# Patient Record
Sex: MALE | Race: WHITE | NOT HISPANIC OR LATINO | URBAN - METROPOLITAN AREA
[De-identification: names, ages, dates, MRNs, and addresses within clinical notes are randomized per-mention and may not be internally consistent; named-entity substitution may affect disease eponyms.]

---

## 2022-07-05 ENCOUNTER — INPATIENT (INPATIENT)
Facility: HOSPITAL | Age: 72
LOS: 50 days | Discharge: HOME HEALTH | End: 2022-08-25
Attending: STUDENT IN AN ORGANIZED HEALTH CARE EDUCATION/TRAINING PROGRAM | Admitting: STUDENT IN AN ORGANIZED HEALTH CARE EDUCATION/TRAINING PROGRAM

## 2022-07-05 ENCOUNTER — EMERGENCY (EMERGENCY)
Facility: HOSPITAL | Age: 72
LOS: 1 days | Discharge: ANOTHER HOSPITAL | End: 2022-07-05
Attending: STUDENT IN AN ORGANIZED HEALTH CARE EDUCATION/TRAINING PROGRAM
Payer: COMMERCIAL

## 2022-07-05 VITALS
RESPIRATION RATE: 19 BRPM | SYSTOLIC BLOOD PRESSURE: 127 MMHG | HEART RATE: 68 BPM | OXYGEN SATURATION: 99 % | DIASTOLIC BLOOD PRESSURE: 83 MMHG | TEMPERATURE: 98 F

## 2022-07-05 VITALS
DIASTOLIC BLOOD PRESSURE: 77 MMHG | OXYGEN SATURATION: 97 % | SYSTOLIC BLOOD PRESSURE: 121 MMHG | WEIGHT: 169.98 LBS | RESPIRATION RATE: 18 BRPM | HEART RATE: 60 BPM | TEMPERATURE: 98 F

## 2022-07-05 VITALS — TEMPERATURE: 98 F | WEIGHT: 169.98 LBS | RESPIRATION RATE: 16 BRPM | HEIGHT: 73 IN | OXYGEN SATURATION: 99 %

## 2022-07-05 DIAGNOSIS — F33.9 MAJOR DEPRESSIVE DISORDER, RECURRENT, UNSPECIFIED: ICD-10-CM

## 2022-07-05 DIAGNOSIS — F33.2 MAJOR DEPRESSIVE DISORDER, RECURRENT SEVERE WITHOUT PSYCHOTIC FEATURES: ICD-10-CM

## 2022-07-05 LAB
ALBUMIN SERPL ELPH-MCNC: 3.9 G/DL — SIGNIFICANT CHANGE UP (ref 3.5–5)
ALP SERPL-CCNC: 74 U/L — SIGNIFICANT CHANGE UP (ref 40–120)
ALT FLD-CCNC: 21 U/L DA — SIGNIFICANT CHANGE UP (ref 10–60)
ANION GAP SERPL CALC-SCNC: 6 MMOL/L — SIGNIFICANT CHANGE UP (ref 5–17)
AST SERPL-CCNC: 18 U/L — SIGNIFICANT CHANGE UP (ref 10–40)
BASOPHILS # BLD AUTO: 0.03 K/UL — SIGNIFICANT CHANGE UP (ref 0–0.2)
BASOPHILS NFR BLD AUTO: 0.4 % — SIGNIFICANT CHANGE UP (ref 0–2)
BILIRUB SERPL-MCNC: 1.4 MG/DL — HIGH (ref 0.2–1.2)
BUN SERPL-MCNC: 23 MG/DL — HIGH (ref 7–18)
CALCIUM SERPL-MCNC: 9.6 MG/DL — SIGNIFICANT CHANGE UP (ref 8.4–10.5)
CHLORIDE SERPL-SCNC: 105 MMOL/L — SIGNIFICANT CHANGE UP (ref 96–108)
CO2 SERPL-SCNC: 31 MMOL/L — SIGNIFICANT CHANGE UP (ref 22–31)
CREAT SERPL-MCNC: 1.15 MG/DL — SIGNIFICANT CHANGE UP (ref 0.5–1.3)
EGFR: 68 ML/MIN/1.73M2 — SIGNIFICANT CHANGE UP
EOSINOPHIL # BLD AUTO: 0.15 K/UL — SIGNIFICANT CHANGE UP (ref 0–0.5)
EOSINOPHIL NFR BLD AUTO: 2.2 % — SIGNIFICANT CHANGE UP (ref 0–6)
ETHANOL SERPL-MCNC: <3 MG/DL — SIGNIFICANT CHANGE UP (ref 0–10)
GLUCOSE SERPL-MCNC: 94 MG/DL — SIGNIFICANT CHANGE UP (ref 70–99)
HCT VFR BLD CALC: 39.9 % — SIGNIFICANT CHANGE UP (ref 39–50)
HGB BLD-MCNC: 13.5 G/DL — SIGNIFICANT CHANGE UP (ref 13–17)
IMM GRANULOCYTES NFR BLD AUTO: 0.4 % — SIGNIFICANT CHANGE UP (ref 0–1.5)
LYMPHOCYTES # BLD AUTO: 1.21 K/UL — SIGNIFICANT CHANGE UP (ref 1–3.3)
LYMPHOCYTES # BLD AUTO: 17.4 % — SIGNIFICANT CHANGE UP (ref 13–44)
MAGNESIUM SERPL-MCNC: 2.3 MG/DL — SIGNIFICANT CHANGE UP (ref 1.6–2.6)
MCHC RBC-ENTMCNC: 29.3 PG — SIGNIFICANT CHANGE UP (ref 27–34)
MCHC RBC-ENTMCNC: 33.8 GM/DL — SIGNIFICANT CHANGE UP (ref 32–36)
MCV RBC AUTO: 86.6 FL — SIGNIFICANT CHANGE UP (ref 80–100)
MONOCYTES # BLD AUTO: 0.61 K/UL — SIGNIFICANT CHANGE UP (ref 0–0.9)
MONOCYTES NFR BLD AUTO: 8.8 % — SIGNIFICANT CHANGE UP (ref 2–14)
NEUTROPHILS # BLD AUTO: 4.92 K/UL — SIGNIFICANT CHANGE UP (ref 1.8–7.4)
NEUTROPHILS NFR BLD AUTO: 70.8 % — SIGNIFICANT CHANGE UP (ref 43–77)
NRBC # BLD: 0 /100 WBCS — SIGNIFICANT CHANGE UP (ref 0–0)
PLATELET # BLD AUTO: 191 K/UL — SIGNIFICANT CHANGE UP (ref 150–400)
POTASSIUM SERPL-MCNC: 3.6 MMOL/L — SIGNIFICANT CHANGE UP (ref 3.5–5.3)
POTASSIUM SERPL-SCNC: 3.6 MMOL/L — SIGNIFICANT CHANGE UP (ref 3.5–5.3)
PROT SERPL-MCNC: 7.5 G/DL — SIGNIFICANT CHANGE UP (ref 6–8.3)
RBC # BLD: 4.61 M/UL — SIGNIFICANT CHANGE UP (ref 4.2–5.8)
RBC # FLD: 13.7 % — SIGNIFICANT CHANGE UP (ref 10.3–14.5)
SARS-COV-2 RNA SPEC QL NAA+PROBE: SIGNIFICANT CHANGE UP
SODIUM SERPL-SCNC: 142 MMOL/L — SIGNIFICANT CHANGE UP (ref 135–145)
TSH SERPL-MCNC: 1.08 UU/ML — SIGNIFICANT CHANGE UP (ref 0.34–4.82)
WBC # BLD: 6.95 K/UL — SIGNIFICANT CHANGE UP (ref 3.8–10.5)
WBC # FLD AUTO: 6.95 K/UL — SIGNIFICANT CHANGE UP (ref 3.8–10.5)

## 2022-07-05 PROCEDURE — 99284 EMERGENCY DEPT VISIT MOD MDM: CPT

## 2022-07-05 PROCEDURE — 96374 THER/PROPH/DIAG INJ IV PUSH: CPT

## 2022-07-05 PROCEDURE — 96375 TX/PRO/DX INJ NEW DRUG ADDON: CPT

## 2022-07-05 PROCEDURE — 80307 DRUG TEST PRSMV CHEM ANLYZR: CPT

## 2022-07-05 PROCEDURE — 93005 ELECTROCARDIOGRAM TRACING: CPT

## 2022-07-05 PROCEDURE — 36415 COLL VENOUS BLD VENIPUNCTURE: CPT

## 2022-07-05 PROCEDURE — 80053 COMPREHEN METABOLIC PANEL: CPT

## 2022-07-05 PROCEDURE — 83735 ASSAY OF MAGNESIUM: CPT

## 2022-07-05 PROCEDURE — 85025 COMPLETE CBC W/AUTO DIFF WBC: CPT

## 2022-07-05 PROCEDURE — 84484 ASSAY OF TROPONIN QUANT: CPT

## 2022-07-05 PROCEDURE — 99285 EMERGENCY DEPT VISIT HI MDM: CPT | Mod: 25

## 2022-07-05 PROCEDURE — 84443 ASSAY THYROID STIM HORMONE: CPT

## 2022-07-05 PROCEDURE — 87635 SARS-COV-2 COVID-19 AMP PRB: CPT

## 2022-07-05 RX ORDER — SODIUM CHLORIDE 9 MG/ML
1000 INJECTION INTRAMUSCULAR; INTRAVENOUS; SUBCUTANEOUS ONCE
Refills: 0 | Status: COMPLETED | OUTPATIENT
Start: 2022-07-05 | End: 2022-07-05

## 2022-07-05 RX ORDER — DIAZEPAM 5 MG
10 TABLET ORAL ONCE
Refills: 0 | Status: DISCONTINUED | OUTPATIENT
Start: 2022-07-05 | End: 2022-07-05

## 2022-07-05 RX ORDER — KETOROLAC TROMETHAMINE 30 MG/ML
15 SYRINGE (ML) INJECTION ONCE
Refills: 0 | Status: DISCONTINUED | OUTPATIENT
Start: 2022-07-05 | End: 2022-07-05

## 2022-07-05 RX ADMIN — Medication 15 MILLIGRAM(S): at 13:51

## 2022-07-05 RX ADMIN — Medication 15 MILLIGRAM(S): at 14:21

## 2022-07-05 RX ADMIN — SODIUM CHLORIDE 1000 MILLILITER(S): 9 INJECTION INTRAMUSCULAR; INTRAVENOUS; SUBCUTANEOUS at 13:54

## 2022-07-05 RX ADMIN — Medication 10 MILLIGRAM(S): at 17:58

## 2022-07-05 NOTE — ED ADULT NURSE NOTE - NSIMPLEMENTINTERV_GEN_ALL_ED
Implemented All Fall with Harm Risk Interventions:  [place] to call system. Call bell, personal items and telephone within reach. Instruct patient to call for assistance. Room bathroom lighting operational. Non-slip footwear when patient is off stretcher. Physically safe environment: no spills, clutter or unnecessary equipment. Stretcher in lowest position, wheels locked, appropriate side rails in place. Provide visual cue, wrist band, yellow gown, etc. Monitor gait and stability. Monitor for mental status changes and reorient to person, place, and time. Review medications for side effects contributing to fall risk. Reinforce activity limits and safety measures with patient and family. Provide visual clues: red socks.

## 2022-07-05 NOTE — ED BEHAVIORAL HEALTH ASSESSMENT NOTE - ELEVATED CHRONIC RISK
Cheiloplasty (Complex) Text: A decision was made to reconstruct the defect with a  cheiloplasty.  The defect was undermined extensively.  Additional obicularis oris muscle was excised with a 15 blade scalpel.  The defect was converted into a full thickness wedge to facilite a better cosmetic result.  Small vessels were then tied off with 5-0 monocyrl. The obicularis oris, superficial fascia, adipose and dermis were then reapproximated.  After the deeper layers were approximated the epidermis was reapproximated with particular care given to realign the vermilion border. Yes

## 2022-07-05 NOTE — ED BEHAVIORAL HEALTH ASSESSMENT NOTE - DETAILS
Discussed with ED attending see HPI, reports that he has walked into traffic, reports that he plans on stopping all medications reports that he had put a  hit out on a drug dealer years ago but did not give details Would not impact clinical decisionmaking at this time reports some shortness of breath though no apparent distress on exam at this time Patient is referent, did not give permission to speak to brother

## 2022-07-05 NOTE — ED PROVIDER NOTE - CLINICAL SUMMARY MEDICAL DECISION MAKING FREE TEXT BOX
71M presenting with chronic insomnia and difficulty urinating. endorses some passive SI. will get labs, psych consult. will reassess.

## 2022-07-05 NOTE — ED BEHAVIORAL HEALTH ASSESSMENT NOTE - RISK ASSESSMENT
High Acute Suicide Risk risk factors include suicide ideation, report of prior attempts, insomnia, hopelessness, multiple medical conditions, lack of current outpatient care, social isolation, elderly white male. protective factors include lack of access to firearms, lack of psychosis, sobriety

## 2022-07-05 NOTE — BH PATIENT PROFILE - FALL HARM RISK - UNIVERSAL INTERVENTIONS
Bed in lowest position, wheels locked, appropriate side rails in place/Call bell, personal items and telephone in reach/Instruct patient to call for assistance before getting out of bed or chair/Non-slip footwear when patient is out of bed/[place] to call system/Physically safe environment - no spills, clutter or unnecessary equipment/Purposeful Proactive Rounding/Room/bathroom lighting operational, light cord in reach

## 2022-07-05 NOTE — ED BEHAVIORAL HEALTH ASSESSMENT NOTE - HPI (INCLUDE ILLNESS QUALITY, SEVERITY, DURATION, TIMING, CONTEXT, MODIFYING FACTORS, ASSOCIATED SIGNS AND SYMPTOMS)
71M, unemployed, living with brother, with PMH of HTN, HLD, urinary incontinence 2/2 unclear prostate issues, R leg pain from arthritis, psych hx of MDD, OCD, "mixed personality disorder", reports hx of walking into traffic and of hitting himself, at least two prior hospitalizations ([place] 1/4/22-2/24/2022, [facility] 10/22-11/15/2021 for depression w psychosis), has previously been seen at [place] ED, now BIBEMS unclear who called after patient reported multiple somatic complaints and then reported that he wanted to kill himself.     Patient states that he has been dealing with multiple medical issues including urinary incontinence and leg pain, and recently shortness of breath, that he cannot get these taken care of (unclear reasons). Patient states that he has felt increasingly depressed, not able to enjoy himself at all, that he no longer has anyone to talk to because hes pushed them all away, reports sleeping at most 2 hours a night, hes been losing weight, feeling hopeless, and increasingly suicidal. He states that he doesn't want to go on anymore, has thought about walking into traffic, touching a live wire, and states that he no longer wants to take any of his medications so that he will die. He does not know what he takes, stating that he has medications for high blood pressure, cholesterol, prostate issues, denies taking psych meds for years stating that they have all stopped working but that prozac worked in the past. Denies that he currently has any outpatient psychiatrist or therapist though stated that he had someone who he spoke with at [facility] who helped him with tasks from time to time but that this program is ending soon. He doesn't remember their name or contact information and states that there is nobody that we can talk to. He states that he had one best friend but that this person doesn't have time for him anymore and he doesn't want to burden her.     He denies auditory, visual, or tactile hallucinations, denies paranoia though states that the psychiatrist at [facility] has lied to him in the past and locked him away. He states that he used to drink alcohol but hasn't in decades, denies any drug use, states that he is incredibly anti nicotine.    He states that he is open to inpatient admission. He did not give permission for us to speak with brother or any collateral other than his pharmacy and doctor if we could find the name

## 2022-07-05 NOTE — ED ADULT NURSE NOTE - OBJECTIVE STATEMENT
BIBA c/o chest pressure, frequent urination, leg pain, difficulty walking, on arrival patient says he does not feel like living, not intentions of hurting himself, but wishes that  he may pass away. denies homicidal ideation.

## 2022-07-05 NOTE — ED BEHAVIORAL HEALTH ASSESSMENT NOTE - NS ED BHA PLAN HOLD IN ED BH CONTACTED FT
No last  provider contact information. Will call [facility] and obtain medication list and PMD

## 2022-07-05 NOTE — ED BEHAVIORAL HEALTH ASSESSMENT NOTE - DIFFERENTIAL
MDD, recurrent, severe without psychosis, r/o adjustment, mood disorder secondary to medical conditions, bipolar, schizoaffective, malingering

## 2022-07-05 NOTE — ED PROVIDER NOTE - PROGRESS NOTE DETAILS
telepsych consulted. Tomás Perry pt calm after valium. accepted vol to [facility] dr kennedy [address] [place] [address]

## 2022-07-05 NOTE — ED BEHAVIORAL HEALTH ASSESSMENT NOTE - SUMMARY
71M, unemployed, living with brother, with PMH of HTN, HLD, urinary incontinence 2/2 unclear prostate issues, R leg pain from arthritis, psych hx of MDD, OCD, "mixed personality disorder", reports hx of walking into traffic and of hitting himself, at least two prior hospitalizations ([place] 1/4/22-2/24/2022, [facility] 10/22-11/15/2021 for depression w psychosis), has previously been seen at [place] ED, now BIBEMS unclear who called after patient reported multiple somatic complaints and then reported that he wanted to kill himself.     Patient reports shortness of breath and some chest pressure and as such will need medical clearance    If cleared, he reports symptoms consistent with worsening depression in the form of low mood, hopelessness, insomnia, anhedonia, decreased PO intake, and worsening suicide ideation with plan to stop all of his medications. At this time he would meet inpatient hospitalization criteria for safety, stabilization, possible medication titration, and is currently appropriate for voluntary admission

## 2022-07-05 NOTE — ED PROVIDER NOTE - OBJECTIVE STATEMENT
71M, pmh of htn, hld, presenting with chronic leg pain and difficulty urinating. patient is unsure when he last saw a doctor. "don't know if i'm coming or going". has also been unable to sleep. would like surgery to fix his prostate problems. wishes he would die if his medical problems cannot be fixed. no fever, chest pain, shortness of breath, nausea, vomiting, or abdominal pain. has a headache.

## 2022-07-05 NOTE — ED ADULT TRIAGE NOTE - CHIEF COMPLAINT QUOTE
patient complains of difficulty x months, chest pressure x month, continously urinating, " I feel like its better of if someone just kill me "

## 2022-07-05 NOTE — ED BEHAVIORAL HEALTH NOTE - BEHAVIORAL HEALTH NOTE
Spoke to pharmacist at  and  pharmacy: Confirmed patients medications to be tamsulosin 0.4mg PO qDay, finesteride 5mg PO qDay, nifedipine ER 30mg PO qDay, simvastatin 20mg PO qDay, oxybutinin ER 5mg PO qDay, Drs ashley Brewer and Liliana Morales ([phone]), zeke walker [phone]. All medications were picked up 6/8/2022, 30 day supply. She reports that patient ambulates independently as far as she knows, not surprised that he does not remember what medications he takes as he gets a blister pack.

## 2022-07-05 NOTE — ED BEHAVIORAL HEALTH ASSESSMENT NOTE - CURRENT MEDICATION
denies psychiatric medications, reports that he takes multiple medical medications but doesn't remember what

## 2022-07-06 LAB
A1C WITH ESTIMATED AVERAGE GLUCOSE RESULT: 5.1 % — SIGNIFICANT CHANGE UP (ref 4–5.6)
CHOLEST SERPL-MCNC: 128 MG/DL — SIGNIFICANT CHANGE UP
ESTIMATED AVERAGE GLUCOSE: 100 — SIGNIFICANT CHANGE UP
FOLATE SERPL-MCNC: 6.7 NG/ML — SIGNIFICANT CHANGE UP (ref 3.1–17.5)
HDLC SERPL-MCNC: 52 MG/DL — SIGNIFICANT CHANGE UP
LIPID PNL WITH DIRECT LDL SERPL: 61 MG/DL — SIGNIFICANT CHANGE UP
NON HDL CHOLESTEROL: 76 MG/DL — SIGNIFICANT CHANGE UP
TRIGL SERPL-MCNC: 74 MG/DL — SIGNIFICANT CHANGE UP
VIT B12 SERPL-MCNC: 335 PG/ML — SIGNIFICANT CHANGE UP (ref 200–900)
VIT D25+D1,25 OH+D1,25 PNL SERPL-MCNC: 52.1 PG/ML — SIGNIFICANT CHANGE UP (ref 19.9–79.3)

## 2022-07-06 PROCEDURE — 99223 1ST HOSP IP/OBS HIGH 75: CPT

## 2022-07-06 PROCEDURE — 99222 1ST HOSP IP/OBS MODERATE 55: CPT

## 2022-07-06 RX ORDER — MIRTAZAPINE 45 MG/1
7.5 TABLET, ORALLY DISINTEGRATING ORAL ONCE
Refills: 0 | Status: COMPLETED | OUTPATIENT
Start: 2022-07-06 | End: 2022-07-06

## 2022-07-06 RX ORDER — DIPHENHYDRAMINE HCL 50 MG
25 CAPSULE ORAL ONCE
Refills: 0 | Status: DISCONTINUED | OUTPATIENT
Start: 2022-07-06 | End: 2022-07-06

## 2022-07-06 RX ORDER — CHOLECALCIFEROL (VITAMIN D3) 125 MCG
2000 CAPSULE ORAL ONCE
Refills: 0 | Status: COMPLETED | OUTPATIENT
Start: 2022-07-06 | End: 2022-07-06

## 2022-07-06 RX ORDER — TAMSULOSIN HYDROCHLORIDE 0.4 MG/1
0.4 CAPSULE ORAL DAILY
Refills: 0 | Status: DISCONTINUED | OUTPATIENT
Start: 2022-07-06 | End: 2022-08-25

## 2022-07-06 RX ORDER — DIPHENHYDRAMINE HCL 50 MG
25 CAPSULE ORAL EVERY 6 HOURS
Refills: 0 | Status: DISCONTINUED | OUTPATIENT
Start: 2022-07-06 | End: 2022-07-06

## 2022-07-06 RX ORDER — FINASTERIDE 5 MG/1
5 TABLET, FILM COATED ORAL DAILY
Refills: 0 | Status: DISCONTINUED | OUTPATIENT
Start: 2022-07-06 | End: 2022-08-25

## 2022-07-06 RX ORDER — HALOPERIDOL DECANOATE 100 MG/ML
2.5 INJECTION INTRAMUSCULAR EVERY 6 HOURS
Refills: 0 | Status: DISCONTINUED | OUTPATIENT
Start: 2022-07-06 | End: 2022-08-25

## 2022-07-06 RX ORDER — HALOPERIDOL DECANOATE 100 MG/ML
2.5 INJECTION INTRAMUSCULAR ONCE
Refills: 0 | Status: DISCONTINUED | OUTPATIENT
Start: 2022-07-06 | End: 2022-08-25

## 2022-07-06 RX ORDER — OXYBUTYNIN CHLORIDE 5 MG
5 TABLET ORAL DAILY
Refills: 0 | Status: DISCONTINUED | OUTPATIENT
Start: 2022-07-06 | End: 2022-08-25

## 2022-07-06 RX ORDER — CHOLECALCIFEROL (VITAMIN D3) 125 MCG
2000 CAPSULE ORAL DAILY
Refills: 0 | Status: DISCONTINUED | OUTPATIENT
Start: 2022-07-06 | End: 2022-08-25

## 2022-07-06 RX ORDER — SIMVASTATIN 20 MG/1
20 TABLET, FILM COATED ORAL DAILY
Refills: 0 | Status: DISCONTINUED | OUTPATIENT
Start: 2022-07-06 | End: 2022-08-25

## 2022-07-06 RX ORDER — NIFEDIPINE 30 MG
30 TABLET, EXTENDED RELEASE 24 HR ORAL DAILY
Refills: 0 | Status: DISCONTINUED | OUTPATIENT
Start: 2022-07-06 | End: 2022-08-25

## 2022-07-06 RX ADMIN — Medication 1 TABLET(S): at 17:36

## 2022-07-06 RX ADMIN — Medication 5 MILLIGRAM(S): at 09:51

## 2022-07-06 RX ADMIN — MIRTAZAPINE 7.5 MILLIGRAM(S): 45 TABLET, ORALLY DISINTEGRATING ORAL at 02:00

## 2022-07-06 RX ADMIN — SIMVASTATIN 20 MILLIGRAM(S): 20 TABLET, FILM COATED ORAL at 09:51

## 2022-07-06 RX ADMIN — Medication 30 MILLIGRAM(S): at 09:50

## 2022-07-06 RX ADMIN — FINASTERIDE 5 MILLIGRAM(S): 5 TABLET, FILM COATED ORAL at 09:51

## 2022-07-06 RX ADMIN — TAMSULOSIN HYDROCHLORIDE 0.4 MILLIGRAM(S): 0.4 CAPSULE ORAL at 09:51

## 2022-07-06 NOTE — BH INPATIENT PSYCHIATRY ASSESSMENT NOTE - DETAILS
reports that he had put a  hit out on a drug dealer years ago but did not give details see HPI, reports that he has walked into traffic, reports that he plans on stopping all medications. Verbalized conditional suicidality such as If you don't find my phone I will harm myself. However denied thoughts of hurting self in hospital when not discussing missing phone Would not impact clinical decisionmaking at this time

## 2022-07-06 NOTE — CONSULT NOTE ADULT - SUBJECTIVE AND OBJECTIVE BOX
HPI: 71M admitted to [facility] 7/5/22 for depression with several medical complaints.  He reports urinary frequency and is taking proscar and flomax but says they don''t help.  He states that there is "no urologist for him to see" due to limited mobility due to right knee pain.  His upper back constantly itches.  Has insomnia    PAST MEDICAL & SURGICAL HISTORY:  BPH    Review of Systems:   CONSTITUTIONAL: No fever, weight loss, or fatigue  EYES: No eye pain, visual disturbances, or discharge  ENMT:  No difficulty hearing, tinnitus, vertigo; No sinus or throat pain  NECK: No pain or stiffness  RESPIRATORY: No cough, wheezing, chills or hemoptysis; No shortness of breath  CARDIOVASCULAR: No chest pain, palpitations, dizziness, or leg swelling  GASTROINTESTINAL: No abdominal or epigastric pain. No nausea, vomiting, or hematemesis; No diarrhea or constipation. No melena or hematochezia.  GENITOURINARY: see HPI  NEUROLOGICAL: No headaches, memory loss, loss of strength, numbness, or tremors  SKIN: see HPI   LYMPH NODES: No enlarged glands  ENDOCRINE: No heat or cold intolerance; No hair loss  MUSCULOSKELETAL: see HPI  HEME/LYMPH: No easy bruising, or bleeding gums  ALLERY AND IMMUNOLOGIC: No hives or eczema    Allergies    penicillin (Hives)    Intolerances        Social History: no etoh ton idu    FAMILY HISTORY:      MEDICATIONS  (STANDING):  cholecalciferol 2000 Unit(s) Oral once  cholecalciferol 2000 Unit(s) Oral daily  finasteride 5 milliGRAM(s) Oral daily  multivitamin      multivitamin 1 Tablet(s) Oral once  NIFEdipine XL 30 milliGRAM(s) Oral daily  oxybutynin 5 milliGRAM(s) Oral daily  simvastatin 20 milliGRAM(s) Oral daily  tamsulosin 0.4 milliGRAM(s) Oral daily    MEDICATIONS  (PRN):  haloperidol     Tablet 2.5 milliGRAM(s) Oral every 6 hours PRN agitation  haloperidol    Injectable 2.5 milliGRAM(s) IntraMuscular once PRN severe agitation  LORazepam     Tablet 0.5 milliGRAM(s) Oral every 6 hours PRN anxiety or insomnia      Vital Signs Last 24 Hrs  T(C): 36.2 (06 Jul 2022 07:59), Max: 36.7 (05 Jul 2022 16:41)  T(F): 97.2 (06 Jul 2022 07:59), Max: 98.1 (05 Jul 2022 16:41)  HR: 68 (05 Jul 2022 22:11) (62 - 68)  BP: 127/83 (05 Jul 2022 22:11) (123/67 - 133/82)  BP(mean): --  RR: 16 (05 Jul 2022 23:42) (16 - 19)  SpO2: 99% (05 Jul 2022 23:42) (97% - 99%)  CAPILLARY BLOOD GLUCOSE            PHYSICAL EXAM:  GENERAL: NAD, well-developed  HEAD:  Atraumatic, Normocephalic  EYES: EOMI, conjunctiva and sclera clear  NECK: Supple, No JVD  CHEST/LUNG: Clear to auscultation bilaterally; No wheeze  HEART: Regular rate and rhythm; No murmurs, rubs, or gallops  ABDOMEN: Soft, Nontender, Nondistended; Bowel sounds present  EXTREMITIES:  2+ Peripheral Pulses, No clubbing, cyanosis, or edema  R Knee no swelling or erythema, ttp when flexing  NEUROLOGY: non-focal  SKIN: no rash on back, multiple SKs    LABS:                        13.5   6.95  )-----------( 191      ( 05 Jul 2022 13:47 )             39.9     07-05    142  |  105  |  23<H>  ----------------------------<  94  3.6   |  31  |  1.15    Ca    9.6      05 Jul 2022 13:47  Mg     2.3     07-05    TPro  7.5  /  Alb  3.9  /  TBili  1.4<H>  /  DBili  x   /  AST  18  /  ALT  21  /  AlkPhos  74  07-05                Care Discussed with Consultants/Other Providers: Dr Iniguez

## 2022-07-06 NOTE — PSYCHIATRIC REHAB INITIAL EVALUATION - NSBHSTRENGTHHOME_PSY_ALL_CORE
Pt reports that he is unsure of returning back to the apartment he shares with his brother due to conflict with his brother.

## 2022-07-06 NOTE — CONSULT NOTE ADULT - ASSESSMENT
71M BPH HTN HLD admitted for depression    Plan:  Knee arthritis: Tylenol, lidocaien patch prn.  PT eval    BPH: COntinue flomax/proscar, outpt urology f/u    HTN: Nifedipine    HLD: statin    Depression: Management per primary team

## 2022-07-06 NOTE — BH INPATIENT PSYCHIATRY ASSESSMENT NOTE - NSBHASSESSSUMMFT_PSY_ALL_CORE
Patient with long term hx of unusual relatedness including pattern of making up psychiatric symptoms or hx such as claiming he arranged a hit or his brother suicided . Recently there has been escalating pattern of hospitalizations, ER visits . Compliance after d/c is nil. Patient also develops excessive attachment or intense dislike of providers. Patient has no actual hx of suicide attempts or fh or SIB. 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 Suspect possible unconscious secondary gain related to medical complaints help seeking then rejection of help as inadequate. Patient not assessed as needing CO. Will try to get more collateral before starting psych meds. Despite above may benefit from SSRI or SNRi and low dose antipsychotic. Seen by medicine started on lidoderm patch

## 2022-07-06 NOTE — BH INPATIENT PSYCHIATRY ASSESSMENT NOTE - NSCOMMENTSUICRISKFACT_PSY_ALL_CORE
Patient with  multiple risk factors including mood disorder, , possible personality disorder however denying current plan or intent to harm self, is help seeking want to get treatment, no CO required

## 2022-07-06 NOTE — BH INPATIENT PSYCHIATRY ASSESSMENT NOTE - NSBHCHARTREVIEWVS_PSY_A_CORE FT
Vital Signs Last 24 Hrs  T(C): 36.2 (07-06-22 @ 07:59), Max: 36.7 (07-05-22 @ 16:41)  T(F): 97.2 (07-06-22 @ 07:59), Max: 98.1 (07-05-22 @ 16:41)  HR: 68 (07-05-22 @ 22:11) (62 - 68)  BP: 127/83 (07-05-22 @ 22:11) (123/67 - 133/82)  BP(mean): --  RR: 16 (07-05-22 @ 23:42) (16 - 19)  SpO2: 99% (07-05-22 @ 23:42) (97% - 99%)    Orthostatic VS  07-06-22 @ 07:59  Lying BP: --/-- HR: --  Sitting BP: 113/78 HR: 65  Standing BP: 119/72 HR: 80  Site: upper left arm  Mode: electronic  Orthostatic VS  07-05-22 @ 23:42  Lying BP: --/-- HR: --  Sitting BP: 144/83 HR: 70  Standing BP: 128/74 HR: 80  Site: upper right arm  Mode: electronic

## 2022-07-06 NOTE — BH SOCIAL WORK INITIAL PSYCHOSOCIAL EVALUATION - NSHIGHRISKBEHFT_PSY_ALL_CORE
Pt expresses SI with plan Pt expresses SI with plan.  Pt reportedly has no history of suicide attempt.  Pt reportedly leaves the home early in the day (5am), is out all day or multiple days without contact with his family.

## 2022-07-06 NOTE — PHYSICAL THERAPY INITIAL EVALUATION ADULT - PERTINENT HX OF CURRENT PROBLEM, REHAB EVAL
Pt is with PMH of HTN, HLD, urinary incontinence 2/2 unclear prostate issues, R leg pain from arthritis, psych hx of MDD, OCD, "mixed personality disorder", at least two prior hospitalizations ([place] 1/4/22-2/24/2022, [facility] 10/22-11/15/2021 for depression w psychosis), has previously been seen at [place] ED, now BIBEMS unclear who called after patient reported multiple somatic complaints and then reported that he wanted to kill himself." Patient referred to PT due to Chronic R knee pain.

## 2022-07-06 NOTE — BH INPATIENT PSYCHIATRY ASSESSMENT NOTE - NSSUICRSKFACTOR_PSY_ALL_CORE
Current and Past Psychiatric Diagnoses/Presenting Symptoms/Treatment Related Factors/Activating Events/Stressors/Other

## 2022-07-06 NOTE — BH SOCIAL WORK INITIAL PSYCHOSOCIAL EVALUATION - NSBHHOME_PSY_ALL_CORE
Home with Family Pt lives with his brother and brother's roomate in a rented apartment.  Pt reports living in this apartment most of his life/Home with Family

## 2022-07-06 NOTE — BH SOCIAL WORK INITIAL PSYCHOSOCIAL EVALUATION - OTHER PAST PSYCHIATRIC HISTORY (INCLUDE DETAILS REGARDING ONSET, COURSE OF ILLNESS, INPATIENT/OUTPATIENT TREATMENT)
Pt with history of formal psychiatric treatment for many years.  Last admissions in [facility] 1/4/22-2/24/22 and [facility] 10/22/21-11/15/21.  Pt reports no outpatient provider at this time. Pt with history of formal psychiatric treatment.  Two admissions in [facility] 1/4/22-2/24/22 and [facility] 10/22/21-11/15/21, were reportedly his first inpatient admissions. .  Pt was in outpatient treatment in [facility], and attending the [facility] there consistently until last fall when he reports falling out with a woman there.  Pt with at least three ED visits this Spring, and reportedly overutilizing the ER for medical care that is not urgent.

## 2022-07-06 NOTE — BH INPATIENT PSYCHIATRY ASSESSMENT NOTE - CURRENT PLAN:
Internal Medicine Resident Progress Note     Patient: Alyssia Swan Date: 8/1/2021   YOB: 1936 Admission Date: 6/23/2021   MRN: [id number] Attending: Fabio Steele MD     Chief Complaint   Patient presents with   • Multiple Falls     Patient Active Problem List   Diagnosis   • Diabetes mellitus (CMS/HCC)   • LBP (low back pain)   • TIA (transient ischemic attack)   • Dermatophytosis of foot   • Diabetic polyneuropathy (CMS/HCC)   • Type II or unspecified type diabetes mellitus with neurological manifestations, not stated as uncontrolled   • Lichen sclerosus   • Depression   • Difficulty walking   • Urinary incontinence   • Osteoarthritis   • Vitamin B12 deficiency (non anemic)   • Rib pain on left side   • Somnolence   • Folic acid deficiency   • Syncope    • Benign essential hypertension   • Anemia   • Thyroid nodule   • Memory loss   • MDS (myelodysplastic syndrome) (CMS/HCC)   • Deconditioning w/ recurrent falls   • Moderate Pericardial Effusion   • DEBRA     Alyssia Swan is an 85 year old female with PMHx significant for hypertension, hyperlipidemia, type 2 diabetes, myelodysplastic malignancy (leukocytosis and anemia), and CKD2 who presented to the hospital after she was seen at the hematology oncology clinic for MDS and presented for concern of frequent falls. Vital signs were stable on admission. She did have bruises over the face and pain to the left thumb. Pertinent laboratory findings include WBC 19 (13-22), HGB 9.3, creatinine 1.1 with GFR of 46, CT negative, however did show bilateral thyroid nodules, largest being 1.9 cm.  X-ray showing nondisplaced intra-articular fracture of palmar base of distal phalanx of left thumb and patient refused splint. An Echo was done showing moderate pericardial effusion with minor hemodynamic compromise but patient remained hemodynamically stable. Patient was deemed inappropriate to make her own decisions so her POA was activated (son Ted Swan). POA and patient decided  to treat pericardial effusion conservatively. Patient was seen by cardiology who elected no immediate procedure but to try steroid trial with prednisone 60mg BID. This was subsequently changed on 06/27/21 to 60mg once daily as it was leading to high blood sugars and elevated white count.  On 6/28 the patient is prednisone was discontinued.  On 6/29 0.6 mg colchicine was started daily and changed to twice weekly on 7/26 due to decreased Cr clearance. The patient has continued to be hemodynamically stable, working with PT and OT while looking for placement. On 7/13 patient had unwitnessed fall with laceration to the right parietal skull, CT negative, laceration stapled by surgery. Staples removed on 7/25. Throughout admission, she has received twice weekly labs (M, Th). Hgb has remained stable around 7-8 and she has not required transfusion. Repeat echo on 7/26 showed similar moderate pericardial effusion, but with less hemodynamic compromise of the heart.    SUBJECTIVE   No acute event overnight.  Sleeping quietly this morning.  Discussed with RN regarding any concerns overnight.  There where none.    Activity level:  As tolerated  Nausea/vomiting:  GI Symptoms: Decreased appetite    OBJECTIVE  Scheduled Medications latanoprost, 1 drop, Nightly  linaGLIPtin, 5 mg, Daily  vitamin D3, 1.25 mg, Once per day on Sun  aspirin-dipyridamole, 1 capsule, BID  folic acid, 1 mg, Daily  insulin lispro, , TID WC  colchicine, 0.6 mg, Once per day on Tue Fri  carvedilol, 6.25 mg, BID WC  losartan, 50 mg, Daily  magnesium oxide, 400 mg, BID  docusate sodium-sennosides, 1 tablet, Daily  melatonin, 3 mg, Nightly  chlorthalidone, 25 mg, Daily  enoxaparin, 30 mg, Daily  Potassium Standard Replacement Protocol, , See Admin Instructions  Magnesium Standard Replacement Protocol, , See Admin Instructions    PRN Medications acetaminophen, 1,000 mg, Q4H PRN  bisacodyl, 10 mg, Daily PRN  dextrose, 25 g, PRN  dextrose, 12.5 g, PRN  glucagon, 1  mg, PRN  polyethylene glycol, 17 g, BID PRN  hydrALAZINE, 10 mg, Q6H PRN  labetalol, 20 mg, Q6H PRN  sodium chloride, 500 mL, PRN  sodium chloride, 25 mL, PRN  dextrose, 15 g, PRN  dextrose, 30 g, PRN    Continuous Infusions   Vital Last Value 24 Hour Range   Temperature 97.5 °F (36.4 °C) (08/01/21 0610) Temp  Min: 97.5 °F (36.4 °C)  Max: 98 °F (36.7 °C)   Pulse 82 (08/01/21 0610) Pulse  Min: 75  Max: 82   Respiratory 18 (08/01/21 0610) Resp  Min: 17  Max: 18   Non-Invasive  Blood Pressure (!) 145/63 (08/01/21 0610) BP  Min: 114/54  Max: 145/63   Arterial   Blood Pressure   No data recorded   Pulse Oximetry 96 % (08/01/21 0610) SpO2  Min: 96 %  Max: 97 %     Height/Weight Today Admitted   Weight 54.8 kg (07/06/21 1400) Weight: 58.3 kg (06/24/21 1600)   Height N/A Height: 5' 1\" (154.9 cm) (06/24/21 1600)   BMI N/A BMI (Calculated): 24.28 (06/24/21 1600)     Intake/Output  Last Stool Occurrence: 1 (07/28/21 1300)    Intake/Output Summary (Last 24 hours) at 8/1/2021 0723  Last data filed at 7/31/2021 2120  Gross per 24 hour   Intake 120 ml   Output --   Net 120 ml     Active Lines and Nursing Skin Assessments  Peripheral IV 07/04/21 Left Antecubital 22 (Active)   Site Assessment WDL 07/06/21 0900   Dressing Type Transparent 07/06/21 0900   Dressing Activity Changed 07/04/21 1900   Dressing Changed On   07/04/21 07/04/21 1900   Lumen Cap Applied/Changed On 07/04/21 07/04/21 0530   Interventions Capped IV 07/06/21 0900     PHYSICAL EXAM  General:  [place]  used. Alert, cooperative, conversive. Lying in bed. Pale.  Skin:  Warm, dry no evidence of rash or other lesions. 1 large 5 cm bruise on R forearm near venipuncture site, healing.  HEENT: EOMI. The sclerae and conjunctivae are normal bilaterally. 2.5 cm laceration without active bleeding on R parietal skull, healing well, staples removed. MMM.  Respiratory:  Bilaterally clear to auscultation   Non-labored respirations.  Normal respiratory  effort.  Cardiovascular: Regular rate and rhythm and S1, S2 present but distant   Abdomen: soft, non-tender, non-distended. Positive bowel sounds all quadrants. No guarding or rebound.   Extremities and Spine:  No edema.  No deformity.  No tenderness.  Neuro:  Alert, oriented to self, place ([place]) and Month (July), year 2021.  Speech intact.  No dysphasia or dysarthria.  Symmetrical facial structures.    Psych:  Affect is appropriate with normal social interaction.    LAB RESULTS  Recent Labs   Lab 07/31/21  0753 07/30/21  1005 07/29/21  0857   WBC 14.5* 15.0* 13.1*   HCT 25.1* 26.4* 23.6*   HGB 7.8* 8.0* 7.1*    427 483*     Recent Labs   Lab 07/30/21  1005 07/29/21  0857 07/27/21  0708 07/26/21  0925   SODIUM 141 139  --  139   POTASSIUM 4.2 4.5 4.7 3.9   CHLORIDE 103 103  --  104   CO2 29 28  --  26   GLUCOSE 168* 213*  --  200*   BUN 21* 20  --  32*   CREATININE 1.04* 1.06*  --  0.95       EKG (Most recent)  6/23 Normal sinus, non specific t wave inversion    IMAGING  TTE  Result Date: 7/26/2021  Final Impression:  Limited TTE. Moderate pericardial effusion. Compared to prior study, dated 6/25/21, the amount of effusion is similar, but hemodynamically the heart seems LESS compromised. now RA , MV & TV inflow velocities ) .    MICROBIOLOGY  No new microbiology    Quality Indicators   AMI With Heart Failure with Reduced LVEF (<40%)? no  Heart failure?  No  Stroke measures indicated? no  AMI? No  DVT/VTE Prophylaxis:  VTE Pharmacologic Prophylaxis: Yes  VTE Mechanical Prophylaxis: Yes  Severe sepsis with septic shock?  No    ASSESSMENT AND PLAN  Alyssia Swan is a 85 year old female who was admitted on 6/23/2021. The patient is currently in stable condition.  We plan to proceed as follows (by problem).     # Moderate Pericardial Effusion: 6/25 echo with moderate pericardial effusion. Some signs of hemodynamic compromise noted (RV & RA collapse, exaggerated changes in diastolic flow velocities through MV  during respiration, IVC size). New EF 40%. Etiology unclear.  Patient hemodynamically stable.    - Pt's POA opted for conservative treatment per discussion with cardiology. Patient agreed with this  - S/p prednisone 60 mg BID 6/25-6/26, reduced to 60 mg daily 6/27-6/28, d/c'ed 6/28  - Continue colchicine 0.6 mg twice weekly (T, F)   - S/p colchicine 0.6 mg daily 6/29-7/25   - With reduced Cr clearance changed to Tues/Fri per pharmacy on 7/26  - Limited echo repeated on 07/26 with no significant changes, but showed less hemodynamic effect of effusion on the heart  - Patient remains asymptomatic  - Selective DNR: no intubation; antiarrhythmics, cardioversion, and vasopressors okay     # Dementia, without behavioral disturbance  # Depression, unspecified  - Delirium precautions, including sleep hygiene, frequent redirection, pain control, and controlled environment with frequent reorientation.  Avoid pharmacologic interventions for agitation.  - Activated POA - Ted Swan (son)    # Right parietal skull laceration, subsequent encounter: result of unwitnessed fall on 7/13, head CT negative.  - Laceration closed with staples by general surgery 7/13, staples removed on 7/25  - Healing well    # Type 2 diabetes mellitus, with hyperglycemia, with polyneuropathy  - PTA metformin and Tradjenta held  - Sliding scale insulin TID with meals   - 7/26: adjusted to more conservative scale (i.e.,1 unit Lispro for -250) due to minimal insulin requirements  - Consider restarting home medications given stability while awaiting placement, but creatinine clearance <30   - Will hold off for now      # Nondisplaced intra-articular fracture of the palmar base of the distal phalanx of the thumb  - Patient refusing splint  - Pain control with Tylenol, patient not complaining of pain currently  - PT/OT consulted     # Myeloid malignancy, biopsy proven (+) DASHAWN 2 and SF3B1  # Leukocytosis, trending upwards  # Anemia: Hgb stable around  7-8  - Recent bone marrow proven myeloid malignancy: (+) DASHAWN 2 and SF3B1, deletion 20 q Complex cytogenetics.  No active indication of infection. On Luspatercept treatment outpatient    - Discussed with Dr. Mcintosh, no need for luspatercept injection as inpatient, can wait for outpatient  - Continue Monday, Thursday CBC, BMP  - Transfuse if Hgb <7.0  - WBC: 13.1 > 15.0  - UA with small LEs, few bacteria, likely contaminated sample. Will repeat UA with culture  - Will contact hematologist Dr. Mcintosh  to discuss continuing treatment    # Painful urination, resolved  - 7/12: UA non-infectious  # Constipation, resolved  # Acute Kidney Injury, resolved  # Recurrent falls  # Generalized weakness  --PT/OT     Chronic Issues:  # Essential Hypertension, controlled - Losartan 50 mg, Carvedilol 6.25 mg bid, Chlorthalidone 25 mg    [facility]  Fluids: none   Isolation: none  DM: Insulin lispro SQ SS  Electrolyte PRN's: yes  Nutrition: PO Carb controlled  Pain: Acetaminophen PO  N/V: currently none  PT/OT: involved  GI Prophylaxis: currently none     Disposition:  Medically stable for discharge and awaiting placement.  Case management team is working with the patient's son (POA) to find placement.  [facility] approved, clinically accepted to [facility], awaiting patient admission to [facility].    The assessment and plan discussed with attending physician Fabio Steele MD who upon seeing the patient agrees with the above.       Parth Villa MD IMTS , PGY 3  Pager # [phone]  08/01/21 7:23 AM     Attending staffing note:    I have evaluated Ms. Swan and discussed her status and plan of care with Dr. Villa.  I have reviewed and agree with his progress note.    Fabio Steele MD  08/01/2021    Please contact the cross cover pager ([phone] or  at [facility] for questions between 1900 to 0700 Monday through Friday and between 1100 to 0700 on the weekends.     Covers all AMTS/IMTS patients  during the following hours:  Mon - Fri: 7:00 PM to 7:00 AM  Sat - Sun, and holidays: 11:00 AM to 7:00 AM      None known

## 2022-07-06 NOTE — BH INPATIENT PSYCHIATRY ASSESSMENT NOTE - NSBHLEGALSTATUSDT_PSY_ALL_CORE
2020    From the office of:   Adrian Haq MD   [facility]  [address] [place] [address] [address] SP[address]ER [address]  Phone: [phone]  Fax: [phone]    In regards to Dorene Garland, :  1939    Please note, the patient has severe hearing deficit.    Chief Complaint/Reason for Visit:  This patient is seen at the request of Natalie Bella MD  for further evaluation and recommendation regarding uterine mass and right breast mass.  The patient presents today accompanied by her daughter Jayda.      History of Present Illness:  Pt is a 81 year old year old female who, on 20, presented to the emergency department with complaints of constipation and unable to urinate. It was noted, \"The patient received normal saline bolus of 500 mL IV. She received 2 enemas with a large amount of stool out after the 2nd enema. A Peres catheter was placed.The case was initially discussed with Dr. Glass, gynecology. He requested the case been discussed with Dr. Adrian Haq, gynecology- oncology in [place]. The case was then discussed with Dr. Haq.\" A CT was obtained.    CT abdomen/pelvis performed 20 showed:  -Significant enlargement of the uterus with a large mass. This has  increased in size from the prior exam. Gynecological consultation is  recommended for possible malignancy.  -A 2.5 cm lobulated heterogeneous mass in the right breast. This is  suspicious for malignancy. Correlation with mammography and ultrasound is  recommended if indicated.  -Small pulmonary nodules, for which correlation with CT of the chest is  recommended if indicated.  -The other findings as described above.    She now presents for further evaluation and treatment recommendations (consultation).  She states she has had a very weak urinary stream for many weeks.  She did have a prolapsed uterus and blamed the urination issue on the ureter and prolapse.  She has urge to urinate, and feel like she was fully emptying  her bladder.  She also notes chronic constipation, having bowel movement 1-2 times per week.  She presented to the emergency department yesterday due to her concerns and had the workup as above.  She was told she could have both breast and uterine cancer.  She does have a history of breast cysts.    She notes chronic constipation as well as the issues with being unable to urinate.  She has had a Peres catheter placed and this is in place at this point.  She has no vaginal bleeding or discharge.  She notes no leg or ankle swelling.  No abdominal pain or bloating.  She has had an intentional weight loss of 14 lb over the past month and a half.  She has decreased appetite, does not feel hungry.  She denies any early satiety.    On her review of systems notes ongoing medicated depression, insomnia and anxiety, all worsened with the present diagnosis.  Medicated hypothyroidism.  Ongoing hot flashes which have increased in severity of late.  Easy bruising.  Chronic constipation.                                        Review of Systems:  A 14 item review of systems was performed and all responses were negative except as noted in the above \"History of Present Illness\".     Distress Thermometer:  Ten.  Depression, nervousness, worry.  Changes in urination.  Constipation.  Eating, fatigue.  Sleep.    Medications:  Current Outpatient Medications   Medication Sig   • aspirin 81 MG chewable tablet Chew 81 mg by mouth daily.   • buPROPion (WELLBUTRIN) 100 MG tablet Take 100 mg by mouth.   • hydrochlorothiazide (HYDRODIURIL) 25 MG tablet TAKE ONE TABLET BY MOUTH ONCE A DAY.   • levothyroxine 100 MCG tablet TAKE ONE TABLET BY MOUTH ONCE A DAY.   • meclizine (ANTIVERT) 25 MG tablet Take 25 mg by mouth 3 times daily as needed.   • pantoprazole (PROTONIX) 40 MG tablet Take 40 mg by mouth daily.   • prochlorperazine (COMPAZINE) 10 MG tablet Take 10 mg by mouth every 6 hours as needed.   • psyllium (METAMUCIL) 0.52 g capsule 4Capsules  daily     No current facility-administered medications for this visit.        Please see EPIC for a complete list of medications.    Allergies:  ALLERGIES:  Opioid analgesics and Propoxyphene    Past Medical History:  Past Medical History:   Diagnosis Date   • Anxiety    • Scott's esophagus    • Depressive disorder    • Diaphragmatic hernia    • Esophageal reflux    • Gangrene (CMS/HCC)    • Gastric ulcer    • Hyperlipidemia    • Hypothyroidism    • Lyme disease    • Rectocele    • Rosacea, acne    • Shoulder joint replacement status 2007   • Ulcer of esophagus        Past Surgical History:  Past Surgical History:   Procedure Laterality Date   • ------------other-------------      vein stripping   • ------------other-------------      cyber sterotatc radio  surg   • Central line insertion  2015   • Colonoscopy     • Removal of ovary(s)     • Total shoulder arthroplasty  2004   • Upper gi endoscopy,exam         Family History:  History reviewed. No pertinent family history.    Social History:   Social History     Socioeconomic History   • Marital status:      Spouse name: Not on file   • Number of children: Not on file   • Years of education: Not on file   • Highest education level: Not on file   Occupational History   • Not on file   Social Needs   • Financial resource strain: Not on file   • Food insecurity:     Worry: Not on file     Inability: Not on file   • Transportation needs:     Medical: Not on file     Non-medical: Not on file   Tobacco Use   • Smoking status: Former Smoker   • Smokeless tobacco: Never Used   Substance and Sexual Activity   • Alcohol use: Not Currently   • Drug use: Never   • Sexual activity: Not Currently   Lifestyle   • Physical activity:     Days per week: Not on file     Minutes per session: Not on file   • Stress: Not on file   Relationships   • Social connections:     Talks on phone: Not on file     Gets together: Not on file     Attends Confucianist service: Not on file      Active member of club or organization: Not on file     Attends meetings of clubs or organizations: Not on file     Relationship status: Not on file   • Intimate partner violence:     Fear of current or ex partner: Not on file     Emotionally abused: Not on file     Physically abused: Not on file     Forced sexual activity: Not on file   Other Topics Concern   • Not on file   Social History Narrative   • Not on file       PHYSICAL EXAM  Constitutional:   General appearance:  Well-developed, well-nourished female in no obvious distress.  She has a significant resting tremor.   Vitals:    Visit Vitals  /70   Pulse 70   Temp 97.5 °F (36.4 °C)   Resp 14   Ht 5' 1\" (1.549 m)   Wt 66.5 kg   BMI 27.68 kg/m²     Neurological/Psychiatric:  Orientation: Alert to time, place and person. Mood and affect: Appears appropriate.  Neck:  Appears normal, with no masses, asymmetry, with a midline trachea. The thyroid is not enlarged, tender or with any masses.  Skin: No rashes, lesions or ulcers.  Respiratory:  Good respiratory effort, with no use of accessory muscles.  Good diaphram movement.  Lungs are clear to auscultation with no abnormal breath sounds.  Cardiovascular: Auscultation of heart reveals no abnormal sounds or murmurs. The peripheral vascular system shows full pulses and no edema, or tenderness.  Chest (breasts):  Please see \"Genitourinary\" below  Gastrointestinal (Abdomen):  No surgical scars.  A mass is palpable almost up to the umbilicus, mobile, not tender.  No other masses, tenderness, hernia, or hepatosplenomegaly. Stool sample for occult blood test not indicated.  Lymphatic: No palpable lymph nodes in neck, axillae, or groin.  Genitourinary:    Breasts are symmetric and the left side shows masses or lumps, tenderness, or nipple discharge.  On the right, there is a now the tuning have cm in diameter, not fixed to the underlying bone, hard, fibrotic, in the inner lower quadrant, skin retraction or ulceration  above this.  It does not appear fixed to the skin.  Pelvic exam   External genitalia: Normal appearance with no lesions.   Urethra meatus is of normal size and location, with no lesions or prolapse.   Urethra shows no masses, tenderness or scarring.   Bladder shows no tenderness, fullness or masses.   Vagina has normal appearance, with no discharge, lesions, cystocele, rectocele or prolapse.   Cervix:  I could not clearly see the cervix.  It feels smooth on palpation.   Uterus:  Appears markedly enlarged, but smooth, mobile, with no tenderness, fairly poorly supported.   Adnexa/Parametria:  One side is surgically absent.  I could not clearly palpate the other side secondary to the marked enlargement of the uterus.   Anus and Perineum: No lesions noted.  There was formed stool elements around the anus which I removed prior to doing the exam.  Digital rectal exam: Good sphincter tone, no masses. No obvious hemorrhoids.     Data Reviewed:  Ultrasound and Office notes    Tests Ordered:  COVID-19 testing.  CBC, CMP, chest x-ray, EKG, type and screen    Consultations Requested:  Breast surgery regarding evaluation of right inner lower quadrant mass 2.5 cm.  Preop surgical optimization clinic.    Assessment and Plan:   Uterine mass with bladder outlet obstruction.  Right breast mass  I discussed the following:    I think both the uterine mass and the breast mass may be cancer.  In terms of the breast mass, I would like to have a breast specialist see her for further workup and possible treatment.    In terms of what required treatment in the near future, given the bladder outlet obstruction, I think it would be wise to perform the gynecologic cancer surgery in the near future.  She does have the Peres, there is a fair chance she can develop an infection with the Peres in place.  As far as the likelihood of this being cancer, it has been noted that she had the mass back in 2009 but it is appreciably bigger now.  That would  put the chance of this being cancer at least 50%.  Unfortunately I was unable to perform any type of biopsy at this point.  Other options would be to simply observe this, have a pessary in place and see if she can urinate better.  Another option would be to do nothing at all.  They wish to proceed with surgery.    The specific surgery recommended is a robotic-assisted total laparoscopic hysterectomy, removal remaining ovary and tube, possible removal of pelvic/aortic lymph nodes via sentinel technique or full dissection, possible omentectomy, possible tumor debulking, possible exploratory laparotomy. She was given a list of medications she should take with a sip of water the morning of surgery. Otherwise, she is to have nothing to eat, drink or smoke for 8 hours before surgery. She will likely be discharged home the day of the surgery, but could potentially require a number of days in the hospital, if perhaps she undergoes an exploratory laparotomy. Regardless, she will have excellent postoperative pain control including when she is discharged home. In terms of restrictions, she was advised she'll be unable to drive for one week following surgery and unable to lift over 10 pounds for 4 weeks following the surgery. She acknowledges understanding of all this information and grants both verbal and written permission to proceed with surgery.    The risks of surgery were discussed in great detail.  This included reviewing the [facility] Informed Consent in detail.  She acknowledges understanding of all the information presented and was given a copy of this to take with her for further review.  She grants both verbal and written permission to proceed with surgery.     The plan is the surgery will be performed at [facility] on [address].   She will have the tests as noted above. She was given a copy of my professional card and encouraged to call me should she  have any questions or concerns.    Dr. Bella, thank you so much for allowing me to help you care for Ms. Garland.  Please contact me should you have any questions or concerns whatsoever. I look forward to being of further service to you and will continue to keep you informed of any and all subsequent developments in her care from my standpoint.         05-Jul-2022

## 2022-07-06 NOTE — PHYSICAL THERAPY INITIAL EVALUATION ADULT - PLANNED THERAPY INTERVENTIONS, PT EVAL
balance training/gait training/joint mobilization/manual therapy techniques/neuromuscular re-education/ROM/strengthening/stretching/transfer training
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
## 2022-07-06 NOTE — BH INPATIENT PSYCHIATRY ASSESSMENT NOTE - NSBHLOCFT_PSY_A_CORE
Problem: Falls - Risk of:  Goal: Will remain free from falls  Description  Will remain free from falls  Outcome: Met This Shift  Note:    Pt is a High fall risk. See Kalli Shoe Fall Score and ABCDS Injury Risk assessments. xplained fall risk precautions to pt and family and rationale behind their use to keep the patient safe. Pt bed is in low position, side rails up, call light and belongings are in reach. Fall wristband applied and present on pts wrist.  Bed alarm on. Pt encouraged to call for assistance. Will continue with hourly rounds for PO intake, pain needs, toileting and repositioning as needed. Problem: Bleeding:  Goal: Will show no signs and symptoms of excessive bleeding  Description  Will show no signs and symptoms of excessive bleeding  Outcome: Met This Shift  Note:   Patient's hemoglobin this AM:   Recent Labs     03/04/20  0245   HGB 8.1*     Patient's platelet count this AM:   Recent Labs     03/04/20  0245   *    Thrombocytopenia Precautions in place. Patient showing no signs or symptoms of active bleeding. Transfusion not indicated at this time. Patient verbalizes understanding of all instructions. Will continue to assess and implement POC. Call light within reach and hourly rounding in place. Problem: Infection - Central Venous Catheter-Associated Bloodstream Infection:  Goal: Will show no infection signs and symptoms  Description  Will show no infection signs and symptoms  Outcome: Met This Shift  Note:   CVC site remains free of signs/symptoms of infection. No drainage, edema, erythema, pain, or warmth noted at site. Dressing changes continue per protocol and on an as needed basis - see flowsheet. Compliant with [facility] Bath Protocol:  Performed CHG bath today per [facility] protocol utilizing Bed bath with CHG wipes. CVC site cleansed with CHG wipe over dressing, skin surrounding dressing, and first 6\" of IV tubing. Pt tolerated well.   Continued to encourage daily CHG bathing per [facility] protocol. Problem: Nutrition  Goal: Optimal nutrition therapy  Outcome: Ongoing  Note:   Pt continues to attempt small frequent snacks. Problem: Venous Thromboembolism:  Goal: Will show no signs or symptoms of venous thromboembolism  Description  Will show no signs or symptoms of venous thromboembolism  Outcome: Ongoing  Note:   Pt with a-fib. Currently on treatment dose Lovenox. Pt at risk of bleeding d/t anticoagulation. Cautioned pt on safety precautions to decrease risk of bleeding. Will notify provider if platelets drop below 50,000 and/or if pt has invasive procedure scheduled in order to hold anticoagulation. Problem: Cardiac Output - Decreased:  Goal: Hemodynamic stability will improve  Description  Hemodynamic stability will improve  Outcome: Ongoing  Note:   Pt remains on blood pressure support medication. Titrating per order. Problem: Serum Glucose Level - Abnormal:  Goal: Ability to maintain appropriate glucose levels will improve to within specified parameters  Description  Ability to maintain appropriate glucose levels will improve to within specified parameters  Outcome: Ongoing  Note:   Blood sugar checked ACHS. Problem: Risk for Impaired Skin Integrity  Goal: Tissue integrity - skin and mucous membranes  Description  Structural intactness and normal physiological function of skin and  mucous membranes. Note:   No acute changes during shift. Pt turned Q2. mild sedation from meds in ER

## 2022-07-06 NOTE — BH INPATIENT PSYCHIATRY ASSESSMENT NOTE - NSBHMETABOLIC_PSY_ALL_CORE_FT
BMI: BMI (kg/m2): 22.4 (07-05-22 @ 23:42)  HbA1c: A1C with Estimated Average Glucose Result: 5.1 % (07-06-22 @ 10:38)    Glucose:   BP: --  Lipid Panel: Date/Time: 07-06-22 @ 10:38  Cholesterol, Serum: 128  Direct LDL: --  HDL Cholesterol, Serum: 52  Total Cholesterol/HDL Ration Measurement: --  Triglycerides, Serum: 74

## 2022-07-06 NOTE — PHYSICAL THERAPY INITIAL EVALUATION ADULT - GAIT DEVIATIONS NOTED, PT EVAL
decreased stella/increased time in double stance/decreased step length/decreased stride length/decreased weight-shifting ability

## 2022-07-06 NOTE — PHYSICAL THERAPY INITIAL EVALUATION ADULT - RANGE OF MOTION EXAMINATION, REHAB EVAL
Right knee extension/bilateral lower extremity ROM was WFL (within functional limits)/deficits as listed below

## 2022-07-06 NOTE — PSYCHIATRIC REHAB INITIAL EVALUATION - NSBHPRRECOMMEND_PSY_ALL_CORE
Writer met with Pt to orient him to the unit, to Psych Rehab department and its functions. Pt was receptive. Pt was seen in his room. Upon approach Pt was lying on his bed wearing hospital gown and boxers. Pt was carefree with his revealing attire and unreceptive to staff’s encouragement/suggestion to put on pants or have the sheet on his bottom.    Pt was verbal, uncooperative, and disorganized during the session. Pt is a 71 year old who identifies as a single,  male. Pt reports that he is unemployed and on disability benefits. Pt lives with his brother who he does not get along well. Pt report SI in context of refuse to eat and take his meds. Pt also expressed [place] in context of plan to kill one of the psychologists of [facility]. When he is asked about his plan he reports that he got people who would do for him. Pt denies AH/VH. Pt reports chronic pain and desire of having his operation of prostate. Pt also reports that he does not trust anyone.  Pt [email or web address]. Pt was unable to identify his primary reason of hospitalization.  Per the chart Pt is with PMH of HTN, HLD, urinary incontinence 2/2 unclear prostate issues, R leg pain from arthritis, psych hx of MDD, OCD, "mixed personality disorder", at least two prior hospitalizations ([facility] 1/4/22-2/24/2022, [facility] 10/22-11/15/2021 for depression w psychosis), has previously been seen at [facility], now [facility] unclear who called after patient reported multiple somatic complaints and then reported that he wanted to kill himself."  Writer was unable to establish to collaborate to work on a specific goal due to Pt’s disorganization. Therefore, a goal is chosen for him to work on the next seven days. Over the next seven days Psych rehab staff will provide support, encouragement individual and group therapy to assist the Pt in his progression his goal.

## 2022-07-06 NOTE — BH SOCIAL WORK INITIAL PSYCHOSOCIAL EVALUATION - NSBHBARRIERS_PSY_ALL_CORE
Pt reports chronic leg pain, urinary incontinence related to urologic issues./Medical co-morbidities/Poor judgement

## 2022-07-06 NOTE — PHYSICAL THERAPY INITIAL EVALUATION ADULT - IMPAIRMENTS FOUND, PT EVAL
aerobic capacity/endurance/ergonomics and body mechanics/gait, locomotion, and balance/joint integrity and mobility/muscle strength/poor safety awareness/ROM
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
## 2022-07-06 NOTE — PSYCHIATRIC REHAB INITIAL EVALUATION - NSBHSIGPRIORMED_PSY_ALL_CORE
Pt is with PMH of HTN, HLD, urinary incontinence 2/2 unclear prostate issues, R leg pain from arthritis, psych hx of MDD, OCD, "mixed personality disorder"./Yes (Specify)

## 2022-07-06 NOTE — BH SOCIAL WORK INITIAL PSYCHOSOCIAL EVALUATION - NSBHCOMMCURRENT_PSY_ALL_CORE
Pt has been followed by [facility] for several years [facility] has followed pt since 11/21.  Raeann Live Dir.  [phone]

## 2022-07-06 NOTE — BH SOCIAL WORK INITIAL PSYCHOSOCIAL EVALUATION - NSBHEMPLOYEDYN_PSY_ALL_CORE
No pt worked for [employer] for 14 years leaving approximately 1983 with no further consistent employment/No

## 2022-07-06 NOTE — BH INPATIENT PSYCHIATRY ASSESSMENT NOTE - CURRENT MEDICATION
MEDICATIONS  (STANDING):  cholecalciferol 2000 Unit(s) Oral once  cholecalciferol 2000 Unit(s) Oral daily  finasteride 5 milliGRAM(s) Oral daily  multivitamin      multivitamin 1 Tablet(s) Oral once  NIFEdipine XL 30 milliGRAM(s) Oral daily  oxybutynin 5 milliGRAM(s) Oral daily  simvastatin 20 milliGRAM(s) Oral daily  tamsulosin 0.4 milliGRAM(s) Oral daily    MEDICATIONS  (PRN):  haloperidol     Tablet 2.5 milliGRAM(s) Oral every 6 hours PRN agitation  haloperidol    Injectable 2.5 milliGRAM(s) IntraMuscular once PRN severe agitation  LORazepam     Tablet 0.5 milliGRAM(s) Oral every 6 hours PRN anxiety or insomnia

## 2022-07-06 NOTE — BH INPATIENT PSYCHIATRY ASSESSMENT NOTE - RISK ASSESSMENT
risk factors include suicide ideation, report of prior attempts, insomnia, hopelessness, multiple medical conditions, lack of current outpatient care, social isolation, elderly white male. protective factors include lack of access to firearms, lack of psychosis, sobriety

## 2022-07-06 NOTE — BH INPATIENT PSYCHIATRY ASSESSMENT NOTE - LEGAL HISTORY
Arrived from surgery per cart with Dr. Hidalgo and OR RN. PD catheter intact and clamped, dressing dry. Continue to monitor.   none known

## 2022-07-07 PROCEDURE — 99232 SBSQ HOSP IP/OBS MODERATE 35: CPT

## 2022-07-07 PROCEDURE — 90837 PSYTX W PT 60 MINUTES: CPT

## 2022-07-07 RX ADMIN — SIMVASTATIN 20 MILLIGRAM(S): 20 TABLET, FILM COATED ORAL at 09:01

## 2022-07-07 RX ADMIN — Medication 2000 UNIT(S): at 09:01

## 2022-07-07 RX ADMIN — FINASTERIDE 5 MILLIGRAM(S): 5 TABLET, FILM COATED ORAL at 09:02

## 2022-07-07 RX ADMIN — Medication 5 MILLIGRAM(S): at 09:02

## 2022-07-07 RX ADMIN — Medication 0.5 MILLIGRAM(S): at 21:57

## 2022-07-07 RX ADMIN — TAMSULOSIN HYDROCHLORIDE 0.4 MILLIGRAM(S): 0.4 CAPSULE ORAL at 09:01

## 2022-07-07 RX ADMIN — Medication 1 TABLET(S): at 09:02

## 2022-07-07 RX ADMIN — Medication 30 MILLIGRAM(S): at 09:01

## 2022-07-07 NOTE — BH INPATIENT PSYCHIATRY PROGRESS NOTE - NSBHCHARTREVIEWVS_PSY_A_CORE FT
Vital Signs Last 24 Hrs  T(C): 36.3 (07-07-22 @ 07:46), Max: 36.3 (07-07-22 @ 07:46)  T(F): 97.3 (07-07-22 @ 07:46), Max: 97.3 (07-07-22 @ 07:46)  HR: --  BP: --  BP(mean): --  RR: --  SpO2: --    Orthostatic VS  07-07-22 @ 07:46  Lying BP: --/-- HR: --  Sitting BP: 107/73 HR: 73  Standing BP: --/-- HR: --  Site: upper left arm  Mode: electronic  Orthostatic VS  07-06-22 @ 07:59  Lying BP: --/-- HR: --  Sitting BP: 113/78 HR: 65  Standing BP: 119/72 HR: 80  Site: upper left arm  Mode: electronic  Orthostatic VS  07-05-22 @ 23:42  Lying BP: --/-- HR: --  Sitting BP: 144/83 HR: 70  Standing BP: 128/74 HR: 80  Site: upper right arm  Mode: electronic

## 2022-07-07 NOTE — BH INPATIENT PSYCHIATRY PROGRESS NOTE - NSBHFUPINTERVALHXFT_PSY_A_CORE
71 y.o.  male, single, unemployed, living with brother, with medical hx of HTN, HLD, urinary incontinence 2/2 unclear prostate issues vs overactive bladder, R leg pain from arthritis, psychiatric hx of MDD, ?OCD, "mixed personality disorder", at least two prior hospitalizations ([place] 1/4/22-2/24/2022, [facility] 10/22-11/15/2021 for depression w psychosis), has previously been seen at [facility], now BIBEMS unclear who called after patient reported multiple somatic complaints and then reported that he wanted to kill himself.     Patient initially met at entrance of his room. Patient emphatically stated, "my tshirts are gone so I have no reason to live". However, patient was redirected and agreed to interview. Patient interviewed in group room with attending psychiatrist. Patient reported that he feels chronically depressed since childhood, with nothing has helped in the past, except for prozac, and also complained of multiple somatic complaints, claiming that he feels suicidal because of loss of his shirts and not being able to watch TV, however upon further questioning admits to no specific intent or plan.     Patient appears to be circumstantial at times, reports that he had been seeing someone at [employer] and was later reassigned to "[employer]", however has not been seeing a psychiatrist. Patient talks about different ways he can potentially hurt himself, however denied any suicide attempts, and reports previous nonsuicidal self harm behavior by cutting and by banging on the wall when frustrated in the past. He can be vivid in his descriptions, stating "I wish a hole would open up in the earth and swallow [facility]". Patient reports his frustration at the director at [facility] for not allowing him to set up a table in the front, offering resources for substance use cessation, yet also reports being attracted to her. Patient also makes claims that he has ordered "hits" on multiple people, including on one person to protect his sex-worker friend and claims that the police did not care and thanked him for it.     No perceptual disturbances elicited. Patient complained about having to read his poetry, reported being a "well-read" person but does not like reading.     Collateral obtained from  of [facility] Dr. Cristo Delgadillo [phone] who reported that he is familiar with patient for several years.  He reported that patient has been extensively evaluated and diagnosed with factitious disorder, with several visits to [place] ED for somatic complaints and c/o depression and insomnia. He reports that patient has a  to link to outpatient care, and requires psychotherapy. He reported that patient is not on any psychotropic medications and has been previously tried on several psychotropic medications with no effect. He reported patient having several medical problems including urinary incontinence, that his medical providers are managing conservatively with medications and not opting for surgery. He reported that patient has previously been offered a place in Adult home, however declined, electing to stay with his brother.  71 y.o.  male, single, unemployed, living with brother, with medical hx of HTN, HLD, urinary incontinence 2/2 unclear prostate issues vs overactive bladder, R leg pain from arthritis, psychiatric hx of MDD, ?OCD, "mixed personality disorder", at least two prior hospitalizations ([place] 1/4/22-2/24/2022, [facility] 10/22-11/15/2021 for depression w psychosis), has previously been seen at [place] ED, now BIBEMS unclear who called after patient reported multiple somatic complaints and then reported that he wanted to kill himself.     Patient initially met at entrance of his room. Patient exasperatedly stated, "my tshirts are gone so I have no reason to live". However, patient was redirected and agreed to interview. Patient interviewed in group room with attending psychiatrist. Patient reported that he feels chronically depressed since childhood, with nothing has helped in the past, except for prozac, and also complained of multiple somatic complaints, claiming that he feels suicidal because of loss of his shirts and not being able to watch TV, however upon further questioning admits to no specific intent or plan. He also admitted that there are several ways to hurt himself, however does not have previous suicide attempts. He does have past hx of self-injurious behavior of banging on the wall or threatening to cut self when frustrated.    Patient appears to be circumstantial at times, reports that he had been seeing someone at [employer] and was later reassigned to "[employer]", however has not been seeing a psychiatrist. Patient talks about different ways he can potentially hurt himself, however denied any suicide attempts, and reports previous nonsuicidal self harm behavior by cutting and by banging on the wall when frustrated in the past. He can be vivid in his descriptions, stating "I wish a hole would open up in the earth and swallow [facility]". Patient reports his frustration at the director at [facility] for not allowing him to set up a table in the front, offering resources for substance use cessation, yet also reports being attracted to her. Patient also makes claims that he has ordered "hits" on multiple people, including on one person to protect his sex-worker friend and claims that the police did not care and thanked him for it.     No perceptual disturbances elicited. Patient complained about having to read his poetry, reported being a "well-read" person but does not like reading.     Collateral obtained from  of [facility] Dr. Cristo Delgadillo [phone] who reported that he is familiar with patient for several years.  He reported that patient has been extensively evaluated and diagnosed with factitious disorder, with several visits to [place] ED for somatic complaints and c/o depression and insomnia. He reports that patient has a  to link to outpatient care, and requires psychotherapy. He reported that patient is not on any psychotropic medications and has been previously tried on several psychotropic medications with no effect. He reported patient having several medical problems including urinary incontinence, that his medical providers are managing conservatively with medications and not opting for surgery. He reported that patient has previously been offered a place in Adult home, however declined, electing to stay with his brother.

## 2022-07-07 NOTE — BH INPATIENT PSYCHIATRY PROGRESS NOTE - PRN MEDS
MEDICATIONS  (PRN):  haloperidol     Tablet 2.5 milliGRAM(s) Oral every 6 hours PRN agitation  haloperidol    Injectable 2.5 milliGRAM(s) IntraMuscular once PRN severe agitation  LORazepam     Tablet 0.5 milliGRAM(s) Oral every 6 hours PRN anxiety or insomnia

## 2022-07-07 NOTE — BH INPATIENT PSYCHIATRY PROGRESS NOTE - NSBHASSESSSUMMFT_PSY_ALL_CORE
Patient with long term hx of unusual relatedness including pattern of making up psychiatric symptoms or hx such as claiming he arranged a hit or his brother suicided . Recently there has been escalating pattern of hospitalizations, ER visits . Compliance after d/c is nil. Patient also develops excessive attachment or intense dislike of providers. Patient has no actual hx of suicide attempts or fh or SIB. 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 Suspect possible unconscious secondary gain related to medical complaints help seeking then rejection of help as inadequate. Patient not assessed as needing CO. Will try to get more collateral before starting psych meds. Despite above may benefit from SSRI or SNRi and low dose antipsychotic. Seen by medicine started on lidoderm patch Patient with long term hx of unusual relatedness including pattern of making up psychiatric symptoms or hx such as claiming he arranged a hit or his brother suicided . Recently there has been escalating pattern of hospitalizations, ER visits . Compliance after d/c is nil. Patient also develops excessive attachment or intense dislike of providers. Patient has no actual hx of suicide attempts or fh or SIB. Patient with possible facititious disorder vs mood disorder. Suspect possible unconscious primary gain related to medical complaints help seeking then rejection of help as inadequate. Patient not assessed as needing CO. In light of collateral, will consider referral to psychotherapy program upon discharge if patient adherent to treatment plan.

## 2022-07-07 NOTE — BH INPATIENT PSYCHIATRY PROGRESS NOTE - CURRENT MEDICATION
MEDICATIONS  (STANDING):  cholecalciferol 2000 Unit(s) Oral once  cholecalciferol 2000 Unit(s) Oral daily  finasteride 5 milliGRAM(s) Oral daily  multivitamin      multivitamin 1 Tablet(s) Oral daily  NIFEdipine XL 30 milliGRAM(s) Oral daily  oxybutynin 5 milliGRAM(s) Oral daily  simvastatin 20 milliGRAM(s) Oral daily  tamsulosin 0.4 milliGRAM(s) Oral daily    MEDICATIONS  (PRN):  haloperidol     Tablet 2.5 milliGRAM(s) Oral every 6 hours PRN agitation  haloperidol    Injectable 2.5 milliGRAM(s) IntraMuscular once PRN severe agitation  LORazepam     Tablet 0.5 milliGRAM(s) Oral every 6 hours PRN anxiety or insomnia   MEDICATIONS  (STANDING):  cholecalciferol 2000 Unit(s) Oral daily  finasteride 5 milliGRAM(s) Oral daily  multivitamin      multivitamin 1 Tablet(s) Oral daily  NIFEdipine XL 30 milliGRAM(s) Oral daily  oxybutynin 5 milliGRAM(s) Oral daily  simvastatin 20 milliGRAM(s) Oral daily  tamsulosin 0.4 milliGRAM(s) Oral daily    MEDICATIONS  (PRN):  haloperidol     Tablet 2.5 milliGRAM(s) Oral every 6 hours PRN agitation  haloperidol    Injectable 2.5 milliGRAM(s) IntraMuscular once PRN severe agitation  LORazepam     Tablet 0.5 milliGRAM(s) Oral every 6 hours PRN anxiety or insomnia

## 2022-07-08 PROCEDURE — 99232 SBSQ HOSP IP/OBS MODERATE 35: CPT | Mod: GC

## 2022-07-08 RX ORDER — MIRTAZAPINE 45 MG/1
7.5 TABLET, ORALLY DISINTEGRATING ORAL AT BEDTIME
Refills: 0 | Status: DISCONTINUED | OUTPATIENT
Start: 2022-07-08 | End: 2022-07-09

## 2022-07-08 RX ADMIN — SIMVASTATIN 20 MILLIGRAM(S): 20 TABLET, FILM COATED ORAL at 10:04

## 2022-07-08 RX ADMIN — Medication 1 TABLET(S): at 10:04

## 2022-07-08 RX ADMIN — MIRTAZAPINE 7.5 MILLIGRAM(S): 45 TABLET, ORALLY DISINTEGRATING ORAL at 20:03

## 2022-07-08 RX ADMIN — Medication 2000 UNIT(S): at 10:04

## 2022-07-08 RX ADMIN — FINASTERIDE 5 MILLIGRAM(S): 5 TABLET, FILM COATED ORAL at 10:04

## 2022-07-08 RX ADMIN — TAMSULOSIN HYDROCHLORIDE 0.4 MILLIGRAM(S): 0.4 CAPSULE ORAL at 10:04

## 2022-07-08 RX ADMIN — Medication 30 MILLIGRAM(S): at 10:04

## 2022-07-08 RX ADMIN — Medication 5 MILLIGRAM(S): at 10:04

## 2022-07-08 RX ADMIN — Medication 0.5 MILLIGRAM(S): at 19:48

## 2022-07-08 NOTE — BH INPATIENT PSYCHIATRY PROGRESS NOTE - NSBHCHARTREVIEWVS_PSY_A_CORE FT
Vital Signs Last 24 Hrs  T(C): 36.8 (07-08-22 @ 07:50), Max: 36.8 (07-08-22 @ 07:50)  T(F): 98.3 (07-08-22 @ 07:50), Max: 98.3 (07-08-22 @ 07:50)  HR: --  BP: --  BP(mean): --  RR: --  SpO2: --    Orthostatic VS  07-08-22 @ 07:50  Lying BP: --/-- HR: --  Sitting BP: 108/66 HR: 76  Standing BP: --/-- HR: --  Site: upper left arm  Mode: electronic  Orthostatic VS  07-07-22 @ 07:46  Lying BP: --/-- HR: --  Sitting BP: 107/73 HR: 73  Standing BP: --/-- HR: --  Site: upper left arm  Mode: electronic

## 2022-07-08 NOTE — BH CHART NOTE - NSEVENTNOTEFT_PSY_ALL_CORE
SCOTT called to perform psychiatric safety assessment on patient expressing acute suicidality. Patient reports active SI w/plan and intent to cut wrists vertically. Patient would not describe how he would do so on the unit. He is unable to safety plan. Patient is unable able to identify protective factors or reasons to live. Patient states that if he were to go home, he would overdose on his pills. At this time patient requires CO 1:1 to remain safe. Discussed with RN staff. Staff to also offer patient Ativan 0.5mg PO for anxiety.

## 2022-07-08 NOTE — BH INPATIENT PSYCHIATRY PROGRESS NOTE - NSBHFUPINTERVALHXFT_PSY_A_CORE
71 y.o.  male, single, unemployed, living with brother, with medical hx of HTN, HLD, urinary incontinence 2/2 unclear prostate issues vs overactive bladder, R leg pain from arthritis, psychiatric hx of MDD, ?OCD, "mixed personality disorder", at least two prior hospitalizations ([place] 1/4/22-2/24/2022, [facility] 10/22-11/15/2021 for depression w psychosis), has previously been seen at [place] [facility], now BIBEMS unclear who called after patient reported multiple somatic complaints and then reported that he wanted to kill himself.     7/7: Patient reported that he feels chronically depressed since childhood, with nothing has helped in the past, except for prozac, and also complained of multiple somatic complaints, claiming that he feels suicidal because of loss of his shirts and not being able to watch TV, however upon further questioning admits to no specific intent or plan. He also admitted that there are several ways to hurt himself, however does not have previous suicide attempts. He does have past hx of self-injurious behavior of banging on the wall or threatening to cut self when frustrated. No perceptual disturbances elicited.     Per professional collateral Dr. Delgadillo from [facility], patient is diagnosed with factitious disorder, can appear morose and requires light level of support. He recommended psychotherapy for patient.     7/8: 71 y.o.  male, single, unemployed, living with brother, with medical hx of HTN, HLD, urinary incontinence 2/2 unclear prostate issues vs overactive bladder, R leg pain from arthritis, psychiatric hx of MDD, ?OCD, "mixed personality disorder", at least two prior hospitalizations ([place] 1/4/22-2/24/2022, [facility] 10/22-11/15/2021 for depression w psychosis), has previously been seen at [place] [facility], now BIBEMS unclear who called after patient reported multiple somatic complaints and then reported that he wanted to kill himself.     7/7: Patient reported that he feels chronically depressed since childhood, with nothing has helped in the past, except for prozac, and also complained of multiple somatic complaints, claiming that he feels suicidal because of loss of his shirts and not being able to watch TV, however upon further questioning admits to no specific intent or plan. He also admitted that there are several ways to hurt himself, however does not have previous suicide attempts. He does have past hx of self-injurious behavior of banging on the wall or threatening to cut self when frustrated. No perceptual disturbances elicited.     Per professional collateral Dr. Delgadillo from [facility], patient is diagnosed with factitious disorder, can appear morose and requires light level of support. He recommended psychotherapy for patient.     7/8: c/o depressed mood and difficulty sleeping, verbalizes feeling that his friends, including his female friend, are going to leave him while he is in the hospital. He reports wanting surgery for his prostate. He was encouraged to explore his treatment options with his outpatient provider. treatment options were discussed with patient including mirtazapine. Patient was counseled on most common side effects. Patient verbalized understanding and agreed to trial of low dose mirtazapine. Patient also complained of toe nail issue, will consult podiatry for evaluation.

## 2022-07-08 NOTE — BH TREATMENT PLAN - NSTXDISORGINTERPR_PSY_ALL_CORE
Over the next seven days, Psychiatric rehabilitation staff will provide encouragement, support, psychotherapy, and psychoeducation to assist the patient in the progression of his treatment goal.

## 2022-07-08 NOTE — BH INPATIENT PSYCHIATRY PROGRESS NOTE - CURRENT MEDICATION
MEDICATIONS  (STANDING):  cholecalciferol 2000 Unit(s) Oral daily  finasteride 5 milliGRAM(s) Oral daily  mirtazapine 7.5 milliGRAM(s) Oral at bedtime  multivitamin      multivitamin 1 Tablet(s) Oral daily  NIFEdipine XL 30 milliGRAM(s) Oral daily  oxybutynin 5 milliGRAM(s) Oral daily  simvastatin 20 milliGRAM(s) Oral daily  tamsulosin 0.4 milliGRAM(s) Oral daily    MEDICATIONS  (PRN):  haloperidol     Tablet 2.5 milliGRAM(s) Oral every 6 hours PRN agitation  haloperidol    Injectable 2.5 milliGRAM(s) IntraMuscular once PRN severe agitation  LORazepam     Tablet 0.5 milliGRAM(s) Oral every 6 hours PRN anxiety or insomnia

## 2022-07-08 NOTE — BH INPATIENT PSYCHIATRY PROGRESS NOTE - NSBHASSESSSUMMFT_PSY_ALL_CORE
Patient with long term hx of unusual relatedness including pattern of making up psychiatric symptoms or hx such as claiming he arranged a hit or his brother suicided . Recently there has been escalating pattern of hospitalizations, ER visits . Compliance after d/c is nil. Patient also develops excessive attachment or intense dislike of providers. Patient has no actual hx of suicide attempts or fh or SIB. Patient with possible facititious disorder vs mood disorder. Suspect possible unconscious primary gain related to medical complaints help seeking then rejection of help as inadequate. Patient not assessed as needing CO. In light of collateral, will consider referral to psychotherapy program upon discharge if patient adherent to treatment plan.     Patient continues to verbalize suicidal ideations when needs are not met, has been c/o depressed mood and insomnia, will start on low dose mirtazapine and reassess.  Patient with long term hx of unusual relatedness including pattern of making up psychiatric symptoms or hx such as claiming he arranged a hit or his brother suicided . Recently there has been escalating pattern of hospitalizations, ER visits . Compliance after d/c is nil. Patient also develops excessive attachment or intense dislike of providers. Patient has no actual hx of suicide attempts or fh or SIB. Patient with possible facititious disorder vs mood disorder. Suspect possible unconscious primary gain related to medical complaints help seeking then rejection of help as inadequate. Patient not assessed as needing CO. In light of collateral, will consider referral to psychotherapy program upon discharge if patient adherent to treatment plan.     Patient continues to verbalize suicidal ideations when needs are not met, has been c/o depressed mood and insomnia, will start on low dose mirtazapine and reassess.     -Admit to [facility] 2S  -Legal status: 9.13 Voluntary  -START Mirtazapine 7.5mg QHS  -Continue individual, group and milieu therapy  -Continue home meds

## 2022-07-09 PROCEDURE — 99232 SBSQ HOSP IP/OBS MODERATE 35: CPT

## 2022-07-09 RX ORDER — MIRTAZAPINE 45 MG/1
15 TABLET, ORALLY DISINTEGRATING ORAL AT BEDTIME
Refills: 0 | Status: DISCONTINUED | OUTPATIENT
Start: 2022-07-09 | End: 2022-07-17

## 2022-07-09 RX ADMIN — Medication 1 TABLET(S): at 09:14

## 2022-07-09 RX ADMIN — Medication 0.5 MILLIGRAM(S): at 20:24

## 2022-07-09 RX ADMIN — Medication 30 MILLIGRAM(S): at 09:15

## 2022-07-09 RX ADMIN — Medication 5 MILLIGRAM(S): at 09:14

## 2022-07-09 RX ADMIN — MIRTAZAPINE 15 MILLIGRAM(S): 45 TABLET, ORALLY DISINTEGRATING ORAL at 20:23

## 2022-07-09 RX ADMIN — SIMVASTATIN 20 MILLIGRAM(S): 20 TABLET, FILM COATED ORAL at 09:14

## 2022-07-09 RX ADMIN — TAMSULOSIN HYDROCHLORIDE 0.4 MILLIGRAM(S): 0.4 CAPSULE ORAL at 09:15

## 2022-07-09 RX ADMIN — FINASTERIDE 5 MILLIGRAM(S): 5 TABLET, FILM COATED ORAL at 09:14

## 2022-07-09 RX ADMIN — Medication 2000 UNIT(S): at 09:14

## 2022-07-09 NOTE — BH INPATIENT PSYCHIATRY PROGRESS NOTE - NSBHCHARTREVIEWVS_PSY_A_CORE FT
Vital Signs Last 24 Hrs  T(C): --  T(F): --  HR: --  BP: --  BP(mean): --  RR: --  SpO2: --    Orthostatic VS  07-09-22 @ 07:33  Lying BP: --/-- HR: --  Sitting BP: 119/77 HR: 68  Standing BP: --/-- HR: --  Site: --  Mode: --  Orthostatic VS  07-08-22 @ 07:50  Lying BP: --/-- HR: --  Sitting BP: 108/66 HR: 76  Standing BP: --/-- HR: --  Site: upper left arm  Mode: electronic

## 2022-07-09 NOTE — BH INPATIENT PSYCHIATRY PROGRESS NOTE - NSBHFUPINTERVALHXFT_PSY_A_CORE
71 y.o.  male, single, unemployed, living with brother, with medical hx of HTN, HLD, urinary incontinence 2/2 unclear prostate issues vs overactive bladder, R leg pain from arthritis, psychiatric hx of MDD, ?OCD, "mixed personality disorder", at least two prior hospitalizations ([place] 1/4/22-2/24/2022, [facility] 10/22-11/15/2021 for depression w psychosis), has previously been seen at [place] [facility], now BIBEMS unclear who called after patient reported multiple somatic complaints and then reported that he wanted to kill himself.   7/9 Patient seen for dperession, PD. Patient verbalized plan to make vertical incision on wrist last night placed on CO. Eating sleeping fair.    7/7: Patient reported that he feels chronically depressed since childhood, with nothing has helped in the past, except for prozac, and also complained of multiple somatic complaints, claiming that he feels suicidal because of loss of his shirts and not being able to watch TV, however upon further questioning admits to no specific intent or plan. He also admitted that there are several ways to hurt himself, however does not have previous suicide attempts. He does have past hx of self-injurious behavior of banging on the wall or threatening to cut self when frustrated. No perceptual disturbances elicited.     Per professional collateral Dr. Delgadillo from [facility], patient is diagnosed with factitious disorder, can appear morose and requires light level of support. He recommended psychotherapy for patient.     7/8: c/o depressed mood and difficulty sleeping, verbalizes feeling that his friends, including his female friend, are going to leave him while he is in the hospital. He reports wanting surgery for his prostate. He was encouraged to explore his treatment options with his outpatient provider. treatment options were discussed with patient including mirtazapine. Patient was counseled on most common side effects. Patient verbalized understanding and agreed to trial of low dose mirtazapine. Patient also complained of toe nail issue, will consult podiatry for evaluation.

## 2022-07-09 NOTE — BH INPATIENT PSYCHIATRY PROGRESS NOTE - NSBHASSESSSUMMFT_PSY_ALL_CORE
Patient with long term hx of unusual relatedness including pattern of making up psychiatric symptoms or hx such as claiming he arranged a hit or his brother suicided . Recently there has been escalating pattern of hospitalizations, ER visits . Compliance after d/c is nil. Patient also develops excessive attachment or intense dislike of providers. Patient has no actual hx of suicide attempts or fh or SIB. Patient with possible facititious disorder vs mood disorder. Suspect possible unconscious primary gain related to medical complaints help seeking then rejection of help as inadequate. Patient not assessed as needing CO. In light of collateral, will consider referral to psychotherapy program upon discharge if patient adherent to treatment plan.     Patient continues to verbalize suicidal ideations when needs are not met, has been c/o depressed mood and insomnia, will start on low dose mirtazapine and reassess.     -Admit to [facility] 2S  -Legal status: 9.13 Voluntary  -START Mirtazapine 7.5mg QHS  -Continue individual, group and milieu therapy  -Continue home meds   7/9 Depressed, denies current SI. Has hx symptom exaggeration therefore complicated self harm assessment. COnt C?O for now, explore collateral that may shed light on pattern of in hospital suicidality Increase Remeron

## 2022-07-10 PROCEDURE — 99232 SBSQ HOSP IP/OBS MODERATE 35: CPT

## 2022-07-10 RX ADMIN — MIRTAZAPINE 15 MILLIGRAM(S): 45 TABLET, ORALLY DISINTEGRATING ORAL at 20:55

## 2022-07-10 RX ADMIN — FINASTERIDE 5 MILLIGRAM(S): 5 TABLET, FILM COATED ORAL at 08:59

## 2022-07-10 RX ADMIN — Medication 2000 UNIT(S): at 08:59

## 2022-07-10 RX ADMIN — TAMSULOSIN HYDROCHLORIDE 0.4 MILLIGRAM(S): 0.4 CAPSULE ORAL at 08:59

## 2022-07-10 RX ADMIN — Medication 1 TABLET(S): at 08:58

## 2022-07-10 RX ADMIN — Medication 5 MILLIGRAM(S): at 08:59

## 2022-07-10 RX ADMIN — SIMVASTATIN 20 MILLIGRAM(S): 20 TABLET, FILM COATED ORAL at 08:58

## 2022-07-10 RX ADMIN — Medication 30 MILLIGRAM(S): at 08:59

## 2022-07-10 NOTE — BH INPATIENT PSYCHIATRY PROGRESS NOTE - NSBHASSESSSUMMFT_PSY_ALL_CORE
Patient with long term hx of unusual relatedness including pattern of making up psychiatric symptoms or hx such as claiming he arranged a hit or his brother suicided . Recently there has been escalating pattern of hospitalizations, ER visits . Compliance after d/c is nil. Patient also develops excessive attachment or intense dislike of providers. Patient has no actual hx of suicide attempts or fh or SIB. Patient with possible facititious disorder vs mood disorder. Suspect possible unconscious primary gain related to medical complaints help seeking then rejection of help as inadequate. Patient not assessed as needing CO. In light of collateral, will consider referral to psychotherapy program upon discharge if patient adherent to treatment plan.     Patient continues to verbalize suicidal ideations when needs are not met, has been c/o depressed mood and insomnia, will start on low dose mirtazapine and reassess.     -Admit to [facility] 2S  -Legal status: 9.13 Voluntary  -START Mirtazapine 7.5mg QHS  -Continue individual, group and milieu therapy  -Continue home meds   7/9 Depressed, denies current SI. Has hx symptom exaggeration therefore complicated self harm assessment. COnt CO for now, explore collateral that may shed light on pattern of in hospital suicidality Increase Remeron  7/10 Patient dysphoric, expresses pessimism but not agitated, not psychotic, no command khan to harm self, no global insomnia. Verbalizes conditional suicidality, talks about possibility of self harm in future but not in hospital. At same time has evidence of future orientation in that he requested we get in touch with urologist because he wants to have a procedure that may help him with his urologic issues. Patient is assessed as not having active SI requiring as CO. He has conditional SI which is associated with lower risk fo self harm that non conditional SI. He is help seeking.Will d/c Co but cont to provide support , Cont Remeron dose just increased.

## 2022-07-10 NOTE — BH INPATIENT PSYCHIATRY PROGRESS NOTE - NSBHCHARTREVIEWVS_PSY_A_CORE FT
Vital Signs Last 24 Hrs  T(C): 36.9 (07-10-22 @ 07:45), Max: 36.9 (07-10-22 @ 07:45)  T(F): 98.4 (07-10-22 @ 07:45), Max: 98.4 (07-10-22 @ 07:45)  HR: --  BP: --  BP(mean): --  RR: 18 (07-10-22 @ 07:45) (18 - 18)  SpO2: --    Orthostatic VS  07-10-22 @ 07:45  Lying BP: --/-- HR: --  Sitting BP: 130/75 HR: 64  Standing BP: --/-- HR: --  Site: --  Mode: --  Orthostatic VS  07-09-22 @ 07:33  Lying BP: --/-- HR: --  Sitting BP: 119/77 HR: 68  Standing BP: --/-- HR: --  Site: --  Mode: --

## 2022-07-10 NOTE — BH INPATIENT PSYCHIATRY PROGRESS NOTE - NSBHFUPINTERVALCCFT_PSY_A_CORE
I don't know what will happen if I go home. I want to see that urologist. Patient reports somewhat betters sleep.

## 2022-07-10 NOTE — BH INPATIENT PSYCHIATRY PROGRESS NOTE - NSBHFUPINTERVALHXFT_PSY_A_CORE
71 y.o.  male, single, unemployed, living with brother, with medical hx of HTN, HLD, urinary incontinence 2/2 unclear prostate issues vs overactive bladder, R leg pain from arthritis, psychiatric hx of MDD, ?OCD, "mixed personality disorder", at least two prior hospitalizations ([place] 1/4/22-2/24/2022, [facility] 10/22-11/15/2021 for depression w psychosis), has previously been seen at [place] [facility], now BIBEMS unclear who called after patient reported multiple somatic complaints and then reported that he wanted to kill himself.   7/9 Patient seen for dperession, PD. Patient verbalized plan to make vertical incision on wrist last night placed on CO. Eating sleeping fair.    7/7: Patient reported that he feels chronically depressed since childhood, with nothing has helped in the past, except for prozac, and also complained of multiple somatic complaints, claiming that he feels suicidal because of loss of his shirts and not being able to watch TV, however upon further questioning admits to no specific intent or plan. He also admitted that there are several ways to hurt himself, however does not have previous suicide attempts. He does have past hx of self-injurious behavior of banging on the wall or threatening to cut self when frustrated. No perceptual disturbances elicited.     Per professional collateral Dr. Delgadillo from [facility], patient is diagnosed with factitious disorder, can appear morose and requires light level of support. He recommended psychotherapy for patient.     7/8: c/o depressed mood and difficulty sleeping, verbalizes feeling that his friends, including his female friend, are going to leave him while he is in the hospital. He reports wanting surgery for his prostate. He was encouraged to explore his treatment options with his outpatient provider. treatment options were discussed with patient including mirtazapine. Patient was counseled on most common side effects. Patient verbalized understanding and agreed to trial of low dose mirtazapine. Patient also complained of toe nail issue, will consult podiatry for evaluation.   7/9 Patient seen for depression, PD. No overnight events. Patient eating okay, slept fairly well.

## 2022-07-10 NOTE — BH INPATIENT PSYCHIATRY PROGRESS NOTE - CURRENT MEDICATION
MEDICATIONS  (STANDING):  cholecalciferol 2000 Unit(s) Oral daily  finasteride 5 milliGRAM(s) Oral daily  mirtazapine 15 milliGRAM(s) Oral at bedtime  multivitamin      multivitamin 1 Tablet(s) Oral daily  NIFEdipine XL 30 milliGRAM(s) Oral daily  oxybutynin 5 milliGRAM(s) Oral daily  simvastatin 20 milliGRAM(s) Oral daily  tamsulosin 0.4 milliGRAM(s) Oral daily    MEDICATIONS  (PRN):  haloperidol     Tablet 2.5 milliGRAM(s) Oral every 6 hours PRN agitation  haloperidol    Injectable 2.5 milliGRAM(s) IntraMuscular once PRN severe agitation  LORazepam     Tablet 0.5 milliGRAM(s) Oral every 6 hours PRN anxiety or insomnia

## 2022-07-11 PROCEDURE — 99232 SBSQ HOSP IP/OBS MODERATE 35: CPT

## 2022-07-11 RX ORDER — ACETAMINOPHEN 500 MG
650 TABLET ORAL ONCE
Refills: 0 | Status: COMPLETED | OUTPATIENT
Start: 2022-07-11 | End: 2022-07-11

## 2022-07-11 RX ADMIN — Medication 650 MILLIGRAM(S): at 18:47

## 2022-07-11 RX ADMIN — Medication 2000 UNIT(S): at 08:29

## 2022-07-11 RX ADMIN — TAMSULOSIN HYDROCHLORIDE 0.4 MILLIGRAM(S): 0.4 CAPSULE ORAL at 08:29

## 2022-07-11 RX ADMIN — Medication 0.5 MILLIGRAM(S): at 20:55

## 2022-07-11 RX ADMIN — Medication 5 MILLIGRAM(S): at 08:29

## 2022-07-11 RX ADMIN — FINASTERIDE 5 MILLIGRAM(S): 5 TABLET, FILM COATED ORAL at 08:29

## 2022-07-11 RX ADMIN — Medication 1 TABLET(S): at 08:29

## 2022-07-11 RX ADMIN — MIRTAZAPINE 15 MILLIGRAM(S): 45 TABLET, ORALLY DISINTEGRATING ORAL at 20:55

## 2022-07-11 RX ADMIN — Medication 30 MILLIGRAM(S): at 08:29

## 2022-07-11 RX ADMIN — Medication 650 MILLIGRAM(S): at 19:45

## 2022-07-11 RX ADMIN — SIMVASTATIN 20 MILLIGRAM(S): 20 TABLET, FILM COATED ORAL at 08:28

## 2022-07-11 NOTE — BH INPATIENT PSYCHIATRY PROGRESS NOTE - NSBHCHARTREVIEWVS_PSY_A_CORE FT
Vital Signs Last 24 Hrs  T(C): 36.8 (07-11-22 @ 08:06), Max: 36.8 (07-11-22 @ 08:06)  T(F): 98.3 (07-11-22 @ 08:06), Max: 98.3 (07-11-22 @ 08:06)  HR: --  BP: --  BP(mean): --  RR: --  SpO2: --    Orthostatic VS  07-11-22 @ 08:06  Lying BP: --/-- HR: --  Sitting BP: 151/84 HR: 68  Standing BP: 151/91 HR: 77  Site: upper left arm  Mode: electronic  Orthostatic VS  07-10-22 @ 07:45  Lying BP: --/-- HR: --  Sitting BP: 130/75 HR: 64  Standing BP: --/-- HR: --  Site: --  Mode: --

## 2022-07-11 NOTE — BH INPATIENT PSYCHIATRY PROGRESS NOTE - NSBHASSESSSUMMFT_PSY_ALL_CORE
Patient with long term hx of unusual relatedness including pattern of making up psychiatric symptoms or hx such as claiming he arranged a hit or his brother suicided . Recently there has been escalating pattern of hospitalizations, ER visits . Compliance after d/c is nil. Patient also develops excessive attachment or intense dislike of providers. Patient has no actual hx of suicide attempts or fh or SIB. Patient with possible facititious disorder vs mood disorder. Suspect possible unconscious primary gain related to medical complaints help seeking then rejection of help as inadequate. Patient not assessed as needing CO. In light of collateral, will consider referral to psychotherapy program upon discharge if patient adherent to treatment plan.     Patient reports conditional suicidal ideations when his medical or psychosocial needs are not addressed. Patient's medications were adjusted over the weekend. Will continue to monitor and encourage developing healthier coping mechanisms.     -Admit to [facility] 2S  -Legal status: 9.13 Voluntary  -Continue Mirtazapine 15mg HS  -Continue individual, group and milieu therapy  -Continue home meds

## 2022-07-11 NOTE — BH INPATIENT PSYCHIATRY PROGRESS NOTE - NSBHFUPINTERVALHXFT_PSY_A_CORE
71 y.o.  male, single, unemployed, living with brother, with medical hx of HTN, HLD, urinary incontinence 2/2 unclear prostate issues vs overactive bladder, R leg pain from arthritis, psychiatric hx of MDD, ?OCD, "mixed personality disorder", at least two prior hospitalizations ([place] 1/4/22-2/24/2022, [facility] 10/22-11/15/2021 for depression w psychosis), has previously been seen at [place] ED, now BIBEMS unclear who called after patient reported multiple somatic complaints and then reported that he wanted to kill himself.     Chart reviewed. Patient had reported suicidal ideation over the weekend of wanting to cut self, was placed on CO1:1 however no subsequent events reported  and he reported that he reported SI if he were to be discharged, however denied SI while in the hospital and CO1:1 was discontinued.     Patient seen and evaluated this AM with attending. Patient reported feeling frustration of not hearing from his friend, complaining that his friend is moving on without him and is too busy to contact him. Patient was provided the correct number for his friend to contact if he wishes his friend to contact him. Patient reports he is the "Alpha resident" at his house and reports he "probably knows more than the counselors at the [employer]". He denied current suicidal or homicidal ideations, reports that he has limited frustration tolerance and verbalized SI as he was feeling frustrated. He denied any specific intent or plan to hurt himself. Patient has forward looking outlook and looks forward to getting better and getting his medical issues resolved in the future as well. Patient reported adherence to medications, denied any side effects. Patient has not been attending groups. Patient was provided emotional support and encouraged to develop frustration tolerance and coping skills.

## 2022-07-11 NOTE — BH INPATIENT PSYCHIATRY PROGRESS NOTE - NSBHFUPINTERVALCCFT_PSY_A_CORE
"I can't speak with my friend. I cannot get a newspaper. I hate the clubhouse. I wish [facility] would vanish".

## 2022-07-12 PROCEDURE — 99232 SBSQ HOSP IP/OBS MODERATE 35: CPT

## 2022-07-12 RX ADMIN — Medication 0.5 MILLIGRAM(S): at 21:43

## 2022-07-12 RX ADMIN — FINASTERIDE 5 MILLIGRAM(S): 5 TABLET, FILM COATED ORAL at 09:38

## 2022-07-12 RX ADMIN — TAMSULOSIN HYDROCHLORIDE 0.4 MILLIGRAM(S): 0.4 CAPSULE ORAL at 09:37

## 2022-07-12 RX ADMIN — SIMVASTATIN 20 MILLIGRAM(S): 20 TABLET, FILM COATED ORAL at 09:38

## 2022-07-12 RX ADMIN — Medication 1 TABLET(S): at 09:38

## 2022-07-12 RX ADMIN — Medication 5 MILLIGRAM(S): at 09:37

## 2022-07-12 RX ADMIN — Medication 2000 UNIT(S): at 09:37

## 2022-07-12 RX ADMIN — MIRTAZAPINE 15 MILLIGRAM(S): 45 TABLET, ORALLY DISINTEGRATING ORAL at 21:03

## 2022-07-12 RX ADMIN — Medication 30 MILLIGRAM(S): at 09:37

## 2022-07-12 NOTE — BH INPATIENT PSYCHIATRY PROGRESS NOTE - NSBHASSESSSUMMFT_PSY_ALL_CORE
Patient with long term hx of unusual relatedness including pattern of making up psychiatric symptoms or hx such as claiming he arranged a hit or his brother suicided . Recently there has been escalating pattern of hospitalizations, ER visits . Compliance after d/c is nil. Patient also develops excessive attachment or intense dislike of providers. Patient has no actual hx of suicide attempts or fh or SIB. Patient with possible facititious disorder vs mood disorder. Suspect possible unconscious primary gain related to medical complaints help seeking then rejection of help as inadequate. Patient not assessed as needing CO. In light of collateral, will consider referral to psychotherapy program upon discharge if patient adherent to treatment plan.     Patient reports conditional suicidal ideations when his medical or psychosocial needs are not addressed. Patient's medications were adjusted over the weekend. Will continue to monitor and encourage developing healthier coping mechanisms and working relationships with his treatment team.     -Admit to [facility]  -Legal status: 9.13 Voluntary  -Continue Mirtazapine 15mg HS  -Continue individual, group and milieu therapy  -Continue home meds

## 2022-07-12 NOTE — BH INPATIENT PSYCHIATRY PROGRESS NOTE - NSBHFUPINTERVALHXFT_PSY_A_CORE
Pt seen for follow up of depression and suicidal thoughts. Overnight staff called [facility] to evaluate pt after he endorsed tooth pain. Pt denies toothache this morning during rounds. Will continue follow up and consider dental consult if pt complains of sx again. Pt briefly endorses preoccupation with his urinary frequency and again brings up his wish to pursue surgery. Pt aware that he will have to follow up as an outpatient with Urology. Pt expresses concern about his ability to follow up with his outpatient providers due to transportation issues. Reports that his last  was "working on getting me access-a-ride but I don't know what happened". Pt asks treatment team to help him find a  "that will tolerate me", suggesting the prospects should take a survey in order to "match" him with the  with the most compatible temperament. Writer discussed how an arrangement like this would be unlikely and explored Mr. Rogers' struggles establishing working relationships with people. Mr. Rogers states "well as soon as I meet someone I'm already expecting to be stabbed on the back". Pt shares a long history of perceived betrayals starting with both parental figures. Pt reports he lost touch with his father's side of the family since he passed away at age 40, stating his father's family blamed his mother for his death. "They thought she pushed him too hard to work beyond his ability". Patient describes his mother as a woman who "liked the finer things, she loved wearing jewelry and being admired for her pieces". Pt states his mother was "terrible" and shares how she "paraded me and pointed out my oddities". 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 Encouraged pt to focus on ways he has been able to work with people in the past in order to achieve his goals.

## 2022-07-12 NOTE — BH INPATIENT PSYCHIATRY PROGRESS NOTE - NSBHCHARTREVIEWVS_PSY_A_CORE FT
Vital Signs Last 24 Hrs  T(C): 36.7 (07-12-22 @ 08:11), Max: 36.7 (07-12-22 @ 08:11)  T(F): 98 (07-12-22 @ 08:11), Max: 98 (07-12-22 @ 08:11)  HR: --  BP: --  BP(mean): --  RR: --  SpO2: --    Orthostatic VS  07-12-22 @ 08:11  Lying BP: --/-- HR: --  Sitting BP: 133/88 HR: 67  Standing BP: 124/82 HR: 81  Site: upper left arm  Mode: electronic  Orthostatic VS  07-11-22 @ 08:06  Lying BP: --/-- HR: --  Sitting BP: 151/84 HR: 68  Standing BP: 151/91 HR: 77  Site: upper left arm  Mode: electronic

## 2022-07-13 PROCEDURE — 99232 SBSQ HOSP IP/OBS MODERATE 35: CPT | Mod: GC

## 2022-07-13 RX ORDER — CALAMINE AND ZINC OXIDE AND PHENOL 160; 10 MG/ML; MG/ML
1 LOTION TOPICAL THREE TIMES A DAY
Refills: 0 | Status: DISCONTINUED | OUTPATIENT
Start: 2022-07-13 | End: 2022-07-14

## 2022-07-13 RX ADMIN — Medication 5 MILLIGRAM(S): at 09:24

## 2022-07-13 RX ADMIN — FINASTERIDE 5 MILLIGRAM(S): 5 TABLET, FILM COATED ORAL at 09:24

## 2022-07-13 RX ADMIN — MIRTAZAPINE 15 MILLIGRAM(S): 45 TABLET, ORALLY DISINTEGRATING ORAL at 20:24

## 2022-07-13 RX ADMIN — Medication 0.5 MILLIGRAM(S): at 09:24

## 2022-07-13 RX ADMIN — Medication 2000 UNIT(S): at 09:23

## 2022-07-13 RX ADMIN — Medication 0.5 MILLIGRAM(S): at 20:24

## 2022-07-13 RX ADMIN — SIMVASTATIN 20 MILLIGRAM(S): 20 TABLET, FILM COATED ORAL at 09:23

## 2022-07-13 RX ADMIN — Medication 30 MILLIGRAM(S): at 09:24

## 2022-07-13 RX ADMIN — TAMSULOSIN HYDROCHLORIDE 0.4 MILLIGRAM(S): 0.4 CAPSULE ORAL at 09:24

## 2022-07-13 RX ADMIN — Medication 1 TABLET(S): at 09:23

## 2022-07-13 NOTE — BH INPATIENT PSYCHIATRY PROGRESS NOTE - CURRENT MEDICATION
MEDICATIONS  (STANDING):  cholecalciferol 2000 Unit(s) Oral daily  finasteride 5 milliGRAM(s) Oral daily  mirtazapine 15 milliGRAM(s) Oral at bedtime  multivitamin      multivitamin 1 Tablet(s) Oral daily  NIFEdipine XL 30 milliGRAM(s) Oral daily  oxybutynin 5 milliGRAM(s) Oral daily  simvastatin 20 milliGRAM(s) Oral daily  tamsulosin 0.4 milliGRAM(s) Oral daily    MEDICATIONS  (PRN):  haloperidol     Tablet 2.5 milliGRAM(s) Oral every 6 hours PRN agitation  haloperidol    Injectable 2.5 milliGRAM(s) IntraMuscular once PRN severe agitation  LORazepam     Tablet 0.5 milliGRAM(s) Oral every 6 hours PRN anxiety or insomnia   MEDICATIONS  (STANDING):  cholecalciferol 2000 Unit(s) Oral daily  finasteride 5 milliGRAM(s) Oral daily  mirtazapine 15 milliGRAM(s) Oral at bedtime  multivitamin      multivitamin 1 Tablet(s) Oral daily  NIFEdipine XL 30 milliGRAM(s) Oral daily  oxybutynin 5 milliGRAM(s) Oral daily  simvastatin 20 milliGRAM(s) Oral daily  tamsulosin 0.4 milliGRAM(s) Oral daily    MEDICATIONS  (PRN):  haloperidol     Tablet 2.5 milliGRAM(s) Oral every 6 hours PRN agitation  haloperidol    Injectable 2.5 milliGRAM(s) IntraMuscular once PRN severe agitation  LORazepam     Tablet 0.5 milliGRAM(s) Oral every 6 hours PRN anxiety/insomnia   MEDICATIONS  (STANDING):  cholecalciferol 2000 Unit(s) Oral daily  finasteride 5 milliGRAM(s) Oral daily  mirtazapine 15 milliGRAM(s) Oral at bedtime  multivitamin      multivitamin 1 Tablet(s) Oral daily  NIFEdipine XL 30 milliGRAM(s) Oral daily  oxybutynin 5 milliGRAM(s) Oral daily  simvastatin 20 milliGRAM(s) Oral daily  tamsulosin 0.4 milliGRAM(s) Oral daily    MEDICATIONS  (PRN):  calamine/zinc oxide Lotion 1 Application(s) Topical three times a day PRN itching  haloperidol     Tablet 2.5 milliGRAM(s) Oral every 6 hours PRN agitation  haloperidol    Injectable 2.5 milliGRAM(s) IntraMuscular once PRN severe agitation  LORazepam     Tablet 0.5 milliGRAM(s) Oral every 6 hours PRN anxiety/insomnia

## 2022-07-13 NOTE — BH INPATIENT PSYCHIATRY PROGRESS NOTE - NSBHFUPINTERVALHXFT_PSY_A_CORE
Pt seen for follow up of depression and suicidal thoughts. Patient     Talked about his friend and talking to her.  Itching. No lesions other than moles. No rash  Loose tooth. Dental  Catastrophic thinking. Pt seen for follow up of depression and suicidal thoughts. Patient complained of having a loose tooth and wanting it to be addressed as he fears he may dislodge it and choke on it. Patient also reported feeling itchy on his back. Upon examination, no significant rash noted. Several nevi noted which patient reported are chronic in nature. Patient agreed to use lotion to alleviate the itching. Patient also appeared to demonstrate all-or-none thinking, stating "if I cannot have things the way I want, life is not worth living". Discussed patient's pattern of thinking, including that patient had previously thought that his friend no longer wanted to be his friend when he had been unable to reach her by phone. However, patient had spoken with her recently. Encouraged patient to develop balanced thoughts including considering alternate explanations. Patient also voiced his frustration of not getting along with previous assigned , however reported that he spoke with a different  and appeared to be more amenable to working with him. Patient denied current suicidal thoughts. No problems with sleep or appetite reported.

## 2022-07-13 NOTE — BH INPATIENT PSYCHIATRY PROGRESS NOTE - PRN MEDS
MEDICATIONS  (PRN):  haloperidol     Tablet 2.5 milliGRAM(s) Oral every 6 hours PRN agitation  haloperidol    Injectable 2.5 milliGRAM(s) IntraMuscular once PRN severe agitation  LORazepam     Tablet 0.5 milliGRAM(s) Oral every 6 hours PRN anxiety or insomnia   MEDICATIONS  (PRN):  haloperidol     Tablet 2.5 milliGRAM(s) Oral every 6 hours PRN agitation  haloperidol    Injectable 2.5 milliGRAM(s) IntraMuscular once PRN severe agitation  LORazepam     Tablet 0.5 milliGRAM(s) Oral every 6 hours PRN anxiety/insomnia   MEDICATIONS  (PRN):  calamine/zinc oxide Lotion 1 Application(s) Topical three times a day PRN itching  haloperidol     Tablet 2.5 milliGRAM(s) Oral every 6 hours PRN agitation  haloperidol    Injectable 2.5 milliGRAM(s) IntraMuscular once PRN severe agitation  LORazepam     Tablet 0.5 milliGRAM(s) Oral every 6 hours PRN anxiety/insomnia

## 2022-07-13 NOTE — BH INPATIENT PSYCHIATRY PROGRESS NOTE - NSBHASSESSSUMMFT_PSY_ALL_CORE
71 y.o. male with hx of unusual relatedness including pattern of making up psychiatric symptoms or hx such as claiming he arranged a hit or his brother killed himself. Recently there has been escalating pattern of hospitalizations, ER visits. Compliance after d/c is nil. Patient also develops excessive attachment or intense dislike of providers and other staff. Patient has no actual hx of suicide attempts or fh or SIB. Patient with possible facititious disorder vs mood disorder. Suspect possible unconscious primary gain related to medical complaints help seeking then rejection of help as inadequate. Patient not assessed as needing CO. In light of collateral, will consider referral to psychotherapy program upon discharge if patient adherent to treatment plan.     7/13: Patient reports complaints of his back itching and loose tooth. Patient continues to demonstrate black-or-white thinking and making conditional statements "if I do not get things the way I want, there is no point in living". Patient does not endorse suicidal ideations, and accepts that he tends to think about the worst outcomes at times. Patient encouraged to develop healthier pattern of thinking and to also focus on positives. Patient is future oriented, thinking about working with new , that he appears to like, and wants to be setup with appointments to receive the care.     Plan:  - Calamine lotion TID for itching  - Continue Remeron 15mg PO HS  - Dental consult for loose tooth  - Individual and group therapy  - Coordinate with SW for safe discharge plan.         71 y.o. male with hx of unusual relatedness including pattern of making up psychiatric symptoms or hx such as claiming he arranged a hit or his brother killed himself. Recently there has been escalating pattern of hospitalizations, ER visits. Compliance after d/c is nil. Patient also develops excessive attachment or intense dislike of providers and other staff. Patient has no actual hx of suicide attempts or fh or SIB. Patient with possible facititious disorder vs mood disorder. Suspect possible unconscious primary gain related to medical complaints help seeking then rejection of help as inadequate. Patient not assessed as needing CO. In light of collateral, will consider referral to psychotherapy program upon discharge if patient adherent to treatment plan.     7/13: Patient reports complaints of his back itching and loose tooth. Patient continues to demonstrate black-or-white thinking and making conditional statements "if I do not get things the way I want, there is no point in living". Patient does not endorse suicidal ideations, and accepts that he tends to think about the worst outcomes at times. Patient encouraged to develop healthier pattern of thinking and to also focus on positives. Patient is future oriented, thinking about working with new , that he appears to like, and wants to be setup with appointments to receive the care.     Plan:  - Calamine lotion TID for itching  - Continue Remeron 15mg PO HS  - Continue Proscar  - Continue tamsulosin for overflow incontinence  - Continue simvastatin for HLD  - Continue nifedipine XL for HTN  - Dental consult for loose tooth  - Individual and group therapy  - Coordinate with SW for safe discharge plan.         71 y.o. male with hx of unusual relatedness including pattern of making up psychiatric symptoms or hx such as claiming he arranged a hit or his brother killed himself. Recently there has been escalating pattern of hospitalizations, ER visits. Compliance after d/c is nil. Patient also develops excessive attachment or intense dislike of providers and other staff. Patient has no actual hx of suicide attempts or fh or SIB. Patient with possible facititious disorder vs mood disorder. Suspect possible unconscious primary gain related to medical complaints help seeking then rejection of help as inadequate. Patient not assessed as needing CO. In light of collateral, will consider referral to psychotherapy program upon discharge if patient adherent to treatment plan.     7/13: Patient reports complaints of his back itching and loose tooth. Patient continues to demonstrate black-or-white thinking and making conditional statements "if I do not get things the way I want, there is no point in living". Patient does not endorse suicidal ideations, and accepts that he tends to think about the worst outcomes at times. Patient encouraged to develop healthier pattern of thinking and to also focus on positives. Patient is future oriented, thinking about working with new , that he appears to like, and wants to be setup with appointments to receive the care.     Plan:  - Calamine lotion TID for itching  - Continue Remeron 15mg PO HS  - Continue Proscar 5mg PO daily for BPH  - Continue tamsulosin 0.4mg PO daily for overflow incontinence/BPH  - Continue oxybutynin 5mg PO daily for overactive bladder  - Continue simvastatin 20mg PO HS for HLD  - Continue nifedipine XL 30mg PO daily for HTN  - Dental consult for loose tooth  - Individual and group therapy  - Coordinate with SW for safe discharge plan.

## 2022-07-13 NOTE — BH INPATIENT PSYCHIATRY PROGRESS NOTE - NSBHCHARTREVIEWVS_PSY_A_CORE FT
Vital Signs Last 24 Hrs  T(C): 36.8 (07-13-22 @ 07:54), Max: 36.8 (07-13-22 @ 07:54)  T(F): 98.3 (07-13-22 @ 07:54), Max: 98.3 (07-13-22 @ 07:54)  HR: --  BP: --  BP(mean): --  RR: --  SpO2: --    Orthostatic VS  07-13-22 @ 07:54  Lying BP: --/-- HR: --  Sitting BP: 141/74 HR: 80  Standing BP: 146/71 HR: 69  Site: --  Mode: --  Orthostatic VS  07-12-22 @ 08:11  Lying BP: --/-- HR: --  Sitting BP: 133/88 HR: 67  Standing BP: 124/82 HR: 81  Site: upper left arm  Mode: electronic

## 2022-07-14 PROCEDURE — 99232 SBSQ HOSP IP/OBS MODERATE 35: CPT | Mod: GC

## 2022-07-14 RX ORDER — CALAMINE AND ZINC OXIDE AND PHENOL 160; 10 MG/ML; MG/ML
1 LOTION TOPICAL THREE TIMES A DAY
Refills: 0 | Status: DISCONTINUED | OUTPATIENT
Start: 2022-07-14 | End: 2022-07-17

## 2022-07-14 RX ADMIN — FINASTERIDE 5 MILLIGRAM(S): 5 TABLET, FILM COATED ORAL at 08:38

## 2022-07-14 RX ADMIN — Medication 30 MILLIGRAM(S): at 08:38

## 2022-07-14 RX ADMIN — SIMVASTATIN 20 MILLIGRAM(S): 20 TABLET, FILM COATED ORAL at 08:37

## 2022-07-14 RX ADMIN — CALAMINE AND ZINC OXIDE AND PHENOL 1 APPLICATION(S): 160; 10 LOTION TOPICAL at 21:29

## 2022-07-14 RX ADMIN — TAMSULOSIN HYDROCHLORIDE 0.4 MILLIGRAM(S): 0.4 CAPSULE ORAL at 08:38

## 2022-07-14 RX ADMIN — Medication 1 TABLET(S): at 08:38

## 2022-07-14 RX ADMIN — Medication 2000 UNIT(S): at 08:38

## 2022-07-14 RX ADMIN — Medication 5 MILLIGRAM(S): at 08:38

## 2022-07-14 RX ADMIN — MIRTAZAPINE 15 MILLIGRAM(S): 45 TABLET, ORALLY DISINTEGRATING ORAL at 21:29

## 2022-07-14 NOTE — BH INPATIENT PSYCHIATRY PROGRESS NOTE - NSBHFUPINTERVALHXFT_PSY_A_CORE
Pt seen for follow up of depression and suicidal thoughts. Patient  Pt seen for follow up of depression and suicidal thoughts. Patient reported feeling upset this morning as he did not like the way a nurse responded to him. Patient reports not getting along with others and did not ask for calamine lotion and feels bothered by the itch. Patient also reported that he is upset because he would like to be able to achieve his [place] and getting his medical issues addressed however tends to bring obstacles to achieving his goals including stating that he does not get along with the  from [employer]. He also complains about his brother and blames his family dynamics with his mother for him not getting along with his brother. However patient does not wish to have alternate living arrangements as he would no longer be the "alpha tenant". He is aware of goals, however tends to magnify his obstacles. Patient encouraged to accept circumstances that may be outside his control and to redirect his attention to his goals. His tendency to employ magnification of his problems was briefly addressed including his concern of losing his friend. Patient appears to feel reassured as he was able to speak with his friend again. Patient was encouraged to utilize balanced thoughts when working with assigned  to achieve his goals, however patient became ambivalent. Pt reports feeling nice overall because he feels he is able to help encourage other patients.

## 2022-07-14 NOTE — BH INPATIENT PSYCHIATRY PROGRESS NOTE - CURRENT MEDICATION
MEDICATIONS  (STANDING):  cholecalciferol 2000 Unit(s) Oral daily  finasteride 5 milliGRAM(s) Oral daily  mirtazapine 15 milliGRAM(s) Oral at bedtime  multivitamin      multivitamin 1 Tablet(s) Oral daily  NIFEdipine XL 30 milliGRAM(s) Oral daily  oxybutynin 5 milliGRAM(s) Oral daily  simvastatin 20 milliGRAM(s) Oral daily  tamsulosin 0.4 milliGRAM(s) Oral daily    MEDICATIONS  (PRN):  calamine/zinc oxide Lotion 1 Application(s) Topical three times a day PRN itching  haloperidol     Tablet 2.5 milliGRAM(s) Oral every 6 hours PRN agitation  haloperidol    Injectable 2.5 milliGRAM(s) IntraMuscular once PRN severe agitation  LORazepam     Tablet 0.5 milliGRAM(s) Oral every 6 hours PRN anxiety/insomnia   MEDICATIONS  (STANDING):  calamine/zinc oxide Lotion 1 Application(s) Topical three times a day  cholecalciferol 2000 Unit(s) Oral daily  finasteride 5 milliGRAM(s) Oral daily  mirtazapine 15 milliGRAM(s) Oral at bedtime  multivitamin      multivitamin 1 Tablet(s) Oral daily  NIFEdipine XL 30 milliGRAM(s) Oral daily  oxybutynin 5 milliGRAM(s) Oral daily  simvastatin 20 milliGRAM(s) Oral daily  tamsulosin 0.4 milliGRAM(s) Oral daily    MEDICATIONS  (PRN):  haloperidol     Tablet 2.5 milliGRAM(s) Oral every 6 hours PRN agitation  haloperidol    Injectable 2.5 milliGRAM(s) IntraMuscular once PRN severe agitation  LORazepam     Tablet 0.5 milliGRAM(s) Oral every 6 hours PRN anxiety/insomnia

## 2022-07-14 NOTE — BH INPATIENT PSYCHIATRY PROGRESS NOTE - NSBHCHARTREVIEWVS_PSY_A_CORE FT
Vital Signs Last 24 Hrs  T(C): 36.4 (07-14-22 @ 07:56), Max: 36.4 (07-14-22 @ 07:56)  T(F): 97.6 (07-14-22 @ 07:56), Max: 97.6 (07-14-22 @ 07:56)  HR: --  BP: --  BP(mean): --  RR: --  SpO2: --    Orthostatic VS  07-14-22 @ 07:56  Lying BP: --/-- HR: --  Sitting BP: 125/81 HR: 92  Standing BP: 127/77 HR: 84  Site: upper left arm  Mode: electronic  Orthostatic VS  07-13-22 @ 07:54  Lying BP: --/-- HR: --  Sitting BP: 141/74 HR: 80  Standing BP: 146/71 HR: 69  Site: --  Mode: --

## 2022-07-14 NOTE — BH INPATIENT PSYCHIATRY PROGRESS NOTE - NSBHASSESSSUMMFT_PSY_ALL_CORE
71 y.o. male with hx of unusual relatedness including pattern of making up psychiatric symptoms or hx such as claiming he arranged a hit or his brother killed himself. Recently there has been escalating pattern of hospitalizations, ER visits. Compliance after d/c is nil. Patient also develops excessive attachment or intense dislike of providers and other staff. Patient has no actual hx of suicide attempts or fh or SIB. Patient with possible facititious disorder vs mood disorder. Suspect possible unconscious primary gain related to medical complaints help seeking then rejection of help as inadequate. Patient not assessed as needing CO. In light of collateral, will consider referral to psychotherapy program upon discharge if patient adherent to treatment plan.     7/13: Patient reports complaints of his back itching and loose tooth. Patient continues to demonstrate black-or-white thinking and making conditional statements "if I do not get things the way I want, there is no point in living". Patient does not endorse suicidal ideations, and accepts that he tends to think about the worst outcomes at times. Patient encouraged to develop healthier pattern of thinking and to also focus on positives. Patient is future oriented, thinking about working with new , that he appears to like, and wants to be setup with appointments to receive the care.   7/14:     Plan:  - Calamine lotion TID for itching  - Continue Remeron 15mg PO HS  - Continue Proscar 5mg PO daily for BPH  - Continue tamsulosin 0.4mg PO daily for overflow incontinence/BPH  - Continue oxybutynin 5mg PO daily for overactive bladder  - Continue simvastatin 20mg PO HS for HLD  - Continue nifedipine XL 30mg PO daily for HTN  - Dental consult for loose tooth  - Individual and group therapy  - Coordinate with SW for safe discharge plan.         71 y.o. male with hx of unusual relatedness including pattern of making up psychiatric symptoms or hx such as claiming he arranged a hit or his brother killed himself. Recently there has been escalating pattern of hospitalizations, ER visits. Compliance after d/c is nil. Patient also develops excessive attachment or intense dislike of providers and other staff. Patient has no actual hx of suicide attempts or fh or SIB. Patient with possible facititious disorder vs mood disorder. Suspect possible unconscious primary gain related to medical complaints help seeking then rejection of help as inadequate. Patient not assessed as needing CO. In light of collateral, will consider referral to psychotherapy program upon discharge if patient adherent to treatment plan.     7/13: Patient reports complaints of his back itching and loose tooth. Patient continues to demonstrate black-or-white thinking and making conditional statements "if I do not get things the way I want, there is no point in living". Patient does not endorse suicidal ideations, and accepts that he tends to think about the worst outcomes at times. Patient encouraged to develop healthier pattern of thinking and to also focus on positives. Patient is future oriented, thinking about working with new , that he appears to like, and wants to be setup with appointments to receive the care.   7/14: Pt does not endorse SI. Appears goal oriented, however also complains and becomes help rejecting. Patient encouraged to develop balanced thoughts. Patient continues to endorse conditions on receiving help only on his terms.     Plan:  - Legals: 9.13  - Psychiatry: Remeron 15mg PO HS for depression.  - Psychotherapy: Individual, group and milieu therapy as appropriate.  - Medical: Calamine lotion TID for itching, Proscar 5mg PO daily for BPH, tamsulosin 0.4mg PO daily for overflow incontinence/BPH, oxybutynin 5mg PO daily for overactive bladder, simvastatin 20mg PO HS for HLD, nifedipine XL 30mg PO daily for HTN  - Dental consult for loose tooth  - Dispo: Pending.

## 2022-07-14 NOTE — BH INPATIENT PSYCHIATRY PROGRESS NOTE - PRN MEDS
MEDICATIONS  (PRN):  calamine/zinc oxide Lotion 1 Application(s) Topical three times a day PRN itching  haloperidol     Tablet 2.5 milliGRAM(s) Oral every 6 hours PRN agitation  haloperidol    Injectable 2.5 milliGRAM(s) IntraMuscular once PRN severe agitation  LORazepam     Tablet 0.5 milliGRAM(s) Oral every 6 hours PRN anxiety/insomnia   MEDICATIONS  (PRN):  haloperidol     Tablet 2.5 milliGRAM(s) Oral every 6 hours PRN agitation  haloperidol    Injectable 2.5 milliGRAM(s) IntraMuscular once PRN severe agitation  LORazepam     Tablet 0.5 milliGRAM(s) Oral every 6 hours PRN anxiety/insomnia

## 2022-07-15 RX ORDER — ARIPIPRAZOLE 15 MG/1
2 TABLET ORAL DAILY
Refills: 0 | Status: DISCONTINUED | OUTPATIENT
Start: 2022-07-15 | End: 2022-07-17

## 2022-07-15 RX ADMIN — Medication 5 MILLIGRAM(S): at 08:43

## 2022-07-15 RX ADMIN — MIRTAZAPINE 15 MILLIGRAM(S): 45 TABLET, ORALLY DISINTEGRATING ORAL at 21:31

## 2022-07-15 RX ADMIN — SIMVASTATIN 20 MILLIGRAM(S): 20 TABLET, FILM COATED ORAL at 08:44

## 2022-07-15 RX ADMIN — CALAMINE AND ZINC OXIDE AND PHENOL 1 APPLICATION(S): 160; 10 LOTION TOPICAL at 13:14

## 2022-07-15 RX ADMIN — Medication 30 MILLIGRAM(S): at 08:43

## 2022-07-15 RX ADMIN — CALAMINE AND ZINC OXIDE AND PHENOL 1 APPLICATION(S): 160; 10 LOTION TOPICAL at 09:21

## 2022-07-15 RX ADMIN — FINASTERIDE 5 MILLIGRAM(S): 5 TABLET, FILM COATED ORAL at 08:43

## 2022-07-15 RX ADMIN — TAMSULOSIN HYDROCHLORIDE 0.4 MILLIGRAM(S): 0.4 CAPSULE ORAL at 08:43

## 2022-07-15 RX ADMIN — Medication 2000 UNIT(S): at 08:43

## 2022-07-15 RX ADMIN — Medication 1 TABLET(S): at 08:44

## 2022-07-15 RX ADMIN — CALAMINE AND ZINC OXIDE AND PHENOL 1 APPLICATION(S): 160; 10 LOTION TOPICAL at 21:31

## 2022-07-15 NOTE — BH INPATIENT PSYCHIATRY PROGRESS NOTE - PRN MEDS
MEDICATIONS  (PRN):  haloperidol     Tablet 2.5 milliGRAM(s) Oral every 6 hours PRN agitation  haloperidol    Injectable 2.5 milliGRAM(s) IntraMuscular once PRN severe agitation  LORazepam     Tablet 0.5 milliGRAM(s) Oral every 6 hours PRN anxiety/insomnia

## 2022-07-15 NOTE — BH INPATIENT PSYCHIATRY PROGRESS NOTE - CURRENT MEDICATION
MEDICATIONS  (STANDING):  ARIPiprazole 2 milliGRAM(s) Oral daily  calamine/zinc oxide Lotion 1 Application(s) Topical three times a day  cholecalciferol 2000 Unit(s) Oral daily  finasteride 5 milliGRAM(s) Oral daily  mirtazapine 15 milliGRAM(s) Oral at bedtime  multivitamin      multivitamin 1 Tablet(s) Oral daily  NIFEdipine XL 30 milliGRAM(s) Oral daily  oxybutynin 5 milliGRAM(s) Oral daily  simvastatin 20 milliGRAM(s) Oral daily  tamsulosin 0.4 milliGRAM(s) Oral daily    MEDICATIONS  (PRN):  haloperidol     Tablet 2.5 milliGRAM(s) Oral every 6 hours PRN agitation  haloperidol    Injectable 2.5 milliGRAM(s) IntraMuscular once PRN severe agitation  LORazepam     Tablet 0.5 milliGRAM(s) Oral every 6 hours PRN anxiety/insomnia

## 2022-07-15 NOTE — BH INPATIENT PSYCHIATRY PROGRESS NOTE - NSBHFUPINTERVALHXFT_PSY_A_CORE
Pt seen for follow up of depression and suicidal thoughts. Pt went for dental consult today and was told he has several teeth that should be extracted. Pt return to the unit feeling upset, stating he'd rather die than use dentures. Pt states he would prefer to have implants but is unsure if this would be covered. Pt denies active suicidal ideation, intent or plans, stating his suicidal thinking is conditional on his ability to either obtain implants in the future or dentures he can tolerate. Pt reports he was given dentures in the past and never liked them because "I felt I was gagging", "If I had dentures that would stick well, maybe that could work". Despite having a bleak outlook on life pt is able to consider alternatives and find reasons to live for. Pt hopes to be able to obtain access a ride and pursue urology follow up. Pt enjoys working on his writing and drawings.

## 2022-07-15 NOTE — BH INPATIENT PSYCHIATRY PROGRESS NOTE - NSBHASSESSSUMMFT_PSY_ALL_CORE
71 y.o. male with hx of unusual relatedness including pattern of making up psychiatric symptoms or hx such as claiming he arranged a hit or his brother killed himself. Recently there has been escalating pattern of hospitalizations, ER visits. Compliance after d/c is nil. Patient also develops excessive attachment or intense dislike of providers and other staff. Patient has no actual hx of suicide attempts or fh or SIB. Patient with possible facititious disorder vs mood disorder. Suspect possible unconscious primary gain related to medical complaints help seeking then rejection of help as inadequate. Patient not assessed as needing CO. In light of collateral, will consider referral to psychotherapy program upon discharge if patient adherent to treatment plan.     7/13: Patient reports complaints of his back itching and loose tooth. Patient continues to demonstrate black-or-white thinking and making conditional statements "if I do not get things the way I want, there is no point in living". Patient does not endorse suicidal ideations, and accepts that he tends to think about the worst outcomes at times. Patient encouraged to develop healthier pattern of thinking and to also focus on positives. Patient is future oriented, thinking about working with new , that he appears to like, and wants to be setup with appointments to receive the care.   7/14: Pt does not endorse SI. Appears goal oriented, however also complains and becomes help rejecting. Patient encouraged to develop balanced thoughts. Patient continues to endorse conditions on receiving help only on his terms.   7/15: Pt continues to endorse passive SI, conditional on being able to obtain certain medical services in the future.     Plan:  - Legals: 9.13  - Psychiatry: Remeron 15mg PO HS for depression. START Abilify 2mg QD.   - Psychotherapy: Individual, group and milieu therapy as appropriate.  - Medical: Calamine lotion TID for itching, Proscar 5mg PO daily for BPH, tamsulosin 0.4mg PO daily for overflow incontinence/BPH, oxybutynin 5mg PO daily for overactive bladder, simvastatin 20mg PO HS for HLD, nifedipine XL 30mg PO daily for HTN  - Dental consult for loose tooth  - Dispo: Pending.

## 2022-07-15 NOTE — BH INPATIENT PSYCHIATRY PROGRESS NOTE - NSBHCHARTREVIEWVS_PSY_A_CORE FT
Vital Signs Last 24 Hrs  T(C): 36.5 (07-15-22 @ 07:50), Max: 36.5 (07-15-22 @ 07:50)  T(F): 97.7 (07-15-22 @ 07:50), Max: 97.7 (07-15-22 @ 07:50)  HR: --  BP: --  BP(mean): --  RR: --  SpO2: --    Orthostatic VS  07-15-22 @ 07:50  Lying BP: --/-- HR: --  Sitting BP: 131/82 HR: 65  Standing BP: 113/80 HR: 86  Site: --  Mode: --  Orthostatic VS  07-14-22 @ 07:56  Lying BP: --/-- HR: --  Sitting BP: 125/81 HR: 92  Standing BP: 127/77 HR: 84  Site: upper left arm  Mode: electronic

## 2022-07-16 PROCEDURE — 99232 SBSQ HOSP IP/OBS MODERATE 35: CPT

## 2022-07-16 RX ORDER — ACETAMINOPHEN 500 MG
650 TABLET ORAL EVERY 6 HOURS
Refills: 0 | Status: DISCONTINUED | OUTPATIENT
Start: 2022-07-16 | End: 2022-08-25

## 2022-07-16 RX ADMIN — FINASTERIDE 5 MILLIGRAM(S): 5 TABLET, FILM COATED ORAL at 10:16

## 2022-07-16 RX ADMIN — TAMSULOSIN HYDROCHLORIDE 0.4 MILLIGRAM(S): 0.4 CAPSULE ORAL at 10:16

## 2022-07-16 RX ADMIN — Medication 5 MILLIGRAM(S): at 10:16

## 2022-07-16 RX ADMIN — MIRTAZAPINE 15 MILLIGRAM(S): 45 TABLET, ORALLY DISINTEGRATING ORAL at 21:33

## 2022-07-16 RX ADMIN — Medication 2000 UNIT(S): at 10:15

## 2022-07-16 RX ADMIN — Medication 650 MILLIGRAM(S): at 17:14

## 2022-07-16 RX ADMIN — SIMVASTATIN 20 MILLIGRAM(S): 20 TABLET, FILM COATED ORAL at 10:16

## 2022-07-16 RX ADMIN — Medication 650 MILLIGRAM(S): at 18:11

## 2022-07-16 RX ADMIN — CALAMINE AND ZINC OXIDE AND PHENOL 1 APPLICATION(S): 160; 10 LOTION TOPICAL at 21:33

## 2022-07-16 RX ADMIN — ARIPIPRAZOLE 2 MILLIGRAM(S): 15 TABLET ORAL at 10:16

## 2022-07-16 RX ADMIN — Medication 30 MILLIGRAM(S): at 10:16

## 2022-07-16 RX ADMIN — Medication 1 TABLET(S): at 10:16

## 2022-07-16 NOTE — BH INPATIENT PSYCHIATRY PROGRESS NOTE - NSBHASSESSSUMMFT_PSY_ALL_CORE
71 y.o. male with hx of unusual relatedness including pattern of making up psychiatric symptoms or hx such as claiming he arranged a hit or his brother killed himself. Recently there has been escalating pattern of hospitalizations, ER visits. Compliance after d/c is nil. Patient also develops excessive attachment or intense dislike of providers and other staff. Patient has no actual hx of suicide attempts or fh or SIB. Patient with possible facititious disorder vs mood disorder. Suspect possible unconscious primary gain related to medical complaints help seeking then rejection of help as inadequate. Patient not assessed as needing CO. In light of collateral, will consider referral to psychotherapy program upon discharge if patient adherent to treatment plan.     7/13: Patient reports complaints of his back itching and loose tooth. Patient continues to demonstrate black-or-white thinking and making conditional statements "if I do not get things the way I want, there is no point in living". Patient does not endorse suicidal ideations, and accepts that he tends to think about the worst outcomes at times. Patient encouraged to develop healthier pattern of thinking and to also focus on positives. Patient is future oriented, thinking about working with new , that he appears to like, and wants to be setup with appointments to receive the care.   7/14: Pt does not endorse SI. Appears goal oriented, however also complains and becomes help rejecting. Patient encouraged to develop balanced thoughts. Patient continues to endorse conditions on receiving help only on his terms.   7/15: Pt continues to endorse passive SI, conditional on being able to obtain certain medical services in the future.   7/16: Depressed, overinclusive, negativistic and ruminative. Tolerating abilify.      Plan:  - Legals: 9.13  - Psychiatry: Remeron 15mg PO HS for depression, Abilify 2mg QD.   - Psychotherapy: Individual, group and milieu therapy as appropriate.  - Medical: Calamine lotion TID for itching, Proscar 5mg PO daily for BPH, tamsulosin 0.4mg PO daily for overflow incontinence/BPH, oxybutynin 5mg PO daily for overactive bladder, simvastatin 20mg PO HS for HLD, nifedipine XL 30mg PO daily for HTN  - Dental consult for loose tooth  - Dispo: Pending.

## 2022-07-16 NOTE — BH INPATIENT PSYCHIATRY PROGRESS NOTE - NSBHFUPINTERVALHXFT_PSY_A_CORE
Pt seen for follow up of depression and suicidal thoughts. Chart reviewed and discussed with nursing staff. Pt is compliant with meds, no acute outbursts, no PRN overnight/this am. On Exam, Pt reports not doing well, has more physical problems than emotional. He spoke about his dental issues, not sure if he will be covered for his dental implants that he needs. He reports most psych meds are not helpful except prozac in the past. He appears negativistic, talkative, ruminative and somatic. Denies any SI, HI.

## 2022-07-16 NOTE — BH INPATIENT PSYCHIATRY PROGRESS NOTE - NSBHCHARTREVIEWVS_PSY_A_CORE FT
Vital Signs Last 24 Hrs  T(C): 36.2 (07-16-22 @ 08:46), Max: 36.2 (07-16-22 @ 08:46)  T(F): 97.2 (07-16-22 @ 08:46), Max: 97.2 (07-16-22 @ 08:46)  HR: --  BP: --  BP(mean): --  RR: --  SpO2: --    Orthostatic VS  07-16-22 @ 09:15  Lying BP: --/-- HR: --  Sitting BP: 117/67 HR: 72  Standing BP: 111/67 HR: 92  Site: --  Mode: --  Orthostatic VS  07-16-22 @ 08:46  Lying BP: --/-- HR: --  Sitting BP: 123/79 HR: 66  Standing BP: 103/76 HR: 88  Site: --  Mode: --  Orthostatic VS  07-15-22 @ 07:50  Lying BP: --/-- HR: --  Sitting BP: 131/82 HR: 65  Standing BP: 113/80 HR: 86  Site: --  Mode: --

## 2022-07-17 RX ORDER — MIRTAZAPINE 45 MG/1
7.5 TABLET, ORALLY DISINTEGRATING ORAL AT BEDTIME
Refills: 0 | Status: DISCONTINUED | OUTPATIENT
Start: 2022-07-17 | End: 2022-07-21

## 2022-07-17 RX ORDER — FLUOXETINE HCL 10 MG
10 CAPSULE ORAL DAILY
Refills: 0 | Status: DISCONTINUED | OUTPATIENT
Start: 2022-07-17 | End: 2022-08-16

## 2022-07-17 RX ORDER — CALAMINE AND ZINC OXIDE AND PHENOL 160; 10 MG/ML; MG/ML
1 LOTION TOPICAL THREE TIMES A DAY
Refills: 0 | Status: DISCONTINUED | OUTPATIENT
Start: 2022-07-17 | End: 2022-08-25

## 2022-07-17 RX ORDER — ARIPIPRAZOLE 15 MG/1
5 TABLET ORAL DAILY
Refills: 0 | Status: DISCONTINUED | OUTPATIENT
Start: 2022-07-17 | End: 2022-07-26

## 2022-07-17 RX ADMIN — Medication 2000 UNIT(S): at 09:58

## 2022-07-17 RX ADMIN — Medication 650 MILLIGRAM(S): at 07:24

## 2022-07-17 RX ADMIN — TAMSULOSIN HYDROCHLORIDE 0.4 MILLIGRAM(S): 0.4 CAPSULE ORAL at 09:59

## 2022-07-17 RX ADMIN — SIMVASTATIN 20 MILLIGRAM(S): 20 TABLET, FILM COATED ORAL at 09:58

## 2022-07-17 RX ADMIN — Medication 650 MILLIGRAM(S): at 08:15

## 2022-07-17 RX ADMIN — Medication 1 TABLET(S): at 09:58

## 2022-07-17 RX ADMIN — MIRTAZAPINE 7.5 MILLIGRAM(S): 45 TABLET, ORALLY DISINTEGRATING ORAL at 20:28

## 2022-07-17 RX ADMIN — Medication 30 MILLIGRAM(S): at 09:58

## 2022-07-17 RX ADMIN — Medication 5 MILLIGRAM(S): at 09:59

## 2022-07-17 RX ADMIN — ARIPIPRAZOLE 2 MILLIGRAM(S): 15 TABLET ORAL at 09:58

## 2022-07-17 RX ADMIN — FINASTERIDE 5 MILLIGRAM(S): 5 TABLET, FILM COATED ORAL at 09:59

## 2022-07-17 RX ADMIN — Medication 0.5 MILLIGRAM(S): at 20:28

## 2022-07-17 NOTE — BH INPATIENT PSYCHIATRY PROGRESS NOTE - NSBHFUPINTERVALHXFT_PSY_A_CORE
Pt seen for follow up of depression and suicidal thoughts. Chart reviewed and discussed with nursing staff. Pt is compliant with meds, no acute outbursts, no PRN overnight/this am. On Exam, Pt reports not doing well, has more physical problems than emotional. He spoke about his dental issues, not sure if he will be covered for his dental implants that he needs. He reports most psych meds are not helpful except prozac in the past. He appears negativistic, talkative, ruminative and somatic. Denies any SI, HI.    7/17: No significant overnight events reported, pt taking meds, denies side effects. Pt seen this morning during rounds, sitting in a corner in the dayroom. Ravinder, reports preferring to avoid people Pt seen for follow up of depression and suicidal thoughts. Chart reviewed and discussed with nursing staff. Pt is compliant with meds, no acute outbursts, no PRN overnight/this am. On Exam, Pt reports not doing well, has more physical problems than emotional. He spoke about his dental issues, not sure if he will be covered for his dental implants that he needs. He reports most psych meds are not helpful except prozac in the past. He appears negativistic, talkative, ruminative and somatic. Denies any SI, HI.    7/17: No significant overnight events reported, pt taking meds, denies side effects. Pt seen this morning during rounds, sitting in a corner in the dayroom. Ravinder, reports preferring to avoid people. Continues to present catastrophic thinking, negative, endosing hopelessness and somatic preoccupation with his teeth, back itch and prostate. Denies acute SI but endorses thoughts that his life is not worth living contingent on being able to obtain his prostate surgery, being able to get access a ride and a  "that is up for the challenge". Will lower Mirtazapine to 7.5mg as pt continues to endorse difficulty sleeping and start Prozac, which pt reports he has benefitted from in the past.

## 2022-07-17 NOTE — BH INPATIENT PSYCHIATRY PROGRESS NOTE - CURRENT MEDICATION
MEDICATIONS  (STANDING):  ARIPiprazole 2 milliGRAM(s) Oral daily  cholecalciferol 2000 Unit(s) Oral daily  finasteride 5 milliGRAM(s) Oral daily  mirtazapine 15 milliGRAM(s) Oral at bedtime  multivitamin      multivitamin 1 Tablet(s) Oral daily  NIFEdipine XL 30 milliGRAM(s) Oral daily  oxybutynin 5 milliGRAM(s) Oral daily  simvastatin 20 milliGRAM(s) Oral daily  tamsulosin 0.4 milliGRAM(s) Oral daily    MEDICATIONS  (PRN):  acetaminophen     Tablet .. 650 milliGRAM(s) Oral every 6 hours PRN Mild Pain (1 - 3), Moderate Pain (4 - 6)  calamine/zinc oxide Lotion 1 Application(s) Topical three times a day PRN itching  haloperidol     Tablet 2.5 milliGRAM(s) Oral every 6 hours PRN agitation  haloperidol    Injectable 2.5 milliGRAM(s) IntraMuscular once PRN severe agitation  LORazepam     Tablet 0.5 milliGRAM(s) Oral every 6 hours PRN anxiety/insomnia   MEDICATIONS  (STANDING):  ARIPiprazole 5 milliGRAM(s) Oral daily  cholecalciferol 2000 Unit(s) Oral daily  finasteride 5 milliGRAM(s) Oral daily  FLUoxetine 10 milliGRAM(s) Oral daily  mirtazapine 7.5 milliGRAM(s) Oral at bedtime  multivitamin      multivitamin 1 Tablet(s) Oral daily  NIFEdipine XL 30 milliGRAM(s) Oral daily  oxybutynin 5 milliGRAM(s) Oral daily  simvastatin 20 milliGRAM(s) Oral daily  tamsulosin 0.4 milliGRAM(s) Oral daily    MEDICATIONS  (PRN):  acetaminophen     Tablet .. 650 milliGRAM(s) Oral every 6 hours PRN Mild Pain (1 - 3), Moderate Pain (4 - 6)  calamine/zinc oxide Lotion 1 Application(s) Topical three times a day PRN itching  haloperidol     Tablet 2.5 milliGRAM(s) Oral every 6 hours PRN agitation  haloperidol    Injectable 2.5 milliGRAM(s) IntraMuscular once PRN severe agitation  LORazepam     Tablet 0.5 milliGRAM(s) Oral every 6 hours PRN anxiety/insomnia

## 2022-07-17 NOTE — BH INPATIENT PSYCHIATRY PROGRESS NOTE - PRN MEDS
MEDICATIONS  (PRN):  acetaminophen     Tablet .. 650 milliGRAM(s) Oral every 6 hours PRN Mild Pain (1 - 3), Moderate Pain (4 - 6)  calamine/zinc oxide Lotion 1 Application(s) Topical three times a day PRN itching  haloperidol     Tablet 2.5 milliGRAM(s) Oral every 6 hours PRN agitation  haloperidol    Injectable 2.5 milliGRAM(s) IntraMuscular once PRN severe agitation  LORazepam     Tablet 0.5 milliGRAM(s) Oral every 6 hours PRN anxiety/insomnia

## 2022-07-17 NOTE — BH INPATIENT PSYCHIATRY PROGRESS NOTE - NSBHASSESSSUMMFT_PSY_ALL_CORE
71 y.o. male with hx of unusual relatedness including pattern of making up psychiatric symptoms or hx such as claiming he arranged a hit or his brother killed himself. Recently there has been escalating pattern of hospitalizations, ER visits. Compliance after d/c is nil. Patient also develops excessive attachment or intense dislike of providers and other staff. Patient has no actual hx of suicide attempts or fh or SIB. Patient with possible facititious disorder vs mood disorder. Suspect possible unconscious primary gain related to medical complaints help seeking then rejection of help as inadequate. Patient not assessed as needing CO. In light of collateral, will consider referral to psychotherapy program upon discharge if patient adherent to treatment plan.     7/13: Patient reports complaints of his back itching and loose tooth. Patient continues to demonstrate black-or-white thinking and making conditional statements "if I do not get things the way I want, there is no point in living". Patient does not endorse suicidal ideations, and accepts that he tends to think about the worst outcomes at times. Patient encouraged to develop healthier pattern of thinking and to also focus on positives. Patient is future oriented, thinking about working with new , that he appears to like, and wants to be setup with appointments to receive the care.   7/14: Pt does not endorse SI. Appears goal oriented, however also complains and becomes help rejecting. Patient encouraged to develop balanced thoughts. Patient continues to endorse conditions on receiving help only on his terms.   7/15: Pt continues to endorse passive SI, conditional on being able to obtain certain medical services in the future.   7/16: Depressed, overinclusive, negativistic and ruminative. Tolerating abilify.      Plan:  - Legals: 9.13  - Psychiatry: Remeron 15mg PO HS for depression, Abilify 2mg QD.   - Psychotherapy: Individual, group and milieu therapy as appropriate.  - Medical: Calamine lotion TID for itching, Proscar 5mg PO daily for BPH, tamsulosin 0.4mg PO daily for overflow incontinence/BPH, oxybutynin 5mg PO daily for overactive bladder, simvastatin 20mg PO HS for HLD, nifedipine XL 30mg PO daily for HTN  - Dental consult for loose tooth  - Dispo: Pending.         71 y.o. male with hx of unusual relatedness including pattern of making up psychiatric symptoms or hx such as claiming he arranged a hit or his brother killed himself. Recently there has been escalating pattern of hospitalizations, ER visits. Compliance after d/c is nil. Patient also develops excessive attachment or intense dislike of providers and other staff. Patient has no actual hx of suicide attempts or fh or SIB. Patient with possible facititious disorder vs mood disorder. Suspect possible unconscious primary gain related to medical complaints help seeking then rejection of help as inadequate. Patient not assessed as needing CO. In light of collateral, will consider referral to psychotherapy program upon discharge if patient adherent to treatment plan.     7/13: Patient reports complaints of his back itching and loose tooth. Patient continues to demonstrate black-or-white thinking and making conditional statements "if I do not get things the way I want, there is no point in living". Patient does not endorse suicidal ideations, and accepts that he tends to think about the worst outcomes at times. Patient encouraged to develop healthier pattern of thinking and to also focus on positives. Patient is future oriented, thinking about working with new , that he appears to like, and wants to be setup with appointments to receive the care.   7/14: Pt does not endorse SI. Appears goal oriented, however also complains and becomes help rejecting. Patient encouraged to develop balanced thoughts. Patient continues to endorse conditions on receiving help only on his terms.   7/15: Pt continues to endorse passive SI, conditional on being able to obtain certain medical services in the future.   7/16: Depressed, overinclusive, negativistic and ruminative. Tolerating abilify.    7/17: 7/17: No significant overnight events reported, pt taking meds, denies side effects. Pt seen this morning during rounds, sitting in a corner in the dayroom. Ravinder, reports preferring to avoid people. Continues to present catastrophic thinking, negative, endosing hopelessness and somatic preoccupation with his teeth, back itch and prostate. Denies acute SI but endorses thoughts that his life is not worth living contingent on being able to obtain his prostate surgery, being able to get access a ride and a  "that is up for the challenge". Will lower Mirtazapine to 7.5mg as pt continues to endorse difficulty sleeping and start Prozac, which pt reports he has benefitted from in the past. Increased Abilify to 5mg QD.     Plan:  - Legals: 9.13  - Psychiatry: Remeron 7/5mg PO HS for depression, Abilify 5mg QD, START Prozac 10mg QD.   - Psychotherapy: Individual, group and milieu therapy as appropriate.  - Medical: Calamine lotion TID for itching, Proscar 5mg PO daily for BPH, tamsulosin 0.4mg PO daily for overflow incontinence/BPH, oxybutynin 5mg PO daily for overactive bladder, simvastatin 20mg PO HS for HLD, nifedipine XL 30mg PO daily for HTN  - Dental consult for loose tooth  - Dispo: Pending.

## 2022-07-17 NOTE — BH INPATIENT PSYCHIATRY PROGRESS NOTE - NSBHCHARTREVIEWVS_PSY_A_CORE FT
Vital Signs Last 24 Hrs  T(C): 36.8 (07-17-22 @ 07:57), Max: 36.8 (07-17-22 @ 07:57)  T(F): 98.3 (07-17-22 @ 07:57), Max: 98.3 (07-17-22 @ 07:57)  HR: --  BP: --  BP(mean): --  RR: --  SpO2: --    Orthostatic VS  07-17-22 @ 07:57  Lying BP: --/-- HR: --  Sitting BP: 169/82 HR: 87  Standing BP: 149/87 HR: 91  Site: --  Mode: --  Orthostatic VS  07-16-22 @ 09:15  Lying BP: --/-- HR: --  Sitting BP: 117/67 HR: 72  Standing BP: 111/67 HR: 92  Site: --  Mode: --  Orthostatic VS  07-16-22 @ 08:46  Lying BP: --/-- HR: --  Sitting BP: 123/79 HR: 66  Standing BP: 103/76 HR: 88  Site: --  Mode: --

## 2022-07-18 PROCEDURE — 99232 SBSQ HOSP IP/OBS MODERATE 35: CPT | Mod: GC

## 2022-07-18 RX ADMIN — Medication 5 MILLIGRAM(S): at 08:48

## 2022-07-18 RX ADMIN — Medication 2000 UNIT(S): at 08:49

## 2022-07-18 RX ADMIN — Medication 10 MILLIGRAM(S): at 08:49

## 2022-07-18 RX ADMIN — Medication 30 MILLIGRAM(S): at 08:49

## 2022-07-18 RX ADMIN — MIRTAZAPINE 7.5 MILLIGRAM(S): 45 TABLET, ORALLY DISINTEGRATING ORAL at 20:41

## 2022-07-18 RX ADMIN — ARIPIPRAZOLE 5 MILLIGRAM(S): 15 TABLET ORAL at 08:49

## 2022-07-18 RX ADMIN — Medication 1 TABLET(S): at 08:49

## 2022-07-18 RX ADMIN — FINASTERIDE 5 MILLIGRAM(S): 5 TABLET, FILM COATED ORAL at 08:48

## 2022-07-18 RX ADMIN — Medication 0.5 MILLIGRAM(S): at 20:41

## 2022-07-18 RX ADMIN — Medication 650 MILLIGRAM(S): at 21:43

## 2022-07-18 RX ADMIN — Medication 650 MILLIGRAM(S): at 22:40

## 2022-07-18 RX ADMIN — TAMSULOSIN HYDROCHLORIDE 0.4 MILLIGRAM(S): 0.4 CAPSULE ORAL at 08:48

## 2022-07-18 RX ADMIN — SIMVASTATIN 20 MILLIGRAM(S): 20 TABLET, FILM COATED ORAL at 08:49

## 2022-07-18 NOTE — BH INPATIENT PSYCHIATRY PROGRESS NOTE - NSBHASSESSSUMMFT_PSY_ALL_CORE
71 y.o. male with hx of unusual relatedness including pattern of making up psychiatric symptoms or hx such as claiming he arranged a hit or his brother killed himself. Recently there has been escalating pattern of hospitalizations, ER visits. Compliance after d/c is nil. Patient also develops excessive attachment or intense dislike of providers and other staff. Patient has no actual hx of suicide attempts or fh or SIB. Patient with possible facititious disorder vs mood disorder. Suspect possible unconscious primary gain related to medical complaints help seeking then rejection of help as inadequate. Patient not assessed as needing CO. In light of collateral, will consider referral to psychotherapy program upon discharge if patient adherent to treatment plan.     7/13: Patient reports complaints of his back itching and loose tooth. Patient continues to demonstrate black-or-white thinking and making conditional statements "if I do not get things the way I want, there is no point in living". Patient does not endorse suicidal ideations, and accepts that he tends to think about the worst outcomes at times. Patient encouraged to develop healthier pattern of thinking and to also focus on positives. Patient is future oriented, thinking about working with new , that he appears to like, and wants to be setup with appointments to receive the care.   7/14: Pt does not endorse SI. Appears goal oriented, however also complains and becomes help rejecting. Patient encouraged to develop balanced thoughts. Patient continues to endorse conditions on receiving help only on his terms.   7/15: Pt continues to endorse passive SI, conditional on being able to obtain certain medical services in the future.   7/16: Depressed, overinclusive, negativistic and ruminative. Tolerating abilify.    7/17: No significant overnight events reported, pt taking meds, denies side effects. Continues to present catastrophic thinking, negative, endosing hopelessness and somatic preoccupation with his teeth, back itch and prostate. Denies acute SI but endorses thoughts that his life is not worth living contingent on being able to obtain his prostate surgery, being able to get access a ride and a  "that is up for the challenge". Will lower Mirtazapine to 7.5mg as pt continues to endorse difficulty sleeping and start Prozac, which pt reports he has benefitted from in the past. Increased Abilify to 5mg QD.   7/18: Pt continues to present with all-or-none thinking, endorsing somatic preoccupation with teeth and prostate. Patient appears to bring up other complaints such as his relationships when discussing plans for his current somatic complaints. No active SI reported, and hopelessness is contingent upon not getting staff that "can tolerate" him and getting his tooth extracted and receiving implants and receiving prostate surgery. Continue current medications. Plan for meeting with  for appropriate followups. Will keep mirtazapine 7.5mg HS for insomnia and plan to titrate fluoxetine as tolerated.     Plan:  - Legals: 9.13  - Psychiatry: Remeron 7.5mg PO HS for depression, Abilify 5mg QD, Prozac 10mg QD.   - Psychotherapy: Individual, group and milieu therapy as appropriate.  - Medical: Calamine lotion TID for itching, Proscar 5mg PO daily for BPH, tamsulosin 0.4mg PO daily for overflow incontinence/BPH, oxybutynin 5mg PO daily for overactive bladder, simvastatin 20mg PO HS for HLD, nifedipine XL 30mg PO daily for HTN  - Dispo: Pending.

## 2022-07-18 NOTE — BH INPATIENT PSYCHIATRY PROGRESS NOTE - NSBHCHARTREVIEWVS_PSY_A_CORE FT
Vital Signs Last 24 Hrs  T(C): 36.7 (07-18-22 @ 07:52), Max: 36.7 (07-18-22 @ 07:52)  T(F): 98.1 (07-18-22 @ 07:52), Max: 98.1 (07-18-22 @ 07:52)  HR: --  BP: --  BP(mean): --  RR: --  SpO2: --    Orthostatic VS  07-18-22 @ 07:52  Lying BP: --/-- HR: --  Sitting BP: 142/86 HR: 78  Standing BP: 133/81 HR: 82  Site: upper left arm  Mode: electronic  Orthostatic VS  07-17-22 @ 07:57  Lying BP: --/-- HR: --  Sitting BP: 169/82 HR: 87  Standing BP: 149/87 HR: 91  Site: --  Mode: --

## 2022-07-18 NOTE — BH INPATIENT PSYCHIATRY PROGRESS NOTE - NSBHFUPINTERVALHXFT_PSY_A_CORE
71 y.o. male, single, living with brother, presented to hospital for complaints of SI.     7/18: Pt seen for follow up of depression and suicidal thoughts. Chart reviewed. Patient received PRN Ativan last night for insomnia. Patient has been adherent to medications. Patient reports people cannot stand him and sits by himself in common area. Patient reports he only get along with certain people such as his roommate, however he prefers not to bother him. Patient continues to verbalize complaints about his teeth and prostate with all or none thinking and continues to bring up other problems when discussing plans for his complaints. Patient denies acute SI, reports feeling "live would not be worth living" contingent upon him not having his prostate surgery and having only 1 tooth that he feel loose to be removed. Patient encouraged to discuss his concerns with the respective providers during his appointments and encouraged to develop healthier pattern of thinking. Patient appears to have multiple complaints and it was discussed with the patient that he appears to relate with people through his complaints.  71 y.o. male, single, living with brother, presented to hospital for complaints of SI.     7/18: Pt seen for follow up of depression and suicidal thoughts. Chart reviewed. Patient received PRN Ativan last night for insomnia. Patient has been adherent to medications. Patient reports people cannot stand him and sits by himself in common area. Patient reports he only get along with certain people such as his roommate, however he prefers not to bother him. Patient continues to verbalize complaints about his teeth and prostate with all or none thinking and continues to bring up other problems when discussing plans for his complaints. Patient denies acute SI, reports feeling "live would not be worth living" contingent upon him not having his prostate surgery and having only 1 tooth that he feel loose to be removed. Patient encouraged to discuss his concerns with the respective providers during his appointments and encouraged to develop healthier pattern of thinking. Patient appears to have multiple complaints and it was discussed with the patient that he appears to relate with people through his complaints.     Addendum 3:23 PM: Pt's loose tooth fell out. Pt has previously been to dental clinic and it was recommended for multiple tooth extractions however patient declined. Pt was offered to follow up with dental. Pt agreed to follow up, however declines extraction of the other teeth.

## 2022-07-18 NOTE — BH INPATIENT PSYCHIATRY PROGRESS NOTE - CURRENT MEDICATION
MEDICATIONS  (STANDING):  ARIPiprazole 5 milliGRAM(s) Oral daily  cholecalciferol 2000 Unit(s) Oral daily  finasteride 5 milliGRAM(s) Oral daily  FLUoxetine 10 milliGRAM(s) Oral daily  mirtazapine 7.5 milliGRAM(s) Oral at bedtime  multivitamin      multivitamin 1 Tablet(s) Oral daily  NIFEdipine XL 30 milliGRAM(s) Oral daily  oxybutynin 5 milliGRAM(s) Oral daily  simvastatin 20 milliGRAM(s) Oral daily  tamsulosin 0.4 milliGRAM(s) Oral daily    MEDICATIONS  (PRN):  acetaminophen     Tablet .. 650 milliGRAM(s) Oral every 6 hours PRN Mild Pain (1 - 3), Moderate Pain (4 - 6)  calamine/zinc oxide Lotion 1 Application(s) Topical three times a day PRN itching  haloperidol     Tablet 2.5 milliGRAM(s) Oral every 6 hours PRN agitation  haloperidol    Injectable 2.5 milliGRAM(s) IntraMuscular once PRN severe agitation  LORazepam     Tablet 0.5 milliGRAM(s) Oral every 6 hours PRN anxiety/insomnia

## 2022-07-19 PROCEDURE — 90837 PSYTX W PT 60 MINUTES: CPT

## 2022-07-19 PROCEDURE — 99232 SBSQ HOSP IP/OBS MODERATE 35: CPT

## 2022-07-19 RX ADMIN — ARIPIPRAZOLE 5 MILLIGRAM(S): 15 TABLET ORAL at 09:58

## 2022-07-19 RX ADMIN — Medication 2000 UNIT(S): at 09:58

## 2022-07-19 RX ADMIN — FINASTERIDE 5 MILLIGRAM(S): 5 TABLET, FILM COATED ORAL at 09:58

## 2022-07-19 RX ADMIN — Medication 5 MILLIGRAM(S): at 09:57

## 2022-07-19 RX ADMIN — TAMSULOSIN HYDROCHLORIDE 0.4 MILLIGRAM(S): 0.4 CAPSULE ORAL at 09:58

## 2022-07-19 RX ADMIN — Medication 1 TABLET(S): at 09:58

## 2022-07-19 RX ADMIN — Medication 30 MILLIGRAM(S): at 09:58

## 2022-07-19 RX ADMIN — SIMVASTATIN 20 MILLIGRAM(S): 20 TABLET, FILM COATED ORAL at 09:57

## 2022-07-19 RX ADMIN — Medication 10 MILLIGRAM(S): at 09:58

## 2022-07-19 RX ADMIN — MIRTAZAPINE 7.5 MILLIGRAM(S): 45 TABLET, ORALLY DISINTEGRATING ORAL at 22:11

## 2022-07-19 NOTE — BH INPATIENT PSYCHIATRY PROGRESS NOTE - NSBHCHARTREVIEWVS_PSY_A_CORE FT
Vital Signs Last 24 Hrs  T(C): 36.3 (07-19-22 @ 08:10), Max: 36.3 (07-19-22 @ 08:10)  T(F): 97.3 (07-19-22 @ 08:10), Max: 97.3 (07-19-22 @ 08:10)  HR: --  BP: --  BP(mean): --  RR: --  SpO2: --    Orthostatic VS  07-19-22 @ 08:28  Lying BP: --/-- HR: --  Sitting BP: 144/82 HR: 77  Standing BP: 131/77 HR: 88  Site: --  Mode: --  Orthostatic VS  07-19-22 @ 08:10  Lying BP: --/-- HR: --  Sitting BP: 135/85 HR: 72  Standing BP: 164/89 HR: 93  Site: --  Mode: --  Orthostatic VS  07-18-22 @ 07:52  Lying BP: --/-- HR: --  Sitting BP: 142/86 HR: 78  Standing BP: 133/81 HR: 82  Site: upper left arm  Mode: electronic

## 2022-07-19 NOTE — BH INPATIENT PSYCHIATRY PROGRESS NOTE - NSBHASSESSSUMMFT_PSY_ALL_CORE
71 y.o. male with hx of unusual relatedness including pattern of making up psychiatric symptoms or hx such as claiming he arranged a hit or his brother killed himself. Recently there has been escalating pattern of hospitalizations, ER visits. Compliance after d/c is nil. Patient also develops excessive attachment or intense dislike of providers and other staff. Patient has no actual hx of suicide attempts or fh or SIB. Patient with possible facititious disorder vs mood disorder. Suspect possible unconscious primary gain related to medical complaints help seeking then rejection of help as inadequate. Patient not assessed as needing CO. In light of collateral, will consider referral to psychotherapy program upon discharge if patient adherent to treatment plan.     7/13: Patient reports complaints of his back itching and loose tooth. Patient continues to demonstrate black-or-white thinking and making conditional statements "if I do not get things the way I want, there is no point in living". Patient does not endorse suicidal ideations, and accepts that he tends to think about the worst outcomes at times. Patient encouraged to develop healthier pattern of thinking and to also focus on positives. Patient is future oriented, thinking about working with new , that he appears to like, and wants to be setup with appointments to receive the care.   7/14: Pt does not endorse SI. Appears goal oriented, however also complains and becomes help rejecting. Patient encouraged to develop balanced thoughts. Patient continues to endorse conditions on receiving help only on his terms.   7/15: Pt continues to endorse passive SI, conditional on being able to obtain certain medical services in the future.   7/16: Depressed, overinclusive, negativistic and ruminative. Tolerating abilify.    7/17: No significant overnight events reported, pt taking meds, denies side effects. Continues to present catastrophic thinking, negative, endosing hopelessness and somatic preoccupation with his teeth, back itch and prostate. Denies acute SI but endorses thoughts that his life is not worth living contingent on being able to obtain his prostate surgery, being able to get access a ride and a  "that is up for the challenge". Will lower Mirtazapine to 7.5mg as pt continues to endorse difficulty sleeping and start Prozac, which pt reports he has benefitted from in the past. Increased Abilify to 5mg QD.   7/18: Pt continues to present with all-or-none thinking, endorsing somatic preoccupation with teeth and prostate. Patient appears to bring up other complaints such as his relationships when discussing plans for his current somatic complaints. No active SI reported, and hopelessness is contingent upon not getting staff that "can tolerate" him and getting his tooth extracted and receiving implants and receiving prostate surgery. Continue current medications. Plan for meeting with  for appropriate followups. Will keep mirtazapine 7.5mg HS for insomnia and plan to titrate fluoxetine as tolerated.   7/19: Pt continues to present negative, catastrophic thinking. Will continue current regimen  Plan:  - Legals: 9.13  - Psychiatry: Remeron 7.5mg PO HS for depression, Abilify 5mg QD, Prozac 10mg QD.   - Psychotherapy: Individual, group and milieu therapy as appropriate.  - Medical: Calamine lotion TID for itching, Proscar 5mg PO daily for BPH, tamsulosin 0.4mg PO daily for overflow incontinence/BPH, oxybutynin 5mg PO daily for overactive bladder, simvastatin 20mg PO HS for HLD, nifedipine XL 30mg PO daily for HTN  - Dispo: Pending.

## 2022-07-19 NOTE — BH INPATIENT PSYCHIATRY PROGRESS NOTE - NSBHFUPINTERVALHXFT_PSY_A_CORE
71 y.o. male, single, living with brother, presented to hospital for complaints of SI.     No significant overnight events reported by staff. No PRNs needed. Pt is visible in the unit. On encounter this morning pt expressed some ambivalence about returning home with his brother, considering how poor his relationship with him has been. 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 Continues to request assistance securing an appointment with a surgeon who could help with his prostate. Requesting access a ride as well.

## 2022-07-20 PROCEDURE — 99232 SBSQ HOSP IP/OBS MODERATE 35: CPT | Mod: GC

## 2022-07-20 RX ORDER — SIMETHICONE 80 MG/1
80 TABLET, CHEWABLE ORAL THREE TIMES A DAY
Refills: 0 | Status: DISCONTINUED | OUTPATIENT
Start: 2022-07-20 | End: 2022-08-25

## 2022-07-20 RX ADMIN — MIRTAZAPINE 7.5 MILLIGRAM(S): 45 TABLET, ORALLY DISINTEGRATING ORAL at 21:01

## 2022-07-20 RX ADMIN — Medication 650 MILLIGRAM(S): at 11:06

## 2022-07-20 RX ADMIN — FINASTERIDE 5 MILLIGRAM(S): 5 TABLET, FILM COATED ORAL at 09:26

## 2022-07-20 RX ADMIN — Medication 2000 UNIT(S): at 09:25

## 2022-07-20 RX ADMIN — Medication 650 MILLIGRAM(S): at 21:57

## 2022-07-20 RX ADMIN — SIMVASTATIN 20 MILLIGRAM(S): 20 TABLET, FILM COATED ORAL at 09:25

## 2022-07-20 RX ADMIN — Medication 5 MILLIGRAM(S): at 09:26

## 2022-07-20 RX ADMIN — Medication 650 MILLIGRAM(S): at 21:01

## 2022-07-20 RX ADMIN — ARIPIPRAZOLE 5 MILLIGRAM(S): 15 TABLET ORAL at 09:25

## 2022-07-20 RX ADMIN — Medication 650 MILLIGRAM(S): at 09:27

## 2022-07-20 RX ADMIN — Medication 10 MILLIGRAM(S): at 09:25

## 2022-07-20 RX ADMIN — Medication 30 MILLIGRAM(S): at 09:25

## 2022-07-20 RX ADMIN — TAMSULOSIN HYDROCHLORIDE 0.4 MILLIGRAM(S): 0.4 CAPSULE ORAL at 09:25

## 2022-07-20 RX ADMIN — Medication 1 TABLET(S): at 09:25

## 2022-07-20 NOTE — BH INPATIENT PSYCHIATRY PROGRESS NOTE - PRN MEDS
MEDICATIONS  (PRN):  acetaminophen     Tablet .. 650 milliGRAM(s) Oral every 6 hours PRN Mild Pain (1 - 3), Moderate Pain (4 - 6)  calamine/zinc oxide Lotion 1 Application(s) Topical three times a day PRN itching  haloperidol     Tablet 2.5 milliGRAM(s) Oral every 6 hours PRN agitation  haloperidol    Injectable 2.5 milliGRAM(s) IntraMuscular once PRN severe agitation  LORazepam     Tablet 0.5 milliGRAM(s) Oral every 6 hours PRN anxiety/insomnia  simethicone 80 milliGRAM(s) Chew three times a day PRN Gas

## 2022-07-20 NOTE — BH INPATIENT PSYCHIATRY PROGRESS NOTE - NSBHASSESSSUMMFT_PSY_ALL_CORE
71 y.o. male with hx of unusual relatedness including pattern of making up psychiatric symptoms or hx such as claiming he arranged a hit or his brother killed himself. Recently there has been escalating pattern of hospitalizations, ER visits. Compliance after d/c is nil. Patient also develops excessive attachment or intense dislike of providers and other staff. Patient has no actual hx of suicide attempts or fh or SIB. Patient with possible facititious disorder vs mood disorder. Suspect possible unconscious primary gain related to medical complaints help seeking then rejection of help as inadequate. Patient not assessed as needing CO. In light of collateral, will consider referral to psychotherapy program upon discharge if patient adherent to treatment plan.     7/13: Patient reports complaints of his back itching and loose tooth. Patient continues to demonstrate black-or-white thinking and making conditional statements "if I do not get things the way I want, there is no point in living". Patient does not endorse suicidal ideations, and accepts that he tends to think about the worst outcomes at times. Patient encouraged to develop healthier pattern of thinking and to also focus on positives. Patient is future oriented, thinking about working with new , that he appears to like, and wants to be setup with appointments to receive the care.   7/14: Pt does not endorse SI. Appears goal oriented, however also complains and becomes help rejecting. Patient encouraged to develop balanced thoughts. Patient continues to endorse conditions on receiving help only on his terms.   7/15: Pt continues to endorse passive SI, conditional on being able to obtain certain medical services in the future.   7/16: Depressed, overinclusive, negativistic and ruminative. Tolerating abilify.    7/17: No significant overnight events reported, pt taking meds, denies side effects. Continues to present catastrophic thinking, negative, endosing hopelessness and somatic preoccupation with his teeth, back itch and prostate. Denies acute SI but endorses thoughts that his life is not worth living contingent on being able to obtain his prostate surgery, being able to get access a ride and a  "that is up for the challenge". Will lower Mirtazapine to 7.5mg as pt continues to endorse difficulty sleeping and start Prozac, which pt reports he has benefitted from in the past. Increased Abilify to 5mg QD.   7/18: Pt continues to present with all-or-none thinking, endorsing somatic preoccupation with teeth and prostate. Patient appears to bring up other complaints such as his relationships when discussing plans for his current somatic complaints. No active SI reported, and hopelessness is contingent upon not getting staff that "can tolerate" him and getting his tooth extracted and receiving implants and receiving prostate surgery. Continue current medications. Plan for meeting with  for appropriate followups. Will keep mirtazapine 7.5mg HS for insomnia and plan to titrate fluoxetine as tolerated.   7/19: Pt continues to present negative, catastrophic thinking. Will continue current regimen  Plan:  - Legals: 9.13  - Psychiatry: Remeron 7.5mg PO HS for depression, Abilify 5mg QD, Prozac 10mg QD.   - Psychotherapy: Individual, group and milieu therapy as appropriate.  - Medical: Calamine lotion TID for itching, Proscar 5mg PO daily for BPH, tamsulosin 0.4mg PO daily for overflow incontinence/BPH, oxybutynin 5mg PO daily for overactive bladder, simvastatin 20mg PO HS for HLD, nifedipine XL 30mg PO daily for HTN  - Dispo: Pending.   71 y.o. male with hx of unusual relatedness including pattern of making up psychiatric symptoms or hx such as claiming he arranged a hit or his brother killed himself. Recently there has been escalating pattern of hospitalizations, ER visits. Compliance after d/c is nil. Patient also develops excessive attachment or intense dislike of providers and other staff. Patient has no actual hx of suicide attempts or fh or SIB. Patient with possible facititious disorder vs mood disorder. Suspect possible unconscious primary gain related to medical complaints help seeking then rejection of help as inadequate. Patient not assessed as needing CO. In light of collateral, will consider referral to psychotherapy program upon discharge if patient adherent to treatment plan.     7/13: Patient reports complaints of his back itching and loose tooth. Patient continues to demonstrate black-or-white thinking and making conditional statements "if I do not get things the way I want, there is no point in living". Patient does not endorse suicidal ideations, and accepts that he tends to think about the worst outcomes at times. Patient encouraged to develop healthier pattern of thinking and to also focus on positives. Patient is future oriented, thinking about working with new , that he appears to like, and wants to be setup with appointments to receive the care.   7/14: Pt does not endorse SI. Appears goal oriented, however also complains and becomes help rejecting. Patient encouraged to develop balanced thoughts. Patient continues to endorse conditions on receiving help only on his terms.   7/15: Pt continues to endorse passive SI, conditional on being able to obtain certain medical services in the future.   7/16: Depressed, overinclusive, negativistic and ruminative. Tolerating abilify.    7/17: No significant overnight events reported, pt taking meds, denies side effects. Continues to present catastrophic thinking, negative, endosing hopelessness and somatic preoccupation with his teeth, back itch and prostate. Denies acute SI but endorses thoughts that his life is not worth living contingent on being able to obtain his prostate surgery, being able to get access a ride and a  "that is up for the challenge". Will lower Mirtazapine to 7.5mg as pt continues to endorse difficulty sleeping and start Prozac, which pt reports he has benefitted from in the past. Increased Abilify to 5mg QD.   7/18: Pt continues to present with all-or-none thinking, endorsing somatic preoccupation with teeth and prostate. Patient appears to bring up other complaints such as his relationships when discussing plans for his current somatic complaints. No active SI reported, and hopelessness is contingent upon not getting staff that "can tolerate" him and getting his tooth extracted and receiving implants and receiving prostate surgery. Continue current medications. Plan for meeting with  for appropriate followups. Will keep mirtazapine 7.5mg HS for insomnia and plan to titrate fluoxetine as tolerated.   7/19: Pt continues to present negative, catastrophic thinking. Will continue current regimen  7/20: Pt preoccupied with being alpha tenant. C/o gas, however denied constipation. Will give PRN simethicone.   Plan:  - Legals: 9.13  - Psychiatry: Remeron 7.5mg PO HS for depression, Abilify 5mg QD, Prozac 10mg QD.   - Psychotherapy: Individual, group and milieu therapy as appropriate.  - Medical: Simethicone 80mg PO TID PRN flatulence. Calamine lotion TID for itching, Proscar 5mg PO daily for BPH, tamsulosin 0.4mg PO daily for overflow incontinence/BPH, oxybutynin 5mg PO daily for overactive bladder, simvastatin 20mg PO HS for HLD, nifedipine XL 30mg PO daily for HTN  - Dispo: Pending.

## 2022-07-20 NOTE — BH INPATIENT PSYCHIATRY PROGRESS NOTE - NSBHFUPINTERVALHXFT_PSY_A_CORE
71 y.o. male, single, living with brother, presented to hospital for complaints of SI.     No significant overnight events reported by staff. No PRNs needed. Pt is visible in the unit. On encounter this morning pt expressed some ambivalence about returning home with his brother, considering how poor his relationship with him has been. 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 Continues to request assistance securing an appointment with a surgeon who could help with his prostate. Requesting access a ride as well.  71 y.o. male, single, living with brother, presented to hospital for complaints of SI. No significant overnight events reported by staff. No PRNs needed. Pt continued to exhibit catastrophization and all-or-none thinking. Pt [place] because he gets along with only some people there. Pt continued to decline going to assisted living facility, stating he hates his brother but he will live with him to not lose his belongings. 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 No SI reported during evaluation.     Pt c/o of flatulence x 1 day. Regular BM.

## 2022-07-20 NOTE — BH INPATIENT PSYCHIATRY PROGRESS NOTE - NSBHMSESPABN_PSY_A_CORE
Increased productivity
Decreased productivity
Increased productivity

## 2022-07-20 NOTE — BH INPATIENT PSYCHIATRY PROGRESS NOTE - CURRENT MEDICATION
MEDICATIONS  (STANDING):  ARIPiprazole 5 milliGRAM(s) Oral daily  cholecalciferol 2000 Unit(s) Oral daily  finasteride 5 milliGRAM(s) Oral daily  FLUoxetine 10 milliGRAM(s) Oral daily  mirtazapine 7.5 milliGRAM(s) Oral at bedtime  multivitamin      multivitamin 1 Tablet(s) Oral daily  NIFEdipine XL 30 milliGRAM(s) Oral daily  oxybutynin 5 milliGRAM(s) Oral daily  simvastatin 20 milliGRAM(s) Oral daily  tamsulosin 0.4 milliGRAM(s) Oral daily    MEDICATIONS  (PRN):  acetaminophen     Tablet .. 650 milliGRAM(s) Oral every 6 hours PRN Mild Pain (1 - 3), Moderate Pain (4 - 6)  calamine/zinc oxide Lotion 1 Application(s) Topical three times a day PRN itching  haloperidol     Tablet 2.5 milliGRAM(s) Oral every 6 hours PRN agitation  haloperidol    Injectable 2.5 milliGRAM(s) IntraMuscular once PRN severe agitation  LORazepam     Tablet 0.5 milliGRAM(s) Oral every 6 hours PRN anxiety/insomnia  simethicone 80 milliGRAM(s) Chew three times a day PRN Gas

## 2022-07-20 NOTE — BH INPATIENT PSYCHIATRY PROGRESS NOTE - NSBHCHARTREVIEWVS_PSY_A_CORE FT
Vital Signs Last 24 Hrs  T(C): 36.7 (07-20-22 @ 08:27), Max: 36.7 (07-20-22 @ 08:27)  T(F): 98 (07-20-22 @ 08:27), Max: 98 (07-20-22 @ 08:27)  HR: --  BP: --  BP(mean): --  RR: --  SpO2: --    Orthostatic VS  07-20-22 @ 08:27  Lying BP: --/-- HR: --  Sitting BP: 115/76 HR: 75  Standing BP: 109/75 HR: 92  Site: upper left arm  Mode: electronic  Orthostatic VS  07-19-22 @ 08:28  Lying BP: --/-- HR: --  Sitting BP: 144/82 HR: 77  Standing BP: 131/77 HR: 88  Site: --  Mode: --  Orthostatic VS  07-19-22 @ 08:10  Lying BP: --/-- HR: --  Sitting BP: 135/85 HR: 72  Standing BP: 164/89 HR: 93  Site: --  Mode: --

## 2022-07-21 PROCEDURE — 99232 SBSQ HOSP IP/OBS MODERATE 35: CPT | Mod: GC

## 2022-07-21 RX ORDER — TRAZODONE HCL 50 MG
50 TABLET ORAL AT BEDTIME
Refills: 0 | Status: DISCONTINUED | OUTPATIENT
Start: 2022-07-21 | End: 2022-08-25

## 2022-07-21 RX ADMIN — TAMSULOSIN HYDROCHLORIDE 0.4 MILLIGRAM(S): 0.4 CAPSULE ORAL at 08:24

## 2022-07-21 RX ADMIN — Medication 5 MILLIGRAM(S): at 08:26

## 2022-07-21 RX ADMIN — Medication 50 MILLIGRAM(S): at 20:20

## 2022-07-21 RX ADMIN — Medication 30 MILLIGRAM(S): at 08:26

## 2022-07-21 RX ADMIN — ARIPIPRAZOLE 5 MILLIGRAM(S): 15 TABLET ORAL at 08:24

## 2022-07-21 RX ADMIN — Medication 2000 UNIT(S): at 08:25

## 2022-07-21 RX ADMIN — Medication 10 MILLIGRAM(S): at 08:26

## 2022-07-21 RX ADMIN — Medication 1 TABLET(S): at 08:25

## 2022-07-21 RX ADMIN — SIMVASTATIN 20 MILLIGRAM(S): 20 TABLET, FILM COATED ORAL at 08:24

## 2022-07-21 RX ADMIN — FINASTERIDE 5 MILLIGRAM(S): 5 TABLET, FILM COATED ORAL at 08:24

## 2022-07-21 NOTE — BH INPATIENT PSYCHIATRY PROGRESS NOTE - PRN MEDS
MEDICATIONS  (PRN):  acetaminophen     Tablet .. 650 milliGRAM(s) Oral every 6 hours PRN Mild Pain (1 - 3), Moderate Pain (4 - 6)  calamine/zinc oxide Lotion 1 Application(s) Topical three times a day PRN itching  haloperidol     Tablet 2.5 milliGRAM(s) Oral every 6 hours PRN agitation  haloperidol    Injectable 2.5 milliGRAM(s) IntraMuscular once PRN severe agitation  simethicone 80 milliGRAM(s) Chew three times a day PRN Gas

## 2022-07-21 NOTE — BH INPATIENT PSYCHIATRY PROGRESS NOTE - CURRENT MEDICATION
MEDICATIONS  (STANDING):  ARIPiprazole 5 milliGRAM(s) Oral daily  cholecalciferol 2000 Unit(s) Oral daily  finasteride 5 milliGRAM(s) Oral daily  FLUoxetine 10 milliGRAM(s) Oral daily  mirtazapine 7.5 milliGRAM(s) Oral at bedtime  multivitamin      multivitamin 1 Tablet(s) Oral daily  NIFEdipine XL 30 milliGRAM(s) Oral daily  oxybutynin 5 milliGRAM(s) Oral daily  simvastatin 20 milliGRAM(s) Oral daily  tamsulosin 0.4 milliGRAM(s) Oral daily    MEDICATIONS  (PRN):  acetaminophen     Tablet .. 650 milliGRAM(s) Oral every 6 hours PRN Mild Pain (1 - 3), Moderate Pain (4 - 6)  calamine/zinc oxide Lotion 1 Application(s) Topical three times a day PRN itching  haloperidol     Tablet 2.5 milliGRAM(s) Oral every 6 hours PRN agitation  haloperidol    Injectable 2.5 milliGRAM(s) IntraMuscular once PRN severe agitation  simethicone 80 milliGRAM(s) Chew three times a day PRN Gas

## 2022-07-21 NOTE — BH INPATIENT PSYCHIATRY PROGRESS NOTE - NSBHFUPINTERVALHXFT_PSY_A_CORE
71 y.o. male, single, living with brother, presented to hospital for complaints of SI. No significant overnight events reported by staff. No PRNs needed. Mr. Rogers went for his dental appointment this morning after one of his teeth fell out. Rooth was extracted. Pt continues to decline extraction of additional damaged teeth despite staff encouragement and possible risks. On encounter today pt reports difficulty sleeping at night. Will d/c mirtazapine and offer trazodone 50mg for sleep instead. Will continue Abilify and Prozac for mood.

## 2022-07-21 NOTE — BH INPATIENT PSYCHIATRY PROGRESS NOTE - NSBHASSESSSUMMFT_PSY_ALL_CORE
71 y.o. male with hx of unusual relatedness including pattern of making up psychiatric symptoms or hx such as claiming he arranged a hit or his brother killed himself. Recently there has been escalating pattern of hospitalizations, ER visits. Compliance after d/c is nil. Patient also develops excessive attachment or intense dislike of providers and other staff. Patient has no actual hx of suicide attempts or fh or SIB. Patient with possible facititious disorder vs mood disorder. Suspect possible unconscious primary gain related to medical complaints help seeking then rejection of help as inadequate. Patient not assessed as needing CO. In light of collateral, will consider referral to psychotherapy program upon discharge if patient adherent to treatment plan.     7/13: Patient reports complaints of his back itching and loose tooth. Patient continues to demonstrate black-or-white thinking and making conditional statements "if I do not get things the way I want, there is no point in living". Patient does not endorse suicidal ideations, and accepts that he tends to think about the worst outcomes at times. Patient encouraged to develop healthier pattern of thinking and to also focus on positives. Patient is future oriented, thinking about working with new , that he appears to like, and wants to be setup with appointments to receive the care.   7/14: Pt does not endorse SI. Appears goal oriented, however also complains and becomes help rejecting. Patient encouraged to develop balanced thoughts. Patient continues to endorse conditions on receiving help only on his terms.   7/15: Pt continues to endorse passive SI, conditional on being able to obtain certain medical services in the future.   7/16: Depressed, overinclusive, negativistic and ruminative. Tolerating abilify.    7/17: No significant overnight events reported, pt taking meds, denies side effects. Continues to present catastrophic thinking, negative, endosing hopelessness and somatic preoccupation with his teeth, back itch and prostate. Denies acute SI but endorses thoughts that his life is not worth living contingent on being able to obtain his prostate surgery, being able to get access a ride and a  "that is up for the challenge". Will lower Mirtazapine to 7.5mg as pt continues to endorse difficulty sleeping and start Prozac, which pt reports he has benefitted from in the past. Increased Abilify to 5mg QD.   7/18: Pt continues to present with all-or-none thinking, endorsing somatic preoccupation with teeth and prostate. Patient appears to bring up other complaints such as his relationships when discussing plans for his current somatic complaints. No active SI reported, and hopelessness is contingent upon not getting staff that "can tolerate" him and getting his tooth extracted and receiving implants and receiving prostate surgery. Continue current medications. Plan for meeting with  for appropriate followups. Will keep mirtazapine 7.5mg HS for insomnia and plan to titrate fluoxetine as tolerated.   7/19: Pt continues to present negative, catastrophic thinking. Will continue current regimen  7/20: Pt preoccupied with being alpha tenant. C/o gas, however denied constipation. Will give PRN simethicone.   7/21: Pt reports difficulty sleeping. D/C mirtazapine, will offer trazodone 50mg instead.   Plan:  - Legals: 9.13  - Psychiatry: D/C Remeron, Abilify 5mg QD, Prozac 10mg QD. Trazodone 50mg QHS.  - Psychotherapy: Individual, group and milieu therapy as appropriate.  - Medical: Simethicone 80mg PO TID PRN flatulence. Calamine lotion TID for itching, Proscar 5mg PO daily for BPH, tamsulosin 0.4mg PO daily for overflow incontinence/BPH, oxybutynin 5mg PO daily for overactive bladder, simvastatin 20mg PO HS for HLD, nifedipine XL 30mg PO daily for HTN  - Dispo: Pending.

## 2022-07-21 NOTE — BH INPATIENT PSYCHIATRY PROGRESS NOTE - NSBHCHARTREVIEWVS_PSY_A_CORE FT
Vital Signs Last 24 Hrs  T(C): 36.9 (07-21-22 @ 08:13), Max: 36.9 (07-21-22 @ 08:13)  T(F): 98.5 (07-21-22 @ 08:13), Max: 98.5 (07-21-22 @ 08:13)  HR: --  BP: --  BP(mean): --  RR: --  SpO2: --    Orthostatic VS  07-21-22 @ 08:13  Lying BP: --/-- HR: --  Sitting BP: 131/74 HR: 65  Standing BP: 122/80 HR: 81  Site: upper left arm  Mode: electronic  Orthostatic VS  07-20-22 @ 08:27  Lying BP: --/-- HR: --  Sitting BP: 115/76 HR: 75  Standing BP: 109/75 HR: 92  Site: upper left arm  Mode: electronic

## 2022-07-22 RX ADMIN — SIMVASTATIN 20 MILLIGRAM(S): 20 TABLET, FILM COATED ORAL at 09:08

## 2022-07-22 RX ADMIN — Medication 5 MILLIGRAM(S): at 09:08

## 2022-07-22 RX ADMIN — Medication 10 MILLIGRAM(S): at 09:08

## 2022-07-22 RX ADMIN — ARIPIPRAZOLE 5 MILLIGRAM(S): 15 TABLET ORAL at 09:08

## 2022-07-22 RX ADMIN — FINASTERIDE 5 MILLIGRAM(S): 5 TABLET, FILM COATED ORAL at 09:09

## 2022-07-22 RX ADMIN — Medication 50 MILLIGRAM(S): at 20:46

## 2022-07-22 RX ADMIN — Medication 1 TABLET(S): at 09:08

## 2022-07-22 RX ADMIN — Medication 2000 UNIT(S): at 09:08

## 2022-07-22 RX ADMIN — TAMSULOSIN HYDROCHLORIDE 0.4 MILLIGRAM(S): 0.4 CAPSULE ORAL at 09:08

## 2022-07-22 RX ADMIN — Medication 30 MILLIGRAM(S): at 09:08

## 2022-07-22 NOTE — BH INPATIENT PSYCHIATRY PROGRESS NOTE - CURRENT MEDICATION
MEDICATIONS  (STANDING):  ARIPiprazole 5 milliGRAM(s) Oral daily  cholecalciferol 2000 Unit(s) Oral daily  finasteride 5 milliGRAM(s) Oral daily  FLUoxetine 10 milliGRAM(s) Oral daily  multivitamin      multivitamin 1 Tablet(s) Oral daily  NIFEdipine XL 30 milliGRAM(s) Oral daily  oxybutynin 5 milliGRAM(s) Oral daily  simvastatin 20 milliGRAM(s) Oral daily  tamsulosin 0.4 milliGRAM(s) Oral daily  traZODone 50 milliGRAM(s) Oral at bedtime    MEDICATIONS  (PRN):  acetaminophen     Tablet .. 650 milliGRAM(s) Oral every 6 hours PRN Mild Pain (1 - 3), Moderate Pain (4 - 6)  calamine/zinc oxide Lotion 1 Application(s) Topical three times a day PRN itching  haloperidol     Tablet 2.5 milliGRAM(s) Oral every 6 hours PRN agitation  haloperidol    Injectable 2.5 milliGRAM(s) IntraMuscular once PRN severe agitation  simethicone 80 milliGRAM(s) Chew three times a day PRN Gas

## 2022-07-22 NOTE — BH INPATIENT PSYCHIATRY PROGRESS NOTE - NSBHCHARTREVIEWVS_PSY_A_CORE FT
Vital Signs Last 24 Hrs  T(C): 36.6 (07-22-22 @ 08:17), Max: 36.6 (07-22-22 @ 08:17)  T(F): 97.9 (07-22-22 @ 08:17), Max: 97.9 (07-22-22 @ 08:17)  HR: --  BP: --  BP(mean): --  RR: --  SpO2: --    Orthostatic VS  07-22-22 @ 08:17  Lying BP: --/-- HR: --  Sitting BP: 122/91 HR: 77  Standing BP: 112/76 HR: 105  Site: --  Mode: --  Orthostatic VS  07-21-22 @ 08:13  Lying BP: --/-- HR: --  Sitting BP: 131/74 HR: 65  Standing BP: 122/80 HR: 81  Site: upper left arm  Mode: electronic

## 2022-07-22 NOTE — BH INPATIENT PSYCHIATRY PROGRESS NOTE - NSBHFUPINTERVALHXFT_PSY_A_CORE
71 y.o. male, single, living with brother, presented to hospital for complaints of SI. No significant overnight events reported by staff. No PRNs needed. Pt went for dental extraction yesterday and has declined extraction of other teeth despite staff encouragement and education. Pt c/o of uncomfortable pointy feeling where he had extraction but denied pain, warmth, swelling at site. No VS abnormalities. Discussed with dental resident who confirmed that there were no complications during extraction and agreed with monitoring pt and to reconsult if pt develops any pain or swelling. Pt denied S/H IIP. Remains preoccupied with somatic complaints including his dental and prostate issues. Uses coping skill of writing poems with drawings and feels sense of accomplishment by helping other peers.

## 2022-07-22 NOTE — BH INPATIENT PSYCHIATRY PROGRESS NOTE - NSBHASSESSSUMMFT_PSY_ALL_CORE
71 y.o. male with hx of unusual relatedness including pattern of making up psychiatric symptoms or hx such as claiming he arranged a hit or his brother killed himself. Recently there has been escalating pattern of hospitalizations, ER visits. Compliance after d/c is nil. Patient also develops excessive attachment or intense dislike of providers and other staff. Patient has no actual hx of suicide attempts or fh or SIB. Patient with possible facititious disorder vs mood disorder. Suspect possible unconscious primary gain related to medical complaints help seeking then rejection of help as inadequate. Patient not assessed as needing CO. In light of collateral, will consider referral to psychotherapy program upon discharge if patient adherent to treatment plan.     7/13: Patient reports complaints of his back itching and loose tooth. Patient continues to demonstrate black-or-white thinking and making conditional statements "if I do not get things the way I want, there is no point in living". Patient does not endorse suicidal ideations, and accepts that he tends to think about the worst outcomes at times. Patient encouraged to develop healthier pattern of thinking and to also focus on positives. Patient is future oriented, thinking about working with new , that he appears to like, and wants to be setup with appointments to receive the care.   7/14: Pt does not endorse SI. Appears goal oriented, however also complains and becomes help rejecting. Patient encouraged to develop balanced thoughts. Patient continues to endorse conditions on receiving help only on his terms.   7/15: Pt continues to endorse passive SI, conditional on being able to obtain certain medical services in the future.   7/16: Depressed, overinclusive, negativistic and ruminative. Tolerating abilify.    7/17: No significant overnight events reported, pt taking meds, denies side effects. Continues to present catastrophic thinking, negative, endosing hopelessness and somatic preoccupation with his teeth, back itch and prostate. Denies acute SI but endorses thoughts that his life is not worth living contingent on being able to obtain his prostate surgery, being able to get access a ride and a  "that is up for the challenge". Will lower Mirtazapine to 7.5mg as pt continues to endorse difficulty sleeping and start Prozac, which pt reports he has benefitted from in the past. Increased Abilify to 5mg QD.   7/18: Pt continues to present with all-or-none thinking, endorsing somatic preoccupation with teeth and prostate. Patient appears to bring up other complaints such as his relationships when discussing plans for his current somatic complaints. No active SI reported, and hopelessness is contingent upon not getting staff that "can tolerate" him and getting his tooth extracted and receiving implants and receiving prostate surgery. Continue current medications. Plan for meeting with  for appropriate followups. Will keep mirtazapine 7.5mg HS for insomnia and plan to titrate fluoxetine as tolerated.   7/19: Pt continues to present negative, catastrophic thinking. Will continue current regimen  7/20: Pt preoccupied with being alpha tenant. C/o gas, however denied constipation. Will give PRN simethicone.   7/21: Pt reports difficulty sleeping. D/C mirtazapine, will offer trazodone 50mg instead.   7/22: No SI reported. No c/o sleep difficulty. Preoccupied with somatic complaints (dental, prostate).   Plan:  - Legals: 9.13  - Psychiatry: Abilify 5mg QD, Prozac 10mg QD. Trazodone 50mg QHS.  - Psychotherapy: Individual, group and milieu therapy as appropriate.  - Medical: Simethicone 80mg PO TID PRN flatulence. Calamine lotion TID for itching, Proscar 5mg PO daily for BPH, tamsulosin 0.4mg PO daily for overflow incontinence/BPH, oxybutynin 5mg PO daily for overactive bladder, simvastatin 20mg PO HS for HLD, nifedipine XL 30mg PO daily for HTN  - Dispo: Pending.

## 2022-07-23 RX ADMIN — Medication 10 MILLIGRAM(S): at 11:03

## 2022-07-23 RX ADMIN — ARIPIPRAZOLE 5 MILLIGRAM(S): 15 TABLET ORAL at 11:04

## 2022-07-23 RX ADMIN — Medication 1 TABLET(S): at 11:04

## 2022-07-23 RX ADMIN — TAMSULOSIN HYDROCHLORIDE 0.4 MILLIGRAM(S): 0.4 CAPSULE ORAL at 11:03

## 2022-07-23 RX ADMIN — SIMVASTATIN 20 MILLIGRAM(S): 20 TABLET, FILM COATED ORAL at 11:02

## 2022-07-23 RX ADMIN — Medication 650 MILLIGRAM(S): at 05:56

## 2022-07-23 RX ADMIN — Medication 650 MILLIGRAM(S): at 04:33

## 2022-07-23 RX ADMIN — Medication 50 MILLIGRAM(S): at 21:08

## 2022-07-23 RX ADMIN — Medication 2000 UNIT(S): at 11:02

## 2022-07-23 RX ADMIN — FINASTERIDE 5 MILLIGRAM(S): 5 TABLET, FILM COATED ORAL at 11:01

## 2022-07-23 RX ADMIN — Medication 5 MILLIGRAM(S): at 11:02

## 2022-07-23 RX ADMIN — Medication 30 MILLIGRAM(S): at 11:03

## 2022-07-23 NOTE — BH PSYCHOLOGY - GROUP THERAPY NOTE - NSPSYCHOLGRPDBTGOAL_PSY_A_CORE
reduce impulsive self-defeating behavior/improve ability to indentify feelings/improve ability to communicate feelings

## 2022-07-23 NOTE — BH PSYCHOLOGY - GROUP THERAPY NOTE - NSBHPSYCHOLADDL_PSY_A_CORE
Patients became acquainted with the DBT concept of effective problem solving. Patients provided handouts on problem solving techniques including checking the facts, goal identification, and evaluating decision making.  Patients identified a mutual problem and engaged in problem solving exercise. Patients were encouraged to continue practicing problem solving strategies while on unit.

## 2022-07-24 RX ADMIN — Medication 10 MILLIGRAM(S): at 08:50

## 2022-07-24 RX ADMIN — Medication 1 TABLET(S): at 08:50

## 2022-07-24 RX ADMIN — Medication 50 MILLIGRAM(S): at 20:55

## 2022-07-24 RX ADMIN — SIMVASTATIN 20 MILLIGRAM(S): 20 TABLET, FILM COATED ORAL at 08:49

## 2022-07-24 RX ADMIN — FINASTERIDE 5 MILLIGRAM(S): 5 TABLET, FILM COATED ORAL at 08:49

## 2022-07-24 RX ADMIN — Medication 30 MILLIGRAM(S): at 08:49

## 2022-07-24 RX ADMIN — Medication 2000 UNIT(S): at 08:49

## 2022-07-24 RX ADMIN — ARIPIPRAZOLE 5 MILLIGRAM(S): 15 TABLET ORAL at 08:49

## 2022-07-24 RX ADMIN — Medication 5 MILLIGRAM(S): at 08:49

## 2022-07-24 RX ADMIN — TAMSULOSIN HYDROCHLORIDE 0.4 MILLIGRAM(S): 0.4 CAPSULE ORAL at 08:50

## 2022-07-25 PROCEDURE — 99231 SBSQ HOSP IP/OBS SF/LOW 25: CPT | Mod: GC

## 2022-07-25 RX ADMIN — Medication 2000 UNIT(S): at 09:03

## 2022-07-25 RX ADMIN — FINASTERIDE 5 MILLIGRAM(S): 5 TABLET, FILM COATED ORAL at 09:05

## 2022-07-25 RX ADMIN — Medication 50 MILLIGRAM(S): at 20:47

## 2022-07-25 RX ADMIN — Medication 5 MILLIGRAM(S): at 09:04

## 2022-07-25 RX ADMIN — SIMVASTATIN 20 MILLIGRAM(S): 20 TABLET, FILM COATED ORAL at 09:04

## 2022-07-25 RX ADMIN — Medication 10 MILLIGRAM(S): at 09:04

## 2022-07-25 RX ADMIN — Medication 1 TABLET(S): at 09:03

## 2022-07-25 RX ADMIN — ARIPIPRAZOLE 5 MILLIGRAM(S): 15 TABLET ORAL at 09:04

## 2022-07-25 RX ADMIN — TAMSULOSIN HYDROCHLORIDE 0.4 MILLIGRAM(S): 0.4 CAPSULE ORAL at 09:04

## 2022-07-25 RX ADMIN — Medication 30 MILLIGRAM(S): at 09:04

## 2022-07-25 NOTE — BH INPATIENT PSYCHIATRY PROGRESS NOTE - NSBHFUPINTERVALHXFT_PSY_A_CORE
71 y.o. male, single, living with brother, presented to hospital for complaints of SI. No significant overnight events reported by staff. No PRNs needed. Pt reported feeling uncomfortable feeling but denied any distress at tooth extraction site. Pt continues to decline extraction of other teeth and is keen on getting implants instead of dentures. Pt was educated to discuss options with dentist during next appointment. Discussed with patient the plan to have meeting with , once assigned, and to set patient up with all required appointments. Pt verbalized understanding and also expressed wanting to be assigned with a particular staff at the [facility], because he cannot get along with the others.

## 2022-07-25 NOTE — BH INPATIENT PSYCHIATRY PROGRESS NOTE - NSBHCHARTREVIEWVS_PSY_A_CORE FT
Vital Signs Last 24 Hrs  T(C): 36.3 (07-25-22 @ 06:32), Max: 36.3 (07-25-22 @ 06:32)  T(F): 97.3 (07-25-22 @ 06:32), Max: 97.3 (07-25-22 @ 06:32)  HR: --  BP: --  BP(mean): --  RR: --  SpO2: --    Orthostatic VS  07-25-22 @ 06:32  Lying BP: --/-- HR: --  Sitting BP: 126/70 HR: 85  Standing BP: 116/69 HR: 83  Site: upper right arm  Mode: electronic  Orthostatic VS  07-24-22 @ 06:43  Lying BP: --/-- HR: --  Sitting BP: 123/80 HR: 70  Standing BP: 100/68 HR: 87  Site: upper left arm  Mode: electronic

## 2022-07-25 NOTE — BH INPATIENT PSYCHIATRY PROGRESS NOTE - NSBHASSESSSUMMFT_PSY_ALL_CORE
71 y.o. male with hx of unusual relatedness including pattern of making up psychiatric symptoms or hx such as claiming he arranged a hit or his brother killed himself. Recently there has been escalating pattern of hospitalizations, ER visits. Compliance after d/c is nil. Patient also develops excessive attachment or intense dislike of providers and other staff. Patient has no actual hx of suicide attempts or fh or SIB. Patient with possible facititious disorder vs mood disorder. Suspect possible unconscious primary gain related to medical complaints help seeking then rejection of help as inadequate. Patient not assessed as needing CO. In light of collateral, will consider referral to psychotherapy program upon discharge if patient adherent to treatment plan.     7/13: Patient reports complaints of his back itching and loose tooth. Patient continues to demonstrate black-or-white thinking and making conditional statements "if I do not get things the way I want, there is no point in living". Patient does not endorse suicidal ideations, and accepts that he tends to think about the worst outcomes at times. Patient encouraged to develop healthier pattern of thinking and to also focus on positives. Patient is future oriented, thinking about working with new , that he appears to like, and wants to be setup with appointments to receive the care.   7/14: Pt does not endorse SI. Appears goal oriented, however also complains and becomes help rejecting. Patient encouraged to develop balanced thoughts. Patient continues to endorse conditions on receiving help only on his terms.   7/15: Pt continues to endorse passive SI, conditional on being able to obtain certain medical services in the future.   7/16: Depressed, overinclusive, negativistic and ruminative. Tolerating abilify.    7/17: No significant overnight events reported, pt taking meds, denies side effects. Continues to present catastrophic thinking, negative, endosing hopelessness and somatic preoccupation with his teeth, back itch and prostate. Denies acute SI but endorses thoughts that his life is not worth living contingent on being able to obtain his prostate surgery, being able to get access a ride and a  "that is up for the challenge". Will lower Mirtazapine to 7.5mg as pt continues to endorse difficulty sleeping and start Prozac, which pt reports he has benefitted from in the past. Increased Abilify to 5mg QD.   7/18: Pt continues to present with all-or-none thinking, endorsing somatic preoccupation with teeth and prostate. Patient appears to bring up other complaints such as his relationships when discussing plans for his current somatic complaints. No active SI reported, and hopelessness is contingent upon not getting staff that "can tolerate" him and getting his tooth extracted and receiving implants and receiving prostate surgery. Continue current medications. Plan for meeting with  for appropriate followups. Will keep mirtazapine 7.5mg HS for insomnia and plan to titrate fluoxetine as tolerated.   7/19: Pt continues to present negative, catastrophic thinking. Will continue current regimen  7/20: Pt preoccupied with being alpha tenant. C/o gas, however denied constipation. Will give PRN simethicone.   7/21: Pt reports difficulty sleeping. D/C mirtazapine, will offer trazodone 50mg instead.   7/22: No SI reported. No c/o sleep difficulty. Preoccupied with somatic complaints (dental, prostate).   7/23: Pt presents with help-rejecting complaints but overall no longer c/o of SI. Continue current regimen  Plan:  - Legals: 9.13  - Psychiatry: Abilify 5mg QD, Prozac 10mg QD. Trazodone 50mg QHS.  - Psychotherapy: Individual, group and milieu therapy as appropriate.  - Medical: Simethicone 80mg PO TID PRN flatulence. Calamine lotion TID for itching, Proscar 5mg PO daily for BPH, tamsulosin 0.4mg PO daily for overflow incontinence/BPH, oxybutynin 5mg PO daily for overactive bladder, simvastatin 20mg PO HS for HLD, nifedipine XL 30mg PO daily for HTN  - Dispo: Pending.

## 2022-07-26 PROCEDURE — 99232 SBSQ HOSP IP/OBS MODERATE 35: CPT | Mod: GC

## 2022-07-26 RX ORDER — ARIPIPRAZOLE 15 MG/1
10 TABLET ORAL DAILY
Refills: 0 | Status: DISCONTINUED | OUTPATIENT
Start: 2022-07-26 | End: 2022-08-25

## 2022-07-26 RX ORDER — LANOLIN ALCOHOL/MO/W.PET/CERES
5 CREAM (GRAM) TOPICAL AT BEDTIME
Refills: 0 | Status: DISCONTINUED | OUTPATIENT
Start: 2022-07-26 | End: 2022-07-28

## 2022-07-26 RX ADMIN — Medication 1 TABLET(S): at 09:26

## 2022-07-26 RX ADMIN — FINASTERIDE 5 MILLIGRAM(S): 5 TABLET, FILM COATED ORAL at 09:25

## 2022-07-26 RX ADMIN — Medication 10 MILLIGRAM(S): at 09:26

## 2022-07-26 RX ADMIN — Medication 5 MILLIGRAM(S): at 09:25

## 2022-07-26 RX ADMIN — ARIPIPRAZOLE 5 MILLIGRAM(S): 15 TABLET ORAL at 09:25

## 2022-07-26 RX ADMIN — Medication 30 MILLIGRAM(S): at 09:24

## 2022-07-26 RX ADMIN — SIMVASTATIN 20 MILLIGRAM(S): 20 TABLET, FILM COATED ORAL at 09:25

## 2022-07-26 RX ADMIN — Medication 50 MILLIGRAM(S): at 21:25

## 2022-07-26 RX ADMIN — TAMSULOSIN HYDROCHLORIDE 0.4 MILLIGRAM(S): 0.4 CAPSULE ORAL at 09:25

## 2022-07-26 RX ADMIN — Medication 5 MILLIGRAM(S): at 21:25

## 2022-07-26 RX ADMIN — Medication 2000 UNIT(S): at 09:26

## 2022-07-26 NOTE — BH INPATIENT PSYCHIATRY PROGRESS NOTE - CURRENT MEDICATION
MEDICATIONS  (STANDING):  ARIPiprazole 5 milliGRAM(s) Oral daily  cholecalciferol 2000 Unit(s) Oral daily  finasteride 5 milliGRAM(s) Oral daily  FLUoxetine 10 milliGRAM(s) Oral daily  multivitamin      multivitamin 1 Tablet(s) Oral daily  NIFEdipine XL 30 milliGRAM(s) Oral daily  oxybutynin 5 milliGRAM(s) Oral daily  simvastatin 20 milliGRAM(s) Oral daily  tamsulosin 0.4 milliGRAM(s) Oral daily  traZODone 50 milliGRAM(s) Oral at bedtime    MEDICATIONS  (PRN):  acetaminophen     Tablet .. 650 milliGRAM(s) Oral every 6 hours PRN Mild Pain (1 - 3), Moderate Pain (4 - 6)  calamine/zinc oxide Lotion 1 Application(s) Topical three times a day PRN itching  haloperidol     Tablet 2.5 milliGRAM(s) Oral every 6 hours PRN agitation  haloperidol    Injectable 2.5 milliGRAM(s) IntraMuscular once PRN severe agitation  simethicone 80 milliGRAM(s) Chew three times a day PRN Gas   MEDICATIONS  (STANDING):  ARIPiprazole 10 milliGRAM(s) Oral daily  cholecalciferol 2000 Unit(s) Oral daily  finasteride 5 milliGRAM(s) Oral daily  FLUoxetine 10 milliGRAM(s) Oral daily  multivitamin      multivitamin 1 Tablet(s) Oral daily  NIFEdipine XL 30 milliGRAM(s) Oral daily  oxybutynin 5 milliGRAM(s) Oral daily  simvastatin 20 milliGRAM(s) Oral daily  tamsulosin 0.4 milliGRAM(s) Oral daily  traZODone 50 milliGRAM(s) Oral at bedtime    MEDICATIONS  (PRN):  acetaminophen     Tablet .. 650 milliGRAM(s) Oral every 6 hours PRN Mild Pain (1 - 3), Moderate Pain (4 - 6)  calamine/zinc oxide Lotion 1 Application(s) Topical three times a day PRN itching  haloperidol     Tablet 2.5 milliGRAM(s) Oral every 6 hours PRN agitation  haloperidol    Injectable 2.5 milliGRAM(s) IntraMuscular once PRN severe agitation  simethicone 80 milliGRAM(s) Chew three times a day PRN Gas

## 2022-07-26 NOTE — BH INPATIENT PSYCHIATRY PROGRESS NOTE - NSBHCHARTREVIEWVS_PSY_A_CORE FT
Vital Signs Last 24 Hrs  T(C): 36.4 (07-26-22 @ 06:52), Max: 36.4 (07-26-22 @ 06:52)  T(F): 97.6 (07-26-22 @ 06:52), Max: 97.6 (07-26-22 @ 06:52)  HR: --  BP: --  BP(mean): --  RR: --  SpO2: --    Orthostatic VS  07-26-22 @ 08:27  Lying BP: --/-- HR: --  Sitting BP: 143/91 HR: 74  Standing BP: 133/81 HR: 87  Site: upper left arm  Mode: electronic  Orthostatic VS  07-26-22 @ 06:52  Lying BP: --/-- HR: --  Sitting BP: 152/75 HR: 69  Standing BP: 121/77 HR: 81  Site: --  Mode: --  Orthostatic VS  07-25-22 @ 06:32  Lying BP: --/-- HR: --  Sitting BP: 126/70 HR: 85  Standing BP: 116/69 HR: 83  Site: upper right arm  Mode: electronic

## 2022-07-26 NOTE — BH INPATIENT PSYCHIATRY PROGRESS NOTE - NSBHASSESSSUMMFT_PSY_ALL_CORE
71 y.o. male with hx of unusual relatedness including pattern of making up psychiatric symptoms or hx such as claiming he arranged a hit or his brother killed himself. Recently there has been escalating pattern of hospitalizations, ER visits. Compliance after d/c is nil. Patient also develops excessive attachment or intense dislike of providers and other staff. Patient has no actual hx of suicide attempts or fh or SIB. Patient with possible facititious disorder vs mood disorder. Suspect possible unconscious primary gain related to medical complaints help seeking then rejection of help as inadequate. Patient not assessed as needing CO. In light of collateral, will consider referral to psychotherapy program upon discharge if patient adherent to treatment plan.     7/13: Patient reports complaints of his back itching and loose tooth. Patient continues to demonstrate black-or-white thinking and making conditional statements "if I do not get things the way I want, there is no point in living". Patient does not endorse suicidal ideations, and accepts that he tends to think about the worst outcomes at times. Patient encouraged to develop healthier pattern of thinking and to also focus on positives. Patient is future oriented, thinking about working with new , that he appears to like, and wants to be setup with appointments to receive the care.   7/14: Pt does not endorse SI. Appears goal oriented, however also complains and becomes help rejecting. Patient encouraged to develop balanced thoughts. Patient continues to endorse conditions on receiving help only on his terms.   7/15: Pt continues to endorse passive SI, conditional on being able to obtain certain medical services in the future.   7/16: Depressed, overinclusive, negativistic and ruminative. Tolerating abilify.    7/17: No significant overnight events reported, pt taking meds, denies side effects. Continues to present catastrophic thinking, negative, endosing hopelessness and somatic preoccupation with his teeth, back itch and prostate. Denies acute SI but endorses thoughts that his life is not worth living contingent on being able to obtain his prostate surgery, being able to get access a ride and a  "that is up for the challenge". Will lower Mirtazapine to 7.5mg as pt continues to endorse difficulty sleeping and start Prozac, which pt reports he has benefitted from in the past. Increased Abilify to 5mg QD.   7/18: Pt continues to present with all-or-none thinking, endorsing somatic preoccupation with teeth and prostate. Patient appears to bring up other complaints such as his relationships when discussing plans for his current somatic complaints. No active SI reported, and hopelessness is contingent upon not getting staff that "can tolerate" him and getting his tooth extracted and receiving implants and receiving prostate surgery. Continue current medications. Plan for meeting with  for appropriate followups. Will keep mirtazapine 7.5mg HS for insomnia and plan to titrate fluoxetine as tolerated.   7/19: Pt continues to present negative, catastrophic thinking. Will continue current regimen  7/20: Pt preoccupied with being alpha tenant. C/o gas, however denied constipation. Will give PRN simethicone.   7/21: Pt reports difficulty sleeping. D/C mirtazapine, will offer trazodone 50mg instead.   7/22: No SI reported. No c/o sleep difficulty. Preoccupied with somatic complaints (dental, prostate).   7/23: Pt presents with help-rejecting complaints but overall no longer c/o of SI. Continue current regimen  7/26: Some c/o of intermittent passive suicidal ideation, however affect had improved by the afternoon. Pt appears future oriented, wants to express his feelings to staff individually, however denies current SI, HI. No need for 1:1 at this time. Continue current medications.    Plan:  - Legals: 9.13  - Safety: Safety plan discussed. No need for CO 1:1 at this time.   - Psychiatry: Abilify 5mg QD, Prozac 10mg QD. Trazodone 50mg QHS.  - Psychotherapy: Individual, group and milieu therapy as appropriate.  - Medical: Simethicone 80mg PO TID PRN flatulence. Calamine lotion TID for itching, Proscar 5mg PO daily for BPH, tamsulosin 0.4mg PO daily for overflow incontinence/BPH, oxybutynin 5mg PO daily for overactive bladder, simvastatin 20mg PO HS for HLD, nifedipine XL 30mg PO daily for HTN  - Dispo: Pending.

## 2022-07-26 NOTE — BH INPATIENT PSYCHIATRY PROGRESS NOTE - NSBHFUPINTERVALHXFT_PSY_A_CORE
71 y.o. male, single, living with brother, presented to hospital for complaints of SI. No significant overnight events reported by staff. No PRNs needed. Pt had reported to staff earlier in the morning c/o passive suicidal ideations. However later in the day, pt was noted to be more future oriented, writing notes in his diary and talking about wanting a job and getting to a better place. He appeared to reminisce about the past, talking about his previous relationship and how he felt he cannot get another girlfriend. He also talked about a staff member at [facility] [employer], [facility], whom he gets along with and would like to be his  in the future. Pt also made vague allusions to some medication not working for him, however did not describe any particular side effect.

## 2022-07-27 LAB — SARS-COV-2 RNA SPEC QL NAA+PROBE: SIGNIFICANT CHANGE UP

## 2022-07-27 RX ADMIN — Medication 30 MILLIGRAM(S): at 09:21

## 2022-07-27 RX ADMIN — Medication 5 MILLIGRAM(S): at 20:57

## 2022-07-27 RX ADMIN — Medication 50 MILLIGRAM(S): at 20:56

## 2022-07-27 RX ADMIN — Medication 2000 UNIT(S): at 09:20

## 2022-07-27 RX ADMIN — FINASTERIDE 5 MILLIGRAM(S): 5 TABLET, FILM COATED ORAL at 09:20

## 2022-07-27 RX ADMIN — Medication 5 MILLIGRAM(S): at 09:20

## 2022-07-27 RX ADMIN — Medication 10 MILLIGRAM(S): at 09:20

## 2022-07-27 RX ADMIN — TAMSULOSIN HYDROCHLORIDE 0.4 MILLIGRAM(S): 0.4 CAPSULE ORAL at 09:20

## 2022-07-27 RX ADMIN — Medication 1 TABLET(S): at 09:20

## 2022-07-27 RX ADMIN — ARIPIPRAZOLE 10 MILLIGRAM(S): 15 TABLET ORAL at 09:20

## 2022-07-27 RX ADMIN — SIMVASTATIN 20 MILLIGRAM(S): 20 TABLET, FILM COATED ORAL at 09:21

## 2022-07-27 NOTE — BH INPATIENT PSYCHIATRY PROGRESS NOTE - NSBHFUPINTERVALHXFT_PSY_A_CORE
71 y.o. male, single, living with brother, presented to hospital for complaints of SI. No significant overnight events reported by staff. No PRNs needed. Pt has been adherent to medications. 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 Pt expressed disappointment with not being able to get in touch with his friend, and initially felt that she does not want to speak with him, but was willing to accept alternate explanation that she might have been busy and stated he would try calling her later. Pt was c/o of feeling urgency due to his prostate issues, however did not have episode of incontinence and was able to use the toilet in his room without incident. Pt also c/o of right hand tremor, however no tremor was noted on evaluation. He expressed wanting to speak to his previous  Raeann, however was informed that he is pending reassignment of , and will be able to address his concerns with them. Pt also wanted to follow up outpatient with his dentist for oral hygiene and did not feel the need to go to dental clinic here.    71 y.o. male, single, living with brother, presented to hospital for complaints of SI. No significant overnight events reported by staff. No PRNs needed. Pt has been adherent to medications. Vitals positive for orthostasis. 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 Pt expressed disappointment with not being able to get in touch with his friend, and initially felt that she does not want to speak with him, but was willing to accept alternate explanation that she might have been busy and stated he would try calling her later. Pt was c/o of feeling urgency due to his prostate issues, however did not have episode of incontinence and was able to use the toilet in his room without incident. Pt also c/o of right hand tremor, however no tremor was noted on evaluation. He expressed wanting to speak to his previous  Raeann, however was informed that he is pending reassignment of , and will be able to address his concerns with them. Pt also wanted to follow up outpatient with his dentist for oral hygiene and did not feel the need to go to dental clinic here.

## 2022-07-27 NOTE — BH INPATIENT PSYCHIATRY PROGRESS NOTE - NSBHCHARTREVIEWVS_PSY_A_CORE FT
Vital Signs Last 24 Hrs  T(C): 36.4 (07-27-22 @ 08:23), Max: 36.4 (07-27-22 @ 08:23)  T(F): 97.5 (07-27-22 @ 08:23), Max: 97.5 (07-27-22 @ 08:23)  HR: --  BP: --  BP(mean): --  RR: --  SpO2: --    Orthostatic VS  07-27-22 @ 08:23  Lying BP: --/-- HR: --  Sitting BP: 136/84 HR: 65  Standing BP: 107/73 HR: 85  Site: --  Mode: --  Orthostatic VS  07-26-22 @ 08:27  Lying BP: --/-- HR: --  Sitting BP: 143/91 HR: 74  Standing BP: 133/81 HR: 87  Site: upper left arm  Mode: electronic  Orthostatic VS  07-26-22 @ 06:52  Lying BP: --/-- HR: --  Sitting BP: 152/75 HR: 69  Standing BP: 121/77 HR: 81  Site: --  Mode: --

## 2022-07-27 NOTE — BH INPATIENT PSYCHIATRY PROGRESS NOTE - NSBHASSESSSUMMFT_PSY_ALL_CORE
71 y.o. male with hx of unusual relatedness including pattern of making up psychiatric symptoms or hx such as claiming he arranged a hit or his brother killed himself. Recently there has been escalating pattern of hospitalizations, ER visits. Compliance after d/c is nil. Patient also develops excessive attachment or intense dislike of providers and other staff. Patient has no actual hx of suicide attempts or fh or SIB. Patient with possible facititious disorder vs mood disorder. Suspect possible unconscious primary gain related to medical complaints help seeking then rejection of help as inadequate. Patient not assessed as needing CO. In light of collateral, will consider referral to psychotherapy program upon discharge if patient adherent to treatment plan.     7/13: Patient reports complaints of his back itching and loose tooth. Patient continues to demonstrate black-or-white thinking and making conditional statements "if I do not get things the way I want, there is no point in living". Patient does not endorse suicidal ideations, and accepts that he tends to think about the worst outcomes at times. Patient encouraged to develop healthier pattern of thinking and to also focus on positives. Patient is future oriented, thinking about working with new , that he appears to like, and wants to be setup with appointments to receive the care.   7/14: Pt does not endorse SI. Appears goal oriented, however also complains and becomes help rejecting. Patient encouraged to develop balanced thoughts. Patient continues to endorse conditions on receiving help only on his terms.   7/15: Pt continues to endorse passive SI, conditional on being able to obtain certain medical services in the future.   7/16: Depressed, overinclusive, negativistic and ruminative. Tolerating abilify.    7/17: No significant overnight events reported, pt taking meds, denies side effects. Continues to present catastrophic thinking, negative, endosing hopelessness and somatic preoccupation with his teeth, back itch and prostate. Denies acute SI but endorses thoughts that his life is not worth living contingent on being able to obtain his prostate surgery, being able to get access a ride and a  "that is up for the challenge". Will lower Mirtazapine to 7.5mg as pt continues to endorse difficulty sleeping and start Prozac, which pt reports he has benefitted from in the past. Increased Abilify to 5mg QD.   7/18: Pt continues to present with all-or-none thinking, endorsing somatic preoccupation with teeth and prostate. Patient appears to bring up other complaints such as his relationships when discussing plans for his current somatic complaints. No active SI reported, and hopelessness is contingent upon not getting staff that "can tolerate" him and getting his tooth extracted and receiving implants and receiving prostate surgery. Continue current medications. Plan for meeting with  for appropriate followups. Will keep mirtazapine 7.5mg HS for insomnia and plan to titrate fluoxetine as tolerated.   7/19: Pt continues to present negative, catastrophic thinking. Will continue current regimen  7/20: Pt preoccupied with being alpha tenant. C/o gas, however denied constipation. Will give PRN simethicone.   7/21: Pt reports difficulty sleeping. D/C mirtazapine, will offer trazodone 50mg instead.   7/22: No SI reported. No c/o sleep difficulty. Preoccupied with somatic complaints (dental, prostate).   7/23: Pt presents with help-rejecting complaints but overall no longer c/o of SI. Continue current regimen  7/26: Some c/o of intermittent passive suicidal ideation, however affect had improved by the afternoon. Pt appears future oriented, wants to express his feelings to staff individually, however denies current SI, HI. No need for 1:1 at this time. Continue current medications.  7/27: No longer c/o SI. Appears to want to be able to express his frustrations without interjection by others. Appears future oriented, wanting to speak with his friend later. C/o of tremor, however no tremor noted. Will continue to monitor for EPS in light of change in aripiprazole.     Plan:  - Legals: 9.13  - Safety: Safety plan discussed. No need for CO 1:1 at this time.   - Psychiatry: Abilify 10mg QD, Prozac 10mg QD. Trazodone 50mg QHS. Melatonin 4mg HS.  - Psychotherapy: Individual, group and milieu therapy as appropriate.  - Medical: Simethicone 80mg PO TID PRN flatulence. Calamine lotion TID for itching, Proscar 5mg PO daily for BPH, tamsulosin 0.4mg PO daily for overflow incontinence/BPH, oxybutynin 5mg PO daily for overactive bladder, simvastatin 20mg PO HS for HLD, nifedipine XL 30mg PO daily for HTN  - Dispo: Pending.   71 y.o. male with hx of unusual relatedness including pattern of making up psychiatric symptoms or hx such as claiming he arranged a hit or his brother killed himself. Recently there has been escalating pattern of hospitalizations, ER visits. Compliance after d/c is nil. Patient also develops excessive attachment or intense dislike of providers and other staff. Patient has no actual hx of suicide attempts or fh or SIB. Patient with possible facititious disorder vs mood disorder. Suspect possible unconscious primary gain related to medical complaints help seeking then rejection of help as inadequate. Patient not assessed as needing CO. In light of collateral, will consider referral to psychotherapy program upon discharge if patient adherent to treatment plan.     7/13: Patient reports complaints of his back itching and loose tooth. Patient continues to demonstrate black-or-white thinking and making conditional statements "if I do not get things the way I want, there is no point in living". Patient does not endorse suicidal ideations, and accepts that he tends to think about the worst outcomes at times. Patient encouraged to develop healthier pattern of thinking and to also focus on positives. Patient is future oriented, thinking about working with new , that he appears to like, and wants to be setup with appointments to receive the care.   7/14: Pt does not endorse SI. Appears goal oriented, however also complains and becomes help rejecting. Patient encouraged to develop balanced thoughts. Patient continues to endorse conditions on receiving help only on his terms.   7/15: Pt continues to endorse passive SI, conditional on being able to obtain certain medical services in the future.   7/16: Depressed, overinclusive, negativistic and ruminative. Tolerating abilify.    7/17: No significant overnight events reported, pt taking meds, denies side effects. Continues to present catastrophic thinking, negative, endosing hopelessness and somatic preoccupation with his teeth, back itch and prostate. Denies acute SI but endorses thoughts that his life is not worth living contingent on being able to obtain his prostate surgery, being able to get access a ride and a  "that is up for the challenge". Will lower Mirtazapine to 7.5mg as pt continues to endorse difficulty sleeping and start Prozac, which pt reports he has benefitted from in the past. Increased Abilify to 5mg QD.   7/18: Pt continues to present with all-or-none thinking, endorsing somatic preoccupation with teeth and prostate. Patient appears to bring up other complaints such as his relationships when discussing plans for his current somatic complaints. No active SI reported, and hopelessness is contingent upon not getting staff that "can tolerate" him and getting his tooth extracted and receiving implants and receiving prostate surgery. Continue current medications. Plan for meeting with  for appropriate followups. Will keep mirtazapine 7.5mg HS for insomnia and plan to titrate fluoxetine as tolerated.   7/19: Pt continues to present negative, catastrophic thinking. Will continue current regimen  7/20: Pt preoccupied with being alpha tenant. C/o gas, however denied constipation. Will give PRN simethicone.   7/21: Pt reports difficulty sleeping. D/C mirtazapine, will offer trazodone 50mg instead.   7/22: No SI reported. No c/o sleep difficulty. Preoccupied with somatic complaints (dental, prostate).   7/23: Pt presents with help-rejecting complaints but overall no longer c/o of SI. Continue current regimen  7/26: Some c/o of intermittent passive suicidal ideation, however affect had improved by the afternoon. Pt appears future oriented, wants to express his feelings to staff individually, however denies current SI, HI. No need for 1:1 at this time. Continue current medications.  7/27: No longer c/o SI. Appears to want to be able to express his frustrations without interjection by others. Appears future oriented, wanting to speak with his friend later. C/o of tremor, however no tremor noted. Will continue to monitor for EPS in light of change in aripiprazole. Orthostatic vitals noted. Will encourage fluids and continue to monitor.    Plan:  - Legals: 9.13  - Safety: Safety plan discussed. No need for CO 1:1 at this time.   - Psychiatry: Abilify 10mg QD, Prozac 10mg QD. Trazodone 50mg QHS. Melatonin 4mg HS.  - Psychotherapy: Individual, group and milieu therapy as appropriate.  - Medical: Simethicone 80mg PO TID PRN flatulence. Calamine lotion TID for itching, Proscar 5mg PO daily for BPH, tamsulosin 0.4mg PO daily for overflow incontinence/BPH, oxybutynin 5mg PO daily for overactive bladder, simvastatin 20mg PO HS for HLD, nifedipine XL 30mg PO daily for HTN  - Dispo: Pending.

## 2022-07-27 NOTE — BH INPATIENT PSYCHIATRY PROGRESS NOTE - CURRENT MEDICATION
MEDICATIONS  (STANDING):  ARIPiprazole 10 milliGRAM(s) Oral daily  cholecalciferol 2000 Unit(s) Oral daily  finasteride 5 milliGRAM(s) Oral daily  FLUoxetine 10 milliGRAM(s) Oral daily  melatonin. 5 milliGRAM(s) Oral at bedtime  multivitamin      multivitamin 1 Tablet(s) Oral daily  NIFEdipine XL 30 milliGRAM(s) Oral daily  oxybutynin 5 milliGRAM(s) Oral daily  simvastatin 20 milliGRAM(s) Oral daily  tamsulosin 0.4 milliGRAM(s) Oral daily  traZODone 50 milliGRAM(s) Oral at bedtime    MEDICATIONS  (PRN):  acetaminophen     Tablet .. 650 milliGRAM(s) Oral every 6 hours PRN Mild Pain (1 - 3), Moderate Pain (4 - 6)  calamine/zinc oxide Lotion 1 Application(s) Topical three times a day PRN itching  haloperidol     Tablet 2.5 milliGRAM(s) Oral every 6 hours PRN agitation  haloperidol    Injectable 2.5 milliGRAM(s) IntraMuscular once PRN severe agitation  simethicone 80 milliGRAM(s) Chew three times a day PRN Gas

## 2022-07-28 PROCEDURE — 99231 SBSQ HOSP IP/OBS SF/LOW 25: CPT | Mod: GC

## 2022-07-28 RX ORDER — CALCIUM CARBONATE 500(1250)
1 TABLET ORAL ONCE
Refills: 0 | Status: COMPLETED | OUTPATIENT
Start: 2022-07-28 | End: 2022-07-30

## 2022-07-28 RX ORDER — LANOLIN ALCOHOL/MO/W.PET/CERES
6 CREAM (GRAM) TOPICAL AT BEDTIME
Refills: 0 | Status: DISCONTINUED | OUTPATIENT
Start: 2022-07-28 | End: 2022-08-25

## 2022-07-28 RX ORDER — CALCIUM CARBONATE 500(1250)
1 TABLET ORAL DAILY
Refills: 0 | Status: DISCONTINUED | OUTPATIENT
Start: 2022-07-28 | End: 2022-08-25

## 2022-07-28 RX ADMIN — Medication 1 TABLET(S): at 09:49

## 2022-07-28 RX ADMIN — Medication 650 MILLIGRAM(S): at 22:33

## 2022-07-28 RX ADMIN — Medication 650 MILLIGRAM(S): at 21:49

## 2022-07-28 RX ADMIN — ARIPIPRAZOLE 10 MILLIGRAM(S): 15 TABLET ORAL at 09:49

## 2022-07-28 RX ADMIN — Medication 30 MILLIGRAM(S): at 09:49

## 2022-07-28 RX ADMIN — FINASTERIDE 5 MILLIGRAM(S): 5 TABLET, FILM COATED ORAL at 09:49

## 2022-07-28 RX ADMIN — Medication 50 MILLIGRAM(S): at 21:29

## 2022-07-28 RX ADMIN — Medication 5 MILLIGRAM(S): at 09:49

## 2022-07-28 RX ADMIN — Medication 2000 UNIT(S): at 09:49

## 2022-07-28 RX ADMIN — Medication 6 MILLIGRAM(S): at 21:28

## 2022-07-28 RX ADMIN — TAMSULOSIN HYDROCHLORIDE 0.4 MILLIGRAM(S): 0.4 CAPSULE ORAL at 09:49

## 2022-07-28 RX ADMIN — Medication 10 MILLIGRAM(S): at 09:48

## 2022-07-28 RX ADMIN — SIMVASTATIN 20 MILLIGRAM(S): 20 TABLET, FILM COATED ORAL at 09:49

## 2022-07-28 NOTE — BH INPATIENT PSYCHIATRY PROGRESS NOTE - NSCGIIMPROVESX_PSY_ALL_CORE
3 = Minimally improved - slightly better with little or no clinically meaningful reduction of symptoms.  Represents very little change in basic clinical status, level of care, or functional capacity.

## 2022-07-28 NOTE — BH INPATIENT PSYCHIATRY PROGRESS NOTE - NSCGISEVERILLNESS_PSY_ALL_CORE
5 = Markedly ill - intrusive symptoms that distinctly impair social/occupational function or cause intrusive levels of distress

## 2022-07-28 NOTE — BH INPATIENT PSYCHIATRY PROGRESS NOTE - NSBHCHARTREVIEWVS_PSY_A_CORE FT
Vital Signs Last 24 Hrs  T(C): 36.6 (07-28-22 @ 07:53), Max: 36.6 (07-28-22 @ 07:53)  T(F): 97.9 (07-28-22 @ 07:53), Max: 97.9 (07-28-22 @ 07:53)  HR: --  BP: --  BP(mean): --  RR: --  SpO2: --    Orthostatic VS  07-28-22 @ 10:08  Lying BP: --/-- HR: --  Sitting BP: 120/80 HR: 74  Standing BP: 110/70 HR: 70  Site: --  Mode: --  Orthostatic VS  07-28-22 @ 07:53  Lying BP: --/-- HR: --  Sitting BP: 120/72 HR: 76  Standing BP: 99/66 HR: 93  Site: upper left arm  Mode: electronic  Orthostatic VS  07-27-22 @ 08:23  Lying BP: --/-- HR: --  Sitting BP: 136/84 HR: 65  Standing BP: 107/73 HR: 85  Site: --  Mode: --

## 2022-07-28 NOTE — BH INPATIENT PSYCHIATRY PROGRESS NOTE - NSBHFUPINTERVALHXFT_PSY_A_CORE
71 y.o. male, single, living with brother, presented to hospital for complaints of SI. No significant overnight events reported by staff. No PRNs needed. Pt has been adherent to medications. Vitals positive for orthostasis. Upon evaluation, pt c/o difficulty sleeping which was not reflected in the nursing assessments which reflected Pt slept through the night until early morning. Pt reported having a lot of aggression that he lets out at others. Healthier methods of expressing aggression including wit, sarcasm were discussed however patient would direct conversation to other problems including his poor relationship with his family members and his negative attitude toward a particular staff member at [employer], whom he "cannot work with". Pt expressed his need of wanting a  who can work with "his personality". Patient also discussed his friend Grace whom he has not been able to get in touch with recently, however is able to manage his frustration. Denies SI, HI. Pt is goal oriented, with getting his prostate issues addressed so that he can travel to certain bakeries and go to [place]. Pt was also c/o dyspnea however was not noted to be short of breath during the entire interview and spoke comfortably without distress. Later during the interview, pt c/o dyspepsia, after having a heavy meal, and agreed to receive Tums for symptomatic relief

## 2022-07-28 NOTE — BH INPATIENT PSYCHIATRY PROGRESS NOTE - CURRENT MEDICATION
MEDICATIONS  (STANDING):  ARIPiprazole 10 milliGRAM(s) Oral daily  calcium carbonate    500 mG (Tums) Chewable 1 Tablet(s) Chew once  cholecalciferol 2000 Unit(s) Oral daily  finasteride 5 milliGRAM(s) Oral daily  FLUoxetine 10 milliGRAM(s) Oral daily  melatonin. 6 milliGRAM(s) Oral at bedtime  multivitamin      multivitamin 1 Tablet(s) Oral daily  NIFEdipine XL 30 milliGRAM(s) Oral daily  oxybutynin 5 milliGRAM(s) Oral daily  simvastatin 20 milliGRAM(s) Oral daily  tamsulosin 0.4 milliGRAM(s) Oral daily  traZODone 50 milliGRAM(s) Oral at bedtime    MEDICATIONS  (PRN):  acetaminophen     Tablet .. 650 milliGRAM(s) Oral every 6 hours PRN Mild Pain (1 - 3), Moderate Pain (4 - 6)  calamine/zinc oxide Lotion 1 Application(s) Topical three times a day PRN itching  calcium carbonate    500 mG (Tums) Chewable 1 Tablet(s) Chew daily PRN dyspepsia  haloperidol     Tablet 2.5 milliGRAM(s) Oral every 6 hours PRN agitation  haloperidol    Injectable 2.5 milliGRAM(s) IntraMuscular once PRN severe agitation  simethicone 80 milliGRAM(s) Chew three times a day PRN Gas

## 2022-07-28 NOTE — BH INPATIENT PSYCHIATRY PROGRESS NOTE - PRN MEDS
MEDICATIONS  (PRN):  acetaminophen     Tablet .. 650 milliGRAM(s) Oral every 6 hours PRN Mild Pain (1 - 3), Moderate Pain (4 - 6)  calamine/zinc oxide Lotion 1 Application(s) Topical three times a day PRN itching  calcium carbonate    500 mG (Tums) Chewable 1 Tablet(s) Chew daily PRN dyspepsia  haloperidol     Tablet 2.5 milliGRAM(s) Oral every 6 hours PRN agitation  haloperidol    Injectable 2.5 milliGRAM(s) IntraMuscular once PRN severe agitation  simethicone 80 milliGRAM(s) Chew three times a day PRN Gas

## 2022-07-28 NOTE — BH INPATIENT PSYCHIATRY PROGRESS NOTE - NSBHASSESSSUMMFT_PSY_ALL_CORE
71 y.o. male with hx of unusual relatedness including pattern of making up psychiatric symptoms or hx such as claiming he arranged a hit or his brother killed himself. Recently there has been escalating pattern of hospitalizations, ER visits. Compliance after d/c is nil. Patient also develops excessive attachment or intense dislike of providers and other staff. Patient has no actual hx of suicide attempts or fh or SIB. Patient with possible facititious disorder vs mood disorder. Suspect possible unconscious primary gain related to medical complaints help seeking then rejection of help as inadequate. Patient not assessed as needing CO. In light of collateral, will consider referral to psychotherapy program upon discharge if patient adherent to treatment plan.     7/13: Patient reports complaints of his back itching and loose tooth. Patient continues to demonstrate black-or-white thinking and making conditional statements "if I do not get things the way I want, there is no point in living". Patient does not endorse suicidal ideations, and accepts that he tends to think about the worst outcomes at times. Patient encouraged to develop healthier pattern of thinking and to also focus on positives. Patient is future oriented, thinking about working with new , that he appears to like, and wants to be setup with appointments to receive the care.   7/14: Pt does not endorse SI. Appears goal oriented, however also complains and becomes help rejecting. Patient encouraged to develop balanced thoughts. Patient continues to endorse conditions on receiving help only on his terms.   7/15: Pt continues to endorse passive SI, conditional on being able to obtain certain medical services in the future.   7/16: Depressed, overinclusive, negativistic and ruminative. Tolerating abilify.    7/17: No significant overnight events reported, pt taking meds, denies side effects. Continues to present catastrophic thinking, negative, endosing hopelessness and somatic preoccupation with his teeth, back itch and prostate. Denies acute SI but endorses thoughts that his life is not worth living contingent on being able to obtain his prostate surgery, being able to get access a ride and a  "that is up for the challenge". Will lower Mirtazapine to 7.5mg as pt continues to endorse difficulty sleeping and start Prozac, which pt reports he has benefitted from in the past. Increased Abilify to 5mg QD.   7/18: Pt continues to present with all-or-none thinking, endorsing somatic preoccupation with teeth and prostate. Patient appears to bring up other complaints such as his relationships when discussing plans for his current somatic complaints. No active SI reported, and hopelessness is contingent upon not getting staff that "can tolerate" him and getting his tooth extracted and receiving implants and receiving prostate surgery. Continue current medications. Plan for meeting with  for appropriate followups. Will keep mirtazapine 7.5mg HS for insomnia and plan to titrate fluoxetine as tolerated.   7/19: Pt continues to present negative, catastrophic thinking. Will continue current regimen  7/20: Pt preoccupied with being alpha tenant. C/o gas, however denied constipation. Will give PRN simethicone.   7/21: Pt reports difficulty sleeping. D/C mirtazapine, will offer trazodone 50mg instead.   7/22: No SI reported. No c/o sleep difficulty. Preoccupied with somatic complaints (dental, prostate).   7/23: Pt presents with help-rejecting complaints but overall no longer c/o of SI. Continue current regimen  7/26: Some c/o of intermittent passive suicidal ideation, however affect had improved by the afternoon. Pt appears future oriented, wants to express his feelings to staff individually, however denies current SI, HI. No need for 1:1 at this time. Continue current medications.  7/27: No longer c/o SI. Appears to want to be able to express his frustrations without interjection by others. Appears future oriented, wanting to speak with his friend later. C/o of tremor, however no tremor noted. Will continue to monitor for EPS in light of change in aripiprazole. Orthostatic vitals noted. Will encourage fluids and continue to monitor.  7/28: Expresses frustration about not having the right  for his personality. Pt denied SI, HI. Encouraged to develop further frustration tolerance skills and healthier expressions of frustration. C/o somatic symptoms including SOB, however not noted to be short of breath while talking circumstantially. C/o of difficulty sleeping, can increase melatonin. Will avoid increasing trazodone in light of positive orthostatic vital signs.      Plan:  - Legals: 9.13  - Safety: Safety plan discussed. No need for CO 1:1 at this time.   - Psychiatry: Abilify 10mg QD, Prozac 10mg QD. Trazodone 50mg QHS. Increase Melatonin 6mg HS.  - Psychotherapy: Individual, group and milieu therapy as appropriate.  - Medical: Simethicone 80mg PO TID PRN flatulence. Calamine lotion TID for itching, Proscar 5mg PO daily for BPH, tamsulosin 0.4mg PO daily for overflow incontinence/BPH, oxybutynin 5mg PO daily for overactive bladder, simvastatin 20mg PO HS for HLD, nifedipine XL 30mg PO daily for HTN  - Dispo: Pending.

## 2022-07-29 PROCEDURE — 99231 SBSQ HOSP IP/OBS SF/LOW 25: CPT | Mod: GC

## 2022-07-29 RX ADMIN — Medication 1 TABLET(S): at 10:00

## 2022-07-29 RX ADMIN — SIMVASTATIN 20 MILLIGRAM(S): 20 TABLET, FILM COATED ORAL at 10:01

## 2022-07-29 RX ADMIN — Medication 10 MILLIGRAM(S): at 10:01

## 2022-07-29 RX ADMIN — Medication 6 MILLIGRAM(S): at 20:24

## 2022-07-29 RX ADMIN — FINASTERIDE 5 MILLIGRAM(S): 5 TABLET, FILM COATED ORAL at 10:00

## 2022-07-29 RX ADMIN — Medication 2000 UNIT(S): at 10:00

## 2022-07-29 RX ADMIN — Medication 5 MILLIGRAM(S): at 10:01

## 2022-07-29 RX ADMIN — Medication 30 MILLIGRAM(S): at 10:00

## 2022-07-29 RX ADMIN — TAMSULOSIN HYDROCHLORIDE 0.4 MILLIGRAM(S): 0.4 CAPSULE ORAL at 10:00

## 2022-07-29 RX ADMIN — ARIPIPRAZOLE 10 MILLIGRAM(S): 15 TABLET ORAL at 10:00

## 2022-07-29 RX ADMIN — Medication 50 MILLIGRAM(S): at 20:25

## 2022-07-29 NOTE — BH TREATMENT PLAN - NSTXDCOPNOINTERSW_PSY_ALL_CORE
Sw meets routinely with pt to encourage his acceptance of adequate social support and follow up.
SW will meet with pt routinely to encourage his acceptance of outpatient supports and follow up, as well as compliance with his medication regimen post discharge.

## 2022-07-29 NOTE — BH INPATIENT PSYCHIATRY PROGRESS NOTE - NSBHFUPINTERVALHXFT_PSY_A_CORE
71 y.o. male, single, living with brother, presented to hospital for complaints of SI. No significant overnight events reported by staff. No PRNs needed. Pt has been adherent to medications. Pt expressed his frustration that another peer had taken his pillow. At the time, pt appropriately did not engage with peer, but rather complained to the staff. During evaluation, Patient continued to catastrophize and report on his problems including not getting along with brother and not getting along at the clubhouse, and would try to bring up other complaints when potential plans were discussed. Pt reports passive SI when frustrated, however also reports he would not kill himself. Pt's SI appears to be conditional on having to interact with his brother. Pt later reported that he is not interested in returning home and would like to explore other arrangements including assisted living facility.    71 y.o. male, single, living with brother, presented to hospital for complaints of SI. No significant overnight events reported by staff. No PRNs needed. Pt has been adherent to medications. Pt expressed his frustration that another peer had taken his pillow. At the time, pt appropriately did not engage with peer, but rather complained to the staff. During evaluation, Patient continued to catastrophize and report on his problems including not getting along with brother and not getting along at the clubhouse, and would try to bring up other complaints when potential plans were discussed. Pt reports passive SI when frustrated, however also reports he would not kill himself. 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

## 2022-07-29 NOTE — BH INPATIENT PSYCHIATRY PROGRESS NOTE - NSBHASSESSSUMMFT_PSY_ALL_CORE
71 y.o. male with hx of unusual relatedness including pattern of making up psychiatric symptoms or hx such as claiming he arranged a hit or his brother killed himself. Recently there has been escalating pattern of hospitalizations, ER visits. Compliance after d/c is nil. Patient also develops excessive attachment or intense dislike of providers and other staff. Patient has no actual hx of suicide attempts or fh or SIB. Patient with possible facititious disorder vs mood disorder. Suspect possible unconscious primary gain related to medical complaints help seeking then rejection of help as inadequate. Patient not assessed as needing CO. In light of collateral, will consider referral to psychotherapy program upon discharge if patient adherent to treatment plan.     7/13: Patient reports complaints of his back itching and loose tooth. Patient continues to demonstrate black-or-white thinking and making conditional statements "if I do not get things the way I want, there is no point in living". Patient does not endorse suicidal ideations, and accepts that he tends to think about the worst outcomes at times. Patient encouraged to develop healthier pattern of thinking and to also focus on positives. Patient is future oriented, thinking about working with new , that he appears to like, and wants to be setup with appointments to receive the care.   7/14: Pt does not endorse SI. Appears goal oriented, however also complains and becomes help rejecting. Patient encouraged to develop balanced thoughts. Patient continues to endorse conditions on receiving help only on his terms.   7/15: Pt continues to endorse passive SI, conditional on being able to obtain certain medical services in the future.   7/16: Depressed, overinclusive, negativistic and ruminative. Tolerating abilify.    7/17: No significant overnight events reported, pt taking meds, denies side effects. Continues to present catastrophic thinking, negative, endosing hopelessness and somatic preoccupation with his teeth, back itch and prostate. Denies acute SI but endorses thoughts that his life is not worth living contingent on being able to obtain his prostate surgery, being able to get access a ride and a  "that is up for the challenge". Will lower Mirtazapine to 7.5mg as pt continues to endorse difficulty sleeping and start Prozac, which pt reports he has benefitted from in the past. Increased Abilify to 5mg QD.   7/18: Pt continues to present with all-or-none thinking, endorsing somatic preoccupation with teeth and prostate. Patient appears to bring up other complaints such as his relationships when discussing plans for his current somatic complaints. No active SI reported, and hopelessness is contingent upon not getting staff that "can tolerate" him and getting his tooth extracted and receiving implants and receiving prostate surgery. Continue current medications. Plan for meeting with  for appropriate followups. Will keep mirtazapine 7.5mg HS for insomnia and plan to titrate fluoxetine as tolerated.   7/19: Pt continues to present negative, catastrophic thinking. Will continue current regimen  7/20: Pt preoccupied with being alpha tenant. C/o gas, however denied constipation. Will give PRN simethicone.   7/21: Pt reports difficulty sleeping. D/C mirtazapine, will offer trazodone 50mg instead.   7/22: No SI reported. No c/o sleep difficulty. Preoccupied with somatic complaints (dental, prostate).   7/23: Pt presents with help-rejecting complaints but overall no longer c/o of SI. Continue current regimen  7/26: Some c/o of intermittent passive suicidal ideation, however affect had improved by the afternoon. Pt appears future oriented, wants to express his feelings to staff individually, however denies current SI, HI. No need for 1:1 at this time. Continue current medications.  7/27: No longer c/o SI. Appears to want to be able to express his frustrations without interjection by others. Appears future oriented, wanting to speak with his friend later. C/o of tremor, however no tremor noted. Will continue to monitor for EPS in light of change in aripiprazole. Orthostatic vitals noted. Will encourage fluids and continue to monitor.  7/28: Expresses frustration about not having the right  for his personality. Pt denied SI, HI. Encouraged to develop further frustration tolerance skills and healthier expressions of frustration. C/o somatic symptoms including SOB, however not noted to be short of breath while talking circumstantially. C/o of difficulty sleeping, can increase melatonin. Will avoid increasing trazodone in light of positive orthostatic vital signs.  7/29: After overnight incident of another peer taking his pillow, pt continued to focus on problems. Pt reported his brother being a source of frustration for him and reported conditional passive SI if he had to deal with his brother at home. Pt wanting to go to [facility] and wanting a  "that can work with him". Discussed about pt expressing his frustration and aggression by verbalizing SI and pt employing negative cognitive filters.       Plan:  - Legals: 9.13  - Safety: Safety plan discussed. No need for CO 1:1 at this time.   - Psychiatry: Abilify 10mg QD, Prozac 10mg QD. Trazodone 50mg QHS. Increase Melatonin 6mg HS.  - Psychotherapy: Individual, group and milieu therapy as appropriate.  - Medical: Simethicone 80mg PO TID PRN flatulence. Calamine lotion TID for itching, Proscar 5mg PO daily for BPH, tamsulosin 0.4mg PO daily for overflow incontinence/BPH, oxybutynin 5mg PO daily for overactive bladder, simvastatin 20mg PO HS for HLD, nifedipine XL 30mg PO daily for HTN  - Dispo: Pending.   71 y.o. male with hx of unusual relatedness including pattern of making up psychiatric symptoms or hx such as claiming he arranged a hit or his brother killed himself. Recently there has been escalating pattern of hospitalizations, ER visits. Compliance after d/c is nil. Patient also develops excessive attachment or intense dislike of providers and other staff. Patient has no actual hx of suicide attempts or fh or SIB. Patient with possible facititious disorder vs mood disorder. Suspect possible unconscious primary gain related to medical complaints help seeking then rejection of help as inadequate. Patient not assessed as needing CO. In light of collateral, will consider referral to psychotherapy program upon discharge if patient adherent to treatment plan.     7/13: Patient reports complaints of his back itching and loose tooth. Patient continues to demonstrate black-or-white thinking and making conditional statements "if I do not get things the way I want, there is no point in living". Patient does not endorse suicidal ideations, and accepts that he tends to think about the worst outcomes at times. Patient encouraged to develop healthier pattern of thinking and to also focus on positives. Patient is future oriented, thinking about working with new , that he appears to like, and wants to be setup with appointments to receive the care.   7/14: Pt does not endorse SI. Appears goal oriented, however also complains and becomes help rejecting. Patient encouraged to develop balanced thoughts. Patient continues to endorse conditions on receiving help only on his terms.   7/15: Pt continues to endorse passive SI, conditional on being able to obtain certain medical services in the future.   7/16: Depressed, overinclusive, negativistic and ruminative. Tolerating abilify.    7/17: No significant overnight events reported, pt taking meds, denies side effects. Continues to present catastrophic thinking, negative, endosing hopelessness and somatic preoccupation with his teeth, back itch and prostate. Denies acute SI but endorses thoughts that his life is not worth living contingent on being able to obtain his prostate surgery, being able to get access a ride and a  "that is up for the challenge". Will lower Mirtazapine to 7.5mg as pt continues to endorse difficulty sleeping and start Prozac, which pt reports he has benefitted from in the past. Increased Abilify to 5mg QD.   7/18: Pt continues to present with all-or-none thinking, endorsing somatic preoccupation with teeth and prostate. Patient appears to bring up other complaints such as his relationships when discussing plans for his current somatic complaints. No active SI reported, and hopelessness is contingent upon not getting staff that "can tolerate" him and getting his tooth extracted and receiving implants and receiving prostate surgery. Continue current medications. Plan for meeting with  for appropriate followups. Will keep mirtazapine 7.5mg HS for insomnia and plan to titrate fluoxetine as tolerated.   7/19: Pt continues to present negative, catastrophic thinking. Will continue current regimen  7/20: Pt preoccupied with being alpha tenant. C/o gas, however denied constipation. Will give PRN simethicone.   7/21: Pt reports difficulty sleeping. D/C mirtazapine, will offer trazodone 50mg instead.   7/22: No SI reported. No c/o sleep difficulty. Preoccupied with somatic complaints (dental, prostate).   7/23: Pt presents with help-rejecting complaints but overall no longer c/o of SI. Continue current regimen  7/26: Some c/o of intermittent passive suicidal ideation, however affect had improved by the afternoon. Pt appears future oriented, wants to express his feelings to staff individually, however denies current SI, HI. No need for 1:1 at this time. Continue current medications.  7/27: No longer c/o SI. Appears to want to be able to express his frustrations without interjection by others. Appears future oriented, wanting to speak with his friend later. C/o of tremor, however no tremor noted. Will continue to monitor for EPS in light of change in aripiprazole. Orthostatic vitals noted. Will encourage fluids and continue to monitor.  7/28: Expresses frustration about not having the right  for his personality. Pt denied SI, HI. Encouraged to develop further frustration tolerance skills and healthier expressions of frustration. C/o somatic symptoms including SOB, however not noted to be short of breath while talking circumstantially. C/o of difficulty sleeping, can increase melatonin. Will avoid increasing trazodone in light of positive orthostatic vital signs.  7/29: After overnight incident of another peer taking his pillow, pt continued to focus on problems. 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 Pt's recent negative cognitive filter may be as a result of recent overnight incident, will continue to monitor. Pt requires case conference with  once assigned to have a concrete solution focused plan.       Plan:  - Legals: 9.13  - Safety: Safety plan discussed. No need for CO 1:1 at this time.   - Psychiatry: Abilify 10mg QD, Prozac 10mg QD. Trazodone 50mg QHS. Increase Melatonin 6mg HS.  - Psychotherapy: Individual, group and milieu therapy as appropriate.  - Medical: Simethicone 80mg PO TID PRN flatulence. Calamine lotion TID for itching, Proscar 5mg PO daily for BPH, tamsulosin 0.4mg PO daily for overflow incontinence/BPH, oxybutynin 5mg PO daily for overactive bladder, simvastatin 20mg PO HS for HLD, nifedipine XL 30mg PO daily for HTN  - Dispo: Pending.

## 2022-07-29 NOTE — BH INPATIENT PSYCHIATRY PROGRESS NOTE - NSBHCHARTREVIEWVS_PSY_A_CORE FT
Vital Signs Last 24 Hrs  T(C): 36.4 (07-29-22 @ 07:49), Max: 36.4 (07-29-22 @ 07:49)  T(F): 97.5 (07-29-22 @ 07:49), Max: 97.5 (07-29-22 @ 07:49)  HR: --  BP: --  BP(mean): --  RR: --  SpO2: --    Orthostatic VS  07-29-22 @ 07:49  Lying BP: --/-- HR: --  Sitting BP: --/-- HR: --  Standing BP: 119/77 HR: 83  Site: upper left arm  Mode: electronic  Orthostatic VS  07-28-22 @ 10:08  Lying BP: --/-- HR: --  Sitting BP: 120/80 HR: 74  Standing BP: 110/70 HR: 70  Site: --  Mode: --  Orthostatic VS  07-28-22 @ 07:53  Lying BP: --/-- HR: --  Sitting BP: 120/72 HR: 76  Standing BP: 99/66 HR: 93  Site: upper left arm  Mode: electronic

## 2022-07-30 LAB — SARS-COV-2 RNA SPEC QL NAA+PROBE: SIGNIFICANT CHANGE UP

## 2022-07-30 RX ADMIN — Medication 1 TABLET(S): at 09:00

## 2022-07-30 RX ADMIN — Medication 10 MILLIGRAM(S): at 09:00

## 2022-07-30 RX ADMIN — SIMETHICONE 80 MILLIGRAM(S): 80 TABLET, CHEWABLE ORAL at 09:01

## 2022-07-30 RX ADMIN — Medication 6 MILLIGRAM(S): at 20:57

## 2022-07-30 RX ADMIN — Medication 1 TABLET(S): at 09:01

## 2022-07-30 RX ADMIN — SIMVASTATIN 20 MILLIGRAM(S): 20 TABLET, FILM COATED ORAL at 09:01

## 2022-07-30 RX ADMIN — TAMSULOSIN HYDROCHLORIDE 0.4 MILLIGRAM(S): 0.4 CAPSULE ORAL at 09:00

## 2022-07-30 RX ADMIN — Medication 650 MILLIGRAM(S): at 07:03

## 2022-07-30 RX ADMIN — Medication 30 MILLIGRAM(S): at 09:00

## 2022-07-30 RX ADMIN — Medication 50 MILLIGRAM(S): at 20:57

## 2022-07-30 RX ADMIN — Medication 2000 UNIT(S): at 09:00

## 2022-07-30 RX ADMIN — Medication 5 MILLIGRAM(S): at 09:00

## 2022-07-30 RX ADMIN — ARIPIPRAZOLE 10 MILLIGRAM(S): 15 TABLET ORAL at 09:01

## 2022-07-30 RX ADMIN — Medication 1 TABLET(S): at 09:33

## 2022-07-30 RX ADMIN — FINASTERIDE 5 MILLIGRAM(S): 5 TABLET, FILM COATED ORAL at 09:00

## 2022-07-31 RX ADMIN — Medication 50 MILLIGRAM(S): at 21:09

## 2022-07-31 RX ADMIN — Medication 6 MILLIGRAM(S): at 21:08

## 2022-07-31 RX ADMIN — Medication 650 MILLIGRAM(S): at 21:38

## 2022-07-31 RX ADMIN — Medication 1 TABLET(S): at 08:26

## 2022-07-31 RX ADMIN — Medication 2000 UNIT(S): at 08:25

## 2022-07-31 RX ADMIN — SIMVASTATIN 20 MILLIGRAM(S): 20 TABLET, FILM COATED ORAL at 08:25

## 2022-07-31 RX ADMIN — ARIPIPRAZOLE 10 MILLIGRAM(S): 15 TABLET ORAL at 08:24

## 2022-07-31 RX ADMIN — Medication 30 MILLIGRAM(S): at 08:26

## 2022-07-31 RX ADMIN — Medication 10 MILLIGRAM(S): at 08:25

## 2022-07-31 RX ADMIN — TAMSULOSIN HYDROCHLORIDE 0.4 MILLIGRAM(S): 0.4 CAPSULE ORAL at 08:26

## 2022-07-31 RX ADMIN — Medication 5 MILLIGRAM(S): at 08:24

## 2022-07-31 RX ADMIN — FINASTERIDE 5 MILLIGRAM(S): 5 TABLET, FILM COATED ORAL at 08:26

## 2022-07-31 RX ADMIN — Medication 650 MILLIGRAM(S): at 22:10

## 2022-07-31 NOTE — BH CHART NOTE - NSEVENTNOTEFT_PSY_ALL_CORE
SCOTT called for evaluation of bilateral leg swelling. Pt reports chronic progressive, bilateral leg swelling that began several months ago. Pt denies any acute changes in pain or swelling. Pt denies chest pain, SOB, or edema. Denies headache, visual changes, confusion, or n/v.       T(C): 36.7 (07-31-22 @ 06:47), Max: 36.7 (07-31-22 @ 06:47)  HR: --  BP: --  RR: --  SpO2: --    Physical Exam:  Gen: Patient sitting on chair in common area, NAD   HEENT: NC/AT,  EOMI.    Resp: CTA b/l. No wheezes, ronchi, or crackles.   CV: Radial pulses 2+ b/l, RRR, no murmurs.   Abd: soft, NTND, no guarding or rigidity. NABS.    Ext: 1+ pitting edema, ROM intact, no clubbing, or cyanosis.   Neuro: awake, alert, grossly oriented.     Assessment:  SCOTT called for evaluation of bilateral leg swelling. Pt noted to have1+ bilateral leg swelling, however low clinical suspicion for DVT as no warmth or tenderness noted in legs and symptoms appear chronic. Pt is clinically stable with exam otherwise unremarkable.     Plan:  1. no further medical intervention necessary at this time.   2. will continue to monitor routinely.   3. d/w RN staff

## 2022-08-01 PROCEDURE — 90834 PSYTX W PT 45 MINUTES: CPT

## 2022-08-01 PROCEDURE — 99231 SBSQ HOSP IP/OBS SF/LOW 25: CPT | Mod: GC

## 2022-08-01 RX ADMIN — Medication 5 MILLIGRAM(S): at 08:20

## 2022-08-01 RX ADMIN — Medication 1 TABLET(S): at 08:20

## 2022-08-01 RX ADMIN — Medication 50 MILLIGRAM(S): at 21:12

## 2022-08-01 RX ADMIN — Medication 30 MILLIGRAM(S): at 08:20

## 2022-08-01 RX ADMIN — ARIPIPRAZOLE 10 MILLIGRAM(S): 15 TABLET ORAL at 08:19

## 2022-08-01 RX ADMIN — SIMVASTATIN 20 MILLIGRAM(S): 20 TABLET, FILM COATED ORAL at 08:20

## 2022-08-01 RX ADMIN — FINASTERIDE 5 MILLIGRAM(S): 5 TABLET, FILM COATED ORAL at 08:19

## 2022-08-01 RX ADMIN — TAMSULOSIN HYDROCHLORIDE 0.4 MILLIGRAM(S): 0.4 CAPSULE ORAL at 08:20

## 2022-08-01 RX ADMIN — Medication 2000 UNIT(S): at 08:19

## 2022-08-01 RX ADMIN — Medication 10 MILLIGRAM(S): at 08:19

## 2022-08-01 RX ADMIN — Medication 6 MILLIGRAM(S): at 21:12

## 2022-08-01 NOTE — BH INPATIENT PSYCHIATRY PROGRESS NOTE - CURRENT MEDICATION
MEDICATIONS  (STANDING):  ARIPiprazole 10 milliGRAM(s) Oral daily  cholecalciferol 2000 Unit(s) Oral daily  finasteride 5 milliGRAM(s) Oral daily  FLUoxetine 10 milliGRAM(s) Oral daily  melatonin. 6 milliGRAM(s) Oral at bedtime  multivitamin      multivitamin 1 Tablet(s) Oral daily  NIFEdipine XL 30 milliGRAM(s) Oral daily  oxybutynin 5 milliGRAM(s) Oral daily  simvastatin 20 milliGRAM(s) Oral daily  tamsulosin 0.4 milliGRAM(s) Oral daily  traZODone 50 milliGRAM(s) Oral at bedtime    MEDICATIONS  (PRN):  acetaminophen     Tablet .. 650 milliGRAM(s) Oral every 6 hours PRN Mild Pain (1 - 3), Moderate Pain (4 - 6)  calamine/zinc oxide Lotion 1 Application(s) Topical three times a day PRN itching  calcium carbonate    500 mG (Tums) Chewable 1 Tablet(s) Chew daily PRN dyspepsia  haloperidol     Tablet 2.5 milliGRAM(s) Oral every 6 hours PRN agitation  haloperidol    Injectable 2.5 milliGRAM(s) IntraMuscular once PRN severe agitation  simethicone 80 milliGRAM(s) Chew three times a day PRN Gas

## 2022-08-01 NOTE — BH INPATIENT PSYCHIATRY PROGRESS NOTE - NSBHCHARTREVIEWVS_PSY_A_CORE FT
Vital Signs Last 24 Hrs  T(C): 36.4 (08-01-22 @ 07:32), Max: 36.4 (08-01-22 @ 07:32)  T(F): 97.5 (08-01-22 @ 07:32), Max: 97.5 (08-01-22 @ 07:32)  HR: --  BP: --  BP(mean): --  RR: --  SpO2: --    Orthostatic VS  08-01-22 @ 07:32  Lying BP: --/-- HR: --  Sitting BP: 144/74 HR: 71  Standing BP: 139/80 HR: 79  Site: upper left arm  Mode: electronic  Orthostatic VS  07-31-22 @ 06:47  Lying BP: --/-- HR: --  Sitting BP: 134/63 HR: 65  Standing BP: 122/74 HR: 80  Site: upper left arm  Mode: electronic

## 2022-08-01 NOTE — BH INPATIENT PSYCHIATRY PROGRESS NOTE - NSBHASSESSSUMMFT_PSY_ALL_CORE
71 y.o. male with hx of unusual relatedness including pattern of making up psychiatric symptoms or hx such as claiming he arranged a hit or his brother killed himself. Recently there has been escalating pattern of hospitalizations, ER visits. Compliance after d/c is nil. Patient also develops excessive attachment or intense dislike of providers and other staff. Patient has no actual hx of suicide attempts or fh or SIB. Patient with possible facititious disorder vs mood disorder. Suspect possible unconscious primary gain related to medical complaints help seeking then rejection of help as inadequate. Patient not assessed as needing CO. In light of collateral, will consider referral to psychotherapy program upon discharge if patient adherent to treatment plan.     7/13: Patient reports complaints of his back itching and loose tooth. Patient continues to demonstrate black-or-white thinking and making conditional statements "if I do not get things the way I want, there is no point in living". Patient does not endorse suicidal ideations, and accepts that he tends to think about the worst outcomes at times. Patient encouraged to develop healthier pattern of thinking and to also focus on positives. Patient is future oriented, thinking about working with new , that he appears to like, and wants to be setup with appointments to receive the care.   7/14: Pt does not endorse SI. Appears goal oriented, however also complains and becomes help rejecting. Patient encouraged to develop balanced thoughts. Patient continues to endorse conditions on receiving help only on his terms.   7/15: Pt continues to endorse passive SI, conditional on being able to obtain certain medical services in the future.   7/16: Depressed, overinclusive, negativistic and ruminative. Tolerating abilify.    7/17: No significant overnight events reported, pt taking meds, denies side effects. Continues to present catastrophic thinking, negative, endosing hopelessness and somatic preoccupation with his teeth, back itch and prostate. Denies acute SI but endorses thoughts that his life is not worth living contingent on being able to obtain his prostate surgery, being able to get access a ride and a  "that is up for the challenge". Will lower Mirtazapine to 7.5mg as pt continues to endorse difficulty sleeping and start Prozac, which pt reports he has benefitted from in the past. Increased Abilify to 5mg QD.   7/18: Pt continues to present with all-or-none thinking, endorsing somatic preoccupation with teeth and prostate. Patient appears to bring up other complaints such as his relationships when discussing plans for his current somatic complaints. No active SI reported, and hopelessness is contingent upon not getting staff that "can tolerate" him and getting his tooth extracted and receiving implants and receiving prostate surgery. Continue current medications. Plan for meeting with  for appropriate followups. Will keep mirtazapine 7.5mg HS for insomnia and plan to titrate fluoxetine as tolerated.   7/19: Pt continues to present negative, catastrophic thinking. Will continue current regimen  7/20: Pt preoccupied with being alpha tenant. C/o gas, however denied constipation. Will give PRN simethicone.   7/21: Pt reports difficulty sleeping. D/C mirtazapine, will offer trazodone 50mg instead.   7/22: No SI reported. No c/o sleep difficulty. Preoccupied with somatic complaints (dental, prostate).   7/23: Pt presents with help-rejecting complaints but overall no longer c/o of SI. Continue current regimen  7/26: Some c/o of intermittent passive suicidal ideation, however affect had improved by the afternoon. Pt appears future oriented, wants to express his feelings to staff individually, however denies current SI, HI. No need for 1:1 at this time. Continue current medications.  7/27: No longer c/o SI. Appears to want to be able to express his frustrations without interjection by others. Appears future oriented, wanting to speak with his friend later. C/o of tremor, however no tremor noted. Will continue to monitor for EPS in light of change in aripiprazole. Orthostatic vitals noted. Will encourage fluids and continue to monitor.  7/28: Expresses frustration about not having the right  for his personality. Pt denied SI, HI. Encouraged to develop further frustration tolerance skills and healthier expressions of frustration. C/o somatic symptoms including SOB, however not noted to be short of breath while talking circumstantially. C/o of difficulty sleeping, can increase melatonin. Will avoid increasing trazodone in light of positive orthostatic vital signs.  7/29: After overnight incident of another peer taking his pillow, pt continued to focus on problems. 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 Pt's recent negative cognitive filter may be as a result of recent overnight incident, will continue to monitor. Pt requires case conference with  once assigned to have a concrete solution focused plan.   8/1: Pt making conditional passive SI if he were to go home and deal with his brother. Pt otherwise appears to be complaint driven, focused on his somatic issues including prostate and his arthritis. Pt's conditional threats appear to be his maladaptive way of relating to others. Does not require CO 1:1 at this time.       Plan:  - Legals: 9.13  - Safety: Safety plan discussed. No need for CO 1:1 at this time.   - Psychiatry: Abilify 10mg QD, Prozac 10mg QD. Trazodone 50mg QHS. Increase Melatonin 6mg HS.  - Psychotherapy: Individual, group and milieu therapy as appropriate.  - Medical: Simethicone 80mg PO TID PRN flatulence. Calamine lotion TID for itching, Proscar 5mg PO daily for BPH, tamsulosin 0.4mg PO daily for overflow incontinence/BPH, oxybutynin 5mg PO daily for overactive bladder, simvastatin 20mg PO HS for HLD, nifedipine XL 30mg PO daily for HTN  - Dispo: Pending.

## 2022-08-01 NOTE — BH INPATIENT PSYCHIATRY PROGRESS NOTE - NSBHFUPINTERVALHXFT_PSY_A_CORE
71 y.o. male, single, living with brother, presented to hospital for complaints of SI. No significant overnight events reported by staff. No PRNs needed. Pt has been adherent to medications. Upon evaluation, pt continued to express frustration and several somatic complaints, including his arthritis and prostate, stating that he would want them addressed before he felt ready to go home. Discussed with pt that his medical care appointments would be arranged for him upon discharge from inpatient unit such that he may have his issues addressed. Pt was verbalizing not wanting to go home because his brother is at home and he cannot get along with him, making conditional statements that he would die if he went home to live with him. Pt appears future oriented, is focused on his poetry and getting transportation to go to different bakeries and to his appointments.

## 2022-08-02 PROCEDURE — 99231 SBSQ HOSP IP/OBS SF/LOW 25: CPT | Mod: GC

## 2022-08-02 RX ADMIN — Medication 10 MILLIGRAM(S): at 09:50

## 2022-08-02 RX ADMIN — Medication 50 MILLIGRAM(S): at 21:08

## 2022-08-02 RX ADMIN — FINASTERIDE 5 MILLIGRAM(S): 5 TABLET, FILM COATED ORAL at 09:50

## 2022-08-02 RX ADMIN — TAMSULOSIN HYDROCHLORIDE 0.4 MILLIGRAM(S): 0.4 CAPSULE ORAL at 09:49

## 2022-08-02 RX ADMIN — Medication 1 TABLET(S): at 09:50

## 2022-08-02 RX ADMIN — SIMVASTATIN 20 MILLIGRAM(S): 20 TABLET, FILM COATED ORAL at 09:49

## 2022-08-02 RX ADMIN — Medication 2000 UNIT(S): at 09:50

## 2022-08-02 RX ADMIN — Medication 6 MILLIGRAM(S): at 21:08

## 2022-08-02 RX ADMIN — ARIPIPRAZOLE 10 MILLIGRAM(S): 15 TABLET ORAL at 09:49

## 2022-08-02 RX ADMIN — Medication 5 MILLIGRAM(S): at 09:50

## 2022-08-02 RX ADMIN — Medication 30 MILLIGRAM(S): at 09:50

## 2022-08-02 NOTE — BH INPATIENT PSYCHIATRY PROGRESS NOTE - NSBHCHARTREVIEWVS_PSY_A_CORE FT
Vital Signs Last 24 Hrs  T(C): 36.5 (08-02-22 @ 06:39), Max: 36.5 (08-02-22 @ 06:39)  T(F): 97.7 (08-02-22 @ 06:39), Max: 97.7 (08-02-22 @ 06:39)  HR: --  BP: --  BP(mean): --  RR: --  SpO2: --    Orthostatic VS  08-02-22 @ 06:39  Lying BP: --/-- HR: --  Sitting BP: 127/83 HR: 82  Standing BP: 119/79 HR: 69  Site: --  Mode: --  Orthostatic VS  08-01-22 @ 07:32  Lying BP: --/-- HR: --  Sitting BP: 144/74 HR: 71  Standing BP: 139/80 HR: 79  Site: upper left arm  Mode: electronic

## 2022-08-02 NOTE — BH INPATIENT PSYCHIATRY PROGRESS NOTE - NSBHFUPINTERVALHXFT_PSY_A_CORE
71 y.o. male, single, living with brother, presented to hospital for complaints of SI. No significant overnight events reported by staff. No PRNs needed. Pt has been adherent to medications. Upon evaluation, pt reported feeling okay. Pt reported he had been doing his poetry and remains focused on his medical problems. Pt denies SI, HI.

## 2022-08-02 NOTE — BH INPATIENT PSYCHIATRY PROGRESS NOTE - NSBHMSEMOOD_PSY_A_CORE
Writer talked with patient and explained to him that he did have an echocardiogram while he was in the hospital which did show that he has a leaky valve. Patient states, \"I don't remember having that test but I must have. I was pretty out of it. 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 My surgery was already canceled and I need to get this taken care of\". Writer spoke with KERI Morin NP who agrees to see patient for surgery clearance 12/27/18. Patient added to schedule and agrees to appointment.   Depressed

## 2022-08-02 NOTE — UM REPORT PROGRESS NOTE - NSUMSWNOTE_GEN_A_CORE FT
Pt remains negatively focused, help seeking, then rejecting.  Pt makes suicidal statements when frustrated with his circumstances.  Pt participating partially with plan for new casemanager.  Pt hasn't linked with outpatient follow up since [facility] which he cannot return Pt remains negatively focused, help seeking, then rejecting.  Pt makes suicidal statements when frustrated with his circumstances.  Pt participating partially with plan for new casemanager.  Pt hasn't linked with outpatient follow up since [facility], where he is now banned, due to excessive use of staff and ED.  Writer informed pt of new care coordination assignment with [facility].  Writer left message to coordinate intake, and plan for meeting in person/virtually given pt's chronic difficulties with linkage, and overal compliance Pt remains negatively focused, help seeking, then rejecting.  Pt makes suicidal statements when frustrated with his circumstances.  Pt participating partially with plan for new casemanager.  Pt hasn't linked with outpatient follow up since [facility], where he is now banned, due to excessive use of staff and ED.  Writer informed pt of new care coordination assignment with [facility].  Pt tolerated initial contact with [facility] care coordinator, with plans for an additional meeting to complete enrollment.   Pt remains negatively focused, help seeking, then rejecting.  Pt makes suicidal statements when frustrated with his circumstances.  Pt participating partially with plan for new casemanager.  Pt hasn't linked with outpatient follow up since [facility], where he is now banned, due to excessive use of staff and ED.  Writer informed pt of new care coordination assignment with [facility]. Second meeting with care coordination is completed and pt is enrolled with [facility] for care coordination.  Pt able to discuss his concerns, with more hope for the future, but  expresses pessimism and SI when speaking specifically about discharge.

## 2022-08-02 NOTE — BH INPATIENT PSYCHIATRY PROGRESS NOTE - NSBHASSESSSUMMFT_PSY_ALL_CORE
71 y.o. male with hx of unusual relatedness including pattern of making up psychiatric symptoms or hx such as claiming he arranged a hit or his brother killed himself. Recently there has been escalating pattern of hospitalizations, ER visits. Compliance after d/c is nil. Patient also develops excessive attachment or intense dislike of providers and other staff. Patient has no actual hx of suicide attempts or fh or SIB. Patient with possible facititious disorder vs mood disorder. Suspect possible unconscious primary gain related to medical complaints help seeking then rejection of help as inadequate. Patient not assessed as needing CO. In light of collateral, will consider referral to psychotherapy program upon discharge if patient adherent to treatment plan.     7/13: Patient reports complaints of his back itching and loose tooth. Patient continues to demonstrate black-or-white thinking and making conditional statements "if I do not get things the way I want, there is no point in living". Patient does not endorse suicidal ideations, and accepts that he tends to think about the worst outcomes at times. Patient encouraged to develop healthier pattern of thinking and to also focus on positives. Patient is future oriented, thinking about working with new , that he appears to like, and wants to be setup with appointments to receive the care.   7/14: Pt does not endorse SI. Appears goal oriented, however also complains and becomes help rejecting. Patient encouraged to develop balanced thoughts. Patient continues to endorse conditions on receiving help only on his terms.   7/15: Pt continues to endorse passive SI, conditional on being able to obtain certain medical services in the future.   7/16: Depressed, overinclusive, negativistic and ruminative. Tolerating abilify.    7/17: No significant overnight events reported, pt taking meds, denies side effects. Continues to present catastrophic thinking, negative, endosing hopelessness and somatic preoccupation with his teeth, back itch and prostate. Denies acute SI but endorses thoughts that his life is not worth living contingent on being able to obtain his prostate surgery, being able to get access a ride and a  "that is up for the challenge". Will lower Mirtazapine to 7.5mg as pt continues to endorse difficulty sleeping and start Prozac, which pt reports he has benefitted from in the past. Increased Abilify to 5mg QD.   7/18: Pt continues to present with all-or-none thinking, endorsing somatic preoccupation with teeth and prostate. Patient appears to bring up other complaints such as his relationships when discussing plans for his current somatic complaints. No active SI reported, and hopelessness is contingent upon not getting staff that "can tolerate" him and getting his tooth extracted and receiving implants and receiving prostate surgery. Continue current medications. Plan for meeting with  for appropriate followups. Will keep mirtazapine 7.5mg HS for insomnia and plan to titrate fluoxetine as tolerated.   7/19: Pt continues to present negative, catastrophic thinking. Will continue current regimen  7/20: Pt preoccupied with being alpha tenant. C/o gas, however denied constipation. Will give PRN simethicone.   7/21: Pt reports difficulty sleeping. D/C mirtazapine, will offer trazodone 50mg instead.   7/22: No SI reported. No c/o sleep difficulty. Preoccupied with somatic complaints (dental, prostate).   7/23: Pt presents with help-rejecting complaints but overall no longer c/o of SI. Continue current regimen  7/26: Some c/o of intermittent passive suicidal ideation, however affect had improved by the afternoon. Pt appears future oriented, wants to express his feelings to staff individually, however denies current SI, HI. No need for 1:1 at this time. Continue current medications.  7/27: No longer c/o SI. Appears to want to be able to express his frustrations without interjection by others. Appears future oriented, wanting to speak with his friend later. C/o of tremor, however no tremor noted. Will continue to monitor for EPS in light of change in aripiprazole. Orthostatic vitals noted. Will encourage fluids and continue to monitor.  7/28: Expresses frustration about not having the right  for his personality. Pt denied SI, HI. Encouraged to develop further frustration tolerance skills and healthier expressions of frustration. C/o somatic symptoms including SOB, however not noted to be short of breath while talking circumstantially. C/o of difficulty sleeping, can increase melatonin. Will avoid increasing trazodone in light of positive orthostatic vital signs.  7/29: After overnight incident of another peer taking his pillow, pt continued to focus on problems. 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 Pt's recent negative cognitive filter may be as a result of recent overnight incident, will continue to monitor. Pt requires case conference with  once assigned to have a concrete solution focused plan.   8/1: Pt making conditional passive SI if he were to go home and deal with his brother. Pt otherwise appears to be complaint driven, focused on his somatic issues including prostate and his arthritis. Pt's conditional threats appear to be his maladaptive way of relating to others. Does not require CO 1:1 at this time.   8/2: Pt denies SI, HI, however tends to make conditional threats when discussing discharge home. Pt focused on somatic complaints. Pt's brother called and pt gave verbal consent to speak with brother to give update.       Plan:  - Legals: 9.13  - Safety: Safety plan discussed. No need for CO 1:1 at this time.   - Psychiatry: Abilify 10mg QD, Prozac 10mg QD. Trazodone 50mg QHS. Melatonin 6mg HS.  - Psychotherapy: Individual, group and milieu therapy as appropriate.  - Medical: Simethicone 80mg PO TID PRN flatulence. Calamine lotion TID for itching, Proscar 5mg PO daily for BPH, tamsulosin 0.4mg PO daily for overflow incontinence/BPH, oxybutynin 5mg PO daily for overactive bladder, simvastatin 20mg PO HS for HLD, nifedipine XL 30mg PO daily for HTN  - Dispo: Pending.   71 y.o. male with hx of unusual relatedness including pattern of making up psychiatric symptoms or hx such as claiming he arranged a hit or his brother killed himself. Recently there has been escalating pattern of hospitalizations, ER visits. Compliance after d/c is nil. Patient also develops excessive attachment or intense dislike of providers and other staff. Patient has no actual hx of suicide attempts or fh or SIB. Patient with possible facititious disorder vs mood disorder. Suspect possible unconscious primary gain related to medical complaints help seeking then rejection of help as inadequate. Patient not assessed as needing CO. In light of collateral, will consider referral to psychotherapy program upon discharge if patient adherent to treatment plan.     7/13: Patient reports complaints of his back itching and loose tooth. Patient continues to demonstrate black-or-white thinking and making conditional statements "if I do not get things the way I want, there is no point in living". Patient does not endorse suicidal ideations, and accepts that he tends to think about the worst outcomes at times. Patient encouraged to develop healthier pattern of thinking and to also focus on positives. Patient is future oriented, thinking about working with new , that he appears to like, and wants to be setup with appointments to receive the care.   7/14: Pt does not endorse SI. Appears goal oriented, however also complains and becomes help rejecting. Patient encouraged to develop balanced thoughts. Patient continues to endorse conditions on receiving help only on his terms.   7/15: Pt continues to endorse passive SI, conditional on being able to obtain certain medical services in the future.   7/16: Depressed, overinclusive, negativistic and ruminative. Tolerating abilify.    7/17: No significant overnight events reported, pt taking meds, denies side effects. Continues to present catastrophic thinking, negative, endosing hopelessness and somatic preoccupation with his teeth, back itch and prostate. Denies acute SI but endorses thoughts that his life is not worth living contingent on being able to obtain his prostate surgery, being able to get access a ride and a  "that is up for the challenge". Will lower Mirtazapine to 7.5mg as pt continues to endorse difficulty sleeping and start Prozac, which pt reports he has benefitted from in the past. Increased Abilify to 5mg QD.   7/18: Pt continues to present with all-or-none thinking, endorsing somatic preoccupation with teeth and prostate. Patient appears to bring up other complaints such as his relationships when discussing plans for his current somatic complaints. No active SI reported, and hopelessness is contingent upon not getting staff that "can tolerate" him and getting his tooth extracted and receiving implants and receiving prostate surgery. Continue current medications. Plan for meeting with  for appropriate followups. Will keep mirtazapine 7.5mg HS for insomnia and plan to titrate fluoxetine as tolerated.   7/19: Pt continues to present negative, catastrophic thinking. Will continue current regimen  7/20: Pt preoccupied with being alpha tenant. C/o gas, however denied constipation. Will give PRN simethicone.   7/21: Pt reports difficulty sleeping. D/C mirtazapine, will offer trazodone 50mg instead.   7/22: No SI reported. No c/o sleep difficulty. Preoccupied with somatic complaints (dental, prostate).   7/23: Pt presents with help-rejecting complaints but overall no longer c/o of SI. Continue current regimen  7/26: Some c/o of intermittent passive suicidal ideation, however affect had improved by the afternoon. Pt appears future oriented, wants to express his feelings to staff individually, however denies current SI, HI. No need for 1:1 at this time. Continue current medications.  7/27: No longer c/o SI. Appears to want to be able to express his frustrations without interjection by others. Appears future oriented, wanting to speak with his friend later. C/o of tremor, however no tremor noted. Will continue to monitor for EPS in light of change in aripiprazole. Orthostatic vitals noted. Will encourage fluids and continue to monitor.  7/28: Expresses frustration about not having the right  for his personality. Pt denied SI, HI. Encouraged to develop further frustration tolerance skills and healthier expressions of frustration. C/o somatic symptoms including SOB, however not noted to be short of breath while talking circumstantially. C/o of difficulty sleeping, can increase melatonin. Will avoid increasing trazodone in light of positive orthostatic vital signs.  7/29: After overnight incident of another peer taking his pillow, pt continued to focus on problems. 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 Pt's recent negative cognitive filter may be as a result of recent overnight incident, will continue to monitor. Pt requires case conference with  once assigned to have a concrete solution focused plan.   8/1: Pt making conditional passive SI if he were to go home and deal with his brother. Pt otherwise appears to be complaint driven, focused on his somatic issues including prostate and his arthritis. Pt's conditional threats appear to be his maladaptive way of relating to others. Does not require CO 1:1 at this time.   8/2: Pt denies SI, HI, however tends to make conditional threats when frustrated and discussing discharge home. Pt focused on somatic complaints. Pt's brother called and pt gave verbal consent to speak with brother to give update.       Plan:  - Legals: 9.13  - Safety: Safety plan discussed. No need for CO 1:1 at this time.   - Psychiatry: Abilify 10mg QD, Prozac 10mg QD. Trazodone 50mg QHS. Melatonin 6mg HS.  - Psychotherapy: Individual, group and milieu therapy as appropriate.  - Medical: Simethicone 80mg PO TID PRN flatulence. Calamine lotion TID for itching, Proscar 5mg PO daily for BPH, tamsulosin 0.4mg PO daily for overflow incontinence/BPH, oxybutynin 5mg PO daily for overactive bladder, simvastatin 20mg PO HS for HLD, nifedipine XL 30mg PO daily for HTN  - Dispo: Pending.

## 2022-08-03 PROCEDURE — 99231 SBSQ HOSP IP/OBS SF/LOW 25: CPT

## 2022-08-03 PROCEDURE — 90834 PSYTX W PT 45 MINUTES: CPT

## 2022-08-03 RX ADMIN — Medication 30 MILLIGRAM(S): at 10:01

## 2022-08-03 RX ADMIN — SIMVASTATIN 20 MILLIGRAM(S): 20 TABLET, FILM COATED ORAL at 10:01

## 2022-08-03 RX ADMIN — Medication 5 MILLIGRAM(S): at 10:00

## 2022-08-03 RX ADMIN — TAMSULOSIN HYDROCHLORIDE 0.4 MILLIGRAM(S): 0.4 CAPSULE ORAL at 10:00

## 2022-08-03 RX ADMIN — Medication 1 TABLET(S): at 10:01

## 2022-08-03 RX ADMIN — Medication 6 MILLIGRAM(S): at 21:42

## 2022-08-03 RX ADMIN — Medication 2000 UNIT(S): at 10:01

## 2022-08-03 RX ADMIN — ARIPIPRAZOLE 10 MILLIGRAM(S): 15 TABLET ORAL at 10:00

## 2022-08-03 RX ADMIN — Medication 10 MILLIGRAM(S): at 10:01

## 2022-08-03 RX ADMIN — FINASTERIDE 5 MILLIGRAM(S): 5 TABLET, FILM COATED ORAL at 10:01

## 2022-08-03 RX ADMIN — Medication 50 MILLIGRAM(S): at 21:42

## 2022-08-03 NOTE — BH INPATIENT PSYCHIATRY PROGRESS NOTE - NSBHFUPINTERVALHXFT_PSY_A_CORE
71 y.o. male, single, living with brother, presented to hospital for complaints of SI. No significant overnight events reported by staff. No PRNs needed. Pt has been adherent to medications. Upon evaluation, pt reported he had been doing fine, including writing positive poems, until he felt that he had lost something. Pt kept perseverating on losing his watch which was later found in the trash. Pt denies SI, HI. Tends to verbalize his frustrations with reports of feeling hopeless despite being engaged in activities prior.

## 2022-08-03 NOTE — BH INPATIENT PSYCHIATRY PROGRESS NOTE - NSBHCHARTREVIEWVS_PSY_A_CORE FT
Vital Signs Last 24 Hrs  T(C): 36.6 (08-03-22 @ 06:38), Max: 36.6 (08-03-22 @ 06:38)  T(F): 97.8 (08-03-22 @ 06:38), Max: 97.8 (08-03-22 @ 06:38)  HR: --  BP: --  BP(mean): --  RR: --  SpO2: --    Orthostatic VS  08-03-22 @ 06:38  Lying BP: --/-- HR: --  Sitting BP: 158/87 HR: 68  Standing BP: 144/71 HR: 78  Site: --  Mode: --  Orthostatic VS  08-02-22 @ 06:39  Lying BP: --/-- HR: --  Sitting BP: 127/83 HR: 82  Standing BP: 119/79 HR: 69  Site: --  Mode: --

## 2022-08-03 NOTE — BH INPATIENT PSYCHIATRY PROGRESS NOTE - NSBHASSESSSUMMFT_PSY_ALL_CORE
71 y.o. male with hx of unusual relatedness including pattern of making up psychiatric symptoms or hx such as claiming he arranged a hit or his brother killed himself. Recently there has been escalating pattern of hospitalizations, ER visits. Compliance after d/c is nil. Patient also develops excessive attachment or intense dislike of providers and other staff. Patient has no actual hx of suicide attempts or fh or SIB. Patient with possible facititious disorder vs mood disorder. Suspect possible unconscious primary gain related to medical complaints help seeking then rejection of help as inadequate. Patient not assessed as needing CO. In light of collateral, will consider referral to psychotherapy program upon discharge if patient adherent to treatment plan.     7/13: Patient reports complaints of his back itching and loose tooth. Patient continues to demonstrate black-or-white thinking and making conditional statements "if I do not get things the way I want, there is no point in living". Patient does not endorse suicidal ideations, and accepts that he tends to think about the worst outcomes at times. Patient encouraged to develop healthier pattern of thinking and to also focus on positives. Patient is future oriented, thinking about working with new , that he appears to like, and wants to be setup with appointments to receive the care.   7/14: Pt does not endorse SI. Appears goal oriented, however also complains and becomes help rejecting. Patient encouraged to develop balanced thoughts. Patient continues to endorse conditions on receiving help only on his terms.   7/15: Pt continues to endorse passive SI, conditional on being able to obtain certain medical services in the future.   7/16: Depressed, overinclusive, negativistic and ruminative. Tolerating abilify.    7/17: No significant overnight events reported, pt taking meds, denies side effects. Continues to present catastrophic thinking, negative, endosing hopelessness and somatic preoccupation with his teeth, back itch and prostate. Denies acute SI but endorses thoughts that his life is not worth living contingent on being able to obtain his prostate surgery, being able to get access a ride and a  "that is up for the challenge". Will lower Mirtazapine to 7.5mg as pt continues to endorse difficulty sleeping and start Prozac, which pt reports he has benefitted from in the past. Increased Abilify to 5mg QD.   7/18: Pt continues to present with all-or-none thinking, endorsing somatic preoccupation with teeth and prostate. Patient appears to bring up other complaints such as his relationships when discussing plans for his current somatic complaints. No active SI reported, and hopelessness is contingent upon not getting staff that "can tolerate" him and getting his tooth extracted and receiving implants and receiving prostate surgery. Continue current medications. Plan for meeting with  for appropriate followups. Will keep mirtazapine 7.5mg HS for insomnia and plan to titrate fluoxetine as tolerated.   7/19: Pt continues to present negative, catastrophic thinking. Will continue current regimen  7/20: Pt preoccupied with being alpha tenant. C/o gas, however denied constipation. Will give PRN simethicone.   7/21: Pt reports difficulty sleeping. D/C mirtazapine, will offer trazodone 50mg instead.   7/22: No SI reported. No c/o sleep difficulty. Preoccupied with somatic complaints (dental, prostate).   7/23: Pt presents with help-rejecting complaints but overall no longer c/o of SI. Continue current regimen  7/26: Some c/o of intermittent passive suicidal ideation, however affect had improved by the afternoon. Pt appears future oriented, wants to express his feelings to staff individually, however denies current SI, HI. No need for 1:1 at this time. Continue current medications.  7/27: No longer c/o SI. Appears to want to be able to express his frustrations without interjection by others. Appears future oriented, wanting to speak with his friend later. C/o of tremor, however no tremor noted. Will continue to monitor for EPS in light of change in aripiprazole. Orthostatic vitals noted. Will encourage fluids and continue to monitor.  7/28: Expresses frustration about not having the right  for his personality. Pt denied SI, HI. Encouraged to develop further frustration tolerance skills and healthier expressions of frustration. C/o somatic symptoms including SOB, however not noted to be short of breath while talking circumstantially. C/o of difficulty sleeping, can increase melatonin. Will avoid increasing trazodone in light of positive orthostatic vital signs.  7/29: After overnight incident of another peer taking his pillow, pt continued to focus on problems. 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 Pt's recent negative cognitive filter may be as a result of recent overnight incident, will continue to monitor. Pt requires case conference with  once assigned to have a concrete solution focused plan.   8/1: Pt making conditional passive SI if he were to go home and deal with his brother. Pt otherwise appears to be complaint driven, focused on his somatic issues including prostate and his arthritis. Pt's conditional threats appear to be his maladaptive way of relating to others. Does not require CO 1:1 at this time.   8/2: Pt denies SI, HI, however tends to make conditional threats when frustrated and discussing discharge home. Pt focused on somatic complaints. Pt's brother called and pt gave verbal consent to speak with brother to give update.   8/3: Pt appears prone to verbalizing frustration with report of feeling hopeless, despite being engaged in writing positive poems shortly before that. No SI reported. Pending  assignment to establish safe discharge plan. Will continue to monitor.      Plan:  - Legals: 9.13  - Safety: Safety plan discussed. No need for CO 1:1 at this time.   - Psychiatry: Abilify 10mg QD, Prozac 10mg QD. Trazodone 50mg QHS. Melatonin 6mg HS.  - Psychotherapy: Individual, group and milieu therapy as appropriate.  - Medical: Simethicone 80mg PO TID PRN flatulence. Calamine lotion TID for itching, Proscar 5mg PO daily for BPH, tamsulosin 0.4mg PO daily for overflow incontinence/BPH, oxybutynin 5mg PO daily for overactive bladder, simvastatin 20mg PO HS for HLD, nifedipine XL 30mg PO daily for HTN  - Dispo: Pending.

## 2022-08-04 PROCEDURE — 99231 SBSQ HOSP IP/OBS SF/LOW 25: CPT | Mod: GC

## 2022-08-04 RX ADMIN — Medication 2000 UNIT(S): at 08:51

## 2022-08-04 RX ADMIN — Medication 10 MILLIGRAM(S): at 08:51

## 2022-08-04 RX ADMIN — TAMSULOSIN HYDROCHLORIDE 0.4 MILLIGRAM(S): 0.4 CAPSULE ORAL at 08:51

## 2022-08-04 RX ADMIN — Medication 50 MILLIGRAM(S): at 21:11

## 2022-08-04 RX ADMIN — FINASTERIDE 5 MILLIGRAM(S): 5 TABLET, FILM COATED ORAL at 08:50

## 2022-08-04 RX ADMIN — Medication 650 MILLIGRAM(S): at 06:10

## 2022-08-04 RX ADMIN — SIMVASTATIN 20 MILLIGRAM(S): 20 TABLET, FILM COATED ORAL at 08:50

## 2022-08-04 RX ADMIN — Medication 1 TABLET(S): at 08:51

## 2022-08-04 RX ADMIN — Medication 30 MILLIGRAM(S): at 08:50

## 2022-08-04 RX ADMIN — Medication 5 MILLIGRAM(S): at 08:51

## 2022-08-04 RX ADMIN — ARIPIPRAZOLE 10 MILLIGRAM(S): 15 TABLET ORAL at 08:50

## 2022-08-04 RX ADMIN — Medication 6 MILLIGRAM(S): at 21:10

## 2022-08-04 RX ADMIN — Medication 650 MILLIGRAM(S): at 06:56

## 2022-08-04 NOTE — BH INPATIENT PSYCHIATRY PROGRESS NOTE - NSBHCHARTREVIEWVS_PSY_A_CORE FT
Vital Signs Last 24 Hrs  T(C): 36.5 (08-04-22 @ 07:56), Max: 36.5 (08-04-22 @ 07:56)  T(F): 97.7 (08-04-22 @ 07:56), Max: 97.7 (08-04-22 @ 07:56)  HR: --  BP: --  BP(mean): --  RR: --  SpO2: --    Orthostatic VS  08-04-22 @ 07:56  Lying BP: --/-- HR: --  Sitting BP: 137/73 HR: 67  Standing BP: 131/82 HR: 83  Site: --  Mode: --  Orthostatic VS  08-03-22 @ 06:38  Lying BP: --/-- HR: --  Sitting BP: 158/87 HR: 68  Standing BP: 144/71 HR: 78  Site: --  Mode: --

## 2022-08-04 NOTE — BH INPATIENT PSYCHIATRY PROGRESS NOTE - NSBHFUPINTERVALHXFT_PSY_A_CORE
71 y.o. male, single, living with brother, presented to hospital for complaints of SI. No significant overnight events reported by staff. No PRNs needed. Pt has been adherent to medications. Upon evaluation, pt reported he had been as usual. He reported feeling upset that he could not talk to his friend Grace but he will have to try tomorrow when she is available. He also reported on telling a staff member at [facility] about his poems and feels encouraged and wishes to share his poetry with her. Pt also reported on his frustration with his brother whom pt reported was sick and "will not outlive me". Pt denies SI, HI.

## 2022-08-04 NOTE — BH INPATIENT PSYCHIATRY PROGRESS NOTE - NSBHASSESSSUMMFT_PSY_ALL_CORE
71 y.o. male with hx of unusual relatedness including pattern of making up psychiatric symptoms or hx such as claiming he arranged a hit or his brother killed himself. Recently there has been escalating pattern of hospitalizations, ER visits. Compliance after d/c is nil. Patient also develops excessive attachment or intense dislike of providers and other staff. Patient has no actual hx of suicide attempts or fh or SIB. Patient with possible facititious disorder vs mood disorder. Suspect possible unconscious primary gain related to medical complaints help seeking then rejection of help as inadequate. Patient not assessed as needing CO. In light of collateral, will consider referral to psychotherapy program upon discharge if patient adherent to treatment plan.     7/13: Patient reports complaints of his back itching and loose tooth. Patient continues to demonstrate black-or-white thinking and making conditional statements "if I do not get things the way I want, there is no point in living". Patient does not endorse suicidal ideations, and accepts that he tends to think about the worst outcomes at times. Patient encouraged to develop healthier pattern of thinking and to also focus on positives. Patient is future oriented, thinking about working with new , that he appears to like, and wants to be setup with appointments to receive the care.   7/14: Pt does not endorse SI. Appears goal oriented, however also complains and becomes help rejecting. Patient encouraged to develop balanced thoughts. Patient continues to endorse conditions on receiving help only on his terms.   7/15: Pt continues to endorse passive SI, conditional on being able to obtain certain medical services in the future.   7/16: Depressed, overinclusive, negativistic and ruminative. Tolerating abilify.    7/17: No significant overnight events reported, pt taking meds, denies side effects. Continues to present catastrophic thinking, negative, endosing hopelessness and somatic preoccupation with his teeth, back itch and prostate. Denies acute SI but endorses thoughts that his life is not worth living contingent on being able to obtain his prostate surgery, being able to get access a ride and a  "that is up for the challenge". Will lower Mirtazapine to 7.5mg as pt continues to endorse difficulty sleeping and start Prozac, which pt reports he has benefitted from in the past. Increased Abilify to 5mg QD.   7/18: Pt continues to present with all-or-none thinking, endorsing somatic preoccupation with teeth and prostate. Patient appears to bring up other complaints such as his relationships when discussing plans for his current somatic complaints. No active SI reported, and hopelessness is contingent upon not getting staff that "can tolerate" him and getting his tooth extracted and receiving implants and receiving prostate surgery. Continue current medications. Plan for meeting with  for appropriate followups. Will keep mirtazapine 7.5mg HS for insomnia and plan to titrate fluoxetine as tolerated.   7/19: Pt continues to present negative, catastrophic thinking. Will continue current regimen  7/20: Pt preoccupied with being alpha tenant. C/o gas, however denied constipation. Will give PRN simethicone.   7/21: Pt reports difficulty sleeping. D/C mirtazapine, will offer trazodone 50mg instead.   7/22: No SI reported. No c/o sleep difficulty. Preoccupied with somatic complaints (dental, prostate).   7/23: Pt presents with help-rejecting complaints but overall no longer c/o of SI. Continue current regimen  7/26: Some c/o of intermittent passive suicidal ideation, however affect had improved by the afternoon. Pt appears future oriented, wants to express his feelings to staff individually, however denies current SI, HI. No need for 1:1 at this time. Continue current medications.  7/27: No longer c/o SI. Appears to want to be able to express his frustrations without interjection by others. Appears future oriented, wanting to speak with his friend later. C/o of tremor, however no tremor noted. Will continue to monitor for EPS in light of change in aripiprazole. Orthostatic vitals noted. Will encourage fluids and continue to monitor.  7/28: Expresses frustration about not having the right  for his personality. Pt denied SI, HI. Encouraged to develop further frustration tolerance skills and healthier expressions of frustration. C/o somatic symptoms including SOB, however not noted to be short of breath while talking circumstantially. C/o of difficulty sleeping, can increase melatonin. Will avoid increasing trazodone in light of positive orthostatic vital signs.  7/29: After overnight incident of another peer taking his pillow, pt continued to focus on problems. 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 Pt's recent negative cognitive filter may be as a result of recent overnight incident, will continue to monitor. Pt requires case conference with  once assigned to have a concrete solution focused plan.   8/1: Pt making conditional passive SI if he were to go home and deal with his brother. Pt otherwise appears to be complaint driven, focused on his somatic issues including prostate and his arthritis. Pt's conditional threats appear to be his maladaptive way of relating to others. Does not require CO 1:1 at this time.   8/2: Pt denies SI, HI, however tends to make conditional threats when frustrated and discussing discharge home. Pt focused on somatic complaints. Pt's brother called and pt gave verbal consent to speak with brother to give update.   8/3: Pt appears prone to verbalizing frustration with report of feeling hopeless, despite being engaged in writing positive poems shortly before that. No SI reported. Pending  assignment to establish safe discharge plan. Will continue to monitor.  8/4: Verbalizes frustration and c/o his frustrations with his brother, also having positive future oriented goal including sharing poetry with [facility] staff. No SI reported. Pending care manager assignment.     Plan:  - Legals: 9.13  - Safety: Safety plan discussed. No need for CO 1:1 at this time.   - Psychiatry: Abilify 10mg QD, Prozac 10mg QD. Trazodone 50mg QHS. Melatonin 6mg HS.  - Psychotherapy: Individual, group and milieu therapy as appropriate.  - Medical: Simethicone 80mg PO TID PRN flatulence. Calamine lotion TID for itching, Proscar 5mg PO daily for BPH, tamsulosin 0.4mg PO daily for overflow incontinence/BPH, oxybutynin 5mg PO daily for overactive bladder, simvastatin 20mg PO HS for HLD, nifedipine XL 30mg PO daily for HTN  - Dispo: Pending.

## 2022-08-05 RX ORDER — SOD,AMMONIUM,POTASSIUM LACTATE
1 CREAM (GRAM) TOPICAL
Refills: 0 | Status: DISCONTINUED | OUTPATIENT
Start: 2022-08-05 | End: 2022-08-25

## 2022-08-05 RX ORDER — LANOLIN ALCOHOL/MO/W.PET/CERES
3 CREAM (GRAM) TOPICAL ONCE
Refills: 0 | Status: COMPLETED | OUTPATIENT
Start: 2022-08-05 | End: 2022-08-05

## 2022-08-05 RX ADMIN — Medication 10 MILLIGRAM(S): at 09:20

## 2022-08-05 RX ADMIN — TAMSULOSIN HYDROCHLORIDE 0.4 MILLIGRAM(S): 0.4 CAPSULE ORAL at 09:19

## 2022-08-05 RX ADMIN — FINASTERIDE 5 MILLIGRAM(S): 5 TABLET, FILM COATED ORAL at 09:19

## 2022-08-05 RX ADMIN — ARIPIPRAZOLE 10 MILLIGRAM(S): 15 TABLET ORAL at 09:19

## 2022-08-05 RX ADMIN — Medication 50 MILLIGRAM(S): at 20:47

## 2022-08-05 RX ADMIN — Medication 1 TABLET(S): at 09:20

## 2022-08-05 RX ADMIN — SIMVASTATIN 20 MILLIGRAM(S): 20 TABLET, FILM COATED ORAL at 09:20

## 2022-08-05 RX ADMIN — Medication 30 MILLIGRAM(S): at 09:20

## 2022-08-05 RX ADMIN — Medication 2000 UNIT(S): at 09:19

## 2022-08-05 RX ADMIN — Medication 1 APPLICATION(S): at 21:07

## 2022-08-05 RX ADMIN — Medication 5 MILLIGRAM(S): at 09:19

## 2022-08-05 RX ADMIN — Medication 3 MILLIGRAM(S): at 00:47

## 2022-08-05 RX ADMIN — Medication 6 MILLIGRAM(S): at 20:46

## 2022-08-05 NOTE — BH INPATIENT PSYCHIATRY PROGRESS NOTE - CURRENT MEDICATION
MEDICATIONS  (STANDING):  ammonium lactate 12% Lotion 1 Application(s) Topical two times a day  ARIPiprazole 10 milliGRAM(s) Oral daily  cholecalciferol 2000 Unit(s) Oral daily  finasteride 5 milliGRAM(s) Oral daily  FLUoxetine 10 milliGRAM(s) Oral daily  melatonin. 6 milliGRAM(s) Oral at bedtime  multivitamin      multivitamin 1 Tablet(s) Oral daily  NIFEdipine XL 30 milliGRAM(s) Oral daily  oxybutynin 5 milliGRAM(s) Oral daily  simvastatin 20 milliGRAM(s) Oral daily  tamsulosin 0.4 milliGRAM(s) Oral daily  traZODone 50 milliGRAM(s) Oral at bedtime    MEDICATIONS  (PRN):  acetaminophen     Tablet .. 650 milliGRAM(s) Oral every 6 hours PRN Mild Pain (1 - 3), Moderate Pain (4 - 6)  calamine/zinc oxide Lotion 1 Application(s) Topical three times a day PRN itching  calcium carbonate    500 mG (Tums) Chewable 1 Tablet(s) Chew daily PRN dyspepsia  haloperidol     Tablet 2.5 milliGRAM(s) Oral every 6 hours PRN agitation  haloperidol    Injectable 2.5 milliGRAM(s) IntraMuscular once PRN severe agitation  simethicone 80 milliGRAM(s) Chew three times a day PRN Gas

## 2022-08-05 NOTE — BH INPATIENT PSYCHIATRY PROGRESS NOTE - NSBHFUPINTERVALHXFT_PSY_A_CORE
71 y.o. male, single, living with brother, presented to hospital for complaints of SI. No significant overnight events reported by staff. No PRNs needed. Pt has been adherent to medications. Upon evaluation, pt reported he had been as usual. He reported having small ulcer on the interphalangeal aspect of middle toe. Pt has been stuffing items in his sock for safekeeping and it might be applying pressure and friction on pt's toe. Will give lachydrin for lubrication and consider podiatry consult if doesn't improve. Pt is looking forward to calling [facility] staff Jo and sharing his poetry which seems to give him a sense of purpose. Pt denies SI, HI.

## 2022-08-05 NOTE — BH INPATIENT PSYCHIATRY PROGRESS NOTE - NSBHCHARTREVIEWVS_PSY_A_CORE FT
Vital Signs Last 24 Hrs  T(C): 36.2 (08-05-22 @ 07:45), Max: 36.2 (08-05-22 @ 07:45)  T(F): 97.1 (08-05-22 @ 07:45), Max: 97.1 (08-05-22 @ 07:45)  HR: --  BP: --  BP(mean): --  RR: --  SpO2: --    Orthostatic VS  08-05-22 @ 07:45  Lying BP: --/-- HR: --  Sitting BP: 104/75 HR: 78  Standing BP: 128/85 HR: 83  Site: --  Mode: --  Orthostatic VS  08-04-22 @ 07:56  Lying BP: --/-- HR: --  Sitting BP: 137/73 HR: 67  Standing BP: 131/82 HR: 83  Site: --  Mode: --

## 2022-08-05 NOTE — BH INPATIENT PSYCHIATRY PROGRESS NOTE - NSBHASSESSSUMMFT_PSY_ALL_CORE
71 y.o. male with hx of unusual relatedness including pattern of making up psychiatric symptoms or hx such as claiming he arranged a hit or his brother killed himself. Recently there has been escalating pattern of hospitalizations, ER visits. Compliance after d/c is nil. Patient also develops excessive attachment or intense dislike of providers and other staff. Patient has no actual hx of suicide attempts or fh or SIB. Patient with possible facititious disorder vs mood disorder. Suspect possible unconscious primary gain related to medical complaints help seeking then rejection of help as inadequate. Patient not assessed as needing CO. In light of collateral, will consider referral to psychotherapy program upon discharge if patient adherent to treatment plan.     7/13: Patient reports complaints of his back itching and loose tooth. Patient continues to demonstrate black-or-white thinking and making conditional statements "if I do not get things the way I want, there is no point in living". Patient does not endorse suicidal ideations, and accepts that he tends to think about the worst outcomes at times. Patient encouraged to develop healthier pattern of thinking and to also focus on positives. Patient is future oriented, thinking about working with new , that he appears to like, and wants to be setup with appointments to receive the care.   7/14: Pt does not endorse SI. Appears goal oriented, however also complains and becomes help rejecting. Patient encouraged to develop balanced thoughts. Patient continues to endorse conditions on receiving help only on his terms.   7/15: Pt continues to endorse passive SI, conditional on being able to obtain certain medical services in the future.   7/16: Depressed, overinclusive, negativistic and ruminative. Tolerating abilify.    7/17: No significant overnight events reported, pt taking meds, denies side effects. Continues to present catastrophic thinking, negative, endosing hopelessness and somatic preoccupation with his teeth, back itch and prostate. Denies acute SI but endorses thoughts that his life is not worth living contingent on being able to obtain his prostate surgery, being able to get access a ride and a  "that is up for the challenge". Will lower Mirtazapine to 7.5mg as pt continues to endorse difficulty sleeping and start Prozac, which pt reports he has benefitted from in the past. Increased Abilify to 5mg QD.   7/18-7/22: Pt continues to present with all-or-none thinking, endorsing somatic preoccupation with teeth and prostate. Patient appears to bring up other complaints such as his relationships when discussing plans for his current somatic complaints. No active SI reported, and hopelessness is contingent upon not getting staff that "can tolerate" him and getting his tooth extracted and receiving implants and receiving prostate surgery. Pt showed poor response to mirtazapine for insomnia, was switched to trazodone 50mg PO HS.  7/26: Some c/o of intermittent passive suicidal ideation, however affect had improved by the afternoon. Pt appears future oriented, wants to express his feelings to staff individually, however denies current SI, HI. No need for 1:1 at this time. Continue current medications.  7/27: No longer c/o SI. Appears to want to be able to express his frustrations without interjection by others. Appears future oriented, wanting to speak with his friend later. C/o of tremor, however no tremor noted. Will continue to monitor for EPS in light of change in aripiprazole. Orthostatic vitals noted. Will encourage fluids and continue to monitor.  7/28: Expresses frustration about not having the right  for his personality. Pt denied SI, HI. Encouraged to develop further frustration tolerance skills and healthier expressions of frustration. C/o somatic symptoms including SOB, however not noted to be short of breath while talking circumstantially. C/o of difficulty sleeping, can increase melatonin. Will avoid increasing trazodone in light of positive orthostatic vital signs.  7/29: After overnight incident of another peer taking his pillow, pt continued to focus on problems. 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 Pt's recent negative cognitive filter may be as a result of recent overnight incident, will continue to monitor. Pt requires case conference with  once assigned to have a concrete solution focused plan.   8/1: Pt making conditional passive SI if he were to go home and deal with his brother. Pt otherwise appears to be complaint driven, focused on his somatic issues including prostate and his arthritis. Pt's conditional threats appear to be his maladaptive way of relating to others. Does not require CO 1:1 at this time.   8/2: Pt denies SI, HI, however tends to make conditional threats when frustrated and discussing discharge home. Pt focused on somatic complaints. Pt's brother called and pt gave verbal consent to speak with brother to give update.   8/3: Pt appears prone to verbalizing frustration with report of feeling hopeless, despite being engaged in writing positive poems shortly before that. No SI reported. Pending  assignment to establish safe discharge plan. Will continue to monitor.  8/4: Verbalizes frustration and c/o his frustrations with his brother, also having positive future oriented goal including sharing poetry with [facility] staff. No SI reported. Pending care manager assignment.   8/5: Pt doing well with concern over dryness between toes. Will give lachydrin and reassess. Also complaining of sleep. Can consider increasing melatonin to 10mg HS. Pending  assignment.     Plan:  - Legals: 9.13  - Safety: Safety plan discussed. No need for CO 1:1 at this time.   - Psychiatry: Abilify 10mg QD, Prozac 10mg QD. Trazodone 50mg QHS. Melatonin 6mg HS.  - Psychotherapy: Individual, group and milieu therapy as appropriate.  - Medical: Simethicone 80mg PO TID PRN flatulence. Calamine lotion TID for itching, Proscar 5mg PO daily for BPH, tamsulosin 0.4mg PO daily for overflow incontinence/BPH, oxybutynin 5mg PO daily for overactive bladder, simvastatin 20mg PO HS for HLD, nifedipine XL 30mg PO daily for HTN  - Dispo: Pending.

## 2022-08-06 RX ADMIN — SIMVASTATIN 20 MILLIGRAM(S): 20 TABLET, FILM COATED ORAL at 10:09

## 2022-08-06 RX ADMIN — Medication 2000 UNIT(S): at 10:08

## 2022-08-06 RX ADMIN — FINASTERIDE 5 MILLIGRAM(S): 5 TABLET, FILM COATED ORAL at 10:09

## 2022-08-06 RX ADMIN — Medication 5 MILLIGRAM(S): at 10:09

## 2022-08-06 RX ADMIN — Medication 6 MILLIGRAM(S): at 21:22

## 2022-08-06 RX ADMIN — Medication 1 TABLET(S): at 10:08

## 2022-08-06 RX ADMIN — TAMSULOSIN HYDROCHLORIDE 0.4 MILLIGRAM(S): 0.4 CAPSULE ORAL at 10:09

## 2022-08-06 RX ADMIN — ARIPIPRAZOLE 10 MILLIGRAM(S): 15 TABLET ORAL at 10:09

## 2022-08-06 RX ADMIN — Medication 50 MILLIGRAM(S): at 21:22

## 2022-08-06 RX ADMIN — Medication 10 MILLIGRAM(S): at 10:09

## 2022-08-06 RX ADMIN — Medication 1 APPLICATION(S): at 21:48

## 2022-08-06 RX ADMIN — Medication 30 MILLIGRAM(S): at 10:08

## 2022-08-07 RX ADMIN — TAMSULOSIN HYDROCHLORIDE 0.4 MILLIGRAM(S): 0.4 CAPSULE ORAL at 09:19

## 2022-08-07 RX ADMIN — Medication 5 MILLIGRAM(S): at 09:19

## 2022-08-07 RX ADMIN — Medication 650 MILLIGRAM(S): at 02:50

## 2022-08-07 RX ADMIN — Medication 2000 UNIT(S): at 09:17

## 2022-08-07 RX ADMIN — Medication 50 MILLIGRAM(S): at 21:02

## 2022-08-07 RX ADMIN — Medication 30 MILLIGRAM(S): at 09:20

## 2022-08-07 RX ADMIN — ARIPIPRAZOLE 10 MILLIGRAM(S): 15 TABLET ORAL at 09:18

## 2022-08-07 RX ADMIN — Medication 6 MILLIGRAM(S): at 21:01

## 2022-08-07 RX ADMIN — Medication 650 MILLIGRAM(S): at 00:19

## 2022-08-07 RX ADMIN — FINASTERIDE 5 MILLIGRAM(S): 5 TABLET, FILM COATED ORAL at 09:19

## 2022-08-07 RX ADMIN — SIMVASTATIN 20 MILLIGRAM(S): 20 TABLET, FILM COATED ORAL at 09:19

## 2022-08-07 RX ADMIN — Medication 10 MILLIGRAM(S): at 09:20

## 2022-08-07 RX ADMIN — Medication 1 TABLET(S): at 09:18

## 2022-08-08 PROCEDURE — 90837 PSYTX W PT 60 MINUTES: CPT

## 2022-08-08 RX ADMIN — ARIPIPRAZOLE 10 MILLIGRAM(S): 15 TABLET ORAL at 08:55

## 2022-08-08 RX ADMIN — Medication 2000 UNIT(S): at 08:54

## 2022-08-08 RX ADMIN — Medication 1 TABLET(S): at 08:54

## 2022-08-08 RX ADMIN — Medication 650 MILLIGRAM(S): at 04:12

## 2022-08-08 RX ADMIN — Medication 1 APPLICATION(S): at 21:53

## 2022-08-08 RX ADMIN — SIMVASTATIN 20 MILLIGRAM(S): 20 TABLET, FILM COATED ORAL at 08:55

## 2022-08-08 RX ADMIN — FINASTERIDE 5 MILLIGRAM(S): 5 TABLET, FILM COATED ORAL at 08:55

## 2022-08-08 RX ADMIN — Medication 5 MILLIGRAM(S): at 08:55

## 2022-08-08 RX ADMIN — Medication 50 MILLIGRAM(S): at 21:42

## 2022-08-08 RX ADMIN — Medication 6 MILLIGRAM(S): at 21:42

## 2022-08-08 RX ADMIN — Medication 30 MILLIGRAM(S): at 08:55

## 2022-08-08 RX ADMIN — TAMSULOSIN HYDROCHLORIDE 0.4 MILLIGRAM(S): 0.4 CAPSULE ORAL at 08:55

## 2022-08-08 RX ADMIN — Medication 1 APPLICATION(S): at 09:11

## 2022-08-08 RX ADMIN — Medication 650 MILLIGRAM(S): at 06:24

## 2022-08-08 RX ADMIN — Medication 10 MILLIGRAM(S): at 08:55

## 2022-08-08 NOTE — BH INPATIENT PSYCHIATRY PROGRESS NOTE - NSBHFUPINTERVALHXFT_PSY_A_CORE
71 y.o. male, single, living with brother, presented to hospital for complaints of SI. No significant overnight events reported by staff. No PRNs needed. Pt has been adherent to medications. Pt seen today after he met with Dr. Harrington from psychology. Mr. Rogers showed writer a poem he had just written about psychology. Writer encouraged him to show it to Dr. Harrington. Pt states he will. Discussed ongoing process and plans to get him a new  before discharge. Pt states he is willing to meet with "anyone and answer questions, as long as they understand I'm a difficult person. I know I'm difficult to work with because I'm smarter than all the doctors". BP was higher today than normal, pt usually runs within the normal range. Will check again tomorrow and observe.

## 2022-08-08 NOTE — BH INPATIENT PSYCHIATRY PROGRESS NOTE - NSBHCHARTREVIEWVS_PSY_A_CORE FT
Vital Signs Last 24 Hrs  T(C): 36.2 (08-08-22 @ 07:12), Max: 36.2 (08-08-22 @ 07:12)  T(F): 97.2 (08-08-22 @ 07:12), Max: 97.2 (08-08-22 @ 07:12)  HR: --  BP: --  BP(mean): --  RR: --  SpO2: --    Orthostatic VS  08-08-22 @ 07:12  Lying BP: --/-- HR: --  Sitting BP: 151/86 HR: 72  Standing BP: 146/78 HR: 84  Site: --  Mode: --  Orthostatic VS  08-07-22 @ 07:21  Lying BP: --/-- HR: --  Sitting BP: 119/82 HR: 87  Standing BP: 122/89 HR: 95  Site: --  Mode: --

## 2022-08-08 NOTE — BH INPATIENT PSYCHIATRY PROGRESS NOTE - NSBHASSESSSUMMFT_PSY_ALL_CORE
71 y.o. male with hx of unusual relatedness including pattern of making up psychiatric symptoms or hx such as claiming he arranged a hit or his brother killed himself. Recently there has been escalating pattern of hospitalizations, ER visits. Compliance after d/c is nil. Patient also develops excessive attachment or intense dislike of providers and other staff. Patient has no actual hx of suicide attempts or fh or SIB. Patient with possible facititious disorder vs mood disorder. Suspect possible unconscious primary gain related to medical complaints help seeking then rejection of help as inadequate. Patient not assessed as needing CO. In light of collateral, will consider referral to psychotherapy program upon discharge if patient adherent to treatment plan.     7/13: Patient reports complaints of his back itching and loose tooth. Patient continues to demonstrate black-or-white thinking and making conditional statements "if I do not get things the way I want, there is no point in living". Patient does not endorse suicidal ideations, and accepts that he tends to think about the worst outcomes at times. Patient encouraged to develop healthier pattern of thinking and to also focus on positives. Patient is future oriented, thinking about working with new , that he appears to like, and wants to be setup with appointments to receive the care.   7/14: Pt does not endorse SI. Appears goal oriented, however also complains and becomes help rejecting. Patient encouraged to develop balanced thoughts. Patient continues to endorse conditions on receiving help only on his terms.   7/15: Pt continues to endorse passive SI, conditional on being able to obtain certain medical services in the future.   7/16: Depressed, overinclusive, negativistic and ruminative. Tolerating abilify.    7/17: No significant overnight events reported, pt taking meds, denies side effects. Continues to present catastrophic thinking, negative, endosing hopelessness and somatic preoccupation with his teeth, back itch and prostate. Denies acute SI but endorses thoughts that his life is not worth living contingent on being able to obtain his prostate surgery, being able to get access a ride and a  "that is up for the challenge". Will lower Mirtazapine to 7.5mg as pt continues to endorse difficulty sleeping and start Prozac, which pt reports he has benefitted from in the past. Increased Abilify to 5mg QD.   7/18-7/22: Pt continues to present with all-or-none thinking, endorsing somatic preoccupation with teeth and prostate. Patient appears to bring up other complaints such as his relationships when discussing plans for his current somatic complaints. No active SI reported, and hopelessness is contingent upon not getting staff that "can tolerate" him and getting his tooth extracted and receiving implants and receiving prostate surgery. Pt showed poor response to mirtazapine for insomnia, was switched to trazodone 50mg PO HS.  7/26: Some c/o of intermittent passive suicidal ideation, however affect had improved by the afternoon. Pt appears future oriented, wants to express his feelings to staff individually, however denies current SI, HI. No need for 1:1 at this time. Continue current medications.  7/27: No longer c/o SI. Appears to want to be able to express his frustrations without interjection by others. Appears future oriented, wanting to speak with his friend later. C/o of tremor, however no tremor noted. Will continue to monitor for EPS in light of change in aripiprazole. Orthostatic vitals noted. Will encourage fluids and continue to monitor.  7/28: Expresses frustration about not having the right  for his personality. Pt denied SI, HI. Encouraged to develop further frustration tolerance skills and healthier expressions of frustration. C/o somatic symptoms including SOB, however not noted to be short of breath while talking circumstantially. C/o of difficulty sleeping, can increase melatonin. Will avoid increasing trazodone in light of positive orthostatic vital signs.  7/29: After overnight incident of another peer taking his pillow, pt continued to focus on problems. 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 Pt's recent negative cognitive filter may be as a result of recent overnight incident, will continue to monitor. Pt requires case conference with  once assigned to have a concrete solution focused plan.   8/1: Pt making conditional passive SI if he were to go home and deal with his brother. Pt otherwise appears to be complaint driven, focused on his somatic issues including prostate and his arthritis. Pt's conditional threats appear to be his maladaptive way of relating to others. Does not require CO 1:1 at this time.   8/2: Pt denies SI, HI, however tends to make conditional threats when frustrated and discussing discharge home. Pt focused on somatic complaints. Pt's brother called and pt gave verbal consent to speak with brother to give update.   8/3: Pt appears prone to verbalizing frustration with report of feeling hopeless, despite being engaged in writing positive poems shortly before that. No SI reported. Pending  assignment to establish safe discharge plan. Will continue to monitor.  8/4: Verbalizes frustration and c/o his frustrations with his brother, also having positive future oriented goal including sharing poetry with [facility] staff. No SI reported. Pending care manager assignment.   8/5: Pt doing well with concern over dryness between toes. Will give lachydrin and reassess. Also complaining of sleep. Can consider increasing melatonin to 10mg HS. Pending  assignment.   8/8: Pt reports stable mood, denies any complaints. Discharge pending  assignment.     Plan:  - Legals: 9.13  - Safety: Safety plan discussed. No need for CO 1:1 at this time.   - Psychiatry: Abilify 10mg QD, Prozac 10mg QD. Trazodone 50mg QHS. Melatonin 6mg HS.  - Psychotherapy: Individual, group and milieu therapy as appropriate.  - Medical: Simethicone 80mg PO TID PRN flatulence. Calamine lotion TID for itching, Proscar 5mg PO daily for BPH, tamsulosin 0.4mg PO daily for overflow incontinence/BPH, oxybutynin 5mg PO daily for overactive bladder, simvastatin 20mg PO HS for HLD, nifedipine XL 30mg PO daily for HTN  - Dispo: Pending.

## 2022-08-09 RX ADMIN — Medication 1 APPLICATION(S): at 21:27

## 2022-08-09 RX ADMIN — TAMSULOSIN HYDROCHLORIDE 0.4 MILLIGRAM(S): 0.4 CAPSULE ORAL at 09:33

## 2022-08-09 RX ADMIN — Medication 1 APPLICATION(S): at 09:32

## 2022-08-09 RX ADMIN — Medication 2000 UNIT(S): at 09:32

## 2022-08-09 RX ADMIN — Medication 5 MILLIGRAM(S): at 09:33

## 2022-08-09 RX ADMIN — Medication 10 MILLIGRAM(S): at 09:32

## 2022-08-09 RX ADMIN — Medication 30 MILLIGRAM(S): at 09:33

## 2022-08-09 RX ADMIN — Medication 1 TABLET(S): at 09:33

## 2022-08-09 RX ADMIN — FINASTERIDE 5 MILLIGRAM(S): 5 TABLET, FILM COATED ORAL at 09:34

## 2022-08-09 RX ADMIN — ARIPIPRAZOLE 10 MILLIGRAM(S): 15 TABLET ORAL at 09:33

## 2022-08-09 RX ADMIN — Medication 6 MILLIGRAM(S): at 21:26

## 2022-08-09 RX ADMIN — Medication 50 MILLIGRAM(S): at 21:25

## 2022-08-09 RX ADMIN — SIMVASTATIN 20 MILLIGRAM(S): 20 TABLET, FILM COATED ORAL at 09:33

## 2022-08-09 NOTE — BH INPATIENT PSYCHIATRY PROGRESS NOTE - NSBHFUPINTERVALHXFT_PSY_A_CORE
71 y.o. male, single, living with brother, presented to hospital for complaints of SI. No significant overnight events reported by staff. No PRNs needed. Pt has been adherent to medications. 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 He showed the poetry he had written and the artwork another peer had made. He reported looking forward to meeting with the  stating "I love to speak with people, let them do any assessments for me". Denies SI, HI.

## 2022-08-09 NOTE — BH INPATIENT PSYCHIATRY PROGRESS NOTE - NSBHCHARTREVIEWVS_PSY_A_CORE FT
Vital Signs Last 24 Hrs  T(C): 36.5 (08-09-22 @ 08:03), Max: 36.5 (08-09-22 @ 08:03)  T(F): 97.7 (08-09-22 @ 08:03), Max: 97.7 (08-09-22 @ 08:03)  HR: --  BP: --  BP(mean): --  RR: --  SpO2: --    Orthostatic VS  08-09-22 @ 08:03  Lying BP: --/-- HR: --  Sitting BP: 151/82 HR: 68  Standing BP: 134/75 HR: 79  Site: --  Mode: --  Orthostatic VS  08-08-22 @ 07:12  Lying BP: --/-- HR: --  Sitting BP: 151/86 HR: 72  Standing BP: 146/78 HR: 84  Site: --  Mode: --

## 2022-08-09 NOTE — BH INPATIENT PSYCHIATRY PROGRESS NOTE - NSBHASSESSSUMMFT_PSY_ALL_CORE
71 y.o. male with hx of unusual relatedness including pattern of making up psychiatric symptoms or hx such as claiming he arranged a hit or his brother killed himself. Recently there has been escalating pattern of hospitalizations, ER visits. Compliance after d/c is nil. Patient also develops excessive attachment or intense dislike of providers and other staff. Patient has no actual hx of suicide attempts or fh or SIB. Patient with possible facititious disorder vs mood disorder. Suspect possible unconscious primary gain related to medical complaints help seeking then rejection of help as inadequate. Patient not assessed as needing CO. In light of collateral, will consider referral to psychotherapy program upon discharge if patient adherent to treatment plan.     7/13: Patient reports complaints of his back itching and loose tooth. Patient continues to demonstrate black-or-white thinking and making conditional statements "if I do not get things the way I want, there is no point in living". Patient does not endorse suicidal ideations, and accepts that he tends to think about the worst outcomes at times. Patient encouraged to develop healthier pattern of thinking and to also focus on positives. Patient is future oriented, thinking about working with new , that he appears to like, and wants to be setup with appointments to receive the care.   7/14: Pt does not endorse SI. Appears goal oriented, however also complains and becomes help rejecting. Patient encouraged to develop balanced thoughts. Patient continues to endorse conditions on receiving help only on his terms.   7/15: Pt continues to endorse passive SI, conditional on being able to obtain certain medical services in the future.   7/16: Depressed, overinclusive, negativistic and ruminative. Tolerating abilify.    7/17: No significant overnight events reported, pt taking meds, denies side effects. Continues to present catastrophic thinking, negative, endosing hopelessness and somatic preoccupation with his teeth, back itch and prostate. Denies acute SI but endorses thoughts that his life is not worth living contingent on being able to obtain his prostate surgery, being able to get access a ride and a  "that is up for the challenge". Will lower Mirtazapine to 7.5mg as pt continues to endorse difficulty sleeping and start Prozac, which pt reports he has benefitted from in the past. Increased Abilify to 5mg QD.   7/18-7/22: Pt continues to present with all-or-none thinking, endorsing somatic preoccupation with teeth and prostate. Patient appears to bring up other complaints such as his relationships when discussing plans for his current somatic complaints. No active SI reported, and hopelessness is contingent upon not getting staff that "can tolerate" him and getting his tooth extracted and receiving implants and receiving prostate surgery. Pt showed poor response to mirtazapine for insomnia, was switched to trazodone 50mg PO HS.  7/26: Some c/o of intermittent passive suicidal ideation, however affect had improved by the afternoon. Pt appears future oriented, wants to express his feelings to staff individually, however denies current SI, HI. No need for 1:1 at this time. Continue current medications.  7/27: No longer c/o SI. Appears to want to be able to express his frustrations without interjection by others. Appears future oriented, wanting to speak with his friend later. C/o of tremor, however no tremor noted. Will continue to monitor for EPS in light of change in aripiprazole. Orthostatic vitals noted. Will encourage fluids and continue to monitor.  7/28: Expresses frustration about not having the right  for his personality. Pt denied SI, HI. Encouraged to develop further frustration tolerance skills and healthier expressions of frustration. C/o somatic symptoms including SOB, however not noted to be short of breath while talking circumstantially. C/o of difficulty sleeping, can increase melatonin. Will avoid increasing trazodone in light of positive orthostatic vital signs.  7/29: After overnight incident of another peer taking his pillow, pt continued to focus on problems. 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 Pt's recent negative cognitive filter may be as a result of recent overnight incident, will continue to monitor. Pt requires case conference with  once assigned to have a concrete solution focused plan.   8/1: Pt making conditional passive SI if he were to go home and deal with his brother. Pt otherwise appears to be complaint driven, focused on his somatic issues including prostate and his arthritis. Pt's conditional threats appear to be his maladaptive way of relating to others. Does not require CO 1:1 at this time.   8/2: Pt denies SI, HI, however tends to make conditional threats when frustrated and discussing discharge home. Pt focused on somatic complaints. Pt's brother called and pt gave verbal consent to speak with brother to give update.   8/3: Pt appears prone to verbalizing frustration with report of feeling hopeless, despite being engaged in writing positive poems shortly before that. No SI reported. Pending  assignment to establish safe discharge plan. Will continue to monitor.  8/4: Verbalizes frustration and c/o his frustrations with his brother, also having positive future oriented goal including sharing poetry with [facility] staff. No SI reported. Pending care manager assignment.   8/5: Pt doing well with concern over dryness between toes. Will give lachydrin and reassess. Also complaining of sleep. Can consider increasing melatonin to 10mg HS. Pending  assignment.   8/8: Pt reports stable mood, denies any complaints. Discharge pending  assignment.   8/9: Reports stable mood, engaged in activities - writing poems, engaging with peers. No complaints reported. Pending  assignment.    Plan:  - Legals: 9.13  - Safety: Safety plan discussed. No need for CO 1:1 at this time.   - Psychiatry: Abilify 10mg QD, Prozac 10mg QD. Trazodone 50mg QHS. Melatonin 6mg HS.  - Psychotherapy: Individual, group and milieu therapy as appropriate.  - Medical: Simethicone 80mg PO TID PRN flatulence. Calamine lotion TID for itching, Proscar 5mg PO daily for BPH, tamsulosin 0.4mg PO daily for overflow incontinence/BPH, oxybutynin 5mg PO daily for overactive bladder, simvastatin 20mg PO HS for HLD, nifedipine XL 30mg PO daily for HTN  - Dispo: Pending.

## 2022-08-10 RX ORDER — PETROLATUM,WHITE
1 JELLY (GRAM) TOPICAL THREE TIMES A DAY
Refills: 0 | Status: DISCONTINUED | OUTPATIENT
Start: 2022-08-10 | End: 2022-08-25

## 2022-08-10 RX ADMIN — Medication 5 MILLIGRAM(S): at 09:26

## 2022-08-10 RX ADMIN — SIMVASTATIN 20 MILLIGRAM(S): 20 TABLET, FILM COATED ORAL at 09:26

## 2022-08-10 RX ADMIN — FINASTERIDE 5 MILLIGRAM(S): 5 TABLET, FILM COATED ORAL at 09:26

## 2022-08-10 RX ADMIN — Medication 1 APPLICATION(S): at 21:58

## 2022-08-10 RX ADMIN — Medication 2000 UNIT(S): at 09:25

## 2022-08-10 RX ADMIN — Medication 10 MILLIGRAM(S): at 09:26

## 2022-08-10 RX ADMIN — Medication 50 MILLIGRAM(S): at 21:58

## 2022-08-10 RX ADMIN — Medication 30 MILLIGRAM(S): at 09:26

## 2022-08-10 RX ADMIN — SIMETHICONE 80 MILLIGRAM(S): 80 TABLET, CHEWABLE ORAL at 15:08

## 2022-08-10 RX ADMIN — Medication 1 APPLICATION(S): at 09:25

## 2022-08-10 RX ADMIN — TAMSULOSIN HYDROCHLORIDE 0.4 MILLIGRAM(S): 0.4 CAPSULE ORAL at 09:26

## 2022-08-10 RX ADMIN — Medication 1 TABLET(S): at 09:25

## 2022-08-10 RX ADMIN — Medication 6 MILLIGRAM(S): at 21:57

## 2022-08-10 RX ADMIN — ARIPIPRAZOLE 10 MILLIGRAM(S): 15 TABLET ORAL at 09:25

## 2022-08-10 NOTE — BH INPATIENT PSYCHIATRY PROGRESS NOTE - NSBHCHARTREVIEWVS_PSY_A_CORE FT
Vital Signs Last 24 Hrs  T(C): 36.7 (08-10-22 @ 07:54), Max: 36.7 (08-10-22 @ 07:54)  T(F): 98 (08-10-22 @ 07:54), Max: 98 (08-10-22 @ 07:54)  HR: --  BP: --  BP(mean): --  RR: --  SpO2: --    Orthostatic VS  08-10-22 @ 07:54  Lying BP: --/-- HR: --  Sitting BP: 141/78 HR: 74  Standing BP: 133/76 HR: 86  Site: --  Mode: --  Orthostatic VS  08-09-22 @ 08:03  Lying BP: --/-- HR: --  Sitting BP: 151/82 HR: 68  Standing BP: 134/75 HR: 79  Site: --  Mode: --

## 2022-08-10 NOTE — BH INPATIENT PSYCHIATRY PROGRESS NOTE - NSBHFUPINTERVALHXFT_PSY_A_CORE
71 y.o. male, single, living with brother, presented to hospital for complaints of SI. No significant overnight events reported by staff. No PRNs needed. Pt has been adherent to medications. On encounter this morning pt initially stated feeling well and working on his writing. Pt then appeared to be upset writer told another pt her drawing of a flower was pretty, asking writer if she was being honest of "just patronizing to these people". Mr. Rogers accused writer of being patronizing and suggested writer should be honest about "the quality of the art work". Later pt told his nurse that he wish he could die but denied having thoughts of hurting himself, stating he just wishes he could "let myself die". Pt has a long, chronic hx of making suicidal statements as a maladaptive coping skill and way to expresses his frustration. Pt himself has said during this admission "I don't mean it, it's just my way of expressing myself" when discussing his suicidal statements.

## 2022-08-10 NOTE — BH INPATIENT PSYCHIATRY PROGRESS NOTE - NSBHASSESSSUMMFT_PSY_ALL_CORE
71 y.o. male with hx of unusual relatedness including pattern of making up psychiatric symptoms or hx such as claiming he arranged a hit or his brother killed himself. Recently there has been escalating pattern of hospitalizations, ER visits. Compliance after d/c is nil. Patient also develops excessive attachment or intense dislike of providers and other staff. Patient has no actual hx of suicide attempts or fh or SIB. Patient with possible facititious disorder vs mood disorder. Suspect possible unconscious primary gain related to medical complaints help seeking then rejection of help as inadequate. Patient not assessed as needing CO. In light of collateral, will consider referral to psychotherapy program upon discharge if patient adherent to treatment plan.     7/13: Patient reports complaints of his back itching and loose tooth. Patient continues to demonstrate black-or-white thinking and making conditional statements "if I do not get things the way I want, there is no point in living". Patient does not endorse suicidal ideations, and accepts that he tends to think about the worst outcomes at times. Patient encouraged to develop healthier pattern of thinking and to also focus on positives. Patient is future oriented, thinking about working with new , that he appears to like, and wants to be setup with appointments to receive the care.   7/14: Pt does not endorse SI. Appears goal oriented, however also complains and becomes help rejecting. Patient encouraged to develop balanced thoughts. Patient continues to endorse conditions on receiving help only on his terms.   7/15: Pt continues to endorse passive SI, conditional on being able to obtain certain medical services in the future.   7/16: Depressed, overinclusive, negativistic and ruminative. Tolerating abilify.    7/17: No significant overnight events reported, pt taking meds, denies side effects. Continues to present catastrophic thinking, negative, endosing hopelessness and somatic preoccupation with his teeth, back itch and prostate. Denies acute SI but endorses thoughts that his life is not worth living contingent on being able to obtain his prostate surgery, being able to get access a ride and a  "that is up for the challenge". Will lower Mirtazapine to 7.5mg as pt continues to endorse difficulty sleeping and start Prozac, which pt reports he has benefitted from in the past. Increased Abilify to 5mg QD.   7/18-7/22: Pt continues to present with all-or-none thinking, endorsing somatic preoccupation with teeth and prostate. Patient appears to bring up other complaints such as his relationships when discussing plans for his current somatic complaints. No active SI reported, and hopelessness is contingent upon not getting staff that "can tolerate" him and getting his tooth extracted and receiving implants and receiving prostate surgery. Pt showed poor response to mirtazapine for insomnia, was switched to trazodone 50mg PO HS.  7/26: Some c/o of intermittent passive suicidal ideation, however affect had improved by the afternoon. Pt appears future oriented, wants to express his feelings to staff individually, however denies current SI, HI. No need for 1:1 at this time. Continue current medications.  7/27: No longer c/o SI. Appears to want to be able to express his frustrations without interjection by others. Appears future oriented, wanting to speak with his friend later. C/o of tremor, however no tremor noted. Will continue to monitor for EPS in light of change in aripiprazole. Orthostatic vitals noted. Will encourage fluids and continue to monitor.  7/28: Expresses frustration about not having the right  for his personality. Pt denied SI, HI. Encouraged to develop further frustration tolerance skills and healthier expressions of frustration. C/o somatic symptoms including SOB, however not noted to be short of breath while talking circumstantially. C/o of difficulty sleeping, can increase melatonin. Will avoid increasing trazodone in light of positive orthostatic vital signs.  7/29: After overnight incident of another peer taking his pillow, pt continued to focus on problems. 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 Pt's recent negative cognitive filter may be as a result of recent overnight incident, will continue to monitor. Pt requires case conference with  once assigned to have a concrete solution focused plan.   8/1: Pt making conditional passive SI if he were to go home and deal with his brother. Pt otherwise appears to be complaint driven, focused on his somatic issues including prostate and his arthritis. Pt's conditional threats appear to be his maladaptive way of relating to others. Does not require CO 1:1 at this time.   8/2: Pt denies SI, HI, however tends to make conditional threats when frustrated and discussing discharge home. Pt focused on somatic complaints. Pt's brother called and pt gave verbal consent to speak with brother to give update.   8/3: Pt appears prone to verbalizing frustration with report of feeling hopeless, despite being engaged in writing positive poems shortly before that. No SI reported. Pending  assignment to establish safe discharge plan. Will continue to monitor.  8/4: Verbalizes frustration and c/o his frustrations with his brother, also having positive future oriented goal including sharing poetry with [facility] staff. No SI reported. Pending care manager assignment.   8/5: Pt doing well with concern over dryness between toes. Will give lachydrin and reassess. Also complaining of sleep. Can consider increasing melatonin to 10mg HS. Pending  assignment.   8/8: Pt reports stable mood, denies any complaints. Discharge pending  assignment.   8/9: Reports stable mood, engaged in activities - writing poems, engaging with peers. No complaints reported. Pending  assignment.  8/10: Pt reported feeling well this morning, continues to make passive suicidal statements with no intent/plan usually triggered by frustration. Pending  assignment.   Plan:  - Legals: 9.13  - Safety: Safety plan discussed. No need for CO 1:1 at this time.   - Psychiatry: Abilify 10mg QD, Prozac 10mg QD. Trazodone 50mg QHS. Melatonin 6mg HS.  - Psychotherapy: Individual, group and milieu therapy as appropriate.  - Medical: Simethicone 80mg PO TID PRN flatulence. Calamine lotion TID for itching, Proscar 5mg PO daily for BPH, tamsulosin 0.4mg PO daily for overflow incontinence/BPH, oxybutynin 5mg PO daily for overactive bladder, simvastatin 20mg PO HS for HLD, nifedipine XL 30mg PO daily for HTN  - Dispo: Pending.

## 2022-08-11 PROCEDURE — 90834 PSYTX W PT 45 MINUTES: CPT

## 2022-08-11 RX ADMIN — Medication 1 APPLICATION(S): at 20:56

## 2022-08-11 RX ADMIN — Medication 50 MILLIGRAM(S): at 20:57

## 2022-08-11 RX ADMIN — SIMETHICONE 80 MILLIGRAM(S): 80 TABLET, CHEWABLE ORAL at 09:57

## 2022-08-11 RX ADMIN — Medication 1 TABLET(S): at 09:18

## 2022-08-11 RX ADMIN — TAMSULOSIN HYDROCHLORIDE 0.4 MILLIGRAM(S): 0.4 CAPSULE ORAL at 09:18

## 2022-08-11 RX ADMIN — FINASTERIDE 5 MILLIGRAM(S): 5 TABLET, FILM COATED ORAL at 09:18

## 2022-08-11 RX ADMIN — Medication 10 MILLIGRAM(S): at 09:19

## 2022-08-11 RX ADMIN — ARIPIPRAZOLE 10 MILLIGRAM(S): 15 TABLET ORAL at 09:19

## 2022-08-11 RX ADMIN — Medication 30 MILLIGRAM(S): at 09:18

## 2022-08-11 RX ADMIN — Medication 5 MILLIGRAM(S): at 09:19

## 2022-08-11 RX ADMIN — Medication 1 APPLICATION(S): at 10:43

## 2022-08-11 RX ADMIN — Medication 6 MILLIGRAM(S): at 20:57

## 2022-08-11 RX ADMIN — Medication 2000 UNIT(S): at 09:19

## 2022-08-11 RX ADMIN — SIMVASTATIN 20 MILLIGRAM(S): 20 TABLET, FILM COATED ORAL at 09:19

## 2022-08-11 NOTE — BH INPATIENT PSYCHIATRY PROGRESS NOTE - NSBHCHARTREVIEWVS_PSY_A_CORE FT
Vital Signs Last 24 Hrs  T(C): 36.5 (08-11-22 @ 06:27), Max: 36.5 (08-11-22 @ 06:27)  T(F): 97.7 (08-11-22 @ 06:27), Max: 97.7 (08-11-22 @ 06:27)  HR: --  BP: --  BP(mean): --  RR: 18 (08-11-22 @ 06:27) (18 - 18)  SpO2: --    Orthostatic VS  08-11-22 @ 10:40  Lying BP: --/-- HR: --  Sitting BP: 123/57 HR: 81  Standing BP: 111/61 HR: 91  Site: upper right arm  Mode: electronic  Orthostatic VS  08-11-22 @ 06:27  Lying BP: --/-- HR: --  Sitting BP: 139/73 HR: 68  Standing BP: 119/79 HR: 80  Site: upper right arm  Mode: electronic  Orthostatic VS  08-10-22 @ 07:54  Lying BP: --/-- HR: --  Sitting BP: 141/78 HR: 74  Standing BP: 133/76 HR: 86  Site: --  Mode: --

## 2022-08-11 NOTE — BH INPATIENT PSYCHIATRY PROGRESS NOTE - NSBHASSESSSUMMFT_PSY_ALL_CORE
71 y.o. male with hx of unusual relatedness including pattern of making up psychiatric symptoms or hx such as claiming he arranged a hit or his brother killed himself. Recently there has been escalating pattern of hospitalizations, ER visits. Compliance after d/c is nil. Patient also develops excessive attachment or intense dislike of providers and other staff. Patient has no actual hx of suicide attempts or fh or SIB. Patient with possible facititious disorder vs mood disorder. Suspect possible unconscious primary gain related to medical complaints help seeking then rejection of help as inadequate. Patient not assessed as needing CO. In light of collateral, will consider referral to psychotherapy program upon discharge if patient adherent to treatment plan.     7/13: Patient reports complaints of his back itching and loose tooth. Patient continues to demonstrate black-or-white thinking and making conditional statements "if I do not get things the way I want, there is no point in living". Patient does not endorse suicidal ideations, and accepts that he tends to think about the worst outcomes at times. Patient encouraged to develop healthier pattern of thinking and to also focus on positives. Patient is future oriented, thinking about working with new , that he appears to like, and wants to be setup with appointments to receive the care.   7/14: Pt does not endorse SI. Appears goal oriented, however also complains and becomes help rejecting. Patient encouraged to develop balanced thoughts. Patient continues to endorse conditions on receiving help only on his terms.   7/15: Pt continues to endorse passive SI, conditional on being able to obtain certain medical services in the future.   7/16: Depressed, overinclusive, negativistic and ruminative. Tolerating abilify.    7/17: No significant overnight events reported, pt taking meds, denies side effects. Continues to present catastrophic thinking, negative, endosing hopelessness and somatic preoccupation with his teeth, back itch and prostate. Denies acute SI but endorses thoughts that his life is not worth living contingent on being able to obtain his prostate surgery, being able to get access a ride and a  "that is up for the challenge". Will lower Mirtazapine to 7.5mg as pt continues to endorse difficulty sleeping and start Prozac, which pt reports he has benefitted from in the past. Increased Abilify to 5mg QD.   7/18-7/22: Pt continues to present with all-or-none thinking, endorsing somatic preoccupation with teeth and prostate. Patient appears to bring up other complaints such as his relationships when discussing plans for his current somatic complaints. No active SI reported, and hopelessness is contingent upon not getting staff that "can tolerate" him and getting his tooth extracted and receiving implants and receiving prostate surgery. Pt showed poor response to mirtazapine for insomnia, was switched to trazodone 50mg PO HS.  7/26: Some c/o of intermittent passive suicidal ideation, however affect had improved by the afternoon. Pt appears future oriented, wants to express his feelings to staff individually, however denies current SI, HI. No need for 1:1 at this time. Continue current medications.  7/27: No longer c/o SI. Appears to want to be able to express his frustrations without interjection by others. Appears future oriented, wanting to speak with his friend later. C/o of tremor, however no tremor noted. Will continue to monitor for EPS in light of change in aripiprazole. Orthostatic vitals noted. Will encourage fluids and continue to monitor.  7/28: Expresses frustration about not having the right  for his personality. Pt denied SI, HI. Encouraged to develop further frustration tolerance skills and healthier expressions of frustration. C/o somatic symptoms including SOB, however not noted to be short of breath while talking circumstantially. C/o of difficulty sleeping, can increase melatonin. Will avoid increasing trazodone in light of positive orthostatic vital signs.  7/29: After overnight incident of another peer taking his pillow, pt continued to focus on problems. 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 Pt's recent negative cognitive filter may be as a result of recent overnight incident, will continue to monitor. Pt requires case conference with  once assigned to have a concrete solution focused plan.   8/1: Pt making conditional passive SI if he were to go home and deal with his brother. Pt otherwise appears to be complaint driven, focused on his somatic issues including prostate and his arthritis. Pt's conditional threats appear to be his maladaptive way of relating to others. Does not require CO 1:1 at this time.   8/2: Pt denies SI, HI, however tends to make conditional threats when frustrated and discussing discharge home. Pt focused on somatic complaints. Pt's brother called and pt gave verbal consent to speak with brother to give update.   8/3: Pt appears prone to verbalizing frustration with report of feeling hopeless, despite being engaged in writing positive poems shortly before that. No SI reported. Pending  assignment to establish safe discharge plan. Will continue to monitor.  8/4: Verbalizes frustration and c/o his frustrations with his brother, also having positive future oriented goal including sharing poetry with [facility] staff. No SI reported. Pending care manager assignment.   8/5: Pt doing well with concern over dryness between toes. Will give lachydrin and reassess. Also complaining of sleep. Can consider increasing melatonin to 10mg HS. Pending  assignment.   8/8: Pt reports stable mood, denies any complaints. Discharge pending  assignment.   8/9: Reports stable mood, engaged in activities - writing poems, engaging with peers. No complaints reported. Pending  assignment.  8/10: Pt reported feeling well this morning, continues to make passive suicidal statements with no intent/plan usually triggered by frustration. Pending  assignment.   8/11: Pt has been eating appropriately and visible around the unit. Pt has maladaptive behavior of verbalizing SI without plan when frustrated. Pt tends to display traits of wanting perfectionism in the fulfillment of his wants. Pt is pending meeting with  to have concrete plans upon discharge.     Plan:  - Legals: 9.13  - Safety: Safety plan discussed. No need for CO 1:1 at this time.   - Psychiatry: Abilify 10mg QD, Prozac 10mg QD. Trazodone 50mg QHS. Melatonin 6mg HS.  - Psychotherapy: Individual, group and milieu therapy as appropriate.  - Medical: Simethicone 80mg PO TID PRN flatulence. Calamine lotion TID for itching, Proscar 5mg PO daily for BPH, tamsulosin 0.4mg PO daily for overflow incontinence/BPH, oxybutynin 5mg PO daily for overactive bladder, simvastatin 20mg PO HS for HLD, nifedipine XL 30mg PO daily for HTN  - Dispo: Pending.

## 2022-08-11 NOTE — BH INPATIENT PSYCHIATRY PROGRESS NOTE - CURRENT MEDICATION
MEDICATIONS  (STANDING):  ammonium lactate 12% Lotion 1 Application(s) Topical two times a day  ARIPiprazole 10 milliGRAM(s) Oral daily  cholecalciferol 2000 Unit(s) Oral daily  finasteride 5 milliGRAM(s) Oral daily  FLUoxetine 10 milliGRAM(s) Oral daily  melatonin. 6 milliGRAM(s) Oral at bedtime  multivitamin      multivitamin 1 Tablet(s) Oral daily  NIFEdipine XL 30 milliGRAM(s) Oral daily  oxybutynin 5 milliGRAM(s) Oral daily  simvastatin 20 milliGRAM(s) Oral daily  tamsulosin 0.4 milliGRAM(s) Oral daily  traZODone 50 milliGRAM(s) Oral at bedtime    MEDICATIONS  (PRN):  acetaminophen     Tablet .. 650 milliGRAM(s) Oral every 6 hours PRN Mild Pain (1 - 3), Moderate Pain (4 - 6)  calamine/zinc oxide Lotion 1 Application(s) Topical three times a day PRN itching  calcium carbonate    500 mG (Tums) Chewable 1 Tablet(s) Chew daily PRN dyspepsia  haloperidol     Tablet 2.5 milliGRAM(s) Oral every 6 hours PRN agitation  haloperidol    Injectable 2.5 milliGRAM(s) IntraMuscular once PRN severe agitation  petrolatum white Ointment 1 Application(s) Topical three times a day PRN skin irritation  simethicone 80 milliGRAM(s) Chew three times a day PRN Gas

## 2022-08-11 NOTE — BH INPATIENT PSYCHIATRY PROGRESS NOTE - PRN MEDS
MEDICATIONS  (PRN):  acetaminophen     Tablet .. 650 milliGRAM(s) Oral every 6 hours PRN Mild Pain (1 - 3), Moderate Pain (4 - 6)  calamine/zinc oxide Lotion 1 Application(s) Topical three times a day PRN itching  calcium carbonate    500 mG (Tums) Chewable 1 Tablet(s) Chew daily PRN dyspepsia  haloperidol     Tablet 2.5 milliGRAM(s) Oral every 6 hours PRN agitation  haloperidol    Injectable 2.5 milliGRAM(s) IntraMuscular once PRN severe agitation  petrolatum white Ointment 1 Application(s) Topical three times a day PRN skin irritation  simethicone 80 milliGRAM(s) Chew three times a day PRN Gas

## 2022-08-11 NOTE — BH INPATIENT PSYCHIATRY PROGRESS NOTE - NSBHFUPINTERVALHXFT_PSY_A_CORE
71 y.o. male, single, living with brother, presented to hospital for complaints of SI. No significant overnight events reported by staff. No PRNs needed. Pt has been adherent to medications. On encounter this morning pt initially stated he has been feeling upset and does not know if he wants to live, however also clarifies that he does not wish to kill himself. Pt reports feeling frustrated that he has not been able to speak with his friend and wonders if he will be able to meet with her in the future. Pt has a long, chronic hx of making suicidal statements as a maladaptive coping skill and way to expresses his frustration. Pt himself has said during this admission "I don't mean it, it's just my way of expressing myself" when discussing his suicidal statements. Pt's behavior of redirecting the conversation to his maladaptive behavior of verbalizing thoughts of death when talking about solutions was discussed. Pt has been otherwise eating well. Sleep has been disturbed due to urinary urgency from overactive bladder and BPH. Pt has been looking forward to meeting  to discuss his concerns. Pt also reports not wanting to go home despite being the "alpha tenant" and being "loved by everyone but my brother" despite reporting that his brother is "essentially bed bound" and is cared for by his roommate.

## 2022-08-12 PROCEDURE — 99231 SBSQ HOSP IP/OBS SF/LOW 25: CPT | Mod: GC

## 2022-08-12 RX ADMIN — Medication 10 MILLIGRAM(S): at 09:39

## 2022-08-12 RX ADMIN — Medication 1 APPLICATION(S): at 21:23

## 2022-08-12 RX ADMIN — Medication 2000 UNIT(S): at 09:38

## 2022-08-12 RX ADMIN — Medication 30 MILLIGRAM(S): at 09:39

## 2022-08-12 RX ADMIN — Medication 5 MILLIGRAM(S): at 09:39

## 2022-08-12 RX ADMIN — Medication 6 MILLIGRAM(S): at 21:24

## 2022-08-12 RX ADMIN — FINASTERIDE 5 MILLIGRAM(S): 5 TABLET, FILM COATED ORAL at 09:39

## 2022-08-12 RX ADMIN — Medication 650 MILLIGRAM(S): at 10:00

## 2022-08-12 RX ADMIN — TAMSULOSIN HYDROCHLORIDE 0.4 MILLIGRAM(S): 0.4 CAPSULE ORAL at 09:39

## 2022-08-12 RX ADMIN — SIMETHICONE 80 MILLIGRAM(S): 80 TABLET, CHEWABLE ORAL at 09:40

## 2022-08-12 RX ADMIN — ARIPIPRAZOLE 10 MILLIGRAM(S): 15 TABLET ORAL at 09:39

## 2022-08-12 RX ADMIN — Medication 50 MILLIGRAM(S): at 21:23

## 2022-08-12 RX ADMIN — Medication 1 TABLET(S): at 09:38

## 2022-08-12 RX ADMIN — Medication 650 MILLIGRAM(S): at 09:40

## 2022-08-12 RX ADMIN — Medication 1 APPLICATION(S): at 09:42

## 2022-08-12 RX ADMIN — SIMVASTATIN 20 MILLIGRAM(S): 20 TABLET, FILM COATED ORAL at 09:39

## 2022-08-12 NOTE — BH INPATIENT PSYCHIATRY PROGRESS NOTE - NSBHCHARTREVIEWVS_PSY_A_CORE FT
Vital Signs Last 24 Hrs  T(C): 36.6 (08-12-22 @ 07:56), Max: 36.6 (08-12-22 @ 07:56)  T(F): 97.9 (08-12-22 @ 07:56), Max: 97.9 (08-12-22 @ 07:56)  HR: --  BP: --  BP(mean): --  RR: --  SpO2: --    Orthostatic VS  08-12-22 @ 07:56  Lying BP: --/-- HR: --  Sitting BP: 144/87 HR: 75  Standing BP: 130/77 HR: 83  Site: --  Mode: --  Orthostatic VS  08-11-22 @ 10:40  Lying BP: --/-- HR: --  Sitting BP: 123/57 HR: 81  Standing BP: 111/61 HR: 91  Site: upper right arm  Mode: electronic  Orthostatic VS  08-11-22 @ 06:27  Lying BP: --/-- HR: --  Sitting BP: 139/73 HR: 68  Standing BP: 119/79 HR: 80  Site: upper right arm  Mode: electronic

## 2022-08-12 NOTE — BH INPATIENT PSYCHIATRY PROGRESS NOTE - NSBHFUPINTERVALHXFT_PSY_A_CORE
71 y.o. male, single, living with brother, presented to hospital for complaints of SI. No significant overnight events reported by staff. No PRNs needed. Pt has been adherent to medications. Upon evaluation, pt initially made statements he does not know if live is worth living. Pt has a long, chronic hx of making suicidal statements as a maladaptive coping skill and way to expresses his frustration. Further discussion held about his somatic complaints and encouraged pt to discuss his concerns with  to have them addressed. Pt's tendency to catastrophize matters was discussed. Pt looks forward to meeting the  and wants "her" to be someone who can deal with him.

## 2022-08-12 NOTE — BH INPATIENT PSYCHIATRY PROGRESS NOTE - NSBHASSESSSUMMFT_PSY_ALL_CORE
71 y.o. male with hx of unusual relatedness including pattern of making up psychiatric symptoms or hx such as claiming he arranged a hit or his brother killed himself. Recently there has been escalating pattern of hospitalizations, ER visits. Compliance after d/c is nil. Patient also develops excessive attachment or intense dislike of providers and other staff. Patient has no actual hx of suicide attempts or fh or SIB. Patient with possible facititious disorder vs mood disorder. Suspect possible unconscious primary gain related to medical complaints help seeking then rejection of help as inadequate. Patient not assessed as needing CO. In light of collateral, will consider referral to psychotherapy program upon discharge if patient adherent to treatment plan.     7/13: Patient reports complaints of his back itching and loose tooth. Patient continues to demonstrate black-or-white thinking and making conditional statements "if I do not get things the way I want, there is no point in living". Patient does not endorse suicidal ideations, and accepts that he tends to think about the worst outcomes at times. Patient encouraged to develop healthier pattern of thinking and to also focus on positives. Patient is future oriented, thinking about working with new , that he appears to like, and wants to be setup with appointments to receive the care.   7/14: Pt does not endorse SI. Appears goal oriented, however also complains and becomes help rejecting. Patient encouraged to develop balanced thoughts. Patient continues to endorse conditions on receiving help only on his terms.   7/15: Pt continues to endorse passive SI, conditional on being able to obtain certain medical services in the future.   7/16: Depressed, overinclusive, negativistic and ruminative. Tolerating abilify.    7/17: No significant overnight events reported, pt taking meds, denies side effects. Continues to present catastrophic thinking, negative, endosing hopelessness and somatic preoccupation with his teeth, back itch and prostate. Denies acute SI but endorses thoughts that his life is not worth living contingent on being able to obtain his prostate surgery, being able to get access a ride and a  "that is up for the challenge". Will lower Mirtazapine to 7.5mg as pt continues to endorse difficulty sleeping and start Prozac, which pt reports he has benefitted from in the past. Increased Abilify to 5mg QD.   7/18-7/22: Pt continues to present with all-or-none thinking, endorsing somatic preoccupation with teeth and prostate. Patient appears to bring up other complaints such as his relationships when discussing plans for his current somatic complaints. No active SI reported, and hopelessness is contingent upon not getting staff that "can tolerate" him and getting his tooth extracted and receiving implants and receiving prostate surgery. Pt showed poor response to mirtazapine for insomnia, was switched to trazodone 50mg PO HS.  7/26: Some c/o of intermittent passive suicidal ideation, however affect had improved by the afternoon. Pt appears future oriented, wants to express his feelings to staff individually, however denies current SI, HI. No need for 1:1 at this time. Continue current medications.  7/27: No longer c/o SI. Appears to want to be able to express his frustrations without interjection by others. Appears future oriented, wanting to speak with his friend later. C/o of tremor, however no tremor noted. Will continue to monitor for EPS in light of change in aripiprazole. Orthostatic vitals noted. Will encourage fluids and continue to monitor.  7/28: Expresses frustration about not having the right  for his personality. Pt denied SI, HI. Encouraged to develop further frustration tolerance skills and healthier expressions of frustration. C/o somatic symptoms including SOB, however not noted to be short of breath while talking circumstantially. C/o of difficulty sleeping, can increase melatonin. Will avoid increasing trazodone in light of positive orthostatic vital signs.  7/29: After overnight incident of another peer taking his pillow, pt continued to focus on problems. 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 Pt's recent negative cognitive filter may be as a result of recent overnight incident, will continue to monitor. Pt requires case conference with  once assigned to have a concrete solution focused plan.   8/1: Pt making conditional passive SI if he were to go home and deal with his brother. Pt otherwise appears to be complaint driven, focused on his somatic issues including prostate and his arthritis. Pt's conditional threats appear to be his maladaptive way of relating to others. Does not require CO 1:1 at this time.   8/2: Pt denies SI, HI, however tends to make conditional threats when frustrated and discussing discharge home. Pt focused on somatic complaints. Pt's brother called and pt gave verbal consent to speak with brother to give update.   8/3: Pt appears prone to verbalizing frustration with report of feeling hopeless, despite being engaged in writing positive poems shortly before that. No SI reported. Pending  assignment to establish safe discharge plan. Will continue to monitor.  8/4: Verbalizes frustration and c/o his frustrations with his brother, also having positive future oriented goal including sharing poetry with [facility] staff. No SI reported. Pending care manager assignment.   8/5: Pt doing well with concern over dryness between toes. Will give lachydrin and reassess. Also complaining of sleep. Can consider increasing melatonin to 10mg HS. Pending  assignment.   8/8: Pt reports stable mood, denies any complaints. Discharge pending  assignment.   8/9: Reports stable mood, engaged in activities - writing poems, engaging with peers. No complaints reported. Pending  assignment.  8/10: Pt reported feeling well this morning, continues to make passive suicidal statements with no intent/plan usually triggered by frustration. Pending  assignment.   8/11: Pt has been eating appropriately and visible around the unit. Pt has maladaptive behavior of verbalizing SI without plan when frustrated. Pt tends to display traits of wanting perfectionism in the fulfillment of his wants. Pt is pending meeting with  to have concrete plans upon discharge.   8/12: Pt verbalizes conditional passive SI when frustrated or provocatively to relate to others. Pt engages with others on the unit and was engaging with multiple staff members for significant periods. Pt is pending meeting with  to have concrete plans upon discharge.    Plan:  - Legals: 9.13  - Safety: Safety plan discussed. No need for CO 1:1 at this time.   - Psychiatry: Abilify 10mg QD, Prozac 10mg QD. Trazodone 50mg QHS. Melatonin 6mg HS.  - Psychotherapy: Individual, group and milieu therapy as appropriate.  - Medical: Simethicone 80mg PO TID PRN flatulence. Calamine lotion TID for itching, Proscar 5mg PO daily for BPH, tamsulosin 0.4mg PO daily for overflow incontinence/BPH, oxybutynin 5mg PO daily for overactive bladder, simvastatin 20mg PO HS for HLD, nifedipine XL 30mg PO daily for HTN  - Dispo: Pending.

## 2022-08-13 RX ADMIN — Medication 6 MILLIGRAM(S): at 20:54

## 2022-08-13 RX ADMIN — SIMVASTATIN 20 MILLIGRAM(S): 20 TABLET, FILM COATED ORAL at 10:12

## 2022-08-13 RX ADMIN — Medication 2000 UNIT(S): at 10:11

## 2022-08-13 RX ADMIN — Medication 650 MILLIGRAM(S): at 13:29

## 2022-08-13 RX ADMIN — Medication 50 MILLIGRAM(S): at 20:54

## 2022-08-13 RX ADMIN — Medication 5 MILLIGRAM(S): at 10:12

## 2022-08-13 RX ADMIN — Medication 30 MILLIGRAM(S): at 10:13

## 2022-08-13 RX ADMIN — FINASTERIDE 5 MILLIGRAM(S): 5 TABLET, FILM COATED ORAL at 10:13

## 2022-08-13 RX ADMIN — TAMSULOSIN HYDROCHLORIDE 0.4 MILLIGRAM(S): 0.4 CAPSULE ORAL at 10:11

## 2022-08-13 RX ADMIN — ARIPIPRAZOLE 10 MILLIGRAM(S): 15 TABLET ORAL at 10:10

## 2022-08-13 RX ADMIN — Medication 10 MILLIGRAM(S): at 10:12

## 2022-08-13 RX ADMIN — Medication 1 APPLICATION(S): at 20:54

## 2022-08-13 RX ADMIN — Medication 1 TABLET(S): at 10:13

## 2022-08-13 RX ADMIN — SIMETHICONE 80 MILLIGRAM(S): 80 TABLET, CHEWABLE ORAL at 13:29

## 2022-08-13 RX ADMIN — Medication 1 APPLICATION(S): at 11:24

## 2022-08-14 RX ADMIN — Medication 1 APPLICATION(S): at 09:39

## 2022-08-14 RX ADMIN — Medication 1 TABLET(S): at 09:40

## 2022-08-14 RX ADMIN — Medication 50 MILLIGRAM(S): at 20:25

## 2022-08-14 RX ADMIN — SIMVASTATIN 20 MILLIGRAM(S): 20 TABLET, FILM COATED ORAL at 09:40

## 2022-08-14 RX ADMIN — FINASTERIDE 5 MILLIGRAM(S): 5 TABLET, FILM COATED ORAL at 09:40

## 2022-08-14 RX ADMIN — ARIPIPRAZOLE 10 MILLIGRAM(S): 15 TABLET ORAL at 09:40

## 2022-08-14 RX ADMIN — Medication 10 MILLIGRAM(S): at 09:41

## 2022-08-14 RX ADMIN — Medication 6 MILLIGRAM(S): at 20:25

## 2022-08-14 RX ADMIN — Medication 2000 UNIT(S): at 09:40

## 2022-08-14 RX ADMIN — Medication 1 APPLICATION(S): at 20:24

## 2022-08-14 RX ADMIN — Medication 30 MILLIGRAM(S): at 09:40

## 2022-08-14 RX ADMIN — Medication 5 MILLIGRAM(S): at 09:43

## 2022-08-14 RX ADMIN — TAMSULOSIN HYDROCHLORIDE 0.4 MILLIGRAM(S): 0.4 CAPSULE ORAL at 09:41

## 2022-08-15 PROCEDURE — 99232 SBSQ HOSP IP/OBS MODERATE 35: CPT | Mod: GC

## 2022-08-15 RX ADMIN — SIMVASTATIN 20 MILLIGRAM(S): 20 TABLET, FILM COATED ORAL at 10:05

## 2022-08-15 RX ADMIN — Medication 1 APPLICATION(S): at 21:48

## 2022-08-15 RX ADMIN — ARIPIPRAZOLE 10 MILLIGRAM(S): 15 TABLET ORAL at 10:05

## 2022-08-15 RX ADMIN — FINASTERIDE 5 MILLIGRAM(S): 5 TABLET, FILM COATED ORAL at 10:05

## 2022-08-15 RX ADMIN — TAMSULOSIN HYDROCHLORIDE 0.4 MILLIGRAM(S): 0.4 CAPSULE ORAL at 10:06

## 2022-08-15 RX ADMIN — Medication 10 MILLIGRAM(S): at 10:05

## 2022-08-15 RX ADMIN — Medication 2000 UNIT(S): at 10:05

## 2022-08-15 RX ADMIN — Medication 50 MILLIGRAM(S): at 21:18

## 2022-08-15 RX ADMIN — Medication 6 MILLIGRAM(S): at 21:18

## 2022-08-15 RX ADMIN — Medication 5 MILLIGRAM(S): at 10:06

## 2022-08-15 RX ADMIN — Medication 1 TABLET(S): at 10:05

## 2022-08-15 NOTE — BH INPATIENT PSYCHIATRY PROGRESS NOTE - NSBHASSESSSUMMFT_PSY_ALL_CORE
71 y.o. male with hx of unusual relatedness including pattern of making up psychiatric symptoms or hx such as claiming he arranged a hit or his brother killed himself. Recently there has been escalating pattern of hospitalizations, ER visits. Compliance after d/c is nil. Patient also develops excessive attachment or intense dislike of providers and other staff. Patient has no actual hx of suicide attempts or fh or SIB. Patient with possible facititious disorder vs mood disorder. Suspect possible unconscious primary gain related to medical complaints help seeking then rejection of help as inadequate. Patient not assessed as needing CO. In light of collateral, will consider referral to psychotherapy program upon discharge if patient adherent to treatment plan.     7/13 -7/14: Patient reports complaints of his back itching and loose tooth. Patient continues to demonstrate black-or-white thinking and making conditional statements "if I do not get things the way I want, there is no point in living". Patient does not endorse suicidal ideations, and accepts that he tends to think about the worst outcomes at times. Patient encouraged to develop healthier pattern of thinking and to also focus on positives. Patient is future oriented, thinking about working with new , that he appears to like, and wants to be setup with appointments to receive the care. Pt does not endorse SI. Appears goal oriented, however also complains and becomes help rejecting. Patient encouraged to develop balanced thoughts. Patient continues to endorse conditions on receiving help only on his terms.   7/15: Pt continues to endorse passive SI, conditional on being able to obtain certain medical services in the future.   7/16: Depressed, overinclusive, negativistic and ruminative. Tolerating abilify.    7/17: No significant overnight events reported, pt taking meds, denies side effects. Continues to present catastrophic thinking, negative, endosing hopelessness and somatic preoccupation with his teeth, back itch and prostate. Denies acute SI but endorses thoughts that his life is not worth living contingent on being able to obtain his prostate surgery, being able to get access a ride and a  "that is up for the challenge". Will lower Mirtazapine to 7.5mg as pt continues to endorse difficulty sleeping and start Prozac, which pt reports he has benefitted from in the past. Increased Abilify to 5mg QD.   7/18-7/22: Pt continues to present with all-or-none thinking, endorsing somatic preoccupation with teeth and prostate. Patient appears to bring up other complaints such as his relationships when discussing plans for his current somatic complaints. No active SI reported, and hopelessness is contingent upon not getting staff that "can tolerate" him and getting his tooth extracted and receiving implants and receiving prostate surgery. Pt showed poor response to mirtazapine for insomnia, was switched to trazodone 50mg PO HS.  7/26: Some c/o of intermittent passive suicidal ideation, however affect had improved by the afternoon. Pt appears future oriented, wants to express his feelings to staff individually, however denies current SI, HI. No need for 1:1 at this time. Continue current medications.  7/27: No longer c/o SI. Appears to want to be able to express his frustrations without interjection by others. Appears future oriented, wanting to speak with his friend later. C/o of tremor, however no tremor noted. Will continue to monitor for EPS in light of change in aripiprazole. Orthostatic vitals noted. Will encourage fluids and continue to monitor.  7/28: Expresses frustration about not having the right  for his personality. Pt denied SI, HI. Encouraged to develop further frustration tolerance skills and healthier expressions of frustration. C/o somatic symptoms including SOB, however not noted to be short of breath while talking circumstantially. C/o of difficulty sleeping, can increase melatonin. Will avoid increasing trazodone in light of positive orthostatic vital signs.  7/29: After overnight incident of another peer taking his pillow, pt continued to focus on problems. 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 Pt's recent negative cognitive filter may be as a result of recent overnight incident, will continue to monitor. Pt requires case conference with  once assigned to have a concrete solution focused plan.   8/1: Pt making conditional passive SI if he were to go home and deal with his brother. Pt otherwise appears to be complaint driven, focused on his somatic issues including prostate and his arthritis. Pt's conditional threats appear to be his maladaptive way of relating to others. Does not require CO 1:1 at this time.   8/2: Pt denies SI, HI, however tends to make conditional threats when frustrated and discussing discharge home. Pt focused on somatic complaints. Pt's brother called and pt gave verbal consent to speak with brother to give update.   8/3: Pt appears prone to verbalizing frustration with report of feeling hopeless, despite being engaged in writing positive poems shortly before that. No SI reported. Pending  assignment to establish safe discharge plan. Will continue to monitor.  8/4: Verbalizes frustration and c/o his frustrations with his brother, also having positive future oriented goal including sharing poetry with [facility] staff. No SI reported. Pending care manager assignment.   8/5: Pt doing well with concern over dryness between toes. Will give lachydrin and reassess. Also complaining of sleep. Can consider increasing melatonin to 10mg HS. Pending  assignment.   8/8 - 8/12. Pt has been eating appropriately and visible around the unit. Pt has maladaptive behavior of verbalizing SI without plan when frustrated. Pt tends to display traits of wanting perfectionism in the fulfillment of his wants. Pt is pending meeting with  to have concrete plans upon discharge.     8/15: Pt denies SI. Appears to show tendencies of wanting to obtain perfectionism including having a "perfect" BP and missing out on romantic partner in his past. Pt is pending meeting with  for plans upon discharge. In light of orthostatic drop in BP, will hold nifedipine for today.     Plan:  - Legals: 9.13  - Safety: Safety plan discussed. No need for CO 1:1 at this time.   - Psychiatry: Abilify 10mg QD, Prozac 10mg QD. Trazodone 50mg QHS. Melatonin 6mg HS.  - Psychotherapy: Individual, group and milieu therapy as appropriate.  - Medical: Simethicone 80mg PO TID PRN flatulence. Calamine lotion TID for itching, Proscar 5mg PO daily for BPH, tamsulosin 0.4mg PO daily for overflow incontinence/BPH, oxybutynin 5mg PO daily for overactive bladder, simvastatin 20mg PO HS for HLD, nifedipine XL 30mg PO daily for HTN  - Dispo: Pending.

## 2022-08-15 NOTE — BH INPATIENT PSYCHIATRY PROGRESS NOTE - NSBHCHARTREVIEWVS_PSY_A_CORE FT
Vital Signs Last 24 Hrs  T(C): 36.7 (08-15-22 @ 07:56), Max: 36.7 (08-15-22 @ 07:56)  T(F): 98 (08-15-22 @ 07:56), Max: 98 (08-15-22 @ 07:56)  HR: --  BP: --  BP(mean): --  RR: --  SpO2: --    Orthostatic VS  08-15-22 @ 07:56  Lying BP: --/-- HR: --  Sitting BP: 127/79 HR: 68  Standing BP: 104/70 HR: 87  Site: --  Mode: --  Orthostatic VS  08-14-22 @ 08:28  Lying BP: --/-- HR: --  Sitting BP: 122/74 HR: 71  Standing BP: 102/74 HR: 85  Site: --  Mode: --   Vital Signs Last 24 Hrs  T(C): 36.7 (08-15-22 @ 07:56), Max: 36.7 (08-15-22 @ 07:56)  T(F): 98 (08-15-22 @ 07:56), Max: 98 (08-15-22 @ 07:56)  HR: --  BP: --  BP(mean): --  RR: --  SpO2: --    Orthostatic VS  08-15-22 @ 09:33  Lying BP: --/-- HR: --  Sitting BP: 134/70 HR: 73  Standing BP: 113/64 HR: 86  Site: --  Mode: --  Orthostatic VS  08-15-22 @ 07:56  Lying BP: --/-- HR: --  Sitting BP: 127/79 HR: 68  Standing BP: 104/70 HR: 87  Site: --  Mode: --  Orthostatic VS  08-14-22 @ 08:28  Lying BP: --/-- HR: --  Sitting BP: 122/74 HR: 71  Standing BP: 102/74 HR: 85  Site: --  Mode: --

## 2022-08-15 NOTE — BH INPATIENT PSYCHIATRY PROGRESS NOTE - NSBHFUPINTERVALHXFT_PSY_A_CORE
71 y.o. male, single, living with brother, presented to hospital for complaints of SI. No significant overnight events reported by staff. No PRNs needed. Pt has been adherent to medications. Upon evaluation, pt reported doing "okay". Pt reported working on his writing and wanting to share it with the "[employer]". Pt also c/o itching on his back after a recent shower, has PRN calamine lotion to use. Pt continues to have traits desiring perfection, reporting he was upset that his blood pressure was not 120/80 which he considers is "perfect". BP showing orthostatic drop, however pt denies any symptoms of hypotension. Patient also reported getting along with peer who has been making drawings of flowers in his notebook and recalls letting go of a romantic interest in his past. Patient denies SI, HI.

## 2022-08-16 DIAGNOSIS — R60.0 LOCALIZED EDEMA: ICD-10-CM

## 2022-08-16 PROCEDURE — 90834 PSYTX W PT 45 MINUTES: CPT

## 2022-08-16 PROCEDURE — 99233 SBSQ HOSP IP/OBS HIGH 50: CPT

## 2022-08-16 PROCEDURE — 99232 SBSQ HOSP IP/OBS MODERATE 35: CPT

## 2022-08-16 PROCEDURE — 93970 EXTREMITY STUDY: CPT | Mod: 26

## 2022-08-16 RX ORDER — FLUOXETINE HCL 10 MG
20 CAPSULE ORAL DAILY
Refills: 0 | Status: DISCONTINUED | OUTPATIENT
Start: 2022-08-17 | End: 2022-08-18

## 2022-08-16 RX ADMIN — Medication 30 MILLIGRAM(S): at 08:22

## 2022-08-16 RX ADMIN — FINASTERIDE 5 MILLIGRAM(S): 5 TABLET, FILM COATED ORAL at 08:22

## 2022-08-16 RX ADMIN — Medication 1 TABLET(S): at 08:21

## 2022-08-16 RX ADMIN — Medication 50 MILLIGRAM(S): at 21:30

## 2022-08-16 RX ADMIN — Medication 650 MILLIGRAM(S): at 09:38

## 2022-08-16 RX ADMIN — Medication 650 MILLIGRAM(S): at 08:39

## 2022-08-16 RX ADMIN — TAMSULOSIN HYDROCHLORIDE 0.4 MILLIGRAM(S): 0.4 CAPSULE ORAL at 08:20

## 2022-08-16 RX ADMIN — Medication 6 MILLIGRAM(S): at 21:30

## 2022-08-16 RX ADMIN — Medication 5 MILLIGRAM(S): at 08:22

## 2022-08-16 RX ADMIN — Medication 10 MILLIGRAM(S): at 08:21

## 2022-08-16 RX ADMIN — SIMVASTATIN 20 MILLIGRAM(S): 20 TABLET, FILM COATED ORAL at 08:21

## 2022-08-16 RX ADMIN — ARIPIPRAZOLE 10 MILLIGRAM(S): 15 TABLET ORAL at 08:21

## 2022-08-16 RX ADMIN — Medication 2000 UNIT(S): at 08:21

## 2022-08-16 NOTE — BH INPATIENT PSYCHIATRY PROGRESS NOTE - CURRENT MEDICATION
MEDICATIONS  (STANDING):  ammonium lactate 12% Lotion 1 Application(s) Topical two times a day  ARIPiprazole 10 milliGRAM(s) Oral daily  cholecalciferol 2000 Unit(s) Oral daily  finasteride 5 milliGRAM(s) Oral daily  melatonin. 6 milliGRAM(s) Oral at bedtime  multivitamin      multivitamin 1 Tablet(s) Oral daily  NIFEdipine XL 30 milliGRAM(s) Oral daily  oxybutynin 5 milliGRAM(s) Oral daily  simvastatin 20 milliGRAM(s) Oral daily  tamsulosin 0.4 milliGRAM(s) Oral daily  traZODone 50 milliGRAM(s) Oral at bedtime    MEDICATIONS  (PRN):  acetaminophen     Tablet .. 650 milliGRAM(s) Oral every 6 hours PRN Mild Pain (1 - 3), Moderate Pain (4 - 6)  calamine/zinc oxide Lotion 1 Application(s) Topical three times a day PRN itching  calcium carbonate    500 mG (Tums) Chewable 1 Tablet(s) Chew daily PRN dyspepsia  haloperidol     Tablet 2.5 milliGRAM(s) Oral every 6 hours PRN agitation  haloperidol    Injectable 2.5 milliGRAM(s) IntraMuscular once PRN severe agitation  petrolatum white Ointment 1 Application(s) Topical three times a day PRN skin irritation  simethicone 80 milliGRAM(s) Chew three times a day PRN Gas

## 2022-08-16 NOTE — BH INPATIENT PSYCHIATRY PROGRESS NOTE - NSBHCHARTREVIEWVS_PSY_A_CORE FT
Vital Signs Last 24 Hrs  T(C): 36.4 (08-16-22 @ 06:49), Max: 36.4 (08-16-22 @ 06:49)  T(F): 97.6 (08-16-22 @ 06:49), Max: 97.6 (08-16-22 @ 06:49)  HR: --  BP: --  BP(mean): --  RR: --  SpO2: --    Orthostatic VS  08-16-22 @ 06:49  Lying BP: --/-- HR: --  Sitting BP: 133/79 HR: 73  Standing BP: 123/77 HR: --  Site: --  Mode: --  Orthostatic VS  08-15-22 @ 09:33  Lying BP: --/-- HR: --  Sitting BP: 134/70 HR: 73  Standing BP: 113/64 HR: 86  Site: --  Mode: --  Orthostatic VS  08-15-22 @ 07:56  Lying BP: --/-- HR: --  Sitting BP: 127/79 HR: 68  Standing BP: 104/70 HR: 87  Site: --  Mode: --

## 2022-08-16 NOTE — PROGRESS NOTE ADULT - ASSESSMENT
71 year old male with Hx of BPH, HTN, HLD admitted for depression. Consulted for lower extremity edema, worse on R than Left. Patient states he has had lower extremity edema, more on the right leg ever since he fell on his right leg couple of years ago.     Plan:  #Lower extremity Edema  Likely chronic lymphedema, however have to rule out DVT   Obtain Doppler of LE b/l    Knee arthritis: Tylenol, lidocaine patch prn.  PT eval    BPH: COntinue flomax/proscar, outpt urology f/u    HTN: Nifedipine    HLD: statin    Depression: Management per primary team

## 2022-08-16 NOTE — DIETITIAN INITIAL EVALUATION ADULT - OTHER INFO
Pt admitted to [facility] for Depression and SI. Medical history includes HTN, Hyperlipidemia, Urinary incontinence 2/2 prostate issues, Right leg pain (Arthritis) Saw Pt in outdoor area, reports eating well. Complains of flatulence. Pt on simethicone. Will send Lactaid milk with breakfast. Denies food allergies. Declines diet education at this time.

## 2022-08-16 NOTE — BH INPATIENT PSYCHIATRY PROGRESS NOTE - NSBHFUPINTERVALHXFT_PSY_A_CORE
71 y.o. male, single, living with brother, presented to hospital for complaints of SI. No significant overnight events reported by staff. No PRNs needed. Pt has been adherent to medications. Upon evaluation, pt reported not doing well, and perseverating about returning home with his brother, which he is concerned would not go well.  He continues to complain of depression with vague suicidal thinking related to discharge.  He has been visible on the unit, social with select peers.  The patient reports eating well but having some interrupted sleep.  He has been tolerating medications well.  Agreeable to increasing Prozac to target continue symptoms.

## 2022-08-16 NOTE — BH INPATIENT PSYCHIATRY PROGRESS NOTE - NSBHASSESSSUMMFT_PSY_ALL_CORE
71 y.o. male with hx of unusual relatedness including pattern of making up psychiatric symptoms or hx such as claiming he arranged a hit or his brother killed himself. Recently there has been escalating pattern of hospitalizations, ER visits. Compliance after d/c is nil. Patient also develops excessive attachment or intense dislike of providers and other staff. Patient has no actual hx of suicide attempts or fh or SIB. Patient with possible facititious disorder vs mood disorder. Suspect possible unconscious primary gain related to medical complaints help seeking then rejection of help as inadequate. Patient not assessed as needing CO. In light of collateral, will consider referral to psychotherapy program upon discharge if patient adherent to treatment plan.     7/13 -7/14: Patient reports complaints of his back itching and loose tooth. Patient continues to demonstrate black-or-white thinking and making conditional statements "if I do not get things the way I want, there is no point in living". Patient does not endorse suicidal ideations, and accepts that he tends to think about the worst outcomes at times. Patient encouraged to develop healthier pattern of thinking and to also focus on positives. Patient is future oriented, thinking about working with new , that he appears to like, and wants to be setup with appointments to receive the care. Pt does not endorse SI. Appears goal oriented, however also complains and becomes help rejecting. Patient encouraged to develop balanced thoughts. Patient continues to endorse conditions on receiving help only on his terms.   7/15: Pt continues to endorse passive SI, conditional on being able to obtain certain medical services in the future.   7/16: Depressed, overinclusive, negativistic and ruminative. Tolerating abilify.    7/17: No significant overnight events reported, pt taking meds, denies side effects. Continues to present catastrophic thinking, negative, endosing hopelessness and somatic preoccupation with his teeth, back itch and prostate. Denies acute SI but endorses thoughts that his life is not worth living contingent on being able to obtain his prostate surgery, being able to get access a ride and a  "that is up for the challenge". Will lower Mirtazapine to 7.5mg as pt continues to endorse difficulty sleeping and start Prozac, which pt reports he has benefitted from in the past. Increased Abilify to 5mg QD.   7/18-7/22: Pt continues to present with all-or-none thinking, endorsing somatic preoccupation with teeth and prostate. Patient appears to bring up other complaints such as his relationships when discussing plans for his current somatic complaints. No active SI reported, and hopelessness is contingent upon not getting staff that "can tolerate" him and getting his tooth extracted and receiving implants and receiving prostate surgery. Pt showed poor response to mirtazapine for insomnia, was switched to trazodone 50mg PO HS.  7/26: Some c/o of intermittent passive suicidal ideation, however affect had improved by the afternoon. Pt appears future oriented, wants to express his feelings to staff individually, however denies current SI, HI. No need for 1:1 at this time. Continue current medications.  7/27: No longer c/o SI. Appears to want to be able to express his frustrations without interjection by others. Appears future oriented, wanting to speak with his friend later. C/o of tremor, however no tremor noted. Will continue to monitor for EPS in light of change in aripiprazole. Orthostatic vitals noted. Will encourage fluids and continue to monitor.  7/28: Expresses frustration about not having the right  for his personality. Pt denied SI, HI. Encouraged to develop further frustration tolerance skills and healthier expressions of frustration. C/o somatic symptoms including SOB, however not noted to be short of breath while talking circumstantially. C/o of difficulty sleeping, can increase melatonin. Will avoid increasing trazodone in light of positive orthostatic vital signs.  7/29: After overnight incident of another peer taking his pillow, pt continued to focus on problems. 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 Pt's recent negative cognitive filter may be as a result of recent overnight incident, will continue to monitor. Pt requires case conference with  once assigned to have a concrete solution focused plan.   8/1: Pt making conditional passive SI if he were to go home and deal with his brother. Pt otherwise appears to be complaint driven, focused on his somatic issues including prostate and his arthritis. Pt's conditional threats appear to be his maladaptive way of relating to others. Does not require CO 1:1 at this time.   8/2: Pt denies SI, HI, however tends to make conditional threats when frustrated and discussing discharge home. Pt focused on somatic complaints. Pt's brother called and pt gave verbal consent to speak with brother to give update.   8/3: Pt appears prone to verbalizing frustration with report of feeling hopeless, despite being engaged in writing positive poems shortly before that. No SI reported. Pending  assignment to establish safe discharge plan. Will continue to monitor.  8/4: Verbalizes frustration and c/o his frustrations with his brother, also having positive future oriented goal including sharing poetry with [facility] staff. No SI reported. Pending care manager assignment.   8/5: Pt doing well with concern over dryness between toes. Will give lachydrin and reassess. Also complaining of sleep. Can consider increasing melatonin to 10mg HS. Pending  assignment.   8/8 - 8/12. Pt has been eating appropriately and visible around the unit. Pt has maladaptive behavior of verbalizing SI without plan when frustrated. Pt tends to display traits of wanting perfectionism in the fulfillment of his wants. Pt is pending meeting with  to have concrete plans upon discharge.     8/15: Pt denies SI. Appears to show tendencies of wanting to obtain perfectionism including having a "perfect" BP and missing out on romantic partner in his past. Pt is pending meeting with  for plans upon discharge. In light of orthostatic drop in BP, will hold nifedipine for today.   6/16: the patient continues to express depressed mood and concerns about possible discharge home with his brother.  Vague SI without plan or intent.  Behavior has been well controlled.    Plan:  - Legals: 9.13  - Safety: Safety plan discussed. No need for CO 1:1 at this time.   - Psychiatry: Abilify 10mg QD, increase Prozac to 20mg QD. Trazodone 50mg QHS. Melatonin 6mg HS.  - Psychotherapy: Individual, group and milieu therapy as appropriate.  - Medical: Simethicone 80mg PO TID PRN flatulence. Calamine lotion TID for itching, Proscar 5mg PO daily for BPH, tamsulosin 0.4mg PO daily for overflow incontinence/BPH, oxybutynin 5mg PO daily for overactive bladder, simvastatin 20mg PO HS for HLD, nifedipine XL 30mg PO daily for HTN  - Patient noted with RLE swelling - seen by medicine - will obtain Dopplers to rule out DVT  - Dispo: Pending.

## 2022-08-16 NOTE — DIETITIAN INITIAL EVALUATION ADULT - PERTINENT MEDS FT
MEDICATIONS  (STANDING):  ammonium lactate 12% Lotion 1 Application(s) Topical two times a day  ARIPiprazole 10 milliGRAM(s) Oral daily  cholecalciferol 2000 Unit(s) Oral daily  finasteride 5 milliGRAM(s) Oral daily  melatonin. 6 milliGRAM(s) Oral at bedtime  multivitamin      multivitamin 1 Tablet(s) Oral daily  NIFEdipine XL 30 milliGRAM(s) Oral daily  oxybutynin 5 milliGRAM(s) Oral daily  simvastatin 20 milliGRAM(s) Oral daily  tamsulosin 0.4 milliGRAM(s) Oral daily  traZODone 50 milliGRAM(s) Oral at bedtime

## 2022-08-17 PROCEDURE — 99231 SBSQ HOSP IP/OBS SF/LOW 25: CPT | Mod: GC

## 2022-08-17 RX ORDER — SODIUM CHLORIDE 0.65 %
1 AEROSOL, SPRAY (ML) NASAL
Refills: 0 | Status: DISCONTINUED | OUTPATIENT
Start: 2022-08-17 | End: 2022-08-25

## 2022-08-17 RX ADMIN — FINASTERIDE 5 MILLIGRAM(S): 5 TABLET, FILM COATED ORAL at 09:09

## 2022-08-17 RX ADMIN — Medication 50 MILLIGRAM(S): at 21:01

## 2022-08-17 RX ADMIN — Medication 1 SPRAY(S): at 20:57

## 2022-08-17 RX ADMIN — Medication 2000 UNIT(S): at 09:08

## 2022-08-17 RX ADMIN — Medication 650 MILLIGRAM(S): at 06:52

## 2022-08-17 RX ADMIN — Medication 5 MILLIGRAM(S): at 09:08

## 2022-08-17 RX ADMIN — ARIPIPRAZOLE 10 MILLIGRAM(S): 15 TABLET ORAL at 09:08

## 2022-08-17 RX ADMIN — Medication 20 MILLIGRAM(S): at 09:09

## 2022-08-17 RX ADMIN — Medication 6 MILLIGRAM(S): at 20:57

## 2022-08-17 RX ADMIN — Medication 650 MILLIGRAM(S): at 06:01

## 2022-08-17 RX ADMIN — Medication 1 APPLICATION(S): at 20:56

## 2022-08-17 RX ADMIN — Medication 1 APPLICATION(S): at 14:33

## 2022-08-17 RX ADMIN — SIMVASTATIN 20 MILLIGRAM(S): 20 TABLET, FILM COATED ORAL at 09:09

## 2022-08-17 RX ADMIN — Medication 30 MILLIGRAM(S): at 09:08

## 2022-08-17 RX ADMIN — Medication 1 TABLET(S): at 09:08

## 2022-08-17 RX ADMIN — TAMSULOSIN HYDROCHLORIDE 0.4 MILLIGRAM(S): 0.4 CAPSULE ORAL at 09:09

## 2022-08-17 NOTE — BH TREATMENT PLAN - NSTXDEPRESINTERMD_PSY_ALL_CORE
Starting Mirtazapine 7.5mg QHS. Continue psychoeducation. 
Psychoeducation and Pharmacotherapy. Continue AbilifyShaic. 
Psychoeducation and Pharmacotherapy. Continue AbilifyShaic.

## 2022-08-17 NOTE — BH INPATIENT PSYCHIATRY PROGRESS NOTE - NSBHCHARTREVIEWVS_PSY_A_CORE FT
Vital Signs Last 24 Hrs  T(C): 36.4 (08-17-22 @ 06:25), Max: 36.4 (08-17-22 @ 06:25)  T(F): 97.6 (08-17-22 @ 06:25), Max: 97.6 (08-17-22 @ 06:25)  HR: --  BP: --  BP(mean): --  RR: --  SpO2: --    Orthostatic VS  08-17-22 @ 06:25  Lying BP: --/-- HR: --  Sitting BP: 133/72 HR: 69  Standing BP: 133/74 HR: 80  Site: --  Mode: --  Orthostatic VS  08-16-22 @ 06:49  Lying BP: --/-- HR: --  Sitting BP: 133/79 HR: 73  Standing BP: 123/77 HR: --  Site: --  Mode: --

## 2022-08-17 NOTE — BH INPATIENT PSYCHIATRY PROGRESS NOTE - NSBHASSESSSUMMFT_PSY_ALL_CORE
71 y.o. male with hx of unusual relatedness including pattern of making up psychiatric symptoms or hx such as claiming he arranged a hit or his brother killed himself. Recently there has been escalating pattern of hospitalizations, ER visits. Compliance after d/c is nil. Patient also develops excessive attachment or intense dislike of providers and other staff. Patient has no actual hx of suicide attempts or fh or SIB. Patient with possible facititious disorder vs mood disorder. Suspect possible unconscious primary gain related to medical complaints help seeking then rejection of help as inadequate. Patient not assessed as needing CO. In light of collateral, will consider referral to psychotherapy program upon discharge if patient adherent to treatment plan.     7/13 -7/14: Patient reports complaints of his back itching and loose tooth. Patient continues to demonstrate black-or-white thinking and making conditional statements "if I do not get things the way I want, there is no point in living". Patient does not endorse suicidal ideations, and accepts that he tends to think about the worst outcomes at times. Patient encouraged to develop healthier pattern of thinking and to also focus on positives. Patient is future oriented, thinking about working with new , that he appears to like, and wants to be setup with appointments to receive the care. Pt does not endorse SI. Appears goal oriented, however also complains and becomes help rejecting. Patient encouraged to develop balanced thoughts. Patient continues to endorse conditions on receiving help only on his terms.   7/15: Pt continues to endorse passive SI, conditional on being able to obtain certain medical services in the future.   7/16: Depressed, overinclusive, negativistic and ruminative. Tolerating abilify.    7/17: No significant overnight events reported, pt taking meds, denies side effects. Continues to present catastrophic thinking, negative, endosing hopelessness and somatic preoccupation with his teeth, back itch and prostate. Denies acute SI but endorses thoughts that his life is not worth living contingent on being able to obtain his prostate surgery, being able to get access a ride and a  "that is up for the challenge". Will lower Mirtazapine to 7.5mg as pt continues to endorse difficulty sleeping and start Prozac, which pt reports he has benefitted from in the past. Increased Abilify to 5mg QD.   7/18-7/22: Pt continues to present with all-or-none thinking, endorsing somatic preoccupation with teeth and prostate. Patient appears to bring up other complaints such as his relationships when discussing plans for his current somatic complaints. No active SI reported, and hopelessness is contingent upon not getting staff that "can tolerate" him and getting his tooth extracted and receiving implants and receiving prostate surgery. Pt showed poor response to mirtazapine for insomnia, was switched to trazodone 50mg PO HS.  7/26: Some c/o of intermittent passive suicidal ideation, however affect had improved by the afternoon. Pt appears future oriented, wants to express his feelings to staff individually, however denies current SI, HI. No need for 1:1 at this time. Continue current medications.  7/27: No longer c/o SI. Appears to want to be able to express his frustrations without interjection by others. Appears future oriented, wanting to speak with his friend later. C/o of tremor, however no tremor noted. Will continue to monitor for EPS in light of change in aripiprazole. Orthostatic vitals noted. Will encourage fluids and continue to monitor.  7/28: Expresses frustration about not having the right  for his personality. Pt denied SI, HI. Encouraged to develop further frustration tolerance skills and healthier expressions of frustration. C/o somatic symptoms including SOB, however not noted to be short of breath while talking circumstantially. C/o of difficulty sleeping, can increase melatonin. Will avoid increasing trazodone in light of positive orthostatic vital signs.  7/29: After overnight incident of another peer taking his pillow, pt continued to focus on problems. 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 Pt's recent negative cognitive filter may be as a result of recent overnight incident, will continue to monitor. Pt requires case conference with  once assigned to have a concrete solution focused plan.   8/1: Pt making conditional passive SI if he were to go home and deal with his brother. Pt otherwise appears to be complaint driven, focused on his somatic issues including prostate and his arthritis. Pt's conditional threats appear to be his maladaptive way of relating to others. Does not require CO 1:1 at this time.   8/2: Pt denies SI, HI, however tends to make conditional threats when frustrated and discussing discharge home. Pt focused on somatic complaints. Pt's brother called and pt gave verbal consent to speak with brother to give update.   8/3: Pt appears prone to verbalizing frustration with report of feeling hopeless, despite being engaged in writing positive poems shortly before that. No SI reported. Pending  assignment to establish safe discharge plan. Will continue to monitor.  8/4: Verbalizes frustration and c/o his frustrations with his brother, also having positive future oriented goal including sharing poetry with [facility] staff. No SI reported. Pending care manager assignment.   8/5: Pt doing well with concern over dryness between toes. Will give lachydrin and reassess. Also complaining of sleep. Can consider increasing melatonin to 10mg HS. Pending  assignment.   8/8 - 8/12. Pt has been eating appropriately and visible around the unit. Pt has maladaptive behavior of verbalizing SI without plan when frustrated. Pt tends to display traits of wanting perfectionism in the fulfillment of his wants. Pt is pending meeting with  to have concrete plans upon discharge.     8/15: Pt denies SI. Appears to show tendencies of wanting to obtain perfectionism including having a "perfect" BP and missing out on romantic partner in his past. Pt is pending meeting with  for plans upon discharge. In light of orthostatic drop in BP, will hold nifedipine for today.   6/16: the patient continues to express depressed mood and concerns about possible discharge home with his brother.  Vague SI without plan or intent.  Behavior has been well controlled.  8/17: Denies SI, HI. Behavior under control and visible around the unit with peers. Remains externally focused with sharing poetry with friends and engaging in activities pleasing to pt.    Plan:  - Legals: 9.13  - Safety: Safety plan discussed. No need for CO 1:1 at this time.   - Psychiatry: Abilify 10mg QD, increase Prozac to 20mg QD. Trazodone 50mg QHS. Melatonin 6mg HS.  - Psychotherapy: Individual, group and milieu therapy as appropriate.  - Medical: Simethicone 80mg PO TID PRN flatulence. Calamine lotion TID for itching, Proscar 5mg PO daily for BPH, tamsulosin 0.4mg PO daily for overflow incontinence/BPH, oxybutynin 5mg PO daily for overactive bladder, simvastatin 20mg PO HS for HLD, nifedipine XL 30mg PO daily for HTN  - Patient noted with RLE swelling - seen by medicine - Dopplers negative for DVT.  - Dispo: Pending.

## 2022-08-17 NOTE — BH TREATMENT PLAN - NSTXCOPEINTERPR_PSY_ALL_CORE
Psychiatric rehabilitation staff will continue to encourage patient to attend daily groups and meet with staff individually, in order to identify 2 effective coping skills to manage sx within seven days.
Patient will identify and utilize two coping skills daily to manage his anxiety and depressive symptoms within seven days.

## 2022-08-17 NOTE — BH TREATMENT PLAN - NSTXPATIENTPARTICIPATE_PSY_ALL_CORE
Patient participated in identification of needs/problems/goals for treatment/Patient participated in defining interventions/Patient participated in development of after care plan
Patient participated in identification of needs/problems/goals for treatment/Patient participated in development of after care plan
Patient participated in identification of needs/problems/goals for treatment/Patient participated in defining interventions/Patient participated in development of after care plan

## 2022-08-17 NOTE — BH INPATIENT PSYCHIATRY PROGRESS NOTE - CURRENT MEDICATION
MEDICATIONS  (STANDING):  ammonium lactate 12% Lotion 1 Application(s) Topical two times a day  ARIPiprazole 10 milliGRAM(s) Oral daily  cholecalciferol 2000 Unit(s) Oral daily  finasteride 5 milliGRAM(s) Oral daily  FLUoxetine 20 milliGRAM(s) Oral daily  melatonin. 6 milliGRAM(s) Oral at bedtime  multivitamin      multivitamin 1 Tablet(s) Oral daily  NIFEdipine XL 30 milliGRAM(s) Oral daily  oxybutynin 5 milliGRAM(s) Oral daily  simvastatin 20 milliGRAM(s) Oral daily  sodium chloride 0.65% Nasal 1 Spray(s) Both Nostrils two times a day  tamsulosin 0.4 milliGRAM(s) Oral daily  traZODone 50 milliGRAM(s) Oral at bedtime    MEDICATIONS  (PRN):  acetaminophen     Tablet .. 650 milliGRAM(s) Oral every 6 hours PRN Mild Pain (1 - 3), Moderate Pain (4 - 6)  calamine/zinc oxide Lotion 1 Application(s) Topical three times a day PRN itching  calcium carbonate    500 mG (Tums) Chewable 1 Tablet(s) Chew daily PRN dyspepsia  haloperidol     Tablet 2.5 milliGRAM(s) Oral every 6 hours PRN agitation  haloperidol    Injectable 2.5 milliGRAM(s) IntraMuscular once PRN severe agitation  petrolatum white Ointment 1 Application(s) Topical three times a day PRN skin irritation  simethicone 80 milliGRAM(s) Chew three times a day PRN Gas

## 2022-08-17 NOTE — BH TREATMENT PLAN - NSTXPLANTHERAPYSESSIONSFT_PSY_ALL_CORE
08-11-22  Type of therapy: Other,Physical therapy  Type of session: Individual  Level of patient participation: Participates  --  Therapy conducted by: Other (specify),Physical therapy  Therapy Summary: Patient participated in therapeutic activity and gait training.    08-16-22  Type of therapy: Coping skills,Creative arts therapy,Health and fitness,Spirituality,Stress management  Type of session: Group  Level of patient participation: Participated with encouragement  Duration of participation: 45 minutes  Therapy conducted by: Psych rehab  Therapy Summary: In response to the COVID-19 outbreak [facility]. As a result it should be noted the unit activities and structure have been temporarily modified to promote safety of patients and staff. Patient over the past week made some gains. Patient with minimal encouragement attends most of the morning and afternoon rehab groups. During activities patient is generally cooperative, verbal and is able to stay task focused with moderate redirection. Patient is complying with his medications, eats his meals with the community and ambulates independently. Patient initiate social interaction with select peers and staff. However, patient remains negative, at times moderately irritable and somatically focus. Patient continues to state he does not want to live with his brother.  
  07-26-22  --  Type of session: Individual  Level of patient participation: Attentive,Engaged,Participates  Duration of participation: Less than 15 minutes  Therapy conducted by: Psych rehab  Therapy Summary: Patient remains essentially the same this week, with minimal change in functioning and sx over the past week. Patient reported to writer that he is "not good" and reported that he is "still depressed" and is not interestd in going to a "dessisted living" as he is concerned about his artwork that is in his apartment. Patient was mostly negativistic and help rejecting. Patient spoke mostly [facility], and how he is "not ready to go home". Patient denied AH/VH/SI/HI. Patient appears disheveled with poor grooming. Patient has not made progress with his rehabilitation goals. Patient did report that he enjoys writing poems and haikus but does not identify them as coping skills. Psych rehab staff will continue to provide ongoing support and encouragement.    07-29-22  Type of therapy: Other,Physical therapy  Type of session: Individual  Level of patient participation: Participates  --  Therapy conducted by: Other (specify),Physical therapy  Therapy Summary: Patient engaged in gait training and therapeutic activity.

## 2022-08-17 NOTE — BH TREATMENT PLAN - NSBHPRIMARYDX_PSY_ALL_CORE
Major psychotic depression, recurrent    

## 2022-08-17 NOTE — BH TREATMENT PLAN - NSTXNEUROGOAL_PSY_ALL_CORE
Will respond to therapeutic diversional/calming interventions 75% of the time when agitated and/or disruptive

## 2022-08-17 NOTE — BH INPATIENT PSYCHIATRY PROGRESS NOTE - NSBHFUPINTERVALHXFT_PSY_A_CORE
71 y.o. male, single, living with brother, presented to hospital for complaints of SI. No significant overnight events reported by staff. No PRNs needed. Pt has been adherent to medications and tolerating it well. B/l venous duplex was negative for DVT. Upon evaluation, pt reported doing okay. Pt reported on some mild itching on his forearm, no visible signs of erythema or rash. Pt has ammonium lactate for xerosis.  He has been visible on the unit, social with select peers. He also appeared keen on sharing poetry he had written for his friend Grace who is caring for her daughter. Pt reported being concerned to get his teeth fixed and possibly get fixed dentures. Pt encouraged to explore his options upon outpatient appointment with dentist. Pt also verbalizes difficulty getting along with his brother, but later reported that his brother's roommate is taking care of him, and pt values being the main tenant at the apartment. He denies current SI, HI.

## 2022-08-18 PROCEDURE — 99231 SBSQ HOSP IP/OBS SF/LOW 25: CPT

## 2022-08-18 RX ORDER — FLUOXETINE HCL 10 MG
30 CAPSULE ORAL DAILY
Refills: 0 | Status: DISCONTINUED | OUTPATIENT
Start: 2022-08-18 | End: 2022-08-25

## 2022-08-18 RX ADMIN — TAMSULOSIN HYDROCHLORIDE 0.4 MILLIGRAM(S): 0.4 CAPSULE ORAL at 09:24

## 2022-08-18 RX ADMIN — FINASTERIDE 5 MILLIGRAM(S): 5 TABLET, FILM COATED ORAL at 09:24

## 2022-08-18 RX ADMIN — Medication 50 MILLIGRAM(S): at 21:28

## 2022-08-18 RX ADMIN — ARIPIPRAZOLE 10 MILLIGRAM(S): 15 TABLET ORAL at 09:23

## 2022-08-18 RX ADMIN — Medication 1 SPRAY(S): at 21:29

## 2022-08-18 RX ADMIN — Medication 1 TABLET(S): at 09:23

## 2022-08-18 RX ADMIN — SIMVASTATIN 20 MILLIGRAM(S): 20 TABLET, FILM COATED ORAL at 09:23

## 2022-08-18 RX ADMIN — Medication 30 MILLIGRAM(S): at 09:24

## 2022-08-18 RX ADMIN — Medication 2000 UNIT(S): at 09:23

## 2022-08-18 RX ADMIN — Medication 20 MILLIGRAM(S): at 09:24

## 2022-08-18 RX ADMIN — Medication 1 SPRAY(S): at 09:23

## 2022-08-18 RX ADMIN — Medication 6 MILLIGRAM(S): at 21:24

## 2022-08-18 RX ADMIN — Medication 5 MILLIGRAM(S): at 09:24

## 2022-08-18 NOTE — BH INPATIENT PSYCHIATRY PROGRESS NOTE - CURRENT MEDICATION
MEDICATIONS  (STANDING):  ammonium lactate 12% Lotion 1 Application(s) Topical two times a day  ARIPiprazole 10 milliGRAM(s) Oral daily  cholecalciferol 2000 Unit(s) Oral daily  finasteride 5 milliGRAM(s) Oral daily  FLUoxetine 20 milliGRAM(s) Oral daily  melatonin. 6 milliGRAM(s) Oral at bedtime  multivitamin      multivitamin 1 Tablet(s) Oral daily  NIFEdipine XL 30 milliGRAM(s) Oral daily  oxybutynin 5 milliGRAM(s) Oral daily  simvastatin 20 milliGRAM(s) Oral daily  sodium chloride 0.65% Nasal 1 Spray(s) Both Nostrils two times a day  tamsulosin 0.4 milliGRAM(s) Oral daily  traZODone 50 milliGRAM(s) Oral at bedtime    MEDICATIONS  (PRN):  acetaminophen     Tablet .. 650 milliGRAM(s) Oral every 6 hours PRN Mild Pain (1 - 3), Moderate Pain (4 - 6)  calamine/zinc oxide Lotion 1 Application(s) Topical three times a day PRN itching  calcium carbonate    500 mG (Tums) Chewable 1 Tablet(s) Chew daily PRN dyspepsia  haloperidol     Tablet 2.5 milliGRAM(s) Oral every 6 hours PRN agitation  haloperidol    Injectable 2.5 milliGRAM(s) IntraMuscular once PRN severe agitation  petrolatum white Ointment 1 Application(s) Topical three times a day PRN skin irritation  simethicone 80 milliGRAM(s) Chew three times a day PRN Gas   MEDICATIONS  (STANDING):  ammonium lactate 12% Lotion 1 Application(s) Topical two times a day  ARIPiprazole 10 milliGRAM(s) Oral daily  cholecalciferol 2000 Unit(s) Oral daily  finasteride 5 milliGRAM(s) Oral daily  FLUoxetine 30 milliGRAM(s) Oral daily  melatonin. 6 milliGRAM(s) Oral at bedtime  multivitamin      multivitamin 1 Tablet(s) Oral daily  NIFEdipine XL 30 milliGRAM(s) Oral daily  oxybutynin 5 milliGRAM(s) Oral daily  simvastatin 20 milliGRAM(s) Oral daily  sodium chloride 0.65% Nasal 1 Spray(s) Both Nostrils two times a day  tamsulosin 0.4 milliGRAM(s) Oral daily  traZODone 50 milliGRAM(s) Oral at bedtime    MEDICATIONS  (PRN):  acetaminophen     Tablet .. 650 milliGRAM(s) Oral every 6 hours PRN Mild Pain (1 - 3), Moderate Pain (4 - 6)  calamine/zinc oxide Lotion 1 Application(s) Topical three times a day PRN itching  calcium carbonate    500 mG (Tums) Chewable 1 Tablet(s) Chew daily PRN dyspepsia  haloperidol     Tablet 2.5 milliGRAM(s) Oral every 6 hours PRN agitation  haloperidol    Injectable 2.5 milliGRAM(s) IntraMuscular once PRN severe agitation  petrolatum white Ointment 1 Application(s) Topical three times a day PRN skin irritation  simethicone 80 milliGRAM(s) Chew three times a day PRN Gas

## 2022-08-18 NOTE — BH INPATIENT PSYCHIATRY PROGRESS NOTE - NSBHCHARTREVIEWVS_PSY_A_CORE FT
Vital Signs Last 24 Hrs  T(C): 36.5 (08-18-22 @ 07:58), Max: 36.5 (08-18-22 @ 07:58)  T(F): 97.7 (08-18-22 @ 07:58), Max: 97.7 (08-18-22 @ 07:58)  HR: --  BP: --  BP(mean): --  RR: 16 (08-17-22 @ 19:54) (16 - 16)  SpO2: 100% (08-17-22 @ 19:54) (100% - 100%)    Orthostatic VS  08-18-22 @ 10:01  Lying BP: --/-- HR: --  Sitting BP: 109/70 HR: 82  Standing BP: 101/71 HR: 90  Site: upper left arm  Mode: electronic  Orthostatic VS  08-18-22 @ 07:58  Lying BP: --/-- HR: --  Sitting BP: 135/77 HR: 71  Standing BP: 113/78 HR: 79  Site: upper left arm  Mode: electronic  Orthostatic VS  08-17-22 @ 20:30  Lying BP: --/-- HR: --  Sitting BP: 152/72 HR: 72  Standing BP: --/-- HR: --  Site: --  Mode: --  Orthostatic VS  08-17-22 @ 06:25  Lying BP: --/-- HR: --  Sitting BP: 133/72 HR: 69  Standing BP: 133/74 HR: 80  Site: --  Mode: --

## 2022-08-18 NOTE — BH INPATIENT PSYCHIATRY PROGRESS NOTE - NSBHFUPINTERVALHXFT_PSY_A_CORE
71 y.o. male, single, living with brother, presented to hospital for complaints of SI. No significant overnight events reported by staff. No PRNs needed. Pt has been adherent to medications and tolerating it well. Upon evaluation, pt reported not feeling well. Pt c/o poor sleep waking up at 1 AM and working on his poetry since then, despite sleep log reflecting sleep through the night.  He has been visible on the unit, social with select peers. Pt is preoccupied with his somatic complaints however upon discussion of solution focused plan, becomes upset stating that he cannot leave and expresses conditional SI if he were to be discharged. Pt is also seeking another visit with his . He denies current SI, HI. 71 y.o. male, single, living with brother, presented to hospital for complaints of SI. No significant overnight events reported by staff. No PRNs needed. Pt has been adherent to medications and tolerating it well. Upon evaluation, pt reported not feeling well. Pt c/o poor sleep waking up at 1 AM and working on his poetry since then, despite sleep log reflecting sleep throughout most of the night.  He has been visible on the unit, social with select peers. Pt is preoccupied with his somatic complaints however upon discussion of solution focused plan, becomes upset stating that he cannot leave and expresses conditional SI if he were to be discharged. Pt is also seeking another visit with his . He denies current SI, HI.

## 2022-08-18 NOTE — BH INPATIENT PSYCHIATRY PROGRESS NOTE - NSBHASSESSSUMMFT_PSY_ALL_CORE
71 y.o. male with hx of unusual relatedness including pattern of making up psychiatric symptoms or hx such as claiming he arranged a hit or his brother killed himself. Recently there has been escalating pattern of hospitalizations, ER visits. Compliance after d/c is nil. Patient also develops excessive attachment or intense dislike of providers and other staff. Patient has no actual hx of suicide attempts or fh or SIB. Patient with possible facititious disorder vs mood disorder. Suspect possible unconscious primary gain related to medical complaints help seeking then rejection of help as inadequate. Patient not assessed as needing CO. In light of collateral, will consider referral to psychotherapy program upon discharge if patient adherent to treatment plan.     7/13 -7/14: Patient reports complaints of his back itching and loose tooth. Patient continues to demonstrate black-or-white thinking and making conditional statements "if I do not get things the way I want, there is no point in living". Patient does not endorse suicidal ideations, and accepts that he tends to think about the worst outcomes at times. Patient encouraged to develop healthier pattern of thinking and to also focus on positives. Patient is future oriented, thinking about working with new , that he appears to like, and wants to be setup with appointments to receive the care. Pt does not endorse SI. Appears goal oriented, however also complains and becomes help rejecting. Patient encouraged to develop balanced thoughts. Patient continues to endorse conditions on receiving help only on his terms.   7/15: Pt continues to endorse passive SI, conditional on being able to obtain certain medical services in the future.   7/16: Depressed, overinclusive, negativistic and ruminative. Tolerating abilify.    7/17: No significant overnight events reported, pt taking meds, denies side effects. Continues to present catastrophic thinking, negative, endosing hopelessness and somatic preoccupation with his teeth, back itch and prostate. Denies acute SI but endorses thoughts that his life is not worth living contingent on being able to obtain his prostate surgery, being able to get access a ride and a  "that is up for the challenge". Will lower Mirtazapine to 7.5mg as pt continues to endorse difficulty sleeping and start Prozac, which pt reports he has benefitted from in the past. Increased Abilify to 5mg QD.   7/18-7/22: Pt continues to present with all-or-none thinking, endorsing somatic preoccupation with teeth and prostate. Patient appears to bring up other complaints such as his relationships when discussing plans for his current somatic complaints. No active SI reported, and hopelessness is contingent upon not getting staff that "can tolerate" him and getting his tooth extracted and receiving implants and receiving prostate surgery. Pt showed poor response to mirtazapine for insomnia, was switched to trazodone 50mg PO HS.  7/26: Some c/o of intermittent passive suicidal ideation, however affect had improved by the afternoon. Pt appears future oriented, wants to express his feelings to staff individually, however denies current SI, HI. No need for 1:1 at this time. Continue current medications.  7/27: No longer c/o SI. Appears to want to be able to express his frustrations without interjection by others. Appears future oriented, wanting to speak with his friend later. C/o of tremor, however no tremor noted. Will continue to monitor for EPS in light of change in aripiprazole. Orthostatic vitals noted. Will encourage fluids and continue to monitor.  7/28: Expresses frustration about not having the right  for his personality. Pt denied SI, HI. Encouraged to develop further frustration tolerance skills and healthier expressions of frustration. C/o somatic symptoms including SOB, however not noted to be short of breath while talking circumstantially. C/o of difficulty sleeping, can increase melatonin. Will avoid increasing trazodone in light of positive orthostatic vital signs.  7/29: After overnight incident of another peer taking his pillow, pt continued to focus on problems. 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 Pt's recent negative cognitive filter may be as a result of recent overnight incident, will continue to monitor. Pt requires case conference with  once assigned to have a concrete solution focused plan.   8/1: Pt making conditional passive SI if he were to go home and deal with his brother. Pt otherwise appears to be complaint driven, focused on his somatic issues including prostate and his arthritis. Pt's conditional threats appear to be his maladaptive way of relating to others. Does not require CO 1:1 at this time.   8/2: Pt denies SI, HI, however tends to make conditional threats when frustrated and discussing discharge home. Pt focused on somatic complaints. Pt's brother called and pt gave verbal consent to speak with brother to give update.   8/3: Pt appears prone to verbalizing frustration with report of feeling hopeless, despite being engaged in writing positive poems shortly before that. No SI reported. Pending  assignment to establish safe discharge plan. Will continue to monitor.  8/4: Verbalizes frustration and c/o his frustrations with his brother, also having positive future oriented goal including sharing poetry with [employer] staff. No SI reported. Pending care manager assignment.   8/5: Pt doing well with concern over dryness between toes. Will give lachydrin and reassess. Also complaining of sleep. Can consider increasing melatonin to 10mg HS. Pending  assignment.   8/8 - 8/12. Pt has been eating appropriately and visible around the unit. Pt has maladaptive behavior of verbalizing SI without plan when frustrated. Pt tends to display traits of wanting perfectionism in the fulfillment of his wants. Pt is pending meeting with  to have concrete plans upon discharge.     8/15: Pt denies SI. Appears to show tendencies of wanting to obtain perfectionism including having a "perfect" BP and missing out on romantic partner in his past. Pt is pending meeting with  for plans upon discharge. In light of orthostatic drop in BP, will hold nifedipine for today.   6/16: the patient continues to express depressed mood and concerns about possible discharge home with his brother.  Vague SI without plan or intent.  Behavior has been well controlled.  8/17: Denies SI, HI. Behavior under control and visible around the unit with peers. Remains externally focused with sharing poetry with friends and engaging in activities pleasing to pt.  8/18: Pt tends to express conditional SI when frustrated and wants his needs addressed. Psychoeducation provided and will facilitate another meeting with . Pt c/o subjective insomnia that is not reflected in sleep log. Will increase prozac to 30mg PO daily for depressed mood.    Plan:  - Legals: 9.13  - Safety: Safety plan discussed. No need for CO 1:1 at this time.   - Psychiatry: Abilify 10mg QD, increase Prozac to 30mg QD. Trazodone 50mg QHS. Melatonin 6mg HS.  - Psychotherapy: Individual, group and milieu therapy as appropriate.  - Medical: Simethicone 80mg PO TID PRN flatulence. Calamine lotion TID for itching, Proscar 5mg PO daily for BPH, tamsulosin 0.4mg PO daily for overflow incontinence/BPH, oxybutynin 5mg PO daily for overactive bladder, simvastatin 20mg PO HS for HLD, nifedipine XL 30mg PO daily for HTN  - Patient noted with RLE swelling - seen by medicine - Dopplers negative for DVT.  - Dispo: Pending.

## 2022-08-19 PROCEDURE — 99231 SBSQ HOSP IP/OBS SF/LOW 25: CPT | Mod: GC

## 2022-08-19 RX ADMIN — TAMSULOSIN HYDROCHLORIDE 0.4 MILLIGRAM(S): 0.4 CAPSULE ORAL at 09:52

## 2022-08-19 RX ADMIN — Medication 5 MILLIGRAM(S): at 09:52

## 2022-08-19 RX ADMIN — Medication 650 MILLIGRAM(S): at 14:43

## 2022-08-19 RX ADMIN — Medication 6 MILLIGRAM(S): at 21:03

## 2022-08-19 RX ADMIN — Medication 30 MILLIGRAM(S): at 09:53

## 2022-08-19 RX ADMIN — Medication 30 MILLIGRAM(S): at 09:52

## 2022-08-19 RX ADMIN — FINASTERIDE 5 MILLIGRAM(S): 5 TABLET, FILM COATED ORAL at 09:52

## 2022-08-19 RX ADMIN — SIMETHICONE 80 MILLIGRAM(S): 80 TABLET, CHEWABLE ORAL at 12:57

## 2022-08-19 RX ADMIN — ARIPIPRAZOLE 10 MILLIGRAM(S): 15 TABLET ORAL at 09:52

## 2022-08-19 RX ADMIN — Medication 50 MILLIGRAM(S): at 21:03

## 2022-08-19 RX ADMIN — Medication 1 TABLET(S): at 09:51

## 2022-08-19 RX ADMIN — SIMVASTATIN 20 MILLIGRAM(S): 20 TABLET, FILM COATED ORAL at 09:52

## 2022-08-19 RX ADMIN — Medication 650 MILLIGRAM(S): at 13:23

## 2022-08-19 RX ADMIN — Medication 1 SPRAY(S): at 09:51

## 2022-08-19 RX ADMIN — Medication 2000 UNIT(S): at 09:51

## 2022-08-19 NOTE — BH INPATIENT PSYCHIATRY PROGRESS NOTE - NSBHCHARTREVIEWVS_PSY_A_CORE FT
Vital Signs Last 24 Hrs  T(C): 36.6 (08-19-22 @ 07:57), Max: 36.6 (08-19-22 @ 07:57)  T(F): 97.9 (08-19-22 @ 07:57), Max: 97.9 (08-19-22 @ 07:57)  HR: --  BP: --  BP(mean): --  RR: --  SpO2: --    Orthostatic VS  08-19-22 @ 07:57  Lying BP: --/-- HR: --  Sitting BP: 127/69 HR: 73  Standing BP: 126/63 HR: 80  Site: upper left arm  Mode: electronic  Orthostatic VS  08-18-22 @ 10:01  Lying BP: --/-- HR: --  Sitting BP: 109/70 HR: 82  Standing BP: 101/71 HR: 90  Site: upper left arm  Mode: electronic  Orthostatic VS  08-18-22 @ 07:58  Lying BP: --/-- HR: --  Sitting BP: 135/77 HR: 71  Standing BP: 113/78 HR: 79  Site: upper left arm  Mode: electronic  Orthostatic VS  08-17-22 @ 20:30  Lying BP: --/-- HR: --  Sitting BP: 152/72 HR: 72  Standing BP: --/-- HR: --  Site: --  Mode: --

## 2022-08-19 NOTE — BH INPATIENT PSYCHIATRY PROGRESS NOTE - NSBHASSESSSUMMFT_PSY_ALL_CORE
71 y.o. male with hx of unusual relatedness including pattern of making up psychiatric symptoms or hx such as claiming he arranged a hit or his brother killed himself. Recently there has been escalating pattern of hospitalizations, ER visits. Compliance after d/c is nil. Patient also develops excessive attachment or intense dislike of providers and other staff. Patient has no actual hx of suicide attempts or fh or SIB. Patient with possible facititious disorder vs mood disorder. Suspect possible unconscious primary gain related to medical complaints help seeking then rejection of help as inadequate. Patient not assessed as needing CO. In light of collateral, will consider referral to psychotherapy program upon discharge if patient adherent to treatment plan.     7/13 -7/14: Patient reports complaints of his back itching and loose tooth. Patient continues to demonstrate black-or-white thinking and making conditional statements "if I do not get things the way I want, there is no point in living". Patient does not endorse suicidal ideations, and accepts that he tends to think about the worst outcomes at times. Patient encouraged to develop healthier pattern of thinking and to also focus on positives. Patient is future oriented, thinking about working with new , that he appears to like, and wants to be setup with appointments to receive the care. Pt does not endorse SI. Appears goal oriented, however also complains and becomes help rejecting. Patient encouraged to develop balanced thoughts. Patient continues to endorse conditions on receiving help only on his terms.   7/15: Pt continues to endorse passive SI, conditional on being able to obtain certain medical services in the future.   7/16: Depressed, overinclusive, negativistic and ruminative. Tolerating abilify.    7/17: No significant overnight events reported, pt taking meds, denies side effects. Continues to present catastrophic thinking, negative, endosing hopelessness and somatic preoccupation with his teeth, back itch and prostate. Denies acute SI but endorses thoughts that his life is not worth living contingent on being able to obtain his prostate surgery, being able to get access a ride and a  "that is up for the challenge". Will lower Mirtazapine to 7.5mg as pt continues to endorse difficulty sleeping and start Prozac, which pt reports he has benefitted from in the past. Increased Abilify to 5mg QD.   7/18-7/22: Pt continues to present with all-or-none thinking, endorsing somatic preoccupation with teeth and prostate. Patient appears to bring up other complaints such as his relationships when discussing plans for his current somatic complaints. No active SI reported, and hopelessness is contingent upon not getting staff that "can tolerate" him and getting his tooth extracted and receiving implants and receiving prostate surgery. Pt showed poor response to mirtazapine for insomnia, was switched to trazodone 50mg PO HS.  7/26: Some c/o of intermittent passive suicidal ideation, however affect had improved by the afternoon. Pt appears future oriented, wants to express his feelings to staff individually, however denies current SI, HI. No need for 1:1 at this time. Continue current medications.  7/27: No longer c/o SI. Appears to want to be able to express his frustrations without interjection by others. Appears future oriented, wanting to speak with his friend later. C/o of tremor, however no tremor noted. Will continue to monitor for EPS in light of change in aripiprazole. Orthostatic vitals noted. Will encourage fluids and continue to monitor.  7/28: Expresses frustration about not having the right  for his personality. Pt denied SI, HI. Encouraged to develop further frustration tolerance skills and healthier expressions of frustration. C/o somatic symptoms including SOB, however not noted to be short of breath while talking circumstantially. C/o of difficulty sleeping, can increase melatonin. Will avoid increasing trazodone in light of positive orthostatic vital signs.  7/29: After overnight incident of another peer taking his pillow, pt continued to focus on problems. 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 Pt's recent negative cognitive filter may be as a result of recent overnight incident, will continue to monitor. Pt requires case conference with  once assigned to have a concrete solution focused plan.   8/1: Pt making conditional passive SI if he were to go home and deal with his brother. Pt otherwise appears to be complaint driven, focused on his somatic issues including prostate and his arthritis. Pt's conditional threats appear to be his maladaptive way of relating to others. Does not require CO 1:1 at this time.   8/2: Pt denies SI, HI, however tends to make conditional threats when frustrated and discussing discharge home. Pt focused on somatic complaints. Pt's brother called and pt gave verbal consent to speak with brother to give update.   8/3: Pt appears prone to verbalizing frustration with report of feeling hopeless, despite being engaged in writing positive poems shortly before that. No SI reported. Pending  assignment to establish safe discharge plan. Will continue to monitor.  8/4: Verbalizes frustration and c/o his frustrations with his brother, also having positive future oriented goal including sharing poetry with [employer] staff. No SI reported. Pending care manager assignment.   8/5: Pt doing well with concern over dryness between toes. Will give lachydrin and reassess. Also complaining of sleep. Can consider increasing melatonin to 10mg HS. Pending  assignment.   8/8 - 8/12. Pt has been eating appropriately and visible around the unit. Pt has maladaptive behavior of verbalizing SI without plan when frustrated. Pt tends to display traits of wanting perfectionism in the fulfillment of his wants. Pt is pending meeting with  to have concrete plans upon discharge.     8/15: Pt denies SI. Appears to show tendencies of wanting to obtain perfectionism including having a "perfect" BP and missing out on romantic partner in his past. Pt is pending meeting with  for plans upon discharge. In light of orthostatic drop in BP, will hold nifedipine for today.   6/16: the patient continues to express depressed mood and concerns about possible discharge home with his brother.  Vague SI without plan or intent.  Behavior has been well controlled.  8/17: Denies SI, HI. Behavior under control and visible around the unit with peers. Remains externally focused with sharing poetry with friends and engaging in activities pleasing to pt.  8/18: Pt tends to express conditional SI when frustrated and wants his needs addressed. Psychoeducation provided and will facilitate another meeting with . Pt c/o subjective insomnia that is not reflected in sleep log. Will increase prozac to 30mg PO daily for depressed mood.  8/19: Pt denies any acute complaints, remains focused on sharing poetry with [employer] staff and feels fulfilled with assisting or commenting on other peers. Will continue current treatment.     Plan:  - Legals: 9.13  - Safety: Safety plan discussed. No need for CO 1:1 at this time.   - Psychiatry: Abilify 10mg QD, increase Prozac to 30mg QD. Trazodone 50mg QHS. Melatonin 6mg HS.  - Psychotherapy: Individual, group and milieu therapy as appropriate.  - Medical: Simethicone 80mg PO TID PRN flatulence. Calamine lotion TID for itching, Proscar 5mg PO daily for BPH, tamsulosin 0.4mg PO daily for overflow incontinence/BPH, oxybutynin 5mg PO daily for overactive bladder, simvastatin 20mg PO HS for HLD, nifedipine XL 30mg PO daily for HTN  - Patient noted with RLE swelling - seen by medicine - Dopplers negative for DVT.  - Dispo: Pending.

## 2022-08-19 NOTE — BH INPATIENT PSYCHIATRY PROGRESS NOTE - NSBHFUPINTERVALHXFT_PSY_A_CORE
71 y.o. male, single, living with brother, presented to hospital for complaints of SI. No significant overnight events reported by staff. No PRNs needed. Pt has been adherent to medications and tolerating it well. Upon evaluation, pt appears to feel sense of accomplishment by helping peers on the unit and feels validated.  He has been visible on the unit, social with select peers. Pt is seeking to share his poetry with [employer] staff and appears future oriented but can become frustrated when plans of discharge are discussed. Pt still feels upset about his brother who lives at home but also remains ambivalent stating that his brother is "sicker than [patient], and does not have much long to live".

## 2022-08-19 NOTE — BH INPATIENT PSYCHIATRY PROGRESS NOTE - CURRENT MEDICATION
MEDICATIONS  (STANDING):  ammonium lactate 12% Lotion 1 Application(s) Topical two times a day  ARIPiprazole 10 milliGRAM(s) Oral daily  cholecalciferol 2000 Unit(s) Oral daily  finasteride 5 milliGRAM(s) Oral daily  FLUoxetine 30 milliGRAM(s) Oral daily  melatonin. 6 milliGRAM(s) Oral at bedtime  multivitamin      multivitamin 1 Tablet(s) Oral daily  NIFEdipine XL 30 milliGRAM(s) Oral daily  oxybutynin 5 milliGRAM(s) Oral daily  simvastatin 20 milliGRAM(s) Oral daily  sodium chloride 0.65% Nasal 1 Spray(s) Both Nostrils two times a day  tamsulosin 0.4 milliGRAM(s) Oral daily  traZODone 50 milliGRAM(s) Oral at bedtime    MEDICATIONS  (PRN):  acetaminophen     Tablet .. 650 milliGRAM(s) Oral every 6 hours PRN Mild Pain (1 - 3), Moderate Pain (4 - 6)  calamine/zinc oxide Lotion 1 Application(s) Topical three times a day PRN itching  calcium carbonate    500 mG (Tums) Chewable 1 Tablet(s) Chew daily PRN dyspepsia  haloperidol     Tablet 2.5 milliGRAM(s) Oral every 6 hours PRN agitation  haloperidol    Injectable 2.5 milliGRAM(s) IntraMuscular once PRN severe agitation  petrolatum white Ointment 1 Application(s) Topical three times a day PRN skin irritation  simethicone 80 milliGRAM(s) Chew three times a day PRN Gas

## 2022-08-20 RX ADMIN — ARIPIPRAZOLE 10 MILLIGRAM(S): 15 TABLET ORAL at 09:51

## 2022-08-20 RX ADMIN — Medication 1 SPRAY(S): at 09:53

## 2022-08-20 RX ADMIN — SIMVASTATIN 20 MILLIGRAM(S): 20 TABLET, FILM COATED ORAL at 09:52

## 2022-08-20 RX ADMIN — FINASTERIDE 5 MILLIGRAM(S): 5 TABLET, FILM COATED ORAL at 09:52

## 2022-08-20 RX ADMIN — Medication 1 APPLICATION(S): at 22:31

## 2022-08-20 RX ADMIN — Medication 1 APPLICATION(S): at 09:52

## 2022-08-20 RX ADMIN — Medication 1 SPRAY(S): at 22:31

## 2022-08-20 RX ADMIN — Medication 30 MILLIGRAM(S): at 09:51

## 2022-08-20 RX ADMIN — TAMSULOSIN HYDROCHLORIDE 0.4 MILLIGRAM(S): 0.4 CAPSULE ORAL at 09:52

## 2022-08-20 RX ADMIN — Medication 1 TABLET(S): at 09:52

## 2022-08-20 RX ADMIN — Medication 6 MILLIGRAM(S): at 22:30

## 2022-08-20 RX ADMIN — Medication 30 MILLIGRAM(S): at 09:52

## 2022-08-20 RX ADMIN — Medication 2000 UNIT(S): at 09:50

## 2022-08-20 RX ADMIN — Medication 5 MILLIGRAM(S): at 09:50

## 2022-08-20 RX ADMIN — Medication 50 MILLIGRAM(S): at 22:30

## 2022-08-21 RX ADMIN — FINASTERIDE 5 MILLIGRAM(S): 5 TABLET, FILM COATED ORAL at 09:57

## 2022-08-21 RX ADMIN — TAMSULOSIN HYDROCHLORIDE 0.4 MILLIGRAM(S): 0.4 CAPSULE ORAL at 09:57

## 2022-08-21 RX ADMIN — SIMVASTATIN 20 MILLIGRAM(S): 20 TABLET, FILM COATED ORAL at 09:58

## 2022-08-21 RX ADMIN — Medication 2000 UNIT(S): at 09:57

## 2022-08-21 RX ADMIN — Medication 1 SPRAY(S): at 09:57

## 2022-08-21 RX ADMIN — Medication 6 MILLIGRAM(S): at 20:56

## 2022-08-21 RX ADMIN — Medication 30 MILLIGRAM(S): at 09:58

## 2022-08-21 RX ADMIN — Medication 5 MILLIGRAM(S): at 09:58

## 2022-08-21 RX ADMIN — Medication 1 TABLET(S): at 09:58

## 2022-08-21 RX ADMIN — Medication 50 MILLIGRAM(S): at 20:56

## 2022-08-21 RX ADMIN — ARIPIPRAZOLE 10 MILLIGRAM(S): 15 TABLET ORAL at 09:58

## 2022-08-21 RX ADMIN — Medication 1 APPLICATION(S): at 09:57

## 2022-08-22 PROCEDURE — 90832 PSYTX W PT 30 MINUTES: CPT

## 2022-08-22 PROCEDURE — 99231 SBSQ HOSP IP/OBS SF/LOW 25: CPT | Mod: GC

## 2022-08-22 PROCEDURE — 99232 SBSQ HOSP IP/OBS MODERATE 35: CPT

## 2022-08-22 RX ORDER — KETOCONAZOLE 20 MG/G
1 AEROSOL, FOAM TOPICAL DAILY
Refills: 0 | Status: DISCONTINUED | OUTPATIENT
Start: 2022-08-22 | End: 2022-08-25

## 2022-08-22 RX ADMIN — FINASTERIDE 5 MILLIGRAM(S): 5 TABLET, FILM COATED ORAL at 08:57

## 2022-08-22 RX ADMIN — Medication 30 MILLIGRAM(S): at 08:56

## 2022-08-22 RX ADMIN — Medication 1 SPRAY(S): at 08:55

## 2022-08-22 RX ADMIN — SIMVASTATIN 20 MILLIGRAM(S): 20 TABLET, FILM COATED ORAL at 08:56

## 2022-08-22 RX ADMIN — Medication 1 TABLET(S): at 08:56

## 2022-08-22 RX ADMIN — TAMSULOSIN HYDROCHLORIDE 0.4 MILLIGRAM(S): 0.4 CAPSULE ORAL at 08:56

## 2022-08-22 RX ADMIN — Medication 1 SPRAY(S): at 20:24

## 2022-08-22 RX ADMIN — Medication 1 APPLICATION(S): at 20:24

## 2022-08-22 RX ADMIN — Medication 1 APPLICATION(S): at 20:25

## 2022-08-22 RX ADMIN — Medication 5 MILLIGRAM(S): at 08:56

## 2022-08-22 RX ADMIN — Medication 6 MILLIGRAM(S): at 20:23

## 2022-08-22 RX ADMIN — Medication 2000 UNIT(S): at 08:56

## 2022-08-22 RX ADMIN — ARIPIPRAZOLE 10 MILLIGRAM(S): 15 TABLET ORAL at 08:56

## 2022-08-22 RX ADMIN — Medication 50 MILLIGRAM(S): at 20:23

## 2022-08-22 NOTE — BH INPATIENT PSYCHIATRY PROGRESS NOTE - CURRENT MEDICATION
MEDICATIONS  (STANDING):  ammonium lactate 12% Lotion 1 Application(s) Topical two times a day  ARIPiprazole 10 milliGRAM(s) Oral daily  cholecalciferol 2000 Unit(s) Oral daily  finasteride 5 milliGRAM(s) Oral daily  FLUoxetine 30 milliGRAM(s) Oral daily  melatonin. 6 milliGRAM(s) Oral at bedtime  multivitamin      multivitamin 1 Tablet(s) Oral daily  NIFEdipine XL 30 milliGRAM(s) Oral daily  oxybutynin 5 milliGRAM(s) Oral daily  simvastatin 20 milliGRAM(s) Oral daily  sodium chloride 0.65% Nasal 1 Spray(s) Both Nostrils two times a day  tamsulosin 0.4 milliGRAM(s) Oral daily  traZODone 50 milliGRAM(s) Oral at bedtime    MEDICATIONS  (PRN):  acetaminophen     Tablet .. 650 milliGRAM(s) Oral every 6 hours PRN Mild Pain (1 - 3), Moderate Pain (4 - 6)  calamine/zinc oxide Lotion 1 Application(s) Topical three times a day PRN itching  calcium carbonate    500 mG (Tums) Chewable 1 Tablet(s) Chew daily PRN dyspepsia  haloperidol     Tablet 2.5 milliGRAM(s) Oral every 6 hours PRN agitation  haloperidol    Injectable 2.5 milliGRAM(s) IntraMuscular once PRN severe agitation  petrolatum white Ointment 1 Application(s) Topical three times a day PRN skin irritation  simethicone 80 milliGRAM(s) Chew three times a day PRN Gas   MEDICATIONS  (STANDING):  ammonium lactate 12% Lotion 1 Application(s) Topical two times a day  ARIPiprazole 10 milliGRAM(s) Oral daily  cholecalciferol 2000 Unit(s) Oral daily  clotrimazole 1% Cream 1 Application(s) Topical two times a day  finasteride 5 milliGRAM(s) Oral daily  FLUoxetine 30 milliGRAM(s) Oral daily  ketoconazole 2% Shampoo 1 Application(s) Topical daily  melatonin. 6 milliGRAM(s) Oral at bedtime  multivitamin      multivitamin 1 Tablet(s) Oral daily  NIFEdipine XL 30 milliGRAM(s) Oral daily  oxybutynin 5 milliGRAM(s) Oral daily  simvastatin 20 milliGRAM(s) Oral daily  sodium chloride 0.65% Nasal 1 Spray(s) Both Nostrils two times a day  tamsulosin 0.4 milliGRAM(s) Oral daily  traZODone 50 milliGRAM(s) Oral at bedtime    MEDICATIONS  (PRN):  acetaminophen     Tablet .. 650 milliGRAM(s) Oral every 6 hours PRN Mild Pain (1 - 3), Moderate Pain (4 - 6)  calamine/zinc oxide Lotion 1 Application(s) Topical three times a day PRN itching  calcium carbonate    500 mG (Tums) Chewable 1 Tablet(s) Chew daily PRN dyspepsia  haloperidol     Tablet 2.5 milliGRAM(s) Oral every 6 hours PRN agitation  haloperidol    Injectable 2.5 milliGRAM(s) IntraMuscular once PRN severe agitation  petrolatum white Ointment 1 Application(s) Topical three times a day PRN skin irritation  simethicone 80 milliGRAM(s) Chew three times a day PRN Gas

## 2022-08-22 NOTE — BH INPATIENT PSYCHIATRY PROGRESS NOTE - NSBHASSESSSUMMFT_PSY_ALL_CORE
71 y.o. male with hx of unusual relatedness including pattern of making up psychiatric symptoms or hx such as claiming he arranged a hit or his brother killed himself. Recently there has been escalating pattern of hospitalizations, ER visits. Compliance after d/c is nil. Patient also develops excessive attachment or intense dislike of providers and other staff. Patient has no actual hx of suicide attempts or fh or SIB. Patient with possible facititious disorder vs mood disorder. Suspect possible unconscious primary gain related to medical complaints help seeking then rejection of help as inadequate. Patient not assessed as needing CO. In light of collateral, will consider referral to psychotherapy program upon discharge if patient adherent to treatment plan.     7/13 -7/14: Patient reports complaints of his back itching and loose tooth. Patient continues to demonstrate black-or-white thinking and making conditional statements "if I do not get things the way I want, there is no point in living". Patient does not endorse suicidal ideations, and accepts that he tends to think about the worst outcomes at times. Patient encouraged to develop healthier pattern of thinking and to also focus on positives. Patient is future oriented, thinking about working with new , that he appears to like, and wants to be setup with appointments to receive the care. Pt does not endorse SI. Appears goal oriented, however also complains and becomes help rejecting. Patient encouraged to develop balanced thoughts. Patient continues to endorse conditions on receiving help only on his terms.   7/15: Pt continues to endorse passive SI, conditional on being able to obtain certain medical services in the future.   7/16: Depressed, overinclusive, negativistic and ruminative. Tolerating abilify.    7/17: No significant overnight events reported, pt taking meds, denies side effects. Continues to present catastrophic thinking, negative, endosing hopelessness and somatic preoccupation with his teeth, back itch and prostate. Denies acute SI but endorses thoughts that his life is not worth living contingent on being able to obtain his prostate surgery, being able to get access a ride and a  "that is up for the challenge". Will lower Mirtazapine to 7.5mg as pt continues to endorse difficulty sleeping and start Prozac, which pt reports he has benefitted from in the past. Increased Abilify to 5mg QD.   7/18-7/22: Pt continues to present with all-or-none thinking, endorsing somatic preoccupation with teeth and prostate. Patient appears to bring up other complaints such as his relationships when discussing plans for his current somatic complaints. No active SI reported, and hopelessness is contingent upon not getting staff that "can tolerate" him and getting his tooth extracted and receiving implants and receiving prostate surgery. Pt showed poor response to mirtazapine for insomnia, was switched to trazodone 50mg PO HS.  7/26: Some c/o of intermittent passive suicidal ideation, however affect had improved by the afternoon. Pt appears future oriented, wants to express his feelings to staff individually, however denies current SI, HI. No need for 1:1 at this time. Continue current medications.  7/27: No longer c/o SI. Appears to want to be able to express his frustrations without interjection by others. Appears future oriented, wanting to speak with his friend later. C/o of tremor, however no tremor noted. Will continue to monitor for EPS in light of change in aripiprazole. Orthostatic vitals noted. Will encourage fluids and continue to monitor.  7/28: Expresses frustration about not having the right  for his personality. Pt denied SI, HI. Encouraged to develop further frustration tolerance skills and healthier expressions of frustration. C/o somatic symptoms including SOB, however not noted to be short of breath while talking circumstantially. C/o of difficulty sleeping, can increase melatonin. Will avoid increasing trazodone in light of positive orthostatic vital signs.  7/29: After overnight incident of another peer taking his pillow, pt continued to focus on problems. 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 Pt's recent negative cognitive filter may be as a result of recent overnight incident, will continue to monitor. Pt requires case conference with  once assigned to have a concrete solution focused plan.   8/1: Pt making conditional passive SI if he were to go home and deal with his brother. Pt otherwise appears to be complaint driven, focused on his somatic issues including prostate and his arthritis. Pt's conditional threats appear to be his maladaptive way of relating to others. Does not require CO 1:1 at this time.   8/2: Pt denies SI, HI, however tends to make conditional threats when frustrated and discussing discharge home. Pt focused on somatic complaints. Pt's brother called and pt gave verbal consent to speak with brother to give update.   8/3: Pt appears prone to verbalizing frustration with report of feeling hopeless, despite being engaged in writing positive poems shortly before that. No SI reported. Pending  assignment to establish safe discharge plan. Will continue to monitor.  8/4: Verbalizes frustration and c/o his frustrations with his brother, also having positive future oriented goal including sharing poetry with [employer] staff. No SI reported. Pending care manager assignment.   8/5: Pt doing well with concern over dryness between toes. Will give lachydrin and reassess. Also complaining of sleep. Can consider increasing melatonin to 10mg HS. Pending  assignment.   8/8 - 8/12. Pt has been eating appropriately and visible around the unit. Pt has maladaptive behavior of verbalizing SI without plan when frustrated. Pt tends to display traits of wanting perfectionism in the fulfillment of his wants. Pt is pending meeting with  to have concrete plans upon discharge.     8/15: Pt denies SI. Appears to show tendencies of wanting to obtain perfectionism including having a "perfect" BP and missing out on romantic partner in his past. Pt is pending meeting with  for plans upon discharge. In light of orthostatic drop in BP, will hold nifedipine for today.   6/16: the patient continues to express depressed mood and concerns about possible discharge home with his brother.  Vague SI without plan or intent.  Behavior has been well controlled.  8/17: Denies SI, HI. Behavior under control and visible around the unit with peers. Remains externally focused with sharing poetry with friends and engaging in activities pleasing to pt.  8/18: Pt tends to express conditional SI when frustrated and wants his needs addressed. Psychoeducation provided and will facilitate another meeting with . Pt c/o subjective insomnia that is not reflected in sleep log. Will increase prozac to 30mg PO daily for depressed mood.  8/19: Pt denies any acute complaints, remains focused on sharing poetry with [employer] staff and feels fulfilled with assisting or commenting on other peers. Will continue current treatment.   8/22: Pt noted to have new onset rash of unclear etiology. Will consult hospitalist and/or derm for further recs. No other complaints. Continue current meds.    Plan:  - Legals: 9.13  - Safety: Safety plan discussed. No need for CO 1:1 at this time.   - Psychiatry: Abilify 10mg QD, Prozac 30mg QD. Trazodone 50mg QHS. Melatonin 6mg HS.  - Psychotherapy: Individual, group and milieu therapy as appropriate.  - Medical: Simethicone 80mg PO TID PRN flatulence. Calamine lotion TID for itching, Proscar 5mg PO daily for BPH, tamsulosin 0.4mg PO daily for overflow incontinence/BPH, oxybutynin 5mg PO daily for overactive bladder, simvastatin 20mg PO HS for HLD, nifedipine XL 30mg PO daily for HTN  - Patient noted with RLE swelling - seen by medicine - Dopplers negative for DVT.  - Dispo: Pending.

## 2022-08-22 NOTE — BH INPATIENT PSYCHIATRY PROGRESS NOTE - NSBHFUPINTERVALHXFT_PSY_A_CORE
71 y.o. male, single, living with brother, presented to hospital for complaints of SI. No significant overnight events reported by staff. No PRNs needed. Pt has been adherent to medications and tolerating it well. Upon evaluation patient reported having a new rash on his neck since yesterday with some itching that is less today. Pt had used some moisturizer which did not help with the itching. Patient initially reported he feels like he is waiting for death but later clarified that it is his way of expressing frustration and he denied any suicidal ideation, intent or plan. Patient appears to be looking forward to another meeting with .

## 2022-08-22 NOTE — PROGRESS NOTE ADULT - SUBJECTIVE AND OBJECTIVE BOX
CC/Reason for Consult: Lower extremity swelling     SUBJECTIVE / OVERNIGHT EVENTS: 71 year old male with Hx of BPH, HTN, HLD admitted for depression. Consulted for lower extremity edema, worse on R than Left. Patient states he has had lower extremity edema, more on the right leg ever since he fell on his right leg couple of years ago.     MEDICATIONS  (STANDING):  ammonium lactate 12% Lotion 1 Application(s) Topical two times a day  ARIPiprazole 10 milliGRAM(s) Oral daily  cholecalciferol 2000 Unit(s) Oral daily  finasteride 5 milliGRAM(s) Oral daily  melatonin. 6 milliGRAM(s) Oral at bedtime  multivitamin      multivitamin 1 Tablet(s) Oral daily  NIFEdipine XL 30 milliGRAM(s) Oral daily  oxybutynin 5 milliGRAM(s) Oral daily  simvastatin 20 milliGRAM(s) Oral daily  tamsulosin 0.4 milliGRAM(s) Oral daily  traZODone 50 milliGRAM(s) Oral at bedtime    MEDICATIONS  (PRN):  acetaminophen     Tablet .. 650 milliGRAM(s) Oral every 6 hours PRN Mild Pain (1 - 3), Moderate Pain (4 - 6)  calamine/zinc oxide Lotion 1 Application(s) Topical three times a day PRN itching  calcium carbonate    500 mG (Tums) Chewable 1 Tablet(s) Chew daily PRN dyspepsia  haloperidol     Tablet 2.5 milliGRAM(s) Oral every 6 hours PRN agitation  haloperidol    Injectable 2.5 milliGRAM(s) IntraMuscular once PRN severe agitation  petrolatum white Ointment 1 Application(s) Topical three times a day PRN skin irritation  simethicone 80 milliGRAM(s) Chew three times a day PRN Gas      Vital Signs Last 24 Hrs  T(C): 36.4 (16 Aug 2022 06:49), Max: 36.4 (16 Aug 2022 06:49)  T(F): 97.6 (16 Aug 2022 06:49), Max: 97.6 (16 Aug 2022 06:49)  BP: --133/79      CAPILLARY BLOOD GLUCOSE      PHYSICAL EXAM:  GENERAL: NAD, well-developed  HEAD:  Atraumatic, Normocephalic  EYES: EOMI, conjunctiva and sclera clear  NECK: Supple, No JVD  CHEST/LUNG: Clear to auscultation bilaterally; No wheeze  HEART: Regular rate and rhythm; No murmurs, rubs, or gallops  ABDOMEN: Soft, Nontender, Nondistended; Bowel sounds present  EXTREMITIES:  B/L nonpitting LE edema , R > L   PSYCH: AAOx3  NEUROLOGY: non-focal  SKIN: No rashes or lesions      
[facility] Division of Hospital Medicine  Ashok SearsDO  Available via MS Teams  In house pager [phone]    SUBJECTIVE / OVERNIGHT EVENTS:  Patient states he has had a rash for some time with no significant pruritus across his neck, for which he tried moisturizer. He also reported lesions on his back which bother him more so, but he did not specify in what sense.    ADDITIONAL REVIEW OF SYSTEMS:    MEDICATIONS  (STANDING):  ammonium lactate 12% Lotion 1 Application(s) Topical two times a day  ARIPiprazole 10 milliGRAM(s) Oral daily  cholecalciferol 2000 Unit(s) Oral daily  finasteride 5 milliGRAM(s) Oral daily  FLUoxetine 30 milliGRAM(s) Oral daily  melatonin. 6 milliGRAM(s) Oral at bedtime  multivitamin      multivitamin 1 Tablet(s) Oral daily  NIFEdipine XL 30 milliGRAM(s) Oral daily  oxybutynin 5 milliGRAM(s) Oral daily  simvastatin 20 milliGRAM(s) Oral daily  sodium chloride 0.65% Nasal 1 Spray(s) Both Nostrils two times a day  tamsulosin 0.4 milliGRAM(s) Oral daily  traZODone 50 milliGRAM(s) Oral at bedtime    MEDICATIONS  (PRN):  acetaminophen     Tablet .. 650 milliGRAM(s) Oral every 6 hours PRN Mild Pain (1 - 3), Moderate Pain (4 - 6)  calamine/zinc oxide Lotion 1 Application(s) Topical three times a day PRN itching  calcium carbonate    500 mG (Tums) Chewable 1 Tablet(s) Chew daily PRN dyspepsia  haloperidol     Tablet 2.5 milliGRAM(s) Oral every 6 hours PRN agitation  haloperidol    Injectable 2.5 milliGRAM(s) IntraMuscular once PRN severe agitation  petrolatum white Ointment 1 Application(s) Topical three times a day PRN skin irritation  simethicone 80 milliGRAM(s) Chew three times a day PRN Gas      I&O's Summary      PHYSICAL EXAM:  Vital Signs Last 24 Hrs  T(C): 36.4 (22 Aug 2022 08:06), Max: 36.4 (22 Aug 2022 08:06)  T(F): 97.6 (22 Aug 2022 08:06), Max: 97.6 (22 Aug 2022 08:06)  HR: --  BP: --  BP(mean): --  RR: --  SpO2: --      CONSTITUTIONAL: NAD, well-developed, well-groomed, elderly  EYES: PERRLA; conjunctiva and sclera clear  ENMT: Moist oral mucosa, no pharyngeal injection or exudates; normal dentition  NECK: Supple, no palpable masses; no thyromegaly  RESPIRATORY: Normal respiratory effort; lungs are clear to auscultation bilaterally  CARDIOVASCULAR: Regular rate and rhythm, normal S1 and S2, no murmur/rub/gallop; No lower extremity edema; Peripheral pulses are 2+ bilaterally  ABDOMEN: Nontender to palpation, normoactive bowel sounds, no rebound/guarding; No hepatosplenomegaly  MUSCULOSKELETAL:  Normal gait; no clubbing or cyanosis of digits; no joint swelling or tenderness to palpation  PSYCH: A+O to person, place, and time; affect appropriate  NEUROLOGY: CN 2-12 are intact and symmetric; no gross sensory deficits   SKIN: posterior neck erythematous, non-pruritic, non-indurated pink-colored lesions with some evidence of central sparing extending superiorly into scalp and anteriorly across lateral neck. Back lesions with spotted brown flecks easily removed with palpation by patient. Dry skin in affected regions with dandruff.    LABS:                    COVID-19 PCR: NotDetec (30 Jul 2022 10:41)  COVID-19 PCR: NotDetec (26 Jul 2022 21:38)  COVID-19 PCR: NotDetec (05 Jul 2022 19:08)

## 2022-08-22 NOTE — PROGRESS NOTE ADULT - ASSESSMENT
71 year old male with Hx of BPH, HTN, HLD admitted for depression. Consulted for neck rash.    Plan:  -Neck rash, likely seborrheic dermatitis of posterior neck and scalp  - Recommend ketoconazole shampoo for scalp and clotrimazole cream to neck    BPH: COntinue flomax/proscar, outpt urology f/u    HTN: Nifedipine    HLD: statin    Depression: Management per primary team

## 2022-08-22 NOTE — BH INPATIENT PSYCHIATRY PROGRESS NOTE - NSBHCHARTREVIEWVS_PSY_A_CORE FT
Vital Signs Last 24 Hrs  T(C): 36.4 (08-22-22 @ 08:06), Max: 36.4 (08-22-22 @ 08:06)  T(F): 97.6 (08-22-22 @ 08:06), Max: 97.6 (08-22-22 @ 08:06)  HR: --  BP: --  BP(mean): --  RR: --  SpO2: --    Orthostatic VS  08-22-22 @ 08:06  Lying BP: --/-- HR: --  Sitting BP: 120/82 HR: 70  Standing BP: 128/72 HR: 78  Site: --  Mode: --  Orthostatic VS  08-21-22 @ 06:40  Lying BP: --/-- HR: --  Sitting BP: 122/75 HR: 70  Standing BP: 113/73 HR: 85  Site: upper left arm  Mode: electronic

## 2022-08-23 PROCEDURE — 99231 SBSQ HOSP IP/OBS SF/LOW 25: CPT | Mod: GC

## 2022-08-23 RX ADMIN — Medication 5 MILLIGRAM(S): at 08:20

## 2022-08-23 RX ADMIN — Medication 1 SPRAY(S): at 08:21

## 2022-08-23 RX ADMIN — Medication 30 MILLIGRAM(S): at 08:20

## 2022-08-23 RX ADMIN — TAMSULOSIN HYDROCHLORIDE 0.4 MILLIGRAM(S): 0.4 CAPSULE ORAL at 08:20

## 2022-08-23 RX ADMIN — KETOCONAZOLE 1 APPLICATION(S): 20 AEROSOL, FOAM TOPICAL at 08:29

## 2022-08-23 RX ADMIN — Medication 1 SPRAY(S): at 21:18

## 2022-08-23 RX ADMIN — Medication 1 TABLET(S): at 08:20

## 2022-08-23 RX ADMIN — FINASTERIDE 5 MILLIGRAM(S): 5 TABLET, FILM COATED ORAL at 08:20

## 2022-08-23 RX ADMIN — Medication 1 APPLICATION(S): at 21:19

## 2022-08-23 RX ADMIN — Medication 650 MILLIGRAM(S): at 05:19

## 2022-08-23 RX ADMIN — Medication 1 APPLICATION(S): at 08:29

## 2022-08-23 RX ADMIN — Medication 50 MILLIGRAM(S): at 21:18

## 2022-08-23 RX ADMIN — Medication 1 APPLICATION(S): at 21:18

## 2022-08-23 RX ADMIN — Medication 2000 UNIT(S): at 08:20

## 2022-08-23 RX ADMIN — Medication 6 MILLIGRAM(S): at 21:18

## 2022-08-23 RX ADMIN — ARIPIPRAZOLE 10 MILLIGRAM(S): 15 TABLET ORAL at 08:20

## 2022-08-23 RX ADMIN — SIMVASTATIN 20 MILLIGRAM(S): 20 TABLET, FILM COATED ORAL at 08:20

## 2022-08-23 NOTE — BH INPATIENT PSYCHIATRY PROGRESS NOTE - NSBHCHARTREVIEWVS_PSY_A_CORE FT
Vital Signs Last 24 Hrs  T(C): 36.6 (08-23-22 @ 06:31), Max: 36.6 (08-23-22 @ 06:31)  T(F): 97.9 (08-23-22 @ 06:31), Max: 97.9 (08-23-22 @ 06:31)  HR: --  BP: --  BP(mean): --  RR: --  SpO2: --    Orthostatic VS  08-23-22 @ 06:31  Lying BP: --/-- HR: --  Sitting BP: 138/80 HR: 71  Standing BP: 142/79 HR: 78  Site: upper left arm  Mode: electronic  Orthostatic VS  08-22-22 @ 08:06  Lying BP: --/-- HR: --  Sitting BP: 120/82 HR: 70  Standing BP: 128/72 HR: 78  Site: --  Mode: --
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
## 2022-08-23 NOTE — BH INPATIENT PSYCHIATRY PROGRESS NOTE - CURRENT MEDICATION
MEDICATIONS  (STANDING):  ammonium lactate 12% Lotion 1 Application(s) Topical two times a day  ARIPiprazole 10 milliGRAM(s) Oral daily  cholecalciferol 2000 Unit(s) Oral daily  clotrimazole 1% Cream 1 Application(s) Topical two times a day  finasteride 5 milliGRAM(s) Oral daily  FLUoxetine 30 milliGRAM(s) Oral daily  ketoconazole 2% Shampoo 1 Application(s) Topical daily  melatonin. 6 milliGRAM(s) Oral at bedtime  multivitamin      multivitamin 1 Tablet(s) Oral daily  NIFEdipine XL 30 milliGRAM(s) Oral daily  oxybutynin 5 milliGRAM(s) Oral daily  simvastatin 20 milliGRAM(s) Oral daily  sodium chloride 0.65% Nasal 1 Spray(s) Both Nostrils two times a day  tamsulosin 0.4 milliGRAM(s) Oral daily  traZODone 50 milliGRAM(s) Oral at bedtime    MEDICATIONS  (PRN):  acetaminophen     Tablet .. 650 milliGRAM(s) Oral every 6 hours PRN Mild Pain (1 - 3), Moderate Pain (4 - 6)  calamine/zinc oxide Lotion 1 Application(s) Topical three times a day PRN itching  calcium carbonate    500 mG (Tums) Chewable 1 Tablet(s) Chew daily PRN dyspepsia  haloperidol     Tablet 2.5 milliGRAM(s) Oral every 6 hours PRN agitation  haloperidol    Injectable 2.5 milliGRAM(s) IntraMuscular once PRN severe agitation  petrolatum white Ointment 1 Application(s) Topical three times a day PRN skin irritation  simethicone 80 milliGRAM(s) Chew three times a day PRN Gas

## 2022-08-23 NOTE — BH INPATIENT PSYCHIATRY PROGRESS NOTE - NSBHFUPINTERVALHXFT_PSY_A_CORE
71 y.o. male, single, living with brother, presented to hospital for complaints of SI. No significant overnight events reported by staff. No PRNs needed. Pt has been adherent to medications and tolerating it well. Upon evaluation patient reported good today. Patient reported looking forward to getting his needs addressed upon discharge. Pt also reports rash is improving. Patient reported meeting with the care coordinator today and felt satisfied. Patient is wanting to share his poems with previously acquainted staff at [facility] and feels optimistic toward the future. Pt does report concern about his brother at home, but feels that he could appropriately step out of the house when his brother is bothering him.

## 2022-08-24 RX ORDER — KETOCONAZOLE 20 MG/G
1 AEROSOL, FOAM TOPICAL
Qty: 1 | Refills: 0
Start: 2022-08-24 | End: 2022-09-07

## 2022-08-24 RX ORDER — TAMSULOSIN HYDROCHLORIDE 0.4 MG/1
1 CAPSULE ORAL
Qty: 30 | Refills: 0
Start: 2022-08-24 | End: 2022-09-22

## 2022-08-24 RX ORDER — NIFEDIPINE 30 MG
1 TABLET, EXTENDED RELEASE 24 HR ORAL
Qty: 30 | Refills: 0
Start: 2022-08-24 | End: 2022-09-22

## 2022-08-24 RX ORDER — ARIPIPRAZOLE 15 MG/1
1 TABLET ORAL
Qty: 30 | Refills: 0
Start: 2022-08-24 | End: 2022-09-22

## 2022-08-24 RX ORDER — FINASTERIDE 5 MG/1
1 TABLET, FILM COATED ORAL
Qty: 30 | Refills: 0
Start: 2022-08-24 | End: 2022-09-22

## 2022-08-24 RX ORDER — LANOLIN ALCOHOL/MO/W.PET/CERES
2 CREAM (GRAM) TOPICAL
Qty: 60 | Refills: 0
Start: 2022-08-24 | End: 2022-09-22

## 2022-08-24 RX ORDER — OXYBUTYNIN CHLORIDE 5 MG
1 TABLET ORAL
Qty: 30 | Refills: 0
Start: 2022-08-24 | End: 2022-09-22

## 2022-08-24 RX ORDER — FLUOXETINE HCL 10 MG
3 CAPSULE ORAL
Qty: 90 | Refills: 0
Start: 2022-08-24 | End: 2022-09-22

## 2022-08-24 RX ORDER — CHOLECALCIFEROL (VITAMIN D3) 125 MCG
2000 CAPSULE ORAL
Qty: 30 | Refills: 0
Start: 2022-08-24 | End: 2022-09-22

## 2022-08-24 RX ORDER — SIMVASTATIN 20 MG/1
1 TABLET, FILM COATED ORAL
Qty: 30 | Refills: 0
Start: 2022-08-24 | End: 2022-09-22

## 2022-08-24 RX ORDER — TRAZODONE HCL 50 MG
1 TABLET ORAL
Qty: 30 | Refills: 0
Start: 2022-08-24 | End: 2022-09-22

## 2022-08-24 RX ADMIN — ARIPIPRAZOLE 10 MILLIGRAM(S): 15 TABLET ORAL at 09:49

## 2022-08-24 RX ADMIN — Medication 6 MILLIGRAM(S): at 21:11

## 2022-08-24 RX ADMIN — Medication 50 MILLIGRAM(S): at 21:11

## 2022-08-24 RX ADMIN — Medication 1 APPLICATION(S): at 09:55

## 2022-08-24 RX ADMIN — Medication 650 MILLIGRAM(S): at 21:46

## 2022-08-24 RX ADMIN — Medication 1 SPRAY(S): at 21:45

## 2022-08-24 RX ADMIN — Medication 30 MILLIGRAM(S): at 09:49

## 2022-08-24 RX ADMIN — Medication 2000 UNIT(S): at 09:49

## 2022-08-24 RX ADMIN — TAMSULOSIN HYDROCHLORIDE 0.4 MILLIGRAM(S): 0.4 CAPSULE ORAL at 09:48

## 2022-08-24 RX ADMIN — Medication 1 APPLICATION(S): at 21:45

## 2022-08-24 RX ADMIN — SIMVASTATIN 20 MILLIGRAM(S): 20 TABLET, FILM COATED ORAL at 09:49

## 2022-08-24 RX ADMIN — Medication 1 TABLET(S): at 09:49

## 2022-08-24 RX ADMIN — FINASTERIDE 5 MILLIGRAM(S): 5 TABLET, FILM COATED ORAL at 09:50

## 2022-08-24 RX ADMIN — Medication 5 MILLIGRAM(S): at 09:49

## 2022-08-24 RX ADMIN — Medication 1 SPRAY(S): at 09:50

## 2022-08-24 NOTE — BH INPATIENT PSYCHIATRY PROGRESS NOTE - NSBHATTESTSTAFFAMEND_PSY_A_CORE
I have personally seen and examined this patient. I fully participated in the care of this patient. I have made amendments to the documentation where appropriate and otherwise agree with the history, physical exam, and plan as documented by the

## 2022-08-24 NOTE — BH INPATIENT PSYCHIATRY PROGRESS NOTE - NSBHCHARTREVIEWVS_PSY_A_CORE FT
Vital Signs Last 24 Hrs  T(C): 36.7 (08-24-22 @ 07:23), Max: 36.7 (08-24-22 @ 07:23)  T(F): 98 (08-24-22 @ 07:23), Max: 98 (08-24-22 @ 07:23)  HR: 78 (08-24-22 @ 13:20) (78 - 78)  BP: --  BP(mean): --  RR: 16 (08-24-22 @ 13:20) (16 - 16)  SpO2: 97% (08-24-22 @ 13:20) (97% - 97%)    Orthostatic VS  08-24-22 @ 07:23  Lying BP: --/-- HR: --  Sitting BP: 125/72 HR: 84  Standing BP: 124/76 HR: 87  Site: --  Mode: --  Orthostatic VS  08-23-22 @ 06:31  Lying BP: --/-- HR: --  Sitting BP: 138/80 HR: 71  Standing BP: 142/79 HR: 78  Site: upper left arm  Mode: electronic   Vital Signs Last 24 Hrs  T(C): 36.6 (08-25-22 @ 07:58), Max: 36.6 (08-25-22 @ 07:58)  T(F): 97.9 (08-25-22 @ 07:58), Max: 97.9 (08-25-22 @ 07:58)  HR: 78 (08-24-22 @ 13:20) (78 - 78)  BP: --  BP(mean): --  RR: 16 (08-24-22 @ 13:20) (16 - 16)  SpO2: 97% (08-24-22 @ 13:20) (97% - 97%)    Orthostatic VS  08-25-22 @ 07:58  Lying BP: --/-- HR: --  Sitting BP: 138/81 HR: 88  Standing BP: 154/81 HR: 91  Site: upper left arm  Mode: electronic  Orthostatic VS  08-24-22 @ 07:23  Lying BP: --/-- HR: --  Sitting BP: 125/72 HR: 84  Standing BP: 124/76 HR: 87  Site: --  Mode: --

## 2022-08-24 NOTE — BH INPATIENT PSYCHIATRY PROGRESS NOTE - NSBHFUPINTERVALHXFT_PSY_A_CORE
71 y.o. male, single, living with brother, presented to hospital for complaints of SI. No significant overnight events reported by staff. No PRNs needed. Pt has been adherent to medications and tolerating it well. Patient had been complaining of returning home to his brother and was upset about that and had verbalized multiple somatic complaints and passive SI in that context. Pt makes conditional SI when discussing discharge, however has been noted to be attending groups and visible on the unit.  71 y.o. male, single, living with brother, presented to hospital for complaints of SI. No significant overnight events reported by staff. No PRNs needed. Pt has been adherent to medications and tolerating it well. Patient had been complaining of returning home to his brother and was upset about that and had verbalized multiple somatic complaints and passive SI in that context. Pt makes conditional SI when discussing discharge, however has been noted to be attending groups and visible on the unit. In addition, when exploring his suicidal statements pt denies having any intent or plan to do something to hurt himself and states this is "my way of expressing myself when Im frustrated". Pt also adds he felt upset because he had just tried calling his friend Grace and Jo from [facility] [employer] and was unable to reach either of them.
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
## 2022-08-24 NOTE — BH INPATIENT PSYCHIATRY PROGRESS NOTE - NSBHATTESTTYPESTAFF_PSY_A_CORE
Fellow
Resident
Fellow
Resident
Fellow
Resident

## 2022-08-24 NOTE — BH INPATIENT PSYCHIATRY PROGRESS NOTE - NSBHASSESSSUMMFT_PSY_ALL_CORE
71 y.o. male with hx of unusual relatedness including pattern of making up psychiatric symptoms or hx such as claiming he arranged a hit or his brother killed himself. Recently there has been escalating pattern of hospitalizations, ER visits. Compliance after d/c is nil. Patient also develops excessive attachment or intense dislike of providers and other staff. Patient has no actual hx of suicide attempts or fh or SIB. Patient with possible facititious disorder vs mood disorder. Suspect possible unconscious primary gain related to medical complaints help seeking then rejection of help as inadequate. Patient not assessed as needing CO. In light of collateral, will consider referral to psychotherapy program upon discharge if patient adherent to treatment plan.     7/13 -7/14: Patient reports complaints of his back itching and loose tooth. Patient continues to demonstrate black-or-white thinking and making conditional statements "if I do not get things the way I want, there is no point in living". Patient does not endorse suicidal ideations, and accepts that he tends to think about the worst outcomes at times. Patient encouraged to develop healthier pattern of thinking and to also focus on positives. Patient is future oriented, thinking about working with new , that he appears to like, and wants to be setup with appointments to receive the care. Pt does not endorse SI. Appears goal oriented, however also complains and becomes help rejecting. Patient encouraged to develop balanced thoughts. Patient continues to endorse conditions on receiving help only on his terms.   7/15: Pt continues to endorse passive SI, conditional on being able to obtain certain medical services in the future.   7/16: Depressed, overinclusive, negativistic and ruminative. Tolerating abilify.    7/17: No significant overnight events reported, pt taking meds, denies side effects. Continues to present catastrophic thinking, negative, endosing hopelessness and somatic preoccupation with his teeth, back itch and prostate. Denies acute SI but endorses thoughts that his life is not worth living contingent on being able to obtain his prostate surgery, being able to get access a ride and a  "that is up for the challenge". Will lower Mirtazapine to 7.5mg as pt continues to endorse difficulty sleeping and start Prozac, which pt reports he has benefitted from in the past. Increased Abilify to 5mg QD.   7/18-7/22: Pt continues to present with all-or-none thinking, endorsing somatic preoccupation with teeth and prostate. Patient appears to bring up other complaints such as his relationships when discussing plans for his current somatic complaints. No active SI reported, and hopelessness is contingent upon not getting staff that "can tolerate" him and getting his tooth extracted and receiving implants and receiving prostate surgery. Pt showed poor response to mirtazapine for insomnia, was switched to trazodone 50mg PO HS.  7/26: Some c/o of intermittent passive suicidal ideation, however affect had improved by the afternoon. Pt appears future oriented, wants to express his feelings to staff individually, however denies current SI, HI. No need for 1:1 at this time. Continue current medications.  7/27: No longer c/o SI. Appears to want to be able to express his frustrations without interjection by others. Appears future oriented, wanting to speak with his friend later. C/o of tremor, however no tremor noted. Will continue to monitor for EPS in light of change in aripiprazole. Orthostatic vitals noted. Will encourage fluids and continue to monitor.  7/28: Expresses frustration about not having the right  for his personality. Pt denied SI, HI. Encouraged to develop further frustration tolerance skills and healthier expressions of frustration. C/o somatic symptoms including SOB, however not noted to be short of breath while talking circumstantially. C/o of difficulty sleeping, can increase melatonin. Will avoid increasing trazodone in light of positive orthostatic vital signs.  7/29: After overnight incident of another peer taking his pillow, pt continued to focus on problems. 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 Pt's recent negative cognitive filter may be as a result of recent overnight incident, will continue to monitor. Pt requires case conference with  once assigned to have a concrete solution focused plan.   8/1: Pt making conditional passive SI if he were to go home and deal with his brother. Pt otherwise appears to be complaint driven, focused on his somatic issues including prostate and his arthritis. Pt's conditional threats appear to be his maladaptive way of relating to others. Does not require CO 1:1 at this time.   8/2: Pt denies SI, HI, however tends to make conditional threats when frustrated and discussing discharge home. Pt focused on somatic complaints. Pt's brother called and pt gave verbal consent to speak with brother to give update.   8/3: Pt appears prone to verbalizing frustration with report of feeling hopeless, despite being engaged in writing positive poems shortly before that. No SI reported. Pending  assignment to establish safe discharge plan. Will continue to monitor.  8/4: Verbalizes frustration and c/o his frustrations with his brother, also having positive future oriented goal including sharing poetry with [facility] staff. No SI reported. Pending care manager assignment.   8/5: Pt doing well with concern over dryness between toes. Will give lachydrin and reassess. Also complaining of sleep. Can consider increasing melatonin to 10mg HS. Pending  assignment.   8/8 - 8/12. Pt has been eating appropriately and visible around the unit. Pt has maladaptive behavior of verbalizing SI without plan when frustrated. Pt tends to display traits of wanting perfectionism in the fulfillment of his wants. Pt is pending meeting with  to have concrete plans upon discharge.     8/15: Pt denies SI. Appears to show tendencies of wanting to obtain perfectionism including having a "perfect" BP and missing out on romantic partner in his past. Pt is pending meeting with  for plans upon discharge. In light of orthostatic drop in BP, will hold nifedipine for today.   6/16: the patient continues to express depressed mood and concerns about possible discharge home with his brother.  Vague SI without plan or intent.  Behavior has been well controlled.  8/17: Denies SI, HI. Behavior under control and visible around the unit with peers. Remains externally focused with sharing poetry with friends and engaging in activities pleasing to pt.  8/18: Pt tends to express conditional SI when frustrated and wants his needs addressed. Psychoeducation provided and will facilitate another meeting with . Pt c/o subjective insomnia that is not reflected in sleep log. Will increase prozac to 30mg PO daily for depressed mood.  8/19: Pt denies any acute complaints, remains focused on sharing poetry with [facility] staff and feels fulfilled with assisting or commenting on other peers. Will continue current treatment.   8/22: Pt noted to have new onset rash of unclear etiology. Will consult hospitalist and/or derm for further recs. No other complaints. Continue current meds.  8/23: Pt feeling better. Will continue current medications.  8/24: Pt endorsing multiple somatic c/o and SI conditional upon discharge. Pt had been doing well until today, and had no acute stressor besides discharge. Pt does not appear to be better served with prolonged hospitalization. Pt will be engaged further to connect with outpatient resources including club[facility], case management services and utilizing coping skills.     Plan:  - Legals: 9.13  - Safety: Safety plan discussed. No need for CO 1:1 at this time.   - Psychiatry: Abilify 10mg QD, Prozac 30mg QD. Trazodone 50mg QHS. Melatonin 6mg HS.  - Psychotherapy: Individual, group and milieu therapy as appropriate.  - Medical: Simethicone 80mg PO TID PRN flatulence. Calamine lotion TID for itching, Proscar 5mg PO daily for BPH, tamsulosin 0.4mg PO daily for overflow incontinence/BPH, oxybutynin 5mg PO daily for overactive bladder, simvastatin 20mg PO HS for HLD, nifedipine XL 30mg PO daily for HTN  - Patient noted with RLE swelling - seen by medicine - Dopplers negative for DVT.  - Dispo: Pending.

## 2022-08-25 VITALS — TEMPERATURE: 98 F

## 2022-08-25 PROCEDURE — 99231 SBSQ HOSP IP/OBS SF/LOW 25: CPT

## 2022-08-25 PROCEDURE — 90853 GROUP PSYCHOTHERAPY: CPT

## 2022-08-25 PROCEDURE — 90832 PSYTX W PT 30 MINUTES: CPT | Mod: 59

## 2022-08-25 RX ADMIN — KETOCONAZOLE 1 APPLICATION(S): 20 AEROSOL, FOAM TOPICAL at 09:21

## 2022-08-25 RX ADMIN — Medication 30 MILLIGRAM(S): at 09:22

## 2022-08-25 RX ADMIN — Medication 1 APPLICATION(S): at 09:21

## 2022-08-25 RX ADMIN — Medication 650 MILLIGRAM(S): at 12:16

## 2022-08-25 RX ADMIN — FINASTERIDE 5 MILLIGRAM(S): 5 TABLET, FILM COATED ORAL at 09:22

## 2022-08-25 RX ADMIN — Medication 2000 UNIT(S): at 09:22

## 2022-08-25 RX ADMIN — Medication 5 MILLIGRAM(S): at 09:22

## 2022-08-25 RX ADMIN — Medication 1 TABLET(S): at 09:21

## 2022-08-25 RX ADMIN — SIMVASTATIN 20 MILLIGRAM(S): 20 TABLET, FILM COATED ORAL at 09:22

## 2022-08-25 RX ADMIN — Medication 1 SPRAY(S): at 09:21

## 2022-08-25 RX ADMIN — ARIPIPRAZOLE 10 MILLIGRAM(S): 15 TABLET ORAL at 09:21

## 2022-08-25 RX ADMIN — TAMSULOSIN HYDROCHLORIDE 0.4 MILLIGRAM(S): 0.4 CAPSULE ORAL at 09:22

## 2022-08-25 NOTE — BH INPATIENT PSYCHIATRY PROGRESS NOTE - NSBHPROGSUIC_PSY_A_CORE
no

## 2022-08-25 NOTE — BH DISCHARGE NOTE NURSING/SOCIAL WORK/PSYCH REHAB - NSDCPRRECOMMEND_PSY_ALL_CORE
Patient is scheduled to be discharged today and will return to his previous residence where he lives with his brother. Writer encouraged patient to maintain medication compliance, to utilize his coping skills and to participate in structured activities.

## 2022-08-25 NOTE — BH INPATIENT PSYCHIATRY PROGRESS NOTE - NSBHMSEATTEN_PSY_A_CORE
Impaired

## 2022-08-25 NOTE — BH PSYCHOLOGY - CLINICIAN PSYCHOTHERAPY NOTE - NSTXDEPRESGOAL_PSY_ALL_CORE
Exhibit improvements in self-grooming, hygiene, sleep and appetite
Will identify 2 coping skills that assist in improving mood
Exhibit improvements in self-grooming, hygiene, sleep and appetite
Will identify 2 coping skills that assist in improving mood
Will identify 2 coping skills that assist in improving mood

## 2022-08-25 NOTE — BH PSYCHOLOGY - CLINICIAN PSYCHOTHERAPY NOTE - NSTXDISORGDATEEST_PSY_ALL_CORE
06-Jul-2022
11-Aug-2022
06-Jul-2022
11-Aug-2022

## 2022-08-25 NOTE — BH INPATIENT PSYCHIATRY PROGRESS NOTE - NSBHCONSDANGERSELF_PSY_A_CORE
suicidal ideation with plan and means

## 2022-08-25 NOTE — BH INPATIENT PSYCHIATRY PROGRESS NOTE - NSBHMETABOLICLABS_PSY_ALL_CORE
Labs within last 12 months

## 2022-08-25 NOTE — BH PSYCHOLOGY - GROUP THERAPY NOTE - NSBHPSYCHOLADDL_PSY_A_CORE
Patients were encouraged to discuss concerns and observations about recent events on the unit or in their lives that are cause for distress and to exchange views. Emphasis was on coping, offering support and generating alternatives for problems and challenges. Group members discussed recent reactions to stresses including stigma over diagnoses, anxiety about discharge, and being in a psychiatric hospital and interacting with other patients. Group members were able to listen to each other and receive encouragement, validation and acceptance.

## 2022-08-25 NOTE — BH PSYCHOLOGY - CLINICIAN PSYCHOTHERAPY NOTE - NSBHPSYCHOLBILLFAM_PSY_A_CORE
05171 - 53 minutes and above
46014 - 53 minutes and above
64552 - 53 minutes and above
46479 - 38 to 52 minutes
98878 - 16 to 37 minutes
20358 - 38 to 52 minutes
22830 - 38 to 52 minutes
58172 - 16 to 37 minutes
87884 - 38 to 52 minutes

## 2022-08-25 NOTE — BH INPATIENT PSYCHIATRY PROGRESS NOTE - NSTXDEPRESGOAL_PSY_ALL_CORE
Will identify 2 coping skills that assist in improving mood
Exhibit improvements in self-grooming, hygiene, sleep and appetite
Will identify 2 coping skills that assist in improving mood
Will identify 2 coping skills that assist in improving mood
Exhibit improvements in self-grooming, hygiene, sleep and appetite
Will identify 2 coping skills that assist in improving mood
Exhibit improvements in self-grooming, hygiene, sleep and appetite
Will identify 2 coping skills that assist in improving mood
Exhibit improvements in self-grooming, hygiene, sleep and appetite
Will identify 2 coping skills that assist in improving mood
Exhibit improvements in self-grooming, hygiene, sleep and appetite
Will identify 2 coping skills that assist in improving mood
Exhibit improvements in self-grooming, hygiene, sleep and appetite

## 2022-08-25 NOTE — BH INPATIENT PSYCHIATRY PROGRESS NOTE - NS ED BHA REVIEW OF ED CHART AVAILABLE LABS REVIEWED
None available
Yes
Yes
None available
Yes
Yes
None available
Yes
None available
None available
Yes
None available

## 2022-08-25 NOTE — BH INPATIENT PSYCHIATRY PROGRESS NOTE - NSBHMSEHYG_PSY_A_CORE
Fair

## 2022-08-25 NOTE — BH INPATIENT PSYCHIATRY PROGRESS NOTE - NSBHMSEKNOWHOW_PSY_ALL_CORE
Current Events/Vocabulary
Vocabulary
Current Events/Vocabulary
Vocabulary
Current Events/Vocabulary
Vocabulary
Current Events/Vocabulary
Current Events/Vocabulary
Vocabulary
Vocabulary
Current Events/Vocabulary
Vocabulary
Current Events/Vocabulary
Vocabulary
Current Events/Vocabulary
Vocabulary
Vocabulary
Current Events/Vocabulary

## 2022-08-25 NOTE — BH PSYCHOLOGY - CLINICIAN PSYCHOTHERAPY NOTE - NSBHPSYCHOLPROBS_PSY_ALL_CORE
Depression
Depression
Anxiety/Depression
Depression
Depression/Psychosis
Anxiety/Depression
Depression
Depression
Anxiety/Depression

## 2022-08-25 NOTE — BH INPATIENT PSYCHIATRY PROGRESS NOTE - NSTXDCOPNODATETRGT_PSY_ALL_CORE
03-Aug-2022
14-Jul-2022
01-Aug-2022
01-Sep-2022
14-Jul-2022
21-Jul-2022
25-Aug-2022
01-Aug-2022
14-Jul-2022
16-Aug-2022
14-Jul-2022
21-Jul-2022
16-Aug-2022
01-Aug-2022
21-Jul-2022
03-Aug-2022
16-Aug-2022
21-Jul-2022
21-Jul-2022
01-Aug-2022
14-Jul-2022
21-Jul-2022
25-Aug-2022
25-Aug-2022
21-Jul-2022
01-Aug-2022
03-Aug-2022
16-Aug-2022
16-Aug-2022
14-Jul-2022
01-Aug-2022
14-Jul-2022
16-Aug-2022
21-Jul-2022
01-Aug-2022
14-Jul-2022
16-Aug-2022
25-Aug-2022
21-Jul-2022
25-Aug-2022

## 2022-08-25 NOTE — BH INPATIENT PSYCHIATRY PROGRESS NOTE - NSTXDISORGDATEEST_PSY_ALL_CORE
06-Jul-2022
11-Aug-2022
06-Jul-2022
11-Aug-2022
06-Jul-2022
11-Aug-2022
06-Jul-2022
11-Aug-2022
06-Jul-2022
11-Aug-2022
06-Jul-2022
11-Aug-2022
06-Jul-2022
11-Aug-2022
06-Jul-2022
06-Jul-2022
11-Aug-2022
06-Jul-2022
11-Aug-2022
06-Jul-2022

## 2022-08-25 NOTE — BH INPATIENT PSYCHIATRY PROGRESS NOTE - TELEPSYCHIATRY?
No

## 2022-08-25 NOTE — BH INPATIENT PSYCHIATRY PROGRESS NOTE - NSBHATTESTBILLINGAW_PSY_A_CORE
[id number]-Afuolxwuik Inpatient care - low complexity - 15 minutes
83433-Akritikclx Inpatient care - moderate complexity - 25 minutes
31794-Csmcfubwfn Inpatient care - moderate complexity - 25 minutes
36983-Wehrmbjzsm Inpatient care - moderate complexity - 25 minutes
46876-Mgohmazpwq Inpatient care - moderate complexity - 25 minutes
99421-Wghtqzegcn Inpatient care - moderate complexity - 25 minutes
66329-Jmvwmhkvwi Inpatient care - moderate complexity - 25 minutes
65276-Qfwyvuaoqs Inpatient care - moderate complexity - 25 minutes
28370-Ilrkmyjafz Inpatient care - low complexity - 15 minutes
57382-Xtiosgjldt Inpatient care - moderate complexity - 25 minutes
45382-Kangxztcuw Inpatient care - moderate complexity - 25 minutes
63528-Pojkukabzj Inpatient care - moderate complexity - 25 minutes
61724-Ikmpgjyxzm Inpatient care - moderate complexity - 25 minutes
43439-Btitjljviv Inpatient care - moderate complexity - 25 minutes
76631-Mkltgpylxb Inpatient care - moderate complexity - 25 minutes
85063-Xwpysmlzap Inpatient care - moderate complexity - 25 minutes
67575-Usfcwynwij Inpatient care - moderate complexity - 25 minutes
98283-Vjdvqmtizx Inpatient care - low complexity - 15 minutes
29045-Iunysogrma Inpatient care - moderate complexity - 25 minutes
21200-Ykpvveqzdc Inpatient care - low complexity - 15 minutes
52275-Mnartkiflj Inpatient care - low complexity - 15 minutes
53121-Lxsrqmfgge Inpatient care - low complexity - 15 minutes
96055-Jrmwmnwmhz Inpatient care - low complexity - 15 minutes
08622-Zfrljzwynt Inpatient care - low complexity - 15 minutes
07862-Qtgwsixcza Inpatient care - low complexity - 15 minutes
16237-Bmobrfvnux Inpatient care - low complexity - 15 minutes
21832-Gazjrobsxb Inpatient care - low complexity - 15 minutes
71637-Rfcrunvlkt Inpatient care - low complexity - 15 minutes
30850-Nsazbumdja Inpatient care - low complexity - 15 minutes
14566-Icuymbihhm Inpatient care - moderate complexity - 25 minutes
00882-Vpojjouwhl Inpatient care - low complexity - 15 minutes
48319-Ktvyjrzkqb Inpatient care - low complexity - 15 minutes
77228-Ddytcfmavi Inpatient care - low complexity - 15 minutes
79743-Mwbzfatqei Inpatient care - moderate complexity - 25 minutes
61070-Hwxoybpegd Inpatient care - low complexity - 15 minutes
85502-Otlavyfnqe Inpatient care - moderate complexity - 25 minutes

## 2022-08-25 NOTE — BH INPATIENT PSYCHIATRY PROGRESS NOTE - OTHER
Pt preoccupied with somatic and psychosocial stressors. Patient denied current suicidal ideations, intent or plan.
Pt preoccupied with somatic and psychosocial stressors. Patient denied current suicidal ideations, intent or plan.
somatic, main focus on prostate issues. Patient denies plan or intent to harm self here but may at home if not feeling better
Pt preoccupied with somatic and psychosocial stressors. Patient denied current suicidal ideations, intent or plan.
Pt preoccupied with somatic and psychosocial stressors. Patient denied current suicidal ideations, intent or plan but reports having poor frustration tolerance.
Pt preoccupied with somatic and psychosocial stressors. Patient denied current suicidal ideations, intent or plan.
somatic
Pt preoccupied with somatic and psychosocial stressors. Patient denied current suicidal ideations, intent or plan.
Pt preoccupied with somatic and psychosocial stressors. Expressed vague suicidal ideations related to discharge without intent or plan.
Pt preoccupied with somatic and psychosocial stressors. Patient denied current suicidal ideations, intent or plan.
somatic
somatic. Patient denies plan intent to harm self here but may at home
Pt preoccupied with somatic and psychosocial stressors. Patient denied current suicidal ideations, intent or plan.
Pt preoccupied with somatic and psychosocial stressors. Expressed vague suicidal ideations related to discharge without intent or plan.
Pt preoccupied with somatic and psychosocial stressors. Patient denied current suicidal ideations, intent or plan.
Pt preoccupied with somatic and psychosocial stressors. Expressed vague suicidal ideations related to discharge without intent or plan.
Pt preoccupied with somatic and psychosocial stressors. Expressed vague suicidal ideations related to discharge without intent or plan.
Pt preoccupied with somatic and psychosocial stressors. Patient denied current suicidal ideations, intent or plan but reports having poor frustration tolerance.
Pt preoccupied with somatic and psychosocial stressors. Patient denied current suicidal ideations, intent or plan.
Pt preoccupied with somatic and psychosocial stressors. Expressed vague suicidal ideations related to discharge without intent or plan.
Pt preoccupied with somatic and psychosocial stressors. Expressed vague suicidal ideations related to discharge without intent or plan.
Pt preoccupied with somatic and psychosocial stressors. Patient denied current suicidal ideations, intent or plan.
Pt preoccupied with somatic and psychosocial stressors. Patient denied current suicidal ideations, intent or plan.
Pt preoccupied with somatic and psychosocial stressors. Expressed vague suicidal ideations related to discharge without intent or plan.
Pt preoccupied with somatic and psychosocial stressors. Patient denied current suicidal ideations, intent or plan but reports having poor frustration tolerance.
Pt preoccupied with somatic and psychosocial stressors. Expressed vague suicidal ideations related to discharge without intent or plan.
Pt preoccupied with somatic and psychosocial stressors. Patient denied current suicidal ideations, intent or plan but reports having poor frustration tolerance.
Pt preoccupied with somatic and psychosocial stressors. Patient denied current suicidal ideations, intent or plan but reports having poor frustration tolerance.
Pt preoccupied with somatic and psychosocial stressors. Patient denied current suicidal ideations, intent or plan.

## 2022-08-25 NOTE — BH PSYCHOLOGY - CLINICIAN PSYCHOTHERAPY NOTE - NSBHPSYCHOLASSESSPROV_PSY_A_CORE
Licensed Psychologist

## 2022-08-25 NOTE — BH PSYCHOLOGY - CLINICIAN PSYCHOTHERAPY NOTE - NSTXDCOPNODATEEST_PSY_ALL_CORE
07-Jul-2022
25-Jul-2022
25-Aug-2022
09-Aug-2022
09-Aug-2022
03-Aug-2022
25-Jul-2022
14-Jul-2022
18-Aug-2022

## 2022-08-25 NOTE — BH PSYCHOLOGY - CLINICIAN PSYCHOTHERAPY NOTE - NSBHPSYCHOLRESPONSE_PSY_A_CORE
Accepted support
Symptoms reduced/Coping skills acquired/Accepted support
Symptoms reduced/Coping skills acquired/Accepted support
Accepted support
Symptoms reduced/Coping skills acquired/Accepted support
Symptoms reduced/Accepted support
Accepted support
Accepted support

## 2022-08-25 NOTE — BH INPATIENT PSYCHIATRY PROGRESS NOTE - NSBHMSEBEHAV_PSY_A_CORE
Cooperative
Uncooperative
Uncooperative
Cooperative
Cooperative
Uncooperative
Cooperative
Uncooperative
Cooperative
Uncooperative
Cooperative
Uncooperative
Uncooperative
Cooperative
Uncooperative
Cooperative
Uncooperative
Cooperative

## 2022-08-25 NOTE — BH INPATIENT PSYCHIATRY DISCHARGE NOTE - NSBHDCMEDICALFT_PSY_A_CORE
Pt has medical problem of urinary incontinence, was treated with home medications of Finasteride 5mg PO daily, oxybutynin 5mg po daily and tamsulosin 0.4mg pO HS.  HTN - Procardia XL 30mg PO daily  HLD - Simvastatin 20mg PO HS.

## 2022-08-25 NOTE — BH PSYCHOLOGY - CLINICIAN PSYCHOTHERAPY NOTE - NSBHPSYCHOLGOALS_PSY_A_CORE
Decrease symptoms/Improve social/vocational/coping skills/Psychoeducation
Assessment/Prevent relapse/Psychoeducation
Decrease symptoms/Improve social/vocational/coping skills/Psychoeducation
Decrease symptoms/Improve social/vocational/coping skills/Psychoeducation
Improve social/vocational/coping skills/Prevent relapse/Psychoeducation
Decrease symptoms/Improve social/vocational/coping skills/Psychoeducation
Assessment/Psychoeducation
Improve social/vocational/coping skills/Psychoeducation
Improve social/vocational/coping skills/Prevent relapse/Psychoeducation/Treatment compliance

## 2022-08-25 NOTE — BH INPATIENT PSYCHIATRY PROGRESS NOTE - NSBHMSEEYE_PSY_A_CORE
Fair
Poor
Fair

## 2022-08-25 NOTE — BH INPATIENT PSYCHIATRY PROGRESS NOTE - NSTXCOPEGOAL_PSY_ALL_CORE
Identify and utilize 2 coping skills that meet their needs

## 2022-08-25 NOTE — BH PSYCHOLOGY - CLINICIAN PSYCHOTHERAPY NOTE - NSTXDEPRESDATETRGT_PSY_ALL_CORE
13-Jul-2022
24-Aug-2022
13-Jul-2022
24-Aug-2022
13-Jul-2022
18-Aug-2022
18-Aug-2022

## 2022-08-25 NOTE — BH DISCHARGE NOTE NURSING/SOCIAL WORK/PSYCH REHAB - NSDCPRGOAL_PSY_ALL_CORE
Patient initially presented with symptoms of depression, anxiety, irritability and decreased daily functioning. Patient's initial goals included increasing his hope in recovery, increasing his coping skills, increasing daily functioning and decreasing anxiety. Patient made some gains towards his rehab goals as evidenced by patient's brighter affect, utilization of some coping skills, increased daily functioning and decreased anxiety. Patient also demonstrated daily medication compliance and daily participation in rehab groups. However, today patient stated he is not looking forward to living with his brother and does not want his brother to be his caregiver. Writer provided patient with supportive encouragement.    ***Patient completed a self-rating and a Press Ganey Survey.

## 2022-08-25 NOTE — BH INPATIENT PSYCHIATRY PROGRESS NOTE - NSDCCRITERIA_PSY_ALL_CORE
cgi less than 3

## 2022-08-25 NOTE — BH PSYCHOLOGY - CLINICIAN PSYCHOTHERAPY NOTE - NSTXPROBNEURO_PSY_ALL_CORE
NEUROCOGNITIVE DYSFUNCTION

## 2022-08-25 NOTE — BH INPATIENT PSYCHIATRY PROGRESS NOTE - NSTXCOPEPROGRES_PSY_ALL_CORE
Improving
No Change
Improving
No Change
Improving
No Change
Improving
No Change
Improving
No Change
Improving
Improving
No Change
Improving
Improving
No Change
Improving

## 2022-08-25 NOTE — BH INPATIENT PSYCHIATRY PROGRESS NOTE - NSBHFUPINTERVALHXFT_PSY_A_CORE
71 y.o. male, single, living with brother, presented to hospital for complaints of SI. Pt seen this morning by interdisciplinary team in order to go over his discharge and aftercare plan. Pt initially endorsing anxiety and negative thinking surrounding discharge and the prospect of needing to interact with his brother again. However as the encounter went by patient expressed more willingness to participate and eagerness to pursue his goals. Patient was future oriented in his thought content and speech. Pt expressing interest in attending the Urology appointment we set up for him a week from today in order to pursue his prostate surgery. Pt also on board with psychiatric follow up at our clinic. Discussed process to enroll him in access a ride, pt made aware that he will be receiving a packet that will be mailed to his home address. His  will be available to provide assistance filling out said packet and follow up with access a ride. Pt completed safety plan and received information on our crisis and suicide hotline services in the event he where to experience thoughts of self harm and feel the need to speak with someone. As per [place], while going over these services pt states "but I would never kill myself!".     At all times pt denies active suicidal intent or plans and has expressed on multiple occasions that he uses the term suicide to express his frustration and would "not do something to end my life". Regarding discharge pt appears ambivalent in his feelings about leaving the hospital, on one hand expressing desire to leave in order to attend to his goals (follow up with Urology, pursue prostate surgery, go to [facility], share his poetry at the [employer]) and other times expresses apprehension about going back home with his brother, because "I don't like him". Pt is able to discuss discharge in a positive light after he has been able to talk to supportive figures such as certain clubhouse members or his care manager and feels secure in the plans laid out to help him achieve his goals.  These behaviors appear to be chronic and part of patient's personality and character as evidenced by extensive collateral obtained from [place] psychiatric team, who know patient over many years. At this time pt shows gains in terms of irritability, mood and frustration tolerance with his current med regimen. He appears more adaptable and less rigid in his thinking. He has also presented more positive thinking and reports good mood more often. Patient's use of passive SI as ways of expressing acute frustration and help rejecting patterns appear to be part of characterologic and personality structure problems that are chronic in nature and unlikely to improve with prolonged inpatient psychiatric treatment. Extending his inpatient treatment would likely be detrimental as it would further foster dependency in institutionalization and in the end, hinder patient's ability to pursue goals he has communicated time and time again he wants to achieve.  Pt has at this time shown to benefit from pharmacologic treatment and expansion of his services and support in the community. He has been enrolled and has met with his new  twice, he has an appointment with outpatient psychiatry for follow up and outpatient urology as requested by patient. He is not deemed to be an acute risk to self or others at this point in time and would not benefit from a prolonged inpatient admission at the present time. As discussed above pt remains at chronic, moderate, risk for self harm due to characterologic factors, limited frustration tolerance and maladaptive coping skills. Future risk has been mitigated by pharmacologic treatment of depressive symptoms, establishment of outpatient psychiatric and medical follow up and enhancement of social supports in the community. Protective factors include stable housing, hx of stability in the community, pt denies active SIIP, future oriented thoughts, help seeking behavior, hobbies and activities pt finds meaningful such as working on his poetry.  71 y.o. male, single, living with brother, presented to hospital for complaints of SI. Pt seen this morning by interdisciplinary team in order to go over his discharge and aftercare plan. Pt initially endorsing anxiety and negative thinking surrounding discharge and the prospect of needing to interact with his brother again. However as the encounter went by patient expressed more willingness to participate and eagerness to pursue his goals. Patient was future oriented in his thought content and speech. Pt expressing interest in attending the Urology appointment we set up for him a week from today in order to pursue his prostate surgery. Pt also on board with psychiatric follow up at our clinic. Discussed process to enroll him in access a ride, pt made aware that he will be receiving a packet that will be mailed to his home address. His  will be available to provide assistance filling out said packet and follow up with access a ride. Pt completed safety plan and received information on our crisis and suicide hotline services in the event he where to experience thoughts of self harm and feel the need to speak with someone. As per [place], while going over these services pt states "but I would never kill myself!".     At all times pt denies active suicidal intent or plans and has expressed on multiple occasions that he uses the term suicide to express his frustration and would "not do something to end my life". Regarding discharge pt appears ambivalent in his feelings about leaving the hospital, on one hand expressing desire to leave in order to attend to his goals (follow up with Urology, pursue prostate surgery, go to [facility], share his poetry at [facility] [employer]) and other times expresses apprehension about going back home with his brother, because "I don't like him". 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 Discussed with pt that if he changes his mind and wants to pursue assisted living, his  can assist him. Pt verbalized understanding. In addition, discussed mitigating strategies in order to avoid spending time with his brother if he so wishes. Pt was able to come up with alternatives on his own such as, "I can talk to Bill instead".   Pt is able to discuss discharge in a positive light after he has been able to talk to supportive figures such as certain clubhouse members or his care manager and feels secure in the plans laid out to help him achieve his goals.  These behaviors appear to be chronic and part of patient's personality and character as evidenced by extensive collateral obtained from [place] psychiatric team, who know patient over many years. At this time pt shows gains in terms of irritability, mood and frustration tolerance with his current med regimen. He appears more adaptable and less rigid in his thinking. He has also presented more positive thinking and reports good mood more often. Patient's use of passive SI as ways of expressing acute frustration and help rejecting patterns appear to be part of characterologic and personality structure problems that are chronic in nature and unlikely to improve with prolonged inpatient psychiatric treatment. Extending his inpatient treatment would likely be detrimental as it would further foster dependency in institutionalization and in the end, hinder patient's ability to pursue goals he has communicated time and time again he wants to achieve.  Pt has at this time shown to benefit from pharmacologic treatment and expansion of his services and support in the community. He has been enrolled and has met with his new  twice, he has an appointment with outpatient psychiatry for follow up and outpatient urology as requested by patient. He is not deemed to be an acute risk to self or others at this point in time and would not benefit from a prolonged inpatient admission at the present time. As discussed above pt remains at chronic, moderate, risk for self harm due to characterologic factors, limited frustration tolerance and maladaptive coping skills. Future risk has been mitigated by pharmacologic treatment of depressive symptoms, establishment of outpatient psychiatric and medical follow up and enhancement of social supports in the community. Protective factors include stable housing, hx of stability in the community, pt denies active SIIP, future oriented thoughts, help seeking behavior, hobbies and activities pt finds meaningful such as working on his poetry.

## 2022-08-25 NOTE — BH PSYCHOLOGY - CLINICIAN PSYCHOTHERAPY NOTE - NSTXDCOPNODATETRGT_PSY_ALL_CORE
16-Aug-2022
01-Sep-2022
03-Aug-2022
16-Aug-2022
01-Aug-2022
21-Jul-2022
01-Aug-2022
14-Jul-2022
25-Aug-2022

## 2022-08-25 NOTE — BH INPATIENT PSYCHIATRY PROGRESS NOTE - NSBHMSEMOVE_PSY_A_CORE
No abnormal movements

## 2022-08-25 NOTE — BH INPATIENT PSYCHIATRY PROGRESS NOTE - NSTXDEPRESINTERMD_PSY_ALL_CORE
Psychoeducation and Pharmacotherapy. Continue AbilifyShaic. 
Psychoeducation and Pharmacotherapy. Continue AbilifyShaic. 
Starting Mirtazapine 7.5mg QHS. Continue psychoeducation. 
Psychoeducation and Pharmacotherapy. Continue AbilifyShaic. 
Starting Mirtazapine 7.5mg QHS. Continue psychoeducation. 
Starting Mirtazapine 7.5mg QHS. Continue psychoeducation. 
Psychoeducation and Pharmacotherapy. Continue AbilifyShaic. 
Starting Mirtazapine 7.5mg QHS. Continue psychoeducation. 
Psychoeducation and Pharmacotherapy. Continue AbilifyShaic. 
Starting Mirtazapine 7.5mg QHS. Continue psychoeducation. 
Psychoeducation and Pharmacotherapy. Continue AbilifyShaic. 
Starting Mirtazapine 7.5mg QHS. Continue psychoeducation. 
Psychoeducation and Pharmacotherapy. Continue AbilifyShaic. 
Starting Mirtazapine 7.5mg QHS. Continue psychoeducation. 
Psychoeducation and Pharmacotherapy. Continue AbilifyShaic. 
Psychoeducation and Pharmacotherapy. Continue AbilifyShaic. 
Starting Mirtazapine 7.5mg QHS. Continue psychoeducation. 
Psychoeducation and Pharmacotherapy. Continue AbilifyShaic.

## 2022-08-25 NOTE — BH DISCHARGE NOTE NURSING/SOCIAL WORK/PSYCH REHAB - DISCHARGE INSTRUCTIONS AFTERCARE APPOINTMENTS
In order to check the location, date, or time of your aftercare appointment, please refer to your Discharge Instructions Document given to you upon leaving the hospital.  If you have lost the instructions please call [phone]

## 2022-08-25 NOTE — BH INPATIENT PSYCHIATRY PROGRESS NOTE - NSBHMSETHTCONTENT_PSY_A_CORE
Other
Hopelessness/Other
Hopelessness/Suicidality/Other
Other
Hopelessness/Suicidality/Other
Other
Hopelessness/Suicidality/Other
Hopelessness/Suicidality/Other
Other
Hopelessness/Other
Other
Hopelessness/Other
Other
Other

## 2022-08-25 NOTE — BH PSYCHOLOGY - CLINICIAN PSYCHOTHERAPY NOTE - TOKEN PULL-DIAGNOSIS
Primary Diagnosis:  Major psychotic depression, recurrent [F33.3]        Problem Dx:   Leg edema [R60.0]      
Primary Diagnosis:  Major psychotic depression, recurrent [F33.3]        Problem Dx:   Leg edema [R60.0]      
Primary Diagnosis:  Major psychotic depression, recurrent [F33.3]        Problem Dx:   
Primary Diagnosis:  Major psychotic depression, recurrent [F33.3]        Problem Dx:   Leg edema [R60.0]

## 2022-08-25 NOTE — BH INPATIENT PSYCHIATRY PROGRESS NOTE - NSTXPROBNEURO_PSY_ALL_CORE
NEUROCOGNITIVE DYSFUNCTION

## 2022-08-25 NOTE — BH PSYCHOLOGY - CLINICIAN PSYCHOTHERAPY NOTE - NSBHPSYCHOLNARRATIVE_PSY_A_CORE FT
Psychologist and psychology trainee met with the patient for an individual therapy session at the request of the treatment team and consent of the pt. Themes focused on negative beliefs, perfectionism, and need for external validation. Patient completed homework focused on ambivalence to change. Psychologist noted the patient’s challenges with rigid negative beliefs and unrealistic expectations, to which the patient agreed. Patient engaged in mindfulness, behavior activation, and thought challenging exercises. The patient had difficulty engaging in thought challenging exercises, but found the other exercises to be helpful.  Psychologist encouraged the patient to review thought challenging handouts for homework. Patient welcomed psychology f/u and thanked psychologist for session.    Appearance: Patient appeared their stated age, appropriately attired. His hair appeared unkempt.  Cognition and sensorium: Patient generally appeared awake and alert. Memory appeared sufficient for purposes of this session.   Behavior: Patient’s interview behavior varied between his interactions with the psychologist and the psychology trainee. With the psychologist, the patient was generally cooperative and slightly demanding, eye contact was fair. When interacting with the trainee, the patient appeared slightly provocative, eye contact was intense at times.   Motor: mild fidgeting  Gait: Slight shuffle noted. The patient reported some knee pain.  Speech:  Volume, rate and tone of expressive speech were normal. Quantity was talkative and negativistic.   Mood: Reported as “ok" rating it “6 or 7/10” (10 = best) at outset. During exercises, patient appeared to increase his report of anxiety and frustration   Affect: Appeared anxious, slightly dysthymic narrow in range. Affect brightened by end of session. Congruent with reported thought/mood.   Thought process (observed):  Negativistic, concrete, simplistic; perseverating on frustrations, somatic symptoms, and catastrophic fears  Thought Content: Patient denied Auditory Hallucinations or Visual Hallucinations.  Pt did not endorse suicidal/ homicidal ideation, intent, plan, or urge to self-harm.  Attention/Concentration: good.   Impulse control: good.  Insight:  limited – patient has limited insight into his symptoms.  Judgment: limited to fair   
Psychologist met with the patient for an individual therapy session at the request of the treatment team and consent of the pt. Themes focused on ambivalence to engage in treatment, negative beliefs, and and concerns with purposeful living. Psychologist provided psychoeducation on depression, ambivalence, and social support. Patient declined to engage in coping skills exercise, citing negative beliefs (i.e. “I don’t feel like this is going to do me any good”) as his rationale for not engaging. Psychologist engaged in motivational interviewing, with the patient noting his ambivalence to treatment.  Patient engaged in self-compassion and negative thought challenging exercise. Psychologist encouraged the patient to continue self-compassion exercise for homework. Patient welcomed psychology f/u and thanked psychologist for session.    Appearance: Patient appeared their stated age, appropriately attired. His hair appeared unkempt.  Cognition and sensorium: Patient generally appeared awake and alert. Memory appeared sufficient for purposes of this session.   Behavior: Patient was initially demanding and slightly irritable, though he appeared more cooperative as session wore on. One instance of provocative communication (“I like your pen, I’m going to steal it later”). Eye contact was fair. Patient occasionally glanced away from clinician.  Motor: mild fidgeting  Gait: Slight shuffle noted. The patient reported some knee pain.  Speech:  Rate and tone of expressive speech were normal. Volume was slightly low. Quantity was talkative and negativistic.   Mood: Reported as “ok" rating it “7/10” (10 = best), an improvement from previous session (“2 or 3/10”)  Affect: Appeared anxious, narrow in range. Congruent with reported thought/mood.   Thought process (observed):  Negativistic, concrete, simplistic; perseverating on frustrations and catastrophic fears  Thought Content: Patient denied Auditory Hallucinations or Visual Hallucinations.  Pt did not endorse suicidal/ homicidal ideation, intent, plan, or urge to self-harm.  Attention/Concentration: good.   Impulse control: good.  Insight:  limited – patient has limited insight into his symptoms.  Judgment: limited to fair as evidenced by his desire to meet and his ambivalence to engage in treatment today  
Akash Rogers 08/11/22    11:00-12:10  Psychologist and psychology trainee met with the patient for an individual therapy session at the request of the treatment team and consent of the pt. Themes focused on negative beliefs, perfectionism, and need for external validation. Patient completed homework focused on ambivalence to change. Psychologist noted the patient’s challenges with rigid negative beliefs and unrealistic expectations, to which the patient agreed. Patient engaged in mindfulness, behavior activation, and thought challenging exercises. The patient had difficulty engaging in thought challenging exercises, but found the other exercises to be helpful.  Psychologist encouraged the patient to review thought challenging handouts for homework. Patient welcomed psychology f/u and thanked psychologist for session.    Appearance: Patient appeared their stated age, appropriately attired. His hair appeared unkempt.  Cognition and sensorium: Patient generally appeared awake and alert. Memory appeared sufficient for purposes of this session.   Behavior: Patient’s interview behavior varied between his interactions with the psychologist and the psychology trainee. With the psychologist, the patient was generally cooperative and slightly demanding, eye contact was fair. When interacting with the trainee, the patient appeared slightly provocative, eye contact was intense at times.   Motor: mild fidgeting  Gait: Slight shuffle noted. The patient reported some knee pain.  Speech:  Volume, rate and tone of expressive speech were normal. Quantity was talkative and negativistic.   Mood: Reported as “ok" rating it “6 or 7/10” (10 = best) at outset. During exercises, patient appeared to increase his report of anxiety and frustration   Affect: Appeared anxious, slightly dysthymic narrow in range. Affect brightened by end of session. Congruent with reported thought/mood.   Thought process (observed):  Negativistic, concrete, simplistic; perseverating on frustrations, somatic symptoms, and catastrophic fears  Thought Content: Patient denied Auditory Hallucinations or Visual Hallucinations.  Pt did not endorse suicidal/ homicidal ideation, intent, plan, or urge to self-harm.  Attention/Concentration: good.   Impulse control: good.  Insight:  limited – patient has limited insight into his symptoms.  Judgment: limited to fair   
Psychologist met with the patient for an individual therapy session at the request of the treatment team and consent of the pt. Confidentiality and its limits were discussed; patient verbalized understanding of those limits.   Patient discussed issues related to their hospitalization. Themes focused on somatic concerns, feelings of helplessness and depression, concerns with memory, and concerns with purposeful living. Patient responded well to empathy and validation of her emotional experience.  In order to better understand her experience of symptoms, this Psychologist administered the following cognitive assessments and symptom inventories: Jagdish Cognitive Assessment (MOCA), Geriatric Anxiety Scale -10 item form (GAS-10), Geriatric Depression Scale -Short Form (GDS-SF). Results from these measures indicate that the patient is experiencing severe levels of depression (GDS-SF = 14/15) and experiencing a severe level of anxiety (GAS-10 = 13/30). The patient reported these results to be in line with their subjective experience.   Scores from the MOCA were suggestive of cognitive impairment (MOCA =17/30; 18/30 correcting for education). Based on the patient’s responses, his immediate recall ability appear to be stronger than his delayed recall ability. His visual-spatial processing abilities also appear to be a significant weakness and overall, may warrant further testing.     Appearance: Patient appeared their stated age, appropriately attired. His hair appeared unkempt.  Cognition and sensorium: Patient generally appeared awake and alert but displayed periods of somnolence. Memory appeared sufficient for purposes of this session.   Behavior: Patient was initially demanding and slightly irritable, though she appeared more cooperative as session wore on. Eye contact was limited. Patient was often looking away from clinician.   Motor: mild fidgeting  Gait: Slight shuffle noted.  Speech:  Rate and tone of expressive speech were normal. Volume was slightly low. Quantity was responsive.   Mood: Reported as “not happy" rating it “1/10” (10 = best)  Affect: Appeared slightly irritable, anxious, narrow in range. Congruent with reported thought/mood.   Thought process (observed):  Negativistic; perseverating on frustrations and catastrophic fears  Thought Content: Patient denied Auditory Hallucinations or Visual Hallucinations.  Pt did not endorse suicidal/ homicidal ideation, intent, plan, or urge to self-harm.  Attention/Concentration: good.   Impulse control: good.  Insight:  limited – patient has limited insight into his symptoms.  Judgment: fair to good as evidenced by his desire to engage in treatment today  
Psychologist met with the patient for an individual therapy session at the request of the treatment team and consent of the pt. Themes focused on negative beliefs and fear of change. Psychologist provided psychoeducation behavior activation. Patient engaged in coping skills and digital behavior activation exercise, which he found enjoyable. Patient also shared challenges with change and ambivalence.  Psychologist also provided psychoeducation on challenges with transitions and ambivalence. Psychologist engaged in motivational interviewing and tasked the patient to identify costs vs benefits associated with change for review at next session. Psychologist also encouraged the patient to continue engaging in coping skills and self-compassion exercise for homework. Patient welcomed psychology f/u and thanked psychologist for session.    Appearance: Patient appeared their stated age, appropriately attired. His hair appeared unkempt.  Cognition and sensorium: Patient generally appeared awake and alert. Memory appeared sufficient for purposes of this session.   Behavior: Patient was generally cooperative. Eye contact was fair. Patient occasionally glanced away from clinician.  Motor: mild fidgeting  Gait: Slight shuffle noted. The patient reported some knee pain.  Speech:  Rate and tone of expressive speech were normal. Volume was slightly low. Quantity was talkative and negativistic.   Mood: Reported as “good" rating it “6/10” (10 = best) at outset.   Affect: Appeared anxious, slightly dysthymic narrow in range. Affect brightened by end of session. Congruent with reported thought/mood.   Thought process (observed):  Negativistic, concrete, simplistic; perseverating on frustrations, somatic symptoms, and catastrophic fears  Thought Content: Patient denied Auditory Hallucinations or Visual Hallucinations.  Pt did not endorse suicidal/ homicidal ideation, intent, plan, or urge to self-harm.  Attention/Concentration: good.   Impulse control: good.  Insight:  limited – patient has limited insight into his symptoms.  Judgment: limited to fair     
  Psychologist met with the patient for an individual therapy session at the request of the treatment team and consent of the patient. Themes focused on self-esteem, negative beliefs, and self-compassion. Patient reported improved mood, though quickly noted areas of imperfection that he found frustrating. Psychologist provided psychoeducation on self-compassion as a means of challenging negative beliefs. Patient engaged in negative thought challenging and self-compassion interventions, which the patient reported as being helpful. Patient assigned homework to continue identifying and challenging negative thoughts, and practice self-compassion. Patient welcomed psychology f/u and thanked psychologist for session.    Appearance: Patient appeared their stated age, appropriately attired. His hair appeared unkempt.  Cognition and sensorium: Patient generally appeared awake and alert. Memory appeared sufficient for purposes of this session.   Behavior: Patient was generally cooperative and slightly demanding, eye contact was fair.   Motor: mild fidgeting  Gait: Slight shuffle noted.   Speech:  Volume, rate and tone of expressive speech were normal. Quantity was talkative.   Mood: Reported as “ok" rating it “5 or 6 /10” (10 = best) at outset. Following intervention, the patient reported improved mood, though he did not provide a rating.  Affect: Appeared anxious, slightly dysthymic at outset. Affect brightened by end of session. Congruent with reported thought/mood.   Thought process (observed):  [place], rigid, simplistic, slightly negativistic, slightly provocative; some focus on frustrations and catastrophic fears, though more open to redirection.  Thought Content: Patient denied Auditory Hallucinations or Visual Hallucinations.  Pt did not endorse suicidal/ homicidal ideation, intent, plan, or urge to self-harm.  Attention/Concentration: good.   Impulse control: good.  Insight:  limited – patient has limited insight into his symptoms.  Judgment: limited to fair   
Psychologist met with the patient for an individual therapy session at the request of the treatment team and consent of the pt. Themes focused on negative beliefs, goal setting, and concerns with purposeful living. Psychologist provided psychoeducation behavior activation. Patient engaged in coping skills and digital behavior activation exercise, which he found enjoyable. Psychologist encouraged the patient to continue coping skills and self-compassion exercise for homework. Patient welcomed psychology f/u and thanked psychologist for session.    Appearance: Patient appeared their stated age, appropriately attired. His hair appeared unkempt.  Cognition and sensorium: Patient generally appeared awake and alert. Memory appeared sufficient for purposes of this session.   Behavior: Patient was generally cooperative. Eye contact was fair. Patient occasionally glanced away from clinician.  Motor: mild fidgeting  Gait: Slight shuffle noted. The patient reported some knee pain.  Speech:  Rate and tone of expressive speech were normal. Volume was slightly low. Quantity was talkative and negativistic.   Mood: Reported as “ok" rating it “3/10” (10 = best) at outset. Following exercises, his mood improved to “5/10”  Affect: Appeared anxious, slightly dysthymic narrow in range. Affect brightened by end of session. Congruent with reported thought/mood.   Thought process (observed):  Negativistic, concrete, simplistic; perseverating on frustrations, somatic symptoms, and catastrophic fears  Thought Content: Patient denied Auditory Hallucinations or Visual Hallucinations.  Pt did not endorse suicidal/ homicidal ideation, intent, plan, or urge to self-harm.  Attention/Concentration: good.   Impulse control: good.  Insight:  limited – patient has limited insight into his symptoms.  Judgment: limited to fair   
Psychologist met with the patient for an individual therapy session at the request of the treatment team and consent of the pt. Themes focused on somatic concerns, feelings of helplessness and depression, and concerns with purposeful living. Psychologist provided psychoeducation on depression, values, and values-based living. Patient responded well to empathy and validation of his emotional experience. Psychologist and patient reviewed patient’s journal and patient processed emotions associated with the journal entries as well as the historical challenges on which the entries were based.  Patient engaged in digital behavior activation, which he reported to be beneficial.  Psychologist encouraged the patient to continue engaging in behavior activation. Patient welcomed psychology f/u and thanked psychologist for session.    Appearance: Patient appeared their stated age, appropriately attired. His hair appeared unkempt.  Cognition and sensorium: Patient generally appeared awake and alert. Memory appeared sufficient for purposes of this session.   Behavior: Patient was initially demanding and slightly irritable, though he appeared more cooperative as session wore on. Eye contact was fair. Patient occasionally glanced away from clinician.  Motor: mild fidgeting  Gait: Slight shuffle noted. The patient reported some knee pain.  Speech:  Rate and tone of expressive speech were normal. Volume was slightly low. Quantity was responsive.   Mood: Reported as “not good" rating it “2 or 3/10” (10 = best), an improvement from previous session (“1/10”).  Affect: Appeared anxious, narrow in range. Congruent with reported thought/mood.   Thought process (observed):  Negativistic; perseverating on frustrations and catastrophic fears  Thought Content: Patient denied Auditory Hallucinations or Visual Hallucinations.  Pt did not endorse suicidal/ homicidal ideation, intent, plan, or urge to self-harm.  Attention/Concentration: good.   Impulse control: good.  Insight:  limited – patient has limited insight into his symptoms.  Judgment: fair to good as evidenced by his desire to engage in treatment today    
Psychologist met with the patient for an individual therapy session at the request of the treatment team and consent of the patient. Themes focused on discharge planning and anticipatory anxiety. Patient reported worries surrounding his upcoming discharge. Patient declined to engage in negative thought challenging exercise. He responded well to empathy and normalization of his emotional experience. Psychologist reflected on the patient’s growth during his stay and psychologist encouraged patient to continue practicing skills learned in session including identifying and challenging negative thoughts, coping skills, and practicing self-compassion. Patient thanked psychologist for session.    Appearance: Patient appeared their stated age, appropriately attired. His hair appeared unkempt.  Cognition and sensorium: Patient generally appeared awake and alert. Memory appeared sufficient for purposes of this session.   Behavior: Patient was generally cooperative and slightly demanding, eye contact was fair.   Motor: mild fidgeting  Gait: Slight shuffle noted.   Speech:  Volume, rate and tone of expressive speech were normal. Quantity was talkative.   Mood: Reported as “worried about discharge" rating it “5/10” (10 = best) at outset.   Affect: Appeared anxious, slightly dysthymic at outset. Affect brightened slightly by end of session. Congruent with reported thought/mood.   Thought process (observed):  [place], rigid, simplistic, slightly negativistic, slightly provocative; some focus on frustrations and catastrophic fears, though much more able to be redirected.  Thought Content: Patient denied Auditory Hallucinations or Visual Hallucinations.  Pt did not endorse suicidal/ homicidal ideation, intent, plan, or urge to self-harm.  Attention/Concentration: good.   Impulse control: good.  Insight:  limited – patient has limited insight into his symptoms, though he has shown some improvement in his understanding of how his symptoms impact his life  Judgment: limited to fair

## 2022-08-25 NOTE — BH PSYCHOLOGY - CLINICIAN PSYCHOTHERAPY NOTE - NSTXDISORGDATETRGT_PSY_ALL_CORE
13-Jul-2022
18-Aug-2022
13-Jul-2022
18-Aug-2022
18-Aug-2022
27-Jul-2022
18-Aug-2022
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
## 2022-08-25 NOTE — BH PSYCHOLOGY - GROUP THERAPY NOTE - NSPSYCHOLGRPCOGINT_PSY_A_CORE
group members provided support/group members suggested positive behaviors/reinforced need to self-reward

## 2022-08-25 NOTE — BH INPATIENT PSYCHIATRY PROGRESS NOTE - NSTXNEURODATETRGT_PSY_ALL_CORE
18-Aug-2022

## 2022-08-25 NOTE — BH INPATIENT PSYCHIATRY PROGRESS NOTE - NSBHMSETHTPROC_PSY_A_CORE
Overinclusive/Perseverative
Circumstantial
Linear
Overinclusive/Perseverative
Circumstantial
Overinclusive/Perseverative
Linear
Circumstantial
Overinclusive/Perseverative
Circumstantial
Overinclusive/Perseverative
Circumstantial
Overinclusive/Perseverative
Linear
Overinclusive/Perseverative
Circumstantial
Overinclusive/Perseverative

## 2022-08-25 NOTE — BH PSYCHOLOGY - CLINICIAN PSYCHOTHERAPY NOTE - NSTXDEPRESDATEEST_PSY_ALL_CORE
05-Jul-2022
11-Aug-2022
05-Jul-2022
11-Aug-2022
11-Aug-2022
05-Jul-2022
11-Aug-2022

## 2022-08-25 NOTE — BH INPATIENT PSYCHIATRY PROGRESS NOTE - NSTXPROBCOPE_PSY_ALL_CORE
COPING, INEFFECTIVE

## 2022-08-25 NOTE — BH INPATIENT PSYCHIATRY PROGRESS NOTE - NSTXDEPRESDATEEST_PSY_ALL_CORE
11-Aug-2022
05-Jul-2022
05-Jul-2022
11-Aug-2022
05-Jul-2022
05-Jul-2022
11-Aug-2022
05-Jul-2022
11-Aug-2022
05-Jul-2022
11-Aug-2022
05-Jul-2022
11-Aug-2022
11-Aug-2022
05-Jul-2022
11-Aug-2022
05-Jul-2022
05-Jul-2022
11-Aug-2022
05-Jul-2022

## 2022-08-25 NOTE — BH PSYCHOLOGY - CLINICIAN PSYCHOTHERAPY NOTE - NSBHPSYCHOLINT_PSY_A_CORE
Cognitive/behavioral therapy/Stress management/Supported coping skills/Supportive therapy
Cognitive/behavioral therapy/Stress management/Supported coping skills/Supportive therapy
Cognitive/behavioral therapy/Supported coping skills/Supportive therapy
Cognitive/behavioral therapy/Dialectical  Behavioral Therapy (DBT)/Stress management/Supported coping skills/Supportive therapy
Cognitive/behavioral therapy/Supported coping skills/Supportive therapy
Cognitive/behavioral therapy/Dialectical  Behavioral Therapy (DBT)/Stress management/Supported coping skills/Supportive therapy
Cognitive/behavioral therapy/Supported coping skills/Supportive therapy/Treatment compliance encouraged
Cognitive/behavioral therapy/Reality testing/Supported coping skills/Supportive therapy
Cognitive/behavioral therapy/Stress management/Supported coping skills/Supportive therapy

## 2022-08-25 NOTE — BH INPATIENT PSYCHIATRY PROGRESS NOTE - NSBHFUPMEDSE_PSY_A_CORE
None known

## 2022-08-25 NOTE — BH INPATIENT PSYCHIATRY PROGRESS NOTE - MSE OPTIONS
Structured MSE

## 2022-08-25 NOTE — BH INPATIENT PSYCHIATRY PROGRESS NOTE - NSTXDISORGDATETRGT_PSY_ALL_CORE
27-Jul-2022
18-Aug-2022
13-Jul-2022
13-Jul-2022
18-Aug-2022
13-Jul-2022
13-Jul-2022
27-Jul-2022
13-Jul-2022
13-Jul-2022
27-Jul-2022
13-Jul-2022
13-Jul-2022
18-Aug-2022
27-Jul-2022
13-Jul-2022
27-Jul-2022
27-Jul-2022
13-Jul-2022
13-Jul-2022
27-Jul-2022
18-Aug-2022
27-Jul-2022
18-Aug-2022
13-Jul-2022
18-Aug-2022
13-Jul-2022
13-Jul-2022
18-Aug-2022
18-Aug-2022
27-Jul-2022
18-Aug-2022
13-Jul-2022
18-Aug-2022
27-Jul-2022
27-Jul-2022
18-Aug-2022

## 2022-08-25 NOTE — BH INPATIENT PSYCHIATRY PROGRESS NOTE - NSBHMSEAFFQUAL_PSY_A_CORE
Depressed

## 2022-08-25 NOTE — BH DISCHARGE NOTE NURSING/SOCIAL WORK/PSYCH REHAB - NSBHDCADDR1FT_A_CORE
[address] [place] [address], [place] [address]  Entrance on [address]

## 2022-08-25 NOTE — BH INPATIENT PSYCHIATRY PROGRESS NOTE - LEVEL OF CONSCIOUSNESS
Alert
Other
Alert

## 2022-08-25 NOTE — BH INPATIENT PSYCHIATRY PROGRESS NOTE - NSBHANTIPSYCHOTIC_PSY_ALL_CORE
Yes...
No
Yes...
No
Yes...
No
No
Yes...
No
Yes...
No
Yes...
No
Yes...
No
Yes...
Yes...
No
Yes...

## 2022-08-25 NOTE — BH PSYCHOLOGY - CLINICIAN PSYCHOTHERAPY NOTE - NSTXDISORGGOAL_PSY_ALL_CORE
Will identify 2 coping skills that assist in organizing

## 2022-08-25 NOTE — BH INPATIENT PSYCHIATRY PROGRESS NOTE - NSBHPSYCHOLCOGORIENT_PSY_A_CORE
Oriented to time, place, person, situation

## 2022-08-25 NOTE — BH INPATIENT PSYCHIATRY PROGRESS NOTE - GENERAL APPEARANCE
No deformities present

## 2022-08-25 NOTE — BH INPATIENT PSYCHIATRY PROGRESS NOTE - NSTXDISORGGOAL_PSY_ALL_CORE
Will identify 2 coping skills that assist in organizing

## 2022-08-25 NOTE — BH DISCHARGE NOTE NURSING/SOCIAL WORK/PSYCH REHAB - NSDCADDINFO1FT_PSY_ALL_CORE
The Crisis Center operates between 9 am-3PM Monday through Friday.  You do not have to go at the specific time above, and you should expect a waiting time

## 2022-08-25 NOTE — BH INPATIENT PSYCHIATRY PROGRESS NOTE - NSBHMSEAFFRANGE_PSY_A_CORE
Constricted

## 2022-08-25 NOTE — BH DISCHARGE NOTE NURSING/SOCIAL WORK/PSYCH REHAB - NSCDUDCCRISIS_PSY_A_CORE
[facility]  1 (490) [facility]-WELL ([phone])  Text "WELL" to 27635  Website: www.[employer]/.[facility] 1 (763) 201-UKMK (0294) Website: www.safehorizon.org/.National Suicide Prevention Lifeline 5 (110) 418-4466/.  [facility]  1 (794) LIFENET (845-1819)/.  [facility]  [address] [place] [address] [phone]   Hours:  Monday through Friday from 9 AM to 3 PM/.  [facility] [facility] Crisis Line  9 [phone], Option 1

## 2022-08-25 NOTE — BH INPATIENT PSYCHIATRY PROGRESS NOTE - NSTXDISORGPROGRES_PSY_ALL_CORE
No Change
Improving
Improving
No Change
Improving
No Change
Improving
Improving
No Change
Improving
No Change
Improving
Improving

## 2022-08-25 NOTE — BH INPATIENT PSYCHIATRY DISCHARGE NOTE - NSBHSUICIDESTATUS_PSY_ALL_CORE
Mr Rogers, a 71 y.o.  male, with chronic medical problem of urinary incontinence, with limited social support, and reported mood disorder, has those risks factors for increased risk of suicide. Of those risk factors, patient's social supports, mood disorder and medical problems are modifiable. Patient was provided with additional social supports including reinstatement of a care coordinator, rescheduling appointment for Access-a-ride privileges, instatement of meals-on-wheels, and application for home care submitted. Patient's mood disorder and insomnia was treated with Abilify titrated to 10mg PO daily, Prozac titrated to 30mg PO daily, Trazodone 50mg PO HS and melatonin 6mg PO HS. Patient was also provided with supply of medications for medical comorbidities until his follow up with PCP. Patient was scheduled for follow up with urology for his urinary incontinence.   Patient's protective factors include the lack of recent suicide attempt, having other social supports including friend as well as clinical staff at the [facility], as well as being future oriented toward going to [place]. In light of the above considerations, pt's short term risk of suicide is low. Patient's statements about wanting to kill himself if he had to live with his brother appear to be a maladaptive way of expressing his frustration rather than an imminent risk of death.

## 2022-08-25 NOTE — BH INPATIENT PSYCHIATRY PROGRESS NOTE - NSTXDCOPNOGOAL_PSY_ALL_CORE
Will agree to participate in appropriate outpatient care

## 2022-08-25 NOTE — BH INPATIENT PSYCHIATRY PROGRESS NOTE - NSBHCHARTREVIEWVS_PSY_A_CORE FT
Vital Signs Last 24 Hrs  T(C): 36.6 (08-25-22 @ 07:58), Max: 36.6 (08-25-22 @ 07:58)  T(F): 97.9 (08-25-22 @ 07:58), Max: 97.9 (08-25-22 @ 07:58)  HR: --  BP: --  BP(mean): --  RR: --  SpO2: --    Orthostatic VS  08-25-22 @ 07:58  Lying BP: --/-- HR: --  Sitting BP: 138/81 HR: 88  Standing BP: 154/81 HR: 91  Site: upper left arm  Mode: electronic  Orthostatic VS  08-24-22 @ 07:23  Lying BP: --/-- HR: --  Sitting BP: 125/72 HR: 84  Standing BP: 124/76 HR: 87  Site: --  Mode: --

## 2022-08-25 NOTE — BH INPATIENT PSYCHIATRY PROGRESS NOTE - NSBHROSSYSTEMS_PSY_ALL_CORE
Psychiatric

## 2022-08-25 NOTE — BH DISCHARGE NOTE NURSING/SOCIAL WORK/PSYCH REHAB - PATIENT PORTAL LINK FT
You can access the FollowMyHealth Patient Portal offered by [facility] by registering at the following website: [email or web address]. By joining [facility]’s FollowMyHealth portal, you will also be able to view your health information using other applications (apps) compatible with our system.

## 2022-08-25 NOTE — BH INPATIENT PSYCHIATRY PROGRESS NOTE - NSBHMSEBODY_PSY_A_CORE
Average build

## 2022-08-25 NOTE — BH INPATIENT PSYCHIATRY PROGRESS NOTE - NSTXPROBDISORG_PSY_ALL_CORE
DISORGANIZATION OF THOUGHT/BEHAVIOR

## 2022-08-25 NOTE — BH INPATIENT PSYCHIATRY PROGRESS NOTE - NSBHMSERELATED_PSY_A_CORE
Fair
Poor
Fair
Poor
Fair
Poor
Fair
Poor
Fair
Poor
Poor
Fair
Poor
Poor
Fair
Poor
Fair
Poor
Fair
Poor
Poor
Fair
Fair
Poor
Fair
Fair
Poor
Fair

## 2022-08-25 NOTE — BH INPATIENT PSYCHIATRY PROGRESS NOTE - NSTXCOPEDATEEST_PSY_ALL_CORE
19-Jul-2022
19-Jul-2022
16-Aug-2022
12-Jul-2022
16-Aug-2022
02-Aug-2022
[id number]
12-Jul-2022
02-Aug-2022
12-Jul-2022
02-Aug-2022
02-Aug-2022
11-Aug-2022
12-Jul-2022
11-Aug-2022
12-Jul-2022
16-Aug-2022
22-Aug-2022
19-Jul-2022
25-Aug-2022
16-Aug-2022
19-Jul-2022
02-Aug-2022
16-Aug-2022
19-Jul-2022
19-Jul-2022
02-Aug-2022
02-Aug-2022
19-Jul-2022
12-Jul-2022
12-Jul-2022
19-Jul-2022
22-Aug-2022
19-Jul-2022
11-Aug-2022
19-Jul-2022

## 2022-08-25 NOTE — BH PSYCHOLOGY - GROUP THERAPY NOTE - TOKEN PULL-DIAGNOSIS
Primary Diagnosis:  Major psychotic depression, recurrent [F33.3]        Problem Dx:   
Primary Diagnosis:  Major psychotic depression, recurrent [F33.3]        Problem Dx:   Leg edema [R60.0]

## 2022-08-25 NOTE — BH PSYCHOLOGY - CLINICIAN PSYCHOTHERAPY NOTE - NSBHPTASSESSDT_PSY_A_CORE
11-Aug-2022 11:00
19-Jul-2022 13:45
01-Aug-2022 14:00
07-Jul-2022 14:00
03-Aug-2022 13:40
08-Aug-2022 13:20
25-Aug-2022 14:00
22-Aug-2022 09:26
16-Aug-2022 14:30

## 2022-08-25 NOTE — BH INPATIENT PSYCHIATRY PROGRESS NOTE - NSTXDEPRESDATETRGT_PSY_ALL_CORE
13-Jul-2022
24-Aug-2022
13-Jul-2022
18-Aug-2022
24-Aug-2022
13-Jul-2022
18-Aug-2022
13-Jul-2022
24-Aug-2022
24-Aug-2022
13-Jul-2022
24-Aug-2022
13-Jul-2022
18-Aug-2022
13-Jul-2022
18-Aug-2022
24-Aug-2022
13-Jul-2022
18-Aug-2022

## 2022-08-25 NOTE — BH INPATIENT PSYCHIATRY PROGRESS NOTE - NSTXCOPEDATETRGT_PSY_ALL_CORE
18-Aug-2022
09-Aug-2022
29-Aug-2022
02-Aug-2022
19-Jul-2022
09-Aug-2022
19-Jul-2022
23-Aug-2022
26-Jul-2022
19-Jul-2022
18-Aug-2022
09-Aug-2022
26-Jul-2022
19-Jul-2022
02-Aug-2022
25-Aug-2022
23-Aug-2022
02-Aug-2022
19-Jul-2022
23-Aug-2022
23-Aug-2022
19-Jul-2022
26-Jul-2022
23-Aug-2022
09-Aug-2022
09-Aug-2022
02-Aug-2022
16-Aug-2022
18-Aug-2022
26-Jul-2022
16-Aug-2022
26-Jul-2022
19-Jul-2022
26-Jul-2022
29-Aug-2022

## 2022-08-25 NOTE — BH INPATIENT PSYCHIATRY PROGRESS NOTE - NSBHASSESSSUMMFT_PSY_ALL_CORE
71 y.o. male with hx of unusual relatedness including pattern of making up psychiatric symptoms or hx such as claiming he arranged a hit or his brother killed himself. Recently there has been escalating pattern of hospitalizations, ER visits. Compliance after d/c is nil. Patient also develops excessive attachment or intense dislike of providers and other staff. Patient has no actual hx of suicide attempts or fh or SIB. Patient with possible facititious disorder vs mood disorder. Suspect possible unconscious primary gain related to medical complaints help seeking then rejection of help as inadequate. Patient not assessed as needing CO. In light of collateral, will consider referral to psychotherapy program upon discharge if patient adherent to treatment plan.     7/13 -7/14: Patient reports complaints of his back itching and loose tooth. Patient continues to demonstrate black-or-white thinking and making conditional statements "if I do not get things the way I want, there is no point in living". Patient does not endorse suicidal ideations, and accepts that he tends to think about the worst outcomes at times. Patient encouraged to develop healthier pattern of thinking and to also focus on positives. Patient is future oriented, thinking about working with new , that he appears to like, and wants to be setup with appointments to receive the care. Pt does not endorse SI. Appears goal oriented, however also complains and becomes help rejecting. Patient encouraged to develop balanced thoughts. Patient continues to endorse conditions on receiving help only on his terms.   7/15: Pt continues to endorse passive SI, conditional on being able to obtain certain medical services in the future.   7/16: Depressed, overinclusive, negativistic and ruminative. Tolerating abilify.    7/17: No significant overnight events reported, pt taking meds, denies side effects. Continues to present catastrophic thinking, negative, endosing hopelessness and somatic preoccupation with his teeth, back itch and prostate. Denies acute SI but endorses thoughts that his life is not worth living contingent on being able to obtain his prostate surgery, being able to get access a ride and a  "that is up for the challenge". Will lower Mirtazapine to 7.5mg as pt continues to endorse difficulty sleeping and start Prozac, which pt reports he has benefitted from in the past. Increased Abilify to 5mg QD.   7/18-7/22: Pt continues to present with all-or-none thinking, endorsing somatic preoccupation with teeth and prostate. Patient appears to bring up other complaints such as his relationships when discussing plans for his current somatic complaints. No active SI reported, and hopelessness is contingent upon not getting staff that "can tolerate" him and getting his tooth extracted and receiving implants and receiving prostate surgery. Pt showed poor response to mirtazapine for insomnia, was switched to trazodone 50mg PO HS.  7/26: Some c/o of intermittent passive suicidal ideation, however affect had improved by the afternoon. Pt appears future oriented, wants to express his feelings to staff individually, however denies current SI, HI. No need for 1:1 at this time. Continue current medications.  7/27: No longer c/o SI. Appears to want to be able to express his frustrations without interjection by others. Appears future oriented, wanting to speak with his friend later. C/o of tremor, however no tremor noted. Will continue to monitor for EPS in light of change in aripiprazole. Orthostatic vitals noted. Will encourage fluids and continue to monitor.  7/28: Expresses frustration about not having the right  for his personality. Pt denied SI, HI. Encouraged to develop further frustration tolerance skills and healthier expressions of frustration. C/o somatic symptoms including SOB, however not noted to be short of breath while talking circumstantially. C/o of difficulty sleeping, can increase melatonin. Will avoid increasing trazodone in light of positive orthostatic vital signs.  7/29: After overnight incident of another peer taking his pillow, pt continued to focus on problems. 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 Pt's recent negative cognitive filter may be as a result of recent overnight incident, will continue to monitor. Pt requires case conference with  once assigned to have a concrete solution focused plan.   8/1: Pt making conditional passive SI if he were to go home and deal with his brother. Pt otherwise appears to be complaint driven, focused on his somatic issues including prostate and his arthritis. Pt's conditional threats appear to be his maladaptive way of relating to others. Does not require CO 1:1 at this time.   8/2: Pt denies SI, HI, however tends to make conditional threats when frustrated and discussing discharge home. Pt focused on somatic complaints. Pt's brother called and pt gave verbal consent to speak with brother to give update.   8/3: Pt appears prone to verbalizing frustration with report of feeling hopeless, despite being engaged in writing positive poems shortly before that. No SI reported. Pending  assignment to establish safe discharge plan. Will continue to monitor.  8/4: Verbalizes frustration and c/o his frustrations with his brother, also having positive future oriented goal including sharing poetry with [employer] staff. No SI reported. Pending care manager assignment.   8/5: Pt doing well with concern over dryness between toes. Will give lachydrin and reassess. Also complaining of sleep. Can consider increasing melatonin to 10mg HS. Pending  assignment.   8/8 - 8/12. Pt has been eating appropriately and visible around the unit. Pt has maladaptive behavior of verbalizing SI without plan when frustrated. Pt tends to display traits of wanting perfectionism in the fulfillment of his wants. Pt is pending meeting with  to have concrete plans upon discharge.     8/15: Pt denies SI. Appears to show tendencies of wanting to obtain perfectionism including having a "perfect" BP and missing out on romantic partner in his past. Pt is pending meeting with  for plans upon discharge. In light of orthostatic drop in BP, will hold nifedipine for today.   6/16: the patient continues to express depressed mood and concerns about possible discharge home with his brother.  Vague SI without plan or intent.  Behavior has been well controlled.  8/17: Denies SI, HI. Behavior under control and visible around the unit with peers. Remains externally focused with sharing poetry with friends and engaging in activities pleasing to pt.  8/18: Pt tends to express conditional SI when frustrated and wants his needs addressed. Psychoeducation provided and will facilitate another meeting with . Pt c/o subjective insomnia that is not reflected in sleep log. Will increase prozac to 30mg PO daily for depressed mood.  8/19: Pt denies any acute complaints, remains focused on sharing poetry with [employer] staff and feels fulfilled with assisting or commenting on other peers. Will continue current treatment.   8/22: Pt noted to have new onset rash of unclear etiology. Will consult hospitalist and/or derm for further recs. No other complaints. Continue current meds.  8/23: Pt feeling better. Will continue current medications.  8/24: Pt endorsing multiple somatic c/o and SI conditional upon discharge. Pt had been doing well until today, and had no acute stressor besides discharge. Pt does not appear to be better served with prolonged hospitalization. Pt will be engaged further to connect with outpatient resources including [facility], case management services and utilizing coping skills.     8/25: 71 y.o. male, single, living with brother, presented to hospital for complaints of SI. Pt seen this morning by interdisciplinary team in order to go over his discharge and aftercare plan. Pt initially endorsing anxiety and negative thinking surrounding discharge and the prospect of needing to interact with his brother again. However as the encounter went by patient expressed more willingness to participate and eagerness to pursue his goals. Patient was future oriented in his thought content and speech. Pt expressing interest in attending the Urology appointment we set up for him a week from today in order to pursue his prostate surgery. Pt also on board with psychiatric follow up at our clinic. Discussed process to enroll him in access a ride, pt made aware that he will be receiving a packet that will be mailed to his home address. His  will be available to provide assistance filling out said packet and follow up with access a ride. Pt completed safety plan and received information on our crisis and suicide hotline services in the event he where to experience thoughts of self harm and feel the need to speak with someone. As per [place], while going over these services pt states "but I would never kill myself!".     At all times pt denies active suicidal intent or plans and has expressed on multiple occasions that he uses the term suicide to express his frustration and would "not do something to end my life". Regarding discharge pt appears ambivalent in his feelings about leaving the hospital, on one hand expressing desire to leave in order to attend to his goals (follow up with Urology, pursue prostate surgery, go to [facility], share his poetry at the [employer]) and other times expresses apprehension about going back home with his brother, because "I don't like him". Pt is able to discuss discharge in a positive light after he has been able to talk to supportive figures such as certain clubhouse members or his care manager and feels secure in the plans laid out to help him achieve his goals.  These behaviors appear to be chronic and part of patient's personality and character as evidenced by extensive collateral obtained from [facility] psychiatric team, who know patient over many years. At this time pt shows gains in terms of irritability, mood and frustration tolerance with his current med regimen. He appears more adaptable and less rigid in his thinking. He has also presented more positive thinking and reports good mood more often. Patient's use of passive SI as ways of expressing acute frustration and help rejecting patterns appear to be part of characterologic and personality structure problems that are chronic in nature and unlikely to improve with prolonged inpatient psychiatric treatment. Extending his inpatient treatment would likely be detrimental as it would further foster dependency in institutionalization and in the end, hinder patient's ability to pursue goals he has communicated time and time again he wants to achieve.  Pt has at this time shown to benefit from pharmacologic treatment and expansion of his services and support in the community. He has been enrolled and has met with his new  twice, he has an appointment with outpatient psychiatry for follow up and outpatient urology as requested by patient. He is not deemed to be an acute risk to self or others at this point in time and would not benefit from a prolonged inpatient admission at the present time. As discussed above pt remains at chronic, moderate, risk for self harm due to characterologic factors, limited frustration tolerance and maladaptive coping skills. Future risk has been mitigated by pharmacologic treatment of depressive symptoms, establishment of outpatient psychiatric and medical follow up and enhancement of social supports in the community. Protective factors include stable housing, hx of stability in the community, pt denies active SIIP, future oriented thoughts, help seeking behavior, hobbies and activities pt finds meaningful such as working on his poetry.     Plan:  - Legals: 9.13  - Safety: Safety plan discussed. No need for CO 1:1 at this time.   - Psychiatry: Abilify 10mg QD, Prozac 30mg QD. Trazodone 50mg QHS. Melatonin 6mg HS.  - Psychotherapy: Individual, group and milieu therapy as appropriate.  - Medical: Simethicone 80mg PO TID PRN flatulence. Calamine lotion TID for itching, Proscar 5mg PO daily for BPH, tamsulosin 0.4mg PO daily for overflow incontinence/BPH, oxybutynin 5mg PO daily for overactive bladder, simvastatin 20mg PO HS for HLD, nifedipine XL 30mg PO daily for HTN  - Patient noted with RLE swelling - seen by medicine - Dopplers negative for DVT.  - Dispo: Discharge home

## 2022-08-25 NOTE — BH INPATIENT PSYCHIATRY PROGRESS NOTE - NSBHMSEMUSCLE_PSY_A_CORE
Normal muscle tone/strength

## 2022-08-25 NOTE — BH INPATIENT PSYCHIATRY DISCHARGE NOTE - HOSPITAL COURSE
Upon admission, patient was initially make various complaints of SI. Collateral was obtained from  of [place] outpatient clinic Dr. Cristo Delgadillo who was familiar with the patient, who reported that patient has a tendency to exaggerate symptoms or seek hospitalization for somatic complaints and c/o of depression and insomnia. He reported that patient has been extensively evaluated and diagnosed with factitious disorder and has  to link to outpatient care nad has several medical problems that his providers are managing conservatively. He reported that patient has been offered to live in Adult home in the past, however patient chooses to live with his brother. It was found that patient does not have any significant hx of serious suicidal or self harm attempts and does not have a family hx of suicide attempt in his brother as previously reported as his only brother is alive and lives with him.    During the hospitalization, patient would make conditional passive SI statements that were usually triggered by conversations about discharge planning or by poor frustration tolerance when his needs of immediate response when he is calling his friend or support staff members at [facility] was not met. Patient had been noted to attend groups throughout his hospitalization, would ask for notebooks and pencils to write poems and draw pictures, and would be visible around the unit. Patient would often catastrophize when not receiving immediate response to his calls, and would reject proposed solutions to his problems by redirecting the conversation to other problems. Patient would admit during his hospitalization that he did not want to kill himself or actually die but voiced his frustration in that maladaptive manner. Patient also appeared to be conflicted about wanting to remain the alpha tenant at his house, while also wanting to stay at the hospital.    Patient had been seen by  the psychologist on the unit, and was noted to have patterns of inflexibility, with a pervasive sense of grandiosity and with maladaptive communication skills. Further testing may be appropriate.    Patient was seen prior to discharge and while initially was expressing his frustration at his discharge, he began voices several somatic complaints that he had not mentioned before including that his blood pressure was not "perfect", despite being in the normal range, and c/o of hand tremors with rotational shaking of his right hand that was not noted any time prior to the conversation about discharge. He also began complaining about vision difficulty through his glasses despite not having made any complaints prior to this. Any benefits from continued inpatient hospitalization would be outweighed by further reinforcement of patient's maladaptive behavior leading to further impeding return to a functional baseline in the community.     An extensive discussion about assessment and treatment recommendations including instatement of various social supports has been done with the patient. No evidence of acute decompensated mood or psychotic disorder has been noted that impairs patient from acting on his own volition. Patient has been psychoeducated that he retains ability to not kill himself and follow up with proposed treatment and that the decision to not kill himself lies with the patient himself. Although Mr. Rogers was upset about the decision, he demonstrated an understanding about the treatment recommendations and is appropriate for discharge.    Case discussion was also held with unit chief and another inpatient psychiatrist to discuss the challenges and complexity of the situation and the team agreed that patient might be challenging to discharge but would have to be discharged once social supports are in place.

## 2022-08-25 NOTE — BH INPATIENT PSYCHIATRY PROGRESS NOTE - NSTXPROBDCOPNO_PSY_ALL_CORE
DISCHARGE ISSUE - NON-ADHERENT WITH OUTPATIENT SERVICES

## 2022-08-25 NOTE — BH INPATIENT PSYCHIATRY DISCHARGE NOTE - NSDCMRMEDTOKEN_GEN_ALL_CORE_FT
ARIPiprazole 10 mg oral tablet: 1 tab(s) orally once a day  cholecalciferol oral tablet: 2000 unit(s) orally once a day  clotrimazole 1% topical cream: 1 application topically 2 times a day  finasteride 5 mg oral tablet: 1 tab(s) orally once a day  ketoconazole 2% topical shampoo: 1 application topically once a day  melatonin 3 mg oral tablet: 2 tab(s) orally once a day (at bedtime)  NIFEdipine 30 mg oral tablet, extended release: 1 tab(s) orally once a day  oxybutynin 5 mg oral tablet: 1 tab(s) orally once a day  PROzac 10 mg oral capsule: 3 cap(s) orally once a day  simvastatin 20 mg oral tablet: 1 tab(s) orally once a day  tamsulosin 0.4 mg oral capsule: 1 cap(s) orally once a day  traZODone 50 mg oral tablet: 1 tab(s) orally once a day (at bedtime)

## 2022-08-25 NOTE — BH INPATIENT PSYCHIATRY PROGRESS NOTE - NSTXDEPRESPROGRES_PSY_ALL_CORE
No Change
Improving
No Change
Improving
No Change
Improving
No Change
Improving
Improving
No Change
Improving
No Change

## 2022-08-25 NOTE — BH INPATIENT PSYCHIATRY PROGRESS NOTE - NSBHMSELANG_PSY_A_CORE
No abnormalities noted

## 2022-08-25 NOTE — BH INPATIENT PSYCHIATRY PROGRESS NOTE - NSICDXBHPRIMARYDX_PSY_ALL_CORE
Major psychotic depression, recurrent   F33.3  

## 2022-08-25 NOTE — BH INPATIENT PSYCHIATRY PROGRESS NOTE - NSTXPROBDEPRES_PSY_ALL_CORE
DEPRESSIVE SYMPTOMS

## 2022-08-25 NOTE — BH INPATIENT PSYCHIATRY PROGRESS NOTE - NSICDXBHSECONDARYDX_PSY_ALL_CORE
Leg edema   R60.0  

## 2022-08-25 NOTE — BH INPATIENT PSYCHIATRY PROGRESS NOTE - NSTXDCOPNODATEEST_PSY_ALL_CORE
07-Jul-2022
09-Aug-2022
03-Aug-2022
25-Jul-2022
18-Aug-2022
25-Jul-2022
03-Aug-2022
18-Aug-2022
14-Jul-2022
07-Jul-2022
25-Aug-2022
09-Aug-2022
09-Aug-2022
07-Jul-2022
25-Jul-2022
14-Jul-2022
07-Jul-2022
18-Aug-2022
09-Aug-2022
18-Aug-2022
09-Aug-2022
25-Jul-2022
25-Jul-2022
07-Jul-2022
25-Jul-2022
03-Aug-2022
25-Jul-2022
09-Aug-2022
14-Jul-2022
14-Jul-2022
18-Aug-2022
07-Jul-2022
14-Jul-2022
09-Aug-2022
14-Jul-2022
14-Jul-2022

## 2022-08-25 NOTE — BH INPATIENT PSYCHIATRY PROGRESS NOTE - NSBHMSEJUDGE_PSY_A_CORE
Poor

## 2022-08-25 NOTE — BH INPATIENT PSYCHIATRY PROGRESS NOTE - NSTXDCOPNOPROGRES_PSY_ALL_CORE
No Change

## 2022-08-26 NOTE — SOCIAL WORK POST DISCHARGE FOLLOW UP NOTE - NSBHSWFOLLOWUP_PSY_ALL_CORE_FT
Pt was not accepted to [facility] for RN visits due to their staffing shortage. [facility] have accepted pt and will follow up by phone.

## 2022-09-10 ENCOUNTER — INPATIENT (INPATIENT)
Facility: HOSPITAL | Age: 72
LOS: 23 days | Discharge: ANOTHER HOSPITAL | DRG: 880 | End: 2022-10-04
Attending: HOSPITALIST | Admitting: HOSPITALIST
Payer: COMMERCIAL

## 2022-09-10 VITALS
DIASTOLIC BLOOD PRESSURE: 70 MMHG | OXYGEN SATURATION: 95 % | HEIGHT: 73 IN | RESPIRATION RATE: 15 BRPM | HEART RATE: 69 BPM | SYSTOLIC BLOOD PRESSURE: 132 MMHG | WEIGHT: 179.02 LBS | TEMPERATURE: 98 F

## 2022-09-10 DIAGNOSIS — R06.02 SHORTNESS OF BREATH: ICD-10-CM

## 2022-09-10 LAB
ALBUMIN SERPL ELPH-MCNC: 3.2 G/DL — LOW (ref 3.5–5)
ALP SERPL-CCNC: 118 U/L — SIGNIFICANT CHANGE UP (ref 40–120)
ALT FLD-CCNC: 27 U/L DA — SIGNIFICANT CHANGE UP (ref 10–60)
ANION GAP SERPL CALC-SCNC: 5 MMOL/L — SIGNIFICANT CHANGE UP (ref 5–17)
AST SERPL-CCNC: 18 U/L — SIGNIFICANT CHANGE UP (ref 10–40)
BASOPHILS # BLD AUTO: 0.02 K/UL — SIGNIFICANT CHANGE UP (ref 0–0.2)
BASOPHILS NFR BLD AUTO: 0.3 % — SIGNIFICANT CHANGE UP (ref 0–2)
BILIRUB SERPL-MCNC: 0.4 MG/DL — SIGNIFICANT CHANGE UP (ref 0.2–1.2)
BUN SERPL-MCNC: 31 MG/DL — HIGH (ref 7–18)
CALCIUM SERPL-MCNC: 8.6 MG/DL — SIGNIFICANT CHANGE UP (ref 8.4–10.5)
CHLORIDE SERPL-SCNC: 110 MMOL/L — HIGH (ref 96–108)
CO2 SERPL-SCNC: 28 MMOL/L — SIGNIFICANT CHANGE UP (ref 22–31)
CREAT SERPL-MCNC: 1.21 MG/DL — SIGNIFICANT CHANGE UP (ref 0.5–1.3)
EGFR: 64 ML/MIN/1.73M2 — SIGNIFICANT CHANGE UP
EOSINOPHIL # BLD AUTO: 0.15 K/UL — SIGNIFICANT CHANGE UP (ref 0–0.5)
EOSINOPHIL NFR BLD AUTO: 2.2 % — SIGNIFICANT CHANGE UP (ref 0–6)
GAS PNL BLDV: SIGNIFICANT CHANGE UP
GLUCOSE SERPL-MCNC: 122 MG/DL — HIGH (ref 70–99)
HCT VFR BLD CALC: 33.6 % — LOW (ref 39–50)
HGB BLD-MCNC: 11.2 G/DL — LOW (ref 13–17)
IMM GRANULOCYTES NFR BLD AUTO: 0.3 % — SIGNIFICANT CHANGE UP (ref 0–1.5)
LYMPHOCYTES # BLD AUTO: 0.83 K/UL — LOW (ref 1–3.3)
LYMPHOCYTES # BLD AUTO: 12.3 % — LOW (ref 13–44)
MCHC RBC-ENTMCNC: 29.6 PG — SIGNIFICANT CHANGE UP (ref 27–34)
MCHC RBC-ENTMCNC: 33.3 GM/DL — SIGNIFICANT CHANGE UP (ref 32–36)
MCV RBC AUTO: 88.7 FL — SIGNIFICANT CHANGE UP (ref 80–100)
MONOCYTES # BLD AUTO: 0.51 K/UL — SIGNIFICANT CHANGE UP (ref 0–0.9)
MONOCYTES NFR BLD AUTO: 7.5 % — SIGNIFICANT CHANGE UP (ref 2–14)
NEUTROPHILS # BLD AUTO: 5.23 K/UL — SIGNIFICANT CHANGE UP (ref 1.8–7.4)
NEUTROPHILS NFR BLD AUTO: 77.4 % — HIGH (ref 43–77)
NRBC # BLD: 0 /100 WBCS — SIGNIFICANT CHANGE UP (ref 0–0)
NT-PROBNP SERPL-SCNC: 315 PG/ML — HIGH (ref 0–125)
OB PNL STL: NEGATIVE — SIGNIFICANT CHANGE UP
PLATELET # BLD AUTO: 213 K/UL — SIGNIFICANT CHANGE UP (ref 150–400)
POTASSIUM SERPL-MCNC: 3.3 MMOL/L — LOW (ref 3.5–5.3)
POTASSIUM SERPL-SCNC: 3.3 MMOL/L — LOW (ref 3.5–5.3)
PROT SERPL-MCNC: 6.5 G/DL — SIGNIFICANT CHANGE UP (ref 6–8.3)
RBC # BLD: 3.79 M/UL — LOW (ref 4.2–5.8)
RBC # FLD: 13.3 % — SIGNIFICANT CHANGE UP (ref 10.3–14.5)
SARS-COV-2 RNA SPEC QL NAA+PROBE: SIGNIFICANT CHANGE UP
SODIUM SERPL-SCNC: 143 MMOL/L — SIGNIFICANT CHANGE UP (ref 135–145)
TROPONIN I, HIGH SENSITIVITY RESULT: 7.2 NG/L — SIGNIFICANT CHANGE UP
WBC # BLD: 6.76 K/UL — SIGNIFICANT CHANGE UP (ref 3.8–10.5)
WBC # FLD AUTO: 6.76 K/UL — SIGNIFICANT CHANGE UP (ref 3.8–10.5)

## 2022-09-10 PROCEDURE — 71045 X-RAY EXAM CHEST 1 VIEW: CPT | Mod: 26

## 2022-09-10 PROCEDURE — 99285 EMERGENCY DEPT VISIT HI MDM: CPT

## 2022-09-10 PROCEDURE — 99221 1ST HOSP IP/OBS SF/LOW 40: CPT | Mod: GC

## 2022-09-10 NOTE — H&P ADULT - PROBLEM/PLAN-3
"Pt calling from a clinic in [place]. Requests copy of Depo-Provera order faxed there. Pt states injection due today.   We printed order and faxed to \"Family Practice\"  [phone].  Stewart Muller RN      "
DISPLAY PLAN FREE TEXT

## 2022-09-10 NOTE — ED ADULT NURSE NOTE - ED STAT RN HANDOFF DETAILS 2
pt .remained stable. pt .remained stable. c/o chest discomfort.  NP aware. EKG done. denies chest pain. transfer to [facility] area. report given to nesha buchanan. no acute distress noted

## 2022-09-10 NOTE — H&P ADULT - NSHPREVIEWOFSYSTEMS_GEN_ALL_CORE
REVIEW OF SYSTEMS:    CONSTITUTIONAL: + weakness, no fevers or chills  EYES/ENT: No visual changes;  No vertigo or throat pain   NECK: No pain or stiffness  RESPIRATORY: No cough, wheezing, hemoptysis; + shortness of breath  CARDIOVASCULAR: + chest pain no palpitations  GASTROINTESTINAL: No abdominal or epigastric pain. No nausea, vomiting, or hematemesis; No diarrhea or constipation. No melena or hematochezia.  GENITOURINARY: No dysuria, frequency or hematuria  NEUROLOGICAL: No numbness or weakness  SKIN: No itching, burning, rashes, or lesions   All other review of systems is negative unless indicated above.

## 2022-09-10 NOTE — ED ADULT NURSE NOTE - ED STAT RN HANDOFF DETAILS
Report given to GRZEGORZ Ratliff. No acute distress noted, denies chest pain, no shortness of breath indicated.

## 2022-09-10 NOTE — H&P ADULT - ATTENDING COMMENTS
Discussed with [facility] PGY2 Dr. Brown    This is a 70 y/o male with BPH, HTN and HLD presenting with generalized complaints. Patient of note has flat affect and does not make eye contact during my encounter. He says he has been short of breath for several months. He also endorses musculoskeletal pain and chest discomfort. He reports he has seen physicians for this in the past, but "nobody believes me". Patient presented with similar complaints in July and also endorsed SI at that time, prompting transfer from  ED to [facility] psych unit. Will admit to for Discussed with [facility] PGY2 Dr. Brown    This is a 70 y/o male with depression, BPH, HTN, HLD and medication/care nonadherence presenting with various generalized complaints. Patient of note has flat affect and does not make eye contact during my encounter. He says he has been short of breath for several months. He also endorses musculoskeletal pain and chest discomfort. He reports he has seen physicians for this in the past, but "nobody believes me". Patient presented with similar complaints in July and also endorsed SI at that time, prompting transfer from  ED to [facility] inpatient psych unit. Will admit to medicine for social work/case management evaluation as patient may need change in living situation.    Vital Signs Last 24 Hrs  T(C): 36.7 (11 Sep 2022 05:16), Max: 36.7 (11 Sep 2022 05:16)  T(F): 98.1 (11 Sep 2022 05:16), Max: 98.1 (11 Sep 2022 05:16)  HR: 59 (11 Sep 2022 05:16) (57 - 69)  BP: 152/83 (11 Sep 2022 05:16) (132/70 - 158/89)  BP(mean): --  RR: 17 (11 Sep 2022 05:16) (15 - 17)  SpO2: 96% (11 Sep 2022 05:16) (95% - 97%)    Parameters below as of 11 Sep 2022 05:16  Patient On (Oxygen Delivery Method): room air    PEx  as above    A/P:  #Depression  #HTN  #HLD  #BPH  #Prophylactic measure    - admit to medicine  - dyspnea as well as trace LE edema appear chronic on chart review; will get TTE to establish baseline  - resume home meds  - psych eval  - case management/ eval for safe dispo planning Discussed with [facility] PGY2 Dr. Brown    This is a 70 y/o male with depression, BPH, HTN, HLD and medication/care nonadherence presenting with various generalized complaints. Patient of note has flat affect and does not make eye contact during my encounter. He says he has been short of breath for several months. He also endorses musculoskeletal pain and chest discomfort. He reports he has seen physicians for this in the past, but "nobody believes me". Patient presented with similar complaints in July and also endorsed SI at that time, prompting transfer from  ED to [facility] inpatient psych unit. Will admit to medicine for social work/case management evaluation as patient may need change in living situation.    Vital Signs Last 24 Hrs  T(C): 36.7 (11 Sep 2022 05:16), Max: 36.7 (11 Sep 2022 05:16)  T(F): 98.1 (11 Sep 2022 05:16), Max: 98.1 (11 Sep 2022 05:16)  HR: 59 (11 Sep 2022 05:16) (57 - 69)  BP: 152/83 (11 Sep 2022 05:16) (132/70 - 158/89)  BP(mean): --  RR: 17 (11 Sep 2022 05:16) (15 - 17)  SpO2: 96% (11 Sep 2022 05:16) (95% - 97%)    Parameters below as of 11 Sep 2022 05:16  Patient On (Oxygen Delivery Method): room air    PEx  as above    A/P:  #Depression  #Anemia  #HTN  #HLD  #BPH  #Prophylactic measure    - admit to medicine  - dyspnea as well as trace LE edema appear chronic on chart review; will get TTE to establish baseline  - resume home meds  - trend H/H  - iron studies outpatient  - psych eval  - case management/SW eval for safe dispo planning

## 2022-09-10 NOTE — ED PROVIDER NOTE - PROGRESS NOTE DETAILS
pt states still feeling very bad, wants to stay for more testing, rectal w brown stool, guaiac sent given hb drop, pt does not have cards, is clinic pt at [place] - does not know if he has personal medical doctor. dionne admitting md.

## 2022-09-10 NOTE — ED ADULT NURSE NOTE - INTERVENTIONS DEFINITIONS
Instruct patient to call for assistance/Room bathroom lighting operational/Non-slip footwear when patient is off stretcher/Physically safe environment: no spills, clutter or unnecessary equipment/Stretcher in lowest position, wheels locked, appropriate side rails in place/Monitor gait and stability

## 2022-09-10 NOTE — ED PROVIDER NOTE - CLINICAL SUMMARY MEDICAL DECISION MAKING FREE TEXT BOX
Will do basic blood work, EKG, chest x-ray to rule out ACS, pneumonia or plural effusion. Physical exam and clinical findings against pulmonary embolism. Likely admit for further evaluation including echocardiogram.

## 2022-09-10 NOTE — H&P ADULT - NSHPPHYSICALEXAM_GEN_ALL_CORE
PHYSICAL EXAMINATION:  GENERAL: flat affect, no eye contact   HEAD:  Atraumatic, Normocephalic  EYES:  conjunctiva and sclera clear  NECK: Supple, No JVD, Normal thyroid  CHEST/LUNG: Clear to auscultation. Clear to percussion bilaterally; No rales, rhonchi, wheezing, or rubs  HEART: Regular rate and rhythm; No murmurs, rubs, or gallops  ABDOMEN: Soft, Nontender, Nondistended; Bowel sounds present  NERVOUS SYSTEM:  Alert & Oriented X3,    EXTREMITIES:  2+ Peripheral Pulses, No clubbing, cyanosis, mild trace LE edema   SKIN: warm dry

## 2022-09-10 NOTE — H&P ADULT - ASSESSMENT
70 y/o male with BPH, HTN and HLD presenting with generalized complaints, admitted for further work up for dyspnea and  depression

## 2022-09-10 NOTE — H&P ADULT - HISTORY OF PRESENT ILLNESS
70 y/o male with BPH, HTN and HLD presenting with generalized complaints. Patient states that he feels SOB for the past couple months and states its getting progressively worse. States he is unable to ambulate due to the SOB. He also endorses overall body pain and chest discomfort.  He states that he has seen multiple  physicians for his SOB but states that "nobody believes me".    Per chart review, Patient presented with similar complaints in July and was admitted to inpatient psych due to suicidal ideation.

## 2022-09-10 NOTE — H&P ADULT - PROBLEM SELECTOR PLAN 2
pt was recently admitted to inpatient psych [facility] in July  Diagnosed with  Major psychotic depression  Does not know what medications he is on  per [facility] scripts, pt has not been on any SSRI since last year   primary team to confirm home meds pt was recently admitted to inpatient psych [facility] in July  Diagnosed with  Major psychotic depression  Does not know what medications he is on  per sure scripts, pt has not been on any SSRI since last year   primary team to confirm home meds   consulted

## 2022-09-10 NOTE — H&P ADULT - PROBLEM SELECTOR PLAN 1
presents with dyspnea for couple months  Afebrile, saturating well on RA  CXR shows no infiltrates or signs of fluid overload  EKG NSR, no ST changes   , trop X1 negative   trace LE edema   dyspnea is likely 2/2 anxiety, but will get ECHO to r/o cardiac causes   f/u ECHO

## 2022-09-10 NOTE — H&P ADULT - NSHPLABSRESULTS_GEN_ALL_CORE
LABS:                        11.2   6.76  )-----------( 213      ( 10 Sep 2022 20:20 )             33.6     09-10    143  |  110<H>  |  31<H>  ----------------------------<  122<H>  3.3<L>   |  28  |  1.21    Ca    8.6      10 Sep 2022 20:20    TPro  6.5  /  Alb  3.2<L>  /  TBili  0.4  /  DBili  x   /  AST  18  /  ALT  27  /  AlkPhos  118  09-10        LIVER FUNCTIONS - ( 10 Sep 2022 20:20 )  Alb: 3.2 g/dL / Pro: 6.5 g/dL / ALK PHOS: 118 U/L / ALT: 27 U/L DA / AST: 18 U/L / GGT: x

## 2022-09-10 NOTE — ED PROVIDER NOTE - OBJECTIVE STATEMENT
72 y/o male with a history of hypertension, severe depression, hyperlipidemia and prostate issues comes in complaining that for weeks he has been having shortness of breath that gets worse at night and better in the day. Patient's shortness of breath is not positional. He denies any cough, chest discomfort, leg swelling, bleeding, vomiting, or any weakness in the arms or legs. Patient is a nonsmoker and also denies any history of blood clots. He is allergic to penicillin and septra.

## 2022-09-10 NOTE — H&P ADULT - PROBLEM SELECTOR PLAN 5
IMPROVE VTE Individual Risk Assessment          RISK                                                          Points  [  ] Previous VTE                                                3  [  ] Thrombophilia                                             2  [  ] Lower limb paralysis                                   2        (unable to hold up >15 seconds)    [  ] Current Cancer                                             2         (within 6 months)  [ x ] Immobilization > 24 hrs                              1  [  ] ICU/CCU stay > 24 hours                             1  [ x ] Age > 60                                                         1    IMPROVE VTE Score:         [     2    ]      Will Start Lovenox

## 2022-09-11 DIAGNOSIS — F32.9 MAJOR DEPRESSIVE DISORDER, SINGLE EPISODE, UNSPECIFIED: ICD-10-CM

## 2022-09-11 DIAGNOSIS — I10 ESSENTIAL (PRIMARY) HYPERTENSION: ICD-10-CM

## 2022-09-11 DIAGNOSIS — E78.5 HYPERLIPIDEMIA, UNSPECIFIED: ICD-10-CM

## 2022-09-11 DIAGNOSIS — Z29.9 ENCOUNTER FOR PROPHYLACTIC MEASURES, UNSPECIFIED: ICD-10-CM

## 2022-09-11 DIAGNOSIS — R06.00 DYSPNEA, UNSPECIFIED: ICD-10-CM

## 2022-09-11 LAB
ANION GAP SERPL CALC-SCNC: 4 MMOL/L — LOW (ref 5–17)
BASOPHILS # BLD AUTO: 0.04 K/UL — SIGNIFICANT CHANGE UP (ref 0–0.2)
BASOPHILS NFR BLD AUTO: 0.6 % — SIGNIFICANT CHANGE UP (ref 0–2)
BUN SERPL-MCNC: 31 MG/DL — HIGH (ref 7–18)
CALCIUM SERPL-MCNC: 8.3 MG/DL — LOW (ref 8.4–10.5)
CHLORIDE SERPL-SCNC: 111 MMOL/L — HIGH (ref 96–108)
CO2 SERPL-SCNC: 30 MMOL/L — SIGNIFICANT CHANGE UP (ref 22–31)
CREAT SERPL-MCNC: 1.14 MG/DL — SIGNIFICANT CHANGE UP (ref 0.5–1.3)
EGFR: 69 ML/MIN/1.73M2 — SIGNIFICANT CHANGE UP
EOSINOPHIL # BLD AUTO: 0.24 K/UL — SIGNIFICANT CHANGE UP (ref 0–0.5)
EOSINOPHIL NFR BLD AUTO: 3.6 % — SIGNIFICANT CHANGE UP (ref 0–6)
GLUCOSE SERPL-MCNC: 94 MG/DL — SIGNIFICANT CHANGE UP (ref 70–99)
HCT VFR BLD CALC: 32 % — LOW (ref 39–50)
HCV AB S/CO SERPL IA: 0.09 S/CO — SIGNIFICANT CHANGE UP (ref 0–0.99)
HCV AB SERPL-IMP: SIGNIFICANT CHANGE UP
HGB BLD-MCNC: 10.7 G/DL — LOW (ref 13–17)
IMM GRANULOCYTES NFR BLD AUTO: 0.5 % — SIGNIFICANT CHANGE UP (ref 0–1.5)
LYMPHOCYTES # BLD AUTO: 1.17 K/UL — SIGNIFICANT CHANGE UP (ref 1–3.3)
LYMPHOCYTES # BLD AUTO: 17.7 % — SIGNIFICANT CHANGE UP (ref 13–44)
MAGNESIUM SERPL-MCNC: 2.2 MG/DL — SIGNIFICANT CHANGE UP (ref 1.6–2.6)
MCHC RBC-ENTMCNC: 29.6 PG — SIGNIFICANT CHANGE UP (ref 27–34)
MCHC RBC-ENTMCNC: 33.4 GM/DL — SIGNIFICANT CHANGE UP (ref 32–36)
MCV RBC AUTO: 88.6 FL — SIGNIFICANT CHANGE UP (ref 80–100)
MONOCYTES # BLD AUTO: 0.63 K/UL — SIGNIFICANT CHANGE UP (ref 0–0.9)
MONOCYTES NFR BLD AUTO: 9.5 % — SIGNIFICANT CHANGE UP (ref 2–14)
NEUTROPHILS # BLD AUTO: 4.51 K/UL — SIGNIFICANT CHANGE UP (ref 1.8–7.4)
NEUTROPHILS NFR BLD AUTO: 68.1 % — SIGNIFICANT CHANGE UP (ref 43–77)
NRBC # BLD: 0 /100 WBCS — SIGNIFICANT CHANGE UP (ref 0–0)
PHOSPHATE SERPL-MCNC: 2.4 MG/DL — LOW (ref 2.5–4.5)
PLATELET # BLD AUTO: 194 K/UL — SIGNIFICANT CHANGE UP (ref 150–400)
POTASSIUM SERPL-MCNC: 3.6 MMOL/L — SIGNIFICANT CHANGE UP (ref 3.5–5.3)
POTASSIUM SERPL-SCNC: 3.6 MMOL/L — SIGNIFICANT CHANGE UP (ref 3.5–5.3)
RBC # BLD: 3.61 M/UL — LOW (ref 4.2–5.8)
RBC # FLD: 13.2 % — SIGNIFICANT CHANGE UP (ref 10.3–14.5)
SODIUM SERPL-SCNC: 145 MMOL/L — SIGNIFICANT CHANGE UP (ref 135–145)
TROPONIN I, HIGH SENSITIVITY RESULT: 10.2 NG/L — SIGNIFICANT CHANGE UP
WBC # BLD: 6.62 K/UL — SIGNIFICANT CHANGE UP (ref 3.8–10.5)
WBC # FLD AUTO: 6.62 K/UL — SIGNIFICANT CHANGE UP (ref 3.8–10.5)

## 2022-09-11 PROCEDURE — 99233 SBSQ HOSP IP/OBS HIGH 50: CPT | Mod: GC

## 2022-09-11 RX ORDER — SIMVASTATIN 20 MG/1
20 TABLET, FILM COATED ORAL AT BEDTIME
Refills: 0 | Status: DISCONTINUED | OUTPATIENT
Start: 2022-09-11 | End: 2022-10-04

## 2022-09-11 RX ORDER — ENOXAPARIN SODIUM 100 MG/ML
40 INJECTION SUBCUTANEOUS EVERY 24 HOURS
Refills: 0 | Status: DISCONTINUED | OUTPATIENT
Start: 2022-09-11 | End: 2022-09-29

## 2022-09-11 RX ORDER — POTASSIUM CHLORIDE 20 MEQ
40 PACKET (EA) ORAL ONCE
Refills: 0 | Status: COMPLETED | OUTPATIENT
Start: 2022-09-11 | End: 2022-09-11

## 2022-09-11 RX ORDER — NIFEDIPINE 30 MG
30 TABLET, EXTENDED RELEASE 24 HR ORAL DAILY
Refills: 0 | Status: DISCONTINUED | OUTPATIENT
Start: 2022-09-11 | End: 2022-10-04

## 2022-09-11 RX ORDER — LANOLIN ALCOHOL/MO/W.PET/CERES
3 CREAM (GRAM) TOPICAL ONCE
Refills: 0 | Status: DISCONTINUED | OUTPATIENT
Start: 2022-09-11 | End: 2022-09-12

## 2022-09-11 RX ADMIN — Medication 40 MILLIEQUIVALENT(S): at 02:25

## 2022-09-11 RX ADMIN — ENOXAPARIN SODIUM 40 MILLIGRAM(S): 100 INJECTION SUBCUTANEOUS at 05:27

## 2022-09-11 RX ADMIN — SIMVASTATIN 20 MILLIGRAM(S): 20 TABLET, FILM COATED ORAL at 21:24

## 2022-09-11 RX ADMIN — Medication 30 MILLIGRAM(S): at 05:27

## 2022-09-11 NOTE — PROGRESS NOTE ADULT - PROBLEM SELECTOR PLAN 2
pt was recently admitted to inpatient psych [facility] in July  Diagnosed with  Major psychotic depression  Does not know what medications he is on  per [facility] scripts, pt has not been on any SSRI since last year   primary team to confirm home meds   consulted pt was recently admitted to inpatient psych [facility] in July  Diagnosed with  Major psychotic depression  Does not know what medications he is on  per [facility] scripts, pt has not been on any SSRI since last year   primary team to confirm home meds   consulted  Patient's underlying psychiatric illness likely contributor to presentation, however will assess TTE as above and CT chest, complete cycling cardiac biomarkers.

## 2022-09-11 NOTE — ED ADULT NURSE REASSESSMENT NOTE - NS ED NURSE REASSESS COMMENT FT1
Pt c/o chest pressure, vitals stable. No acute distress noted, denies chest pain, no shortness of breath indicated. NP Stewart made aware, to come evaluate pt. STAT EKG being completed at this time.

## 2022-09-11 NOTE — CHART NOTE - NSCHARTNOTEFT_GEN_A_CORE
EVENT: Received telephone call from RN that pt is c/o of chest tightness    HPI: 70 y/o male with BPH, HTN and HLD presenting with generalized complaints, admitted for further work up for dyspnea and  depression    SUBJECTIVE: "I have tightness in my mid chest area"    OBJECTIVE:  Vital Signs Last 24 Hrs  T(C): 36.8 (11 Sep 2022 19:40), Max: 36.8 (11 Sep 2022 19:40)  T(F): 98.3 (11 Sep 2022 19:40), Max: 98.3 (11 Sep 2022 19:40)  HR: 65 (11 Sep 2022 19:40) (55 - 65)  BP: 162/85 (11 Sep 2022 19:40) (152/83 - 177/95)  BP(mean): --  RR: 17 (11 Sep 2022 19:40) (16 - 17)  SpO2: 98% (11 Sep 2022 19:40) (96% - 98%)    Parameters below as of 11 Sep 2022 19:40  Patient On (Oxygen Delivery Method): room air    FOCUSED PHYSICAL EXAM:  Neuro: awake, alert, oriented x 3. No neuro deficit  Cardiovascular: Pulses +2 B/L in lower and upper extremities, HR regular, BP stable, No edema.  Respiratory: Respirations regular, unlabored, breath sounds clear B/L.   GI: Abdomen soft, non-tender, positive bowel sounds.  : no bladder distention noted. No complaints at this time.  Skin: Dry, intact, no bruising, no diaphoresis.    LABS:                        10.7   6.62  )-----------( 194      ( 11 Sep 2022 05:40 )             32.0     09-11    145  |  111<H>  |  31<H>  ----------------------------<  94  3.6   |  30  |  1.14    Ca    8.3<L>      11 Sep 2022 05:40  Phos  2.4     09-11  Mg     2.2     09-11    TPro  6.5  /  Alb  3.2<L>  /  TBili  0.4  /  DBili  x   /  AST  18  /  ALT  27  /  AlkPhos  118  09-10      EKG:   IMAGING:    ASSESSMENT/PROBLEM:  Chest pain      PLAN:   1. Troponin, EKG ordered stat  2. Monitor for further symptoms of chest pain/chest discomfort  3. F/u with results of troponin & EKG  4. Cont present care/treatment  5. Supportive care

## 2022-09-11 NOTE — PROGRESS NOTE ADULT - PROBLEM SELECTOR PLAN 1
presents with dyspnea for couple months  Afebrile, saturating well on RA  CXR shows no infiltrates or signs of fluid overload  EKG NSR, no ST changes   , trop X1 negative   trace LE edema   dyspnea is likely 2/2 anxiety, but will get ECHO to r/o cardiac causes   f/u ECHO presents with dyspnea for couple months  Afebrile, saturating well on RA  CXR shows no infiltrates or signs of fluid overload  EKG NSR, no ST changes   , trop X1 negative   trace LE edema   dyspnea is likely 2/2 anxiety, but will get ECHO to r/o cardiac causes   f/u ECHO, check CT chest

## 2022-09-11 NOTE — PROGRESS NOTE ADULT - SUBJECTIVE AND OBJECTIVE BOX
Patient is a 71y old  Male who presents with a chief complaint of dyspnea (10 Sep 2022 23:18)      INTERVAL HPI/OVERNIGHT EVENTS: no new complaints    MEDICATIONS  (STANDING):  enoxaparin Injectable 40 milliGRAM(s) SubCutaneous every 24 hours  melatonin 3 milliGRAM(s) Oral once  NIFEdipine XL 30 milliGRAM(s) Oral daily  simvastatin 20 milliGRAM(s) Oral at bedtime    MEDICATIONS  (PRN):      __________________________________________________  REVIEW OF SYSTEMS:    CONSTITUTIONAL: No fever,   EYES: no acute visual disturbances  NECK: No pain or stiffness  RESPIRATORY: No cough; No shortness of breath  CARDIOVASCULAR: No chest pain, no palpitations  GASTROINTESTINAL: No pain. No nausea or vomiting; No diarrhea   NEUROLOGICAL: No headache or numbness, no tremors  MUSCULOSKELETAL: No joint pain, no muscle pain  GENITOURINARY: no dysuria, no frequency, no hesitancy  PSYCHIATRY: no depression , no anxiety  ALL OTHER  ROS negative        Vital Signs Last 24 Hrs  T(C): 36.8 (11 Sep 2022 19:40), Max: 36.8 (11 Sep 2022 19:40)  T(F): 98.3 (11 Sep 2022 19:40), Max: 98.3 (11 Sep 2022 19:40)  HR: 65 (11 Sep 2022 19:40) (55 - 65)  BP: 162/85 (11 Sep 2022 19:40) (152/83 - 177/95)  BP(mean): --  RR: 17 (11 Sep 2022 19:40) (16 - 17)  SpO2: 98% (11 Sep 2022 19:40) (96% - 98%)    Parameters below as of 11 Sep 2022 19:40  Patient On (Oxygen Delivery Method): room air        ________________________________________________  PHYSICAL EXAM:  GENERAL: NAD  HEENT: Normocephalic;  conjunctivae and sclerae clear; moist mucous membranes;   NECK : supple  CHEST/LUNG: Clear to auscultation bilaterally with good air entry   HEART: S1 S2  regular; no murmurs, gallops or rubs  ABDOMEN: Soft, Nontender, Nondistended; Bowel sounds present  EXTREMITIES: no cyanosis; no edema; no calf tenderness  SKIN: warm and dry; no rash  NERVOUS SYSTEM:  Awake and alert; Oriented  to place, person and time ; no new deficits    _________________________________________________  LABS:                        10.7   6.62  )-----------( 194      ( 11 Sep 2022 05:40 )             32.0     09-11    145  |  111<H>  |  31<H>  ----------------------------<  94  3.6   |  30  |  1.14    Ca    8.3<L>      11 Sep 2022 05:40  Phos  2.4     09-11  Mg     2.2     09-11    TPro  6.5  /  Alb  3.2<L>  /  TBili  0.4  /  DBili  x   /  AST  18  /  ALT  27  /  AlkPhos  118  09-10        CAPILLARY BLOOD GLUCOSE            RADIOLOGY & ADDITIONAL TESTS:    Imaging Personally Reviewed:  YES/NO    Consultant(s) Notes Reviewed:   YES/ No    Care Discussed with Consultants :     Plan of care was discussed with patient and /or primary care giver; all questions and concerns were addressed and care was aligned with patient's wishes.       Patient is a 71y old  Male who presents with a chief complaint of dyspnea (10 Sep 2022 23:18)      INTERVAL HPI/OVERNIGHT EVENTS:   Various somatic complaints, including chronic sob, abd pain, generalized weakness/malaise  States he feels "terrible". Did not provide permission for me to reach out to his brother today.   Chart review indicates prior inpatient psych hospitalization for suicidal ideation in setting of major depressive disorder July 5th-August 25th.     MEDICATIONS  (STANDING):  enoxaparin Injectable 40 milliGRAM(s) SubCutaneous every 24 hours  melatonin 3 milliGRAM(s) Oral once  NIFEdipine XL 30 milliGRAM(s) Oral daily  simvastatin 20 milliGRAM(s) Oral at bedtime    MEDICATIONS  (PRN):      __________________________________________________  REVIEW OF SYSTEMS:    CONSTITUTIONAL: No fever,   EYES: no acute visual disturbances  NECK: No pain or stiffness  RESPIRATORY: No cough; No shortness of breath  CARDIOVASCULAR: + chest pain, no palpitations  GASTROINTESTINAL: + abd pain. No nausea or vomiting; No diarrhea   NEUROLOGICAL: No headache or numbness, no tremors  MUSCULOSKELETAL: No joint pain, no muscle pain  GENITOURINARY: no dysuria, no frequency, no hesitancy  PSYCHIATRY: no depression , + anxiety  ALL OTHER  ROS negative        Vital Signs Last 24 Hrs  T(C): 36.8 (11 Sep 2022 19:40), Max: 36.8 (11 Sep 2022 19:40)  T(F): 98.3 (11 Sep 2022 19:40), Max: 98.3 (11 Sep 2022 19:40)  HR: 65 (11 Sep 2022 19:40) (55 - 65)  BP: 162/85 (11 Sep 2022 19:40) (152/83 - 177/95)  BP(mean): --  RR: 17 (11 Sep 2022 19:40) (16 - 17)  SpO2: 98% (11 Sep 2022 19:40) (96% - 98%)    Parameters below as of 11 Sep 2022 19:40  Patient On (Oxygen Delivery Method): room air        ________________________________________________  PHYSICAL EXAM:  GENERAL: NAD  HEENT: Normocephalic;  conjunctivae and sclerae clear; moist mucous membranes;   NECK : supple  CHEST/LUNG: Clear to auscultation bilaterally with good air entry   HEART: S1 S2  regular; no murmurs, gallops or rubs  ABDOMEN: Soft, Nontender, Nondistended; Bowel sounds present  EXTREMITIES: no cyanosis; no edema; no calf tenderness  SKIN: warm and dry; no rash  NERVOUS SYSTEM:  Awake and alert; Oriented  to place, person and time ; no new deficits    _________________________________________________  LABS:                        10.7   6.62  )-----------( 194      ( 11 Sep 2022 05:40 )             32.0     09-11    145  |  111<H>  |  31<H>  ----------------------------<  94  3.6   |  30  |  1.14    Ca    8.3<L>      11 Sep 2022 05:40  Phos  2.4     09-11  Mg     2.2     09-11    TPro  6.5  /  Alb  3.2<L>  /  TBili  0.4  /  DBili  x   /  AST  18  /  ALT  27  /  AlkPhos  118  09-10        CAPILLARY BLOOD GLUCOSE            RADIOLOGY & ADDITIONAL TESTS:    Imaging Personally Reviewed:  YES/NO    Consultant(s) Notes Reviewed:   YES/ No    Care Discussed with Consultants :     Plan of care was discussed with patient and /or primary care giver; all questions and concerns were addressed and care was aligned with patient's wishes.

## 2022-09-11 NOTE — ED ADULT NURSE REASSESSMENT NOTE - NS ED NURSE REASSESS COMMENT FT1
Pt resting in bed, A&OX3, admitted to medicine service, awaiting floor bed. No acute distress noted, denies chest pain, no shortness of breath indicated.

## 2022-09-12 DIAGNOSIS — Z02.9 ENCOUNTER FOR ADMINISTRATIVE EXAMINATIONS, UNSPECIFIED: ICD-10-CM

## 2022-09-12 DIAGNOSIS — F41.9 ANXIETY DISORDER, UNSPECIFIED: ICD-10-CM

## 2022-09-12 DIAGNOSIS — Z86.59 PERSONAL HISTORY OF OTHER MENTAL AND BEHAVIORAL DISORDERS: ICD-10-CM

## 2022-09-12 LAB
ANION GAP SERPL CALC-SCNC: 6 MMOL/L — SIGNIFICANT CHANGE UP (ref 5–17)
BASOPHILS # BLD AUTO: 0.05 K/UL — SIGNIFICANT CHANGE UP (ref 0–0.2)
BASOPHILS NFR BLD AUTO: 0.6 % — SIGNIFICANT CHANGE UP (ref 0–2)
BUN SERPL-MCNC: 23 MG/DL — HIGH (ref 7–18)
CALCIUM SERPL-MCNC: 9 MG/DL — SIGNIFICANT CHANGE UP (ref 8.4–10.5)
CHLORIDE SERPL-SCNC: 104 MMOL/L — SIGNIFICANT CHANGE UP (ref 96–108)
CO2 SERPL-SCNC: 30 MMOL/L — SIGNIFICANT CHANGE UP (ref 22–31)
CREAT SERPL-MCNC: 0.97 MG/DL — SIGNIFICANT CHANGE UP (ref 0.5–1.3)
EGFR: 83 ML/MIN/1.73M2 — SIGNIFICANT CHANGE UP
EOSINOPHIL # BLD AUTO: 0.29 K/UL — SIGNIFICANT CHANGE UP (ref 0–0.5)
EOSINOPHIL NFR BLD AUTO: 3.7 % — SIGNIFICANT CHANGE UP (ref 0–6)
GLUCOSE SERPL-MCNC: 84 MG/DL — SIGNIFICANT CHANGE UP (ref 70–99)
HCT VFR BLD CALC: 35.9 % — LOW (ref 39–50)
HGB BLD-MCNC: 12.1 G/DL — LOW (ref 13–17)
IMM GRANULOCYTES NFR BLD AUTO: 0.6 % — SIGNIFICANT CHANGE UP (ref 0–1.5)
LYMPHOCYTES # BLD AUTO: 1.27 K/UL — SIGNIFICANT CHANGE UP (ref 1–3.3)
LYMPHOCYTES # BLD AUTO: 16.3 % — SIGNIFICANT CHANGE UP (ref 13–44)
MAGNESIUM SERPL-MCNC: 2.1 MG/DL — SIGNIFICANT CHANGE UP (ref 1.6–2.6)
MCHC RBC-ENTMCNC: 28.8 PG — SIGNIFICANT CHANGE UP (ref 27–34)
MCHC RBC-ENTMCNC: 33.7 GM/DL — SIGNIFICANT CHANGE UP (ref 32–36)
MCV RBC AUTO: 85.5 FL — SIGNIFICANT CHANGE UP (ref 80–100)
MONOCYTES # BLD AUTO: 0.73 K/UL — SIGNIFICANT CHANGE UP (ref 0–0.9)
MONOCYTES NFR BLD AUTO: 9.4 % — SIGNIFICANT CHANGE UP (ref 2–14)
NEUTROPHILS # BLD AUTO: 5.41 K/UL — SIGNIFICANT CHANGE UP (ref 1.8–7.4)
NEUTROPHILS NFR BLD AUTO: 69.4 % — SIGNIFICANT CHANGE UP (ref 43–77)
NRBC # BLD: 0 /100 WBCS — SIGNIFICANT CHANGE UP (ref 0–0)
PHOSPHATE SERPL-MCNC: 2.3 MG/DL — LOW (ref 2.5–4.5)
PLATELET # BLD AUTO: 219 K/UL — SIGNIFICANT CHANGE UP (ref 150–400)
POTASSIUM SERPL-MCNC: 3.4 MMOL/L — LOW (ref 3.5–5.3)
POTASSIUM SERPL-SCNC: 3.4 MMOL/L — LOW (ref 3.5–5.3)
RBC # BLD: 4.2 M/UL — SIGNIFICANT CHANGE UP (ref 4.2–5.8)
RBC # FLD: 13 % — SIGNIFICANT CHANGE UP (ref 10.3–14.5)
SODIUM SERPL-SCNC: 140 MMOL/L — SIGNIFICANT CHANGE UP (ref 135–145)
WBC # BLD: 7.8 K/UL — SIGNIFICANT CHANGE UP (ref 3.8–10.5)
WBC # FLD AUTO: 7.8 K/UL — SIGNIFICANT CHANGE UP (ref 3.8–10.5)

## 2022-09-12 PROCEDURE — 71275 CT ANGIOGRAPHY CHEST: CPT | Mod: 26

## 2022-09-12 PROCEDURE — 90792 PSYCH DIAG EVAL W/MED SRVCS: CPT | Mod: 95

## 2022-09-12 RX ORDER — FLUOXETINE HCL 10 MG
20 CAPSULE ORAL DAILY
Refills: 0 | Status: DISCONTINUED | OUTPATIENT
Start: 2022-09-12 | End: 2022-09-20

## 2022-09-12 RX ORDER — FLUTICASONE PROPIONATE 50 MCG
1 SPRAY, SUSPENSION NASAL
Refills: 0 | Status: DISCONTINUED | OUTPATIENT
Start: 2022-09-12 | End: 2022-10-04

## 2022-09-12 RX ORDER — SODIUM,POTASSIUM PHOSPHATES 278-250MG
1 POWDER IN PACKET (EA) ORAL
Refills: 0 | Status: COMPLETED | OUTPATIENT
Start: 2022-09-12 | End: 2022-09-12

## 2022-09-12 RX ORDER — LANOLIN ALCOHOL/MO/W.PET/CERES
3 CREAM (GRAM) TOPICAL AT BEDTIME
Refills: 0 | Status: DISCONTINUED | OUTPATIENT
Start: 2022-09-12 | End: 2022-10-01

## 2022-09-12 RX ORDER — ARIPIPRAZOLE 15 MG/1
5 TABLET ORAL DAILY
Refills: 0 | Status: DISCONTINUED | OUTPATIENT
Start: 2022-09-12 | End: 2022-10-04

## 2022-09-12 RX ORDER — TRAZODONE HCL 50 MG
50 TABLET ORAL AT BEDTIME
Refills: 0 | Status: DISCONTINUED | OUTPATIENT
Start: 2022-09-12 | End: 2022-10-01

## 2022-09-12 RX ADMIN — Medication 1 PACKET(S): at 13:30

## 2022-09-12 RX ADMIN — Medication 1 SPRAY(S): at 22:18

## 2022-09-12 RX ADMIN — ENOXAPARIN SODIUM 40 MILLIGRAM(S): 100 INJECTION SUBCUTANEOUS at 05:39

## 2022-09-12 RX ADMIN — Medication 30 MILLIGRAM(S): at 05:40

## 2022-09-12 RX ADMIN — Medication 3 MILLIGRAM(S): at 22:18

## 2022-09-12 RX ADMIN — Medication 1 PACKET(S): at 13:13

## 2022-09-12 RX ADMIN — Medication 20 MILLIGRAM(S): at 16:57

## 2022-09-12 RX ADMIN — ARIPIPRAZOLE 5 MILLIGRAM(S): 15 TABLET ORAL at 16:57

## 2022-09-12 RX ADMIN — SIMVASTATIN 20 MILLIGRAM(S): 20 TABLET, FILM COATED ORAL at 22:18

## 2022-09-12 RX ADMIN — Medication 1 PACKET(S): at 16:57

## 2022-09-12 RX ADMIN — Medication 50 MILLIGRAM(S): at 22:18

## 2022-09-12 NOTE — DISCHARGE NOTE PROVIDER - HOSPITAL COURSE
72 y/o male  unemployed with PMH of BPH, HTN and HLD , psyche h/o MDD, OCD, "mixed personality disorder " presenting with generalized complaints, admitted for further work up for dyspnea and  depression    Pt presents with  dyspnea for couple months  Afebrile, saturating well on RA  CXR shows no infiltrates or signs of fluid overload  EKG NSR, no ST changes   , trop X1 negative   trace LE edema   dyspnea is likely 2/2 anxiety. Echo shows EF > 55% , G1DD, mild pulmonary HTN.  Pt expresses suicidal ideation. Pt placed on constant observation.  Psyche consulted. Pt denies suicidal plan or intent, saying he would never kill himself. Recently admitted to [facility] 7/5-8/25/22. Pt with factitious disorder.  No contraindication for discharge per Psyche.   Constant observation discontinued. Psyche home meds resumed.     Pt is medically optimized for discharge. Continue medications as instructed. Follow-up with PCP.   This is brief summary of patient's hospitalization. Refer to medical record for complete details of hospital course.         70 y/o male  unemployed with PMH of BPH, HTN and HLD , psyche h/o MDD, OCD, "mixed personality disorder " presenting with generalized complaints, admitted for further work up for dyspnea and  depression.   Patient presented dyspnea for couple months. Afebrile, saturating well on RA. CXR shows no infiltrates or signs of fluid overload. EKG NSR, no ST changes. , trop X1 negative.   Echo shows EF > 55% , G1DD, mild pulmonary HTN. Pt expresses suicidal ideation. Pt placed on constant observation. Psyche consulted. Recently admitted to [facility] 7/5-8/25/22. Pt with factitious disorder.   Pt is medically optimized for discharge. Continue medications as instructed. Follow-up with PCP. Plan to discharge to Inpatient psych facility per psych recommendations.     This is brief summary of patient's hospitalization. Refer to medical record for complete details of hospital course. Discussed discharge plan with SW and hospitalist.          72 y/o male  unemployed with PMH of BPH, HTN and HLD , psych h/o MDD requiring inpatient hospitalizations, OCD, "mixed personality disorder " presenting with generalized complaints, admitted for further work up for dyspnea and  depression. Patient presented dyspnea for couple months. Afebrile, saturating well on RA. CXR shows no infiltrates or signs of fluid overload. EKG NSR, no ST changes. , trop X1 negative.   Echo shows EF > 55% , G1DD, mild pulmonary HTN. Pt expresses suicidal ideation. Pt placed on constant observation. Psyche consulted. Recently admitted to [facility] 7/5-8/25/22. Pt with factitious disorder.   Pt was medically optimized for discharge but then exposed to and later converted to COVID positive status. He remained asymptomatic from his COVID and completed a 10 day quarantine while at [facility]. D-dimer low, not discharged on anti-coagulation. Continue medications as instructed. Follow-up with PCP. Plan to discharge to Inpatient psych facility per psych recommendations.     This is brief summary of patient's hospitalization. Refer to medical record for complete details of hospital course. Discussed discharge plan with SW and hospitalist.

## 2022-09-12 NOTE — DISCHARGE NOTE PROVIDER - NSDCMRMEDTOKEN_GEN_ALL_CORE_FT
ARIPiprazole 10 mg oral tablet: 1 tab(s) orally once a day  cholecalciferol oral tablet: 2000 unit(s) orally once a day  clotrimazole 1% topical cream: 1 application topically 2 times a day  finasteride 5 mg oral tablet: 1 tab(s) orally once a day  ketoconazole 2% topical shampoo: 1 application topically once a day  melatonin 3 mg oral tablet: 2 tab(s) orally once a day (at bedtime)  NIFEdipine 30 mg oral tablet, extended release: 1 tab(s) orally once a day  oxybutynin 5 mg oral tablet: 1 tab(s) orally once a day  PROzac 10 mg oral capsule: 3 cap(s) orally once a day  simvastatin 20 mg oral tablet: 1 tab(s) orally once a day  tamsulosin 0.4 mg oral capsule: 1 cap(s) orally once a day  traZODone 50 mg oral tablet: 1 tab(s) orally once a day (at bedtime)   aluminum hydroxide-magnesium hydroxide 200 mg-200 mg/5 mL oral suspension: 30 milliliter(s) orally every 6 hours, As needed, Dyspepsia  ARIPiprazole 10 mg oral tablet: 1 tab(s) orally once a day  cholecalciferol oral tablet: 2000 unit(s) orally once a day  clotrimazole 1% topical cream: 1 application topically 2 times a day  finasteride 5 mg oral tablet: 1 tab(s) orally once a day  fluticasone 50 mcg/inh nasal spray: 1 spray(s) nasal 2 times a day  ketoconazole 2% topical shampoo: 1 application topically once a day  melatonin 3 mg oral tablet: 2 tab(s) orally once a day (at bedtime)  NIFEdipine 30 mg oral tablet, extended release: 1 tab(s) orally once a day  oxybutynin 5 mg oral tablet: 1 tab(s) orally once a day  PROzac 10 mg oral capsule: 3 cap(s) orally once a day  simvastatin 20 mg oral tablet: 1 tab(s) orally once a day  tamsulosin 0.4 mg oral capsule: 1 cap(s) orally once a day  traZODone 50 mg oral tablet: 1 tab(s) orally once a day (at bedtime)   aluminum hydroxide-magnesium hydroxide 200 mg-200 mg/5 mL oral suspension: 30 milliliter(s) orally every 6 hours, As needed, Dyspepsia  ARIPiprazole 5 mg oral tablet: 1 tab(s) orally once a day  cholecalciferol oral tablet: 2000 unit(s) orally once a day  finasteride 5 mg oral tablet: 1 tab(s) orally once a day  finasteride 5 mg oral tablet: 1 tab(s) orally once a day  FLUoxetine 40 mg oral capsule: 2 cap(s) orally once a day  fluticasone 50 mcg/inh nasal spray: 1 spray(s) nasal 2 times a day  ketoconazole 2% topical shampoo: 1 application topically once a day  melatonin 5 mg oral tablet: 1 tab(s) orally once a day (at bedtime)  NIFEdipine 30 mg oral tablet, extended release: 1 tab(s) orally once a day  oxybutynin 5 mg oral tablet: 1 tab(s) orally once a day  simvastatin 20 mg oral tablet: 1 tab(s) orally once a day (at bedtime)  tamsulosin 0.4 mg oral capsule: 1 cap(s) orally once a day  tamsulosin 0.4 mg oral capsule: 1 cap(s) orally once a day  traZODone 50 mg oral tablet: 1 tab(s) orally once a day (at bedtime)   ALPRAZolam 0.25 mg oral tablet: 1 tab(s) orally every 6 hours, As Needed for Anxiety/Anxiousness/Mild agitation   aluminum hydroxide-magnesium hydroxide 200 mg-200 mg/5 mL oral suspension: 30 milliliter(s) orally every 6 hours, As needed, Dyspepsia  ARIPiprazole 5 mg oral tablet: 1 tab(s) orally once a day  cholecalciferol oral tablet: 2000 unit(s) orally once a day  finasteride 5 mg oral tablet: 1 tab(s) orally once a day  finasteride 5 mg oral tablet: 1 tab(s) orally once a day  FLUoxetine 40 mg oral capsule: 2 cap(s) orally once a day  fluticasone 50 mcg/inh nasal spray: 1 spray(s) nasal 2 times a day  lidocaine 4% topical film: Apply topically to affected area once a day  melatonin 5 mg oral tablet: 1 tab(s) orally once a day (at bedtime)  NIFEdipine 30 mg oral tablet, extended release: 1 tab(s) orally once a day  oxybutynin 5 mg oral tablet: 1 tab(s) orally once a day  simvastatin 20 mg oral tablet: 1 tab(s) orally once a day (at bedtime)  tamsulosin 0.4 mg oral capsule: 1 cap(s) orally once a day  tamsulosin 0.4 mg oral capsule: 1 cap(s) orally once a day  traZODone 50 mg oral tablet: 1 tab(s) orally once a day (at bedtime)   ALPRAZolam 0.25 mg oral tablet: 1 tab(s) orally every 6 hours, As Needed for Anxiety/Anxiousness/Mild agitation   aluminum hydroxide-magnesium hydroxide 200 mg-200 mg/5 mL oral suspension: 30 milliliter(s) orally every 6 hours, As needed, Dyspepsia  ARIPiprazole 5 mg oral tablet: 1 tab(s) orally once a day  cholecalciferol: 2000 unit(s) orally once a day  finasteride 5 mg oral tablet: 1 tab(s) orally once a day  FLUoxetine 40 mg oral capsule: 2 cap(s) orally once a day  fluticasone 50 mcg/inh nasal spray: 1 spray(s) nasal 2 times a day  lidocaine 4% topical film: Apply topically to affected area once a day  melatonin 5 mg oral tablet: 1 tab(s) orally once a day (at bedtime)  NIFEdipine 30 mg oral tablet, extended release: 1 tab(s) orally once a day  oxybutynin 5 mg oral tablet: 1 tab(s) orally once a day  simvastatin 20 mg oral tablet: 1 tab(s) orally once a day (at bedtime)  tamsulosin 0.4 mg oral capsule: 1 cap(s) orally once a day  traZODone 50 mg oral tablet: 1 tab(s) orally once a day (at bedtime)   ALPRAZolam 0.25 mg oral tablet: 1 tab(s) orally every 6 hours, As Needed for Anxiety/Anxiousness/Mild agitation   aluminum hydroxide-magnesium hydroxide 200 mg-200 mg/5 mL oral suspension: 30 milliliter(s) orally every 6 hours, As needed, Dyspepsia  ARIPiprazole 5 mg oral tablet: 1 tab(s) orally once a day  bacitracin 500 units/g topical ointment: Apply topically to affected area 4 times a day to right ear  cholecalciferol: 2000 unit(s) orally once a day  finasteride 5 mg oral tablet: 1 tab(s) orally once a day  FLUoxetine 40 mg oral capsule: 2 cap(s) orally once a day  fluticasone 50 mcg/inh nasal spray: 1 spray(s) nasal 2 times a day  lidocaine 4% topical film: Apply topically to affected area right knee once a day  melatonin 5 mg oral tablet: 1 tab(s) orally once a day (at bedtime)  NIFEdipine 30 mg oral tablet, extended release: 1 tab(s) orally once a day  oxybutynin 5 mg oral tablet: 1 tab(s) orally once a day  simvastatin 20 mg oral tablet: 1 tab(s) orally once a day (at bedtime)  tamsulosin 0.4 mg oral capsule: 1 cap(s) orally once a day  traZODone 50 mg oral tablet: 1 tab(s) orally once a day (at bedtime)

## 2022-09-12 NOTE — BH CONSULTATION LIAISON ASSESSMENT NOTE - NSACTIVEVENT_PSY_ALL_CORE
Triggering events leading to humiliation, shame, and/or despair (e.g., Loss of relationship, financial or health status) (real or anticipated) Current or pending social isolation

## 2022-09-12 NOTE — CHART NOTE - NSCHARTNOTEFT_GEN_A_CORE
EVENT: Received telephone call from RN that pt has redness to el groin areas    HPI: 70 y/o male with BPH, HTN and HLD presenting with generalized complaints, admitted for further work up for dyspnea and  depression    SUBJECTIVE: n/a    OBJECTIVE:  Vital Signs Last 24 Hrs  T(C): 36.6 (12 Sep 2022 00:22), Max: 36.8 (11 Sep 2022 19:40)  T(F): 97.9 (12 Sep 2022 00:22), Max: 98.3 (11 Sep 2022 19:40)  HR: 65 (12 Sep 2022 00:22) (55 - 65)  BP: 161/71 (12 Sep 2022 00:22) (152/83 - 177/95)  BP(mean): --  RR: 18 (12 Sep 2022 00:22) (17 - 18)  SpO2: 97% (12 Sep 2022 00:22) (96% - 98%)    Parameters below as of 12 Sep 2022 00:22  Patient On (Oxygen Delivery Method): room air      FOCUSED PHYSICAL EXAM:  Neuro: awake, alert, oriented x 3. No neuro deficit  Cardiovascular: Pulses +2 B/L in lower and upper extremities, HR regular, BP stable, No edema.  Respiratory: Respirations regular, unlabored, breath sounds clear B/L.   GI: Abdomen soft, non-tender, positive bowel sounds.  : no bladder distention noted. No complaints at this time.  Skin: Dry, intact, no bruising, no diaphoresis.    LABS:                        10.7   6.62  )-----------( 194      ( 11 Sep 2022 05:40 )             32.0     09-11    145  |  111<H>  |  31<H>  ----------------------------<  94  3.6   |  30  |  1.14    Ca    8.3<L>      11 Sep 2022 05:40  Phos  2.4     09-11  Mg     2.2     09-11    TPro  6.5  /  Alb  3.2<L>  /  TBili  0.4  /  DBili  x   /  AST  18  /  ALT  27  /  AlkPhos  118  09-10      EKG:   IMAGING:    ASSESSMENT/PROBLEM: Redness to el groin      PLAN:   1. Nystatin powder to be applied to el groin , 3 x day  2. Monitor response to treatment  3. Cont present care/treatment  4. Supportive care

## 2022-09-12 NOTE — PROGRESS NOTE ADULT - PROBLEM SELECTOR PLAN 1
likely due to anxiety, factitious disorder  CXR shows no infiltrates  Echo shows EF >55% , G1DD, mild pulm HTN  Resume anti-anxiety home meds : Abilify , prozac, trazodone  Continue Melatonin  Appreciate Psyche input  PT  SW

## 2022-09-12 NOTE — BH CONSULTATION LIAISON ASSESSMENT NOTE - CURRENT MEDICATION
MEDICATIONS  (STANDING):  enoxaparin Injectable 40 milliGRAM(s) SubCutaneous every 24 hours  melatonin 3 milliGRAM(s) Oral once  NIFEdipine XL 30 milliGRAM(s) Oral daily  simvastatin 20 milliGRAM(s) Oral at bedtime    MEDICATIONS  (PRN):

## 2022-09-12 NOTE — DISCHARGE NOTE PROVIDER - REASON FOR ADMISSION
Rx Refill Note  Requested Prescriptions     Pending Prescriptions Disp Refills   • montelukast (SINGULAIR) 10 MG tablet 90 tablet 3     Sig: Take 1 tablet by mouth every night at bedtime.      Last office visit with prescribing clinician: 9/17/2021      Next office visit with prescribing clinician: Visit date not found            Deisi Hilton MA  03/07/22, 07:26 CST    
dyspnea

## 2022-09-12 NOTE — BH CONSULTATION LIAISON ASSESSMENT NOTE - RISK ASSESSMENT
risk factors include suicide ideation, report of prior attempts, insomnia, hopelessness, multiple medical conditions, lack of current outpatient care, social isolation, elderly white male. protective factors include lack of access to firearms, lack of psychosis, sobriety risk factors include passive suicidal ideation , insomnia, hopelessness, multiple medical conditions, lack of current outpatient care, social isolation, elderly white male. protective factors include lack of access to firearms, lack of psychosis, sobriety

## 2022-09-12 NOTE — BH CONSULTATION LIAISON ASSESSMENT NOTE - NSSUICRSKFACTOR_PSY_ALL_CORE
Presenting Symptoms/Activating Events/Stressors Current and Past Psychiatric Diagnoses/Presenting Symptoms/Treatment Related Factors/Activating Events/Stressors

## 2022-09-12 NOTE — BH CONSULTATION LIAISON ASSESSMENT NOTE - ADDITIONAL DETAILS ALL
states that he has hit himself in the head states that he has hit himself in the head; per brother and inpatient psychiatrist no other hx of self harm, and no hx   of actual SA's

## 2022-09-12 NOTE — PATIENT PROFILE ADULT - FUNCTIONAL SCREEN CURRENT LEVEL: SWALLOWING (IF SCORE 2 OR MORE FOR ANY ITEM, CONSULT REHAB SERVICES), MLM)
"Requested Prescriptions   Pending Prescriptions Disp Refills     insulin glargine (BASAGLAR KWIKPEN) 100 UNIT/ML pen  Last Written Prescription Date:  08/13/18  Last Fill Quantity: 15ml,  # refills: 1   Last Office Visit with SANDEE, DANIELLE or [facility] prescribing provider:  10/30/18-Monica   Future Office Visit:    15 mL 1     Sig: Inject 45 Units Subcutaneous 2 times daily    Long Acting Insulin Protocol Failed    11/26/2018  8:14 AM       Failed - Blood pressure less than 140/90 in past 6 months    BP Readings from Last 3 Encounters:   10/30/18 157/72   08/13/18 162/80   03/12/18 142/82                Failed - HgbA1C in past 3 or 6 months    If HgbA1C is 8 or greater, it needs to be on file within the past 3 months.  If less than 8, must be on file within the past 6 months.     Recent Labs   Lab Test  08/13/18   0935   A1C  12.9*            Passed - LDL on file in past 12 months    Recent Labs   Lab Test  08/13/18   0935   LDL  Cannot estimate LDL when triglyceride exceeds 400 mg/dL  85            Passed - Microalbumin on file in past 12 months    Recent Labs   Lab Test  08/13/18   0945   MICROL  286   UMALCR  506.20*            Passed - Serum creatinine on file in past 12 months    Recent Labs   Lab Test  12/29/17   0930   CR  0.60            Passed - Patient is age 18 or older       Passed - Recent (6 mo) or future (30 days) visit within the authorizing provider's specialty    Patient had office visit in the last 6 months or has a visit in the next 30 days with authorizing provider or within the authorizing provider's specialty.  See \"Patient Info\" tab in inbasket, or \"Choose Columns\" in Meds & Orders section of the refill encounter.              " 0 = swallows foods/liquids without difficulty

## 2022-09-12 NOTE — PROGRESS NOTE ADULT - SUBJECTIVE AND OBJECTIVE BOX
NP Note.     Patient is a 71y old  Male who presents with a chief complaint of dyspnea (11 Sep 2022 19:10)      INTERVAL HPI/OVERNIGHT EVENTS:   Pt seen and examined this morning.   Pt awake/alert, endorses depression , " I have nothing to live for, my depression is 25/8 which means it is eternal" Pt endorses he harms himself "  I am a cutter but not to the point where I will kill myself, I avoid my veins." Pt endorses last episode of cutting was last year.      MEDICATIONS  (STANDING):  ARIPiprazole 5 milliGRAM(s) Oral daily  enoxaparin Injectable 40 milliGRAM(s) SubCutaneous every 24 hours  FLUoxetine 20 milliGRAM(s) Oral daily  melatonin 3 milliGRAM(s) Oral at bedtime  NIFEdipine XL 30 milliGRAM(s) Oral daily  potassium phosphate / sodium phosphate Powder (PHOS-NaK) 1 Packet(s) Oral three times a day with meals  simvastatin 20 milliGRAM(s) Oral at bedtime  traZODone 50 milliGRAM(s) Oral at bedtime    MEDICATIONS  (PRN):      __________________________________________________  REVIEW OF SYSTEMS:    CONSTITUTIONAL: No fever,   EYES: no acute visual disturbances  NECK: No pain or stiffness  RESPIRATORY: No cough; No shortness of breath  CARDIOVASCULAR: No chest pain, no palpitations  GASTROINTESTINAL: No pain. No nausea or vomiting; No diarrhea   NEUROLOGICAL: No headache or numbness, no tremors  MUSCULOSKELETAL: No joint pain, no muscle pain  GENITOURINARY: no dysuria, no frequency, no hesitancy  PSYCHIATRY: (+)  depression ,  anxiety.   ALL OTHER  ROS negative        Vital Signs Last 24 Hrs  T(C): 36.3 (12 Sep 2022 12:41), Max: 36.8 (11 Sep 2022 19:40)  T(F): 97.4 (12 Sep 2022 12:41), Max: 98.3 (11 Sep 2022 19:40)  HR: 68 (12 Sep 2022 12:41) (60 - 68)  BP: 134/81 (12 Sep 2022 12:41) (134/81 - 162/85)  BP(mean): --  RR: 17 (12 Sep 2022 12:41) (17 - 18)  SpO2: 97% (12 Sep 2022 12:41) (97% - 98%)    Parameters below as of 12 Sep 2022 12:41  Patient On (Oxygen Delivery Method): room air        ________________________________________________  PHYSICAL EXAM:  GENERAL: NAD  HEENT: Normocephalic;  conjunctivae and sclerae clear; moist mucous membranes;   NECK : supple  CHEST/LUNG: Clear to auscultation bilaterally with good air entry   HEART: S1 S2  regular; no murmurs, gallops or rubs  ABDOMEN: Soft, Nontender, Nondistended; Bowel sounds present  EXTREMITIES: no cyanosis; no edema; no calf tenderness  SKIN: warm and dry; no rash  NERVOUS SYSTEM:  Awake and alert; no new deficits    _________________________________________________  LABS:                        12.1   7.80  )-----------( 219      ( 12 Sep 2022 09:42 )             35.9     09-12    140  |  104  |  23<H>  ----------------------------<  84  3.4<L>   |  30  |  0.97    Ca    9.0      12 Sep 2022 09:42  Phos  2.3     09-12  Mg     2.1     09-12    TPro  6.5  /  Alb  3.2<L>  /  TBili  0.4  /  DBili  x   /  AST  18  /  ALT  27  /  AlkPhos  118  09-10        CAPILLARY BLOOD GLUCOSE      RADIOLOGY & ADDITIONAL TESTS:        Consultant(s) Notes Reviewed:   YES/ No    Care Discussed with Consultants :     Plan of care was discussed with patient and /or primary care giver; all questions and concerns were addressed and care was aligned with patient's wishes.     NP Note discussed with primary care physician    Patient is a 71y old  Male who presents with a chief complaint of dyspnea (11 Sep 2022 19:10)      INTERVAL HPI/OVERNIGHT EVENTS:   Pt seen and examined this morning.   Pt awake/alert, endorses depression , " I have nothing to live for, my depression is 25/8 which means it is eternal" Pt endorses he harms himself "  I am a cutter but not to the point where I will kill myself, I avoid my veins." Pt endorses last episode of cutting was last year.      MEDICATIONS  (STANDING):  ARIPiprazole 5 milliGRAM(s) Oral daily  enoxaparin Injectable 40 milliGRAM(s) SubCutaneous every 24 hours  FLUoxetine 20 milliGRAM(s) Oral daily  melatonin 3 milliGRAM(s) Oral at bedtime  NIFEdipine XL 30 milliGRAM(s) Oral daily  potassium phosphate / sodium phosphate Powder (PHOS-NaK) 1 Packet(s) Oral three times a day with meals  simvastatin 20 milliGRAM(s) Oral at bedtime  traZODone 50 milliGRAM(s) Oral at bedtime    MEDICATIONS  (PRN):      __________________________________________________  REVIEW OF SYSTEMS:    CONSTITUTIONAL: No fever,   EYES: no acute visual disturbances  NECK: No pain or stiffness  RESPIRATORY: No cough; No shortness of breath  CARDIOVASCULAR: No chest pain, no palpitations  GASTROINTESTINAL: No pain. No nausea or vomiting; No diarrhea   NEUROLOGICAL: No headache or numbness, no tremors  MUSCULOSKELETAL: No joint pain, no muscle pain  GENITOURINARY: no dysuria, no frequency, no hesitancy  PSYCHIATRY: (+)  depression ,  anxiety.   ALL OTHER  ROS negative        Vital Signs Last 24 Hrs  T(C): 36.3 (12 Sep 2022 12:41), Max: 36.8 (11 Sep 2022 19:40)  T(F): 97.4 (12 Sep 2022 12:41), Max: 98.3 (11 Sep 2022 19:40)  HR: 68 (12 Sep 2022 12:41) (60 - 68)  BP: 134/81 (12 Sep 2022 12:41) (134/81 - 162/85)  BP(mean): --  RR: 17 (12 Sep 2022 12:41) (17 - 18)  SpO2: 97% (12 Sep 2022 12:41) (97% - 98%)    Parameters below as of 12 Sep 2022 12:41  Patient On (Oxygen Delivery Method): room air        ________________________________________________  PHYSICAL EXAM:  GENERAL: NAD  HEENT: Normocephalic;  conjunctivae and sclerae clear; moist mucous membranes;   NECK : supple  CHEST/LUNG: Clear to auscultation bilaterally with good air entry   HEART: S1 S2  regular; no murmurs, gallops or rubs  ABDOMEN: Soft, Nontender, Nondistended; Bowel sounds present  EXTREMITIES: no cyanosis; no edema; no calf tenderness  SKIN: warm and dry; no rash  NERVOUS SYSTEM:  Awake and alert; no new deficits    _________________________________________________  LABS:                        12.1   7.80  )-----------( 219      ( 12 Sep 2022 09:42 )             35.9     09-12    140  |  104  |  23<H>  ----------------------------<  84  3.4<L>   |  30  |  0.97    Ca    9.0      12 Sep 2022 09:42  Phos  2.3     09-12  Mg     2.1     09-12    TPro  6.5  /  Alb  3.2<L>  /  TBili  0.4  /  DBili  x   /  AST  18  /  ALT  27  /  AlkPhos  118  09-10        CAPILLARY BLOOD GLUCOSE      RADIOLOGY & ADDITIONAL TESTS:    < from: CT Angio Chest PE Protocol w/ IV Cont (09.12.22 @ 14:40) >  IMPRESSION:    No pulmonary embolism.    < end of copied text >  < from: Xray Chest 1 View- PORTABLE-Urgent (09.10.22 @ 20:34) >  IMPRESSION:  No focal consolidation.    < end of copied text >      Consultant(s) Notes Reviewed:   YES    Care Discussed with Consultants :     Plan of care was discussed with patient and /or primary care giver; all questions and concerns were addressed and care was aligned with patient's wishes.

## 2022-09-12 NOTE — DISCHARGE NOTE PROVIDER - NSFOLLOWUPCLINICS_GEN_ALL_ED_FT
[facility]  Internal Medicine  [address] [place] [address]  Phone: [phone]  Fax: [phone]  Follow Up Time: 1 week

## 2022-09-12 NOTE — PROGRESS NOTE ADULT - PROBLEM SELECTOR PLAN 3
Resume anti-anxiety home meds : Abilify , prozac, trazodone  Continue Melatonin  Supportive measures  Appreciate Psyche input  PT  SW

## 2022-09-12 NOTE — DISCHARGE NOTE PROVIDER - ATTENDING DISCHARGE PHYSICAL EXAMINATION:
PHYSICAL EXAM:  GENERAL: NAD, well-developed, sitting up in chair  HEAD:  Atraumatic, Normocephalic  EYES:  conjunctiva and sclera clear  NECK: Supple, No JVD  CHEST/LUNG: Clear to auscultation bilaterally; No wheeze, rhonchi, rales  HEART: Regular rate and rhythm; No murmurs, rubs, or gallops  ABDOMEN: Soft, Nontender, Nondistended; Bowel sounds present  EXTREMITIES:  2+ Peripheral Pulses, No clubbing, cyanosis, or edema  PSYCH: AAOx3, flat affect, reports he wishes he were dead  NEUROLOGY: non-focal  SKIN: No rashes or lesions, lidocaine patch on R knee

## 2022-09-12 NOTE — PROGRESS NOTE ADULT - ASSESSMENT
72 y/o male with BPH, HTN and HLD presenting with generalized complaints. Patient states that he feels SOB for the past couple months and states its getting progressively worse. Pt admitted for  workup for dyspnea and depression .

## 2022-09-12 NOTE — BH CONSULTATION LIAISON ASSESSMENT NOTE - NSBHCHARTREVIEWVS_PSY_A_CORE FT
Vital Signs Last 24 Hrs  T(C): 36.2 (12 Sep 2022 04:51), Max: 36.8 (11 Sep 2022 19:40)  T(F): 97.1 (12 Sep 2022 04:51), Max: 98.3 (11 Sep 2022 19:40)  HR: 60 (12 Sep 2022 04:51) (55 - 65)  BP: 141/80 (12 Sep 2022 04:51) (141/80 - 162/85)  BP(mean): --  RR: 18 (12 Sep 2022 04:51) (17 - 18)  SpO2: 98% (12 Sep 2022 04:51) (97% - 98%)    Parameters below as of 12 Sep 2022 04:51  Patient On (Oxygen Delivery Method): room air

## 2022-09-12 NOTE — CHART NOTE - NSCHARTNOTEFT_GEN_A_CORE
EVENT: Received telephone call from RN that pt expressed that he wants to end his life    HPI: 70 y/o male with BPH, HTN and HLD presenting with generalized complaints. Patient states that he feels SOB for the past couple months and states its getting progressively worse. States he is unable to ambulate due to the SOB. He also endorses overall body pain and chest discomfort.  He states that he has seen multiple  physicians for his SOB but states that "nobody believes me".    Per chart review, Patient presented with similar complaints in July and was admitted to inpatient psych due to suicidal ideation.     SUBJECTIVE: "I don't have any plan to kill my self but [place]. I can't take care of myself"      OBJECTIVE:  Vital Signs Last 24 Hrs  T(C): 36.6 (12 Sep 2022 00:22), Max: 36.8 (11 Sep 2022 19:40)  T(F): 97.9 (12 Sep 2022 00:22), Max: 98.3 (11 Sep 2022 19:40)  HR: 65 (12 Sep 2022 00:22) (55 - 65)  BP: 161/71 (12 Sep 2022 00:22) (152/83 - 177/95)  BP(mean): --  RR: 18 (12 Sep 2022 00:22) (17 - 18)  SpO2: 97% (12 Sep 2022 00:22) (96% - 98%)    Parameters below as of 12 Sep 2022 00:22  Patient On (Oxygen Delivery Method): room air      FOCUSED PHYSICAL EXAM:  Neuro: awake, alert, oriented x 3. No neuro deficit  Cardiovascular: Pulses +2 B/L in lower and upper extremities, HR regular, BP stable, No edema.  Respiratory: Respirations regular, unlabored, breath sounds clear B/L.   GI: Abdomen soft, non-tender, positive bowel sounds.  : no bladder distention noted. No complaints at this time.  Skin: Dry, intact, no bruising, no diaphoresis.    LABS:                        10.7   6.62  )-----------( 194      ( 11 Sep 2022 05:40 )             32.0     09-11    145  |  111<H>  |  31<H>  ----------------------------<  94  3.6   |  30  |  1.14    Ca    8.3<L>      11 Sep 2022 05:40  Phos  2.4     09-11  Mg     2.2     09-11    TPro  6.5  /  Alb  3.2<L>  /  TBili  0.4  /  DBili  x   /  AST  18  /  ALT  27  /  AlkPhos  118  09-10      EKG:   IMAGING:      ASSESSMENT/PROBLEM: Suicidal ideation most likely 2/2 to Hx depression      PLAN:   1. Place pt on constant observation  2. Will need psych consult in AM  3. Will endorse to day NP to flu with psych consult  4. Cont present care/treatment  5. Supportive care EVENT: Received telephone call from RN that pt expressed that he wants to end his life    HPI: 72 y/o male with BPH, HTN and HLD presenting with generalized complaints. Patient states that he feels SOB for the past couple months and states its getting progressively worse. States he is unable to ambulate due to the SOB. He also endorses overall body pain and chest discomfort.  He states that he has seen multiple  physicians for his SOB but states that "nobody believes me".    Per chart review, Patient presented with similar complaints in July and was admitted to inpatient psych due to suicidal ideation.     SUBJECTIVE: "I don't have any plan to kill my self but [place] as I'm helpless. I can't take care of myself"  Pt denied any auditory or visual hallucination      OBJECTIVE:  Vital Signs Last 24 Hrs  T(C): 36.6 (12 Sep 2022 00:22), Max: 36.8 (11 Sep 2022 19:40)  T(F): 97.9 (12 Sep 2022 00:22), Max: 98.3 (11 Sep 2022 19:40)  HR: 65 (12 Sep 2022 00:22) (55 - 65)  BP: 161/71 (12 Sep 2022 00:22) (152/83 - 177/95)  BP(mean): --  RR: 18 (12 Sep 2022 00:22) (17 - 18)  SpO2: 97% (12 Sep 2022 00:22) (96% - 98%)    Parameters below as of 12 Sep 2022 00:22  Patient On (Oxygen Delivery Method): room air      FOCUSED PHYSICAL EXAM:  Neuro: awake, alert, oriented x 3. No neuro deficit  Cardiovascular: Pulses +2 B/L in lower and upper extremities, HR regular, BP stable, No edema.  Respiratory: Respirations regular, unlabored, breath sounds clear B/L.   GI: Abdomen soft, non-tender, positive bowel sounds.  : no bladder distention noted. No complaints at this time.  Skin: Dry, intact, no bruising, no diaphoresis.    LABS:                        10.7   6.62  )-----------( 194      ( 11 Sep 2022 05:40 )             32.0     09-11    145  |  111<H>  |  31<H>  ----------------------------<  94  3.6   |  30  |  1.14    Ca    8.3<L>      11 Sep 2022 05:40  Phos  2.4     09-11  Mg     2.2     09-11    TPro  6.5  /  Alb  3.2<L>  /  TBili  0.4  /  DBili  x   /  AST  18  /  ALT  27  /  AlkPhos  118  09-10      EKG:   IMAGING:      ASSESSMENT/PROBLEM: Suicidal ideation most likely 2/2 to Hx depression      PLAN:   1. Place pt on constant observation  2. Will need psych consult in AM  3. Will endorse to day NP to flu with psych consult  4. Cont present care/treatment  5. Supportive care

## 2022-09-12 NOTE — CHART NOTE - NSCHARTNOTEFT_GEN_A_CORE
EVENT: Pt climbing OOB and states "I want to kill myself" " I cant breathe" "Nobody believes me"    BRIEF HPI:72 y/o male with BPH, HTN and HLD presenting with generalized complaints. Patient states that he feels SOB for the past couple months and states its getting progressively worse. Pt admitted for  workup for dyspnea and depression . Seen by   Liaison assessment Risk Assessment Low Acute Suicide Risk. Pt with no active plan, states "I don't know".    OBJECTIVE:  Vital Signs Last 24 Hrs  T(C): 36.4 (12 Sep 2022 20:32), Max: 36.6 (12 Sep 2022 00:22)  T(F): 97.6 (12 Sep 2022 20:32), Max: 97.9 (12 Sep 2022 00:22)  HR: 65 (12 Sep 2022 20:32) (60 - 68)  BP: 136/87 (12 Sep 2022 20:32) (134/81 - 161/71)  BP(mean): --  RR: 18 (12 Sep 2022 20:32) (17 - 18)  SpO2: 98% (12 Sep 2022 20:32) (97% - 98%)    Parameters below as of 12 Sep 2022 12:41  Patient On (Oxygen Delivery Method): room air    FOCUSED PHYSICAL EXAM:  NEURO: Agitated climbing OOB  RESP: Increased unlabored, symmetrical chest movement, O2 sat 98%  CV: S1 S2, regular    LABS:                        12.1   7.80  )-----------( 219      ( 12 Sep 2022 09:42 )             35.9     09-12    140  |  104  |  23<H>  ----------------------------<  84  3.4<L>   |  30  |  0.97    Ca    9.0      12 Sep 2022 09:42  Phos  2.3     09-12  Mg     2.1     09-12    PROBLEM: Suicidal ideation with no clear plan probably due to Depression  PLAN:   1. Constant observation for overnight  2. Cont Aripiprazole 5 kaycee GRAM(s) Oral daily  3. Cont Fluoxetine 20 kaycee GRAM(s) Oral daily  4. Cont melatonin 3 kaycee GRAM(s) Oral at bedtime  5. Cont Trazadone 50 kaycee GRAM(s) Oral at bedtime    FOLLOW UP / RESULT: suicidal risk re assessment

## 2022-09-12 NOTE — DISCHARGE NOTE PROVIDER - NSDCCPCAREPLAN_GEN_ALL_CORE_FT
PRINCIPAL DISCHARGE DIAGNOSIS  Diagnosis: SOB (shortness of breath)  Assessment and Plan of Treatment: likely due to anxiety.   Your chest xray shows no infiltrates  or signs of fluid overload.   Echo unremarkable .   You maintained adequate oxygenation.   Continue medications as prescribed.   Follow-up with your primary care physician within 1 week. Call for appointment.        SECONDARY DISCHARGE DIAGNOSES  Diagnosis: Major depression, chronic  Assessment and Plan of Treatment: Continue your medications as prescribed by your doctor.   Follow-up with your primary care physician and or psychiatrist.   May follow-up at [facility].    Diagnosis: Drop in hemoglobin  Assessment and Plan of Treatment:      PRINCIPAL DISCHARGE DIAGNOSIS  Diagnosis: SOB (shortness of breath)  Assessment and Plan of Treatment: Your symptoms were likely due to anxiety.   Your chest xray showed no infiltrates  or signs of fluid overload.   Echo unremarkable .   You maintained adequate oxygenation.   Continue medications as prescribed.   You are medically stable for discharge to inpatient psych.    You are being admitted to inpatient psych.          SECONDARY DISCHARGE DIAGNOSES  Diagnosis: Major depression, chronic  Assessment and Plan of Treatment: Continue your medications as prescribed.    You are being admitted to inpatient psych.         PRINCIPAL DISCHARGE DIAGNOSIS  Diagnosis: SOB (shortness of breath)  Assessment and Plan of Treatment: Your symptoms were likely due to anxiety.   Your chest xray showed no infiltrates  or signs of fluid overload.   Echo unremarkable .   You maintained adequate oxygenation.   Continue medications as prescribed.   You are medically stable for discharge to inpatient psych.    You are being admitted to inpatient psych.          SECONDARY DISCHARGE DIAGNOSES  Diagnosis: 2019 novel coronavirus disease (COVID-19)  Assessment and Plan of Treatment: You developed COVID 19 while in the hospital. You remained asymptomatic due to this infection. You completed a 10 day quarantine while at [facility] for your COVID.    Diagnosis: Major depression, chronic  Assessment and Plan of Treatment: Continue your medications as prescribed.    You are being admitted to inpatient psych for ongoing suicidal ideations.  Continue your trazodone and fluoxetine as prescribed.

## 2022-09-12 NOTE — BH CONSULTATION LIAISON ASSESSMENT NOTE - HPI (INCLUDE ILLNESS QUALITY, SEVERITY, DURATION, TIMING, CONTEXT, MODIFYING FACTORS, ASSOCIATED SIGNS AND SYMPTOMS)
71M, unemployed, living with brother, with PMH of HTN, HLD, urinary incontinence 2/2 unclear prostate issues, R leg pain from arthritis, psych hx of MDD, OCD, "mixed personality disorder", reports hx of walking into traffic and of hitting himself, at least two prior hospitalizations ([place] 1/4/22-2/24/2022, [facility] 10/22-11/15/2021 for depression w psychosis), has previously been seen at [place] ED, now BIBEMS unclear who called after patient reported multiple somatic complaints and then reported that he wanted to kill himself.     Patient states that he has been dealing with multiple medical issues including urinary incontinence and leg pain, and recently shortness of breath, that he cannot get these taken care of (unclear reasons). Patient states that he has felt increasingly depressed, not able to enjoy himself at all, that he no longer has anyone to talk to because hes pushed them all away, reports sleeping at most 2 hours a night, hes been losing weight, feeling hopeless, and increasingly suicidal. He states that he doesn't want to go on anymore, has thought about walking into traffic, touching a live wire, and states that he no longer wants to take any of his medications so that he will die. He does not know what he takes, stating that he has medications for high blood pressure, cholesterol, prostate issues, denies taking psych meds for years stating that they have all stopped working but that prozac worked in the past. Denies that he currently has any outpatient psychiatrist or therapist though stated that he had someone who he spoke with at [employer] who helped him with tasks from time to time but that this program is ending soon. He doesn't remember their name or contact information and states that there is nobody that we can talk to. He states that he had one best friend but that this person doesn't have time for him anymore and he doesn't want to burden her.     He denies auditory, visual, or tactile hallucinations, denies paranoia though states that the psychiatrist at [facility] has lied to him in the past and locked him away. He states that he used to drink alcohol but hasn't in decades, denies any drug use, states that he is incredibly anti nicotine.    He states that he is open to inpatient admission. He has now given for us to speak with brother  71M, unemployed, living with brother, with PMH of HTN, HLD, urinary incontinence 2/2 unclear prostate issues, R leg pain from arthritis, psych hx of MDD, OCD, "mixed personality disorder", reports hx of walking into traffic and of hitting himself, at least three prior hospitalizations ([facility]; [facility] 1/4/22-2/24/2022, [facility] 10/22-11/15/2021 for depression w psychosis), has previously been seen at [facility] ED, now admitted to medicine for SOB and   chest discomfort, psych consult called after pt expressed a desire to be dead last night.  PT ADMITTED TO [facility] 2S FROM 7/5-8/25/22, DC SUMMARY REVIEWED, CASE D/W INPATIENT ATTENDING DR. HERRERA  Patient states that he has been dealing with multiple medical issues including urinary incontinence and leg pain, and recently shortness of breath, that he cannot get these taken care of (unclear reasons). Patient states that he has felt increasingly depressed, not able to enjoy himself at all, that he no longer has anyone to talk to because hes pushed them all away, reports sleeping at most 2 hours a night, hes been losing weight, feeling hopeless.   He states that he doesn't want to go on anymore, THOUGH HE DENIES SUICIDAL PLAN OR INTENT, SAYING HE WOULD NEVER KILL HIMSELF, AND   THAT 'IF I WANTED TO BE DEAD, I WOULD BE'  says he "cant' get around" and needs helps which he is not receiving.    He does not know what he takes, stating that he has medications for high blood pressure, cholesterol, prostate issues, denies taking psych meds for years stating that they have all stopped working but that prozac worked in the past. Denies that he currently has any outpatient psychiatrist or therapist though stated that he had someone who he spoke with at [employer] who helped him with tasks from time to time but that this program is ending soon. He doesn't remember their name or contact information and states that there is nobody that we can talk to. He states that he had one best friend but that this person doesn't have time for him anymore and he doesn't want to burden her.     He denies auditory, visual, or tactile hallucinations, denies paranoia though states that the psychiatrist at [facility] has lied to him in the past and locked him away. He states that he used to drink alcohol but hasn't in decades, denies any drug use, states that he is incredibly anti nicotine.    PT ENDORSES THE ABOVE. STATES HE DID NOT FOLLOW UP WITH A PSYCHIATRIST, HIS , OR VNS AFTER HIS DC FROM   [facility]. HE IS UNABLE TO GIVE ME NAMES OF CM. says he doesn't know his meds and hasn't been taking them consistently. endorses hopelessness  and passive si w/o intent or plan, saying "I always express suicidal thinking."  spoke with pt's brother. says pt has "psychosomatic" symptoms from anxiety. that it is "his M.O." to go to [facility] and find someone that "doesn't know him" and   say he is suicidal, but that he is not acutally. he says pthas never attempted to harm self. he doesn't believe pt is a true suicide risk.  spoke with dr. herrera from . 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 she says he was help rejecting throughout his stay on . she does not believe pt responded to inpatient treatment or would respond to   a rehospitalization . she set him up with a CM prior to dc, and the unit SW will be calling back with that information.  71M, unemployed, living with brother, with PMH of HTN, HLD, urinary incontinence 2/2 unclear prostate issues, R leg pain from arthritis, psych hx of MDD, OCD, "mixed personality disorder", reports hx of walking into traffic and of hitting himself, at least three prior hospitalizations ([facility]; [place] 1/4/22-2/24/2022, [facility] 10/22-11/15/2021 for depression w psychosis), has previously been seen at [place] ED, now admitted to medicine for SOB and   chest discomfort, psych consult called after pt expressed a desire to be dead last night.  PT ADMITTED TO [facility] 2S FROM 7/5-8/25/22, DC SUMMARY REVIEWED, CASE D/W INPATIENT ATTENDING DR. HERRERA  Patient states that he has been dealing with multiple medical issues including urinary incontinence and leg pain, and recently shortness of breath, that he cannot get these taken care of (unclear reasons). Patient states that he has felt increasingly depressed, not able to enjoy himself at all, that he no longer has anyone to talk to because hes pushed them all away, reports sleeping at most 2 hours a night, hes been losing weight, feeling hopeless.   He states that he doesn't want to go on anymore, THOUGH HE DENIES SUICIDAL PLAN OR INTENT, SAYING HE WOULD NEVER KILL HIMSELF, AND   THAT 'IF I WANTED TO BE DEAD, I WOULD BE'  says he "cant' get around" and needs helps which he is not receiving.    He does not know what he takes, stating that he has medications for high blood pressure, cholesterol, prostate issues, denies taking psych meds for years stating that they have all stopped working but that prozac worked in the past. Denies that he currently has any outpatient psychiatrist or therapist though stated that he had someone who he spoke with at [employer] who helped him with tasks from time to time but that this program is ending soon. He doesn't remember their name or contact information and states that there is nobody that we can talk to. He states that he had one best friend but that this person doesn't have time for him anymore and he doesn't want to burden her.     He denies auditory, visual, or tactile hallucinations, denies paranoia though states that the psychiatrist at [facility] has lied to him in the past and locked him away. He states that he used to drink alcohol but hasn't in decades, denies any drug use, states that he is incredibly anti nicotine.    PT ENDORSES THE ABOVE. STATES HE DID NOT FOLLOW UP WITH A PSYCHIATRIST, HIS , OR VNS AFTER HIS DC FROM   [facility]. HE IS UNABLE TO GIVE ME NAMES OF CM. says he doesn't know his meds and hasn't been taking them consistently. endorses hopelessness  and passive si w/o intent or plan, saying "I always express suicidal thinking."    spoke with pt's brother. says pt has "psychosomatic" symptoms from anxiety. that it is "his M.O." to go to [facility] and find someone that "doesn't know him" and   say he is suicidal, but that he is not acutally. he says pthas never attempted to harm self. he doesn't believe pt is a true suicide risk.    spoke with dr. herrera from . 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 she says he was help rejecting throughout his stay on . she does not believe pt responded to inpatient treatment or would respond to   a rehospitalization . she set him up with a CM prior to dc, and the unit SW will be calling back with that information.

## 2022-09-12 NOTE — PATIENT PROFILE ADULT - FALL HARM RISK - HARM RISK INTERVENTIONS

## 2022-09-12 NOTE — DISCHARGE NOTE PROVIDER - CARE PROVIDER_API CALL
Laci Iniguez)  Geriatric Psychiatry; Psychiatry  [address] [place] [address]  Phone: [phone]  Fax: [phone]  Follow Up Time:

## 2022-09-12 NOTE — BH CONSULTATION LIAISON ASSESSMENT NOTE - SUMMARY
71M, unemployed, living with brother, with PMH of HTN, HLD, urinary incontinence 2/2 unclear prostate issues, R leg pain from arthritis, psych hx of MDD, OCD, "mixed personality disorder", reports hx of walking into traffic and of hitting himself, at least two prior hospitalizations ([place] 1/4/22-2/24/2022, [facility] 10/22-11/15/2021 for depression w psychosis), has previously been seen at [place] ED, now BIBEMS unclear who called after patient reported multiple somatic complaints and then reported that he wanted to kill himself.     Patient reports shortness of breath and some chest pressure and as such will need medical clearance    If cleared, he reports symptoms consistent with worsening depression in the form of low mood, hopelessness, insomnia, anhedonia, decreased PO intake, and worsening suicide ideation with plan to stop all of his medications. At this time he would meet inpatient hospitalization criteria for safety, stabilization, possible medication titration, and is currently appropriate for voluntary admission 71M, unemployed, living with brother, with PMH of HTN, HLD, urinary incontinence 2/2 unclear prostate issues, R leg pain from arthritis, psych hx of MDD, OCD, "mixed personality disorder", reports hx of walking into traffic and of hitting himself, at least three prior hospitalizations ([facility]; [place] 1/4/22-2/24/2022, [facility] 10/22-11/15/2021 for depression w psychosis), has previously been seen at [place] ED, now admitted to medicine for SOB and   chest discomfort, psych consult called after pt expressed a desire to be dead last night.  PT ADMITTED TO [facility] 2S FROM 7/5-8/25/22, DC SUMMARY REVIEWED, CASE D/W INPATIENT ATTENDING DR. HERRERA    collateral obtained from brother and pt's inpatient psychiatrist at [facility] Dr. Herrera.    pt has chronic suicidality which he has never acted on . denies current intent plan.   hx of factitious d/o (primary gain) vs. mood d/o. character pathology. unlikely to benefit from inpatient rehospitalization.    would d/c CO  would restart abilify at 5mg po qd; prozac 20mg qd; melatonin 3mg po qhs; trazodone 50mg qhs (dc meds from [facility])  am awaiting the number for  from 2S to help better coordinate support structures and followup 71M, unemployed, living with brother, with PMH of HTN, HLD, urinary incontinence 2/2 unclear prostate issues, R leg pain from arthritis, psych hx of MDD, OCD, "mixed personality disorder", reports hx of walking into traffic and of hitting himself, at least three prior hospitalizations ([facility]; [place] 1/4/22-2/24/2022, [facility] 10/22-11/15/2021 for depression w psychosis), has previously been seen at [place] ED, now admitted to medicine for SOB and   chest discomfort, psych consult called after pt expressed a desire to be dead last night.  PT ADMITTED TO [facility] 2S FROM 7/5-8/25/22, DC SUMMARY REVIEWED, CASE D/W INPATIENT ATTENDING DR. HERRERA    collateral obtained from brother and pt's inpatient psychiatrist at [facility] Dr. Herrera.    pt has chronic suicidality which he has never acted on . denies current intent or plan.   hx of factitious d/o (primary gain) vs. mood d/o. character pathology. unlikely to benefit from inpatient rehospitalization.    would d/c CO    would restart abilify at 5mg po qd; prozac 20mg qd; melatonin 3mg po qhs; trazodone 50mg qhs (dc meds from [facility])    am awaiting the number for  from 2S to help better coordinate support structures and followup  update : pt's CM is Stephany Fernandez  71M, unemployed, living with brother, with PMH of HTN, HLD, urinary incontinence 2/2 unclear prostate issues, R leg pain from arthritis, psych hx of MDD, OCD, "mixed personality disorder", reports hx of walking into traffic and of hitting himself, at least three prior hospitalizations ([facility]; [place] 1/4/22-2/24/2022, [facility] 10/22-11/15/2021 for depression w psychosis), has previously been seen at [place] ED, now admitted to medicine for SOB and   chest discomfort, psych consult called after pt expressed a desire to be dead last night.  PT ADMITTED TO [facility] 2S FROM 7/5-8/25/22, DC SUMMARY REVIEWED, CASE D/W INPATIENT ATTENDING DR. HERRERA    collateral obtained from brother and pt's inpatient psychiatrist at [facility] Dr. Herrera.    pt has chronic suicidality which he has never acted on . denies current intent or plan.   hx of factitious d/o (primary gain) vs. mood d/o. character pathology. unlikely to benefit from inpatient rehospitalization.    would d/c CO    would restart abilify at 5mg po qd; prozac 20mg qd; melatonin 3mg po qhs; trazodone 50mg qhs (dc meds from [facility])    would recommend medicine team SW coordinating outpatient follow up   with pt's MARCIA Fernandez  with [facility]   per Dr. Herrera pt has also been set up with [facility] and home care.

## 2022-09-12 NOTE — BH CONSULTATION LIAISON ASSESSMENT NOTE - DETAILS
Would not impact clinical decisionmaking at this time see HPI, reports that he has walked into traffic, reports that he plans on stopping all medications reports that he had put a  hit out on a drug dealer years ago but did not give details see HPI [facility] 2S 7/5-8/25/22 called 2S [phone]. spoke w Dr. Yovana Camarillo( see below )

## 2022-09-13 DIAGNOSIS — F68.10 FACTITIOUS DISORDER IMPOSED ON SELF, UNSPECIFIED: ICD-10-CM

## 2022-09-13 LAB
ANION GAP SERPL CALC-SCNC: 6 MMOL/L — SIGNIFICANT CHANGE UP (ref 5–17)
BUN SERPL-MCNC: 22 MG/DL — HIGH (ref 7–18)
CALCIUM SERPL-MCNC: 9.1 MG/DL — SIGNIFICANT CHANGE UP (ref 8.4–10.5)
CHLORIDE SERPL-SCNC: 103 MMOL/L — SIGNIFICANT CHANGE UP (ref 96–108)
CO2 SERPL-SCNC: 31 MMOL/L — SIGNIFICANT CHANGE UP (ref 22–31)
CREAT SERPL-MCNC: 0.99 MG/DL — SIGNIFICANT CHANGE UP (ref 0.5–1.3)
EGFR: 81 ML/MIN/1.73M2 — SIGNIFICANT CHANGE UP
GLUCOSE SERPL-MCNC: 86 MG/DL — SIGNIFICANT CHANGE UP (ref 70–99)
MRSA PCR RESULT.: SIGNIFICANT CHANGE UP
PHOSPHATE SERPL-MCNC: 2.8 MG/DL — SIGNIFICANT CHANGE UP (ref 2.5–4.5)
POTASSIUM SERPL-MCNC: 3.3 MMOL/L — LOW (ref 3.5–5.3)
POTASSIUM SERPL-SCNC: 3.3 MMOL/L — LOW (ref 3.5–5.3)
S AUREUS DNA NOSE QL NAA+PROBE: DETECTED
SODIUM SERPL-SCNC: 140 MMOL/L — SIGNIFICANT CHANGE UP (ref 135–145)

## 2022-09-13 PROCEDURE — 99233 SBSQ HOSP IP/OBS HIGH 50: CPT | Mod: 95

## 2022-09-13 RX ORDER — POTASSIUM CHLORIDE 20 MEQ
40 PACKET (EA) ORAL ONCE
Refills: 0 | Status: COMPLETED | OUTPATIENT
Start: 2022-09-13 | End: 2022-09-13

## 2022-09-13 RX ADMIN — SIMVASTATIN 20 MILLIGRAM(S): 20 TABLET, FILM COATED ORAL at 21:19

## 2022-09-13 RX ADMIN — Medication 1 SPRAY(S): at 17:05

## 2022-09-13 RX ADMIN — Medication 3 MILLIGRAM(S): at 21:19

## 2022-09-13 RX ADMIN — Medication 30 MILLIGRAM(S): at 05:40

## 2022-09-13 RX ADMIN — Medication 30 MILLILITER(S): at 11:12

## 2022-09-13 RX ADMIN — ENOXAPARIN SODIUM 40 MILLIGRAM(S): 100 INJECTION SUBCUTANEOUS at 05:41

## 2022-09-13 RX ADMIN — Medication 40 MILLIEQUIVALENT(S): at 17:05

## 2022-09-13 RX ADMIN — ARIPIPRAZOLE 5 MILLIGRAM(S): 15 TABLET ORAL at 11:11

## 2022-09-13 RX ADMIN — Medication 1 SPRAY(S): at 05:40

## 2022-09-13 RX ADMIN — Medication 20 MILLIGRAM(S): at 11:11

## 2022-09-13 RX ADMIN — Medication 50 MILLIGRAM(S): at 21:19

## 2022-09-13 NOTE — PROGRESS NOTE ADULT - ASSESSMENT
72 y/o male with BPH, HTN and HLD presenting with generalized complaints. Patient states that he feels SOB for the past couple months and states its getting progressively worse. Pt admitted for  workup for dyspnea and depression . CTA chest negative for PE, cardiac w/u negative for ACS.  Pt. required psych eval for verbalization of suicidal ideations and self harm.  Psych notes and recommendations appreciated.  As per psych pt. has chronic passive suicidal ideation with no acute suicidal plan or intent.  No need for CO at this time.  Pt. is not a candidate for  inpatient psych hospitalization, cleared for discharge from psychiatry stand point.

## 2022-09-13 NOTE — PROGRESS NOTE ADULT - PROBLEM SELECTOR PLAN 1
Reportedly verbalizes suicidal ideations repeatedly  Was on CO last night  No need for CO per psych as pt. with chronic suicidal ideations  Cont psych meds  Stable for discharge from psych stand point

## 2022-09-13 NOTE — PROGRESS NOTE ADULT - PROBLEM SELECTOR PLAN 2
expressing negativity  Psych note and reccs appreciated  Safety plan recc appreciated:  Writing, cartoons, poetry  Cont psych meds:  Abilify , prozac, trazodone  Continue Melatonin  Supportive measures  PT  SW

## 2022-09-13 NOTE — BH CONSULTATION LIAISON PROGRESS NOTE - NSBHASSESSMENTFT_PSY_ALL_CORE
Summary (brief):	71M, unemployed, living with brother, with PMH of HTN, HLD, urinary incontinence 2/2 unclear prostate issues, R leg pain from arthritis, psych hx of MDD, OCD, "mixed personality disorder", reports hx of walking into traffic and of hitting himself, at least three prior hospitalizations ([facility]; [place] 1/4/22-2/24/2022, [facility] 10/22-11/15/2021 for depression w psychosis), has previously been seen at [place] ED, now admitted to medicine for SOB and   chest discomfort, psych consult called after pt expressed a desire to be dead last night.  PT ADMITTED TO [facility] 2S FROM 7/5-8/25/22, DC SUMMARY REVIEWED, CASE D/W INPATIENT ATTENDING DR. HERRERA  collateral obtained from brother and pt's inpatient psychiatrist at [facility] Dr. Herrera.    pt today endorses passive suicidal ideation and depression along with a host of medical issues.  he has chronic passive suicidal ideation. no acute suicidal plan or intent. may d/c CO which was restarted by overnight medicine team.   per his brother he has no hx of prior Suicide attempts or self harm.  he has a documented hx of factitious d/o vs malingering and character pathology, which makes in Dr Herrera's estimation (prior   inpatient attending) pt not a candidate for inpatient hospitalization.     his presentation today and during this hosptial stay are consistent with personality d/o - negativistic thinking, poor coping skills,  catastrophization, chronic suiciidal thinking.     acute risk is not elevated considering he is expressing only passive suicidal ideation and no plan or intent.    risk factors have been modified by restarting psychotropic medications and treatment of his medical conditions.  they may be further modifed and improved by setting up a support structure for patient as an outpatient (see below).     pt has elevated CHRONIC risk due to personality d/o, mood disorder  however it should be remembered that Dr. Herrera and doctors at [facility] diagnosed factitious d/o, and per brother   pt has no hx of self harm.     a safety plan has been completed and is in chart . please print this and give to patient at time of discharge.    pt is psychiatrically cleared for discharge. SW may consider home care or assisted living for patient's physical isues and possible   cognitive problems leading to problems with adls such as transportation and taking medications     as documented yestrday pt has a CM with [facility]. Please have SW coordinate discharge planning and outpatient  psychiatric followup with CM    Summary (brief):	71M, unemployed, living with brother, with PMH of HTN, HLD, urinary incontinence 2/2 unclear prostate issues, R leg pain from arthritis, psych hx of MDD, OCD, "mixed personality disorder", reports hx of walking into traffic and of hitting himself, at least three prior hospitalizations ([facility]; [place] 1/4/22-2/24/2022, [facility] 10/22-11/15/2021 for depression w psychosis), has previously been seen at [place] ED, now admitted to medicine for SOB and   chest discomfort, psych consult called after pt expressed a desire to be dead last night.  PT ADMITTED TO [facility] 2S FROM 7/5-8/25/22, DC SUMMARY REVIEWED, CASE D/W INPATIENT ATTENDING DR. HERRERA  collateral obtained from brother and pt's inpatient psychiatrist at [facility] Dr. Herrera.    pt today endorses passive suicidal ideation and depression along with a host of medical issues.  he has chronic passive suicidal ideation. no acute suicidal plan or intent. may d/c CO which was restarted by overnight medicine team.   per his brother he has no hx of prior Suicide attempts or self harm.  he has a documented hx of factitious d/o vs malingering and character pathology, which makes in Dr Herrera's estimation (prior   inpatient attending) pt not a candidate for inpatient hospitalization.     his presentation today and during this hosptial stay are consistent with personality d/o - negativistic thinking, poor coping skills,  catastrophization, chronic suiciidal thinking.     acute risk is not elevated considering he is expressing only passive suicidal ideation and no plan or intent, and he is future and goal oriented  in multiple ways (wants lunch, wants to become a poet, get , go to [facility] three times a week, get physical help  and maybe assisted living, etc. )    risk factors have been modified by restarting psychotropic medications and treatment of his medical conditions.  they may be further modifed and improved by setting up a support structure for patient as an outpatient (see below).     pt has elevated CHRONIC risk due to personality d/o, mood disorder  however it should be remembered that Dr. Herrera and doctors at [facility] who know him well diagnosed factitious d/o, and per brother   pt has no hx of self harm.     *a safety plan has been completed and is in chart . please print this and give to patient at time of discharge.*    pt is psychiatrically cleared for discharge. SW may consider home care or assisted living for patient's physical isues and possible   cognitive problems leading to problems with adls such as transportation and taking medications     as documented yestrday pt has a CM with [facility] (see yesterday's note for contact info).   Please have SW coordinate discharge planning and outpatient psychiatric followup with CM

## 2022-09-13 NOTE — PROGRESS NOTE ADULT - SUBJECTIVE AND OBJECTIVE BOX
NP Note discussed with  Primary Attending    Patient is a 71y old  Male who presents with a chief complaint of dyspnea (12 Sep 2022 17:40)      INTERVAL HPI/OVERNIGHT EVENTS: no new complaints    MEDICATIONS  (STANDING):  ARIPiprazole 5 milliGRAM(s) Oral daily  enoxaparin Injectable 40 milliGRAM(s) SubCutaneous every 24 hours  FLUoxetine 20 milliGRAM(s) Oral daily  fluticasone propionate 50 MICROgram(s)/spray Nasal Spray 1 Spray(s) Both Nostrils two times a day  melatonin 3 milliGRAM(s) Oral at bedtime  NIFEdipine XL 30 milliGRAM(s) Oral daily  potassium chloride    Tablet ER 40 milliEquivalent(s) Oral once  simvastatin 20 milliGRAM(s) Oral at bedtime  traZODone 50 milliGRAM(s) Oral at bedtime    MEDICATIONS  (PRN):  aluminum hydroxide/magnesium hydroxide/simethicone Suspension 30 milliLiter(s) Oral every 6 hours PRN Dyspepsia      __________________________________________________  REVIEW OF SYSTEMS:    CONSTITUTIONAL: No fever,   EYES: no acute visual disturbances  NECK: No pain or stiffness  RESPIRATORY: No cough; No shortness of breath  CARDIOVASCULAR: No chest pain, no palpitations  GASTROINTESTINAL: No pain. No nausea or vomiting; No diarrhea   NEUROLOGICAL: No headache or numbness, no tremors  MUSCULOSKELETAL: No joint pain, no muscle pain  GENITOURINARY: no dysuria, no frequency, no hesitancy  PSYCHIATRY: no depression , no anxiety  ALL OTHER  ROS negative        Vital Signs Last 24 Hrs  T(C): 36.2 (13 Sep 2022 12:00), Max: 36.4 (12 Sep 2022 20:32)  T(F): 97.1 (13 Sep 2022 12:00), Max: 97.6 (12 Sep 2022 20:32)  HR: 64 (13 Sep 2022 12:00) (63 - 72)  BP: 118/71 (13 Sep 2022 12:00) (116/70 - 136/87)  BP(mean): --  RR: 18 (13 Sep 2022 12:00) (18 - 18)  SpO2: 97% (13 Sep 2022 12:00) (95% - 98%)    Parameters below as of 13 Sep 2022 12:00  Patient On (Oxygen Delivery Method): room air        ________________________________________________  PHYSICAL EXAM:  well developed, depressed looking  GENERAL: NAD  HEENT: Normocephalic;  conjunctivae and sclerae clear; moist mucous membranes;   NECK : supple  CHEST/LUNG: Clear to auscultation bilaterally with good air entry   HEART: S1 S2  regular; no murmurs, gallops or rubs  ABDOMEN: Soft, Nontender, Nondistended; Bowel sounds present  EXTREMITIES: no cyanosis; no edema; no calf tenderness  SKIN: warm and dry; no rash  NERVOUS SYSTEM:  Awake and alert; Oriented  to place, person and time ; no new deficits    _________________________________________________  LABS:                        12.1   7.80  )-----------( 219      ( 12 Sep 2022 09:42 )             35.9     09-13    140  |  103  |  22<H>  ----------------------------<  86  3.3<L>   |  31  |  0.99    Ca    9.1      13 Sep 2022 08:03  Phos  2.8     09-13  Mg     2.1     09-12          CAPILLARY BLOOD GLUCOSE      RADIOLOGY & ADDITIONAL TESTS:    < from: CT Angio Chest PE Protocol w/ IV Cont (09.12.22 @ 14:40) >    ACC: [id number] EXAM:  CT ANGIO CHEST PULM [facility]                          PROCEDURE DATE:  09/12/2022          INTERPRETATION:  INDICATION: Dyspnea on exertion, shortness of breath    TECHNIQUE: Volumetric images of the chest were obtained after the   administration of 74 mL of Omnipaque 350. Maximum intensity projection   images were generated.    COMPARISON: None.    FINDINGS:    PULMONARY ANGIOGRAM: No pulmonary embolism.    LUNGS/AIRWAYS/PLEURA: Patent trachea and bronchi. Clear lungs. Nopleural   effusion or pneumothorax.    LYMPH NODES/MEDIASTINUM: No enlarged lymph nodes. Bilateral thyroid   nodules up to 1.1 cm.    HEART/VASCULATURE: Enlarged heart. No pericardial effusion. Coronary   artery calcifications.    UPPER ABDOMEN: Unremarkable.    BONES/SOFT TISSUES: Spondylosis.      IMPRESSION:    No pulmonary embolism.    < end of copied text >      Imaging Personally Reviewed:  YES/NO    Consultant(s) Notes Reviewed:   YES/ No    Care Discussed with Consultants :     Plan of care was discussed with patient and /or primary care giver; all questions and concerns were addressed and care was aligned with patient's wishes.

## 2022-09-13 NOTE — PROGRESS NOTE ADULT - PROBLEM SELECTOR PLAN 9
Pt is medically cleared for discharge   Pt. is cleared for discharge by psych  Does not need inpatient psych  PT recc JESUS MANUEL  SW/CM following

## 2022-09-13 NOTE — PHYSICAL THERAPY INITIAL EVALUATION ADULT - GAIT DEVIATIONS NOTED, PT EVAL
decreased stella/decreased velocity of limb motion/decreased step length/decreased stride length/decreased swing-to-stance ratio

## 2022-09-13 NOTE — PHYSICAL THERAPY INITIAL EVALUATION ADULT - PERTINENT HX OF CURRENT PROBLEM, REHAB EVAL
Patient admitted from home due to worsening dyspnea. Patient w/ h/o BPH, HTN and HLD. Pt. at risk for fall.

## 2022-09-13 NOTE — BH CONSULTATION LIAISON PROGRESS NOTE - NSBHFUPINTERVALHXFT_PSY_A_CORE
pt seen , chart reviewed.  no adverse interval events. no prn's for agitation.  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  says he would like to become a well know poet - "a promise I made to myself."  he has a number of physical complaints, including his swollen right knee, shortness of breath, an enlarged prostate.  states he would like to have lunch in the short term, and long term he wants to have a wife.  he endorses chronic depression. he says he has wanted "a mercy killing" but denies suicidal ideation/intent/plan.   he says he is open to living as assisted living facility because of his physical ailments and the help he needs with adl's.  aaox4. cognition grossly intact.  pt seen , chart reviewed.  overnight medical team restarted CO. no prn's for agitation.  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  says he would like to become a well know poet - "a promise I made to myself."  he has a number of physical complaints, including his swollen right knee, shortness of breath, an enlarged prostate.  states he would like to have lunch in the short term, and long term he wants to have a wife.  he endorses chronic depression. he says he has wanted "a mercy killing" but denies suicidal ideation/intent/plan.   he says he is open to living as assisted living facility because of his physical ailments and the help he needs with adl's.  aaox4. cognition grossly intact.

## 2022-09-14 LAB
ANION GAP SERPL CALC-SCNC: 6 MMOL/L — SIGNIFICANT CHANGE UP (ref 5–17)
BUN SERPL-MCNC: 25 MG/DL — HIGH (ref 7–18)
CALCIUM SERPL-MCNC: 8.9 MG/DL — SIGNIFICANT CHANGE UP (ref 8.4–10.5)
CHLORIDE SERPL-SCNC: 102 MMOL/L — SIGNIFICANT CHANGE UP (ref 96–108)
CO2 SERPL-SCNC: 32 MMOL/L — HIGH (ref 22–31)
CREAT SERPL-MCNC: 1.16 MG/DL — SIGNIFICANT CHANGE UP (ref 0.5–1.3)
EGFR: 67 ML/MIN/1.73M2 — SIGNIFICANT CHANGE UP
GLUCOSE SERPL-MCNC: 95 MG/DL — SIGNIFICANT CHANGE UP (ref 70–99)
POTASSIUM SERPL-MCNC: 3.6 MMOL/L — SIGNIFICANT CHANGE UP (ref 3.5–5.3)
POTASSIUM SERPL-SCNC: 3.6 MMOL/L — SIGNIFICANT CHANGE UP (ref 3.5–5.3)
SODIUM SERPL-SCNC: 140 MMOL/L — SIGNIFICANT CHANGE UP (ref 135–145)

## 2022-09-14 PROCEDURE — 99233 SBSQ HOSP IP/OBS HIGH 50: CPT

## 2022-09-14 RX ORDER — MUPIROCIN 20 MG/G
1 OINTMENT TOPICAL
Refills: 0 | Status: COMPLETED | OUTPATIENT
Start: 2022-09-14 | End: 2022-09-19

## 2022-09-14 RX ORDER — ACETAMINOPHEN 500 MG
650 TABLET ORAL EVERY 6 HOURS
Refills: 0 | Status: DISCONTINUED | OUTPATIENT
Start: 2022-09-14 | End: 2022-10-04

## 2022-09-14 RX ORDER — CHLORHEXIDINE GLUCONATE 213 G/1000ML
1 SOLUTION TOPICAL
Refills: 0 | Status: DISCONTINUED | OUTPATIENT
Start: 2022-09-14 | End: 2022-09-29

## 2022-09-14 RX ADMIN — Medication 50 MILLIGRAM(S): at 21:47

## 2022-09-14 RX ADMIN — ENOXAPARIN SODIUM 40 MILLIGRAM(S): 100 INJECTION SUBCUTANEOUS at 05:41

## 2022-09-14 RX ADMIN — Medication 30 MILLIGRAM(S): at 05:42

## 2022-09-14 RX ADMIN — Medication 1 SPRAY(S): at 17:06

## 2022-09-14 RX ADMIN — SIMVASTATIN 20 MILLIGRAM(S): 20 TABLET, FILM COATED ORAL at 21:47

## 2022-09-14 RX ADMIN — Medication 3 MILLIGRAM(S): at 21:47

## 2022-09-14 RX ADMIN — ARIPIPRAZOLE 5 MILLIGRAM(S): 15 TABLET ORAL at 13:02

## 2022-09-14 RX ADMIN — Medication 30 MILLILITER(S): at 21:47

## 2022-09-14 RX ADMIN — Medication 1 SPRAY(S): at 05:43

## 2022-09-14 RX ADMIN — MUPIROCIN 1 APPLICATION(S): 20 OINTMENT TOPICAL at 17:06

## 2022-09-14 RX ADMIN — Medication 20 MILLIGRAM(S): at 13:02

## 2022-09-14 NOTE — BH CONSULTATION LIAISON PROGRESS NOTE - NSBHASSESSMENTFT_PSY_ALL_CORE
71M, unemployed, living with brother, with PMH of HTN, HLD, urinary incontinence 2/2 unclear prostate issues, R leg pain from arthritis, psych hx of MDD, OCD, "mixed personality disorder", reports hx of walking into traffic and of hitting himself, at least three prior hospitalizations ([facility]; [place] 1/4/22-2/24/2022, [facility] 10/22-11/15/2021 for depression w psychosis), has previously been seen at [place] ED, now admitted to medicine for SOB and chest discomfort, psych consult called after pt expressed a desire to be dead. Patient initially assessed by [facility] and cleared on 9/13/2022. Reconsulted on 9/14/2022 in the setting of new collateral information and persistent suicidal thoughts.     PT ADMITTED TO [facility] 2S FROM 7/5-8/25/22, collateral from Dr. De La Rosa available in previous progress note.    9/13/2022: Upon assessment today patient reports suicidal thoughts which have been chronic but now active and persistent. While patient's symptoms may be secondary to MDD along with personality disorder, patient's chronic risk is elevated with plan to harm self by cutting as he done in past. Patient would likely benefit from inpatient psych admission for stabilization.

## 2022-09-14 NOTE — BH CONSULTATION LIAISON PROGRESS NOTE - NSBHFUPINTERVALHXFT_PSY_A_CORE
Patient seen and chart reviewed. Patient appears to be aaox4 and cognition grossly intact. He appears calm and cooperative. He states that he feels like he is going "to die today." Patient states that he has not spoken to his family or friends in a very long time and that he feels lonely. He reports that he has been having persistent suicidal thoughts, most recently in the last hour. Patients states that in the past when he was 17 years old he cut his wrist and that he would do cut again. Patient reports that his mood has been "all over." He denies any AVH. Patient is interested in continuing psychiatric care in a inpatient psych unit.

## 2022-09-14 NOTE — PROGRESS NOTE ADULT - SUBJECTIVE AND OBJECTIVE BOX
NP Note discussed with  Primary Attending    Patient is a 71y old  Male who presents with a chief complaint of dyspnea (13 Sep 2022 16:49)      INTERVAL HPI/OVERNIGHT EVENTS: no new complaints    MEDICATIONS  (STANDING):  ARIPiprazole 5 milliGRAM(s) Oral daily  chlorhexidine 2% Cloths 1 Application(s) Topical <User Schedule>  enoxaparin Injectable 40 milliGRAM(s) SubCutaneous every 24 hours  FLUoxetine 20 milliGRAM(s) Oral daily  fluticasone propionate 50 MICROgram(s)/spray Nasal Spray 1 Spray(s) Both Nostrils two times a day  melatonin 3 milliGRAM(s) Oral at bedtime  mupirocin 2% Ointment 1 Application(s) Both Nostrils two times a day  NIFEdipine XL 30 milliGRAM(s) Oral daily  simvastatin 20 milliGRAM(s) Oral at bedtime  traZODone 50 milliGRAM(s) Oral at bedtime    MEDICATIONS  (PRN):  acetaminophen     Tablet .. 650 milliGRAM(s) Oral every 6 hours PRN Mild Pain (1 - 3)  aluminum hydroxide/magnesium hydroxide/simethicone Suspension 30 milliLiter(s) Oral every 6 hours PRN Dyspepsia      __________________________________________________  REVIEW OF SYSTEMS:  "I am going to die today"  "you think I'm crazy I know"    CONSTITUTIONAL: No fever,   EYES: no acute visual disturbances  NECK: No pain or stiffness  RESPIRATORY: No cough; No shortness of breath  CARDIOVASCULAR: No chest pain, no palpitations  GASTROINTESTINAL: No pain. No nausea or vomiting; No diarrhea   NEUROLOGICAL: No headache or numbness, no tremors  MUSCULOSKELETAL: No joint pain, no muscle pain  GENITOURINARY: no dysuria, no frequency, no hesitancy  PSYCHIATRY: no depression , no anxiety  ALL OTHER  ROS negative        Vital Signs Last 24 Hrs  T(C): 36.1 (14 Sep 2022 14:32), Max: 36.8 (13 Sep 2022 21:09)  T(F): 97 (14 Sep 2022 14:32), Max: 98.2 (13 Sep 2022 21:09)  HR: 64 (14 Sep 2022 14:32) (56 - 68)  BP: 127/73 (14 Sep 2022 14:32) (116/59 - 136/80)  BP(mean): --  RR: 14 (14 Sep 2022 14:32) (14 - 20)  SpO2: 97% (14 Sep 2022 14:32) (95% - 98%)    Parameters below as of 14 Sep 2022 14:32  Patient On (Oxygen Delivery Method): room air        ________________________________________________  PHYSICAL EXAM:  well developed  GENERAL: NAD  HEENT: Normocephalic;  conjunctivae and sclerae clear; moist mucous membranes;   NECK : supple  CHEST/LUNG: Clear to auscultation bilaterally with good air entry   HEART: S1 S2  regular; no murmurs, gallops or rubs  ABDOMEN: Soft, Nontender, Nondistended; Bowel sounds present  EXTREMITIES: no cyanosis; no edema; no calf tenderness  SKIN: warm and dry; no rash  NERVOUS SYSTEM:  Awake and alert; Oriented  to place, person and time ; no new deficits    _________________________________________________  LABS:    09-14    140  |  102  |  25<H>  ----------------------------<  95  3.6   |  32<H>  |  1.16    Ca    8.9      14 Sep 2022 08:44  Phos  2.8     09-13      CAPILLARY BLOOD GLUCOSE      RADIOLOGY & ADDITIONAL TESTS:    < from: CT Angio Chest PE Protocol w/ IV Cont (09.12.22 @ 14:40) >    ACC: [id number] EXAM:  CT ANGIO CHEST PULM [facility]                          PROCEDURE DATE:  09/12/2022          INTERPRETATION:  INDICATION: Dyspnea on exertion, shortness of breath    TECHNIQUE: Volumetric images of the chest were obtained after the   administration of 74 mL of Omnipaque 350. Maximum intensity projection   images were generated.    COMPARISON: None.    FINDINGS:    PULMONARY ANGIOGRAM: No pulmonary embolism.    LUNGS/AIRWAYS/PLEURA: Patent trachea and bronchi. Clear lungs. Nopleural   effusion or pneumothorax.    LYMPH NODES/MEDIASTINUM: No enlarged lymph nodes. Bilateral thyroid   nodules up to 1.1 cm.    HEART/VASCULATURE: Enlarged heart. No pericardial effusion. Coronary   artery calcifications.    UPPER ABDOMEN: Unremarkable.    BONES/SOFT TISSUES: Spondylosis.      IMPRESSION:    No pulmonary embolism.    --- End of Report ---    < end of copied text >    < from: Xray Chest 1 View- PORTABLE-Urgent (09.10.22 @ 20:34) >    ACC: [id number] EXAM:  XR CHEST PORTABLE URGENT 1V                          PROCEDURE DATE:  09/10/2022          INTERPRETATION:  Portable AP Radiograph dated 9/10/2022 8:34 PM    CLINICAL INFORMATION: 71 years, Male, sob    PRIOR STUDIES: None    FINDINGS:  Lines, Catheters and Support Devices: None.    Heart Size, Mediastinum and Hilar Contours: Heart size is suboptimally   evaluated on this AP projection. Normal mediastinal and hilar contours.    Lungs: No focal consolidation. No pleural effusions. No pneumothorax.    Bones/Soft Tissues: No acute abnormalities of the soft tissues and   osseous structures.    IMPRESSION:  No focal consolidation.    < end of copied text >      Imaging Personally Reviewed:  YES/NO    Consultant(s) Notes Reviewed:   YES/ No    Care Discussed with Consultants :     Plan of care was discussed with patient and /or primary care giver; all questions and concerns were addressed and care was aligned with patient's wishes.

## 2022-09-14 NOTE — PROGRESS NOTE ADULT - PROBLEM SELECTOR PLAN 1
Reportedly verbalizes suicidal ideations repeatedly  Was on enhanced supervision last night  Follow up evaluation by tele psych today (9/14) recc CO and discharge to inpatient psych  Cont psych meds  SW following for inpt. psych placement

## 2022-09-14 NOTE — PROGRESS NOTE ADULT - PROBLEM SELECTOR PLAN 9
Pt is medically cleared for discharge   Pt. recommended discharge to inpatient psych today 9/14  SW/CM following

## 2022-09-14 NOTE — PROGRESS NOTE ADULT - ASSESSMENT
72 y/o male with BPH, HTN and HLD presenting with generalized complaints. Patient states that he feels SOB for the past couple months and states its getting progressively worse. Pt admitted for  workup for dyspnea and depression . CTA chest negative for PE, cardiac w/u negative for ACS.  Pt. required psych eval for verbalization of suicidal ideations and self harm.  Psych notes and recommendations appreciated.  As per psych pt. has chronic passive suicidal ideation with no acute suicidal plan or intent.  No need for CO at this time.  Pt. is not a candidate for  inpatient psych hospitalization, cleared for discharge from psychiatry stand point.  9/14-Pt. with persisting negative expression of thoughts stating "I'm going to die today"  "You think I'm crazy I know" etc.  Tele psych f/u today recc CO and discharge to inpatient psych, SW following.  1:1 initiated, diversional activities such as watching TV, puzzles etc. recommended.

## 2022-09-15 PROCEDURE — 99232 SBSQ HOSP IP/OBS MODERATE 35: CPT

## 2022-09-15 RX ADMIN — Medication 1 SPRAY(S): at 17:46

## 2022-09-15 RX ADMIN — Medication 50 MILLIGRAM(S): at 21:36

## 2022-09-15 RX ADMIN — MUPIROCIN 1 APPLICATION(S): 20 OINTMENT TOPICAL at 05:58

## 2022-09-15 RX ADMIN — Medication 3 MILLIGRAM(S): at 21:37

## 2022-09-15 RX ADMIN — ARIPIPRAZOLE 5 MILLIGRAM(S): 15 TABLET ORAL at 11:58

## 2022-09-15 RX ADMIN — ENOXAPARIN SODIUM 40 MILLIGRAM(S): 100 INJECTION SUBCUTANEOUS at 05:56

## 2022-09-15 RX ADMIN — Medication 20 MILLIGRAM(S): at 11:58

## 2022-09-15 RX ADMIN — Medication 30 MILLILITER(S): at 11:58

## 2022-09-15 RX ADMIN — Medication 1 SPRAY(S): at 05:57

## 2022-09-15 RX ADMIN — Medication 30 MILLIGRAM(S): at 05:57

## 2022-09-15 RX ADMIN — SIMVASTATIN 20 MILLIGRAM(S): 20 TABLET, FILM COATED ORAL at 21:37

## 2022-09-15 RX ADMIN — MUPIROCIN 1 APPLICATION(S): 20 OINTMENT TOPICAL at 17:46

## 2022-09-15 RX ADMIN — Medication 650 MILLIGRAM(S): at 05:59

## 2022-09-15 RX ADMIN — CHLORHEXIDINE GLUCONATE 1 APPLICATION(S): 213 SOLUTION TOPICAL at 05:57

## 2022-09-15 RX ADMIN — Medication 650 MILLIGRAM(S): at 07:00

## 2022-09-15 NOTE — PROGRESS NOTE ADULT - SUBJECTIVE AND OBJECTIVE BOX
NP Note discussed with  Primary Attending    Patient is a 71y old  Male who presents with a chief complaint of dyspnea (14 Sep 2022 15:35)      INTERVAL HPI/OVERNIGHT EVENTS: no new complaints    MEDICATIONS  (STANDING):  ARIPiprazole 5 milliGRAM(s) Oral daily  chlorhexidine 2% Cloths 1 Application(s) Topical <User Schedule>  enoxaparin Injectable 40 milliGRAM(s) SubCutaneous every 24 hours  FLUoxetine 20 milliGRAM(s) Oral daily  fluticasone propionate 50 MICROgram(s)/spray Nasal Spray 1 Spray(s) Both Nostrils two times a day  melatonin 3 milliGRAM(s) Oral at bedtime  mupirocin 2% Ointment 1 Application(s) Both Nostrils two times a day  NIFEdipine XL 30 milliGRAM(s) Oral daily  simvastatin 20 milliGRAM(s) Oral at bedtime  traZODone 50 milliGRAM(s) Oral at bedtime    MEDICATIONS  (PRN):  acetaminophen     Tablet .. 650 milliGRAM(s) Oral every 6 hours PRN Mild Pain (1 - 3)  aluminum hydroxide/magnesium hydroxide/simethicone Suspension 30 milliLiter(s) Oral every 6 hours PRN Dyspepsia      __________________________________________________  REVIEW OF SYSTEMS:    CONSTITUTIONAL: No fever,   EYES: no acute visual disturbances  NECK: No pain or stiffness  RESPIRATORY: No cough; No shortness of breath  CARDIOVASCULAR: No chest pain, no palpitations  GASTROINTESTINAL: No pain. No nausea or vomiting; No diarrhea   NEUROLOGICAL: No headache or numbness, no tremors  MUSCULOSKELETAL: No joint pain, no muscle pain  GENITOURINARY: no dysuria, no frequency, no hesitancy  PSYCHIATRY: no depression , no anxiety  ALL OTHER  ROS negative        Vital Signs Last 24 Hrs  T(C): 36.4 (15 Sep 2022 13:00), Max: 36.4 (14 Sep 2022 20:36)  T(F): 97.6 (15 Sep 2022 13:00), Max: 97.6 (15 Sep 2022 04:31)  HR: 66 (15 Sep 2022 13:00) (63 - 68)  BP: 100/54 (15 Sep 2022 13:00) (100/54 - 162/92)  BP(mean): --  RR: 14 (15 Sep 2022 13:00) (14 - 18)  SpO2: 96% (15 Sep 2022 13:00) (95% - 98%)    Parameters below as of 15 Sep 2022 13:00  Patient On (Oxygen Delivery Method): room air        ________________________________________________  PHYSICAL EXAM:  GENERAL: NAD  HEENT: Normocephalic;  conjunctivae and sclerae clear; moist mucous membranes;   NECK : supple  CHEST/LUNG: Clear to auscultation bilaterally with good air entry   HEART: S1 S2  regular; no murmurs, gallops or rubs  ABDOMEN: Soft, Nontender, Nondistended; Bowel sounds present  EXTREMITIES: no cyanosis; no edema; no calf tenderness  SKIN: warm and dry; no rash  NERVOUS SYSTEM:  Awake and alert; Oriented  to place, person and time ; no new deficits    _________________________________________________  LABS:    09-14    140  |  102  |  25<H>  ----------------------------<  95  3.6   |  32<H>  |  1.16    Ca    8.9      14 Sep 2022 08:44          CAPILLARY BLOOD GLUCOSE            RADIOLOGY & ADDITIONAL TESTS:    Imaging Personally Reviewed:  YES/NO    Consultant(s) Notes Reviewed:   YES/ No    Care Discussed with Consultants :     Plan of care was discussed with patient and /or primary care giver; all questions and concerns were addressed and care was aligned with patient's wishes.

## 2022-09-15 NOTE — BH CONSULTATION LIAISON PROGRESS NOTE - NSBHASSESSMENTFT_PSY_ALL_CORE
71M, unemployed, living with brother, with PMH of HTN, HLD, urinary incontinence 2/2 unclear prostate issues, R leg pain from arthritis, psych hx of MDD, OCD, "mixed personality disorder", reports hx of walking into traffic and of hitting himself, at least three prior hospitalizations ([facility]; [place] 1/4/22-2/24/2022, [facility] 10/22-11/15/2021 for depression w psychosis), has previously been seen at [place] ED, now admitted to medicine for SOB and chest discomfort, psych consult called after pt expressed a desire to be dead. Patient initially assessed by [facility] and cleared on 9/13/2022. Reconsulted on 9/14/2022 in the setting of new collateral information and persistent suicidal thoughts.     PT ADMITTED TO [facility] 2S FROM 7/5-8/25/22, collateral from Dr. De La Rosa available in previous progress note.    9/14/2022: Upon assessment today patient reports suicidal thoughts which have been chronic but now active and persistent. While patient's symptoms may be secondary to MDD along with personality disorder, patient's chronic risk is elevated with plan to harm self by cutting as he done in past. Patient would likely benefit from inpatient psych admission for stabilization.  9/15/2022: Patient endorses persistent SI   71M, unemployed, living with brother, with PMH of HTN, HLD, urinary incontinence 2/2 unclear prostate issues, R leg pain from arthritis, psych hx of MDD, OCD, "mixed personality disorder", reports hx of walking into traffic and of hitting himself, at least three prior hospitalizations ([facility]; [place] 1/4/22-2/24/2022, [facility] 10/22-11/15/2021 for depression w psychosis), has previously been seen at [place] ED, now admitted to medicine for SOB and chest discomfort, psych consult called after pt expressed a desire to be dead. Patient initially assessed by [facility] and cleared on 9/13/2022. Reconsulted on 9/14/2022 in the setting of new collateral information and persistent suicidal thoughts.     PT ADMITTED TO [facility] 2S FROM 7/5-8/25/22, collateral from Dr. De La Rosa available in previous progress note.    9/14/2022: Upon assessment today patient reports suicidal thoughts which have been chronic but now active and persistent. While patient's symptoms may be secondary to MDD along with personality disorder, patient's chronic risk is elevated with plan to harm self by cutting as he done in past. Patient would likely benefit from inpatient psych admission for stabilization.    9/15/2022: Patient endorses persistent SI with plan to cut wrist. Given pphx, collateral provided to SW, patient remains high risk for self harm. Patient would benefit from inpatient psych admission for stabilization.

## 2022-09-15 NOTE — SOCIAL WORK POST DISCHARGE FOLLOW UP NOTE - NSBHSWFOLLOWUP_PSY_ALL_CORE_FT
Pt did not link with his [facility] intake.  Writer attempted to reach pt, but cell phone he provided is incorrect, and belongs to someone unrelated to him.  Writer spoke with pt's brother last week and today.  Pt is admitted to [facility] at this time, brother is uncertain if this is a medical or psychiatric admission.  Brother notes lack of transportation for pt missing scheduled medical and psychiatric appointments.  Writer called [facility] care coordinator, Stephany Fernandez [phone] to inform her of pt's ED visit.  She responded that pt's case was transferred to Barron Lovelace  a non-medicaid care coordinator.  Mr. Lovelace's cell phone is [phone].  Writer attempted to reach him to inform him of pt's admission, and difficulty accessing his outpatient appns, but voicemail was full.  Writer will continue attempts at contact with [facility] care coordinator, for update.

## 2022-09-15 NOTE — BH CONSULTATION LIAISON PROGRESS NOTE - NSBHFUPINTERVALHXFT_PSY_A_CORE
Patient seen and chart reviewed. Patient appears to be aaox4 and cognition grossly intact. He appears calm and cooperative. He states that he feels like he is going "to die today." Patient states that he has not spoken to his family or friends in a very long time and that he feels lonely. He reports that he has been having persistent suicidal thoughts, most recently in the last hour. Patients states that in the past when he was 17 years old he cut his wrist and that he would do cut again. Patient reports that his mood has been "all over." He denies any AVH. Patient is interested in continuing psychiatric care in a inpatient psych unit. Patient seen and chart reviewed. Patient appears to be aaox4 and cognition grossly intact. Patient reports that he has persistent suicidal thoughts with plan of cutting. Patient does state that he distract himself from the thoughts by writing poems and trying to stay "busy." Patient reports poor sleep. He has not been in touch with family/friends.

## 2022-09-16 PROCEDURE — 99232 SBSQ HOSP IP/OBS MODERATE 35: CPT | Mod: 95

## 2022-09-16 PROCEDURE — 99233 SBSQ HOSP IP/OBS HIGH 50: CPT

## 2022-09-16 RX ADMIN — SIMVASTATIN 20 MILLIGRAM(S): 20 TABLET, FILM COATED ORAL at 21:43

## 2022-09-16 RX ADMIN — ENOXAPARIN SODIUM 40 MILLIGRAM(S): 100 INJECTION SUBCUTANEOUS at 06:17

## 2022-09-16 RX ADMIN — Medication 50 MILLIGRAM(S): at 21:43

## 2022-09-16 RX ADMIN — Medication 20 MILLIGRAM(S): at 11:44

## 2022-09-16 RX ADMIN — MUPIROCIN 1 APPLICATION(S): 20 OINTMENT TOPICAL at 06:16

## 2022-09-16 RX ADMIN — Medication 650 MILLIGRAM(S): at 19:28

## 2022-09-16 RX ADMIN — CHLORHEXIDINE GLUCONATE 1 APPLICATION(S): 213 SOLUTION TOPICAL at 06:16

## 2022-09-16 RX ADMIN — Medication 650 MILLIGRAM(S): at 04:57

## 2022-09-16 RX ADMIN — MUPIROCIN 1 APPLICATION(S): 20 OINTMENT TOPICAL at 17:44

## 2022-09-16 RX ADMIN — Medication 1 SPRAY(S): at 06:16

## 2022-09-16 RX ADMIN — ARIPIPRAZOLE 5 MILLIGRAM(S): 15 TABLET ORAL at 11:44

## 2022-09-16 RX ADMIN — Medication 650 MILLIGRAM(S): at 05:33

## 2022-09-16 RX ADMIN — Medication 3 MILLIGRAM(S): at 21:43

## 2022-09-16 RX ADMIN — Medication 30 MILLIGRAM(S): at 06:17

## 2022-09-16 RX ADMIN — Medication 1 SPRAY(S): at 17:43

## 2022-09-16 RX ADMIN — Medication 650 MILLIGRAM(S): at 18:31

## 2022-09-16 NOTE — BH CONSULTATION LIAISON PROGRESS NOTE - NSBHASSESSMENTFT_PSY_ALL_CORE
71M, unemployed, living with brother, with PMH of HTN, HLD, urinary incontinence 2/2 unclear prostate issues, R leg pain from arthritis, psych hx of MDD, OCD, "mixed personality disorder", reports hx of walking into traffic and of hitting himself, at least three prior hospitalizations ([facility]; [place] 1/4/22-2/24/2022, [facility] 10/22-11/15/2021 for depression w psychosis), has previously been seen at [place] ED, now admitted to medicine for SOB and chest discomfort, psych consult called after pt expressed a desire to be dead. Patient initially assessed by Tele Psychiatry and cleared on 9/13/2022. Reconsulted on 9/14/2022 in the setting of new collateral information and persistent suicidal thoughts.     PT ADMITTED TO [facility] 2S FROM 7/5-8/25/22, collateral from Dr. De La Rosa available in previous progress note.    9/14/2022: Upon assessment today patient reports suicidal thoughts which have been chronic but now active and persistent. While patient's symptoms may be secondary to MDD along with personality disorder, patient's chronic risk is elevated with plan to harm self by cutting as he done in past. Patient would likely benefit from inpatient psych admission for stabilization.    9/15/2022: Patient endorses persistent SI with plan to cut wrist. Given pphx, collateral provided to , patient remains high risk for self harm. Patient would benefit from inpatient psych admission for stabilization.     9/16/2022: Patient endorses persistent SI with plan to cut wrist. Patient has not endorsed any new protective factors. Given pphx, collateral provided to SW, patient remains high risk for self harm. Patient would benefit from inpatient psych admission for stabilization.

## 2022-09-16 NOTE — DIETITIAN INITIAL EVALUATION ADULT - OTHER INFO
71 years old male visited earlier today, reports good Po intake and no weight loss . as per pt maybe he gain some weight. pt reports -180 lbs.  Eats everything that is given, does not like peanut butter and jelly sandwich but eats everything else. Kitchen notified.  Pt for discharge to inpatient psych facility.

## 2022-09-16 NOTE — DIETITIAN INITIAL EVALUATION ADULT - PERTINENT MEDS FT
MEDICATIONS  (STANDING):  ARIPiprazole 5 milliGRAM(s) Oral daily  chlorhexidine 2% Cloths 1 Application(s) Topical <User Schedule>  enoxaparin Injectable 40 milliGRAM(s) SubCutaneous every 24 hours  FLUoxetine 20 milliGRAM(s) Oral daily  fluticasone propionate 50 MICROgram(s)/spray Nasal Spray 1 Spray(s) Both Nostrils two times a day  melatonin 3 milliGRAM(s) Oral at bedtime  mupirocin 2% Ointment 1 Application(s) Both Nostrils two times a day  NIFEdipine XL 30 milliGRAM(s) Oral daily  simvastatin 20 milliGRAM(s) Oral at bedtime  traZODone 50 milliGRAM(s) Oral at bedtime    MEDICATIONS  (PRN):  acetaminophen     Tablet .. 650 milliGRAM(s) Oral every 6 hours PRN Mild Pain (1 - 3)  aluminum hydroxide/magnesium hydroxide/simethicone Suspension 30 milliLiter(s) Oral every 6 hours PRN Dyspepsia

## 2022-09-16 NOTE — DIETITIAN INITIAL EVALUATION ADULT - ADD RECOMMEND
Continue with Nutrition plan of care. RD remains available. MD to follow. Provide with food preferences within diet rx.

## 2022-09-16 NOTE — PROGRESS NOTE ADULT - SUBJECTIVE AND OBJECTIVE BOX
NP Note discussed with  Primary Attending    Patient is a 71y old  Male who presents with a chief complaint of Shortness of breath     (16 Sep 2022 11:20)      INTERVAL HPI/OVERNIGHT EVENTS: no new complaints    MEDICATIONS  (STANDING):  ARIPiprazole 5 milliGRAM(s) Oral daily  chlorhexidine 2% Cloths 1 Application(s) Topical <User Schedule>  enoxaparin Injectable 40 milliGRAM(s) SubCutaneous every 24 hours  FLUoxetine 20 milliGRAM(s) Oral daily  fluticasone propionate 50 MICROgram(s)/spray Nasal Spray 1 Spray(s) Both Nostrils two times a day  melatonin 3 milliGRAM(s) Oral at bedtime  mupirocin 2% Ointment 1 Application(s) Both Nostrils two times a day  NIFEdipine XL 30 milliGRAM(s) Oral daily  simvastatin 20 milliGRAM(s) Oral at bedtime  traZODone 50 milliGRAM(s) Oral at bedtime    MEDICATIONS  (PRN):  acetaminophen     Tablet .. 650 milliGRAM(s) Oral every 6 hours PRN Mild Pain (1 - 3)  aluminum hydroxide/magnesium hydroxide/simethicone Suspension 30 milliLiter(s) Oral every 6 hours PRN Dyspepsia      __________________________________________________  REVIEW OF SYSTEMS:    CONSTITUTIONAL: No fever,   EYES: no acute visual disturbances  NECK: No pain or stiffness  RESPIRATORY: No cough; No shortness of breath  CARDIOVASCULAR: No chest pain, no palpitations  GASTROINTESTINAL: No pain. No nausea or vomiting; No diarrhea   NEUROLOGICAL: No headache or numbness, no tremors  MUSCULOSKELETAL: No joint pain, no muscle pain  GENITOURINARY: no dysuria, no frequency, no hesitancy  PSYCHIATRY: no depression , no anxiety  ALL OTHER  ROS negative        Vital Signs Last 24 Hrs  T(C): 37 (16 Sep 2022 05:18), Max: 37 (16 Sep 2022 05:18)  T(F): 98.6 (16 Sep 2022 05:18), Max: 98.6 (16 Sep 2022 05:18)  HR: 60 (16 Sep 2022 05:18) (60 - 66)  BP: 124/69 (16 Sep 2022 05:18) (100/54 - 125/66)  BP(mean): --  RR: 17 (16 Sep 2022 05:18) (14 - 17)  SpO2: 98% (16 Sep 2022 05:18) (96% - 98%)    Parameters below as of 16 Sep 2022 05:18  Patient On (Oxygen Delivery Method): room air        ________________________________________________  PHYSICAL EXAM:  GENERAL: NAD  HEENT: Normocephalic;  conjunctivae and sclerae clear; moist mucous membranes;   NECK : supple  CHEST/LUNG: Clear to auscultation bilaterally with good air entry   HEART: S1 S2  regular; no murmurs, gallops or rubs  ABDOMEN: Soft, Nontender, Nondistended; Bowel sounds present  EXTREMITIES: no cyanosis; no edema; no calf tenderness  SKIN: warm and dry; no rash  NERVOUS SYSTEM:  Awake and alert; Oriented  to place, person and time ; no new deficits    _________________________________________________  LABS:      CAPILLARY BLOOD GLUCOSE      RADIOLOGY & ADDITIONAL TESTS:    Imaging Personally Reviewed:  YES/NO    Consultant(s) Notes Reviewed:   YES/ No    Care Discussed with Consultants :     Plan of care was discussed with patient and /or primary care giver; all questions and concerns were addressed and care was aligned with patient's wishes.

## 2022-09-16 NOTE — BH CONSULTATION LIAISON PROGRESS NOTE - NSBHFUPINTERVALHXFT_PSY_A_CORE
Patient seen and chart reviewed. Patient appears calm and cooperative. Patient states that he has persistent suicidal thoughts and is interested in inpatient psychiatry admission.  Patient seen and chart reviewed. Patient appears calm and cooperative. Patient reports poor sleep last night "as usual." Patient states that he has persistent suicidal thoughts and is interested in inpatient psychiatry admission.

## 2022-09-16 NOTE — BH CONSULTATION LIAISON PROGRESS NOTE - NSBHATTESTBILLINGAW_PSY_A_CORE
[id number] Inpatient care - high complexity - 35 minutes [id number] Inpatient care - moderate complexity - 25 minutes

## 2022-09-16 NOTE — PROGRESS NOTE ADULT - ASSESSMENT
70 y/o male with BPH, HTN and HLD presenting with generalized complaints. Patient states that he feels SOB for the past couple months and states its getting progressively worse. Pt admitted for  workup for dyspnea and depression . CTA chest negative for PE, cardiac w/u negative for ACS.  Pt. required psych eval for verbalization of suicidal ideations and self harm.  Psych notes and recommendations appreciated.  As per psych pt. has chronic passive suicidal ideation with no acute suicidal plan or intent.  No need for CO at this time.  Pt. is not a candidate for  inpatient psych hospitalization, cleared for discharge from psychiatry stand point.  9/14-Pt. with persisting negative expression of thoughts stating "I'm going to die today"  "You think I'm crazy I know" etc.  Tele psych f/u today recc CO and discharge to inpatient psych, SW following.  1:1 initiated, diversional activities such as watching TV, puzzles etc. recommended.  9/16-Remains on 1:1 observation with no behavioral disturbances and no suicidal ideations today.  Pt. communicates being "bored with life" and would like to become someone famous who will be remembered for his greatness.  Pt. is awaiting for inpatient psych facility placement.

## 2022-09-17 LAB — SARS-COV-2 RNA SPEC QL NAA+PROBE: SIGNIFICANT CHANGE UP

## 2022-09-17 PROCEDURE — 99232 SBSQ HOSP IP/OBS MODERATE 35: CPT | Mod: 95

## 2022-09-17 PROCEDURE — 99233 SBSQ HOSP IP/OBS HIGH 50: CPT

## 2022-09-17 RX ADMIN — ARIPIPRAZOLE 5 MILLIGRAM(S): 15 TABLET ORAL at 12:04

## 2022-09-17 RX ADMIN — Medication 3 MILLIGRAM(S): at 22:14

## 2022-09-17 RX ADMIN — Medication 30 MILLIGRAM(S): at 06:36

## 2022-09-17 RX ADMIN — Medication 1 SPRAY(S): at 06:53

## 2022-09-17 RX ADMIN — CHLORHEXIDINE GLUCONATE 1 APPLICATION(S): 213 SOLUTION TOPICAL at 06:38

## 2022-09-17 RX ADMIN — MUPIROCIN 1 APPLICATION(S): 20 OINTMENT TOPICAL at 06:53

## 2022-09-17 RX ADMIN — Medication 20 MILLIGRAM(S): at 12:04

## 2022-09-17 RX ADMIN — Medication 50 MILLIGRAM(S): at 22:14

## 2022-09-17 RX ADMIN — SIMVASTATIN 20 MILLIGRAM(S): 20 TABLET, FILM COATED ORAL at 22:15

## 2022-09-17 RX ADMIN — ENOXAPARIN SODIUM 40 MILLIGRAM(S): 100 INJECTION SUBCUTANEOUS at 06:35

## 2022-09-17 NOTE — PROGRESS NOTE ADULT - ASSESSMENT
#Somatic sx disorder  #Factitious disorder  #MDD  #ANALILIA  #HTN  #HLD    -awaiting geriatric bed  -appreciate psych recs  -c/w abilify, prozac, trazadone, melatonin  -c/w statin, nifedipine  -dvt ppx: lovenox

## 2022-09-17 NOTE — BH CONSULTATION LIAISON PROGRESS NOTE - NSBHASSESSMENTFT_PSY_ALL_CORE
71M, unemployed, living with brother, with PMH of HTN, HLD, urinary incontinence 2/2 unclear prostate issues, R leg pain from arthritis, psych hx of MDD, OCD, "mixed personality disorder", reports hx of walking into traffic and of hitting himself, at least three prior hospitalizations ([facility]; [place] 1/4/22-2/24/2022, [facility] 10/22-11/15/2021 for depression w psychosis), has previously been seen at [place] ED, now admitted to medicine for SOB and chest discomfort, psych consult called after pt expressed a desire to be dead. Patient initially assessed by Tele Psychiatry and cleared on 9/13/2022. Reconsulted on 9/14/2022 in the setting of new collateral information and persistent suicidal thoughts.     PT ADMITTED TO [facility] 2S FROM 7/5-8/25/22, collateral from Dr. De La Rosa available in previous progress note.    9/14/2022: Upon assessment today patient reports suicidal thoughts which have been chronic but now active and persistent. While patient's symptoms may be secondary to MDD along with personality disorder, patient's chronic risk is elevated with plan to harm self by cutting as he done in past. Patient would likely benefit from inpatient psych admission for stabilization.    9/15/2022: Patient endorses persistent SI with plan to cut wrist. Given pphx, collateral provided to , patient remains high risk for self harm. Patient would benefit from inpatient psych admission for stabilization.     9/16/2022: Patient endorses persistent SI with plan to cut wrist. Patient has not endorsed any new protective factors. Given pphx, collateral provided to , patient remains high risk for self harm. Patient would benefit from inpatient psych admission for stabilization.     9/17/2022: Patient endorses SI. 71M, unemployed, living with brother, with PMH of HTN, HLD, urinary incontinence 2/2 unclear prostate issues, R leg pain from arthritis, psych hx of MDD, OCD, "mixed personality disorder", reports hx of walking into traffic and of hitting himself, at least three prior hospitalizations ([facility]; [place] 1/4/22-2/24/2022, [facility] 10/22-11/15/2021 for depression w psychosis), has previously been seen at [place] ED, now admitted to medicine for SOB and chest discomfort, psych consult called after pt expressed a desire to be dead. Patient initially assessed by Tele Psychiatry and cleared on 9/13/2022. Reconsulted on 9/14/2022 in the setting of new collateral information and persistent suicidal thoughts.     PT ADMITTED TO [facility] 2S FROM 7/5-8/25/22, collateral from Dr. De La Rosa available in previous progress note.    9/14/2022: Upon assessment today patient reports suicidal thoughts which have been chronic but now active and persistent. While patient's symptoms may be secondary to MDD along with personality disorder, patient's chronic risk is elevated with plan to harm self by cutting as he done in past. Patient would likely benefit from inpatient psych admission for stabilization.    9/15/2022: Patient endorses persistent SI with plan to cut wrist. Given pphx, collateral provided to , patient remains high risk for self harm. Patient would benefit from inpatient psych admission for stabilization.     9/16/2022: Patient endorses persistent SI with plan to cut wrist. Patient has not endorsed any new protective factors. Given pphx, collateral provided to , patient remains high risk for self harm. Patient would benefit from inpatient psych admission for stabilization.     9/17/2022: Patient endorses SI. Unable to engage in safety planning.

## 2022-09-17 NOTE — BH CONSULTATION LIAISON PROGRESS NOTE - NSBHATTESTBILLINGAW_PSY_A_CORE
[id number] Inpatient care - high complexity - 35 minutes [id number] Inpatient care - moderate complexity - 25 minutes

## 2022-09-17 NOTE — BH CONSULTATION LIAISON PROGRESS NOTE - NSBHFUPINTERVALHXFT_PSY_A_CORE
Patient seen and chart reviewed. Patient appears calm and cooperative. Patient states that he has persistent suicidal thoughts and is interested in inpatient psychiatry admission.  Patient seen and chart reviewed. Patient appears calm and cooperative. Patient states that he has persistent suicidal thoughts and is interested in inpatient psychiatry admission. Patient reports having some trouble with breathing and states that he feels that "he is not going to make it through the weekend."

## 2022-09-18 PROCEDURE — 99233 SBSQ HOSP IP/OBS HIGH 50: CPT

## 2022-09-18 PROCEDURE — 99232 SBSQ HOSP IP/OBS MODERATE 35: CPT | Mod: 95

## 2022-09-18 RX ADMIN — Medication 30 MILLIGRAM(S): at 05:13

## 2022-09-18 RX ADMIN — CHLORHEXIDINE GLUCONATE 1 APPLICATION(S): 213 SOLUTION TOPICAL at 05:19

## 2022-09-18 RX ADMIN — Medication 20 MILLIGRAM(S): at 11:24

## 2022-09-18 RX ADMIN — Medication 50 MILLIGRAM(S): at 21:31

## 2022-09-18 RX ADMIN — Medication 650 MILLIGRAM(S): at 07:04

## 2022-09-18 RX ADMIN — Medication 3 MILLIGRAM(S): at 21:31

## 2022-09-18 RX ADMIN — SIMVASTATIN 20 MILLIGRAM(S): 20 TABLET, FILM COATED ORAL at 21:31

## 2022-09-18 RX ADMIN — ENOXAPARIN SODIUM 40 MILLIGRAM(S): 100 INJECTION SUBCUTANEOUS at 05:14

## 2022-09-18 RX ADMIN — Medication 1 SPRAY(S): at 05:14

## 2022-09-18 RX ADMIN — MUPIROCIN 1 APPLICATION(S): 20 OINTMENT TOPICAL at 05:14

## 2022-09-18 RX ADMIN — Medication 650 MILLIGRAM(S): at 07:45

## 2022-09-18 RX ADMIN — Medication 1 SPRAY(S): at 17:18

## 2022-09-18 RX ADMIN — ARIPIPRAZOLE 5 MILLIGRAM(S): 15 TABLET ORAL at 11:24

## 2022-09-18 RX ADMIN — MUPIROCIN 1 APPLICATION(S): 20 OINTMENT TOPICAL at 17:19

## 2022-09-18 NOTE — BH CONSULTATION LIAISON PROGRESS NOTE - NSBHATTESTBILLINGAW_PSY_A_CORE
[id number] Inpatient care - high complexity - 35 minutes [id number] Inpatient care - moderate complexity - 25 minutes

## 2022-09-18 NOTE — BH CONSULTATION LIAISON PROGRESS NOTE - NSBHFUPINTERVALHXFT_PSY_A_CORE
Patient seen and chart reviewed. Patient appears calm and cooperative. Patient states that he has persistent suicidal thoughts and is interested in inpatient psychiatry admission.

## 2022-09-18 NOTE — BH CONSULTATION LIAISON PROGRESS NOTE - NSBHASSESSMENTFT_PSY_ALL_CORE
71M, unemployed, living with brother, with PMH of HTN, HLD, urinary incontinence 2/2 unclear prostate issues, R leg pain from arthritis, psych hx of MDD, OCD, "mixed personality disorder", reports hx of walking into traffic and of hitting himself, at least three prior hospitalizations ([facility]; [place] 1/4/22-2/24/2022, [facility] 10/22-11/15/2021 for depression w psychosis), has previously been seen at [place] ED, now admitted to medicine for SOB and chest discomfort, psych consult called after pt expressed a desire to be dead. Patient initially assessed by Tele Psychiatry and cleared on 9/13/2022. Reconsulted on 9/14/2022 in the setting of new collateral information and persistent suicidal thoughts.     PT ADMITTED TO [facility] 2S FROM 7/5-8/25/22, collateral from Dr. De La Rosa available in previous progress note.    9/14/2022: Upon assessment today patient reports suicidal thoughts which have been chronic but now active and persistent. While patient's symptoms may be secondary to MDD along with personality disorder, patient's chronic risk is elevated with plan to harm self by cutting as he done in past. Patient would likely benefit from inpatient psych admission for stabilization.    9/15/2022: Patient endorses persistent SI with plan to cut wrist. Given pphx, collateral provided to , patient remains high risk for self harm. Patient would benefit from inpatient psych admission for stabilization.     9/16/2022: Patient endorses persistent SI with plan to cut wrist. Patient has not endorsed any new protective factors. Given pphx, collateral provided to , patient remains high risk for self harm. Patient would benefit from inpatient psych admission for stabilization.     9/17/2022: Patient endorses SI.    9/18/2022: Patient endorses physical illness and SI 71M, unemployed, living with brother, with PMH of HTN, HLD, urinary incontinence 2/2 unclear prostate issues, R leg pain from arthritis, psych hx of MDD, OCD, "mixed personality disorder", reports hx of walking into traffic and of hitting himself, at least three prior hospitalizations ([facility]; [place] 1/4/22-2/24/2022, [facility] 10/22-11/15/2021 for depression w psychosis), has previously been seen at [place] ED, now admitted to medicine for SOB and chest discomfort, psych consult called after pt expressed a desire to be dead. Patient initially assessed by Tele Psychiatry and cleared on 9/13/2022. Reconsulted on 9/14/2022 in the setting of new collateral information and persistent suicidal thoughts.     PT ADMITTED TO [facility] 2S FROM 7/5-8/25/22, collateral from Dr. De La Rosa available in previous progress note.    9/14/2022: Upon assessment today patient reports suicidal thoughts which have been chronic but now active and persistent. While patient's symptoms may be secondary to MDD along with personality disorder, patient's chronic risk is elevated with plan to harm self by cutting as he done in past. Patient would likely benefit from inpatient psych admission for stabilization.    9/15/2022: Patient endorses persistent SI with plan to cut wrist. Given pphx, collateral provided to , patient remains high risk for self harm. Patient would benefit from inpatient psych admission for stabilization.     9/16/2022: Patient endorses persistent SI with plan to cut wrist. Patient has not endorsed any new protective factors. Given pphx, collateral provided to , patient remains high risk for self harm. Patient would benefit from inpatient psych admission for stabilization.     9/17/2022: Patient endorses SI. Unable to engage in safety planning.    9/18/2022: Patient endorses physical illness and SI. Patient is holding for bed.

## 2022-09-18 NOTE — PROGRESS NOTE ADULT - SUBJECTIVE AND OBJECTIVE BOX
S: 71M HTN, HLD, p/w non-specific complaints. Complains of SOB, depression, frequent urination. Workup negative. Also complains about various issues from his relationship to his brother to TV shows. Sx are psychosomatic. Also expresses suicidal ideation. Hx of multiple admission to psych unit.    No new complaints today    REVIEW OF SYSTEMS:    CONSTITUTIONAL: No fever  EYES: No acute visual disturbances  NECK: No pain or stiffness  RESPIRATORY: No cough; No shortness of breath  CARDIOVASCULAR: No chest pain, no palpitations  GASTROINTESTINAL: No pain. No nausea or vomiting.  No diarrhea   NEUROLOGICAL: No headache or numbness, no tremors  MUSCULOSKELETAL: no muscle pain  GENITOURINARY: No dysuria, no frequency, no hesitancy  PSYCHIATRY: No depression , no anxiety  ALL OTHER  ROS negative     O:  Vital Signs Last 24 Hrs  T(C): 36.6 (17 Sep 2022 04:52), Max: 36.6 (16 Sep 2022 12:48)  T(F): 97.8 (17 Sep 2022 04:52), Max: 97.8 (16 Sep 2022 12:48)  HR: 63 (17 Sep 2022 04:52) (60 - 69)  BP: 127/70 (17 Sep 2022 04:52) (126/73 - 127/75)  BP(mean): --  RR: 18 (17 Sep 2022 04:52) (16 - 18)  SpO2: 96% (17 Sep 2022 04:52) (96% - 99%)    Parameters below as of 16 Sep 2022 20:27  Patient On (Oxygen Delivery Method): room air    GENERAL: NAD, disheveled  HEAD:  Atraumatic, Normocephalic  EYES: EOMI, PERRLA, conjunctiva and sclera clear  NECK: Supple, No JVD  CHEST/LUNG: Clear to auscultation bilaterally; No wheeze  HEART: Regular rate and rhythm; No murmurs, rubs, or gallops  ABDOMEN: Soft, Nontender, Nondistended; Bowel sounds present  EXTREMITIES:  2+ Peripheral Pulses, No clubbing, cyanosis, or edema  PSYCH: normal affect, anxious  NEUROLOGY: non-focal  SKIN: No rashes or lesions    acetaminophen     Tablet .. 650 milliGRAM(s) Oral every 6 hours PRN  aluminum hydroxide/magnesium hydroxide/simethicone Suspension 30 milliLiter(s) Oral every 6 hours PRN  ARIPiprazole 5 milliGRAM(s) Oral daily  chlorhexidine 2% Cloths 1 Application(s) Topical <User Schedule>  enoxaparin Injectable 40 milliGRAM(s) SubCutaneous every 24 hours  FLUoxetine 20 milliGRAM(s) Oral daily  fluticasone propionate 50 MICROgram(s)/spray Nasal Spray 1 Spray(s) Both Nostrils two times a day  melatonin 3 milliGRAM(s) Oral at bedtime  mupirocin 2% Ointment 1 Application(s) Both Nostrils two times a day  NIFEdipine XL 30 milliGRAM(s) Oral daily  simvastatin 20 milliGRAM(s) Oral at bedtime  traZODone 50 milliGRAM(s) Oral at bedtime

## 2022-09-18 NOTE — PROGRESS NOTE ADULT - ASSESSMENT
#Somatic sx disorder  #Factitious disorder  #MDD  #ANALILIA  #HTN  #HLD    -awaiting geriatric bed  -appreciate psych recs  -c/w abilify, prozac, trazadone, melatonin  -c/w statin, nifedipine  -dvt ppx: lovenox Posture, length, shape and position symmetric and appropriate for age; movement patterns with normal strength and range of motion; hips without evidence of dislocation on Toney and Ortalani maneuvers and by gluteal fold patterns.

## 2022-09-18 NOTE — BH CONSULTATION LIAISON PROGRESS NOTE - NSICDXBHSECONDARYDX_PSY_ALL_CORE
H/O factitious disorder   Z86.59  

## 2022-09-19 PROCEDURE — 99232 SBSQ HOSP IP/OBS MODERATE 35: CPT | Mod: 95

## 2022-09-19 RX ORDER — FLUTICASONE PROPIONATE 50 MCG
1 SPRAY, SUSPENSION NASAL
Qty: 0 | Refills: 0 | DISCHARGE
Start: 2022-09-19

## 2022-09-19 RX ADMIN — Medication 1 SPRAY(S): at 17:30

## 2022-09-19 RX ADMIN — Medication 3 MILLIGRAM(S): at 22:39

## 2022-09-19 RX ADMIN — Medication 650 MILLIGRAM(S): at 04:24

## 2022-09-19 RX ADMIN — MUPIROCIN 1 APPLICATION(S): 20 OINTMENT TOPICAL at 06:21

## 2022-09-19 RX ADMIN — SIMVASTATIN 20 MILLIGRAM(S): 20 TABLET, FILM COATED ORAL at 22:39

## 2022-09-19 RX ADMIN — Medication 1 SPRAY(S): at 06:22

## 2022-09-19 RX ADMIN — ARIPIPRAZOLE 5 MILLIGRAM(S): 15 TABLET ORAL at 11:40

## 2022-09-19 RX ADMIN — ENOXAPARIN SODIUM 40 MILLIGRAM(S): 100 INJECTION SUBCUTANEOUS at 06:20

## 2022-09-19 RX ADMIN — CHLORHEXIDINE GLUCONATE 1 APPLICATION(S): 213 SOLUTION TOPICAL at 06:21

## 2022-09-19 RX ADMIN — Medication 30 MILLIGRAM(S): at 06:22

## 2022-09-19 RX ADMIN — Medication 50 MILLIGRAM(S): at 22:39

## 2022-09-19 RX ADMIN — Medication 20 MILLIGRAM(S): at 11:40

## 2022-09-19 RX ADMIN — Medication 650 MILLIGRAM(S): at 05:30

## 2022-09-19 NOTE — PROGRESS NOTE ADULT - ASSESSMENT
72 y/o male with BPH, HTN and HLD presenting with generalized complaints. Patient states that he feels SOB for the past couple months and states its getting progressively worse. Pt admitted for  workup for dyspnea and depression . CTA chest negative for PE, cardiac w/u negative for ACS.  Pt. required psych eval for verbalization of suicidal ideations and self harm.  Psych notes and recommendations appreciated.  As per psych pt. has chronic passive suicidal ideation with no acute suicidal plan or intent.  No need for CO at this time.  Pt. is not a candidate for  inpatient psych hospitalization, cleared for discharge from psychiatry stand point.  9/14-Pt. with persisting negative expression of thoughts stating "I'm going to die today"  "You think I'm crazy I know" etc.  Tele psych f/u today recc CO and discharge to inpatient psych, SW following.  1:1 initiated, diversional activities such as watching TV, puzzles etc. recommended.  9/16-Remains on 1:1 observation with no behavioral disturbances and no suicidal ideations today.  Pt. communicates being "bored with life" and would like to become someone famous who will be remembered for his greatness.  Pt. is awaiting for inpatient psych facility placement.  09/19: Awaiting for inpatient psych facility. No bed at this time as per discussion with SW. On 1:1.

## 2022-09-19 NOTE — PROGRESS NOTE ADULT - PROBLEM SELECTOR PLAN 2
expressing negativity  Psych note and reccs appreciated  Safety plan recc appreciated:  Writing, cartoons, poetry  Cont psych meds:  Abilify , prozac, trazodone  Continue Melatonin  Supportive measures  SW to follow on bed availability

## 2022-09-19 NOTE — BH CONSULTATION LIAISON PROGRESS NOTE - NSBHFUPINTERVALHXFT_PSY_A_CORE
Patient seen and chart reviewed. Patient appears calm and cooperative. Patient reports that he is upset because his cellphone broke and that it is at home. He states that he is unable to touch base with family/friends. Patient states that he still has persistent suicidal thoughts from which he distracts himself with poetry. Patient is interested in inpatient psychiatry admission.

## 2022-09-19 NOTE — PROGRESS NOTE ADULT - SUBJECTIVE AND OBJECTIVE BOX
NP Note discussed with  Primary Attending    Patient is a 71y old  Male who presents with a chief complaint of dyspnea (18 Sep 2022 10:11)      INTERVAL HPI/OVERNIGHT EVENTS: no new complaints    MEDICATIONS  (STANDING):  ARIPiprazole 5 milliGRAM(s) Oral daily  chlorhexidine 2% Cloths 1 Application(s) Topical <User Schedule>  enoxaparin Injectable 40 milliGRAM(s) SubCutaneous every 24 hours  FLUoxetine 20 milliGRAM(s) Oral daily  fluticasone propionate 50 MICROgram(s)/spray Nasal Spray 1 Spray(s) Both Nostrils two times a day  melatonin 3 milliGRAM(s) Oral at bedtime  NIFEdipine XL 30 milliGRAM(s) Oral daily  simvastatin 20 milliGRAM(s) Oral at bedtime  traZODone 50 milliGRAM(s) Oral at bedtime    MEDICATIONS  (PRN):  acetaminophen     Tablet .. 650 milliGRAM(s) Oral every 6 hours PRN Mild Pain (1 - 3)  aluminum hydroxide/magnesium hydroxide/simethicone Suspension 30 milliLiter(s) Oral every 6 hours PRN Dyspepsia      __________________________________________________  REVIEW OF SYSTEMS:    CONSTITUTIONAL: No fever,   EYES: no acute visual disturbances  NECK: No pain   RESPIRATORY: dry  cough; No shortness of breath  CARDIOVASCULAR: No chest pain, no palpitations  GASTROINTESTINAL: No pain. No nausea or vomiting; No diarrhea   NEUROLOGICAL: No headache or numbness, no tremors  MUSCULOSKELETAL: No joint pain  GENITOURINARY: no dysuria, urinary frequency   PSYCHIATRY: no depression , no anxiety.   ALL OTHER  ROS negative        Vital Signs Last 24 Hrs  T(C): 36.8 (19 Sep 2022 12:45), Max: 36.8 (19 Sep 2022 12:45)  T(F): 98.2 (19 Sep 2022 12:45), Max: 98.2 (19 Sep 2022 12:45)  HR: 67 (19 Sep 2022 12:45) (65 - 74)  BP: 127/76 (19 Sep 2022 12:45) (127/76 - 152/77)  BP(mean): --  RR: 16 (19 Sep 2022 12:45) (16 - 19)  SpO2: 98% (19 Sep 2022 12:45) (96% - 99%)    Parameters below as of 19 Sep 2022 12:45  Patient On (Oxygen Delivery Method): room air      ________________________________________________  PHYSICAL EXAM:  GENERAL: NAD, OOB to chair.   HEENT: Normocephalic;  conjunctivae and sclerae clear; moist mucous membranes;   NECK : supple  CHEST/LUNG: Clear to auscultation bilaterally with good air entry.   HEART: S1 S2  regular; no murmurs, gallops or rubs  ABDOMEN: Soft, Nontender, Nondistended; Bowel sounds present  EXTREMITIES: no cyanosis; no edema; no calf tenderness. trace Edema lower exts   SKIN: warm and dry; no rash.   NERVOUS SYSTEM:  Awake and alert; Unable to recall name of hospital. Oriented to person and time ; no new deficits    _________________________________________________  LABS:    CAPILLARY BLOOD GLUCOSE    RADIOLOGY & ADDITIONAL TESTS:    Imaging Personally Reviewed:  YES    Consultant(s) Notes Reviewed:   YES    Care Discussed with Consultants : Psych     Plan of care was discussed with patient and /or primary care giver; all questions and concerns were addressed and care was aligned with patient's wishes.
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
## 2022-09-20 LAB
ALBUMIN SERPL ELPH-MCNC: 2.9 G/DL — LOW (ref 3.5–5)
ALP SERPL-CCNC: 85 U/L — SIGNIFICANT CHANGE UP (ref 40–120)
ALT FLD-CCNC: 34 U/L DA — SIGNIFICANT CHANGE UP (ref 10–60)
ANION GAP SERPL CALC-SCNC: 7 MMOL/L — SIGNIFICANT CHANGE UP (ref 5–17)
AST SERPL-CCNC: 23 U/L — SIGNIFICANT CHANGE UP (ref 10–40)
BILIRUB SERPL-MCNC: 0.5 MG/DL — SIGNIFICANT CHANGE UP (ref 0.2–1.2)
BUN SERPL-MCNC: 22 MG/DL — HIGH (ref 7–18)
CALCIUM SERPL-MCNC: 8.7 MG/DL — SIGNIFICANT CHANGE UP (ref 8.4–10.5)
CHLORIDE SERPL-SCNC: 103 MMOL/L — SIGNIFICANT CHANGE UP (ref 96–108)
CO2 SERPL-SCNC: 31 MMOL/L — SIGNIFICANT CHANGE UP (ref 22–31)
CREAT SERPL-MCNC: 1.08 MG/DL — SIGNIFICANT CHANGE UP (ref 0.5–1.3)
EGFR: 73 ML/MIN/1.73M2 — SIGNIFICANT CHANGE UP
GLUCOSE SERPL-MCNC: 82 MG/DL — SIGNIFICANT CHANGE UP (ref 70–99)
HCT VFR BLD CALC: 36.2 % — LOW (ref 39–50)
HGB BLD-MCNC: 12 G/DL — LOW (ref 13–17)
MCHC RBC-ENTMCNC: 29.1 PG — SIGNIFICANT CHANGE UP (ref 27–34)
MCHC RBC-ENTMCNC: 33.1 GM/DL — SIGNIFICANT CHANGE UP (ref 32–36)
MCV RBC AUTO: 87.7 FL — SIGNIFICANT CHANGE UP (ref 80–100)
NRBC # BLD: 0 /100 WBCS — SIGNIFICANT CHANGE UP (ref 0–0)
PLATELET # BLD AUTO: 202 K/UL — SIGNIFICANT CHANGE UP (ref 150–400)
POTASSIUM SERPL-MCNC: 3.4 MMOL/L — LOW (ref 3.5–5.3)
POTASSIUM SERPL-SCNC: 3.4 MMOL/L — LOW (ref 3.5–5.3)
PROT SERPL-MCNC: 6.3 G/DL — SIGNIFICANT CHANGE UP (ref 6–8.3)
RBC # BLD: 4.13 M/UL — LOW (ref 4.2–5.8)
RBC # FLD: 13 % — SIGNIFICANT CHANGE UP (ref 10.3–14.5)
SARS-COV-2 RNA SPEC QL NAA+PROBE: SIGNIFICANT CHANGE UP
SODIUM SERPL-SCNC: 141 MMOL/L — SIGNIFICANT CHANGE UP (ref 135–145)
WBC # BLD: 6.94 K/UL — SIGNIFICANT CHANGE UP (ref 3.8–10.5)
WBC # FLD AUTO: 6.94 K/UL — SIGNIFICANT CHANGE UP (ref 3.8–10.5)

## 2022-09-20 PROCEDURE — 99232 SBSQ HOSP IP/OBS MODERATE 35: CPT

## 2022-09-20 PROCEDURE — 99232 SBSQ HOSP IP/OBS MODERATE 35: CPT | Mod: 95

## 2022-09-20 RX ORDER — FLUOXETINE HCL 10 MG
40 CAPSULE ORAL DAILY
Refills: 0 | Status: DISCONTINUED | OUTPATIENT
Start: 2022-09-20 | End: 2022-09-22

## 2022-09-20 RX ADMIN — Medication 1 SPRAY(S): at 06:32

## 2022-09-20 RX ADMIN — Medication 1 SPRAY(S): at 17:06

## 2022-09-20 RX ADMIN — SIMVASTATIN 20 MILLIGRAM(S): 20 TABLET, FILM COATED ORAL at 22:09

## 2022-09-20 RX ADMIN — Medication 50 MILLIGRAM(S): at 22:09

## 2022-09-20 RX ADMIN — ARIPIPRAZOLE 5 MILLIGRAM(S): 15 TABLET ORAL at 11:37

## 2022-09-20 RX ADMIN — Medication 650 MILLIGRAM(S): at 08:50

## 2022-09-20 RX ADMIN — Medication 650 MILLIGRAM(S): at 06:44

## 2022-09-20 RX ADMIN — Medication 40 MILLIGRAM(S): at 11:37

## 2022-09-20 RX ADMIN — Medication 30 MILLIGRAM(S): at 06:31

## 2022-09-20 RX ADMIN — Medication 3 MILLIGRAM(S): at 22:08

## 2022-09-20 RX ADMIN — ENOXAPARIN SODIUM 40 MILLIGRAM(S): 100 INJECTION SUBCUTANEOUS at 06:31

## 2022-09-20 NOTE — PROGRESS NOTE ADULT - ASSESSMENT
70 y/o male with BPH, HTN and HLD presenting with generalized complaints; SOB. Pt admitted for  workup for dyspnea and depression . CTA chest negative for PE, cardiac w/u negative for ACS.  Pt. required psych eval for verbalization of suicidal ideations and self harm.  Psych notes and recommendations appreciated.  As per psych pt. has chronic passive suicidal ideation with no acute suicidal plan or intent.  No need for CO at this time.  Pt. is not a candidate for  inpatient psych hospitalization, cleared for discharge from psychiatry stand point.  9/14-Pt. with persisting negative expression of thoughts stating "I'm going to die today"  "You think I'm crazy I know" etc.  Tele psych f/u today recc CO and discharge to inpatient psych, SW following.  1:1 initiated, diversional activities such as watching TV, puzzles etc. recommended.  9/16-Remains on 1:1 observation with no behavioral disturbances and no suicidal ideations today.  Pt. communicates being "bored with life" and would like to become someone famous who will be remembered for his greatness.  Pt. is awaiting for inpatient psych facility placement.  09/19: Awaiting for inpatient psych facility. No bed at this time as per discussion with SW. On 1:1.

## 2022-09-20 NOTE — PROGRESS NOTE ADULT - PROBLEM SELECTOR PLAN 4
likely due to anxiety, factitious disorder  CXR shows no infiltrates  Echo shows EF >55% , G1DD, mild pulm HTN  Resume anti-anxiety home meds : Abilify , prozac, trazodone  Continue Melatonin  Appreciate Psyche input

## 2022-09-20 NOTE — PROGRESS NOTE ADULT - SUBJECTIVE AND OBJECTIVE BOX
Patient is a 71y old  Male who presents with a chief complaint of dyspnea (19 Sep 2022 14:20)      INTERVAL HPI/OVERNIGHT EVENTS: no overnight events    I&O's Summary    Vital Signs Last 24 Hrs  T(C): 36.6 (20 Sep 2022 12:46), Max: 36.8 (19 Sep 2022 21:09)  T(F): 97.8 (20 Sep 2022 12:46), Max: 98.2 (19 Sep 2022 21:09)  HR: 63 (20 Sep 2022 12:46) (62 - 63)  BP: 138/76 (20 Sep 2022 12:46) (119/64 - 138/76)  BP(mean): --  RR: 18 (20 Sep 2022 12:46) (17 - 18)  SpO2: 98% (20 Sep 2022 12:46) (95% - 98%)    Parameters below as of 20 Sep 2022 12:46  Patient On (Oxygen Delivery Method): room air      PAST MEDICAL & SURGICAL HISTORY:  HTN (hypertension)      Severe depression      HLD (hyperlipidemia)          SOCIAL HISTORY  Alcohol:  Tobacco:  Illicit substance use:      FAMILY HISTORY:      LABS:                        12.0   6.94  )-----------( 202      ( 20 Sep 2022 07:45 )             36.2     09-20    141  |  103  |  22<H>  ----------------------------<  82  3.4<L>   |  31  |  1.08    Ca    8.7      20 Sep 2022 07:45    TPro  6.3  /  Alb  2.9<L>  /  TBili  0.5  /  DBili  x   /  AST  23  /  ALT  34  /  AlkPhos  85  09-20        CAPILLARY BLOOD GLUCOSE                MEDICATIONS  (STANDING):  ARIPiprazole 5 milliGRAM(s) Oral daily  chlorhexidine 2% Cloths 1 Application(s) Topical <User Schedule>  enoxaparin Injectable 40 milliGRAM(s) SubCutaneous every 24 hours  FLUoxetine 40 milliGRAM(s) Oral daily  fluticasone propionate 50 MICROgram(s)/spray Nasal Spray 1 Spray(s) Both Nostrils two times a day  melatonin 3 milliGRAM(s) Oral at bedtime  NIFEdipine XL 30 milliGRAM(s) Oral daily  simvastatin 20 milliGRAM(s) Oral at bedtime  traZODone 50 milliGRAM(s) Oral at bedtime    MEDICATIONS  (PRN):  acetaminophen     Tablet .. 650 milliGRAM(s) Oral every 6 hours PRN Mild Pain (1 - 3)  aluminum hydroxide/magnesium hydroxide/simethicone Suspension 30 milliLiter(s) Oral every 6 hours PRN Dyspepsia      REVIEW OF SYSTEMS:  CONSTITUTIONAL: No fever  EYES: No eye pain, visual disturbances  ENMT:  No difficulty hearing, No sinus or throat pain  NECK: No pain   RESPIRATORY: No cough, wheezing, chills or hemoptysis; + shortness of breath  CARDIOVASCULAR: No chest pain, palpitations, dizziness, + rt leg swelling  GASTROINTESTINAL: No abdominal pain. No nausea, vomiting, No diarrhea or constipation.   GENITOURINARY: No dysuria  NEUROLOGICAL: No headaches  SKIN: No rashes, or lesions   MUSCULOSKELETAL: No joint pain or swelling    RADIOLOGY & ADDITIONAL TESTS:  < from: Xray Chest 1 View- PORTABLE-Urgent (09.10.22 @ 20:34) >  ACC: [id number] EXAM:  XR CHEST PORTABLE URGENT 1V                          PROCEDURE DATE:  09/10/2022          INTERPRETATION:  Portable AP Radiograph dated 9/10/2022 8:34 PM    CLINICAL INFORMATION: 71 years, Male, sob    PRIOR STUDIES: None    FINDINGS:  Lines, Catheters and Support Devices: None.    Heart Size, Mediastinum and Hilar Contours: Heart size is suboptimally   evaluated on this AP projection. Normal mediastinal and hilar contours.    Lungs: No focal consolidation. No pleural effusions. No pneumothorax.    Bones/Soft Tissues: No acute abnormalities of the soft tissues and   osseous structures.    IMPRESSION:  No focal consolidation.    --- End of Report ---            AUGUST COREY M.D., ATTENDING INTERVENTIONAL RADIOLOGIST  This document has been electronically signed. Sep 11 2022 12:42PM    < end of copied text >    ACC: [id number] EXAM:  CT ANGIO CHEST PULM [facility]                          PROCEDURE DATE:  09/12/2022          INTERPRETATION:  INDICATION: Dyspnea on exertion, shortness of breath    TECHNIQUE: Volumetric images of the chest were obtained after the   administration of 74 mL of Omnipaque 350. Maximum intensity projection   images were generated.    COMPARISON: None.    FINDINGS:    PULMONARY ANGIOGRAM: No pulmonary embolism.    LUNGS/AIRWAYS/PLEURA: Patent trachea and bronchi. Clear lungs. Nopleural   effusion or pneumothorax.    LYMPH NODES/MEDIASTINUM: No enlarged lymph nodes. Bilateral thyroid   nodules up to 1.1 cm.    HEART/VASCULATURE: Enlarged heart. No pericardial effusion. Coronary   artery calcifications.    UPPER ABDOMEN: Unremarkable.    BONES/SOFT TISSUES: Spondylosis.      IMPRESSION:    No pulmonary embolism.    --- End of Report ---            GÓMEZ DOLAN M.D., ATTENDING RADIOGIST  This document has been electronically signed. Sep 12 2022  3:21PM    Imaging Personally Reviewed:  [x ] YES  [ ] NO    Consultant(s) Notes Reviewed:  [ x] YES  [ ] NO    PHYSICAL EXAM:  GENERAL: NAD  HEAD:  Atraumatic, Normocephalic  EYES:  conjunctiva and sclera clear  ENMT:  Moist mucous membranes  NECK: Supple  NERVOUS SYSTEM:  Alert & Oriented X3  CHEST/LUNG: CTA bilaterally; No rales, rhonchi, wheezing  HEART: Regular rate and rhythm  ABDOMEN: Soft, Nontender, Nondistended; Bowel sounds present  EXTREMITIES:  2+ Peripheral Pulses  SKIN: No rashes or lesions    Care Collaborated Discussed with Consultants/Other Providers [ x] YES  [ ] NO

## 2022-09-20 NOTE — BH CONSULTATION LIAISON PROGRESS NOTE - NSBHFUPINTERVALHXFT_PSY_A_CORE
Patient seen and chart reviewed. Patient appears calm and cooperative. Patient reports that he is "doing the same." Patient reports poor sleep.

## 2022-09-20 NOTE — PROGRESS NOTE ADULT - PROBLEM SELECTOR PLAN 5
Resume anti-anxiety home meds : Abilify , prozac, trazodone  Continue Melatonin  Supportive measures  Appreciate Psyche input
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
## 2022-09-20 NOTE — PROGRESS NOTE ADULT - PROBLEM SELECTOR PLAN 9
Pt is medically cleared for discharge   Pt. recommended discharge to inpatient psych, pending bed availability  SW/MARCIA following

## 2022-09-21 LAB
ANION GAP SERPL CALC-SCNC: 6 MMOL/L — SIGNIFICANT CHANGE UP (ref 5–17)
BUN SERPL-MCNC: 24 MG/DL — HIGH (ref 7–18)
CALCIUM SERPL-MCNC: 9.2 MG/DL — SIGNIFICANT CHANGE UP (ref 8.4–10.5)
CHLORIDE SERPL-SCNC: 100 MMOL/L — SIGNIFICANT CHANGE UP (ref 96–108)
CO2 SERPL-SCNC: 33 MMOL/L — HIGH (ref 22–31)
CREAT SERPL-MCNC: 1.14 MG/DL — SIGNIFICANT CHANGE UP (ref 0.5–1.3)
EGFR: 69 ML/MIN/1.73M2 — SIGNIFICANT CHANGE UP
GLUCOSE SERPL-MCNC: 77 MG/DL — SIGNIFICANT CHANGE UP (ref 70–99)
MAGNESIUM SERPL-MCNC: 2.4 MG/DL — SIGNIFICANT CHANGE UP (ref 1.6–2.6)
PHOSPHATE SERPL-MCNC: 3.2 MG/DL — SIGNIFICANT CHANGE UP (ref 2.5–4.5)
POTASSIUM SERPL-MCNC: 3.4 MMOL/L — LOW (ref 3.5–5.3)
POTASSIUM SERPL-SCNC: 3.4 MMOL/L — LOW (ref 3.5–5.3)
SODIUM SERPL-SCNC: 139 MMOL/L — SIGNIFICANT CHANGE UP (ref 135–145)

## 2022-09-21 PROCEDURE — 99232 SBSQ HOSP IP/OBS MODERATE 35: CPT

## 2022-09-21 PROCEDURE — 99232 SBSQ HOSP IP/OBS MODERATE 35: CPT | Mod: 95

## 2022-09-21 RX ORDER — POTASSIUM CHLORIDE 20 MEQ
40 PACKET (EA) ORAL ONCE
Refills: 0 | Status: COMPLETED | OUTPATIENT
Start: 2022-09-21 | End: 2022-09-21

## 2022-09-21 RX ADMIN — Medication 650 MILLIGRAM(S): at 07:32

## 2022-09-21 RX ADMIN — Medication 50 MILLIGRAM(S): at 22:41

## 2022-09-21 RX ADMIN — Medication 3 MILLIGRAM(S): at 21:56

## 2022-09-21 RX ADMIN — CHLORHEXIDINE GLUCONATE 1 APPLICATION(S): 213 SOLUTION TOPICAL at 05:54

## 2022-09-21 RX ADMIN — Medication 30 MILLIGRAM(S): at 05:54

## 2022-09-21 RX ADMIN — Medication 40 MILLIGRAM(S): at 11:41

## 2022-09-21 RX ADMIN — Medication 1 SPRAY(S): at 17:36

## 2022-09-21 RX ADMIN — ENOXAPARIN SODIUM 40 MILLIGRAM(S): 100 INJECTION SUBCUTANEOUS at 05:54

## 2022-09-21 RX ADMIN — SIMVASTATIN 20 MILLIGRAM(S): 20 TABLET, FILM COATED ORAL at 22:41

## 2022-09-21 RX ADMIN — Medication 650 MILLIGRAM(S): at 06:43

## 2022-09-21 RX ADMIN — Medication 40 MILLIEQUIVALENT(S): at 11:41

## 2022-09-21 RX ADMIN — ARIPIPRAZOLE 5 MILLIGRAM(S): 15 TABLET ORAL at 11:41

## 2022-09-21 RX ADMIN — Medication 1 SPRAY(S): at 05:54

## 2022-09-21 NOTE — BH CONSULTATION LIAISON PROGRESS NOTE - NSBHCONSULTPSYCHPLAN_PSY_A_CORE FT
1. Plan to increase Fluoxetine to 60 mg q24 po tomorrow  2. Continue Abilify 5 mg q24 for now, may titrate once fluoxetine is at adequate dose  3. Continue Trazodone 50 mg qhs

## 2022-09-21 NOTE — CHART NOTE - NSCHARTNOTEFT_GEN_A_CORE
Reassessment:     Patient is a 71y old  Male who presents with a chief complaint of dyspnea (21 Sep 2022 11:29)      Factors impacting intake: [ ] none [ ] nausea  [ ] vomiting [ ] diarrhea [ ] constipation  [ ]chewing problems [ ] swallowing issues  [ X] other: Severe Depression, anxiety, HTN, HDL    Diet Prescription: Diet, DASH/ TLC:   Sodium & Cholesterol Restricted (22 @ 01:14)    Intake: Patient on COVID exposure with 1:1 supervision, observed out of  bed in chair, per PCA, pt. with good "appetite", "ate all of breakfast & lunch meals", no reports of GI distress, per flow sheet intake % noted, Psych following, awaiting inpatient Psych placement,  following. Rec. c/w diet as ordered & add Ensure Enlive BID if with decrease po intake (<50%). RD available.      Daily Weight in k (21 Sep 2022 05:30)    % Weight Change: wt. increased noted possible due to fluid shifts     Pertinent Medications: MEDICATIONS  (STANDING):  ARIPiprazole 5 milliGRAM(s) Oral daily  chlorhexidine 2% Cloths 1 Application(s) Topical <User Schedule>  enoxaparin Injectable 40 milliGRAM(s) SubCutaneous every 24 hours  FLUoxetine 40 milliGRAM(s) Oral daily  fluticasone propionate 50 MICROgram(s)/spray Nasal Spray 1 Spray(s) Both Nostrils two times a day  melatonin 3 milliGRAM(s) Oral at bedtime  NIFEdipine XL 30 milliGRAM(s) Oral daily  simvastatin 20 milliGRAM(s) Oral at bedtime  traZODone 50 milliGRAM(s) Oral at bedtime    MEDICATIONS  (PRN):  acetaminophen     Tablet .. 650 milliGRAM(s) Oral every 6 hours PRN Mild Pain (1 - 3)  aluminum hydroxide/magnesium hydroxide/simethicone Suspension 30 milliLiter(s) Oral every 6 hours PRN Dyspepsia    Pertinent Labs:  Na139 mmol/L Glu 77 mg/dL K+ 3.4 mmol/L<L> Cr  1.14 mg/dL BUN 24 mg/dL<H>  Phos 3.2 mg/dL  Alb 2.9 g/dL<L>     CAPILLARY BLOOD GLUCOSE    Skin: intact    Estimated Needs:   [X ] no change since previous assessment  [ ] recalculated:     Previous Nutrition Diagnosis:   [ ] Inadequate Energy Intake [ ]Inadequate Oral Intake [ ] Excessive Energy Intake   [ ] Underweight [ ] Increased Nutrient Needs [ ] Overweight/Obesity   [ ] Altered GI Function [ ] Unintended Weight Loss [ ] Food & Nutrition Related Knowledge Deficit [ ] Malnutrition     Nutrition Diagnosis is [ ] ongoing  [ ] resolved [ ] not applicable     New Nutrition Diagnosis: [ ] not applicable       Interventions: To meet nutrition needs   Recommend  [ ] Change Diet To:  [ ] Nutrition Supplement  [ ] Nutrition Support  [ ] Other:     Monitoring and Evaluation:   [ ] PO intake [ x ] Tolerance to diet prescription [ x ] weights [ x ] labs[ x ] follow up per protocol  [ ] other: Reassessment:     Patient is a 71y old  Male who presents with a chief complaint of dyspnea (21 Sep 2022 11:29)      Factors impacting intake: [ ] none [ ] nausea  [ ] vomiting [ ] diarrhea [ ] constipation  [ ]chewing problems [ ] swallowing issues  [ X] other: Severe Depression, anxiety, HTN, HDL    Diet Prescription: Diet, DASH/ TLC:   Sodium & Cholesterol Restricted (22 @ 01:14)    Intake: Patient on COVID exposure with 1:1 supervision, observed out of  bed in chair, per PCA, pt. with good "appetite", "ate all of breakfast & lunch meals", no reports of GI distress, per flow sheet intake % noted, Psych following, awaiting inpatient Psych placement,  following. Rec. c/w diet as ordered & add Ensure Enlive BID if with decrease po intake (<50%). RD available.      Daily Weight in k (21 Sep 2022 05:30)    % Weight Change: wt. increased noted possible due to fluid shifts     Pertinent Medications: MEDICATIONS  (STANDING):  ARIPiprazole 5 milliGRAM(s) Oral daily  chlorhexidine 2% Cloths 1 Application(s) Topical <User Schedule>  enoxaparin Injectable 40 milliGRAM(s) SubCutaneous every 24 hours  FLUoxetine 40 milliGRAM(s) Oral daily  fluticasone propionate 50 MICROgram(s)/spray Nasal Spray 1 Spray(s) Both Nostrils two times a day  melatonin 3 milliGRAM(s) Oral at bedtime  NIFEdipine XL 30 milliGRAM(s) Oral daily  simvastatin 20 milliGRAM(s) Oral at bedtime  traZODone 50 milliGRAM(s) Oral at bedtime    MEDICATIONS  (PRN):  acetaminophen     Tablet .. 650 milliGRAM(s) Oral every 6 hours PRN Mild Pain (1 - 3)  aluminum hydroxide/magnesium hydroxide/simethicone Suspension 30 milliLiter(s) Oral every 6 hours PRN Dyspepsia    Pertinent Labs:  Na139 mmol/L Glu 77 mg/dL K+ 3.4 mmol/L<L> Cr  1.14 mg/dL BUN 24 mg/dL<H>  Phos 3.2 mg/dL  Alb 2.9 g/dL<L>     CAPILLARY BLOOD GLUCOSE    Skin: intact    Estimated Needs:   [X ] no change since previous assessment  [ ] recalculated:     [X] No Active Nutrition Diagnosis Identified     Previous Nutrition Diagnosis:   [ ] Inadequate Energy Intake [ ]Inadequate Oral Intake [ ] Excessive Energy Intake   [ ] Underweight [ ] Increased Nutrient Needs [ ] Overweight/Obesity   [ ] Altered GI Function [ ] Unintended Weight Loss [ ] Food & Nutrition Related Knowledge Deficit [ ] Malnutrition     Nutrition Diagnosis is [ ] ongoing  [ ] resolved [ ] not applicable     New Nutrition Diagnosis: [ ] not applicable       Interventions: To meet nutrition needs   Recommend  [ ] Change Diet To:  [ ] Nutrition Supplement  [ ] Nutrition Support  [X ] Other: Nursing to continue with meal set up and encouragement    Monitoring and Evaluation:   [ X] PO intake [ x ] Tolerance to diet prescription [ x ] weights [ x ] labs[ x ] follow up per protocol  [ ] other:

## 2022-09-21 NOTE — PROGRESS NOTE ADULT - PROBLEM SELECTOR PLAN 9
Pt is medically cleared for discharge   Pt. recommended discharge to inpatient psych, pending bed availability  SW/CM following    Of note, pt was Covid exposure on 9/20,   Covid PCR neg on 9/20

## 2022-09-21 NOTE — BH CONSULTATION LIAISON PROGRESS NOTE - NSBHASSESSMENTFT_PSY_ALL_CORE
71M, unemployed, living with brother, with PMH of HTN, HLD, urinary incontinence 2/2 unclear prostate issues, R leg pain from arthritis, psych hx of MDD, OCD, "mixed personality disorder", reports hx of walking into traffic and of hitting himself, at least three prior hospitalizations ([facility] [place]; [place] 1/4/22-2/24/2022, [facility] 10/22-11/15/2021 for depression w psychosis), has previously been seen at [place] ED, now admitted to medicine for SOB and chest discomfort, psych consult called after pt expressed a desire to be dead. Patient initially assessed by Tele Psychiatry and cleared on 9/13/2022. Reconsulted on 9/14/2022 in the setting of new collateral information and persistent suicidal thoughts.     PT ADMITTED TO [facility] 2S FROM 7/5-8/25/22, collateral from Dr. De La Rosa available in previous progress note.    9/14/2022: Upon assessment today patient reports suicidal thoughts which have been chronic but now active and persistent. While patient's symptoms may be secondary to MDD along with personality disorder, patient's chronic risk is elevated with plan to harm self by cutting as he done in past. Patient would likely benefit from inpatient psych admission for stabilization.    9/15/2022: Patient endorses persistent SI with plan to cut wrist. Given pphx, collateral provided to , patient remains high risk for self harm. Patient would benefit from inpatient psych admission for stabilization.     9/16/2022: Patient endorses persistent SI with plan to cut wrist. Patient has not endorsed any new protective factors. Given pphx, collateral provided to , patient remains high risk for self harm. Patient would benefit from inpatient psych admission for stabilization.     9/17/2022: Patient endorses SI. Unable to engage in safety planning.    9/18/2022: Patient endorses physical illness and SI. Patient is holding for bed.     9/19/2022: Patient remains with active SI.    9/20/2022: Patient reports persistent symptoms.     9/21/2022: Patient reports persistent symptoms. holding for psychiatry bed. in regards to COVID exposure, patient is  71M, unemployed, living with brother, with PMH of HTN, HLD, urinary incontinence 2/2 unclear prostate issues, R leg pain from arthritis, psych hx of MDD, OCD, "mixed personality disorder", reports hx of walking into traffic and of hitting himself, at least three prior hospitalizations ([facility]; [place] 1/4/22-2/24/2022, [facility] 10/22-11/15/2021 for depression w psychosis), has previously been seen at [place] ED, now admitted to medicine for SOB and chest discomfort, psych consult called after pt expressed a desire to be dead. Patient initially assessed by Tele Psychiatry and cleared on 9/13/2022. Reconsulted on 9/14/2022 in the setting of new collateral information and persistent suicidal thoughts.     PT ADMITTED TO [facility] 2S FROM 7/5-8/25/22, collateral from Dr. De La Rosa available in previous progress note.    9/14/2022: Upon assessment today patient reports suicidal thoughts which have been chronic but now active and persistent. While patient's symptoms may be secondary to MDD along with personality disorder, patient's chronic risk is elevated with plan to harm self by cutting as he done in past. Patient would likely benefit from inpatient psych admission for stabilization.    9/15/2022: Patient endorses persistent SI with plan to cut wrist. Given pphx, collateral provided to , patient remains high risk for self harm. Patient would benefit from inpatient psych admission for stabilization.     9/16/2022: Patient endorses persistent SI with plan to cut wrist. Patient has not endorsed any new protective factors. Given pphx, collateral provided to , patient remains high risk for self harm. Patient would benefit from inpatient psych admission for stabilization.     9/17/2022: Patient endorses SI. Unable to engage in safety planning.    9/18/2022: Patient endorses physical illness and SI. Patient is holding for bed.     9/19/2022: Patient remains with active SI.    9/20/2022: Patient reports persistent symptoms.     9/21/2022: Patient reports persistent symptoms. holding for psychiatry bed. in regards to COVID exposure, patient is planned to be tested on 5 days from exposure, which will determine which unit patient can go to.

## 2022-09-21 NOTE — PROGRESS NOTE ADULT - ASSESSMENT
70 y/o male with BPH, HTN and HLD presenting with generalized complaints; SOB. Pt admitted for  workup for dyspnea and depression . CTA chest negative for PE, cardiac w/u negative for ACS.  Pt. required psych eval for verbalization of suicidal ideations and self harm.  Psych notes and recommendations appreciated.  As per psych pt. has chronic passive suicidal ideation with no acute suicidal plan or intent.  No need for CO at this time.  Pt. is not a candidate for  inpatient psych hospitalization, cleared for discharge from psychiatry stand point.  9/14-Pt. with persisting negative expression of thoughts stating "I'm going to die today"  "You think I'm crazy I know" etc.  Tele psych f/u today recc CO and discharge to inpatient psych, SW following.  1:1 initiated, diversional activities such as watching TV, puzzles etc. recommended.  9/16-Remains on 1:1 observation with no behavioral disturbances and no suicidal ideations today.  Pt. communicates being "bored with life" and would like to become someone famous who will be remembered for his greatness.  Pt. is awaiting for inpatient psych facility placement.  09/19: Awaiting for inpatient psych facility. No bed at this time as per discussion with SW. On 1:1.   Patient seen this morning in NAD, endorses "not happy waking up alive everyday" Continue constant observation. Patient was Covid exposure on 9/20, Covid  neg on 9/20, asymptomatic on droplet precaution. Pending inpatient psych facility placement.

## 2022-09-21 NOTE — PROGRESS NOTE ADULT - SUBJECTIVE AND OBJECTIVE BOX
NP Note discussed with  primary attending    Patient is a 71y old  Male who presents with a chief complaint of dyspnea (20 Sep 2022 18:37)      INTERVAL HPI/OVERNIGHT EVENTS: no new complaints    MEDICATIONS  (STANDING):  ARIPiprazole 5 milliGRAM(s) Oral daily  chlorhexidine 2% Cloths 1 Application(s) Topical <User Schedule>  enoxaparin Injectable 40 milliGRAM(s) SubCutaneous every 24 hours  FLUoxetine 40 milliGRAM(s) Oral daily  fluticasone propionate 50 MICROgram(s)/spray Nasal Spray 1 Spray(s) Both Nostrils two times a day  melatonin 3 milliGRAM(s) Oral at bedtime  NIFEdipine XL 30 milliGRAM(s) Oral daily  potassium chloride    Tablet ER 40 milliEquivalent(s) Oral once  simvastatin 20 milliGRAM(s) Oral at bedtime  traZODone 50 milliGRAM(s) Oral at bedtime    MEDICATIONS  (PRN):  acetaminophen     Tablet .. 650 milliGRAM(s) Oral every 6 hours PRN Mild Pain (1 - 3)  aluminum hydroxide/magnesium hydroxide/simethicone Suspension 30 milliLiter(s) Oral every 6 hours PRN Dyspepsia      __________________________________________________  REVIEW OF SYSTEMS:    CONSTITUTIONAL: No fever,   EYES: no acute visual disturbances  NECK: No pain or stiffness  RESPIRATORY: No cough; No shortness of breath  CARDIOVASCULAR: No chest pain, no palpitations  GASTROINTESTINAL: No pain. No nausea or vomiting; No diarrhea   NEUROLOGICAL: No headache or numbness, no tremors  MUSCULOSKELETAL: No joint pain, no muscle pain  GENITOURINARY: no dysuria, no frequency, no hesitancy  PSYCHIATRY: no depression , no anxiety  ALL OTHER  ROS negative        Vital Signs Last 24 Hrs  T(C): 36.2 (21 Sep 2022 05:30), Max: 36.9 (20 Sep 2022 20:08)  T(F): 97.1 (21 Sep 2022 05:30), Max: 98.5 (20 Sep 2022 21:04)  HR: 60 (21 Sep 2022 05:30) (60 - 67)  BP: 133/73 (21 Sep 2022 05:30) (133/73 - 150/87)  BP(mean): --  RR: 17 (21 Sep 2022 05:30) (16 - 18)  SpO2: 96% (21 Sep 2022 05:30) (96% - 98%)    Parameters below as of 21 Sep 2022 05:30  Patient On (Oxygen Delivery Method): room air        ________________________________________________  PHYSICAL EXAM:  GENERAL: NAD  HEENT: Normocephalic;  conjunctivae and sclerae clear; moist mucous membranes;   NECK : supple  CHEST/LUNG: Clear to ausculitation bilaterally with good air entry   HEART: S1 S2  regular; no murmurs, gallops or rubs  ABDOMEN: Soft, Nontender, Nondistended; Bowel sounds present  EXTREMITIES: no cyanosis; no edema; no calf tenderness  SKIN: warm and dry; no rash  NERVOUS SYSTEM:  Awake and alert; Oriented  to place, person and time ; no new deficits    _________________________________________________  LABS:                        12.0   6.94  )-----------( 202      ( 20 Sep 2022 07:45 )             36.2     09-21    139  |  100  |  24<H>  ----------------------------<  77  3.4<L>   |  33<H>  |  1.14    Ca    9.2      21 Sep 2022 06:21  Phos  3.2     09-21  Mg     2.4     09-21    TPro  6.3  /  Alb  2.9<L>  /  TBili  0.5  /  DBili  x   /  AST  23  /  ALT  34  /  AlkPhos  85  09-20        CAPILLARY BLOOD GLUCOSE            RADIOLOGY & ADDITIONAL TESTS:  < from: CT Angio Chest PE Protocol w/ IV Cont (09.12.22 @ 14:40) >    PULMONARY ANGIOGRAM: No pulmonary embolism.    LUNGS/AIRWAYS/PLEURA: Patent trachea and bronchi. Clear lungs. Nopleural   effusion or pneumothorax.    LYMPH NODES/MEDIASTINUM: No enlarged lymph nodes. Bilateral thyroid   nodules up to 1.1 cm.    HEART/VASCULATURE: Enlarged heart. No pericardial effusion. Coronary   artery calcifications.    UPPER ABDOMEN: Unremarkable.    BONES/SOFT TISSUES: Spondylosis.      IMPRESSION:    No pulmonary embolism.      < end of copied text >    Imaging Personally Reviewed:  YES    Consultant(s) Notes Reviewed:   YES    Care Discussed with Consultants :     Plan of care was discussed with patient and /or primary care giver; all questions and concerns were addressed and care was aligned with patient's wishes.

## 2022-09-22 DIAGNOSIS — U07.1 COVID-19: ICD-10-CM

## 2022-09-22 LAB
ANION GAP SERPL CALC-SCNC: 6 MMOL/L — SIGNIFICANT CHANGE UP (ref 5–17)
BUN SERPL-MCNC: 27 MG/DL — HIGH (ref 7–18)
CALCIUM SERPL-MCNC: 8.9 MG/DL — SIGNIFICANT CHANGE UP (ref 8.4–10.5)
CHLORIDE SERPL-SCNC: 104 MMOL/L — SIGNIFICANT CHANGE UP (ref 96–108)
CO2 SERPL-SCNC: 32 MMOL/L — HIGH (ref 22–31)
CREAT SERPL-MCNC: 1.22 MG/DL — SIGNIFICANT CHANGE UP (ref 0.5–1.3)
EGFR: 63 ML/MIN/1.73M2 — SIGNIFICANT CHANGE UP
GLUCOSE SERPL-MCNC: 81 MG/DL — SIGNIFICANT CHANGE UP (ref 70–99)
HCT VFR BLD CALC: 40.5 % — SIGNIFICANT CHANGE UP (ref 39–50)
HGB BLD-MCNC: 13.3 G/DL — SIGNIFICANT CHANGE UP (ref 13–17)
MCHC RBC-ENTMCNC: 29.1 PG — SIGNIFICANT CHANGE UP (ref 27–34)
MCHC RBC-ENTMCNC: 32.8 GM/DL — SIGNIFICANT CHANGE UP (ref 32–36)
MCV RBC AUTO: 88.6 FL — SIGNIFICANT CHANGE UP (ref 80–100)
NRBC # BLD: 0 /100 WBCS — SIGNIFICANT CHANGE UP (ref 0–0)
PLATELET # BLD AUTO: 214 K/UL — SIGNIFICANT CHANGE UP (ref 150–400)
POTASSIUM SERPL-MCNC: 3.7 MMOL/L — SIGNIFICANT CHANGE UP (ref 3.5–5.3)
POTASSIUM SERPL-SCNC: 3.7 MMOL/L — SIGNIFICANT CHANGE UP (ref 3.5–5.3)
RBC # BLD: 4.57 M/UL — SIGNIFICANT CHANGE UP (ref 4.2–5.8)
RBC # FLD: 13.1 % — SIGNIFICANT CHANGE UP (ref 10.3–14.5)
SARS-COV-2 RNA SPEC QL NAA+PROBE: DETECTED
SODIUM SERPL-SCNC: 142 MMOL/L — SIGNIFICANT CHANGE UP (ref 135–145)
WBC # BLD: 8.9 K/UL — SIGNIFICANT CHANGE UP (ref 3.8–10.5)
WBC # FLD AUTO: 8.9 K/UL — SIGNIFICANT CHANGE UP (ref 3.8–10.5)

## 2022-09-22 PROCEDURE — 99232 SBSQ HOSP IP/OBS MODERATE 35: CPT

## 2022-09-22 PROCEDURE — 99232 SBSQ HOSP IP/OBS MODERATE 35: CPT | Mod: 95

## 2022-09-22 RX ORDER — FLUOXETINE HCL 10 MG
60 CAPSULE ORAL DAILY
Refills: 0 | Status: DISCONTINUED | OUTPATIENT
Start: 2022-09-22 | End: 2022-10-01

## 2022-09-22 RX ORDER — POTASSIUM CHLORIDE 20 MEQ
40 PACKET (EA) ORAL ONCE
Refills: 0 | Status: DISCONTINUED | OUTPATIENT
Start: 2022-09-22 | End: 2022-09-22

## 2022-09-22 RX ADMIN — Medication 1 SPRAY(S): at 05:40

## 2022-09-22 RX ADMIN — CHLORHEXIDINE GLUCONATE 1 APPLICATION(S): 213 SOLUTION TOPICAL at 05:43

## 2022-09-22 RX ADMIN — Medication 650 MILLIGRAM(S): at 20:48

## 2022-09-22 RX ADMIN — Medication 30 MILLIGRAM(S): at 05:39

## 2022-09-22 RX ADMIN — Medication 40 MILLIGRAM(S): at 12:25

## 2022-09-22 RX ADMIN — Medication 3 MILLIGRAM(S): at 20:48

## 2022-09-22 RX ADMIN — ENOXAPARIN SODIUM 40 MILLIGRAM(S): 100 INJECTION SUBCUTANEOUS at 05:40

## 2022-09-22 RX ADMIN — Medication 50 MILLIGRAM(S): at 20:47

## 2022-09-22 RX ADMIN — Medication 1 SPRAY(S): at 18:01

## 2022-09-22 RX ADMIN — SIMVASTATIN 20 MILLIGRAM(S): 20 TABLET, FILM COATED ORAL at 20:48

## 2022-09-22 RX ADMIN — ARIPIPRAZOLE 5 MILLIGRAM(S): 15 TABLET ORAL at 12:26

## 2022-09-22 RX ADMIN — Medication 650 MILLIGRAM(S): at 21:32

## 2022-09-22 NOTE — PROGRESS NOTE ADULT - PROBLEM SELECTOR PLAN 6
Controlled  Continue procardia Resume anti-anxiety home meds : Abilify , prozac, trazodone  Continue Melatonin  Supportive measures  Appreciate Psyche input

## 2022-09-22 NOTE — BH CONSULTATION LIAISON PROGRESS NOTE - NSBHASSESSMENTFT_PSY_ALL_CORE
71M, unemployed, living with brother, with PMH of HTN, HLD, urinary incontinence 2/2 unclear prostate issues, R leg pain from arthritis, psych hx of MDD, OCD, "mixed personality disorder", reports hx of walking into traffic and of hitting himself, at least three prior hospitalizations ([facility]; [place] 1/4/22-2/24/2022, [facility] 10/22-11/15/2021 for depression w psychosis), has previously been seen at [place] ED, now admitted to medicine for SOB and chest discomfort, psych consult called after pt expressed a desire to be dead. Patient initially assessed by Tele Psychiatry and cleared on 9/13/2022. Reconsulted on 9/14/2022 in the setting of new collateral information and persistent suicidal thoughts.     PT ADMITTED TO [facility] 2S FROM 7/5-8/25/22, collateral from Dr. De La Rosa available in previous progress note.    9/14/2022: Upon assessment today patient reports suicidal thoughts which have been chronic but now active and persistent. While patient's symptoms may be secondary to MDD along with personality disorder, patient's chronic risk is elevated with plan to harm self by cutting as he done in past. Patient would likely benefit from inpatient psych admission for stabilization.    9/15/2022: Patient endorses persistent SI with plan to cut wrist. Given pphx, collateral provided to , patient remains high risk for self harm. Patient would benefit from inpatient psych admission for stabilization.     9/16/2022: Patient endorses persistent SI with plan to cut wrist. Patient has not endorsed any new protective factors. Given pphx, collateral provided to SW, patient remains high risk for self harm. Patient would benefit from inpatient psych admission for stabilization.     9/17/2022: Patient endorses SI. Unable to engage in safety planning.    9/18/2022: Patient endorses physical illness and SI. Patient is holding for bed.     9/19/2022: Patient remains with active SI.    9/20/2022: Patient reports persistent symptoms.     9/21/2022: Patient reports persistent symptoms. holding for psychiatry bed. in regards to COVID exposure, patient is planned to be tested on 5 days from exposure, which will determine which unit patient can go to.    9/22/2022: Patient is more engaged in interview today and more descriptive of how he is feeling. It appears that patient remains with suicidal thoughts although with no reported plans, it is still concerning as patient endorses rumination and not able to identify reasons for living. Patient likely unable to safety plan. Would recommend inpatient psych admission, pending bed. Patient will be tested for COVID (5 days from said exposure) which will determine which unit patient can be admitted to.

## 2022-09-22 NOTE — BH CONSULTATION LIAISON PROGRESS NOTE - NSBHFUPINTERVALHXFT_PSY_A_CORE
Patient seen and chart reviewed. Patient appears calm and cooperative. Patient states that he has been feeling "restless" and is always "thinking." He reports that he is worried that he will "die in his sleep" or that his "heart will just stop." Patient states that he has not been able to distract himself by his writing anymore. He reports that he may just "give it all up." Patient endorses persistent suicidal thoughts but does not report plan today. He states that he feels as if he has "nothing to go back to."

## 2022-09-22 NOTE — PROGRESS NOTE ADULT - SUBJECTIVE AND OBJECTIVE BOX
NP Note discussed with  primary attending    Patient is a 71y old  Male who presents with a chief complaint of dyspnea (21 Sep 2022 11:29)      INTERVAL HPI/OVERNIGHT EVENTS: no new complaints    MEDICATIONS  (STANDING):  ARIPiprazole 5 milliGRAM(s) Oral daily  chlorhexidine 2% Cloths 1 Application(s) Topical <User Schedule>  enoxaparin Injectable 40 milliGRAM(s) SubCutaneous every 24 hours  FLUoxetine 60 milliGRAM(s) Oral daily  fluticasone propionate 50 MICROgram(s)/spray Nasal Spray 1 Spray(s) Both Nostrils two times a day  melatonin 3 milliGRAM(s) Oral at bedtime  NIFEdipine XL 30 milliGRAM(s) Oral daily  simvastatin 20 milliGRAM(s) Oral at bedtime  traZODone 50 milliGRAM(s) Oral at bedtime    MEDICATIONS  (PRN):  acetaminophen     Tablet .. 650 milliGRAM(s) Oral every 6 hours PRN Mild Pain (1 - 3)  aluminum hydroxide/magnesium hydroxide/simethicone Suspension 30 milliLiter(s) Oral every 6 hours PRN Dyspepsia      __________________________________________________  REVIEW OF SYSTEMS:    CONSTITUTIONAL: No fever,   EYES: no acute visual disturbances  NECK: No pain or stiffness  RESPIRATORY: No cough; No shortness of breath  CARDIOVASCULAR: No chest pain, no palpitations  GASTROINTESTINAL: No pain. No nausea or vomiting; No diarrhea   NEUROLOGICAL: No headache or numbness, no tremors  MUSCULOSKELETAL: No joint pain, no muscle pain  GENITOURINARY: no dysuria, no frequency, no hesitancy  PSYCHIATRY: no depression , no anxiety  ALL OTHER  ROS negative        Vital Signs Last 24 Hrs  T(C): 36.2 (22 Sep 2022 05:15), Max: 36.8 (21 Sep 2022 20:32)  T(F): 97.2 (22 Sep 2022 05:15), Max: 98.3 (21 Sep 2022 20:32)  HR: 66 (22 Sep 2022 05:15) (63 - 66)  BP: 119/69 (22 Sep 2022 05:15) (119/69 - 150/81)  BP(mean): --  RR: 17 (22 Sep 2022 05:15) (17 - 17)  SpO2: 98% (22 Sep 2022 05:15) (96% - 98%)    Parameters below as of 22 Sep 2022 05:15  Patient On (Oxygen Delivery Method): room air        ________________________________________________  PHYSICAL EXAM:  GENERAL: NAD  HEENT: Normocephalic;  conjunctivae and sclerae clear; moist mucous membranes;   NECK : supple  CHEST/LUNG: Clear to ausculitation bilaterally with good air entry   HEART: S1 S2  regular; no murmurs, gallops or rubs  ABDOMEN: Soft, Nontender, Nondistended; Bowel sounds present  EXTREMITIES: no cyanosis; no edema; no calf tenderness  SKIN: warm and dry; no rash  NERVOUS SYSTEM:  Awake and alert; Oriented  to place, person and time ; no new deficits    _________________________________________________  LABS:                        13.3   8.90  )-----------( 214      ( 22 Sep 2022 07:22 )             40.5     09-22    142  |  104  |  27<H>  ----------------------------<  81  3.7   |  32<H>  |  1.22    Ca    8.9      22 Sep 2022 07:22  Phos  3.2     09-21  Mg     2.4     09-21          CAPILLARY BLOOD GLUCOSE            RADIOLOGY & ADDITIONAL TESTS:    Imaging Personally Reviewed:  YES/    Consultant(s) Notes Reviewed:   YES/    Care Discussed with Consultants :     Plan of care was discussed with patient and /or primary care giver; all questions and concerns were addressed and care was aligned with patient's wishes.

## 2022-09-22 NOTE — PROGRESS NOTE ADULT - PROBLEM SELECTOR PLAN 3
Plan as above   Supportive measures expressing negativity  Psych note and reccs appreciated  Safety plan recc appreciated:  Writing, cartoons, poetry  Cont psych meds:  Abilify , prozac, trazodone  Continue Melatonin  Supportive measures  SW to follow on bed availability

## 2022-09-22 NOTE — PROGRESS NOTE ADULT - PROBLEM SELECTOR PLAN 4
likely due to anxiety, factitious disorder  CXR shows no infiltrates  Echo shows EF >55% , G1DD, mild pulm HTN  Resume anti-anxiety home meds : Abilify , prozac, trazodone  Continue Melatonin  Appreciate Psyche input Plan as above   Supportive measures

## 2022-09-22 NOTE — PROGRESS NOTE ADULT - PROBLEM SELECTOR PLAN 2
expressing negativity  Psych note and reccs appreciated  Safety plan recc appreciated:  Writing, cartoons, poetry  Cont psych meds:  Abilify , prozac, trazodone  Continue Melatonin  Supportive measures  SW to follow on bed availability Reportedly verbalizes suicidal ideations repeatedly  Was on enhanced supervision last night  Follow up evaluation by tele psych today (9/14) recc CO and discharge to inpatient psych  Cont psych meds  SW following for inpt. psych placement

## 2022-09-22 NOTE — PROGRESS NOTE ADULT - PROBLEM SELECTOR PLAN 5
Resume anti-anxiety home meds : Abilify , prozac, trazodone  Continue Melatonin  Supportive measures  Appreciate Psyche input likely due to anxiety, factitious disorder  CXR shows no infiltrates  Echo shows EF >55% , G1DD, mild pulm HTN  Resume anti-anxiety home meds : Abilify , prozac, trazodone  Continue Melatonin  Appreciate Psyche input

## 2022-09-22 NOTE — PROGRESS NOTE ADULT - PROBLEM SELECTOR PLAN 9
Pt is medically cleared for discharge   Pt. recommended discharge to inpatient psych, pending bed availability  SW/CM following    Of note, pt was Covid exposure on 9/20,   Covid PCR neg on 9/20 Lovenox

## 2022-09-22 NOTE — PROGRESS NOTE ADULT - PROBLEM SELECTOR PLAN 1
Reportedly verbalizes suicidal ideations repeatedly  Was on enhanced supervision last night  Follow up evaluation by tele psych today (9/14) recc CO and discharge to inpatient psych  Cont psych meds  SW following for inpt. psych placement Covid exposure on 9/20  Converted Covid+ on 9/22  Asymptomatic, on room air  C/W Airborne/contact precaution  Monitor O2 sat  Supportive measures.

## 2022-09-22 NOTE — BH CONSULTATION LIAISON PROGRESS NOTE - NSBHCONSULTPSYCHPLAN_PSY_A_CORE FT
1. Plan to increase Fluoxetine to 60 mg q24 po tomorrow  2. Continue Abilify 5 mg q24 for now, may titrate once fluoxetine is at adequate dose  3. Continue Trazodone 50 mg qhs 1. Fluoxetine 60 mg q24 po   2. Continue Abilify 5 mg q24 for now, may titrate once fluoxetine is at adequate dose  3. Continue Trazodone 50 mg qhs

## 2022-09-22 NOTE — PROGRESS NOTE ADULT - ASSESSMENT
70 y/o male with BPH, HTN and HLD presenting with generalized complaints; SOB. Pt admitted for  workup for dyspnea and depression . CTA chest negative for PE, cardiac w/u negative for ACS.  Pt. required psych eval for verbalization of suicidal ideations and self harm.  Psych notes and recommendations appreciated.  As per psych pt. has chronic passive suicidal ideation with no acute suicidal plan or intent.  No need for CO at this time.  Pt. is not a candidate for  inpatient psych hospitalization, cleared for discharge from psychiatry stand point.  9/14-Pt. with persisting negative expression of thoughts stating "I'm going to die today"  "You think I'm crazy I know" etc.  Tele psych f/u today recc CO and discharge to inpatient psych, SW following.  1:1 initiated, diversional activities such as watching TV, puzzles etc. recommended.  9/16-Remains on 1:1 observation with no behavioral disturbances and no suicidal ideations today.  Pt. communicates being "bored with life" and would like to become someone famous who will be remembered for his greatness.  Pt. is awaiting for inpatient psych facility placement.  09/19: Awaiting for inpatient psych facility. No bed at this time as per discussion with SW. On 1:1.   Patient seen this morning in NAD, endorses "not happy waking up alive everyday" Continue constant observation. Patient was Covid exposure on 9/20, Covid  neg on 9/20, asymptomatic on droplet precaution. Pending inpatient psych facility placement.       70 y/o male with BPH, HTN and HLD presenting with generalized complaints; SOB. Pt admitted for  workup for dyspnea and depression . CTA chest negative for PE, cardiac w/u negative for ACS.  Pt. required psych eval for verbalization of suicidal ideations and self harm.  Psych notes and recommendations appreciated.  As per psych pt. has chronic passive suicidal ideation with no acute suicidal plan or intent.  No need for CO at this time.  Pt. is not a candidate for  inpatient psych hospitalization, cleared for discharge from psychiatry stand point.  9/14-Pt. with persisting negative expression of thoughts stating "I'm going to die today"  "You think I'm crazy I know" etc.  Tele psych f/u today recc CO and discharge to inpatient psych, SW following.  1:1 initiated, diversional activities such as watching TV, puzzles etc. recommended.  9/16-Remains on 1:1 observation with no behavioral disturbances and no suicidal ideations today.  Pt. communicates being "bored with life" and would like to become someone famous who will be remembered for his greatness.  Pt. is awaiting for inpatient psych facility placement.  09/19: Awaiting for inpatient psych facility. No bed at this time as per discussion with SW. On 1:1.   Patient seen this morning in NAD, endorses "not happy waking up alive everyday" Continue constant observation. Patient was Covid exposure on 9/20, Covid  neg on 9/20, asymptomatic on droplet precaution. Pending inpatient psych facility placement. 70 y/o male with BPH, HTN and HLD presenting with generalized complaints; SOB. Pt admitted for  workup for dyspnea and depression . CTA chest negative for PE, cardiac w/u negative for ACS.  Pt. required psych eval for verbalization of suicidal ideations and self harm.  Psych notes and recommendations appreciated.  As per psych pt. has chronic passive suicidal ideation with no acute suicidal plan or intent.  No need for CO at this time.  Pt. is not a candidate for  inpatient psych hospitalization, cleared for discharge from psychiatry stand point.  9/14-Pt. with persisting negative expression of thoughts stating "I'm going to die today"  "You think I'm crazy I know" etc.  Tele psych f/u today recc CO and discharge to inpatient psych, SW following.  1:1 initiated, diversional activities such as watching TV, puzzles etc. recommended.  9/16-Remains on 1:1 observation with no behavioral disturbances and no suicidal ideations today.  Pt. communicates being "bored with life" and would like to become someone famous who will be remembered for his greatness.  Pt. is awaiting for inpatient psych facility placement.  09/19: Awaiting for inpatient psych facility. No bed at this time as per discussion with SW. On 1:1.   Patient seen this morning in NAD, endorses "not happy waking up alive everyday" Continue constant observation. Patient was Covid exposure on 9/20, Covid  neg on 9/20, asymptomatic on droplet precaution. Pending inpatient psych facility placement.  Patient converted COVID+ on 9/22. On room air, asymptomatic. Continue Airborne/contact precaution.

## 2022-09-23 PROCEDURE — 99232 SBSQ HOSP IP/OBS MODERATE 35: CPT | Mod: 95

## 2022-09-23 PROCEDURE — 99232 SBSQ HOSP IP/OBS MODERATE 35: CPT

## 2022-09-23 RX ADMIN — Medication 1 SPRAY(S): at 05:15

## 2022-09-23 RX ADMIN — ENOXAPARIN SODIUM 40 MILLIGRAM(S): 100 INJECTION SUBCUTANEOUS at 05:15

## 2022-09-23 RX ADMIN — Medication 60 MILLIGRAM(S): at 11:47

## 2022-09-23 RX ADMIN — ARIPIPRAZOLE 5 MILLIGRAM(S): 15 TABLET ORAL at 11:46

## 2022-09-23 RX ADMIN — SIMVASTATIN 20 MILLIGRAM(S): 20 TABLET, FILM COATED ORAL at 21:22

## 2022-09-23 RX ADMIN — Medication 3 MILLIGRAM(S): at 21:22

## 2022-09-23 RX ADMIN — Medication 30 MILLIGRAM(S): at 05:15

## 2022-09-23 RX ADMIN — CHLORHEXIDINE GLUCONATE 1 APPLICATION(S): 213 SOLUTION TOPICAL at 05:21

## 2022-09-23 RX ADMIN — Medication 1 SPRAY(S): at 17:21

## 2022-09-23 RX ADMIN — Medication 50 MILLIGRAM(S): at 21:22

## 2022-09-23 NOTE — PROGRESS NOTE ADULT - PROBLEM SELECTOR PLAN 1
Covid exposure on 9/20  Converted Covid+ on 9/22  Asymptomatic, on room air  C/W Airborne/contact precaution  Monitor O2 sat  Supportive measures.

## 2022-09-23 NOTE — BH CONSULTATION LIAISON PROGRESS NOTE - NSBHFUPINTERVALHXFT_PSY_A_CORE
Interval chart reviewed. Patient was seen by 2 way audio/video device. Patient presents calm but irritable. States "I'm very frustrated today." He reports feeling upset about the results of his COVID test. He reports that "today is a bad day," and when asked, states worse than yesterday. Patient continues to endorse persistent suicidal thoughts "jumping off the roof or taking pills" but denies intent. Unable to safety plan at this time. Interval chart reviewed. Patient was seen by 2 way audio/video device. Patient presents calm but irritable. States "I'm very frustrated today." He reports feeling upset about the results of his COVID test (positive on 9/22). He reports that "today is a bad day," and when asked, states worse than yesterday. Patient continues to endorse persistent suicidal thoughts "jumping off the roof or taking pills" but denies intent. Unable to safety plan at this time.

## 2022-09-23 NOTE — PROGRESS NOTE ADULT - ASSESSMENT
72 y/o male with BPH, HTN and HLD presenting with generalized complaints; SOB. Pt admitted for  workup for dyspnea and depression . CTA chest negative for PE, cardiac w/u negative for ACS.  Pt. required psych eval for verbalization of suicidal ideations and self harm.  Psych notes and recommendations appreciated.  As per psych pt. has chronic passive suicidal ideation with no acute suicidal plan or intent.  No need for CO at this time.  Pt. is not a candidate for  inpatient psych hospitalization, cleared for discharge from psychiatry stand point.  9/14-Pt. with persisting negative expression of thoughts stating "I'm going to die today"  "You think I'm crazy I know" etc.  Tele psych f/u today recc CO and discharge to inpatient psych, SW following.  1:1 initiated, diversional activities such as watching TV, puzzles etc. recommended.  9/16-Remains on 1:1 observation with no behavioral disturbances and no suicidal ideations today.  Pt. communicates being "bored with life" and would like to become someone famous who will be remembered for his greatness.  Pt. is awaiting for inpatient psych facility placement.  09/19: Awaiting for inpatient psych facility. No bed at this time as per discussion with SW. On 1:1.   Patient seen this morning in NAD, endorses "not happy waking up alive everyday" Continue constant observation. Patient was Covid exposure on 9/20, Covid  neg on 9/20, asymptomatic on droplet precaution. Pending inpatient psych facility placement.  Patient converted COVID+ on 9/22. On room air, asymptomatic. Continue Airborne/contact precaution.  9/23: status quo, needs to complete isolation or placement near impossible

## 2022-09-23 NOTE — PROGRESS NOTE ADULT - SUBJECTIVE AND OBJECTIVE BOX
NP Note discussed with  primary attending    Patient is a 71y old  Male who presents with a chief complaint of dyspnea (21 Sep 2022 11:29)      INTERVAL HPI/OVERNIGHT EVENTS: no new complaints    MEDICATIONS  (STANDING):  ARIPiprazole 5 milliGRAM(s) Oral daily  chlorhexidine 2% Cloths 1 Application(s) Topical <User Schedule>  enoxaparin Injectable 40 milliGRAM(s) SubCutaneous every 24 hours  FLUoxetine 60 milliGRAM(s) Oral daily  fluticasone propionate 50 MICROgram(s)/spray Nasal Spray 1 Spray(s) Both Nostrils two times a day  melatonin 3 milliGRAM(s) Oral at bedtime  NIFEdipine XL 30 milliGRAM(s) Oral daily  simvastatin 20 milliGRAM(s) Oral at bedtime  traZODone 50 milliGRAM(s) Oral at bedtime    MEDICATIONS  (PRN):  acetaminophen     Tablet .. 650 milliGRAM(s) Oral every 6 hours PRN Mild Pain (1 - 3)  aluminum hydroxide/magnesium hydroxide/simethicone Suspension 30 milliLiter(s) Oral every 6 hours PRN Dyspepsia      __________________________________________________  REVIEW OF SYSTEMS:    CONSTITUTIONAL: No fever, no chills.   EYES: no acute visual disturbances. Wears glasses.   NECK: No pain  RESPIRATORY: + cough; No shortness of breath.  CARDIOVASCULAR: No chest pain, no palpitations  GASTROINTESTINAL: No pain. No nausea or vomiting; No diarrhea   NEUROLOGICAL: No headache.   MUSCULOSKELETAL: No joint pain, no muscle pain  GENITOURINARY: no dysuria, + frequency, no hesitancy  PSYCHIATRY: + depression , no anxiety.     Vital Signs Last 24 Hrs  T(C): 36.2 (22 Sep 2022 05:15), Max: 36.8 (21 Sep 2022 20:32)  T(F): 97.2 (22 Sep 2022 05:15), Max: 98.3 (21 Sep 2022 20:32)  HR: 66 (22 Sep 2022 05:15) (63 - 66)  BP: 119/69 (22 Sep 2022 05:15) (119/69 - 150/81)  BP(mean): --  RR: 17 (22 Sep 2022 05:15) (17 - 17)  SpO2: 98% (22 Sep 2022 05:15) (96% - 98%)    Parameters below as of 22 Sep 2022 05:15  Patient On (Oxygen Delivery Method): room air      COVID-19 PCR . (09.22.22 @ 14:13)   COVID-19 PCR: Detected: EUA/IVD Basic Metabolic Panel in AM (09.22.22 @ 07:22)   Sodium, Serum: 142 mmol/L   Potassium, Serum: 3.7 mmol/L   Chloride, Serum: 104 mmol/L   Carbon Dioxide, Serum: 32 mmol/L   Anion Gap, Serum: 6 mmol/L   Blood Urea Nitrogen, Serum: 27 mg/dL   Creatinine, Serum: 1.22 mg/dL   Glucose, Serum: 81 mg/dL   Calcium, Total Serum: 8.9 mg/dL   eGFR: 63: The estimated glomerular filtration rate (eGFR) is calculated using the   2021 CKD-EPI creatinine equation, which does not have a coefficient for   race and is validated in individuals 18 years of age and older (N Engl J   Med 2021; [id number]). Creatinine-based eGFR may be inaccurate in   various situations including but not limited to extremes of muscle mass,   altered dietary protein intake, or medications that affect renal tubular   creatinine secretion. mL/min/1.73m2     Complete Blood Count in AM (09.22.22 @ 07:22)   Nucleated RBC: 0 /100 WBCs   WBC Count: 8.90 K/uL   RBC Count: 4.57 M/uL   Hemoglobin: 13.3 g/dL   Hematocrit: 40.5 %   Mean Cell Volume: 88.6 fl   Mean Cell Hemoglobin: 29.1 pg   Mean Cell Hemoglobin Conc: 32.8 gm/dL   Red Cell Distrib Width: 13.1 %   Platelet Count - Automated: 214 K/uL ________________________________________________  PHYSICAL EXAM:  GENERAL: NAD. OOB to chair.   HEENT: Normocephalic; conjunctivae and sclerae clear; moist mucous membranes;   NECK : supple  CHEST/LUNG: Clear to auscultation bilaterally with good air entry   HEART: S1 S2  regular; no murmurs, gallops or rubs  ABDOMEN: Soft, Nontender, Nondistended; Bowel sounds present  EXTREMITIES: no cyanosis; no edema; no calf tenderness  SKIN: warm and dry; no rash  NERVOUS SYSTEM:  Awake and alert; Oriented  to place, person and time ; no new deficits    _________________________________________________  LABS:                RADIOLOGY & ADDITIONAL TESTS:    Imaging Personally Reviewed:  YES/    Consultant(s) Notes Reviewed:   YES/    Care Discussed with Consultants :     Plan of care was discussed with patient and /or primary care giver; all questions and concerns were addressed and care was aligned with patient's wishes.

## 2022-09-23 NOTE — PROGRESS NOTE ADULT - ASSESSMENT
70 y/o male with BPH, HTN and HLD presenting with generalized complaints; SOB. Pt admitted for  workup for dyspnea and depression . CTA chest negative for PE, cardiac w/u negative for ACS.  Pt. required psych eval for verbalization of suicidal ideations and self harm.  Psych notes and recommendations appreciated.  As per psych pt. has chronic passive suicidal ideation with no acute suicidal plan or intent.  No need for CO at this time.  Pt. is not a candidate for  inpatient psych hospitalization, cleared for discharge from psychiatry stand point.  9/14-Pt. with persisting negative expression of thoughts stating "I'm going to die today"  "You think I'm crazy I know" etc.  Tele psych f/u today recc CO and discharge to inpatient psych, SW following.  1:1 initiated, diversional activities such as watching TV, puzzles etc. recommended.  9/16-Remains on 1:1 observation with no behavioral disturbances and no suicidal ideations today.  Pt. communicates being "bored with life" and would like to become someone famous who will be remembered for his greatness.  Pt. is awaiting for inpatient psych facility placement.  09/19: Awaiting for inpatient psych facility. No bed at this time as per discussion with SW. On 1:1.   Patient seen this morning in NAD, endorses "not happy waking up alive everyday" Continue constant observation. Patient was Covid exposure on 9/20, Covid  neg on 9/20, asymptomatic on droplet precaution. Pending inpatient psych facility placement.  Patient converted COVID+ on 9/22. On room air, asymptomatic. Continue Airborne/contact precaution.  09/23: Per psych constant observation. Continue Airborne/contact precaution. Awaiting for inpatient psych facility.

## 2022-09-23 NOTE — PROGRESS NOTE ADULT - PROBLEM SELECTOR PLAN 3
expressing negativity  Psych note and reccs appreciated  Safety plan recc appreciated:  Writing, cartoons, poetry  Cont psych meds:  Abilify , prozac, trazodone  Continue Melatonin  Supportive measures  SW to follow on bed availability  Increased Prozac to 60 mg on 9/22

## 2022-09-23 NOTE — BH CONSULTATION LIAISON PROGRESS NOTE - NSBHCONSULTPSYCHPLAN_PSY_A_CORE FT
1. Fluoxetine 60 mg q24 po   2. Continue Abilify 5 mg q24 for now, may titrate once fluoxetine is at adequate dose  3. Continue Trazodone 50 mg qhs

## 2022-09-23 NOTE — PROGRESS NOTE ADULT - PROBLEM SELECTOR PLAN 2
Reportedly verbalizes suicidal ideations repeatedly  Was on enhanced supervision last night  Follow up evaluation by tele psych today (9/14) recc CO and discharge to inpatient psych  Cont psych meds  SW following for inpt. psych placement  Per psych constant observation
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
## 2022-09-23 NOTE — PROGRESS NOTE ADULT - SUBJECTIVE AND OBJECTIVE BOX
Patient is a 71y old  Male who presents with a chief complaint of dyspnea (22 Sep 2022 14:11)      SUBJECTIVE / OVERNIGHT EVENTS: Patient seen and examined at bedside. No acute events overnight. Frustrated he is now positive for COVID-19. Periodic suicide ideation. Persistent SOB, but no anatomical basis for this. No hypoxia. COVID seems asymptomatic.    MEDICATIONS  (STANDING):  ARIPiprazole 5 milliGRAM(s) Oral daily  chlorhexidine 2% Cloths 1 Application(s) Topical <User Schedule>  enoxaparin Injectable 40 milliGRAM(s) SubCutaneous every 24 hours  FLUoxetine 60 milliGRAM(s) Oral daily  fluticasone propionate 50 MICROgram(s)/spray Nasal Spray 1 Spray(s) Both Nostrils two times a day  melatonin 3 milliGRAM(s) Oral at bedtime  NIFEdipine XL 30 milliGRAM(s) Oral daily  simvastatin 20 milliGRAM(s) Oral at bedtime  traZODone 50 milliGRAM(s) Oral at bedtime    MEDICATIONS  (PRN):  acetaminophen     Tablet .. 650 milliGRAM(s) Oral every 6 hours PRN Mild Pain (1 - 3)  aluminum hydroxide/magnesium hydroxide/simethicone Suspension 30 milliLiter(s) Oral every 6 hours PRN Dyspepsia      CAPILLARY BLOOD GLUCOSE        I&O's Summary      PHYSICAL EXAM:  Vital Signs Last 24 Hrs  T(C): 36.5 (23 Sep 2022 13:07), Max: 36.9 (22 Sep 2022 19:22)  T(F): 97.7 (23 Sep 2022 13:07), Max: 98.4 (22 Sep 2022 19:22)  HR: 77 (23 Sep 2022 13:07) (65 - 77)  BP: 145/79 (23 Sep 2022 13:07) (137/78 - 170/83)  BP(mean): 89 (22 Sep 2022 19:22) (89 - 89)  RR: 18 (23 Sep 2022 13:07) (17 - 18)  SpO2: 99% (23 Sep 2022 13:07) (98% - 99%)    Parameters below as of 23 Sep 2022 13:07  Patient On (Oxygen Delivery Method): room air    GEN: male in NAD, appears comfortable, no diaphoresis  EYES: No scleral injection, PERRL, EOMI  ENTM: neck supple & symmetric without tracheal deviation, moist membranes, no gross hearing impairment, thyroid gland not enlarged  CV: +S1/S2, no m/r/g, no abdominal bruit, no LE edema  RESP: breathing comfortably, no respiratory accessory muscle use, CTAB, no w/r/r  GI: normoactive BS, soft, NTND, no rebounding/guarding, no palpable masses    LABS:                        13.3   8.90  )-----------( 214      ( 22 Sep 2022 07:22 )             40.5     09-22    142  |  104  |  27<H>  ----------------------------<  81  3.7   |  32<H>  |  1.22    Ca    8.9      22 Sep 2022 07:22                  RADIOLOGY & ADDITIONAL TESTS:  Results Reviewed:   Imaging Personally Reviewed:  Electrocardiogram Personally Reviewed:    COORDINATION OF CARE:  Care Discussed with Consultants/Other Providers [Y/N]:  Prior or Outpatient Records Reviewed [Y/N]:

## 2022-09-24 LAB
ALBUMIN SERPL ELPH-MCNC: 3.2 G/DL — LOW (ref 3.5–5)
ALP SERPL-CCNC: 106 U/L — SIGNIFICANT CHANGE UP (ref 40–120)
ALT FLD-CCNC: 49 U/L DA — SIGNIFICANT CHANGE UP (ref 10–60)
ANION GAP SERPL CALC-SCNC: 10 MMOL/L — SIGNIFICANT CHANGE UP (ref 5–17)
AST SERPL-CCNC: 28 U/L — SIGNIFICANT CHANGE UP (ref 10–40)
BILIRUB SERPL-MCNC: 0.5 MG/DL — SIGNIFICANT CHANGE UP (ref 0.2–1.2)
BUN SERPL-MCNC: 27 MG/DL — HIGH (ref 7–18)
CALCIUM SERPL-MCNC: 8.9 MG/DL — SIGNIFICANT CHANGE UP (ref 8.4–10.5)
CHLORIDE SERPL-SCNC: 101 MMOL/L — SIGNIFICANT CHANGE UP (ref 96–108)
CO2 SERPL-SCNC: 29 MMOL/L — SIGNIFICANT CHANGE UP (ref 22–31)
CREAT SERPL-MCNC: 1.14 MG/DL — SIGNIFICANT CHANGE UP (ref 0.5–1.3)
EGFR: 69 ML/MIN/1.73M2 — SIGNIFICANT CHANGE UP
GLUCOSE SERPL-MCNC: 92 MG/DL — SIGNIFICANT CHANGE UP (ref 70–99)
HCT VFR BLD CALC: 40.2 % — SIGNIFICANT CHANGE UP (ref 39–50)
HGB BLD-MCNC: 13.3 G/DL — SIGNIFICANT CHANGE UP (ref 13–17)
MCHC RBC-ENTMCNC: 29.4 PG — SIGNIFICANT CHANGE UP (ref 27–34)
MCHC RBC-ENTMCNC: 33.1 GM/DL — SIGNIFICANT CHANGE UP (ref 32–36)
MCV RBC AUTO: 88.7 FL — SIGNIFICANT CHANGE UP (ref 80–100)
NRBC # BLD: 0 /100 WBCS — SIGNIFICANT CHANGE UP (ref 0–0)
PLATELET # BLD AUTO: 200 K/UL — SIGNIFICANT CHANGE UP (ref 150–400)
POTASSIUM SERPL-MCNC: 3.5 MMOL/L — SIGNIFICANT CHANGE UP (ref 3.5–5.3)
POTASSIUM SERPL-SCNC: 3.5 MMOL/L — SIGNIFICANT CHANGE UP (ref 3.5–5.3)
PROT SERPL-MCNC: 6.9 G/DL — SIGNIFICANT CHANGE UP (ref 6–8.3)
RBC # BLD: 4.53 M/UL — SIGNIFICANT CHANGE UP (ref 4.2–5.8)
RBC # FLD: 13 % — SIGNIFICANT CHANGE UP (ref 10.3–14.5)
SODIUM SERPL-SCNC: 140 MMOL/L — SIGNIFICANT CHANGE UP (ref 135–145)
WBC # BLD: 8.06 K/UL — SIGNIFICANT CHANGE UP (ref 3.8–10.5)
WBC # FLD AUTO: 8.06 K/UL — SIGNIFICANT CHANGE UP (ref 3.8–10.5)

## 2022-09-24 PROCEDURE — 99232 SBSQ HOSP IP/OBS MODERATE 35: CPT

## 2022-09-24 PROCEDURE — 99232 SBSQ HOSP IP/OBS MODERATE 35: CPT | Mod: 95

## 2022-09-24 RX ORDER — LIDOCAINE 4 G/100G
1 CREAM TOPICAL DAILY
Refills: 0 | Status: DISCONTINUED | OUTPATIENT
Start: 2022-09-24 | End: 2022-10-04

## 2022-09-24 RX ADMIN — Medication 650 MILLIGRAM(S): at 05:49

## 2022-09-24 RX ADMIN — Medication 1 SPRAY(S): at 05:44

## 2022-09-24 RX ADMIN — Medication 30 MILLIGRAM(S): at 05:41

## 2022-09-24 RX ADMIN — ENOXAPARIN SODIUM 40 MILLIGRAM(S): 100 INJECTION SUBCUTANEOUS at 05:41

## 2022-09-24 RX ADMIN — Medication 60 MILLIGRAM(S): at 12:13

## 2022-09-24 RX ADMIN — Medication 1 SPRAY(S): at 17:48

## 2022-09-24 RX ADMIN — Medication 650 MILLIGRAM(S): at 06:19

## 2022-09-24 RX ADMIN — ARIPIPRAZOLE 5 MILLIGRAM(S): 15 TABLET ORAL at 12:12

## 2022-09-24 RX ADMIN — SIMVASTATIN 20 MILLIGRAM(S): 20 TABLET, FILM COATED ORAL at 21:20

## 2022-09-24 RX ADMIN — LIDOCAINE 1 PATCH: 4 CREAM TOPICAL at 13:44

## 2022-09-24 RX ADMIN — Medication 50 MILLIGRAM(S): at 21:20

## 2022-09-24 RX ADMIN — Medication 3 MILLIGRAM(S): at 21:20

## 2022-09-24 RX ADMIN — LIDOCAINE 1 PATCH: 4 CREAM TOPICAL at 07:48

## 2022-09-24 RX ADMIN — CHLORHEXIDINE GLUCONATE 1 APPLICATION(S): 213 SOLUTION TOPICAL at 05:42

## 2022-09-24 NOTE — BH CONSULTATION LIAISON PROGRESS NOTE - NSBHFUPINTERVALHXFT_PSY_A_CORE
Seen for follow-up over the weekend. Remains on constant observation.    + irritability, preoccupied somatically (worried about SOB and Blood pressure although calmly eating dinner without any breathing issues), hopeless, overwhelmed, helpless, provocative, and continues to report has no motivation to "go on" but at the same time talking about assisted living facilities and possibly finding one from the hospital.    Unclear etiology, patient doesn't really elaborate on his wish to not be alive, presents somewhat hysterical with subjective complaints but does not match his actions.

## 2022-09-24 NOTE — PROGRESS NOTE ADULT - SUBJECTIVE AND OBJECTIVE BOX
Patient is a 71y old  Male who presents with a chief complaint of dyspnea (23 Sep 2022 18:42)    Patient was seen and examined at bedside   Complains of right knee pain   "I always have suicidal ideas, but no intention to do it now"    INTERVAL HPI/OVERNIGHT EVENTS:  T(C): 36.6 (09-24-22 @ 13:15), Max: 37.1 (09-24-22 @ 05:03)  HR: 67 (09-24-22 @ 13:15) (65 - 70)  BP: 107/59 (09-24-22 @ 13:15) (107/59 - 148/88)  RR: 18 (09-24-22 @ 13:15) (17 - 18)  SpO2: 99% (09-24-22 @ 13:15) (99% - 100%)  Wt(kg): --  I&O's Summary      REVIEW OF SYSTEMS: denies fever, chills, SOB, palpitations, chest pain, abdominal pain, nausea, vomiting, diarrhea, constipation, dizziness    MEDICATIONS  (STANDING):  ARIPiprazole 5 milliGRAM(s) Oral daily  chlorhexidine 2% Cloths 1 Application(s) Topical <User Schedule>  enoxaparin Injectable 40 milliGRAM(s) SubCutaneous every 24 hours  FLUoxetine 60 milliGRAM(s) Oral daily  fluticasone propionate 50 MICROgram(s)/spray Nasal Spray 1 Spray(s) Both Nostrils two times a day  lidocaine   4% Patch 1 Patch Transdermal daily  melatonin 3 milliGRAM(s) Oral at bedtime  NIFEdipine XL 30 milliGRAM(s) Oral daily  simvastatin 20 milliGRAM(s) Oral at bedtime  traZODone 50 milliGRAM(s) Oral at bedtime    MEDICATIONS  (PRN):  acetaminophen     Tablet .. 650 milliGRAM(s) Oral every 6 hours PRN Mild Pain (1 - 3)  aluminum hydroxide/magnesium hydroxide/simethicone Suspension 30 milliLiter(s) Oral every 6 hours PRN Dyspepsia      PHYSICAL EXAM:  GENERAL: NAD, well-groomed, well-developed  NERVOUS SYSTEM:  Alert & Oriented X3, no focal deficit   CHEST/LUNG: Clear to auscultation bilaterally; No rales, rhonchi, wheezing, or rubs  HEART: Regular rate and rhythm; No murmurs, rubs, or gallops  ABDOMEN: Soft, Nontender, Nondistended; Bowel sounds present  EXTREMITIES: Right knee mild tenderness, no knee swelling or erythema, 2+ Peripheral Pulses, No clubbing, cyanosis, or edema  SKIN: No rashes or lesions    LABS:                        13.3   8.06  )-----------( 200      ( 24 Sep 2022 06:30 )             40.2     09-24    140  |  101  |  27<H>  ----------------------------<  92  3.5   |  29  |  1.14    Ca    8.9      24 Sep 2022 06:30    TPro  6.9  /  Alb  3.2<L>  /  TBili  0.5  /  DBili  x   /  AST  28  /  ALT  49  /  AlkPhos  106  09-24        CAPILLARY BLOOD GLUCOSE

## 2022-09-24 NOTE — PROGRESS NOTE ADULT - ASSESSMENT
COVID 19 infection, asymptomatic   Suicidal ideation   MDD  Anxiety   Right knee pain   HTN  HLD    Plan:   COVID status stable, tolerating well RA  Monitor resp status closely, inflammatory markers q2-3days   DVT ppx   Lidocaine patch for right knee, no sign of inflammation   Cont current psych meds   Cont constant observation   Psych following   QTC in last EKG 445ms, cont to monitor   Cont Nifedipine   Waiting for bed for inpatient psych unit

## 2022-09-24 NOTE — BH CONSULTATION LIAISON PROGRESS NOTE - NSBHCONSULTPSYCHPLAN_PSY_A_CORE FT
1. Fluoxetine 60 mg q24 po   2. Continue Abilify 5 mg q24 for now, may titrate once fluoxetine is at adequate dose  3. Continue Trazodone 50 mg qhs  4. Still pending IP psych bed, weekday team will continue bed search
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
## 2022-09-25 PROCEDURE — 99232 SBSQ HOSP IP/OBS MODERATE 35: CPT | Mod: 95

## 2022-09-25 PROCEDURE — 99232 SBSQ HOSP IP/OBS MODERATE 35: CPT

## 2022-09-25 RX ADMIN — LIDOCAINE 1 PATCH: 4 CREAM TOPICAL at 19:12

## 2022-09-25 RX ADMIN — Medication 30 MILLIGRAM(S): at 06:09

## 2022-09-25 RX ADMIN — Medication 3 MILLIGRAM(S): at 22:54

## 2022-09-25 RX ADMIN — Medication 1 SPRAY(S): at 16:49

## 2022-09-25 RX ADMIN — ENOXAPARIN SODIUM 40 MILLIGRAM(S): 100 INJECTION SUBCUTANEOUS at 06:09

## 2022-09-25 RX ADMIN — Medication 50 MILLIGRAM(S): at 22:54

## 2022-09-25 RX ADMIN — LIDOCAINE 1 PATCH: 4 CREAM TOPICAL at 10:11

## 2022-09-25 RX ADMIN — CHLORHEXIDINE GLUCONATE 1 APPLICATION(S): 213 SOLUTION TOPICAL at 06:09

## 2022-09-25 RX ADMIN — Medication 1 SPRAY(S): at 06:13

## 2022-09-25 RX ADMIN — LIDOCAINE 1 PATCH: 4 CREAM TOPICAL at 22:40

## 2022-09-25 RX ADMIN — LIDOCAINE 1 PATCH: 4 CREAM TOPICAL at 00:49

## 2022-09-25 RX ADMIN — Medication 650 MILLIGRAM(S): at 02:58

## 2022-09-25 RX ADMIN — Medication 60 MILLIGRAM(S): at 10:15

## 2022-09-25 RX ADMIN — ARIPIPRAZOLE 5 MILLIGRAM(S): 15 TABLET ORAL at 10:15

## 2022-09-25 RX ADMIN — SIMVASTATIN 20 MILLIGRAM(S): 20 TABLET, FILM COATED ORAL at 22:54

## 2022-09-25 NOTE — PROGRESS NOTE ADULT - SUBJECTIVE AND OBJECTIVE BOX
[facility] Medicine    Patient is a 71y old  Male who presents with a chief complaint of dyspnea (24 Sep 2022 17:25)      SUBJECTIVE / OVERNIGHT EVENTS: No acute events overnight. Patient continues to report frustration regarding getting COVID-19. Continues to endorse SOB, but breathing comfortably. No cough noted. Awaiting inpatient psychiatric bed pending completion of COVID quarantine (will need 10 day quarantine).     MEDICATIONS  (STANDING):  ARIPiprazole 5 milliGRAM(s) Oral daily  chlorhexidine 2% Cloths 1 Application(s) Topical <User Schedule>  enoxaparin Injectable 40 milliGRAM(s) SubCutaneous every 24 hours  FLUoxetine 60 milliGRAM(s) Oral daily  fluticasone propionate 50 MICROgram(s)/spray Nasal Spray 1 Spray(s) Both Nostrils two times a day  lidocaine   4% Patch 1 Patch Transdermal daily  melatonin 3 milliGRAM(s) Oral at bedtime  NIFEdipine XL 30 milliGRAM(s) Oral daily  simvastatin 20 milliGRAM(s) Oral at bedtime  traZODone 50 milliGRAM(s) Oral at bedtime    MEDICATIONS  (PRN):  acetaminophen     Tablet .. 650 milliGRAM(s) Oral every 6 hours PRN Mild Pain (1 - 3)  aluminum hydroxide/magnesium hydroxide/simethicone Suspension 30 milliLiter(s) Oral every 6 hours PRN Dyspepsia      Vital Signs Last 24 Hrs  T(C): 36.5 (09-25-22 @ 05:06)  T(F): 97.7 (09-25-22 @ 05:06), Max: 98.1 (09-24-22 @ 21:10)  HR: 70 (09-25-22 @ 05:06) (68 - 70)  BP: 166/86 (09-25-22 @ 05:06)  BP(mean): 84 (09-24-22 @ 21:10) (84 - 84)  RR: 18 (09-25-22 @ 05:06) (17 - 18)  SpO2: 99% (09-25-22 @ 05:06) (99% - 100%)  Wt(kg): --    CAPILLARY BLOOD GLUCOSE        I&O's Summary      PHYSICAL EXAM:  GENERAL: NAD, well-developed, sitting up eating lunch  HEAD:  Atraumatic, Normocephalic  EYES:  conjunctiva and sclera clear  NECK: Supple, No JVD  CHEST/LUNG: Clear to auscultation bilaterally; No wheeze, rhonchi, rales  HEART: Regular rate and rhythm; No murmurs, rubs, or gallops  ABDOMEN: Soft, Nontender, Nondistended; Bowel sounds present  EXTREMITIES:  2+ Peripheral Pulses, No clubbing, cyanosis, or edema  PSYCH: AAOx3, flat affect   NEUROLOGY: non-focal  SKIN: No rashes or lesions    LABS:                        13.3   8.06  )-----------( 200      ( 24 Sep 2022 06:30 )             40.2     09-24    140  |  101  |  27<H>  ----------------------------<  92  3.5   |  29  |  1.14    Ca    8.9      24 Sep 2022 06:30    TPro  6.9  /  Alb  3.2<L>  /  TBili  0.5  /  DBili  x   /  AST  28  /  ALT  49  /  AlkPhos  106  09-24              RADIOLOGY & ADDITIONAL TESTS:    Imaging Personally Reviewed:    Consultant(s) Notes Reviewed:  Psychiatry     Care Discussed with Consultants/Other Providers:    Assessment and Plan:
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
## 2022-09-25 NOTE — PROGRESS NOTE ADULT - ASSESSMENT
70 y/o male with BPH, HTN and HLD presenting with generalized complaints; SOB. Pt admitted for  workup for dyspnea and depression . CTA chest negative for PE, cardiac w/u negative for ACS.  Pt. required psych eval for verbalization of suicidal ideations and self harm.  Psych notes and recommendations appreciated.  As per psych pt. has chronic passive suicidal ideation with no acute suicidal plan or intent.  No need for CO at this time.  Pt. is not a candidate for  inpatient psych hospitalization, cleared for discharge from psychiatry stand point.  9/14-Pt. with persisting negative expression of thoughts stating "I'm going to die today"  "You think I'm crazy I know" etc.  Tele psych f/u today recc CO and discharge to inpatient psych, SW following.  1:1 initiated, diversional activities such as watching TV, puzzles etc. recommended.  9/16-Remains on 1:1 observation with no behavioral disturbances and no suicidal ideations today.  Pt. communicates being "bored with life" and would like to become someone famous who will be remembered for his greatness.  Pt. is awaiting for inpatient psych facility placement.  09/19: Awaiting for inpatient psych facility. No bed at this time as per discussion with SW. On 1:1.   Patient seen this morning in NAD, endorses "not happy waking up alive everyday" Continue constant observation. Patient was Covid exposure on 9/20, Covid  neg on 9/20, asymptomatic on droplet precaution. Pending inpatient psych facility placement.  Patient converted COVID+ on 9/22. On room air, asymptomatic. Continue Airborne/contact precaution.  09/23: Per psych constant observation. Continue Airborne/contact precaution. Awaiting for inpatient psych facility.  09/25: Awaiting COVID inpatient facility pending quarantine

## 2022-09-25 NOTE — BH CONSULTATION LIAISON PROGRESS NOTE - NSBHFUPINTERVALHXFT_PSY_A_CORE
Seen for follow-up over the weekend. Remains on constant observation.    Continues to present with good behavioral and affective regulation. Speaks with low volume and tone. Presentation similar to yesterday. Patient has multiple subjective complaints, but appears to be  doing fine. (e.g. will request RN to check legs, or report bladder pain but is lying comfortably in no acute distress).    Continues to report hopelessness about the future, but also continues to talk about Assisted Living facility.    Otherwise sleeps well, appetite is good, concentration and energy poor.

## 2022-09-25 NOTE — PROGRESS NOTE ADULT - PROBLEM SELECTOR PLAN 5
likely due to anxiety, factitious disorder  CXR shows no infiltrates  Echo shows EF >55% , G1DD, mild pulm HTN  Resume anti-anxiety home meds : Abilify , prozac, trazodone  Continue Melatonin  Appreciate Psych input

## 2022-09-26 LAB
ANION GAP SERPL CALC-SCNC: 6 MMOL/L — SIGNIFICANT CHANGE UP (ref 5–17)
BUN SERPL-MCNC: 22 MG/DL — HIGH (ref 7–18)
CALCIUM SERPL-MCNC: 8.8 MG/DL — SIGNIFICANT CHANGE UP (ref 8.4–10.5)
CHLORIDE SERPL-SCNC: 103 MMOL/L — SIGNIFICANT CHANGE UP (ref 96–108)
CO2 SERPL-SCNC: 31 MMOL/L — SIGNIFICANT CHANGE UP (ref 22–31)
CREAT SERPL-MCNC: 0.99 MG/DL — SIGNIFICANT CHANGE UP (ref 0.5–1.3)
D DIMER BLD IA.RAPID-MCNC: <150 NG/ML DDU — SIGNIFICANT CHANGE UP
EGFR: 81 ML/MIN/1.73M2 — SIGNIFICANT CHANGE UP
GLUCOSE SERPL-MCNC: 88 MG/DL — SIGNIFICANT CHANGE UP (ref 70–99)
HCT VFR BLD CALC: 38.5 % — LOW (ref 39–50)
HGB BLD-MCNC: 12.9 G/DL — LOW (ref 13–17)
MCHC RBC-ENTMCNC: 29.5 PG — SIGNIFICANT CHANGE UP (ref 27–34)
MCHC RBC-ENTMCNC: 33.5 GM/DL — SIGNIFICANT CHANGE UP (ref 32–36)
MCV RBC AUTO: 87.9 FL — SIGNIFICANT CHANGE UP (ref 80–100)
NRBC # BLD: 0 /100 WBCS — SIGNIFICANT CHANGE UP (ref 0–0)
PLATELET # BLD AUTO: 197 K/UL — SIGNIFICANT CHANGE UP (ref 150–400)
POTASSIUM SERPL-MCNC: 3.3 MMOL/L — LOW (ref 3.5–5.3)
POTASSIUM SERPL-SCNC: 3.3 MMOL/L — LOW (ref 3.5–5.3)
RBC # BLD: 4.38 M/UL — SIGNIFICANT CHANGE UP (ref 4.2–5.8)
RBC # FLD: 12.9 % — SIGNIFICANT CHANGE UP (ref 10.3–14.5)
SODIUM SERPL-SCNC: 140 MMOL/L — SIGNIFICANT CHANGE UP (ref 135–145)
WBC # BLD: 7.63 K/UL — SIGNIFICANT CHANGE UP (ref 3.8–10.5)
WBC # FLD AUTO: 7.63 K/UL — SIGNIFICANT CHANGE UP (ref 3.8–10.5)

## 2022-09-26 PROCEDURE — 99232 SBSQ HOSP IP/OBS MODERATE 35: CPT

## 2022-09-26 PROCEDURE — 99232 SBSQ HOSP IP/OBS MODERATE 35: CPT | Mod: 95

## 2022-09-26 RX ORDER — POTASSIUM CHLORIDE 20 MEQ
40 PACKET (EA) ORAL ONCE
Refills: 0 | Status: COMPLETED | OUTPATIENT
Start: 2022-09-26 | End: 2022-09-26

## 2022-09-26 RX ADMIN — Medication 650 MILLIGRAM(S): at 21:36

## 2022-09-26 RX ADMIN — Medication 50 MILLIGRAM(S): at 21:06

## 2022-09-26 RX ADMIN — Medication 1 SPRAY(S): at 17:35

## 2022-09-26 RX ADMIN — Medication 650 MILLIGRAM(S): at 12:19

## 2022-09-26 RX ADMIN — Medication 40 MILLIEQUIVALENT(S): at 11:48

## 2022-09-26 RX ADMIN — Medication 650 MILLIGRAM(S): at 11:49

## 2022-09-26 RX ADMIN — Medication 1 SPRAY(S): at 06:45

## 2022-09-26 RX ADMIN — ENOXAPARIN SODIUM 40 MILLIGRAM(S): 100 INJECTION SUBCUTANEOUS at 06:45

## 2022-09-26 RX ADMIN — Medication 30 MILLIGRAM(S): at 06:46

## 2022-09-26 RX ADMIN — SIMVASTATIN 20 MILLIGRAM(S): 20 TABLET, FILM COATED ORAL at 21:06

## 2022-09-26 RX ADMIN — Medication 650 MILLIGRAM(S): at 21:06

## 2022-09-26 RX ADMIN — Medication 60 MILLIGRAM(S): at 11:49

## 2022-09-26 RX ADMIN — ARIPIPRAZOLE 5 MILLIGRAM(S): 15 TABLET ORAL at 11:49

## 2022-09-26 RX ADMIN — LIDOCAINE 1 PATCH: 4 CREAM TOPICAL at 19:41

## 2022-09-26 RX ADMIN — CHLORHEXIDINE GLUCONATE 1 APPLICATION(S): 213 SOLUTION TOPICAL at 06:46

## 2022-09-26 RX ADMIN — LIDOCAINE 1 PATCH: 4 CREAM TOPICAL at 23:30

## 2022-09-26 RX ADMIN — LIDOCAINE 1 PATCH: 4 CREAM TOPICAL at 11:50

## 2022-09-26 RX ADMIN — Medication 3 MILLIGRAM(S): at 21:06

## 2022-09-26 NOTE — PROGRESS NOTE ADULT - SUBJECTIVE AND OBJECTIVE BOX
NP Note discussed with  primary attending    Patient is a 71y old  Male who presents with a chief complaint of dyspnea (25 Sep 2022 14:49)      INTERVAL HPI/OVERNIGHT EVENTS: No new complaints.  Found writing.  Concerned about brother "not taking my things."    MEDICATIONS  (STANDING):  ARIPiprazole 5 milliGRAM(s) Oral daily  chlorhexidine 2% Cloths 1 Application(s) Topical <User Schedule>  enoxaparin Injectable 40 milliGRAM(s) SubCutaneous every 24 hours  FLUoxetine 60 milliGRAM(s) Oral daily  fluticasone propionate 50 MICROgram(s)/spray Nasal Spray 1 Spray(s) Both Nostrils two times a day  lidocaine   4% Patch 1 Patch Transdermal daily  melatonin 3 milliGRAM(s) Oral at bedtime  NIFEdipine XL 30 milliGRAM(s) Oral daily  simvastatin 20 milliGRAM(s) Oral at bedtime  traZODone 50 milliGRAM(s) Oral at bedtime    MEDICATIONS  (PRN):  acetaminophen     Tablet .. 650 milliGRAM(s) Oral every 6 hours PRN Mild Pain (1 - 3)  aluminum hydroxide/magnesium hydroxide/simethicone Suspension 30 milliLiter(s) Oral every 6 hours PRN Dyspepsia      __________________________________________________  REVIEW OF SYSTEMS:    CONSTITUTIONAL: No fever  EYES: No acute visual disturbances  NECK: No pain or stiffness  RESPIRATORY: No cough; No shortness of breath  CARDIOVASCULAR: No chest pain, no palpitations  GASTROINTESTINAL: No pain. No nausea or vomiting.  No diarrhea   NEUROLOGICAL: No headache or numbness, no tremors  MUSCULOSKELETAL: No joint pain, no muscle pain  GENITOURINARY: No dysuria, no frequency, no hesitancy  PSYCHIATRY: No depression , no anxiety  ALL OTHER  ROS negative        Vital Signs Last 24 Hrs  T(C): 36.9 (26 Sep 2022 13:34), Max: 36.9 (26 Sep 2022 13:34)  T(F): 98.4 (26 Sep 2022 13:34), Max: 98.4 (26 Sep 2022 13:34)  HR: 70 (26 Sep 2022 13:34) (66 - 80)  BP: 129/73 (26 Sep 2022 13:34) (127/70 - 143/77)  RR: 17 (26 Sep 2022 13:34) (16 - 17)  SpO2: 99% (26 Sep 2022 13:34) (97% - 99%)    Parameters below as of 26 Sep 2022 13:34  Patient On (Oxygen Delivery Method): room air        ________________________________________________  PHYSICAL EXAM:  GENERAL: NAD  HEENT: Normocephalic;  conjunctivae and sclerae clear; moist mucous membranes;   NECK : Supple  CHEST/LUNG: Clear to auscultation bilaterally with good air entry   HEART: S1 S2  regular; no murmurs, gallops or rubs  ABDOMEN: Soft, Nontender, Nondistended; Bowel sounds present x 4 quad  EXTREMITIES: No cyanosis; no edema; no calf tenderness  SKIN: Warm and dry; no rash  NERVOUS SYSTEM:  Awake and alert; Flat affect.  No new deficits.     _________________________________________________  LABS:                        12.9   7.63  )-----------( 197      ( 26 Sep 2022 07:20 )             38.5     09-26    140  |  103  |  22<H>  ----------------------------<  88  3.3<L>   |  31  |  0.99    Ca    8.8      26 Sep 2022 07:20          CAPILLARY BLOOD GLUCOSE            RADIOLOGY & ADDITIONAL TESTS:    Imaging Personally Reviewed:  YES    Consultant(s) Notes Reviewed:   YES    Care Discussed with Consultants :     Plan of care was discussed with patient and /or primary care giver; all questions and concerns were addressed and care was aligned with patient's wishes.

## 2022-09-26 NOTE — PROGRESS NOTE ADULT - PROBLEM SELECTOR PLAN 6
Resume anti-anxiety home meds : Abilify , prozac, trazodone  Continue Melatonin  Supportive measures  Psych following

## 2022-09-26 NOTE — PROGRESS NOTE ADULT - PROBLEM SELECTOR PLAN 5
likely due to anxiety, factitious disorder  CXR shows no infiltrates  Echo shows EF >55% , G1DD, mild pulm HTN  Resume anti-anxiety home meds : Abilify , prozac, trazodone  Continue Melatonin  Psych following

## 2022-09-26 NOTE — BH CONSULTATION LIAISON PROGRESS NOTE - NSBHFUPINTERVALHXFT_PSY_A_CORE
Interval chart reviewed. Patient was seen by 2 way audio/video device. Patient presents calm but irritable. states he is frustrated and has nothing to live for - "there's not much left."  He reports feeling upset about the results of his COVID test (positive on 9/22). He reports his legs and prostate  hurt, and he can't sleep.   Patient continues to endorse persistent suicidal thoughts "jumping off the roof or taking pills" but denies intent. Unable to safety plan at this time.

## 2022-09-26 NOTE — PROGRESS NOTE ADULT - ASSESSMENT
70 y/o male with BPH, HTN and HLD presenting with generalized complaints; SOB. Pt admitted for  workup for dyspnea and depression . CTA chest negative for PE, cardiac w/u negative for ACS.  Pt. required psych eval for verbalization of suicidal ideations and self harm.  Psych notes and recommendations appreciated.  As per psych pt. has chronic passive suicidal ideation with no acute suicidal plan or intent.  No need for CO at this time.  Pt. is not a candidate for  inpatient psych hospitalization, cleared for discharge from psychiatry stand point.  9/14-Pt. with persisting negative expression of thoughts stating "I'm going to die today"  "You think I'm crazy I know" etc.  Tele psych f/u today recc CO and discharge to inpatient psych, SW following.  1:1 initiated, diversional activities such as watching TV, puzzles etc. recommended.    9/16-Remains on 1:1 observation with no behavioral disturbances and no suicidal ideations today.  Pt. communicated being "bored with life" and would like to become someone famous who will be remembered for his greatness.   Awaiting inpatient psych facility placement.    09/19: Awaiting for inpatient psych facility. No bed at this time as per discussion with SW. On 1:1.   Patient seen this morning in NAD, endorses "not happy waking up alive everyday" Continue constant observation. Patient was Covid exposure on 9/20, Covid  neg on 9/20, asymptomatic on droplet precaution. Pending inpatient psych facility placement.  Patient converted COVID+ on 9/22. On room air, asymptomatic. Continue Airborne/contact precaution.    09/23: Per psych constant observation. Continue Airborne/contact precaution. Awaiting for inpatient psych facility.    09/25: Awaiting COVID inpatient Psych facility pending 10d quarantine.  Remains on constant observation.
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
## 2022-09-26 NOTE — PROGRESS NOTE ADULT - PROBLEM SELECTOR PLAN 1
Covid exposure on 9/20  Converted Covid+ on 9/22  Asymptomatic, on room air  C/W Airborne/contact precaution  Monitor O2 sat  Supportive measures

## 2022-09-26 NOTE — PROGRESS NOTE ADULT - PROBLEM SELECTOR PLAN 3
expressing negativity  Psych note and reccs appreciated  Safety plan recc appreciated:  Writing, cartoons, poetry  Cont psych meds:  Abilify , prozac, trazodone  Continue Melatonin  Supportive measures  SW following

## 2022-09-27 LAB
ANION GAP SERPL CALC-SCNC: 6 MMOL/L — SIGNIFICANT CHANGE UP (ref 5–17)
BUN SERPL-MCNC: 22 MG/DL — HIGH (ref 7–18)
CALCIUM SERPL-MCNC: 9.2 MG/DL — SIGNIFICANT CHANGE UP (ref 8.4–10.5)
CHLORIDE SERPL-SCNC: 102 MMOL/L — SIGNIFICANT CHANGE UP (ref 96–108)
CO2 SERPL-SCNC: 31 MMOL/L — SIGNIFICANT CHANGE UP (ref 22–31)
CREAT SERPL-MCNC: 1.1 MG/DL — SIGNIFICANT CHANGE UP (ref 0.5–1.3)
EGFR: 72 ML/MIN/1.73M2 — SIGNIFICANT CHANGE UP
GLUCOSE SERPL-MCNC: 84 MG/DL — SIGNIFICANT CHANGE UP (ref 70–99)
HCT VFR BLD CALC: 39.1 % — SIGNIFICANT CHANGE UP (ref 39–50)
HGB BLD-MCNC: 12.9 G/DL — LOW (ref 13–17)
MCHC RBC-ENTMCNC: 29.1 PG — SIGNIFICANT CHANGE UP (ref 27–34)
MCHC RBC-ENTMCNC: 33 GM/DL — SIGNIFICANT CHANGE UP (ref 32–36)
MCV RBC AUTO: 88.1 FL — SIGNIFICANT CHANGE UP (ref 80–100)
NRBC # BLD: 0 /100 WBCS — SIGNIFICANT CHANGE UP (ref 0–0)
PLATELET # BLD AUTO: 203 K/UL — SIGNIFICANT CHANGE UP (ref 150–400)
POTASSIUM SERPL-MCNC: 3.8 MMOL/L — SIGNIFICANT CHANGE UP (ref 3.5–5.3)
POTASSIUM SERPL-SCNC: 3.8 MMOL/L — SIGNIFICANT CHANGE UP (ref 3.5–5.3)
RBC # BLD: 4.44 M/UL — SIGNIFICANT CHANGE UP (ref 4.2–5.8)
RBC # FLD: 13.2 % — SIGNIFICANT CHANGE UP (ref 10.3–14.5)
SODIUM SERPL-SCNC: 139 MMOL/L — SIGNIFICANT CHANGE UP (ref 135–145)
WBC # BLD: 7.63 K/UL — SIGNIFICANT CHANGE UP (ref 3.8–10.5)
WBC # FLD AUTO: 7.63 K/UL — SIGNIFICANT CHANGE UP (ref 3.8–10.5)

## 2022-09-27 PROCEDURE — 99232 SBSQ HOSP IP/OBS MODERATE 35: CPT

## 2022-09-27 PROCEDURE — 99232 SBSQ HOSP IP/OBS MODERATE 35: CPT | Mod: 95

## 2022-09-27 RX ADMIN — Medication 50 MILLIGRAM(S): at 21:24

## 2022-09-27 RX ADMIN — Medication 3 MILLIGRAM(S): at 21:24

## 2022-09-27 RX ADMIN — Medication 1 SPRAY(S): at 17:08

## 2022-09-27 RX ADMIN — CHLORHEXIDINE GLUCONATE 1 APPLICATION(S): 213 SOLUTION TOPICAL at 05:09

## 2022-09-27 RX ADMIN — Medication 30 MILLILITER(S): at 13:51

## 2022-09-27 RX ADMIN — LIDOCAINE 1 PATCH: 4 CREAM TOPICAL at 19:05

## 2022-09-27 RX ADMIN — ARIPIPRAZOLE 5 MILLIGRAM(S): 15 TABLET ORAL at 11:49

## 2022-09-27 RX ADMIN — LIDOCAINE 1 PATCH: 4 CREAM TOPICAL at 13:51

## 2022-09-27 RX ADMIN — Medication 1 SPRAY(S): at 05:08

## 2022-09-27 RX ADMIN — Medication 650 MILLIGRAM(S): at 09:38

## 2022-09-27 RX ADMIN — Medication 60 MILLIGRAM(S): at 11:49

## 2022-09-27 RX ADMIN — ENOXAPARIN SODIUM 40 MILLIGRAM(S): 100 INJECTION SUBCUTANEOUS at 05:08

## 2022-09-27 RX ADMIN — Medication 30 MILLIGRAM(S): at 05:08

## 2022-09-27 RX ADMIN — Medication 650 MILLIGRAM(S): at 09:08

## 2022-09-27 RX ADMIN — SIMVASTATIN 20 MILLIGRAM(S): 20 TABLET, FILM COATED ORAL at 21:24

## 2022-09-27 NOTE — PROGRESS NOTE ADULT - SUBJECTIVE AND OBJECTIVE BOX
NP Note discussed with  primary attending    Patient is a 71y old  Male who presents with a chief complaint of dyspnea (26 Sep 2022 16:11)      INTERVAL HPI/OVERNIGHT EVENTS: No new complaints    MEDICATIONS  (STANDING):  ARIPiprazole 5 milliGRAM(s) Oral daily  chlorhexidine 2% Cloths 1 Application(s) Topical <User Schedule>  enoxaparin Injectable 40 milliGRAM(s) SubCutaneous every 24 hours  FLUoxetine 60 milliGRAM(s) Oral daily  fluticasone propionate 50 MICROgram(s)/spray Nasal Spray 1 Spray(s) Both Nostrils two times a day  lidocaine   4% Patch 1 Patch Transdermal daily  melatonin 3 milliGRAM(s) Oral at bedtime  NIFEdipine XL 30 milliGRAM(s) Oral daily  simvastatin 20 milliGRAM(s) Oral at bedtime  traZODone 50 milliGRAM(s) Oral at bedtime    MEDICATIONS  (PRN):  acetaminophen     Tablet .. 650 milliGRAM(s) Oral every 6 hours PRN Mild Pain (1 - 3)  aluminum hydroxide/magnesium hydroxide/simethicone Suspension 30 milliLiter(s) Oral every 6 hours PRN Dyspepsia      __________________________________________________  REVIEW OF SYSTEMS:    CONSTITUTIONAL: No fever  EYES: No acute visual disturbances  NECK: No pain or stiffness  RESPIRATORY: No cough; No shortness of breath  CARDIOVASCULAR: No chest pain, no palpitations  GASTROINTESTINAL: No pain. No nausea or vomiting.  No diarrhea   NEUROLOGICAL: No headache or numbness, no tremors  MUSCULOSKELETAL: No joint pain, no muscle pain  GENITOURINARY: No dysuria, no frequency, no hesitancy  PSYCHIATRY: No depression , no anxiety  ALL OTHER  ROS negative      Vital Signs Last 24 Hrs  T(C): 37 (27 Sep 2022 13:43), Max: 37.1 (26 Sep 2022 20:06)  T(F): 98.6 (27 Sep 2022 13:43), Max: 98.7 (26 Sep 2022 20:06)  HR: 70 (27 Sep 2022 13:43) (70 - 72)  BP: 106/63 (27 Sep 2022 13:43) (106/63 - 131/86)  RR: 17 (27 Sep 2022 13:43) (16 - 17)  SpO2: 97% (27 Sep 2022 13:43) (97% - 100%)    Parameters below as of 27 Sep 2022 13:43  Patient On (Oxygen Delivery Method): room air        ________________________________________________  PHYSICAL EXAM:  GENERAL: NAD  HEENT: Normocephalic;  conjunctivae and sclerae clear; moist mucous membranes;   NECK : Supple  CHEST/LUNG: Clear to auscultation bilaterally with good air entry   HEART: S1 S2  regular; no murmurs, gallops or rubs  ABDOMEN: Soft, Nontender, Nondistended; Bowel sounds present x 4 quad  EXTREMITIES: No cyanosis; no edema; no calf tenderness  SKIN: Warm and dry; no rash  NERVOUS SYSTEM:  Awake and alert; Flat affect.  No new deficits.   _________________________________________________  LABS:                        12.9   7.63  )-----------( 203      ( 27 Sep 2022 07:15 )             39.1     09-27    139  |  102  |  22<H>  ----------------------------<  84  3.8   |  31  |  1.10    Ca    9.2      27 Sep 2022 07:15          CAPILLARY BLOOD GLUCOSE            RADIOLOGY & ADDITIONAL TESTS:    Imaging Personally Reviewed:  YES    Consultant(s) Notes Reviewed:   YES    Care Discussed with Consultants :     Plan of care was discussed with patient and /or primary care giver; all questions and concerns were addressed and care was aligned with patient's wishes.

## 2022-09-27 NOTE — BH CONSULTATION LIAISON PROGRESS NOTE - NSBHASSESSMENTFT_PSY_ALL_CORE
71M, unemployed, living with brother, with PMH of HTN, HLD, urinary incontinence 2/2 unclear prostate issues, R leg pain from arthritis, psych hx of MDD, OCD, "mixed personality disorder", reports hx of walking into traffic and of hitting himself, at least three prior hospitalizations ([facility]; [place] 1/4/22-2/24/2022, [facility] 10/22-11/15/2021 for depression w psychosis), has previously been seen at [place] ED, now admitted to medicine for SOB and chest discomfort, psych consult called after pt expressed a desire to be dead. Patient initially assessed by Tele Psychiatry and cleared on 9/13/2022. Reconsulted on 9/14/2022 in the setting of new collateral information and persistent suicidal thoughts.     PT ADMITTED TO [facility] 2S FROM 7/5-8/25/22, collateral from Dr. De La Rosa available in previous progress note.    9/14/2022: Upon assessment today patient reports suicidal thoughts which have been chronic but now active and persistent. While patient's symptoms may be secondary to MDD along with personality disorder, patient's chronic risk is elevated with plan to harm self by cutting as he done in past. Patient would likely benefit from inpatient psych admission for stabilization.    9/15/2022: Patient endorses persistent SI with plan to cut wrist. Given pphx, collateral provided to , patient remains high risk for self harm. Patient would benefit from inpatient psych admission for stabilization.     9/16/2022: Patient endorses persistent SI with plan to cut wrist. Patient has not endorsed any new protective factors. Given pphx, collateral provided to SW, patient remains high risk for self harm. Patient would benefit from inpatient psych admission for stabilization.     9/17/2022: Patient endorses SI. Unable to engage in safety planning.    9/18/2022: Patient endorses physical illness and SI. Patient is holding for bed.     9/19/2022: Patient remains with active SI.    9/20/2022: Patient reports persistent symptoms.     9/21/2022: Patient reports persistent symptoms. holding for psychiatry bed. in regards to COVID exposure, patient is planned to be tested on 5 days from exposure, which will determine which unit patient can go to.    9/22/2022: Patient is more engaged in interview today and more descriptive of how he is feeling. It appears that patient remains with suicidal thoughts although with no reported plans, it is still concerning as patient endorses rumination and not able to identify reasons for living. Patient likely unable to safety plan. Would recommend inpatient psych admission, pending bed. Patient will be tested for COVID (5 days from said exposure) which will determine which unit patient can be admitted to.     9/23/2022: Patient reports feeling upset about COVID result. Endorses chronic active SI, today plan of jumping off the roof or taking pills, denies intent. Unable to safety plan at this time.    9/26 no significant clinical changes. over  the weekend he expressed interest in [facility] but today he presents as still depressed and hopeless  with suicidal ideation with plan but not intent. the risk assessment has not meaningfully changed so plan will remain to transfer to inpatient  psychiatry once medically cleared.     9/27 - No known episodes of agitation or pens. Patient reports that he is severely depressed with suicidal thoughts.  71M, unemployed, living with brother, with PMH of HTN, HLD, urinary incontinence 2/2 unclear prostate issues, R leg pain from arthritis, psych hx of MDD, OCD, "mixed personality disorder", reports hx of walking into traffic and of hitting himself, at least three prior hospitalizations ([facility]; [place] 1/4/22-2/24/2022, [facility] 10/22-11/15/2021 for depression w psychosis), has previously been seen at [place] ED, now admitted to medicine for SOB and chest discomfort, psych consult called after pt expressed a desire to be dead. Patient initially assessed by Tele Psychiatry and cleared on 9/13/2022. Reconsulted on 9/14/2022 in the setting of new collateral information and persistent suicidal thoughts.     PT ADMITTED TO [facility] 2S FROM 7/5-8/25/22, collateral from Dr. De La Rosa available in previous progress note.    9/14/2022: Upon assessment today patient reports suicidal thoughts which have been chronic but now active and persistent. While patient's symptoms may be secondary to MDD along with personality disorder, patient's chronic risk is elevated with plan to harm self by cutting as he done in past. Patient would likely benefit from inpatient psych admission for stabilization.    9/15/2022: Patient endorses persistent SI with plan to cut wrist. Given pphx, collateral provided to , patient remains high risk for self harm. Patient would benefit from inpatient psych admission for stabilization.     9/16/2022: Patient endorses persistent SI with plan to cut wrist. Patient has not endorsed any new protective factors. Given pphx, collateral provided to SW, patient remains high risk for self harm. Patient would benefit from inpatient psych admission for stabilization.     9/17/2022: Patient endorses SI. Unable to engage in safety planning.    9/18/2022: Patient endorses physical illness and SI. Patient is holding for bed.     9/19/2022: Patient remains with active SI.    9/20/2022: Patient reports persistent symptoms.     9/21/2022: Patient reports persistent symptoms. holding for psychiatry bed. in regards to COVID exposure, patient is planned to be tested on 5 days from exposure, which will determine which unit patient can go to.    9/22/2022: Patient is more engaged in interview today and more descriptive of how he is feeling. It appears that patient remains with suicidal thoughts although with no reported plans, it is still concerning as patient endorses rumination and not able to identify reasons for living. Patient likely unable to safety plan. Would recommend inpatient psych admission, pending bed. Patient will be tested for COVID (5 days from said exposure) which will determine which unit patient can be admitted to.     9/23/2022: Patient reports feeling upset about COVID result. Endorses chronic active SI, today plan of jumping off the roof or taking pills, denies intent. Unable to safety plan at this time.    9/26 no significant clinical changes. over  the weekend he expressed interest in [facility] but today he presents as still depressed and hopeless  with suicidal ideation with plan but not intent. the risk assessment has not meaningfully changed so plan will remain to transfer to inpatient  psychiatry once medically cleared.     9/27 - No known episodes of agitation or prns. Patient reports that he is severely depressed with suicidal thoughts.

## 2022-09-27 NOTE — BH CONSULTATION LIAISON PROGRESS NOTE - NSBHFUPINTERVALHXFT_PSY_A_CORE
Interval chart reviewed. Patient was seen by 2 way audio/video device. Patient presents calm and cooperative overall. He reports that he has been having shortness of beath which has been "the same" since he came into the hospital. Patient reports that he thinks he slept "okay, I think." Patient states that his appetite has been normal. Patient reports that he always has thoughts of self harm. He reports that he used to be "a cutter." Patient denies any active plans or intent at this time but states that he has been in a deep depression.

## 2022-09-28 PROCEDURE — 99232 SBSQ HOSP IP/OBS MODERATE 35: CPT

## 2022-09-28 PROCEDURE — 99232 SBSQ HOSP IP/OBS MODERATE 35: CPT | Mod: 95

## 2022-09-28 RX ADMIN — SIMVASTATIN 20 MILLIGRAM(S): 20 TABLET, FILM COATED ORAL at 22:09

## 2022-09-28 RX ADMIN — CHLORHEXIDINE GLUCONATE 1 APPLICATION(S): 213 SOLUTION TOPICAL at 05:05

## 2022-09-28 RX ADMIN — ENOXAPARIN SODIUM 40 MILLIGRAM(S): 100 INJECTION SUBCUTANEOUS at 05:05

## 2022-09-28 RX ADMIN — Medication 50 MILLIGRAM(S): at 22:10

## 2022-09-28 RX ADMIN — LIDOCAINE 1 PATCH: 4 CREAM TOPICAL at 18:50

## 2022-09-28 RX ADMIN — Medication 1 SPRAY(S): at 05:05

## 2022-09-28 RX ADMIN — Medication 1 SPRAY(S): at 17:09

## 2022-09-28 RX ADMIN — LIDOCAINE 1 PATCH: 4 CREAM TOPICAL at 01:39

## 2022-09-28 RX ADMIN — Medication 60 MILLIGRAM(S): at 12:15

## 2022-09-28 RX ADMIN — ARIPIPRAZOLE 5 MILLIGRAM(S): 15 TABLET ORAL at 12:15

## 2022-09-28 RX ADMIN — LIDOCAINE 1 PATCH: 4 CREAM TOPICAL at 12:16

## 2022-09-28 RX ADMIN — Medication 650 MILLIGRAM(S): at 22:40

## 2022-09-28 RX ADMIN — Medication 650 MILLIGRAM(S): at 22:10

## 2022-09-28 RX ADMIN — Medication 30 MILLIGRAM(S): at 05:05

## 2022-09-28 RX ADMIN — Medication 3 MILLIGRAM(S): at 22:10

## 2022-09-28 NOTE — BH CONSULTATION LIAISON PROGRESS NOTE - NSBHASSESSMENTFT_PSY_ALL_CORE
71M, unemployed, living with brother, with PMH of HTN, HLD, urinary incontinence 2/2 unclear prostate issues, R leg pain from arthritis, psych hx of MDD, OCD, "mixed personality disorder", reports hx of walking into traffic and of hitting himself, at least three prior hospitalizations ([facility]; [place] 1/4/22-2/24/2022, [facility] 10/22-11/15/2021 for depression w psychosis), has previously been seen at [place] ED, now admitted to medicine for SOB and chest discomfort, psych consult called after pt expressed a desire to be dead. Patient initially assessed by Tele Psychiatry and cleared on 9/13/2022. Reconsulted on 9/14/2022 in the setting of new collateral information and persistent suicidal thoughts.     PT ADMITTED TO [facility] 2S FROM 7/5-8/25/22, collateral from Dr. De La Rosa available in previous progress note.    9/14/2022: Upon assessment today patient reports suicidal thoughts which have been chronic but now active and persistent. While patient's symptoms may be secondary to MDD along with personality disorder, patient's chronic risk is elevated with plan to harm self by cutting as he done in past. Patient would likely benefit from inpatient psych admission for stabilization.    9/15/2022: Patient endorses persistent SI with plan to cut wrist. Given pphx, collateral provided to , patient remains high risk for self harm. Patient would benefit from inpatient psych admission for stabilization.     9/16/2022: Patient endorses persistent SI with plan to cut wrist. Patient has not endorsed any new protective factors. Given pphx, collateral provided to SW, patient remains high risk for self harm. Patient would benefit from inpatient psych admission for stabilization.     9/17/2022: Patient endorses SI. Unable to engage in safety planning.    9/18/2022: Patient endorses physical illness and SI. Patient is holding for bed.     9/19/2022: Patient remains with active SI.    9/20/2022: Patient reports persistent symptoms.     9/21/2022: Patient reports persistent symptoms. holding for psychiatry bed. in regards to COVID exposure, patient is planned to be tested on 5 days from exposure, which will determine which unit patient can go to.    9/22/2022: Patient is more engaged in interview today and more descriptive of how he is feeling. It appears that patient remains with suicidal thoughts although with no reported plans, it is still concerning as patient endorses rumination and not able to identify reasons for living. Patient likely unable to safety plan. Would recommend inpatient psych admission, pending bed. Patient will be tested for COVID (5 days from said exposure) which will determine which unit patient can be admitted to.     9/23/2022: Patient reports feeling upset about COVID result. Endorses chronic active SI, today plan of jumping off the roof or taking pills, denies intent. Unable to safety plan at this time.    9/26 no significant clinical changes. over  the weekend he expressed [facility] but today he presents as still depressed and hopeless  with suicidal ideation with plan but not intent. the risk assessment has not meaningfully changed so plan will remain to transfer to inpatient  psychiatry once medically cleared.     9/27 - No known episodes of agitation or prns. Patient reports that he is severely depressed with suicidal thoughts.  71M, unemployed, living with brother, with PMH of HTN, HLD, urinary incontinence 2/2 unclear prostate issues, R leg pain from arthritis, psych hx of MDD, OCD, "mixed personality disorder", reports hx of walking into traffic and of hitting himself, at least three prior hospitalizations ([facility]; [place] 1/4/22-2/24/2022, [facility] 10/22-11/15/2021 for depression w psychosis), has previously been seen at [place] ED, now admitted to medicine for SOB and chest discomfort, psych consult called after pt expressed a desire to be dead. Patient initially assessed by Tele Psychiatry and cleared on 9/13/2022. Reconsulted on 9/14/2022 in the setting of new collateral information and persistent suicidal thoughts.     PT ADMITTED TO [facility] 2S FROM 7/5-8/25/22, collateral from Dr. De La Rosa available in previous progress note.    9/14/2022: Upon assessment today patient reports suicidal thoughts which have been chronic but now active and persistent. While patient's symptoms may be secondary to MDD along with personality disorder, patient's chronic risk is elevated with plan to harm self by cutting as he done in past. Patient would likely benefit from inpatient psych admission for stabilization.    9/15/2022: Patient endorses persistent SI with plan to cut wrist. Given pphx, collateral provided to , patient remains high risk for self harm. Patient would benefit from inpatient psych admission for stabilization.     9/16/2022: Patient endorses persistent SI with plan to cut wrist. Patient has not endorsed any new protective factors. Given pphx, collateral provided to SW, patient remains high risk for self harm. Patient would benefit from inpatient psych admission for stabilization.     9/17/2022: Patient endorses SI. Unable to engage in safety planning.    9/18/2022: Patient endorses physical illness and SI. Patient is holding for bed.     9/19/2022: Patient remains with active SI.    9/20/2022: Patient reports persistent symptoms.     9/21/2022: Patient reports persistent symptoms. holding for psychiatry bed. in regards to COVID exposure, patient is planned to be tested on 5 days from exposure, which will determine which unit patient can go to.    9/22/2022: Patient is more engaged in interview today and more descriptive of how he is feeling. It appears that patient remains with suicidal thoughts although with no reported plans, it is still concerning as patient endorses rumination and not able to identify reasons for living. Patient likely unable to safety plan. Would recommend inpatient psych admission, pending bed. Patient will be tested for COVID (5 days from said exposure) which will determine which unit patient can be admitted to.     9/23/2022: Patient reports feeling upset about COVID result. Endorses chronic active SI, today plan of jumping off the roof or taking pills, denies intent. Unable to safety plan at this time.    9/26 no significant clinical changes. over  the weekend he expressed interest in [facility] but today he presents as still depressed and hopeless  with suicidal ideation with plan but not intent. the risk assessment has not meaningfully changed so plan will remain to transfer to inpatient  psychiatry once medically cleared.     9/27 - No known episodes of agitation or prns. Patient reports that he is severely depressed with suicidal thoughts.     9/28 - Patient requiring inpatient psych transfer pending bed. Patient was COVID+ on 9/22/2022

## 2022-09-28 NOTE — PROGRESS NOTE ADULT - ASSESSMENT
72 y/o male with BPH, HTN and HLD presenting with generalized complaints; SOB. Pt admitted for  workup for dyspnea and depression . CTA chest negative for PE, cardiac w/u negative for ACS.  Pt. required psych eval for verbalization of suicidal ideations and self harm.  Psych notes and recommendations appreciated.  As per psych pt. has chronic passive suicidal ideation with no acute suicidal plan or intent.  No need for CO at this time.  Pt. is not a candidate for  inpatient psych hospitalization, cleared for discharge from psychiatry stand point.  9/14-Pt. with persisting negative expression of thoughts stating "I'm going to die today"  "You think I'm crazy I know" etc.  Tele psych f/u today recc CO and discharge to inpatient psych, SW following.  1:1 initiated, diversional activities such as watching TV, puzzles etc. recommended.    9/16-Remains on 1:1 observation with no behavioral disturbances and no suicidal ideations today.  Pt. communicated being "bored with life" and would like to become someone famous who will be remembered for his greatness.   Awaiting inpatient psych facility placement.    09/19: Awaiting for inpatient psych facility. No bed at this time as per discussion with SW. On 1:1.   Patient seen this morning in NAD, endorses "not happy waking up alive everyday" Continue constant observation. Patient was Covid exposure on 9/20, Covid  neg on 9/20, asymptomatic on droplet precaution. Pending inpatient psych facility placement.  Patient converted COVID+ on 9/22. On room air, asymptomatic. Continue Airborne/contact precaution.    09/23: Per psych constant observation. Continue Airborne/contact precaution. Awaiting for inpatient psych facility.    09/25: Awaiting COVID inpatient Psych facility pending 10d quarantine.  Remains on constant observation.

## 2022-09-28 NOTE — PROGRESS NOTE ADULT - PROBLEM SELECTOR PLAN 2
Amy called in stating that she would like fluids tomorrow.  PSR asked way she felt she needed fluids and she said to be on the safe side.    Please call Amy at [phone]    Reportedly verbalizes suicidal ideations repeatedly  Was on enhanced supervision last night  Follow up evaluation by tele psych today (9/14) recc CO and discharge to inpatient psych  Cont psych meds  SW following for inpt. psych placement  Per psych constant observation

## 2022-09-28 NOTE — PROGRESS NOTE ADULT - SUBJECTIVE AND OBJECTIVE BOX
NP Note discussed with  primary attending    Patient is a 71y old  Male who presents with a chief complaint of dyspnea (27 Sep 2022 17:04)      INTERVAL HPI/OVERNIGHT EVENTS: no new complaints    MEDICATIONS  (STANDING):  ARIPiprazole 5 milliGRAM(s) Oral daily  chlorhexidine 2% Cloths 1 Application(s) Topical <User Schedule>  enoxaparin Injectable 40 milliGRAM(s) SubCutaneous every 24 hours  FLUoxetine 60 milliGRAM(s) Oral daily  fluticasone propionate 50 MICROgram(s)/spray Nasal Spray 1 Spray(s) Both Nostrils two times a day  lidocaine   4% Patch 1 Patch Transdermal daily  melatonin 3 milliGRAM(s) Oral at bedtime  NIFEdipine XL 30 milliGRAM(s) Oral daily  simvastatin 20 milliGRAM(s) Oral at bedtime  traZODone 50 milliGRAM(s) Oral at bedtime    MEDICATIONS  (PRN):  acetaminophen     Tablet .. 650 milliGRAM(s) Oral every 6 hours PRN Mild Pain (1 - 3)  aluminum hydroxide/magnesium hydroxide/simethicone Suspension 30 milliLiter(s) Oral every 6 hours PRN Dyspepsia      __________________________________________________  REVIEW OF SYSTEMS:    CONSTITUTIONAL: No fever  EYES: No acute visual disturbances  NECK: No pain or stiffness  RESPIRATORY: No cough; No shortness of breath  CARDIOVASCULAR: No chest pain, no palpitations  GASTROINTESTINAL: No pain. No nausea or vomiting.  No diarrhea   NEUROLOGICAL: No headache or numbness, no tremors  MUSCULOSKELETAL: No joint pain, no muscle pain  GENITOURINARY: No dysuria, no frequency, no hesitancy  PSYCHIATRY: No depression , no anxiety  ALL OTHER  ROS negative      Vital Signs Last 24 Hrs  T(C): 36.9 (28 Sep 2022 13:33), Max: 36.9 (28 Sep 2022 13:33)  T(F): 98.5 (28 Sep 2022 13:33), Max: 98.5 (28 Sep 2022 13:33)  HR: 67 (28 Sep 2022 13:33) (63 - 73)  BP: 132/74 (28 Sep 2022 13:33) (132/74 - 150/86)  RR: 18 (28 Sep 2022 13:33) (18 - 18)  SpO2: 99% (28 Sep 2022 13:33) (99% - 100%)    Parameters below as of 28 Sep 2022 13:33  Patient On (Oxygen Delivery Method): room air        ________________________________________________  PHYSICAL EXAM:  GENERAL: NAD  HEENT: Normocephalic;  conjunctivae and sclerae clear; moist mucous membranes;   NECK : Supple  CHEST/LUNG: Clear to auscultation bilaterally with good air entry   HEART: S1 S2  regular; no murmurs, gallops or rubs  ABDOMEN: Soft, Nontender, Nondistended; Bowel sounds present x 4 quad  EXTREMITIES: No cyanosis; no edema; no calf tenderness  SKIN: Warm and dry; no rash  NERVOUS SYSTEM:  Awake and alert; Flat affect.  No new deficits.   _________________________________________________  LABS:                        12.9   7.63  )-----------( 203      ( 27 Sep 2022 07:15 )             39.1     09-27    139  |  102  |  22<H>  ----------------------------<  84  3.8   |  31  |  1.10    Ca    9.2      27 Sep 2022 07:15          CAPILLARY BLOOD GLUCOSE            RADIOLOGY & ADDITIONAL TESTS:    Imaging Personally Reviewed:  YES    Consultant(s) Notes Reviewed:   YES    Care Discussed with Consultants :     Plan of care was discussed with patient and /or primary care giver; all questions and concerns were addressed and care was aligned with patient's wishes.

## 2022-09-28 NOTE — BH CONSULTATION LIAISON PROGRESS NOTE - NSBHFUPINTERVALHXFT_PSY_A_CORE
Interval chart reviewed. Patient was seen by 2 way audio/video device. Patient presents calm and cooperative overall. He reports that he has been having shortness of beath which has been "the same" since he came into the hospital. Patient reports that he thinks he slept "okay, I think." Patient states that his appetite has been normal. Patient reports that he always has thoughts of self harm. He reports that he used to be "a cutter." Patient denies any active plans or intent at this time but states that he has been in a deep depression.    Patient seen and evaluated. Patient reports that he remains the "same." Patient states that he is feeling lonely as is only friend "Grace" has not been in touch with him. Patient reports that he is upset that he has been alone in the hospital bed as there is no one to talk with. Patients that he has the "same" suicidal thoughts as  usual.

## 2022-09-28 NOTE — SOCIAL WORK POST DISCHARGE FOLLOW UP NOTE - NSBHSWFOLLOWUP_PSY_ALL_CORE_FT
Writer learned that pt continues to be admitted to [facility].  Writer continued attempts to contact pt's newly assigned care coordinator, Barron Lovelace from [facility] [phone] with no success, due to his voice mail being full.  Mr. Lovelace's colleague provided his email address for contact,  [email or web address].  Writer emailed pt's current location, and best contact at home for Mr. Lovelace's follow up.  Mr. Lovelace responded that info was received, he will attempt contact to initiate connection, and facilitate his ongoing follow up.  As pt is being cared for in the hospital, and care coordinator is in place,  no further contact by  is needed.

## 2022-09-29 PROCEDURE — 99232 SBSQ HOSP IP/OBS MODERATE 35: CPT

## 2022-09-29 RX ORDER — CHLORHEXIDINE GLUCONATE 213 G/1000ML
1 SOLUTION TOPICAL
Refills: 0 | Status: DISCONTINUED | OUTPATIENT
Start: 2022-09-29 | End: 2022-10-04

## 2022-09-29 RX ORDER — ENOXAPARIN SODIUM 100 MG/ML
40 INJECTION SUBCUTANEOUS EVERY 24 HOURS
Refills: 0 | Status: DISCONTINUED | OUTPATIENT
Start: 2022-09-29 | End: 2022-10-04

## 2022-09-29 RX ADMIN — ARIPIPRAZOLE 5 MILLIGRAM(S): 15 TABLET ORAL at 12:31

## 2022-09-29 RX ADMIN — Medication 1 SPRAY(S): at 17:19

## 2022-09-29 RX ADMIN — LIDOCAINE 1 PATCH: 4 CREAM TOPICAL at 00:28

## 2022-09-29 RX ADMIN — LIDOCAINE 1 PATCH: 4 CREAM TOPICAL at 12:29

## 2022-09-29 RX ADMIN — LIDOCAINE 1 PATCH: 4 CREAM TOPICAL at 19:00

## 2022-09-29 RX ADMIN — Medication 650 MILLIGRAM(S): at 19:00

## 2022-09-29 RX ADMIN — Medication 650 MILLIGRAM(S): at 17:18

## 2022-09-29 RX ADMIN — Medication 30 MILLIGRAM(S): at 06:50

## 2022-09-29 RX ADMIN — Medication 3 MILLIGRAM(S): at 21:05

## 2022-09-29 RX ADMIN — Medication 1 SPRAY(S): at 06:53

## 2022-09-29 RX ADMIN — ENOXAPARIN SODIUM 40 MILLIGRAM(S): 100 INJECTION SUBCUTANEOUS at 06:50

## 2022-09-29 RX ADMIN — SIMVASTATIN 20 MILLIGRAM(S): 20 TABLET, FILM COATED ORAL at 21:05

## 2022-09-29 RX ADMIN — Medication 50 MILLIGRAM(S): at 21:05

## 2022-09-29 RX ADMIN — Medication 60 MILLIGRAM(S): at 12:31

## 2022-09-29 NOTE — CHART NOTE - NSCHARTNOTEFT_GEN_A_CORE
Reassessment:     Patient is a 71y old  Male who presents with a chief complaint of dyspnea (29 Sep 2022 15:57)      Factors impacting intake: [ ] none [ ] nausea  [ ] vomiting [ ] diarrhea [ ] constipation  [ ]chewing problems [ ] swallowing issues  [X ] other: Severe depression, h/o Factitious Disorder, anxiety, HTN, COVID +    Diet Prescription: Diet, DASH/ TLC:   Sodium & Cholesterol Restricted (22 @ 01:14)    Intake: Patient in isolation, COVID+, on enhanced supervision, observed through glass door, sitting at the bedside consuming lunch tray, per d/w PCA, pt. with good "appetite", no reports of GI distress, per flow sheets intake % noted, rec. c/w diet as ordered, awaiting inpatient Psych placement, Psych/team following. RD available.     Daily Weight in k.7 (28 Sep 2022 04:31)    Pertinent Medications: MEDICATIONS  (STANDING):  ARIPiprazole 5 milliGRAM(s) Oral daily  chlorhexidine 2% Cloths 1 Application(s) Topical <User Schedule>  enoxaparin Injectable 40 milliGRAM(s) SubCutaneous every 24 hours  FLUoxetine 60 milliGRAM(s) Oral daily  fluticasone propionate 50 MICROgram(s)/spray Nasal Spray 1 Spray(s) Both Nostrils two times a day  lidocaine   4% Patch 1 Patch Transdermal daily  melatonin 3 milliGRAM(s) Oral at bedtime  NIFEdipine XL 30 milliGRAM(s) Oral daily  simvastatin 20 milliGRAM(s) Oral at bedtime  traZODone 50 milliGRAM(s) Oral at bedtime    MEDICATIONS  (PRN):  acetaminophen     Tablet .. 650 milliGRAM(s) Oral every 6 hours PRN Mild Pain (1 - 3)  aluminum hydroxide/magnesium hydroxide/simethicone Suspension 30 milliLiter(s) Oral every 6 hours PRN Dyspepsia    Pertinent Labs:  Na139 mmol/L Glu 84 mg/dL K+ 3.8 mmol/L Cr  1.10 mg/dL BUN 22 mg/dL<H>  Alb 3.2 g/dL<L>     CAPILLARY BLOOD GLUCOSE      Skin: intact    Estimated Needs:   [X ] no change since previous assessment  [ ] recalculated:     Previous Nutrition Diagnosis:     [X] No nutrition diagnosis identified  [ ] Inadequate Energy Intake [ ]Inadequate Oral Intake [ ] Excessive Energy Intake   [ ] Underweight [ ] Increased Nutrient Needs [ ] Overweight/Obesity   [ ] Altered GI Function [ ] Unintended Weight Loss [ ] Food & Nutrition Related Knowledge Deficit [ ] Malnutrition     Nutrition Diagnosis is [ ] ongoing  [ ] resolved [ ] not applicable     New Nutrition Diagnosis: [ ] not applicable     Interventions: To meet nutrition needs     Recommend  [ ] Change Diet To:  [ ] Nutrition Supplement  [ ] Nutrition Support  [X ] Other: Nursing to continue feeding assistance and encouragement, aspiration precaution & add MVI/minerals daily     Monitoring and Evaluation:   [ ] PO intake [ x ] Tolerance to diet prescription [ x ] weights [ x ] labs[ x ] follow up per protocol  [ ] other:

## 2022-09-29 NOTE — PROGRESS NOTE ADULT - SUBJECTIVE AND OBJECTIVE BOX
NP Note discussed with  Primary Attending    Patient is a 71y old  Male who presents with a chief complaint of dyspnea (28 Sep 2022 14:37)      INTERVAL HPI/OVERNIGHT EVENTS: no new complaints    MEDICATIONS  (STANDING):  ARIPiprazole 5 milliGRAM(s) Oral daily  chlorhexidine 2% Cloths 1 Application(s) Topical <User Schedule>  enoxaparin Injectable 40 milliGRAM(s) SubCutaneous every 24 hours  FLUoxetine 60 milliGRAM(s) Oral daily  fluticasone propionate 50 MICROgram(s)/spray Nasal Spray 1 Spray(s) Both Nostrils two times a day  lidocaine   4% Patch 1 Patch Transdermal daily  melatonin 3 milliGRAM(s) Oral at bedtime  NIFEdipine XL 30 milliGRAM(s) Oral daily  simvastatin 20 milliGRAM(s) Oral at bedtime  traZODone 50 milliGRAM(s) Oral at bedtime    MEDICATIONS  (PRN):  acetaminophen     Tablet .. 650 milliGRAM(s) Oral every 6 hours PRN Mild Pain (1 - 3)  aluminum hydroxide/magnesium hydroxide/simethicone Suspension 30 milliLiter(s) Oral every 6 hours PRN Dyspepsia      __________________________________________________  REVIEW OF SYSTEMS:    CONSTITUTIONAL: No fever,   EYES: no acute visual disturbances  NECK: No pain or stiffness  RESPIRATORY: No cough; No shortness of breath  CARDIOVASCULAR: No chest pain, no palpitations  GASTROINTESTINAL: No pain. No nausea or vomiting; No diarrhea   NEUROLOGICAL: No headache or numbness, no tremors  MUSCULOSKELETAL: No joint pain, no muscle pain  GENITOURINARY: no dysuria, no frequency, no hesitancy  PSYCHIATRY: no depression , no anxiety  ALL OTHER  ROS negative        Vital Signs Last 24 Hrs  T(C): 36.9 (29 Sep 2022 13:50), Max: 36.9 (29 Sep 2022 13:50)  T(F): 98.4 (29 Sep 2022 13:50), Max: 98.4 (29 Sep 2022 13:50)  HR: 62 (29 Sep 2022 13:50) (61 - 78)  BP: 123/70 (29 Sep 2022 13:50) (123/70 - 154/79)  BP(mean): 83 (29 Sep 2022 13:50) (83 - 83)  RR: 18 (29 Sep 2022 13:50) (18 - 18)  SpO2: 97% (29 Sep 2022 13:50) (96% - 98%)    Parameters below as of 29 Sep 2022 13:50  Patient On (Oxygen Delivery Method): room air        ________________________________________________  PHYSICAL EXAM:  well developed, well groomed  GENERAL: NAD  HEENT: Normocephalic;  conjunctivae and sclerae clear; moist mucous membranes;   NECK : supple  CHEST/LUNG: Clear to auscultation bilaterally with good air entry   HEART: S1 S2  regular; no murmurs, gallops or rubs  ABDOMEN: Soft, Nontender, Nondistended; Bowel sounds present  EXTREMITIES: no cyanosis; no edema; no calf tenderness  SKIN: warm and dry; no rash  NERVOUS SYSTEM:  Awake and alert; Oriented  to place, person and time ; no new deficits    _________________________________________________  LABS:              CAPILLARY BLOOD GLUCOSE            RADIOLOGY & ADDITIONAL TESTS:    Imaging Personally Reviewed:  YES/NO    Consultant(s) Notes Reviewed:   YES/ No    Care Discussed with Consultants :     Plan of care was discussed with patient and /or primary care giver; all questions and concerns were addressed and care was aligned with patient's wishes.

## 2022-09-30 PROCEDURE — 99232 SBSQ HOSP IP/OBS MODERATE 35: CPT

## 2022-09-30 PROCEDURE — 99232 SBSQ HOSP IP/OBS MODERATE 35: CPT | Mod: 95

## 2022-09-30 RX ADMIN — CHLORHEXIDINE GLUCONATE 1 APPLICATION(S): 213 SOLUTION TOPICAL at 05:41

## 2022-09-30 RX ADMIN — Medication 650 MILLIGRAM(S): at 05:30

## 2022-09-30 RX ADMIN — Medication 30 MILLIGRAM(S): at 05:32

## 2022-09-30 RX ADMIN — LIDOCAINE 1 PATCH: 4 CREAM TOPICAL at 22:15

## 2022-09-30 RX ADMIN — SIMVASTATIN 20 MILLIGRAM(S): 20 TABLET, FILM COATED ORAL at 22:08

## 2022-09-30 RX ADMIN — Medication 50 MILLIGRAM(S): at 22:08

## 2022-09-30 RX ADMIN — Medication 1 SPRAY(S): at 18:54

## 2022-09-30 RX ADMIN — Medication 60 MILLIGRAM(S): at 12:24

## 2022-09-30 RX ADMIN — Medication 3 MILLIGRAM(S): at 22:07

## 2022-09-30 RX ADMIN — LIDOCAINE 1 PATCH: 4 CREAM TOPICAL at 12:24

## 2022-09-30 RX ADMIN — ENOXAPARIN SODIUM 40 MILLIGRAM(S): 100 INJECTION SUBCUTANEOUS at 05:31

## 2022-09-30 RX ADMIN — Medication 1 SPRAY(S): at 05:32

## 2022-09-30 RX ADMIN — ARIPIPRAZOLE 5 MILLIGRAM(S): 15 TABLET ORAL at 12:24

## 2022-09-30 RX ADMIN — Medication 650 MILLIGRAM(S): at 06:00

## 2022-09-30 RX ADMIN — LIDOCAINE 1 PATCH: 4 CREAM TOPICAL at 00:10

## 2022-09-30 NOTE — BH CONSULTATION LIAISON PROGRESS NOTE - NSBHFUPINTERVALHXFT_PSY_A_CORE
No-Patient/Caregiver offered and refused free interpretation services. Patient seen and evaluated. Patient reports that he remains the "same."
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
## 2022-09-30 NOTE — PROGRESS NOTE ADULT - ASSESSMENT
72 y/o male with BPH, HTN and HLD presenting with generalized complaints; SOB. Pt admitted for  workup for dyspnea and depression . CTA chest negative for PE, cardiac w/u negative for ACS.  Pt. required psych eval for verbalization of suicidal ideations and self harm.  Psych notes and recommendations appreciated.  As per psych pt. has chronic passive suicidal ideation with no acute suicidal plan or intent.  No need for CO at this time.  Pt. is not a candidate for  inpatient psych hospitalization, cleared for discharge from psychiatry stand point.  9/14-Pt. with persisting negative expression of thoughts stating "I'm going to die today"  "You think I'm crazy I know" etc.  Tele psych f/u today recc CO and discharge to inpatient psych, SW following.  1:1 initiated, diversional activities such as watching TV, puzzles etc. recommended.    9/16-Remains on 1:1 observation with no behavioral disturbances and no suicidal ideations today.  Pt. communicated being "bored with life" and would like to become someone famous who will be remembered for his greatness.   Awaiting inpatient psych facility placement.    09/19: Awaiting for inpatient psych facility. No bed at this time as per discussion with SW. On 1:1.   Patient seen this morning in NAD, endorses "not happy waking up alive everyday" Continue constant observation. Patient was Covid exposure on 9/20, Covid  neg on 9/20, asymptomatic on droplet precaution. Pending inpatient psych facility placement.  Patient converted COVID+ on 9/22. On room air, asymptomatic. Continue Airborne/contact precaution.    09/23: Per psych constant observation. Continue Airborne/contact precaution. Awaiting for inpatient psych facility.    09/25: Awaiting COVID inpatient Psych facility pending 10d quarantine.  Remains on constant observation.    9/30-Psych facility bed will likely be available on Monday per SW.

## 2022-09-30 NOTE — PROGRESS NOTE ADULT - SUBJECTIVE AND OBJECTIVE BOX
NP Note discussed with  Primary Attending    Patient is a 71y old  Male who presents with a chief complaint of dyspnea (29 Sep 2022 15:57)      INTERVAL HPI/OVERNIGHT EVENTS: no new complaints    MEDICATIONS  (STANDING):  ARIPiprazole 5 milliGRAM(s) Oral daily  chlorhexidine 2% Cloths 1 Application(s) Topical <User Schedule>  enoxaparin Injectable 40 milliGRAM(s) SubCutaneous every 24 hours  FLUoxetine 60 milliGRAM(s) Oral daily  fluticasone propionate 50 MICROgram(s)/spray Nasal Spray 1 Spray(s) Both Nostrils two times a day  lidocaine   4% Patch 1 Patch Transdermal daily  melatonin 3 milliGRAM(s) Oral at bedtime  NIFEdipine XL 30 milliGRAM(s) Oral daily  simvastatin 20 milliGRAM(s) Oral at bedtime  traZODone 50 milliGRAM(s) Oral at bedtime    MEDICATIONS  (PRN):  acetaminophen     Tablet .. 650 milliGRAM(s) Oral every 6 hours PRN Mild Pain (1 - 3)  aluminum hydroxide/magnesium hydroxide/simethicone Suspension 30 milliLiter(s) Oral every 6 hours PRN Dyspepsia      __________________________________________________  REVIEW OF SYSTEMS:    CONSTITUTIONAL: No fever,   EYES: no acute visual disturbances  NECK: No pain or stiffness  RESPIRATORY: No cough; No shortness of breath  CARDIOVASCULAR: No chest pain, no palpitations  GASTROINTESTINAL: No pain. No nausea or vomiting; No diarrhea   NEUROLOGICAL: No headache or numbness, no tremors  MUSCULOSKELETAL: No joint pain, no muscle pain  GENITOURINARY: no dysuria, no frequency, no hesitancy  PSYCHIATRY: no depression , no anxiety  ALL OTHER  ROS negative        Vital Signs Last 24 Hrs  T(C): 36.9 (30 Sep 2022 14:13), Max: 36.9 (30 Sep 2022 14:13)  T(F): 98.5 (30 Sep 2022 14:13), Max: 98.5 (30 Sep 2022 14:13)  HR: 65 (30 Sep 2022 14:13) (60 - 65)  BP: 113/46 (30 Sep 2022 14:13) (113/46 - 147/81)  BP(mean): --  RR: 18 (30 Sep 2022 14:13) (18 - 18)  SpO2: 97% (30 Sep 2022 14:13) (97% - 100%)    Parameters below as of 30 Sep 2022 14:13  Patient On (Oxygen Delivery Method): room air        ________________________________________________  PHYSICAL EXAM:  GENERAL: NAD  HEENT: Normocephalic;  conjunctivae and sclerae clear; moist mucous membranes;   NECK : supple  CHEST/LUNG: Clear to auscultation bilaterally with good air entry   HEART: S1 S2  regular; no murmurs, gallops or rubs  ABDOMEN: Soft, Nontender, Nondistended; Bowel sounds present  EXTREMITIES: no cyanosis; no edema; no calf tenderness  SKIN: warm and dry; no rash  NERVOUS SYSTEM:  Awake and alert; Oriented  to place, person and time ; no new deficits    _________________________________________________  LABS:              CAPILLARY BLOOD GLUCOSE            RADIOLOGY & ADDITIONAL TESTS:    Imaging Personally Reviewed:  YES/NO    Consultant(s) Notes Reviewed:   YES/ No    Care Discussed with Consultants :     Plan of care was discussed with patient and /or primary care giver; all questions and concerns were addressed and care was aligned with patient's wishes.

## 2022-10-01 PROCEDURE — 99232 SBSQ HOSP IP/OBS MODERATE 35: CPT

## 2022-10-01 PROCEDURE — 99231 SBSQ HOSP IP/OBS SF/LOW 25: CPT | Mod: 95

## 2022-10-01 RX ORDER — FLUOXETINE HCL 10 MG
80 CAPSULE ORAL DAILY
Refills: 0 | Status: DISCONTINUED | OUTPATIENT
Start: 2022-10-01 | End: 2022-10-04

## 2022-10-01 RX ORDER — ALPRAZOLAM 0.25 MG
0.25 TABLET ORAL DAILY
Refills: 0 | Status: DISCONTINUED | OUTPATIENT
Start: 2022-10-01 | End: 2022-10-04

## 2022-10-01 RX ORDER — TRAZODONE HCL 50 MG
50 TABLET ORAL AT BEDTIME
Refills: 0 | Status: DISCONTINUED | OUTPATIENT
Start: 2022-10-01 | End: 2022-10-04

## 2022-10-01 RX ORDER — LANOLIN ALCOHOL/MO/W.PET/CERES
5 CREAM (GRAM) TOPICAL AT BEDTIME
Refills: 0 | Status: DISCONTINUED | OUTPATIENT
Start: 2022-10-01 | End: 2022-10-04

## 2022-10-01 RX ADMIN — Medication 30 MILLIGRAM(S): at 05:46

## 2022-10-01 RX ADMIN — Medication 1 SPRAY(S): at 05:45

## 2022-10-01 RX ADMIN — Medication 650 MILLIGRAM(S): at 19:14

## 2022-10-01 RX ADMIN — Medication 5 MILLIGRAM(S): at 21:48

## 2022-10-01 RX ADMIN — Medication 30 MILLILITER(S): at 21:48

## 2022-10-01 RX ADMIN — SIMVASTATIN 20 MILLIGRAM(S): 20 TABLET, FILM COATED ORAL at 21:47

## 2022-10-01 RX ADMIN — Medication 650 MILLIGRAM(S): at 18:16

## 2022-10-01 RX ADMIN — ENOXAPARIN SODIUM 40 MILLIGRAM(S): 100 INJECTION SUBCUTANEOUS at 05:45

## 2022-10-01 RX ADMIN — Medication 650 MILLIGRAM(S): at 09:03

## 2022-10-01 RX ADMIN — LIDOCAINE 1 PATCH: 4 CREAM TOPICAL at 01:00

## 2022-10-01 RX ADMIN — Medication 650 MILLIGRAM(S): at 10:01

## 2022-10-01 RX ADMIN — Medication 80 MILLIGRAM(S): at 18:06

## 2022-10-01 RX ADMIN — ARIPIPRAZOLE 5 MILLIGRAM(S): 15 TABLET ORAL at 12:39

## 2022-10-01 RX ADMIN — Medication 50 MILLIGRAM(S): at 21:48

## 2022-10-01 RX ADMIN — LIDOCAINE 1 PATCH: 4 CREAM TOPICAL at 12:39

## 2022-10-01 RX ADMIN — Medication 1 SPRAY(S): at 18:06

## 2022-10-01 RX ADMIN — LIDOCAINE 1 PATCH: 4 CREAM TOPICAL at 19:30

## 2022-10-01 RX ADMIN — CHLORHEXIDINE GLUCONATE 1 APPLICATION(S): 213 SOLUTION TOPICAL at 05:45

## 2022-10-01 NOTE — PROGRESS NOTE ADULT - SUBJECTIVE AND OBJECTIVE BOX
Interval of present illness: No acute events overnight. Pt seen at bedside. Complains of mild difficulty breathing    REVIEW OF SYSTEMS:    CONSTITUTIONAL: No fever  EYES: No acute visual disturbances  NECK: No pain or stiffness  RESPIRATORY: SOB+  CARDIOVASCULAR: No chest pain, no palpitations  GASTROINTESTINAL: No pain. No nausea or vomiting.  No diarrhea   NEUROLOGICAL: No headache or numbness, no tremors  MUSCULOSKELETAL: no muscle pain  GENITOURINARY: No dysuria, no frequency, no hesitancy  PSYCHIATRY: No depression , no anxiety  ALL OTHER  ROS negative     O:  Vital Signs Last 24 Hrs  T(C): 36.8 (01 Oct 2022 06:00), Max: 37.1 (30 Sep 2022 19:19)  T(F): 98.2 (01 Oct 2022 06:00), Max: 98.7 (30 Sep 2022 19:19)  HR: 60 (01 Oct 2022 06:00) (60 - 72)  BP: 151/68 (01 Oct 2022 06:00) (113/46 - 151/68)  BP(mean): --  RR: 17 (01 Oct 2022 06:00) (17 - 18)  SpO2: 98% (01 Oct 2022 06:00) (97% - 98%)    Parameters below as of 01 Oct 2022 06:00  Patient On (Oxygen Delivery Method): room air        Gen: NAD, sitting in bed  Neuro: alert, answering qs appropriately, moves all extremities  HEENT: anicteric, moist oral mucosa  Neck: supple, no JVD elevation  Cards: RRR  Pulm: good inspiratory effort, good air entry into all lung fields, CTAB  Abd: soft, NT/ND, BS+  Ext: no edema  Skin: warm, dry      acetaminophen     Tablet .. 650 milliGRAM(s) Oral every 6 hours PRN  aluminum hydroxide/magnesium hydroxide/simethicone Suspension 30 milliLiter(s) Oral every 6 hours PRN  ARIPiprazole 5 milliGRAM(s) Oral daily  chlorhexidine 2% Cloths 1 Application(s) Topical <User Schedule>  enoxaparin Injectable 40 milliGRAM(s) SubCutaneous every 24 hours  FLUoxetine 80 milliGRAM(s) Oral daily  fluticasone propionate 50 MICROgram(s)/spray Nasal Spray 1 Spray(s) Both Nostrils two times a day  lidocaine   4% Patch 1 Patch Transdermal daily  melatonin 5 milliGRAM(s) Oral at bedtime  NIFEdipine XL 30 milliGRAM(s) Oral daily  simvastatin 20 milliGRAM(s) Oral at bedtime  traZODone 50 milliGRAM(s) Oral at bedtime
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
## 2022-10-01 NOTE — PROGRESS NOTE ADULT - ASSESSMENT
71M HTN, HLD, p/w non-specific complaints. Workup negative. Sx are psychosomatic. Also expresses suicidal ideation. Hx of multiple admission to psych unit. Discharge pending: discharge to inpatient psych. Now converted to COVID positive so awaiting 10d quarantine.    VSS, PE as above, labs reviewed    #Somatic sx disorder  #Factitious disorder  #MDD  #ANALILIA  #HTN  #HLD  #COVID, new, mild    -awaiting geriatric bed  -appreciate psych recs  -c/w abilify, prozac, trazadone, melatonin  -c/w statin, nifedipine  -dvt ppx.

## 2022-10-01 NOTE — BH CONSULTATION LIAISON PROGRESS NOTE - NSBHCONSULTPSYCHPLAN_PSY_A_CORE FT
Prozac 80mg (increased from 60mg on 10/1/22); trazodone 50mg PO qhs with melatonin 5mg PO qhs for insomnia; Xanax 0.25mg PO QD PRN for anxiety (added on 10/1/22)

## 2022-10-01 NOTE — BH CONSULTATION LIAISON PROGRESS NOTE - NSBHFUPINTERVALHXFT_PSY_A_CORE
No significant interval events. Patient has remained calm, cooperative, medication and treatment compliant. States that the current medication regimen is "not doing anything." He had multiple antidepressant trials in the past and he did the best on Prozac but at the highest dose and then he became tolerant to it. Patient is open to trying a higher dose again. As for sleep, he is not sure if he is sleeping or not but reports that he feels not rested and tired in the morning. Patient reports that he is still depressed; one of the reasons is that he cannot have visitors, he cannot see his friends and his brother. He denies any active imminent thoughts of self harm / denies active suicidal ideation with intent, plan while in the hospital. He states he likely would have suicidality once home. Patient actively engaged with Writer. Main complaint was prolonged isolation in one hospital room with limited movement and un-stimulating environment.     ISTOP reference [id number]  3/4/22 zolpidem 5mg po qhs #10 10 days Marissa Goldberg

## 2022-10-02 PROCEDURE — 99232 SBSQ HOSP IP/OBS MODERATE 35: CPT

## 2022-10-02 PROCEDURE — 99231 SBSQ HOSP IP/OBS SF/LOW 25: CPT | Mod: 95

## 2022-10-02 RX ADMIN — SIMVASTATIN 20 MILLIGRAM(S): 20 TABLET, FILM COATED ORAL at 22:13

## 2022-10-02 RX ADMIN — Medication 50 MILLIGRAM(S): at 22:13

## 2022-10-02 RX ADMIN — Medication 1 SPRAY(S): at 06:03

## 2022-10-02 RX ADMIN — Medication 650 MILLIGRAM(S): at 22:50

## 2022-10-02 RX ADMIN — ARIPIPRAZOLE 5 MILLIGRAM(S): 15 TABLET ORAL at 11:08

## 2022-10-02 RX ADMIN — Medication 1 SPRAY(S): at 17:08

## 2022-10-02 RX ADMIN — Medication 30 MILLILITER(S): at 22:13

## 2022-10-02 RX ADMIN — Medication 650 MILLIGRAM(S): at 06:50

## 2022-10-02 RX ADMIN — Medication 30 MILLIGRAM(S): at 06:03

## 2022-10-02 RX ADMIN — LIDOCAINE 1 PATCH: 4 CREAM TOPICAL at 18:58

## 2022-10-02 RX ADMIN — ENOXAPARIN SODIUM 40 MILLIGRAM(S): 100 INJECTION SUBCUTANEOUS at 06:02

## 2022-10-02 RX ADMIN — Medication 650 MILLIGRAM(S): at 22:14

## 2022-10-02 RX ADMIN — CHLORHEXIDINE GLUCONATE 1 APPLICATION(S): 213 SOLUTION TOPICAL at 05:22

## 2022-10-02 RX ADMIN — Medication 650 MILLIGRAM(S): at 06:02

## 2022-10-02 RX ADMIN — LIDOCAINE 1 PATCH: 4 CREAM TOPICAL at 11:08

## 2022-10-02 RX ADMIN — Medication 80 MILLIGRAM(S): at 11:08

## 2022-10-02 RX ADMIN — Medication 5 MILLIGRAM(S): at 22:12

## 2022-10-02 RX ADMIN — LIDOCAINE 1 PATCH: 4 CREAM TOPICAL at 22:23

## 2022-10-02 RX ADMIN — LIDOCAINE 1 PATCH: 4 CREAM TOPICAL at 00:40

## 2022-10-02 NOTE — PROGRESS NOTE ADULT - SUBJECTIVE AND OBJECTIVE BOX
Interval of present illness: No acute events overnight. Pt seen at bedside. Still complains of difficulty breathing but exam wnl    REVIEW OF SYSTEMS:    CONSTITUTIONAL: No fever  EYES: No acute visual disturbances  NECK: No pain or stiffness  RESPIRATORY: SOB+  CARDIOVASCULAR: No chest pain, no palpitations  GASTROINTESTINAL: No pain. No nausea or vomiting.  No diarrhea   NEUROLOGICAL: No headache or numbness, no tremors  MUSCULOSKELETAL: no muscle pain  GENITOURINARY: No dysuria, no frequency, no hesitancy  PSYCHIATRY: No depression , no anxiety  ALL OTHER  ROS negative     O:  Vital Signs Last 24 Hrs  T(C): 36.8 (01 Oct 2022 06:00), Max: 37.1 (30 Sep 2022 19:19)  T(F): 98.2 (01 Oct 2022 06:00), Max: 98.7 (30 Sep 2022 19:19)  HR: 60 (01 Oct 2022 06:00) (60 - 72)  BP: 151/68 (01 Oct 2022 06:00) (113/46 - 151/68)  BP(mean): --  RR: 17 (01 Oct 2022 06:00) (17 - 18)  SpO2: 98% (01 Oct 2022 06:00) (97% - 98%)    Parameters below as of 01 Oct 2022 06:00  Patient On (Oxygen Delivery Method): room air        Gen: NAD, sitting in bed  Neuro: alert, answering qs appropriately, moves all extremities  HEENT: anicteric, moist oral mucosa  Neck: supple, no JVD elevation  Cards: RRR  Pulm: breathing comfortably, speaking in full sentences, good inspiratory effort, good air entry into all lung fields, CTAB  Abd: soft, NT/ND, BS+  Ext: no edema  Skin: warm, dry      acetaminophen     Tablet .. 650 milliGRAM(s) Oral every 6 hours PRN  aluminum hydroxide/magnesium hydroxide/simethicone Suspension 30 milliLiter(s) Oral every 6 hours PRN  ARIPiprazole 5 milliGRAM(s) Oral daily  chlorhexidine 2% Cloths 1 Application(s) Topical <User Schedule>  enoxaparin Injectable 40 milliGRAM(s) SubCutaneous every 24 hours  FLUoxetine 80 milliGRAM(s) Oral daily  fluticasone propionate 50 MICROgram(s)/spray Nasal Spray 1 Spray(s) Both Nostrils two times a day  lidocaine   4% Patch 1 Patch Transdermal daily  melatonin 5 milliGRAM(s) Oral at bedtime  NIFEdipine XL 30 milliGRAM(s) Oral daily  simvastatin 20 milliGRAM(s) Oral at bedtime  traZODone 50 milliGRAM(s) Oral at bedtime
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
## 2022-10-02 NOTE — BH CONSULTATION LIAISON PROGRESS NOTE - NSBHFUPINTERVALHXFT_PSY_A_CORE
No significant interval events. Patient has remained calm, cooperative, medication and treatment compliant; he is tolerating the increase in the Prozac and melatonin thus far and denies adverse medication side effects. He is demoralized as his friend who lives in [place] cannot come visit him and a hospital staff named Aurelia has not come around in the last few days. Patient thinks it is because "they don't want to see me" so CBT technique done to restructure this thinking. Patient then admitted that a lot of his mood is due to not liking living with his brother and his brother's best friend in the same apartment. 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 Moreover, he complained of his feet - nails are bothering me, pain on walking and said "no one even looked at it to see what's wrong," Hence podiatry consults ordered. Patient said that his feet hurting is a big issue as he always loved to go into [place] 2-3 x/week, and did treasure hunting for old coin in [place] and eat brunch there. He loved attending street fairs and socializing with his friends. COVID-19 put a huge hindrance to this and now weather change etc.  Patient did admit that his mood would likely be much better if he had a A service of some kind who would accompany him to places as he still would like to go out of his home.   Patient reports that his mood is still "depressed" - however, this is predicated on situational dissatisfaction and being away from his usual hobbies/interests (see above. He is aware that medications only help so much. He denies any active imminent thoughts of self harm / denies active suicidal ideation with intent, plan while in the hospital. He does say "well I might as well just kill myself" BUT ONLY when talking about not seeing his friends and not going out anymore. He would also like to have some newspapers etc while waiting for a bed at . Patient actively engaged with Writer. Main complaint was prolonged isolation in one hospital room with limited movement and un-stimulating environment.     ISTOP reference [id number]  3/4/22 zolpidem 5mg po qhs #10 10 days Marissa Goldberg

## 2022-10-03 PROCEDURE — 99232 SBSQ HOSP IP/OBS MODERATE 35: CPT | Mod: 95

## 2022-10-03 PROCEDURE — 99232 SBSQ HOSP IP/OBS MODERATE 35: CPT

## 2022-10-03 RX ADMIN — Medication 50 MILLIGRAM(S): at 21:13

## 2022-10-03 RX ADMIN — Medication 80 MILLIGRAM(S): at 14:11

## 2022-10-03 RX ADMIN — Medication 5 MILLIGRAM(S): at 21:13

## 2022-10-03 RX ADMIN — Medication 1 SPRAY(S): at 06:25

## 2022-10-03 RX ADMIN — Medication 30 MILLIGRAM(S): at 06:28

## 2022-10-03 RX ADMIN — CHLORHEXIDINE GLUCONATE 1 APPLICATION(S): 213 SOLUTION TOPICAL at 14:11

## 2022-10-03 RX ADMIN — ENOXAPARIN SODIUM 40 MILLIGRAM(S): 100 INJECTION SUBCUTANEOUS at 06:25

## 2022-10-03 RX ADMIN — ARIPIPRAZOLE 5 MILLIGRAM(S): 15 TABLET ORAL at 14:11

## 2022-10-03 RX ADMIN — LIDOCAINE 1 PATCH: 4 CREAM TOPICAL at 19:36

## 2022-10-03 RX ADMIN — LIDOCAINE 1 PATCH: 4 CREAM TOPICAL at 14:12

## 2022-10-03 RX ADMIN — Medication 650 MILLIGRAM(S): at 06:26

## 2022-10-03 RX ADMIN — Medication 30 MILLILITER(S): at 06:25

## 2022-10-03 RX ADMIN — Medication 1 SPRAY(S): at 17:19

## 2022-10-03 RX ADMIN — SIMVASTATIN 20 MILLIGRAM(S): 20 TABLET, FILM COATED ORAL at 21:13

## 2022-10-03 NOTE — BH CONSULTATION LIAISON PROGRESS NOTE - NSBHMSEMUSCLE_PSY_A_CORE
Normal muscle tone/strength
Unable to assess

## 2022-10-03 NOTE — BH CONSULTATION LIAISON PROGRESS NOTE - NSBHMSEINSIGHT_PSY_A_CORE
Fair
Other
Poor
Fair
Fair
Poor
Fair

## 2022-10-03 NOTE — BH CONSULTATION LIAISON PROGRESS NOTE - NS ED BHA TELEPSYCH PATIENT LOCATION
[place]
[facility]
[place]
[facility]
[place]
[facility]
[place]

## 2022-10-03 NOTE — PROGRESS NOTE ADULT - NS ATTEND BILL GEN_ALL_CORE
Attending to bill

## 2022-10-03 NOTE — PROGRESS NOTE ADULT - REASON FOR ADMISSION
dyspnea

## 2022-10-03 NOTE — BH CONSULTATION LIAISON PROGRESS NOTE - NSBHMSEMOOD_PSY_A_CORE
Depressed

## 2022-10-03 NOTE — BH CONSULTATION LIAISON PROGRESS NOTE - NSBHDSMAXES_PSY_ALL_CORE
See above

## 2022-10-03 NOTE — BH CONSULTATION LIAISON PROGRESS NOTE - NSBHINDICATION_PSY_ALL_CORE
safety

## 2022-10-03 NOTE — BH CONSULTATION LIAISON PROGRESS NOTE - NSBHFUPREASONCONS_PSY_A_CORE
suicidality/depression
suicidality
suicidality/depression
suicidality
suicidality/depression
depression
suicidality/depression
anxiety/depression
suicidality/depression
suicidality/depression

## 2022-10-03 NOTE — PROGRESS NOTE ADULT - NS_MD_PANP_GEN_ALL_CORE

## 2022-10-03 NOTE — BH CONSULTATION LIAISON PROGRESS NOTE - NSBHMSEJUDGE_PSY_A_CORE
---------------------  From: Kushal Pisano D , Carly   To: Shaunna SANTANA, Samanta; Avril Martinez;     Sent: 10/1/2021 12:33:02 PM CDT  Subject: MTM no longer following pt.     Sending as FYI -     Pt no longer following with MTM.  Has not scheduled follow-up and declined future appt.    I'm happy to follow pt again in future if would be helpful. In that case, please enter a new MTM referral.    Thanks  KB  
Fair
Other
Fair
Poor
Fair
Poor
Fair
Fair

## 2022-10-03 NOTE — BH CONSULTATION LIAISON PROGRESS NOTE - NSBHCONSULTFOLLOW_PSY_ALL_CORE
Not applicable, patient requires inpatient psychiatric admission...
No, psychiatric follow up needed - call with questions...
Not applicable, patient requires inpatient psychiatric admission...

## 2022-10-03 NOTE — BH CONSULTATION LIAISON PROGRESS NOTE - NSBHADMITCOUNSEL_PSY_A_CORE
diagnostic results/impressions and/or recommended studies

## 2022-10-03 NOTE — BH CONSULTATION LIAISON PROGRESS NOTE - NSBHMSEGAIT_PSY_A_CORE
Unable to assess
Normal gait / station
Unable to assess
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
## 2022-10-03 NOTE — BH CONSULTATION LIAISON PROGRESS NOTE - NSBHASSESSMENTFT_PSY_ALL_CORE
71M, unemployed, living with brother, with PMH of HTN, HLD, urinary incontinence 2/2 unclear prostate issues, R leg pain from arthritis, psych hx of MDD, OCD, "mixed personality disorder", reports hx of walking into traffic and of hitting himself, at least three prior hospitalizations ([facility]; [place] 1/4/22-2/24/2022, [facility] 10/22-11/15/2021 for depression w psychosis), has previously been seen at [facility] ED, now admitted to medicine for SOB and chest discomfort, psych consult called after pt expressed a desire to be dead. Patient initially assessed by Tele Psychiatry and cleared on 9/13/2022. Reconsulted on 9/14/2022 in the setting of new collateral information and persistent suicidal thoughts with clear plan and intent as described in social documentation.     PT ADMITTED TO [facility] 2S FROM 7/5-8/25/22, collateral from Dr. De La Rosa available in previous progress note.    9/14/2022: Upon assessment today patient reports suicidal thoughts which have been chronic but now active and persistent. While patient's symptoms may be secondary to MDD along with personality disorder, patient's chronic risk is elevated with plan to harm self by cutting as he done in past. Patient would likely benefit from inpatient psych admission for stabilization.    9/15/2022: Patient endorses persistent SI with plan to cut wrist. Given pphx, collateral provided to , patient remains high risk for self harm. Patient would benefit from inpatient psych admission for stabilization.     9/16/2022: Patient endorses persistent SI with plan to cut wrist. Patient has not endorsed any new protective factors. Given pphx, collateral provided to SW, patient remains high risk for self harm. Patient would benefit from inpatient psych admission for stabilization.     9/17/2022: Patient endorses SI. Unable to engage in safety planning.    9/18/2022: Patient endorses physical illness and SI. Patient is holding for bed.     9/19/2022: Patient remains with active SI.    9/20/2022: Patient reports persistent symptoms.     9/21/2022: Patient reports persistent symptoms. holding for psychiatry bed. in regards to COVID exposure, patient is planned to be tested on 5 days from exposure, which will determine which unit patient can go to.    9/22/2022: Patient is more engaged in interview today and more descriptive of how he is feeling. It appears that patient remains with suicidal thoughts although with no reported plans, it is still concerning as patient endorses rumination and not able to identify reasons for living. Patient likely unable to safety plan. Would recommend inpatient psych admission, pending bed. Patient will be tested for COVID (5 days from said exposure) which will determine which unit patient can be admitted to.     9/23/2022: Patient reports feeling upset about COVID result. Endorses chronic active SI, today plan of jumping off the roof or taking pills, denies intent. Unable to safety plan at this time.    9/26 no significant clinical changes. over  the weekend he expressed interest in [facility] but today he presents as still depressed and hopeless  with suicidal ideation with plan but not intent. the risk assessment has not meaningfully changed so plan will remain to transfer to inpatient  psychiatry once medically cleared.     9/27 - No known episodes of agitation or prns. Patient reports that he is severely depressed with suicidal thoughts.     9/28 - Patient requiring inpatient psych transfer pending bed. Patient was COVID+ on 9/22/2022 9/30 - No new concerns, patient is pending [facility] bed. cont 1:1. patient may be transferred s/p 10 days from positive covid test.  71M, unemployed, living with brother, with PMH of HTN, HLD, urinary incontinence 2/2 unclear prostate issues, R leg pain from arthritis, psych hx of MDD, OCD, "mixed personality disorder", reports hx of walking into traffic and of hitting himself, at least three prior hospitalizations ([facility]; [place] 1/4/22-2/24/2022, [facility] 10/22-11/15/2021 for depression w psychosis), has previously been seen at [place] ED, now admitted to medicine for SOB and chest discomfort, psych consult called after pt expressed a desire to be dead. Patient initially assessed by Tele Psychiatry and cleared on 9/13/2022. Reconsulted on 9/14/2022 in the setting of new collateral information and persistent suicidal thoughts with clear plan and intent as described in social documentation.     PT ADMITTED TO [facility] 2S FROM 7/5-8/25/22, collateral from Dr. De La Rosa available in previous progress note.    9/14/2022: Upon assessment today patient reports suicidal thoughts which have been chronic but now active and persistent. While patient's symptoms may be secondary to MDD along with personality disorder, patient's chronic risk is elevated with plan to harm self by cutting as he done in past. Patient would likely benefit from inpatient psych admission for stabilization.    9/15/2022: Patient endorses persistent SI with plan to cut wrist. Given pphx, collateral provided to , patient remains high risk for self harm. Patient would benefit from inpatient psych admission for stabilization.     9/16/2022: Patient endorses persistent SI with plan to cut wrist. Patient has not endorsed any new protective factors. Given pphx, collateral provided to SW, patient remains high risk for self harm. Patient would benefit from inpatient psych admission for stabilization.     9/17/2022: Patient endorses SI. Unable to engage in safety planning.    9/18/2022: Patient endorses physical illness and SI. Patient is holding for bed.     9/19/2022: Patient remains with active SI.    9/20/2022: Patient reports persistent symptoms.     9/21/2022: Patient reports persistent symptoms. holding for psychiatry bed. in regards to COVID exposure, patient is planned to be tested on 5 days from exposure, which will determine which unit patient can go to.    9/22/2022: Patient is more engaged in interview today and more descriptive of how he is feeling. It appears that patient remains with suicidal thoughts although with no reported plans, it is still concerning as patient endorses rumination and not able to identify reasons for living. Patient likely unable to safety plan. Would recommend inpatient psych admission, pending bed. Patient will be tested for COVID (5 days from said exposure) which will determine which unit patient can be admitted to.     9/23/2022: Patient reports feeling upset about COVID result. Endorses chronic active SI, today plan of jumping off the roof or taking pills, denies intent. Unable to safety plan at this time.    9/26 no significant clinical changes. over  the weekend he expressed interest in [facility] but today he presents as still depressed and hopeless  with suicidal ideation with plan but not intent. the risk assessment has not meaningfully changed so plan will remain to transfer to inpatient  psychiatry once medically cleared.     9/27 - No known episodes of agitation or prns. Patient reports that he is severely depressed with suicidal thoughts.     9/28 - Patient requiring inpatient psych transfer pending bed. Patient was COVID+ on 9/22/2022 9/30 - No new concerns, patient is pending [facility] bed. cont 1:1. patient may be transferred s/p 10 days from positive covid test.     71M, unemployed, living with brother, with PMH of HTN, HLD, urinary incontinence 2/2 unclear prostate issues, R leg pain from arthritis, psych hx of MDD, OCD, "mixed personality disorder", reports hx of walking into traffic and of hitting himself, at least three prior hospitalizations ([facility]; [place] 1/4/22-2/24/2022, [facility] 10/22-11/15/2021 for depression w psychosis), has previously been seen at [place] ED, now admitted to medicine for SOB and chest discomfort, psych consult called after pt expressed a desire to be dead. Patient initially assessed by Tele Psychiatry and cleared on 9/13/2022. Reconsulted on 9/14/2022 in the setting of new collateral information and persistent suicidal thoughts.     PT ADMITTED TO [facility] 2S FROM 7/5-8/25/22, collateral from Dr. De La Rosa available in previous progress note.    9/14/2022: Upon assessment today patient reports suicidal thoughts which have been chronic but now active and persistent. While patient's symptoms may be secondary to MDD along with personality disorder, patient's chronic risk is elevated with plan to harm self by cutting as he done in past. Patient would likely benefit from inpatient psych admission for stabilization.    9/15/2022: Patient endorses persistent SI with plan to cut wrist. Given pphx, collateral provided to SW, patient remains high risk for self harm. Patient would benefit from inpatient psych admission for stabilization.   9/16/2022: Patient endorses persistent SI with plan to cut wrist. Patient has not endorsed any new protective factors. Given pphx, collateral provided to SW, patient remains high risk for self harm. Patient would benefit from inpatient psych admission for stabilization.   9/17/2022: Patient endorses SI. Unable to engage in safety planning.  9/18/2022: Patient endorses physical illness and SI. Patient is holding for bed.   9/19/2022: Patient remains with active SI.  9/20/2022: Patient reports persistent symptoms.   9/21/2022: Patient reports persistent symptoms. holding for psychiatry bed. in regards to COVID exposure, patient is planned to be tested on 5 days from exposure, which will determine which unit patient can go to.  9/22/2022: Patient is more engaged in interview today and more descriptive of how he is feeling. It appears that patient remains with suicidal thoughts although with no reported plans, it is still concerning as patient endorses rumination and not able to identify reasons for living. Patient likely unable to safety plan. Would recommend inpatient psych admission, pending bed. Patient will be tested for COVID (5 days from said exposure) which will determine which unit patient can be admitted to.   9/23/2022: Patient reports feeling upset about COVID result. Endorses chronic active SI, today plan of jumping off the roof or taking pills, denies intent. Unable to safety plan at this time.  9/26 no significant clinical changes. over  the weekend he expressed interest in [facility] but today he presents as still depressed and hopeless  with suicidal ideation with plan but not intent. the risk assessment has not meaningfully changed so plan will remain to transfer to inpatient  psychiatry once medically cleared.   9/27 - No known episodes of agitation or prns. Patient reports that he is severely depressed with suicidal thoughts.   9/28 - Patient requiring inpatient psych transfer pending bed. Patient was COVID+ on 9/22/2022 9/30 - No new concerns, patient is pending [facility] bed. cont 1:1. patient may be transferred s/p 10 days from positive covid test.   10/1/22: calm, cooperative, medication and treatment compliant. States that the current medication regimen is "not doing anything." He had multiple antidepressant trials in the past and he did the best on Prozac but at the highest dose and then he became tolerant to it; open to trying a higher dose again; denies any active imminent thoughts of self harm / denies active suicidal ideation with intent, plan while in the hospital. He states he likely would have suicidality once home. + Main complaint was prolonged isolation in one hospital room with limited movement and un-stimulating environment.  - increase Prozac to 80mg PO qd (no contraindications TTE normal; EKG insignificant; normal renal, hepatic functioning ); increase melatonin to 5mg PO qhs for enhancement of sleep quality; Xanax 0.25mg PO qd PRN for anxiousness  - can be tried on Enhanced Supervision; safety tray/plastic utensils  - cannot leave AMA; discussed with ADN   10/2/22: tolerating increased Proxac, melatonin doses. + Podiatry consult ordered (see HPI)  - please provide newspaper, magazines or books to Patient while he is awaiting a bed in inpatient psychiatry   - continue Enhanced Supervision; safety tray/plastic utensils  - cannot leave AMA; discussed with ADN    10/3/2022: Patient endorses persistent suicidality with worsening depression in the setting of isolation and poor social support. Given pphx and concerning statements made previously to the social work team, patient appears to be high risk for self harm. Patient would benefit from inpatient psych admission for medication adjustment, therapeutic milieu and establishing follow up.

## 2022-10-03 NOTE — BH CONSULTATION LIAISON PROGRESS NOTE - NSBHCONSFOLLOWLOC_PSY_ALL_CORE
Other (specify)

## 2022-10-03 NOTE — BH CONSULTATION LIAISON PROGRESS NOTE - NSBHATTESTTYPEVISIT_PSY_A_CORE
Attending Only

## 2022-10-03 NOTE — BH CONSULTATION LIAISON PROGRESS NOTE - NSBHADMITCOORDWITH_PSY_A_CORE
medical staff

## 2022-10-03 NOTE — PROGRESS NOTE ADULT - NS ATTEND OPT1 GEN_ALL_CORE
I attest my time as attending is greater than 50% of the total combined time spent on qualifying patient care activities by the PA/NP and attending.

## 2022-10-03 NOTE — BH CONSULTATION LIAISON PROGRESS NOTE - NSICDXBHPRIMARYDX_PSY_ALL_CORE
Depressed   F32.A  
Depression with anxiety   F41.8  
Depressed   F32.A  

## 2022-10-03 NOTE — BH CONSULTATION LIAISON PROGRESS NOTE - NSBHCONSULTPSYCHPLAN_PSY_A_CORE FT
1. Fluoxetine 60 mg q24 po   2. Continue Abilify 5 mg q24 for now, may titrate once fluoxetine is at adequate dose  3. Continue Trazodone 50 mg qhs 1. Cont Fluoxetine 80 mg q24 po   2. Continue Abilify 5 mg q24 for now, may titrate once fluoxetine is at adequate dose  3. Continue Trazodone 50 mg qhs

## 2022-10-03 NOTE — BH CONSULTATION LIAISON PROGRESS NOTE - NSBHCONSULTRECOMMENDOTHER_PSY_A_CORE FT
see above 
increase Prozac to 80mg PO qd (no contraindications TTE normal; EKG insignificant; normal renal, hepatic functioning ); increase melatonin to 5mg PO qhs for enhancement of sleep quality; Xanax 0.25mg PO qd PRN for anxiousness  - can be tried on Enhanced Supervision; safety tray/plastic utensils
see above 
increase Prozac to 80mg PO qd (no contraindications TTE normal; EKG insignificant; normal renal, hepatic functioning ); increase melatonin to 5mg PO qhs for enhancement of sleep quality; Xanax 0.25mg PO qd PRN for anxiousness  - can be tried on Enhanced Supervision; safety tray/plastic utensils
see above 

## 2022-10-03 NOTE — PROGRESS NOTE ADULT - PROBLEM SELECTOR PLAN 10
Pt is medically cleared for discharge   Pt. recommended discharge to inpatient psych, pending bed availability  SW/CM following  Covid exposure on 9/20  Converted Covid + on 9/22  Awaiting COVID inpatient Psych facility pending 10d quarantine.  9/27 No beds available, may need a new Psych facility
Pt is medically cleared for discharge   Pt. recommended discharge to inpatient psych, pending bed availability  SW/CM following  Of note, pt was Covid exposure on 9/20,   Converted Covid + on 9/22
Pt is medically cleared for discharge   Pt. recommended discharge to inpatient psych, pending bed availability  SW/CM following  Of note, pt was Covid exposure on 9/20  Converted Covid + on 9/22
Pt is medically cleared for discharge   Pt. recommended discharge to inpatient psych, pending bed availability  SW/CM following  Covid exposure on 9/20  Converted Covid + on 9/22  Awaiting COVID inpatient Psych facility pending 10d quarantine.  9/27 No beds available, may need a new Psych facility
Pt is medically cleared for discharge   Pt. recommended discharge to inpatient psych, pending bed availability  SW/CM following  Covid exposure on 9/20  Converted Covid + on 9/22  Awaiting COVID inpatient Psych facility pending 10d quarantine.  9/27 No beds available, may need a new Psych facility
Pt is medically cleared for discharge   Pt. recommended discharge to inpatient psych, pending bed availability  SW/CM following  Covid exposure on 9/20  Converted Covid + on 9/22  Awaiting COVID inpatient Psych facility pending 10d quarantine
Pt is medically cleared for discharge   Pt. recommended discharge to inpatient psych, pending bed availability  SW/CM following  Of note, pt was Covid exposure on 9/20  Converted Covid + on 9/22
Pt is medically cleared for discharge   Pt. recommended discharge to inpatient psych, pending bed availability  SW/CM following  Of note, pt was Covid exposure on 9/20,   Converted Covid + on 9/22

## 2022-10-03 NOTE — PROGRESS NOTE ADULT - PROVIDER SPECIALTY LIST ADULT
Internal Medicine

## 2022-10-03 NOTE — BH CONSULTATION LIAISON PROGRESS NOTE - NSBHFUPINTERVALHXFT_PSY_A_CORE
Patient seen and evaluated. Patient states that the weekend was "miserable." He reports that he is still having trouble getting in touch with his friend, Grace, who he identifies as his main source of support. He endorses feelings of loneliness and reports that his life is "depressing." Patient reports persistent suicidal ideation with vague plan of cutting. He states that he does not feel that he would be safe if he went home at this time.

## 2022-10-03 NOTE — PROGRESS NOTE ADULT - SUBJECTIVE AND OBJECTIVE BOX
NP Note discussed with  primary attending    Patient is a 71y old  Male who presents with a chief complaint of dyspnea (02 Oct 2022 13:19)      INTERVAL HPI/OVERNIGHT EVENTS: No new complaints    MEDICATIONS  (STANDING):  ARIPiprazole 5 milliGRAM(s) Oral daily  chlorhexidine 2% Cloths 1 Application(s) Topical <User Schedule>  enoxaparin Injectable 40 milliGRAM(s) SubCutaneous every 24 hours  FLUoxetine 80 milliGRAM(s) Oral daily  fluticasone propionate 50 MICROgram(s)/spray Nasal Spray 1 Spray(s) Both Nostrils two times a day  lidocaine   4% Patch 1 Patch Transdermal daily  melatonin 5 milliGRAM(s) Oral at bedtime  NIFEdipine XL 30 milliGRAM(s) Oral daily  simvastatin 20 milliGRAM(s) Oral at bedtime  traZODone 50 milliGRAM(s) Oral at bedtime    MEDICATIONS  (PRN):  acetaminophen     Tablet .. 650 milliGRAM(s) Oral every 6 hours PRN Mild Pain (1 - 3)  ALPRAZolam 0.25 milliGRAM(s) Oral daily PRN anxiety / anxiousness / mild agitation  aluminum hydroxide/magnesium hydroxide/simethicone Suspension 30 milliLiter(s) Oral every 6 hours PRN Dyspepsia      __________________________________________________  REVIEW OF SYSTEMS:    CONSTITUTIONAL: No fever  EYES: No acute visual disturbances  NECK: No pain or stiffness  RESPIRATORY: No cough; No shortness of breath  CARDIOVASCULAR: No chest pain, no palpitations  GASTROINTESTINAL: No pain. No nausea or vomiting.  No diarrhea   NEUROLOGICAL: No headache or numbness, no tremors  MUSCULOSKELETAL: No joint pain, no muscle pain  GENITOURINARY: No dysuria, no frequency, no hesitancy  PSYCHIATRY: No depression , no anxiety  ALL OTHER  ROS negative      Vital Signs Last 24 Hrs  T(C): 36.7 (03 Oct 2022 13:00), Max: 36.7 (03 Oct 2022 13:00)  T(F): 98 (03 Oct 2022 13:00), Max: 98 (03 Oct 2022 13:00)  HR: 66 (03 Oct 2022 13:00) (66 - 72)  BP: 121/71 (03 Oct 2022 13:00) (121/71 - 145/66)  BP(mean): 86 (03 Oct 2022 05:36) (86 - 86)  RR: 17 (03 Oct 2022 13:00) (17 - 18)  SpO2: 99% (03 Oct 2022 13:00) (99% - 99%)    Parameters below as of 03 Oct 2022 13:00  Patient On (Oxygen Delivery Method): room air        ________________________________________________  PHYSICAL EXAM:  GENERAL: NAD  HEENT: Normocephalic;  conjunctivae and sclerae clear; moist mucous membranes;   NECK : Supple  CHEST/LUNG: Clear to auscultation bilaterally with good air entry   HEART: S1 S2  regular; no murmurs, gallops or rubs  ABDOMEN: Soft, Nontender, Nondistended; Bowel sounds present x 4 quad  EXTREMITIES: No cyanosis; no edema; no calf tenderness  SKIN: Warm and dry; no rash  NERVOUS SYSTEM:  Awake and alert; Flat affect.  No new deficits.   _________________________________________________  LABS:              CAPILLARY BLOOD GLUCOSE            RADIOLOGY & ADDITIONAL TESTS:    Imaging Personally Reviewed:  YES    Consultant(s) Notes Reviewed:   YES    Care Discussed with Consultants :     Plan of care was discussed with patient and /or primary care giver; all questions and concerns were addressed and care was aligned with patient's wishes.

## 2022-10-03 NOTE — BH CONSULTATION LIAISON PROGRESS NOTE - NSBHADMITIPOBS_PSY_A_CORE
Constant observation
Enhanced supervision
Constant observation
Enhanced supervision
Constant observation
Routine observation
Constant observation
Constant observation

## 2022-10-03 NOTE — BH CONSULTATION LIAISON PROGRESS NOTE - CONSULT REQUEST TIME
14-Sep-2022 10:52
25-Sep-2022 12:01
02-Oct-2022 13:00
14-Sep-2022 10:52
27-Sep-2022 16:41
14-Sep-2022 10:52
26-Sep-2022 11:31
14-Sep-2022 10:52
14-Sep-2022 10:52
13-Sep-2022 11:04
14-Sep-2022 10:52
25-Sep-2022 12:01
14-Sep-2022 10:52
27-Sep-2022 16:41
14-Sep-2022 10:52
01-Oct-2022 12:01
27-Sep-2022 16:41
14-Sep-2022 10:52
14-Sep-2022 10:52
27-Sep-2022 16:41

## 2022-10-03 NOTE — BH CONSULTATION LIAISON PROGRESS NOTE - NSBHMSETHTCONTENT_PSY_A_CORE
Ruminations/Suicidality/Other
Ruminations/Other
Ruminations/Suicidality/Other
Ruminations/Other
Ruminations/Suicidality/Other
Ruminations/Other
Ruminations/Suicidality/Other

## 2022-10-03 NOTE — BH CONSULTATION LIAISON PROGRESS NOTE - NSBHMSEAFFRANGE_PSY_A_CORE
Constricted
Constricted/Other
Constricted
Constricted/Other
Constricted
Full/Constricted
Constricted

## 2022-10-03 NOTE — BH CONSULTATION LIAISON PROGRESS NOTE - NSBHCHARTREVIEWVS_PSY_A_CORE FT
Vital Signs Last 24 Hrs  T(C): 36.7 (25 Sep 2022 14:25), Max: 36.7 (24 Sep 2022 21:10)  T(F): 98.1 (25 Sep 2022 14:25), Max: 98.1 (24 Sep 2022 21:10)  HR: 68 (25 Sep 2022 14:25) (68 - 70)  BP: 111/69 (25 Sep 2022 14:25) (111/69 - 166/86)  BP(mean): 79 (25 Sep 2022 14:25) (79 - 84)  RR: 17 (25 Sep 2022 14:25) (17 - 18)  SpO2: 100% (25 Sep 2022 14:25) (99% - 100%)    Parameters below as of 25 Sep 2022 14:25  Patient On (Oxygen Delivery Method): room air    
Vital Signs Last 24 Hrs  T(C): 36.3 (19 Sep 2022 04:36), Max: 36.7 (18 Sep 2022 19:11)  T(F): 97.3 (19 Sep 2022 04:36), Max: 98 (18 Sep 2022 19:11)  HR: 65 (19 Sep 2022 04:36) (65 - 74)  BP: 138/70 (19 Sep 2022 04:36) (121/73 - 152/77)  BP(mean): --  RR: 19 (19 Sep 2022 04:36) (18 - 19)  SpO2: 97% (19 Sep 2022 04:36) (96% - 99%)    Parameters below as of 19 Sep 2022 04:36  Patient On (Oxygen Delivery Method): room air    
Vital Signs Last 24 Hrs  T(C): 37.1 (30 Sep 2022 19:19), Max: 37.1 (30 Sep 2022 19:19)  T(F): 98.7 (30 Sep 2022 19:19), Max: 98.7 (30 Sep 2022 19:19)  HR: 72 (30 Sep 2022 19:19) (62 - 72)  BP: 131/69 (30 Sep 2022 19:19) (113/46 - 147/81)  BP(mean): --  RR: 18 (30 Sep 2022 19:19) (18 - 18)  SpO2: 98% (30 Sep 2022 19:19) (97% - 100%)    Parameters below as of 30 Sep 2022 19:19  Patient On (Oxygen Delivery Method): room air    
Vital Signs Last 24 Hrs  T(C): 36.7 (18 Sep 2022 21:01), Max: 36.7 (18 Sep 2022 19:11)  T(F): 98 (18 Sep 2022 21:01), Max: 98 (18 Sep 2022 19:11)  HR: 70 (18 Sep 2022 21:01) (59 - 74)  BP: 152/77 (18 Sep 2022 21:01) (121/73 - 152/77)  BP(mean): --  RR: 18 (18 Sep 2022 21:01) (18 - 18)  SpO2: 96% (18 Sep 2022 21:01) (95% - 99%)    Parameters below as of 18 Sep 2022 21:01  Patient On (Oxygen Delivery Method): room air    
Vital Signs Last 24 Hrs  T(C): 36.4 (26 Sep 2022 05:15), Max: 36.8 (25 Sep 2022 21:10)  T(F): 97.6 (26 Sep 2022 05:15), Max: 98.2 (25 Sep 2022 21:10)  HR: 80 (26 Sep 2022 05:15) (66 - 80)  BP: 143/77 (26 Sep 2022 05:15) (111/69 - 143/77)  BP(mean): 79 (25 Sep 2022 14:25) (79 - 79)  RR: 17 (26 Sep 2022 05:15) (16 - 17)  SpO2: 97% (26 Sep 2022 05:15) (97% - 100%)    Parameters below as of 26 Sep 2022 05:15  Patient On (Oxygen Delivery Method): room air    
Vital Signs Last 24 Hrs  T(C): 36.6 (16 Sep 2022 20:27), Max: 37 (16 Sep 2022 05:18)  T(F): 97.8 (16 Sep 2022 20:27), Max: 98.6 (16 Sep 2022 05:18)  HR: 69 (16 Sep 2022 20:27) (60 - 69)  BP: 126/73 (16 Sep 2022 20:27) (124/69 - 127/75)  BP(mean): --  RR: 17 (16 Sep 2022 20:27) (16 - 17)  SpO2: 99% (16 Sep 2022 20:27) (98% - 99%)    Parameters below as of 16 Sep 2022 20:27  Patient On (Oxygen Delivery Method): room air    
Vital Signs Last 24 Hrs  T(C): 36.8 (02 Oct 2022 12:20), Max: 36.8 (02 Oct 2022 12:20)  T(F): 98.2 (02 Oct 2022 12:20), Max: 98.2 (02 Oct 2022 12:20)  HR: 70 (02 Oct 2022 12:20) (64 - 74)  BP: 148/72 (02 Oct 2022 12:20) (138/77 - 151/70)  BP(mean): 93 (01 Oct 2022 13:55) (93 - 93)  RR: 18 (02 Oct 2022 12:20) (17 - 18)  SpO2: 100% (02 Oct 2022 12:20) (96% - 100%)    Parameters below as of 02 Oct 2022 12:20  Patient On (Oxygen Delivery Method): room air    
Vital Signs Last 24 Hrs  T(C): 36.7 (18 Sep 2022 21:01), Max: 36.7 (18 Sep 2022 19:11)  T(F): 98 (18 Sep 2022 21:01), Max: 98 (18 Sep 2022 19:11)  HR: 70 (18 Sep 2022 21:01) (59 - 74)  BP: 152/77 (18 Sep 2022 21:01) (121/73 - 152/77)  BP(mean): --  RR: 18 (18 Sep 2022 21:01) (18 - 18)  SpO2: 96% (18 Sep 2022 21:01) (95% - 99%)    Parameters below as of 18 Sep 2022 21:01  Patient On (Oxygen Delivery Method): room air    
Vital Signs Last 24 Hrs  T(C): 36.5 (21 Sep 2022 13:48), Max: 36.9 (20 Sep 2022 20:08)  T(F): 97.7 (21 Sep 2022 13:48), Max: 98.5 (20 Sep 2022 21:04)  HR: 61 (21 Sep 2022 13:48) (60 - 67)  BP: 135/76 (21 Sep 2022 13:48) (133/73 - 150/87)  BP(mean): 92 (21 Sep 2022 13:48) (92 - 92)  RR: 16 (21 Sep 2022 13:48) (16 - 17)  SpO2: 97% (21 Sep 2022 13:48) (96% - 98%)    Parameters below as of 21 Sep 2022 13:48  Patient On (Oxygen Delivery Method): room air    
Vital Signs Last 24 Hrs  T(C): 36.5 (25 Sep 2022 05:06), Max: 36.7 (24 Sep 2022 21:10)  T(F): 97.7 (25 Sep 2022 05:06), Max: 98.1 (24 Sep 2022 21:10)  HR: 70 (25 Sep 2022 05:06) (67 - 70)  BP: 166/86 (25 Sep 2022 05:06) (107/59 - 166/86)  BP(mean): 84 (24 Sep 2022 21:10) (84 - 84)  RR: 18 (25 Sep 2022 05:06) (17 - 18)  SpO2: 99% (25 Sep 2022 05:06) (99% - 100%)    Parameters below as of 25 Sep 2022 05:06  Patient On (Oxygen Delivery Method): room air    
Vital Signs Last 24 Hrs  T(C): 36.4 (23 Sep 2022 05:15), Max: 36.9 (22 Sep 2022 19:22)  T(F): 97.6 (23 Sep 2022 05:15), Max: 98.4 (22 Sep 2022 19:22)  HR: 71 (23 Sep 2022 05:15) (59 - 71)  BP: 170/83 (23 Sep 2022 05:15) (136/73 - 170/83)  BP(mean): 89 (22 Sep 2022 19:22) (89 - 89)  RR: 17 (23 Sep 2022 05:15) (17 - 17)  SpO2: 98% (23 Sep 2022 05:15) (98% - 99%)    Parameters below as of 23 Sep 2022 05:15  Patient On (Oxygen Delivery Method): room air    
Vital Signs Last 24 Hrs  T(C): 36.5 (21 Sep 2022 13:48), Max: 36.9 (20 Sep 2022 20:08)  T(F): 97.7 (21 Sep 2022 13:48), Max: 98.5 (20 Sep 2022 21:04)  HR: 61 (21 Sep 2022 13:48) (60 - 67)  BP: 135/76 (21 Sep 2022 13:48) (133/73 - 150/87)  BP(mean): 92 (21 Sep 2022 13:48) (92 - 92)  RR: 16 (21 Sep 2022 13:48) (16 - 17)  SpO2: 97% (21 Sep 2022 13:48) (96% - 98%)    Parameters below as of 21 Sep 2022 13:48  Patient On (Oxygen Delivery Method): room air    
Vital Signs Last 24 Hrs  T(C): 36.8 (01 Oct 2022 06:00), Max: 37.1 (30 Sep 2022 19:19)  T(F): 98.2 (01 Oct 2022 06:00), Max: 98.7 (30 Sep 2022 19:19)  HR: 60 (01 Oct 2022 06:00) (60 - 72)  BP: 151/68 (01 Oct 2022 06:00) (113/46 - 151/68)  BP(mean): --  RR: 17 (01 Oct 2022 06:00) (17 - 18)  SpO2: 98% (01 Oct 2022 06:00) (97% - 98%)    Parameters below as of 01 Oct 2022 06:00  Patient On (Oxygen Delivery Method): room air    
Vital Signs Last 24 Hrs  T(C): 37 (16 Sep 2022 05:18), Max: 37 (16 Sep 2022 05:18)  T(F): 98.6 (16 Sep 2022 05:18), Max: 98.6 (16 Sep 2022 05:18)  HR: 60 (16 Sep 2022 05:18) (60 - 66)  BP: 124/69 (16 Sep 2022 05:18) (100/54 - 125/66)  BP(mean): --  RR: 17 (16 Sep 2022 05:18) (14 - 17)  SpO2: 98% (16 Sep 2022 05:18) (96% - 98%)    Parameters below as of 16 Sep 2022 05:18  Patient On (Oxygen Delivery Method): room air    
Vital Signs Last 24 Hrs  T(C): 35.7 (13 Sep 2022 05:20), Max: 36.4 (12 Sep 2022 20:32)  T(F): 96.3 (13 Sep 2022 05:20), Max: 97.6 (12 Sep 2022 20:32)  HR: 72 (13 Sep 2022 09:16) (63 - 72)  BP: 116/70 (13 Sep 2022 09:16) (116/70 - 136/87)  BP(mean): --  RR: 18 (13 Sep 2022 05:20) (17 - 18)  SpO2: 96% (13 Sep 2022 05:20) (96% - 98%)    Parameters below as of 13 Sep 2022 05:20  Patient On (Oxygen Delivery Method): room air    
Vital Signs Last 24 Hrs  T(C): 36.9 (29 Sep 2022 13:50), Max: 36.9 (29 Sep 2022 13:50)  T(F): 98.4 (29 Sep 2022 13:50), Max: 98.4 (29 Sep 2022 13:50)  HR: 62 (29 Sep 2022 13:50) (61 - 78)  BP: 123/70 (29 Sep 2022 13:50) (123/70 - 154/79)  BP(mean): 83 (29 Sep 2022 13:50) (83 - 83)  RR: 18 (29 Sep 2022 13:50) (18 - 18)  SpO2: 97% (29 Sep 2022 13:50) (96% - 98%)    Parameters below as of 29 Sep 2022 13:50  Patient On (Oxygen Delivery Method): room air    
Vital Signs Last 24 Hrs  T(C): 37 (27 Sep 2022 13:43), Max: 37.1 (26 Sep 2022 20:06)  T(F): 98.6 (27 Sep 2022 13:43), Max: 98.7 (26 Sep 2022 20:06)  HR: 70 (27 Sep 2022 13:43) (70 - 72)  BP: 106/63 (27 Sep 2022 13:43) (106/63 - 131/86)  BP(mean): --  RR: 17 (27 Sep 2022 13:43) (16 - 17)  SpO2: 97% (27 Sep 2022 13:43) (97% - 100%)    Parameters below as of 27 Sep 2022 13:43  Patient On (Oxygen Delivery Method): room air    
Vital Signs Last 24 Hrs  T(C): 36.6 (03 Oct 2022 20:16), Max: 36.7 (03 Oct 2022 13:00)  T(F): 97.9 (03 Oct 2022 20:16), Max: 98 (03 Oct 2022 13:00)  HR: 66 (03 Oct 2022 20:16) (66 - 71)  BP: 155/75 (03 Oct 2022 20:16) (121/71 - 155/75)  BP(mean): 86 (03 Oct 2022 05:36) (86 - 86)  RR: 17 (03 Oct 2022 20:16) (17 - 18)  SpO2: 98% (03 Oct 2022 20:16) (98% - 99%)    Parameters below as of 03 Oct 2022 20:16  Patient On (Oxygen Delivery Method): room air    
Vital Signs Last 24 Hrs  T(C): 36.2 (22 Sep 2022 05:15), Max: 36.8 (21 Sep 2022 20:32)  T(F): 97.2 (22 Sep 2022 05:15), Max: 98.3 (21 Sep 2022 20:32)  HR: 66 (22 Sep 2022 05:15) (63 - 66)  BP: 119/69 (22 Sep 2022 05:15) (119/69 - 150/81)  BP(mean): --  RR: 17 (22 Sep 2022 05:15) (17 - 17)  SpO2: 98% (22 Sep 2022 05:15) (96% - 98%)    Parameters below as of 22 Sep 2022 05:15  Patient On (Oxygen Delivery Method): room air    
Vital Signs Last 24 Hrs  T(C): 36.4 (15 Sep 2022 04:31), Max: 36.4 (14 Sep 2022 20:36)  T(F): 97.6 (15 Sep 2022 04:31), Max: 97.6 (15 Sep 2022 04:31)  HR: 63 (15 Sep 2022 04:31) (63 - 68)  BP: 162/92 (15 Sep 2022 04:31) (119/63 - 162/92)  BP(mean): --  RR: 16 (15 Sep 2022 04:31) (14 - 18)  SpO2: 98% (15 Sep 2022 04:31) (95% - 98%)    Parameters below as of 15 Sep 2022 04:31  Patient On (Oxygen Delivery Method): room air

## 2022-10-03 NOTE — BH CONSULTATION LIAISON PROGRESS NOTE - CURRENT MEDICATION
MEDICATIONS  (STANDING):  ARIPiprazole 5 milliGRAM(s) Oral daily  chlorhexidine 2% Cloths 1 Application(s) Topical <User Schedule>  enoxaparin Injectable 40 milliGRAM(s) SubCutaneous every 24 hours  FLUoxetine 60 milliGRAM(s) Oral daily  fluticasone propionate 50 MICROgram(s)/spray Nasal Spray 1 Spray(s) Both Nostrils two times a day  lidocaine   4% Patch 1 Patch Transdermal daily  melatonin 3 milliGRAM(s) Oral at bedtime  NIFEdipine XL 30 milliGRAM(s) Oral daily  potassium chloride    Tablet ER 40 milliEquivalent(s) Oral once  simvastatin 20 milliGRAM(s) Oral at bedtime  traZODone 50 milliGRAM(s) Oral at bedtime    MEDICATIONS  (PRN):  acetaminophen     Tablet .. 650 milliGRAM(s) Oral every 6 hours PRN Mild Pain (1 - 3)  aluminum hydroxide/magnesium hydroxide/simethicone Suspension 30 milliLiter(s) Oral every 6 hours PRN Dyspepsia  
MEDICATIONS  (STANDING):  ARIPiprazole 5 milliGRAM(s) Oral daily  chlorhexidine 2% Cloths 1 Application(s) Topical <User Schedule>  enoxaparin Injectable 40 milliGRAM(s) SubCutaneous every 24 hours  FLUoxetine 80 milliGRAM(s) Oral daily  fluticasone propionate 50 MICROgram(s)/spray Nasal Spray 1 Spray(s) Both Nostrils two times a day  lidocaine   4% Patch 1 Patch Transdermal daily  melatonin 5 milliGRAM(s) Oral at bedtime  NIFEdipine XL 30 milliGRAM(s) Oral daily  simvastatin 20 milliGRAM(s) Oral at bedtime  traZODone 50 milliGRAM(s) Oral at bedtime    MEDICATIONS  (PRN):  acetaminophen     Tablet .. 650 milliGRAM(s) Oral every 6 hours PRN Mild Pain (1 - 3)  ALPRAZolam 0.25 milliGRAM(s) Oral daily PRN anxiety / anxiousness / mild agitation  aluminum hydroxide/magnesium hydroxide/simethicone Suspension 30 milliLiter(s) Oral every 6 hours PRN Dyspepsia  
MEDICATIONS  (STANDING):  ARIPiprazole 5 milliGRAM(s) Oral daily  chlorhexidine 2% Cloths 1 Application(s) Topical <User Schedule>  enoxaparin Injectable 40 milliGRAM(s) SubCutaneous every 24 hours  FLUoxetine 80 milliGRAM(s) Oral daily  fluticasone propionate 50 MICROgram(s)/spray Nasal Spray 1 Spray(s) Both Nostrils two times a day  lidocaine   4% Patch 1 Patch Transdermal daily  melatonin 5 milliGRAM(s) Oral at bedtime  NIFEdipine XL 30 milliGRAM(s) Oral daily  simvastatin 20 milliGRAM(s) Oral at bedtime  traZODone 50 milliGRAM(s) Oral at bedtime    MEDICATIONS  (PRN):  acetaminophen     Tablet .. 650 milliGRAM(s) Oral every 6 hours PRN Mild Pain (1 - 3)  ALPRAZolam 0.25 milliGRAM(s) Oral daily PRN anxiety / anxiousness / mild agitation  aluminum hydroxide/magnesium hydroxide/simethicone Suspension 30 milliLiter(s) Oral every 6 hours PRN Dyspepsia  
MEDICATIONS  (STANDING):  ARIPiprazole 5 milliGRAM(s) Oral daily  chlorhexidine 2% Cloths 1 Application(s) Topical <User Schedule>  enoxaparin Injectable 40 milliGRAM(s) SubCutaneous every 24 hours  FLUoxetine 40 milliGRAM(s) Oral daily  fluticasone propionate 50 MICROgram(s)/spray Nasal Spray 1 Spray(s) Both Nostrils two times a day  melatonin 3 milliGRAM(s) Oral at bedtime  NIFEdipine XL 30 milliGRAM(s) Oral daily  simvastatin 20 milliGRAM(s) Oral at bedtime  traZODone 50 milliGRAM(s) Oral at bedtime    MEDICATIONS  (PRN):  acetaminophen     Tablet .. 650 milliGRAM(s) Oral every 6 hours PRN Mild Pain (1 - 3)  aluminum hydroxide/magnesium hydroxide/simethicone Suspension 30 milliLiter(s) Oral every 6 hours PRN Dyspepsia  
MEDICATIONS  (STANDING):  ARIPiprazole 5 milliGRAM(s) Oral daily  chlorhexidine 2% Cloths 1 Application(s) Topical <User Schedule>  enoxaparin Injectable 40 milliGRAM(s) SubCutaneous every 24 hours  FLUoxetine 20 milliGRAM(s) Oral daily  fluticasone propionate 50 MICROgram(s)/spray Nasal Spray 1 Spray(s) Both Nostrils two times a day  melatonin 3 milliGRAM(s) Oral at bedtime  mupirocin 2% Ointment 1 Application(s) Both Nostrils two times a day  NIFEdipine XL 30 milliGRAM(s) Oral daily  simvastatin 20 milliGRAM(s) Oral at bedtime  traZODone 50 milliGRAM(s) Oral at bedtime    MEDICATIONS  (PRN):  acetaminophen     Tablet .. 650 milliGRAM(s) Oral every 6 hours PRN Mild Pain (1 - 3)  aluminum hydroxide/magnesium hydroxide/simethicone Suspension 30 milliLiter(s) Oral every 6 hours PRN Dyspepsia  
MEDICATIONS  (STANDING):  ARIPiprazole 5 milliGRAM(s) Oral daily  chlorhexidine 2% Cloths 1 Application(s) Topical <User Schedule>  enoxaparin Injectable 40 milliGRAM(s) SubCutaneous every 24 hours  FLUoxetine 40 milliGRAM(s) Oral daily  fluticasone propionate 50 MICROgram(s)/spray Nasal Spray 1 Spray(s) Both Nostrils two times a day  melatonin 3 milliGRAM(s) Oral at bedtime  NIFEdipine XL 30 milliGRAM(s) Oral daily  simvastatin 20 milliGRAM(s) Oral at bedtime  traZODone 50 milliGRAM(s) Oral at bedtime    MEDICATIONS  (PRN):  acetaminophen     Tablet .. 650 milliGRAM(s) Oral every 6 hours PRN Mild Pain (1 - 3)  aluminum hydroxide/magnesium hydroxide/simethicone Suspension 30 milliLiter(s) Oral every 6 hours PRN Dyspepsia  
MEDICATIONS  (STANDING):  ARIPiprazole 5 milliGRAM(s) Oral daily  chlorhexidine 2% Cloths 1 Application(s) Topical <User Schedule>  enoxaparin Injectable 40 milliGRAM(s) SubCutaneous every 24 hours  FLUoxetine 60 milliGRAM(s) Oral daily  fluticasone propionate 50 MICROgram(s)/spray Nasal Spray 1 Spray(s) Both Nostrils two times a day  lidocaine   4% Patch 1 Patch Transdermal daily  melatonin 3 milliGRAM(s) Oral at bedtime  NIFEdipine XL 30 milliGRAM(s) Oral daily  simvastatin 20 milliGRAM(s) Oral at bedtime  traZODone 50 milliGRAM(s) Oral at bedtime    MEDICATIONS  (PRN):  acetaminophen     Tablet .. 650 milliGRAM(s) Oral every 6 hours PRN Mild Pain (1 - 3)  aluminum hydroxide/magnesium hydroxide/simethicone Suspension 30 milliLiter(s) Oral every 6 hours PRN Dyspepsia  
MEDICATIONS  (STANDING):  ARIPiprazole 5 milliGRAM(s) Oral daily  chlorhexidine 2% Cloths 1 Application(s) Topical <User Schedule>  enoxaparin Injectable 40 milliGRAM(s) SubCutaneous every 24 hours  FLUoxetine 60 milliGRAM(s) Oral daily  fluticasone propionate 50 MICROgram(s)/spray Nasal Spray 1 Spray(s) Both Nostrils two times a day  lidocaine   4% Patch 1 Patch Transdermal daily  melatonin 3 milliGRAM(s) Oral at bedtime  NIFEdipine XL 30 milliGRAM(s) Oral daily  simvastatin 20 milliGRAM(s) Oral at bedtime  traZODone 50 milliGRAM(s) Oral at bedtime    MEDICATIONS  (PRN):  acetaminophen     Tablet .. 650 milliGRAM(s) Oral every 6 hours PRN Mild Pain (1 - 3)  aluminum hydroxide/magnesium hydroxide/simethicone Suspension 30 milliLiter(s) Oral every 6 hours PRN Dyspepsia  
MEDICATIONS  (STANDING):  ARIPiprazole 5 milliGRAM(s) Oral daily  chlorhexidine 2% Cloths 1 Application(s) Topical <User Schedule>  enoxaparin Injectable 40 milliGRAM(s) SubCutaneous every 24 hours  FLUoxetine 60 milliGRAM(s) Oral daily  fluticasone propionate 50 MICROgram(s)/spray Nasal Spray 1 Spray(s) Both Nostrils two times a day  melatonin 3 milliGRAM(s) Oral at bedtime  NIFEdipine XL 30 milliGRAM(s) Oral daily  simvastatin 20 milliGRAM(s) Oral at bedtime  traZODone 50 milliGRAM(s) Oral at bedtime    MEDICATIONS  (PRN):  acetaminophen     Tablet .. 650 milliGRAM(s) Oral every 6 hours PRN Mild Pain (1 - 3)  aluminum hydroxide/magnesium hydroxide/simethicone Suspension 30 milliLiter(s) Oral every 6 hours PRN Dyspepsia  
MEDICATIONS  (STANDING):  ARIPiprazole 5 milliGRAM(s) Oral daily  chlorhexidine 2% Cloths 1 Application(s) Topical <User Schedule>  enoxaparin Injectable 40 milliGRAM(s) SubCutaneous every 24 hours  FLUoxetine 60 milliGRAM(s) Oral daily  fluticasone propionate 50 MICROgram(s)/spray Nasal Spray 1 Spray(s) Both Nostrils two times a day  lidocaine   4% Patch 1 Patch Transdermal daily  melatonin 3 milliGRAM(s) Oral at bedtime  NIFEdipine XL 30 milliGRAM(s) Oral daily  simvastatin 20 milliGRAM(s) Oral at bedtime  traZODone 50 milliGRAM(s) Oral at bedtime    MEDICATIONS  (PRN):  acetaminophen     Tablet .. 650 milliGRAM(s) Oral every 6 hours PRN Mild Pain (1 - 3)  aluminum hydroxide/magnesium hydroxide/simethicone Suspension 30 milliLiter(s) Oral every 6 hours PRN Dyspepsia  
MEDICATIONS  (STANDING):  ARIPiprazole 5 milliGRAM(s) Oral daily  enoxaparin Injectable 40 milliGRAM(s) SubCutaneous every 24 hours  FLUoxetine 20 milliGRAM(s) Oral daily  fluticasone propionate 50 MICROgram(s)/spray Nasal Spray 1 Spray(s) Both Nostrils two times a day  melatonin 3 milliGRAM(s) Oral at bedtime  NIFEdipine XL 30 milliGRAM(s) Oral daily  simvastatin 20 milliGRAM(s) Oral at bedtime  traZODone 50 milliGRAM(s) Oral at bedtime    MEDICATIONS  (PRN):  aluminum hydroxide/magnesium hydroxide/simethicone Suspension 30 milliLiter(s) Oral every 6 hours PRN Dyspepsia  
MEDICATIONS  (STANDING):  ARIPiprazole 5 milliGRAM(s) Oral daily  chlorhexidine 2% Cloths 1 Application(s) Topical <User Schedule>  enoxaparin Injectable 40 milliGRAM(s) SubCutaneous every 24 hours  FLUoxetine 20 milliGRAM(s) Oral daily  fluticasone propionate 50 MICROgram(s)/spray Nasal Spray 1 Spray(s) Both Nostrils two times a day  melatonin 3 milliGRAM(s) Oral at bedtime  mupirocin 2% Ointment 1 Application(s) Both Nostrils two times a day  NIFEdipine XL 30 milliGRAM(s) Oral daily  simvastatin 20 milliGRAM(s) Oral at bedtime  traZODone 50 milliGRAM(s) Oral at bedtime    MEDICATIONS  (PRN):  acetaminophen     Tablet .. 650 milliGRAM(s) Oral every 6 hours PRN Mild Pain (1 - 3)  aluminum hydroxide/magnesium hydroxide/simethicone Suspension 30 milliLiter(s) Oral every 6 hours PRN Dyspepsia  
MEDICATIONS  (STANDING):  ARIPiprazole 5 milliGRAM(s) Oral daily  chlorhexidine 2% Cloths 1 Application(s) Topical <User Schedule>  enoxaparin Injectable 40 milliGRAM(s) SubCutaneous every 24 hours  FLUoxetine 20 milliGRAM(s) Oral daily  fluticasone propionate 50 MICROgram(s)/spray Nasal Spray 1 Spray(s) Both Nostrils two times a day  melatonin 3 milliGRAM(s) Oral at bedtime  mupirocin 2% Ointment 1 Application(s) Both Nostrils two times a day  NIFEdipine XL 30 milliGRAM(s) Oral daily  simvastatin 20 milliGRAM(s) Oral at bedtime  traZODone 50 milliGRAM(s) Oral at bedtime    MEDICATIONS  (PRN):  acetaminophen     Tablet .. 650 milliGRAM(s) Oral every 6 hours PRN Mild Pain (1 - 3)  aluminum hydroxide/magnesium hydroxide/simethicone Suspension 30 milliLiter(s) Oral every 6 hours PRN Dyspepsia  
MEDICATIONS  (STANDING):  ARIPiprazole 5 milliGRAM(s) Oral daily  chlorhexidine 2% Cloths 1 Application(s) Topical <User Schedule>  enoxaparin Injectable 40 milliGRAM(s) SubCutaneous every 24 hours  FLUoxetine 60 milliGRAM(s) Oral daily  fluticasone propionate 50 MICROgram(s)/spray Nasal Spray 1 Spray(s) Both Nostrils two times a day  lidocaine   4% Patch 1 Patch Transdermal daily  melatonin 3 milliGRAM(s) Oral at bedtime  NIFEdipine XL 30 milliGRAM(s) Oral daily  simvastatin 20 milliGRAM(s) Oral at bedtime  traZODone 50 milliGRAM(s) Oral at bedtime    MEDICATIONS  (PRN):  acetaminophen     Tablet .. 650 milliGRAM(s) Oral every 6 hours PRN Mild Pain (1 - 3)  aluminum hydroxide/magnesium hydroxide/simethicone Suspension 30 milliLiter(s) Oral every 6 hours PRN Dyspepsia  
MEDICATIONS  (STANDING):  ARIPiprazole 5 milliGRAM(s) Oral daily  chlorhexidine 2% Cloths 1 Application(s) Topical <User Schedule>  enoxaparin Injectable 40 milliGRAM(s) SubCutaneous every 24 hours  FLUoxetine 80 milliGRAM(s) Oral daily  fluticasone propionate 50 MICROgram(s)/spray Nasal Spray 1 Spray(s) Both Nostrils two times a day  lidocaine   4% Patch 1 Patch Transdermal daily  melatonin 5 milliGRAM(s) Oral at bedtime  NIFEdipine XL 30 milliGRAM(s) Oral daily  simvastatin 20 milliGRAM(s) Oral at bedtime  traZODone 50 milliGRAM(s) Oral at bedtime    MEDICATIONS  (PRN):  acetaminophen     Tablet .. 650 milliGRAM(s) Oral every 6 hours PRN Mild Pain (1 - 3)  aluminum hydroxide/magnesium hydroxide/simethicone Suspension 30 milliLiter(s) Oral every 6 hours PRN Dyspepsia  
MEDICATIONS  (STANDING):  ARIPiprazole 5 milliGRAM(s) Oral daily  chlorhexidine 2% Cloths 1 Application(s) Topical <User Schedule>  enoxaparin Injectable 40 milliGRAM(s) SubCutaneous every 24 hours  FLUoxetine 20 milliGRAM(s) Oral daily  fluticasone propionate 50 MICROgram(s)/spray Nasal Spray 1 Spray(s) Both Nostrils two times a day  melatonin 3 milliGRAM(s) Oral at bedtime  mupirocin 2% Ointment 1 Application(s) Both Nostrils two times a day  NIFEdipine XL 30 milliGRAM(s) Oral daily  simvastatin 20 milliGRAM(s) Oral at bedtime  traZODone 50 milliGRAM(s) Oral at bedtime    MEDICATIONS  (PRN):  acetaminophen     Tablet .. 650 milliGRAM(s) Oral every 6 hours PRN Mild Pain (1 - 3)  aluminum hydroxide/magnesium hydroxide/simethicone Suspension 30 milliLiter(s) Oral every 6 hours PRN Dyspepsia  
MEDICATIONS  (STANDING):  ARIPiprazole 5 milliGRAM(s) Oral daily  chlorhexidine 2% Cloths 1 Application(s) Topical <User Schedule>  enoxaparin Injectable 40 milliGRAM(s) SubCutaneous every 24 hours  FLUoxetine 60 milliGRAM(s) Oral daily  fluticasone propionate 50 MICROgram(s)/spray Nasal Spray 1 Spray(s) Both Nostrils two times a day  lidocaine   4% Patch 1 Patch Transdermal daily  melatonin 3 milliGRAM(s) Oral at bedtime  NIFEdipine XL 30 milliGRAM(s) Oral daily  simvastatin 20 milliGRAM(s) Oral at bedtime  traZODone 50 milliGRAM(s) Oral at bedtime    MEDICATIONS  (PRN):  acetaminophen     Tablet .. 650 milliGRAM(s) Oral every 6 hours PRN Mild Pain (1 - 3)  aluminum hydroxide/magnesium hydroxide/simethicone Suspension 30 milliLiter(s) Oral every 6 hours PRN Dyspepsia  
MEDICATIONS  (STANDING):  ARIPiprazole 5 milliGRAM(s) Oral daily  chlorhexidine 2% Cloths 1 Application(s) Topical <User Schedule>  enoxaparin Injectable 40 milliGRAM(s) SubCutaneous every 24 hours  FLUoxetine 60 milliGRAM(s) Oral daily  fluticasone propionate 50 MICROgram(s)/spray Nasal Spray 1 Spray(s) Both Nostrils two times a day  melatonin 3 milliGRAM(s) Oral at bedtime  NIFEdipine XL 30 milliGRAM(s) Oral daily  simvastatin 20 milliGRAM(s) Oral at bedtime  traZODone 50 milliGRAM(s) Oral at bedtime    MEDICATIONS  (PRN):  acetaminophen     Tablet .. 650 milliGRAM(s) Oral every 6 hours PRN Mild Pain (1 - 3)  aluminum hydroxide/magnesium hydroxide/simethicone Suspension 30 milliLiter(s) Oral every 6 hours PRN Dyspepsia  
MEDICATIONS  (STANDING):  ARIPiprazole 5 milliGRAM(s) Oral daily  chlorhexidine 2% Cloths 1 Application(s) Topical <User Schedule>  enoxaparin Injectable 40 milliGRAM(s) SubCutaneous every 24 hours  FLUoxetine 20 milliGRAM(s) Oral daily  fluticasone propionate 50 MICROgram(s)/spray Nasal Spray 1 Spray(s) Both Nostrils two times a day  melatonin 3 milliGRAM(s) Oral at bedtime  NIFEdipine XL 30 milliGRAM(s) Oral daily  simvastatin 20 milliGRAM(s) Oral at bedtime  traZODone 50 milliGRAM(s) Oral at bedtime    MEDICATIONS  (PRN):  acetaminophen     Tablet .. 650 milliGRAM(s) Oral every 6 hours PRN Mild Pain (1 - 3)  aluminum hydroxide/magnesium hydroxide/simethicone Suspension 30 milliLiter(s) Oral every 6 hours PRN Dyspepsia  
MEDICATIONS  (STANDING):  ARIPiprazole 5 milliGRAM(s) Oral daily  chlorhexidine 2% Cloths 1 Application(s) Topical <User Schedule>  enoxaparin Injectable 40 milliGRAM(s) SubCutaneous every 24 hours  FLUoxetine 20 milliGRAM(s) Oral daily  fluticasone propionate 50 MICROgram(s)/spray Nasal Spray 1 Spray(s) Both Nostrils two times a day  melatonin 3 milliGRAM(s) Oral at bedtime  mupirocin 2% Ointment 1 Application(s) Both Nostrils two times a day  NIFEdipine XL 30 milliGRAM(s) Oral daily  simvastatin 20 milliGRAM(s) Oral at bedtime  traZODone 50 milliGRAM(s) Oral at bedtime    MEDICATIONS  (PRN):  acetaminophen     Tablet .. 650 milliGRAM(s) Oral every 6 hours PRN Mild Pain (1 - 3)  aluminum hydroxide/magnesium hydroxide/simethicone Suspension 30 milliLiter(s) Oral every 6 hours PRN Dyspepsia

## 2022-10-03 NOTE — BH CONSULTATION LIAISON PROGRESS NOTE - NSBHATTESTBILLBASEDTIME_PSY_A_CORE
Time based billing

## 2022-10-04 ENCOUNTER — INPATIENT (INPATIENT)
Facility: HOSPITAL | Age: 72
LOS: 58 days | Discharge: HOME | End: 2022-12-02
Attending: PSYCHIATRY & NEUROLOGY | Admitting: PSYCHIATRY & NEUROLOGY
Payer: MEDICARE

## 2022-10-04 VITALS
OXYGEN SATURATION: 97 % | HEART RATE: 71 BPM | TEMPERATURE: 99 F | SYSTOLIC BLOOD PRESSURE: 132 MMHG | RESPIRATION RATE: 17 BRPM | DIASTOLIC BLOOD PRESSURE: 75 MMHG

## 2022-10-04 VITALS — TEMPERATURE: 99 F | HEIGHT: 73 IN | RESPIRATION RATE: 18 BRPM | WEIGHT: 190.92 LBS | OXYGEN SATURATION: 98 %

## 2022-10-04 DIAGNOSIS — F33.9 MAJOR DEPRESSIVE DISORDER, RECURRENT, UNSPECIFIED: ICD-10-CM

## 2022-10-04 PROBLEM — E78.5 HYPERLIPIDEMIA, UNSPECIFIED: Chronic | Status: ACTIVE | Noted: 2022-09-10

## 2022-10-04 PROBLEM — I10 ESSENTIAL (PRIMARY) HYPERTENSION: Chronic | Status: ACTIVE | Noted: 2022-09-10

## 2022-10-04 PROBLEM — F32.2 MAJOR DEPRESSIVE DISORDER, SINGLE EPISODE, SEVERE WITHOUT PSYCHOTIC FEATURES: Chronic | Status: ACTIVE | Noted: 2022-09-10

## 2022-10-04 PROCEDURE — 99239 HOSP IP/OBS DSCHRG MGMT >30: CPT

## 2022-10-04 RX ORDER — LANOLIN ALCOHOL/MO/W.PET/CERES
1 CREAM (GRAM) TOPICAL
Qty: 0 | Refills: 0 | DISCHARGE
Start: 2022-10-04

## 2022-10-04 RX ORDER — NIFEDIPINE 30 MG
30 TABLET, EXTENDED RELEASE 24 HR ORAL DAILY
Refills: 0 | Status: DISCONTINUED | OUTPATIENT
Start: 2022-10-04 | End: 2022-12-02

## 2022-10-04 RX ORDER — BACITRACIN ZINC 500 UNIT/G
1 OINTMENT IN PACKET (EA) TOPICAL
Refills: 0 | Status: DISCONTINUED | OUTPATIENT
Start: 2022-10-04 | End: 2022-10-04

## 2022-10-04 RX ORDER — ALPRAZOLAM 0.25 MG
1 TABLET ORAL
Qty: 0 | Refills: 0 | DISCHARGE
Start: 2022-10-04

## 2022-10-04 RX ORDER — HALOPERIDOL DECANOATE 100 MG/ML
5 INJECTION INTRAMUSCULAR ONCE
Refills: 0 | Status: DISCONTINUED | OUTPATIENT
Start: 2022-10-04 | End: 2022-12-02

## 2022-10-04 RX ORDER — LIDOCAINE 4 G/100G
1 CREAM TOPICAL
Qty: 0 | Refills: 0 | DISCHARGE
Start: 2022-10-04

## 2022-10-04 RX ORDER — LANOLIN ALCOHOL/MO/W.PET/CERES
5 CREAM (GRAM) TOPICAL AT BEDTIME
Refills: 0 | Status: DISCONTINUED | OUTPATIENT
Start: 2022-10-04 | End: 2022-12-02

## 2022-10-04 RX ORDER — FLUTICASONE PROPIONATE 50 MCG
1 SPRAY, SUSPENSION NASAL
Refills: 0 | Status: ACTIVE | OUTPATIENT
Start: 2022-10-04 | End: 2023-09-02

## 2022-10-04 RX ORDER — FINASTERIDE 5 MG/1
5 TABLET, FILM COATED ORAL DAILY
Refills: 0 | Status: DISCONTINUED | OUTPATIENT
Start: 2022-10-04 | End: 2022-12-02

## 2022-10-04 RX ORDER — FLUOXETINE HCL 10 MG
80 CAPSULE ORAL DAILY
Refills: 0 | Status: DISCONTINUED | OUTPATIENT
Start: 2022-10-04 | End: 2022-10-12

## 2022-10-04 RX ORDER — INFLUENZA VIRUS VACCINE 15; 15; 15; 15 UG/.5ML; UG/.5ML; UG/.5ML; UG/.5ML
0.7 SUSPENSION INTRAMUSCULAR ONCE
Refills: 0 | Status: COMPLETED | OUTPATIENT
Start: 2022-10-04 | End: 2022-10-08

## 2022-10-04 RX ORDER — ARIPIPRAZOLE 15 MG/1
5 TABLET ORAL DAILY
Refills: 0 | Status: DISCONTINUED | OUTPATIENT
Start: 2022-10-04 | End: 2022-11-25

## 2022-10-04 RX ORDER — DIPHENHYDRAMINE HCL 50 MG
50 CAPSULE ORAL EVERY 4 HOURS
Refills: 0 | Status: DISCONTINUED | OUTPATIENT
Start: 2022-10-04 | End: 2022-12-02

## 2022-10-04 RX ORDER — SIMVASTATIN 20 MG/1
20 TABLET, FILM COATED ORAL AT BEDTIME
Refills: 0 | Status: DISCONTINUED | OUTPATIENT
Start: 2022-10-04 | End: 2022-12-02

## 2022-10-04 RX ORDER — ARIPIPRAZOLE 15 MG/1
1 TABLET ORAL
Qty: 0 | Refills: 0 | DISCHARGE
Start: 2022-10-04

## 2022-10-04 RX ORDER — OXYBUTYNIN CHLORIDE 5 MG
1 TABLET ORAL
Qty: 0 | Refills: 0 | DISCHARGE
Start: 2022-10-04

## 2022-10-04 RX ORDER — BACITRACIN ZINC 500 UNIT/G
1 OINTMENT IN PACKET (EA) TOPICAL
Refills: 0 | Status: DISCONTINUED | OUTPATIENT
Start: 2022-10-04 | End: 2022-10-06

## 2022-10-04 RX ORDER — FLUOXETINE HCL 10 MG
2 CAPSULE ORAL
Qty: 0 | Refills: 0 | DISCHARGE
Start: 2022-10-04

## 2022-10-04 RX ORDER — TAMSULOSIN HYDROCHLORIDE 0.4 MG/1
1 CAPSULE ORAL
Qty: 0 | Refills: 0 | DISCHARGE
Start: 2022-10-04

## 2022-10-04 RX ORDER — OXYBUTYNIN CHLORIDE 5 MG
5 TABLET ORAL DAILY
Refills: 0 | Status: DISCONTINUED | OUTPATIENT
Start: 2022-10-04 | End: 2022-12-02

## 2022-10-04 RX ORDER — NIFEDIPINE 30 MG
1 TABLET, EXTENDED RELEASE 24 HR ORAL
Qty: 0 | Refills: 0 | DISCHARGE
Start: 2022-10-04

## 2022-10-04 RX ORDER — TRAZODONE HCL 50 MG
1 TABLET ORAL
Qty: 0 | Refills: 0 | DISCHARGE
Start: 2022-10-04

## 2022-10-04 RX ORDER — DIPHENHYDRAMINE HCL 50 MG
50 CAPSULE ORAL EVERY 6 HOURS
Refills: 0 | Status: DISCONTINUED | OUTPATIENT
Start: 2022-10-04 | End: 2022-12-02

## 2022-10-04 RX ORDER — SIMVASTATIN 20 MG/1
1 TABLET, FILM COATED ORAL
Qty: 0 | Refills: 0 | DISCHARGE
Start: 2022-10-04

## 2022-10-04 RX ORDER — CHOLECALCIFEROL (VITAMIN D3) 125 MCG
2000 CAPSULE ORAL
Qty: 0 | Refills: 0 | DISCHARGE
Start: 2022-10-04

## 2022-10-04 RX ORDER — FINASTERIDE 5 MG/1
1 TABLET, FILM COATED ORAL
Qty: 0 | Refills: 0 | DISCHARGE
Start: 2022-10-04

## 2022-10-04 RX ORDER — LIDOCAINE 4 G/100G
1 CREAM TOPICAL EVERY 24 HOURS
Refills: 0 | Status: DISCONTINUED | OUTPATIENT
Start: 2022-10-04 | End: 2022-12-02

## 2022-10-04 RX ORDER — TRAZODONE HCL 50 MG
50 TABLET ORAL AT BEDTIME
Refills: 0 | Status: DISCONTINUED | OUTPATIENT
Start: 2022-10-04 | End: 2022-11-05

## 2022-10-04 RX ORDER — HALOPERIDOL DECANOATE 100 MG/ML
5 INJECTION INTRAMUSCULAR EVERY 6 HOURS
Refills: 0 | Status: DISCONTINUED | OUTPATIENT
Start: 2022-10-04 | End: 2022-12-02

## 2022-10-04 RX ADMIN — ARIPIPRAZOLE 5 MILLIGRAM(S): 15 TABLET ORAL at 12:36

## 2022-10-04 RX ADMIN — LIDOCAINE 1 PATCH: 4 CREAM TOPICAL at 02:27

## 2022-10-04 RX ADMIN — Medication 1 SPRAY(S): at 06:23

## 2022-10-04 RX ADMIN — Medication 5 MILLIGRAM(S): at 21:12

## 2022-10-04 RX ADMIN — SIMVASTATIN 20 MILLIGRAM(S): 20 TABLET, FILM COATED ORAL at 21:13

## 2022-10-04 RX ADMIN — Medication 50 MILLIGRAM(S): at 21:14

## 2022-10-04 RX ADMIN — Medication 1 APPLICATION(S): at 21:12

## 2022-10-04 RX ADMIN — CHLORHEXIDINE GLUCONATE 1 APPLICATION(S): 213 SOLUTION TOPICAL at 06:23

## 2022-10-04 RX ADMIN — ENOXAPARIN SODIUM 40 MILLIGRAM(S): 100 INJECTION SUBCUTANEOUS at 06:24

## 2022-10-04 RX ADMIN — Medication 80 MILLIGRAM(S): at 13:39

## 2022-10-04 RX ADMIN — Medication 30 MILLIGRAM(S): at 06:23

## 2022-10-04 RX ADMIN — LIDOCAINE 1 PATCH: 4 CREAM TOPICAL at 12:37
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
## 2022-10-04 NOTE — DISCHARGE NOTE NURSING/CASE MANAGEMENT/SOCIAL WORK - NSDCPEFALRISK_GEN_ALL_CORE
For information on Fall & Injury Prevention, visit: [email or web address] OR  [email or web address] OR  https://www.cdc.gov/steadi/patient.html

## 2022-10-04 NOTE — DISCHARGE NOTE NURSING/CASE MANAGEMENT/SOCIAL WORK - PATIENT PORTAL LINK FT
You can access the FollowMyHealth Patient Portal offered by [facility] by registering at the following website: [email or web address]. By joining [employer]’s FollowMyHealth portal, you will also be able to view your health information using other applications (apps) compatible with our system.

## 2022-10-04 NOTE — BH PATIENT PROFILE - HOME MEDICATIONS
lidocaine 4% topical film , Apply topically to affected area right knee once a day  bacitracin 500 units/g topical ointment , Apply topically to affected area 4 times a day to right ear  cholecalciferol , 2000 unit(s) orally once a day  oxybutynin 5 mg oral tablet , 1 tab(s) orally once a day  ALPRAZolam 0.25 mg oral tablet , 1 tab(s) orally every 6 hours, As Needed for Anxiety/Anxiousness/Mild agitation   NIFEdipine 30 mg oral tablet, extended release , 1 tab(s) orally once a day  melatonin 5 mg oral tablet , 1 tab(s) orally once a day (at bedtime)  ARIPiprazole 5 mg oral tablet , 1 tab(s) orally once a day  simvastatin 20 mg oral tablet , 1 tab(s) orally once a day (at bedtime)  traZODone 50 mg oral tablet , 1 tab(s) orally once a day (at bedtime)  FLUoxetine 40 mg oral capsule , 2 cap(s) orally once a day  tamsulosin 0.4 mg oral capsule , 1 cap(s) orally once a day  finasteride 5 mg oral tablet , 1 tab(s) orally once a day  fluticasone 50 mcg/inh nasal spray , 1 spray(s) nasal 2 times a day  aluminum hydroxide-magnesium hydroxide 200 mg-200 mg/5 mL oral suspension , 30 milliliter(s) orally every 6 hours, As needed, Dyspepsia   potassium chloride 20 mEq oral tablet, extended release , 1 tab(s) orally once a day. Follow up with your outpatient doctor to monitor potassium levels.  oxybutynin 5 mg oral tablet , 1 tab(s) orally once a day  fluticasone 50 mcg/inh nasal spray , 1 spray(s) nasal 2 times a day  nystatin 100,000 units/g topical powder , 1 application topically 2 times a day  NIFEdipine 30 mg oral tablet, extended release , 1 tab(s) orally once a day  Zocor 20 mg oral tablet , 1 tab(s) orally once a day (at bedtime)  venlafaxine 150 mg oral capsule, extended release , 1 cap(s) orally once a day  traZODone 50 mg oral tablet , 1 tab(s) orally once a day (at bedtime)  acetaminophen 325 mg oral tablet , 2 tab(s) orally every 6 hours, As needed, Mild Pain (1 - 3), Moderate Pain (4 - 6)  Proscar 5 mg oral tablet , 1 tab(s) orally once a day  cholecalciferol , 2000 unit(s) orally once a day  melatonin 5 mg oral tablet , 1 tab(s) orally once a day (at bedtime)  aluminum hydroxide-magnesium hydroxide 200 mg-200 mg/5 mL oral suspension , 30 milliliter(s) orally every 6 hours, As needed, Dyspepsia

## 2022-10-04 NOTE — BH PATIENT PROFILE - FLU SEASON?
"11 y.o. WELL CHILD CHECKUP    Magdalena Avila is a 11 y.o. female who presents to the office today with mother for routine health care examination.    PMH:   Past Medical History:   Diagnosis Date    Allergy     Anxiety     Hypertrophy of tonsils with hypertrophy of adenoids     s/p adenotonsillectomy in 2013     PSH:   Past Surgical History:   Procedure Laterality Date    ADENOIDECTOMY      TONSILLECTOMY       FH:   Family History   Problem Relation Age of Onset    Asthma Father     Irritable bowel syndrome Father     No Known Problems Sister     No Known Problems Brother     Glaucoma Neg Hx     Macular degeneration Neg Hx     Retinal detachment Neg Hx      SH: presently in grade 6.           ROS: No unusual headaches or abdominal pain. No cough, wheezing, shortness of breath, bowel or bladder problems. Diet is good.  Review of Systems   Constitutional: Negative for fever.   HENT: Negative for congestion and sore throat.    Eyes: Negative for discharge and redness.   Respiratory: Negative for cough and wheezing.    Cardiovascular: Negative for chest pain and palpitations.   Gastrointestinal: Negative for constipation, diarrhea and vomiting.   Genitourinary: Negative for hematuria.   Skin: Negative for rash.   Neurological: Negative for headaches.     Answers for HPI/ROS submitted by the patient on 7/24/2020   activity change: No  appetite change : No  difficulty urinating: No  enuresis: No  wound: No  behavior problem: No  sleep disturbance: No  syncope: No      OBJECTIVE:   Vitals:    07/24/20 1400   BP: 120/68   Pulse: (!) 120   Temp: 98.5 °F (36.9 °C)     Wt Readings from Last 3 Encounters:   07/24/20 63.7 kg (140 lb 6.4 oz) (98 %, Z= 1.99)*   11/25/19 66.9 kg (147 lb 6.4 oz) (>99 %, Z= 2.41)*   07/11/19 61.8 kg (136 lb 5.7 oz) (>99 %, Z= 2.33)*     * Growth percentiles are based on CDC (Girls, 2-20 Years) data.     Ht Readings from Last 3 Encounters:   07/24/20 5' 5.35" (1.66 m) (>99 %, Z= 2.55)* " "  11/25/19 5' 5" (1.651 m) (>99 %, Z= 3.06)*   07/11/19 5' 2.5" (1.588 m) (>99 %, Z= 2.57)*     * Growth percentiles are based on [facility] (Girls, 2-20 Years) data.     Body mass index is 23.11 kg/m².  92 %ile (Z= 1.41) based on CDC (Girls, 2-20 Years) BMI-for-age based on BMI available as of 7/24/2020.  98 %ile (Z= 1.99) based on CDC (Girls, 2-20 Years) weight-for-age data using vitals from 7/24/2020.  >99 %ile (Z= 2.55) based on [facility] (Girls, 2-20 Years) Stature-for-age data based on Stature recorded on 7/24/2020.    GENERAL: WDWN female  EYES: PERRLA, EOMI, Normal tracking and conjugate gaze  EARS: TM's gray, normal EAC's bilat without excessive cerumen  VISION and HEARING: Normal.  NOSE: nasal passages clear  OP: healthy dentition, tonsills are normal size  NECK: supple, no masses, no lymphadenopathy, no thyroid prominence  RESP: clear to auscultation bilaterally, no wheezes or rhonchi  CV: RRR, normal S1/S2, no murmurs, clicks, or rubs. 2+ distal radial pulses  ABD: soft, nontender, no masses, no hepatosplenomegaly  : normal female exam, Salvatore III  MS: spine straight, FROM all joints  SKIN: no rashes or lesions    ASSESSMENT:   Well Child    PLAN:   Magdalena was seen today for well child.    Diagnoses and all orders for this visit:    Encounter for well child check without abnormal findings  -     Meningococcal conjugate vaccine 4-valent IM  -     Tdap vaccine greater than or equal to 6yo IM    History of 2019 novel coronavirus disease (COVID-19)  -     COVID-19 (SARS CoV-2) IgG Antibody; Future        Counseling regarding the following: dental care, diet, pool safety, school issues, seat belts and sleep.  Follow up as needed.    " Yes...

## 2022-10-05 DIAGNOSIS — Z76.5 MALINGERER [CONSCIOUS SIMULATION]: ICD-10-CM

## 2022-10-05 LAB — GLUCOSE BLDC GLUCOMTR-MCNC: 112 MG/DL — HIGH (ref 70–99)

## 2022-10-05 PROCEDURE — 99223 1ST HOSP IP/OBS HIGH 75: CPT

## 2022-10-05 RX ADMIN — Medication 5 MILLIGRAM(S): at 10:07

## 2022-10-05 RX ADMIN — Medication 5 MILLIGRAM(S): at 21:34

## 2022-10-05 RX ADMIN — Medication 50 MILLIGRAM(S): at 21:34

## 2022-10-05 RX ADMIN — Medication 1 APPLICATION(S): at 13:01

## 2022-10-05 RX ADMIN — ARIPIPRAZOLE 5 MILLIGRAM(S): 15 TABLET ORAL at 10:07

## 2022-10-05 RX ADMIN — Medication 1 APPLICATION(S): at 10:07

## 2022-10-05 RX ADMIN — Medication 1 SPRAY(S): at 10:08

## 2022-10-05 RX ADMIN — FINASTERIDE 5 MILLIGRAM(S): 5 TABLET, FILM COATED ORAL at 10:07

## 2022-10-05 RX ADMIN — Medication 80 MILLIGRAM(S): at 10:06

## 2022-10-05 RX ADMIN — Medication 30 MILLIGRAM(S): at 10:07

## 2022-10-05 RX ADMIN — Medication 1 APPLICATION(S): at 21:34

## 2022-10-05 NOTE — BH INPATIENT PSYCHIATRY ASSESSMENT NOTE - NSBHCHARTREVIEWVS_PSY_A_CORE FT
Vital Signs Last 24 Hrs  T(C): 36.4 (10-05-22 @ 08:05), Max: 37.1 (10-04-22 @ 17:55)  T(F): 97.6 (10-05-22 @ 08:05), Max: 98.7 (10-04-22 @ 17:55)  HR: --  BP: --  BP(mean): --  RR: 18 (10-04-22 @ 17:55) (18 - 18)  SpO2: 98% (10-04-22 @ 17:55) (98% - 98%)    Orthostatic VS  10-05-22 @ 08:05  Lying BP: --/-- HR: --  Sitting BP: 150/74 HR: 66  Standing BP: 139/80 HR: 71  Site: --  Mode: --  Orthostatic VS  10-04-22 @ 17:55  Lying BP: --/-- HR: --  Sitting BP: 132/79 HR: 90  Standing BP: 134/80 HR: 92  Site: upper left arm  Mode: electronic   Vital Signs Last 24 Hrs  T(C): 36.4 (12-01-22 @ 08:02), Max: 36.4 (11-30-22 @ 10:07)  T(F): 97.6 (12-01-22 @ 08:02), Max: 97.6 (11-30-22 @ 10:07)  HR: 75 (11-30-22 @ 11:15) (75 - 89)  BP: 168/88 (11-30-22 @ 11:15) (148/89 - 200/121)  BP(mean): --  RR: 20 (11-30-22 @ 11:15) (16 - 20)  SpO2: 100% (11-30-22 @ 11:15) (97% - 100%)    Orthostatic VS  12-01-22 @ 08:02  Lying BP: --/-- HR: --  Sitting BP: 119/81 HR: 78  Standing BP: 110/63 HR: 113  Site: upper left arm  Mode: electronic  Orthostatic VS  11-30-22 @ 08:35  Lying BP: --/-- HR: --  Sitting BP: 123/81 HR: 84  Standing BP: 120/61 HR: 105  Site: upper left arm  Mode: electronic

## 2022-10-05 NOTE — BH INPATIENT PSYCHIATRY ASSESSMENT NOTE - NSCOMMENTSUICRISKFACT_PSY_ALL_CORE
Patient has no known hx of suicide attempts, no access to weapons reported, has help-rejecting behavior and can catastrophize.   Static factors include advanced age and being  male.

## 2022-10-05 NOTE — BH INPATIENT PSYCHIATRY ASSESSMENT NOTE - NSSUICRSKFACTOROTHER_PSY_ALL_CORE
Pt tends to have poor frustration tolerance and help rejecting behavior, seeking instead to stay in hospital

## 2022-10-05 NOTE — BH INPATIENT PSYCHIATRY ASSESSMENT NOTE - NSTXDCOTHRGOAL_PSY_ALL_CORE
Pt will comply with treatment and follow up with an improvement in symptoms, helping him to participate in self-care and followup.

## 2022-10-05 NOTE — BH INPATIENT PSYCHIATRY ASSESSMENT NOTE - RISK ASSESSMENT
Pt has reported SI in past but brother has verified that pt does not have any suicide attempts or SIB.  Patient has no known hx of suicide attempts, no access to weapons reported, has help-rejecting behavior and can catastrophize.   Static factors include advanced age and being  male.   Pt has no known hx of suicide attempts, has a friend Sahra that he calls frequently for support. Patient is known to [facility] and has demonstrated ability to seek care in the hospital when needed.

## 2022-10-05 NOTE — BH INPATIENT PSYCHIATRY ASSESSMENT NOTE - DETAILS
Would not impact clinical decisionmaking at this time Pt has reported SI in past but brother has verified that pt does not have any suicide attempts or SIB.

## 2022-10-05 NOTE — BH INPATIENT PSYCHIATRY ASSESSMENT NOTE - NSBHMETABOLIC_PSY_ALL_CORE_FT
BMI: BMI (kg/m2): 25.2 (10-04-22 @ 17:55)  HbA1c: A1C with Estimated Average Glucose Result: 5.1 % (07-06-22 @ 10:38)    Glucose:   BP: --  Lipid Panel: Date/Time: 07-06-22 @ 10:38  Cholesterol, Serum: 128  Direct LDL: --  HDL Cholesterol, Serum: 52  Total Cholesterol/HDL Ration Measurement: --  Triglycerides, Serum: 74   BMI: BMI (kg/m2): 24.7 (11-26-22 @ 17:41)  HbA1c: A1C with Estimated Average Glucose Result: 5.1 % (07-06-22 @ 10:38)    Glucose: POCT Blood Glucose.: 112 mg/dL (10-05-22 @ 20:21)    BP: 168/88 (11-30-22 @ 11:15) (148/89 - 200/121)  Lipid Panel: Date/Time: 07-06-22 @ 10:38  Cholesterol, Serum: 128  Direct LDL: --  HDL Cholesterol, Serum: 52  Total Cholesterol/HDL Ration Measurement: --  Triglycerides, Serum: 74

## 2022-10-05 NOTE — BH INPATIENT PSYCHIATRY ASSESSMENT NOTE - HPI (INCLUDE ILLNESS QUALITY, SEVERITY, DURATION, TIMING, CONTEXT, MODIFYING FACTORS, ASSOCIATED SIGNS AND SYMPTOMS)
71M, unemployed, living with brother, with PMH of HTN, HLD, urinary incontinence due to unclear prostate issues, R leg pain from arthritis, psych hx of MDD, OCD, "mixed personality disorder", at least three prior hospitalizations [facility] 10/22-11/15/2021 for depression w psychosis), has reported SI in the past with thoughts of walking into traffic, but brother has verified that patient makes suicidal statements but has not had any suicidal or SIB attempts in the past. Patient was initially admitted to medicine for SOB and chest discomfort, psych consult called after pt expressed a desire to be dead. Patient initially assessed by [facility] and cleared on 9/13/2022. Team was Reconsulted on 9/14/2022 in the setting of new collateral information and patient's reports of SI without plan. Patient was then admitted to inpatient psychiatry after being treated for covid infection.    Patient seen and evaluated with attending. Patient initially complains of shortness of breath but is able to hold long conversations without any visible distress, reporting that he was unable to read instructions on his discharge papers, yet is able to read label printed on doctor's jacket. Patient is provocative and help-rejecting saying "Just discharge me, I will go to [place] and die". Patient did not follow up with  upon discharge, and had reportedly given an incorrect number to the . Patient reports he cannot stay with his brother, because his brother "hates" him and reports being unable to meet with his friends. Patient is uncooperative and unwilling to discuss treatment goals. Patient denies any active SI with plan, despite making statements of dying if he were to be discharged.

## 2022-10-05 NOTE — BH INPATIENT PSYCHIATRY ASSESSMENT NOTE - NSBHASSESSSUMMFT_PSY_ALL_CORE
71 y.o. male with hx of unusual relatedness including pattern of making up psychiatric symptoms or hx such as claiming he arranged a hit or his brother killed himself. Recently there has been escalating pattern of hospitalizations, ER visits. Compliance after d/c is nil. Patient also develops excessive attachment or intense dislike of providers and other staff. Patient has no actual hx of suicide attempts or fh or SIB. Patient with likely facititious disorder vs mood disorder. Suspect possible unconscious primary gain related to medical complaints help seeking then rejection of help as inadequate. Patient not assessed as needing CO. Titrate medications and connect patient back to services. Appears to be provocative and help-rejecting. Prolonged hospitalization may not help achieve shared goals and might not be therapeutic for patient.    Plan:  1. Admit to [facility]  2. Safety: Does not require CO 1:1 at this time.  3. Legals: 9.27  4. Psychiatry: Continue current meds  .Prozac 80mg PO daily  .Abilify 5mg PO daily  .Trazodone 50mg PO HS  .Melatonin 5mg PO HS  5. Medical: Continue current medications  .Finasteride 5mg PO HS  .Nifedipine 30mg PO Daily  .Oxybutynin 5mg PO daily  .Simvastatin 20mg PO HS  6. Disposition: Home when stable.

## 2022-10-05 NOTE — CONSULT NOTE ADULT - SUBJECTIVE AND OBJECTIVE BOX
HPI:     PAST MEDICAL & SURGICAL HISTORY:  HTN (hypertension)      Severe depression      HLD (hyperlipidemia)          Review of Systems:   CONSTITUTIONAL: No fever, weight loss, or fatigue  EYES: No eye pain, visual disturbances, or discharge  ENMT:  No difficulty hearing, tinnitus, vertigo; No sinus or throat pain  NECK: No pain or stiffness  RESPIRATORY: No cough, wheezing, chills or hemoptysis; No shortness of breath  CARDIOVASCULAR: No chest pain, palpitations, dizziness, or leg swelling  GASTROINTESTINAL: No abdominal or epigastric pain. No nausea, vomiting, or hematemesis; No diarrhea or constipation. No melena or hematochezia.  GENITOURINARY: No dysuria, frequency, hematuria, or incontinence  NEUROLOGICAL: No headaches, memory loss, loss of strength, numbness, or tremors  SKIN: No itching, burning, rashes, or lesions   LYMPH NODES: No enlarged glands  ENDOCRINE: No heat or cold intolerance; No hair loss  MUSCULOSKELETAL: No joint pain or swelling; No muscle, back, or extremity pain  HEME/LYMPH: No easy bruising, or bleeding gums  ALLERY AND IMMUNOLOGIC: No hives or eczema    Allergies    penicillin (Hives)  Septra (Rash; Urticaria; Hives)    Intolerances        Social History:     FAMILY HISTORY:      MEDICATIONS  (STANDING):  ARIPiprazole 5 milliGRAM(s) Oral daily  BACItracin   Ointment 1 Application(s) Topical four times a day  finasteride 5 milliGRAM(s) Oral daily  FLUoxetine 80 milliGRAM(s) Oral daily  fluticasone propionate 50 MICROgram(s)/spray Nasal Spray 1 Spray(s) Both Nostrils two times a day  influenza  Vaccine (HIGH DOSE) 0.7 milliLiter(s) IntraMuscular once  melatonin. 5 milliGRAM(s) Oral at bedtime  NIFEdipine XL 30 milliGRAM(s) Oral daily  oxybutynin 5 milliGRAM(s) Oral daily  simvastatin 20 milliGRAM(s) Oral at bedtime  traZODone 50 milliGRAM(s) Oral at bedtime    MEDICATIONS  (PRN):  diphenhydrAMINE 50 milliGRAM(s) Oral every 6 hours PRN agitation  diphenhydrAMINE Injectable 50 milliGRAM(s) IntraMuscular every 4 hours PRN severe agitation  haloperidol     Tablet 5 milliGRAM(s) Oral every 6 hours PRN agitation  haloperidol    Injectable 5 milliGRAM(s) IntraMuscular once PRN severe agitation  lidocaine   4% Patch 1 Patch Transdermal every 24 hours PRN pain  LORazepam     Tablet 2 milliGRAM(s) Oral every 6 hours PRN anxiety/agitation  LORazepam   Injectable 2 milliGRAM(s) IntraMuscular once PRN severe agitation      Vital Signs Last 24 Hrs  T(C): 36.4 (05 Oct 2022 08:05), Max: 37.1 (04 Oct 2022 13:38)  T(F): 97.6 (05 Oct 2022 08:05), Max: 98.8 (04 Oct 2022 15:30)  HR: 71 (04 Oct 2022 15:30) (71 - 71)  BP: 132/75 (04 Oct 2022 15:30) (123/67 - 132/75)  BP(mean): --  RR: 18 (04 Oct 2022 17:55) (17 - 18)  SpO2: 98% (04 Oct 2022 17:55) (95% - 98%)    Parameters below as of 04 Oct 2022 17:55  Patient On (Oxygen Delivery Method): room air      CAPILLARY BLOOD GLUCOSE            PHYSICAL EXAM:  GENERAL: NAD, well-developed  HEAD:  Atraumatic, Normocephalic  EYES: EOMI, conjunctiva and sclera clear  NECK: Supple, No JVD  CHEST/LUNG: Clear to auscultation bilaterally; No wheeze  HEART: Regular rate and rhythm; No murmurs, rubs, or gallops  ABDOMEN: Soft, Nontender, Nondistended; Bowel sounds present  EXTREMITIES:  2+ Peripheral Pulses, No clubbing, cyanosis, or edema  NEUROLOGY: non-focal  SKIN: No rashes or lesions    LABS:                    EKG(personally reviewed):    RADIOLOGY & ADDITIONAL TESTS:    Imaging Personally Reviewed:    Consultant(s) Notes Reviewed:      Care Discussed with Consultants/Other Providers:   HPI:     70 y/o male with BPH, HTN and HLD, recent covid infection 9/22/22 transferred from [facility] to [facility]. Initially presenting to [facility] with generalized complaints; SOB. Pt admitted for  workup for dyspnea and depression . CTA chest negative for PE, cardiac w/u negative for ACS. Covid positive while hospitalized on 9/22, did not require o2 therapy, dex or remdes. Admitted to [facility] for mgmt of his depression, SI and agitation. Pt states he feels ok now, but worried that "they're going to kick me out."      PAST MEDICAL & SURGICAL HISTORY:  HTN (hypertension)      Severe depression      HLD (hyperlipidemia)      Review of Systems:   CONSTITUTIONAL: No fever, weight loss, or fatigue  ENMT:   No sinus or throat pain  NECK: No pain or stiffness  RESPIRATORY: No cough, wheezing, chills or hemoptysis; No shortness of breath  CARDIOVASCULAR: No chest pain  GASTROINTESTINAL: No abdominal or epigastric pain.No diarrhea or constipation.   GENITOURINARY: No dysuria, frequency, hematuria, or incontinence  NEUROLOGICAL: No headaches  SKIN: No itching, burning, rashes, or lesions   MUSCULOSKELETAL: No joint pain or swelling; No muscle, back, or extremity pain  ALLERY AND IMMUNOLOGIC: No hives or eczema    Allergies    penicillin (Hives)  Septra (Rash; Urticaria; Hives)    Intolerances        Social History:     FAMILY HISTORY:      MEDICATIONS  (STANDING):  ARIPiprazole 5 milliGRAM(s) Oral daily  BACItracin   Ointment 1 Application(s) Topical four times a day  finasteride 5 milliGRAM(s) Oral daily  FLUoxetine 80 milliGRAM(s) Oral daily  fluticasone propionate 50 MICROgram(s)/spray Nasal Spray 1 Spray(s) Both Nostrils two times a day  influenza  Vaccine (HIGH DOSE) 0.7 milliLiter(s) IntraMuscular once  melatonin. 5 milliGRAM(s) Oral at bedtime  NIFEdipine XL 30 milliGRAM(s) Oral daily  oxybutynin 5 milliGRAM(s) Oral daily  simvastatin 20 milliGRAM(s) Oral at bedtime  traZODone 50 milliGRAM(s) Oral at bedtime    MEDICATIONS  (PRN):  diphenhydrAMINE 50 milliGRAM(s) Oral every 6 hours PRN agitation  diphenhydrAMINE Injectable 50 milliGRAM(s) IntraMuscular every 4 hours PRN severe agitation  haloperidol     Tablet 5 milliGRAM(s) Oral every 6 hours PRN agitation  haloperidol    Injectable 5 milliGRAM(s) IntraMuscular once PRN severe agitation  lidocaine   4% Patch 1 Patch Transdermal every 24 hours PRN pain  LORazepam     Tablet 2 milliGRAM(s) Oral every 6 hours PRN anxiety/agitation  LORazepam   Injectable 2 milliGRAM(s) IntraMuscular once PRN severe agitation      Vital Signs Last 24 Hrs  T(C): 36.4 (05 Oct 2022 08:05), Max: 37.1 (04 Oct 2022 13:38)  T(F): 97.6 (05 Oct 2022 08:05), Max: 98.8 (04 Oct 2022 15:30)  HR: 71 (04 Oct 2022 15:30) (71 - 71)  BP: 132/75 (04 Oct 2022 15:30) (123/67 - 132/75)  BP(mean): --  RR: 18 (04 Oct 2022 17:55) (17 - 18)  SpO2: 98% (04 Oct 2022 17:55) (95% - 98%)    Parameters below as of 04 Oct 2022 17:55  Patient On (Oxygen Delivery Method): room air      CAPILLARY BLOOD GLUCOSE            PHYSICAL EXAM:  GENERAL: anxious, thin  HEAD:  Atraumatic, Normocephalic, abrasion of the L cheek  EYES: EOMI, conjunctiva and sclera clear  NECK: Supple, No JVD  CHEST/LUNG: Clear to auscultation bilaterally; No wheeze  HEART: Regular rate and rhythm; No murmurs, rubs, or gallops  ABDOMEN: Soft, Nontender, Nondistended; Bowel sounds present  EXTREMITIES:  2+ Peripheral Pulses, No clubbing, cyanosis, or edema  NEUROLOGY: non-focal  SKIN: No rashes or lesions    LABS:                    EKG(personally reviewed):  EKG w NSR, RBBB and low voltage QRS on my review    RADIOLOGY & ADDITIONAL TESTS:    Imaging Personally Reviewed: CT chest w/o evience of PE, did show thyroid nodules    Consultant(s) Notes Reviewed:  HospitalistMELANI notes    Care Discussed with Consultants/Other Providers: Tereza

## 2022-10-05 NOTE — CONSULT NOTE ADULT - ASSESSMENT
72 y/o male with BPH, HTN and HLD, recent covid infection 9/22/22 transferred from [facility] to [facility]. Initially presenting to [facility] with generalized complaints; SOB. Pt admitted for  workup for dyspnea and depression . CTA chest negative for PE, cardiac w/u negative for ACS. Covid positive while hospitalized on 9/22, did not require o2 therapy, dex or remdes. Admitted to [facility] for mgmt of his depression, SI and agitation.     Depression  - Mgmt as per psych   - Currently on abilify, prozac, and trazodone   - PRN ativan, benadryl, haldol    BPH  - Cont flomax and oxybutynin as ordered    HTN  - Cont nifedipine 30 daily, room to go up if needed, SBP 150s this am, but pt was anxious at that time    HLD  - Cont statin    Thyroid nodules, incidental finding  On CTA on admission. TSH checked in July 2022 was wnl.  - Needs outpatient f/u w PCP for further surveillance  - Can repeat a TSH at next lab check if pt will remain at [facility] for an extended stay

## 2022-10-05 NOTE — BH INPATIENT PSYCHIATRY ASSESSMENT NOTE - CURRENT MEDICATION
MEDICATIONS  (STANDING):  ARIPiprazole 5 milliGRAM(s) Oral daily  BACItracin   Ointment 1 Application(s) Topical four times a day  finasteride 5 milliGRAM(s) Oral daily  FLUoxetine 80 milliGRAM(s) Oral daily  fluticasone propionate 50 MICROgram(s)/spray Nasal Spray 1 Spray(s) Both Nostrils two times a day  influenza  Vaccine (HIGH DOSE) 0.7 milliLiter(s) IntraMuscular once  melatonin. 5 milliGRAM(s) Oral at bedtime  NIFEdipine XL 30 milliGRAM(s) Oral daily  oxybutynin 5 milliGRAM(s) Oral daily  simvastatin 20 milliGRAM(s) Oral at bedtime  traZODone 50 milliGRAM(s) Oral at bedtime    MEDICATIONS  (PRN):  diphenhydrAMINE 50 milliGRAM(s) Oral every 6 hours PRN agitation  diphenhydrAMINE Injectable 50 milliGRAM(s) IntraMuscular every 4 hours PRN severe agitation  haloperidol     Tablet 5 milliGRAM(s) Oral every 6 hours PRN agitation  haloperidol    Injectable 5 milliGRAM(s) IntraMuscular once PRN severe agitation  lidocaine   4% Patch 1 Patch Transdermal every 24 hours PRN pain  LORazepam     Tablet 2 milliGRAM(s) Oral every 6 hours PRN anxiety/agitation  LORazepam   Injectable 2 milliGRAM(s) IntraMuscular once PRN severe agitation   MEDICATIONS  (STANDING):  finasteride 5 milliGRAM(s) Oral daily  melatonin. 5 milliGRAM(s) Oral at bedtime  NIFEdipine XL 30 milliGRAM(s) Oral daily  nystatin Powder 1 Application(s) Topical two times a day  oxybutynin 5 milliGRAM(s) Oral daily  potassium chloride    Tablet ER 20 milliEquivalent(s) Oral daily  simvastatin 20 milliGRAM(s) Oral at bedtime  traZODone 50 milliGRAM(s) Oral at bedtime  venlafaxine  milliGRAM(s) Oral daily    MEDICATIONS  (PRN):  acetaminophen     Tablet .. 650 milliGRAM(s) Oral every 6 hours PRN Mild Pain (1 - 3), Moderate Pain (4 - 6)  diphenhydrAMINE 50 milliGRAM(s) Oral every 6 hours PRN agitation  diphenhydrAMINE Injectable 50 milliGRAM(s) IntraMuscular every 4 hours PRN severe agitation  fluticasone propionate 50 MICROgram(s)/spray Nasal Spray 1 Spray(s) Both Nostrils two times a day PRN congestion  haloperidol     Tablet 5 milliGRAM(s) Oral every 6 hours PRN agitation  haloperidol    Injectable 5 milliGRAM(s) IntraMuscular once PRN severe agitation  lidocaine   4% Patch 1 Patch Transdermal every 24 hours PRN pain

## 2022-10-05 NOTE — BH INPATIENT PSYCHIATRY ASSESSMENT NOTE - TELEPSYCHIATRY?
Review of Systems   Constitutional: Positive for appetite change  Negative for fever  HENT: Negative  Negative for hearing loss, tinnitus, trouble swallowing and voice change  Eyes: Negative  Negative for photophobia and pain  Respiratory: Negative  Negative for shortness of breath  Cardiovascular: Negative  Negative for palpitations  Gastrointestinal: Negative  Negative for nausea and vomiting  Endocrine: Negative  Negative for cold intolerance  Genitourinary: Negative  Negative for dysuria, frequency and urgency  Musculoskeletal: Positive for back pain  Negative for myalgias and neck pain  Skin: Negative  Negative for rash  Neurological: Positive for dizziness (Every once in a while), seizures, weakness (Left leg), light-headedness (Sometimes) and headaches (Sometimes)  Negative for tremors, syncope, facial asymmetry, speech difficulty and numbness  Hematological: Negative  Does not bruise/bleed easily  Psychiatric/Behavioral: Negative  Negative for confusion, hallucinations and sleep disturbance  No

## 2022-10-05 NOTE — BH INPATIENT PSYCHIATRY ASSESSMENT NOTE - NSCOMMENTSUICPROTRISKFACT_PSY_ALL_CORE
Pt has no known hx of suicide attempts, has a friend Sahra that he calls frequently for support. Patient is known to [facility] and is able to seek care in the hospital when required.

## 2022-10-06 PROCEDURE — 99231 SBSQ HOSP IP/OBS SF/LOW 25: CPT | Mod: GC

## 2022-10-06 RX ORDER — ACETAMINOPHEN 500 MG
650 TABLET ORAL EVERY 6 HOURS
Refills: 0 | Status: DISCONTINUED | OUTPATIENT
Start: 2022-10-06 | End: 2022-12-02

## 2022-10-06 RX ADMIN — Medication 30 MILLIGRAM(S): at 08:27

## 2022-10-06 RX ADMIN — Medication 50 MILLIGRAM(S): at 20:35

## 2022-10-06 RX ADMIN — Medication 650 MILLIGRAM(S): at 21:45

## 2022-10-06 RX ADMIN — Medication 1 SPRAY(S): at 08:29

## 2022-10-06 RX ADMIN — Medication 5 MILLIGRAM(S): at 08:27

## 2022-10-06 RX ADMIN — Medication 5 MILLIGRAM(S): at 20:35

## 2022-10-06 RX ADMIN — Medication 80 MILLIGRAM(S): at 08:28

## 2022-10-06 RX ADMIN — Medication 1 SPRAY(S): at 20:36

## 2022-10-06 RX ADMIN — SIMVASTATIN 20 MILLIGRAM(S): 20 TABLET, FILM COATED ORAL at 22:27

## 2022-10-06 RX ADMIN — ARIPIPRAZOLE 5 MILLIGRAM(S): 15 TABLET ORAL at 08:27

## 2022-10-06 RX ADMIN — Medication 650 MILLIGRAM(S): at 07:05

## 2022-10-06 RX ADMIN — Medication 650 MILLIGRAM(S): at 06:23

## 2022-10-06 RX ADMIN — FINASTERIDE 5 MILLIGRAM(S): 5 TABLET, FILM COATED ORAL at 08:27

## 2022-10-06 RX ADMIN — Medication 650 MILLIGRAM(S): at 20:36

## 2022-10-06 NOTE — BH SOCIAL WORK INITIAL PSYCHOSOCIAL EVALUATION - NSBHBARRIERS_PSY_ALL_CORE
Pt reports chronic leg pain, urinary incontinence related to urologic issues./Medical co-morbidities/Poor judgement Pt reports chronic leg pain, urinary incontinence related to urologic issues.  Pt's brother is reportedly his only family available.  Pt and his brother have a strained relationship.  Brother has serious medical issues, and reports he was just discharged from the hospital/Medical co-morbidities/Poor judgement

## 2022-10-06 NOTE — BH INPATIENT PSYCHIATRY PROGRESS NOTE - OTHER
Pt starts shaking his leg after complaining of tremors which was not visible prior to pt complaint or during the rest of the evaluation.

## 2022-10-06 NOTE — BH INPATIENT PSYCHIATRY PROGRESS NOTE - NSBHCHARTREVIEWVS_PSY_A_CORE FT
Vital Signs Last 24 Hrs  T(C): 36.8 (10-06-22 @ 08:29), Max: 36.8 (10-06-22 @ 08:29)  T(F): 98.2 (10-06-22 @ 08:29), Max: 98.2 (10-06-22 @ 08:29)  HR: 70 (10-06-22 @ 08:29) (70 - 70)  BP: --  BP(mean): --  RR: --  SpO2: 99% (10-05-22 @ 22:06) (99% - 99%)    Orthostatic VS  10-06-22 @ 08:29  Lying BP: --/-- HR: --  Sitting BP: 116/76 HR: --  Standing BP: 108/73 HR: --  Site: --  Mode: --  Orthostatic VS  10-05-22 @ 08:05  Lying BP: --/-- HR: --  Sitting BP: 150/74 HR: 66  Standing BP: 139/80 HR: 71  Site: --  Mode: --  Orthostatic VS  10-04-22 @ 17:55  Lying BP: --/-- HR: --  Sitting BP: 132/79 HR: 90  Standing BP: 134/80 HR: 92  Site: upper left arm  Mode: electronic

## 2022-10-06 NOTE — PSYCHIATRIC REHAB INITIAL EVALUATION - PRIMARY SOURCE OF SUPPORT/COMFORT
Encounter Date: 3/1/2018       History     Chief Complaint   Patient presents with    Ankle Pain     c/o left ankle pain.      Patient is a 14 year old male who presents with left foot pain after a fall today. He has PMH significant for ADHD and autism. Grandmother reports he tripped on the sidewalk that was uneven and twisted his foot/ankle. The school put ice on the area. He reports constant, minimal pain that is worse with ambulation. He denied numbness/tingling. No medications given.      The history is provided by the patient and a grandparent.     Review of patient's allergies indicates:   Allergen Reactions    No known drug allergies      Past Medical History:   Diagnosis Date    ADHD (attention deficit hyperactivity disorder)     Autism spectrum disorder      History reviewed. No pertinent surgical history.  Family History   Problem Relation Age of Onset    Psoriasis Paternal Aunt      plaque psoriasis    Lupus Maternal Grandmother     Cancer Other      asbestosis    Hypertension Other     Allergies Mother      Social History   Substance Use Topics    Smoking status: Never Smoker    Smokeless tobacco: Never Used    Alcohol use Not on file     Review of Systems   Constitutional: Negative for activity change, appetite change, chills and fever.   HENT: Negative for congestion, rhinorrhea and sore throat.    Eyes: Negative for redness and visual disturbance.   Respiratory: Negative for cough, chest tightness and shortness of breath.    Cardiovascular: Negative for chest pain.   Gastrointestinal: Negative for abdominal pain, diarrhea, nausea and vomiting.   Genitourinary: Negative for dysuria and frequency.   Musculoskeletal: Positive for arthralgias. Negative for back pain, neck pain and neck stiffness.   Skin: Negative for rash.   Neurological: Negative for dizziness, syncope, numbness and headaches.       Physical Exam     Initial Vitals [03/01/18 1548]   BP Pulse Resp Temp SpO2   118/78 88 18 97.9  °F (36.6 °C) 97 %      MAP       91.33         Physical Exam    Constitutional: Vital signs are normal. He appears well-developed and well-nourished. He is cooperative.  Non-toxic appearance. He does not have a sickly appearance.   HENT:   Head: Normocephalic and atraumatic.   Right Ear: External ear normal.   Left Ear: External ear normal.   Nose: Nose normal.   Mouth/Throat: Oropharynx is clear and moist.   Eyes: Conjunctivae and lids are normal. Pupils are equal, round, and reactive to light.   Neck: Normal range of motion and full passive range of motion without pain. Neck supple.   Cardiovascular: Normal rate and regular rhythm.   No murmur heard.  Pulmonary/Chest: Breath sounds normal. He has no wheezes. He has no rales.   Abdominal: Soft. Normal appearance. There is no tenderness. There is no rigidity, no rebound and no guarding.   Musculoskeletal:        Right ankle: He exhibits normal range of motion, no swelling and no ecchymosis. Tenderness. Lateral malleolus tenderness found.        Left foot: There is tenderness, bony tenderness and swelling. There is normal range of motion, normal capillary refill and no crepitus.        Feet:    Neurological: He is alert and oriented to person, place, and time.   Skin: Skin is warm, dry and intact. No rash noted.         ED Course   Procedures  Labs Reviewed - No data to display          Medical Decision Making:   History:   Old Medical Records: I decided to obtain old medical records.  Clinical Tests:   Radiological Study: Ordered and Reviewed       APC / Resident Notes:   Urgent evaluation of a 14 year old male with complaint of ankle pain.  He reports associated pain and swelling to the ankle.  He reports that he has been able to ambulate on the ankle with increased pain.  He denied fever.  He is noted to have mild swelling and tenderness near the lateral mallelous. There is no tenderness to base of fifth metatarsal. There is no erythema, warmth or concern for  septic joint.  Sensation is intact.  Cap refills less than 3 seconds.  Differential includes sprain versus acute fracture.  X-rays negative.  He was placed in Ace wrap.  He was given a dose of ibuprofen instructed to use R.I.C.E therapy at home.  Follow with ortho if symptoms not improve. Discussed results with patient. Return precautions given. Patient is to follow up with their primary care provider. Case was discussed with Dr. Mccabe who is in agreement with the plan of care. All questions answered.            Attending Attestation:     Physician Attestation Statement for NP/PA:   I discussed this assessment and plan of this patient with the NP/PA, but I did not personally examine the patient. The face to face encounter was performed by the NP/PA.    Other NP/PA Attestation Additions:    History of Present Illness: 14-year-old male presented with a chief complaint of foot and ankle pain status post injury.    Medical Decision Making: Initial differential diagnosis included but not limited to fracture, dislocation, contusion, and sprain.  I am in agreement with the physician assistant's  assessment, treatment, and plan of care.                    Clinical Impression:   The primary encounter diagnosis was Sprain of left ankle, unspecified ligament, initial encounter. A diagnosis of Fall was also pertinent to this visit.                           Destiney Amaya PA-C  03/01/18 1832       Paul Mccabe MD  03/01/18 1919     no one

## 2022-10-06 NOTE — BH SOCIAL WORK INITIAL PSYCHOSOCIAL EVALUATION - OTHER PAST PSYCHIATRIC HISTORY (INCLUDE DETAILS REGARDING ONSET, COURSE OF ILLNESS, INPATIENT/OUTPATIENT TREATMENT)
Pt with history of formal psychiatric treatment.  Two admissions in [facility] 1/4/22-2/24/22 and [facility] 10/22/21-11/15/21, were reportedly his first inpatient admissions. .  Pt was in outpatient treatment in [facility], and attending the clubhouse there consistently until last fall when he reports falling out with a woman there.   Pt most recently admitted to [facility]   Pt with history of formal psychiatric treatment.  Two admissions in [facility] 1/4/22-2/24/22 and [facility] 10/22/21-11/15/21, were reportedly his first inpatient admissions. .  Pt was in outpatient treatment in [facility], and attending the [facility] there consistently until last fall (2021)when he reports falling out with a woman there.   Pt most recently admitted to [facility]  7/5-8/25/22.  Pt did not link with his planned follow up, [facility], or his care coordinator.  Pt was admitted to medicine on 9/10, due to reported SI, and self-reported poor functioning.  Pt tested positive for COVID and remained in [facility] until his transfer to [facility].

## 2022-10-06 NOTE — BH SOCIAL WORK INITIAL PSYCHOSOCIAL EVALUATION - NSHIGHRISKBEHFT_PSY_ALL_CORE
Pt expresses SI with plan.  Pt reportedly has no history of suicide attempt.  Pt reportedly leaves the home early in the day (5am), is out all day or multiple days without contact with his family.  Pt has been expressing SI when talking about going home.

## 2022-10-06 NOTE — PSYCHIATRIC REHAB INITIAL EVALUATION - NSBHPRRECOMMEND_PSY_ALL_CORE
Writer met with the patient to orient him to the psych rehab services and to establish therapeutic goals. Patient stated his goal is to be physically well and move into an assisted living facility. According to the patient he was hospitalized because "I said I wanted to die". According to the patient he made this statement out of frustration with his living environment and physical limitations. Patient stated to writer he did not have an actual plan to kill himself. According to the patient he has a history of depression, and anxiety. According to the patient he has been psychiatrically hospitalized several times. Patient denied any symptoms of confusion, paranoia or hallucinations. Patient is known to writer from his previous [facility] [address].

## 2022-10-06 NOTE — BH SOCIAL WORK INITIAL PSYCHOSOCIAL EVALUATION - NSBHFINANCESOCIAL_PSY_ALL_CORE
None Pt's medicaid is limited due to surplus income.  As per prior care coordinator pt will need to show additional documentation to extend his community benefit/None

## 2022-10-06 NOTE — PSYCHIATRIC REHAB INITIAL EVALUATION - NSBHEDULEVEL_PSY_ALL_CORE
Patient stated he attended college for one year and then quit due to work responsibilities./Other (Specify)

## 2022-10-06 NOTE — BH SOCIAL WORK INITIAL PSYCHOSOCIAL EVALUATION - NSBHCOMMCURRENT_PSY_ALL_CORE
[facility] has followed pt since 11/21.  Raeann Live Dir.  [phone] [facility] has followed pt since 11/21.  Raeann Live Dir.  [phone]/Care Coordinator

## 2022-10-06 NOTE — BH SOCIAL WORK INITIAL PSYCHOSOCIAL EVALUATION - NSPTSTATEDGOAL_PSY_ALL_CORE
Pt will not participate in goal setting at this time Pt unable to participate in goal setting at this time

## 2022-10-06 NOTE — BH INPATIENT PSYCHIATRY PROGRESS NOTE - NSBHASSESSSUMMFT_PSY_ALL_CORE
71 y.o. male with hx of unusual relatedness including pattern of making up psychiatric symptoms or hx such as claiming he arranged a hit or his brother killed himself. Recently there has been escalating pattern of hospitalizations, ER visits. Compliance after d/c is nil. Patient also develops excessive attachment or intense dislike of providers and other staff. Patient has no actual hx of suicide attempts or fh or SIB. Patient with likely facititious disorder vs mood disorder. Suspect possible unconscious primary gain related to medical complaints help seeking then rejection of help as inadequate. Patient not assessed as needing CO. Titrate medications and connect patient back to services. Appears to be provocative and help-rejecting. Prolonged hospitalization may not help achieve shared goals and might not be therapeutic for patient.    10/6: Has multiple somatic complaints, does not engage in treatment planning, however appears to show initiative toward getting a notebook which was a coping mechanism for patient during last hospitalization.     Plan:  1. Admit to [facility]  2. Safety: Does not require CO 1:1 at this time.  3. Legals: 9.27  4. Psychiatry: Continue current meds  .Prozac 80mg PO daily  .Abilify 5mg PO daily  .Trazodone 50mg PO HS  .Melatonin 5mg PO HS  5. Medical: Continue current medications  .Finasteride 5mg PO HS  .Nifedipine 30mg PO Daily  .Oxybutynin 5mg PO daily  .Simvastatin 20mg PO HS  6. Disposition: Home when stable.

## 2022-10-06 NOTE — BH PSYCHOLOGY - GROUP THERAPY NOTE - NSPSYCHOLGRPSYMINT_PSY_A_CORE
feedback provided/support provided/reviewed current functioning/encouraged self-help/symptoms reviewed/reviewed coping skills/discussed symptom reduction strategies

## 2022-10-06 NOTE — BH SOCIAL WORK INITIAL PSYCHOSOCIAL EVALUATION - NSBHEMPLOYEDYN_PSY_ALL_CORE
pt worked for [employer] for 14 years leaving approximately 1983 with no further consistent employment/No

## 2022-10-06 NOTE — BH INPATIENT PSYCHIATRY PROGRESS NOTE - NSBHFUPINTERVALHXFT_PSY_A_CORE
Pt seen and evaluated for reported SI to C&L team after patient was treated for covid 19 infection. Patient continues with help-rejecting complaints, and catastrophization, provocative, stating that you could discharge me and I will just die. Patient has multiple somatic complaints including being unable to read, yet asking for notebook. Other somatic complaints include prostate problems and leg "tremors" that only started after pt made a complaint about it.

## 2022-10-06 NOTE — BH INPATIENT PSYCHIATRY PROGRESS NOTE - NSBHMETABOLIC_PSY_ALL_CORE_FT
BMI: BMI (kg/m2): 25.2 (10-04-22 @ 17:55)  HbA1c: A1C with Estimated Average Glucose Result: 5.1 % (07-06-22 @ 10:38)    Glucose: POCT Blood Glucose.: 112 mg/dL (10-05-22 @ 20:21)    BP: --  Lipid Panel: Date/Time: 07-06-22 @ 10:38  Cholesterol, Serum: 128  Direct LDL: --  HDL Cholesterol, Serum: 52  Total Cholesterol/HDL Ration Measurement: --  Triglycerides, Serum: 74

## 2022-10-06 NOTE — BH SOCIAL WORK INITIAL PSYCHOSOCIAL EVALUATION - NSBHHOME_PSY_ALL_CORE
Pt lives with his brother and brother's roomate in a rented apartment.  Pt reports living in this apartment most of his life/Home with Family

## 2022-10-06 NOTE — BH INPATIENT PSYCHIATRY PROGRESS NOTE - CURRENT MEDICATION
MEDICATIONS  (STANDING):  ARIPiprazole 5 milliGRAM(s) Oral daily  BACItracin   Ointment 1 Application(s) Topical four times a day  finasteride 5 milliGRAM(s) Oral daily  FLUoxetine 80 milliGRAM(s) Oral daily  fluticasone propionate 50 MICROgram(s)/spray Nasal Spray 1 Spray(s) Both Nostrils two times a day  influenza  Vaccine (HIGH DOSE) 0.7 milliLiter(s) IntraMuscular once  melatonin. 5 milliGRAM(s) Oral at bedtime  NIFEdipine XL 30 milliGRAM(s) Oral daily  oxybutynin 5 milliGRAM(s) Oral daily  simvastatin 20 milliGRAM(s) Oral at bedtime  traZODone 50 milliGRAM(s) Oral at bedtime    MEDICATIONS  (PRN):  acetaminophen     Tablet .. 650 milliGRAM(s) Oral every 6 hours PRN Mild Pain (1 - 3), Moderate Pain (4 - 6)  diphenhydrAMINE 50 milliGRAM(s) Oral every 6 hours PRN agitation  diphenhydrAMINE Injectable 50 milliGRAM(s) IntraMuscular every 4 hours PRN severe agitation  haloperidol     Tablet 5 milliGRAM(s) Oral every 6 hours PRN agitation  haloperidol    Injectable 5 milliGRAM(s) IntraMuscular once PRN severe agitation  lidocaine   4% Patch 1 Patch Transdermal every 24 hours PRN pain  LORazepam     Tablet 2 milliGRAM(s) Oral every 6 hours PRN anxiety/agitation  LORazepam   Injectable 2 milliGRAM(s) IntraMuscular once PRN severe agitation

## 2022-10-07 PROCEDURE — 99231 SBSQ HOSP IP/OBS SF/LOW 25: CPT | Mod: GC

## 2022-10-07 RX ADMIN — SIMVASTATIN 20 MILLIGRAM(S): 20 TABLET, FILM COATED ORAL at 21:14

## 2022-10-07 RX ADMIN — Medication 650 MILLIGRAM(S): at 09:30

## 2022-10-07 RX ADMIN — ARIPIPRAZOLE 5 MILLIGRAM(S): 15 TABLET ORAL at 09:30

## 2022-10-07 RX ADMIN — Medication 5 MILLIGRAM(S): at 21:14

## 2022-10-07 RX ADMIN — Medication 1 SPRAY(S): at 09:30

## 2022-10-07 RX ADMIN — Medication 80 MILLIGRAM(S): at 09:30

## 2022-10-07 RX ADMIN — Medication 1 SPRAY(S): at 21:13

## 2022-10-07 RX ADMIN — FINASTERIDE 5 MILLIGRAM(S): 5 TABLET, FILM COATED ORAL at 09:30

## 2022-10-07 RX ADMIN — Medication 50 MILLIGRAM(S): at 21:15

## 2022-10-07 RX ADMIN — Medication 5 MILLIGRAM(S): at 09:30

## 2022-10-07 RX ADMIN — Medication 30 MILLIGRAM(S): at 09:30

## 2022-10-07 RX ADMIN — Medication 650 MILLIGRAM(S): at 10:20

## 2022-10-07 NOTE — BH INPATIENT PSYCHIATRY PROGRESS NOTE - CURRENT MEDICATION
MEDICATIONS  (STANDING):  ARIPiprazole 5 milliGRAM(s) Oral daily  finasteride 5 milliGRAM(s) Oral daily  FLUoxetine 80 milliGRAM(s) Oral daily  fluticasone propionate 50 MICROgram(s)/spray Nasal Spray 1 Spray(s) Both Nostrils two times a day  influenza  Vaccine (HIGH DOSE) 0.7 milliLiter(s) IntraMuscular once  melatonin. 5 milliGRAM(s) Oral at bedtime  NIFEdipine XL 30 milliGRAM(s) Oral daily  oxybutynin 5 milliGRAM(s) Oral daily  simvastatin 20 milliGRAM(s) Oral at bedtime  traZODone 50 milliGRAM(s) Oral at bedtime    MEDICATIONS  (PRN):  acetaminophen     Tablet .. 650 milliGRAM(s) Oral every 6 hours PRN Mild Pain (1 - 3), Moderate Pain (4 - 6)  diphenhydrAMINE 50 milliGRAM(s) Oral every 6 hours PRN agitation  diphenhydrAMINE Injectable 50 milliGRAM(s) IntraMuscular every 4 hours PRN severe agitation  haloperidol     Tablet 5 milliGRAM(s) Oral every 6 hours PRN agitation  haloperidol    Injectable 5 milliGRAM(s) IntraMuscular once PRN severe agitation  lidocaine   4% Patch 1 Patch Transdermal every 24 hours PRN pain  LORazepam     Tablet 2 milliGRAM(s) Oral every 6 hours PRN anxiety/agitation  LORazepam   Injectable 2 milliGRAM(s) IntraMuscular once PRN severe agitation

## 2022-10-07 NOTE — BH INPATIENT PSYCHIATRY PROGRESS NOTE - NSBHASSESSSUMMFT_PSY_ALL_CORE
71 y.o. male with hx of unusual relatedness including pattern of making up psychiatric symptoms or hx such as claiming he arranged a hit or his brother killed himself. Recently there has been escalating pattern of hospitalizations, ER visits. Compliance after d/c is nil. Patient also develops excessive attachment or intense dislike of providers and other staff. Patient has no actual hx of suicide attempts or fh or SIB. Patient with likely facititious disorder vs mood disorder. Suspect possible unconscious primary gain related to medical complaints help seeking then rejection of help as inadequate. Patient not assessed as needing CO. Titrate medications and connect patient back to services. Appears to be provocative and help-rejecting. Prolonged hospitalization may not help achieve shared goals and might not be therapeutic for patient.    10/6: Has multiple somatic complaints, does not engage in treatment planning, however appears to show initiative toward getting a notebook which was a coping mechanism for patient during last hospitalization.   10/7: Continues to have multiple complaints, somewhat more receptive to treatment planning, did not report SI today. Continue with current medications.     Plan:  1. Safety: Does not require CO 1:1 at this time.  2. Legals: 9.27  3. Psychiatry: Continue current meds  .Prozac 80mg PO daily  .Abilify 5mg PO daily  .Trazodone 50mg PO HS  .Melatonin 5mg PO HS  4. Medical: Continue current medications  .Finasteride 5mg PO HS  .Nifedipine 30mg PO Daily  .Oxybutynin 5mg PO daily  .Simvastatin 20mg PO HS  5. Disposition: Home when stable.

## 2022-10-07 NOTE — BH INPATIENT PSYCHIATRY PROGRESS NOTE - NSBHCHARTREVIEWVS_PSY_A_CORE FT
Vital Signs Last 24 Hrs  T(C): 36.4 (10-07-22 @ 07:52), Max: 36.4 (10-07-22 @ 07:52)  T(F): 97.6 (10-07-22 @ 07:52), Max: 97.6 (10-07-22 @ 07:52)  HR: --  BP: --  BP(mean): --  RR: --  SpO2: --    Orthostatic VS  10-07-22 @ 07:52  Lying BP: --/-- HR: --  Sitting BP: 145/78 HR: 65  Standing BP: 139/70 HR: 70  Site: --  Mode: --  Orthostatic VS  10-06-22 @ 08:29  Lying BP: --/-- HR: --  Sitting BP: 116/76 HR: --  Standing BP: 108/73 HR: --  Site: --  Mode: --

## 2022-10-07 NOTE — BH INPATIENT PSYCHIATRY PROGRESS NOTE - NSBHFUPINTERVALHXFT_PSY_A_CORE
Pt seen and evaluated for reported SI to C&L team after patient was treated for covid 19 infection. Patient reported having problems with his prostate as well as knee pain. Patient reported that he wanted to meet his  while in the hospital and goals for treatment were discussed including engaging in his outpatient care and communicating with  by phone. Patient appeared to complain that he is dressed in scrubs and cannot meet with his  like this and cannot go home looking like that. Patient appeared somewhat more receptive and did not make provocative statements of passive SI today. Patient was noted to be in dayroom with other peers. Eating appropriately.

## 2022-10-08 PROCEDURE — 99231 SBSQ HOSP IP/OBS SF/LOW 25: CPT

## 2022-10-08 RX ADMIN — Medication 50 MILLIGRAM(S): at 21:08

## 2022-10-08 RX ADMIN — Medication 1 SPRAY(S): at 21:07

## 2022-10-08 RX ADMIN — Medication 5 MILLIGRAM(S): at 21:08

## 2022-10-08 RX ADMIN — Medication 30 MILLIGRAM(S): at 08:51

## 2022-10-08 RX ADMIN — Medication 80 MILLIGRAM(S): at 08:51

## 2022-10-08 RX ADMIN — INFLUENZA VIRUS VACCINE 0.7 MILLILITER(S): 15; 15; 15; 15 SUSPENSION INTRAMUSCULAR at 09:19

## 2022-10-08 RX ADMIN — Medication 1 SPRAY(S): at 08:51

## 2022-10-08 RX ADMIN — FINASTERIDE 5 MILLIGRAM(S): 5 TABLET, FILM COATED ORAL at 08:51

## 2022-10-08 RX ADMIN — SIMVASTATIN 20 MILLIGRAM(S): 20 TABLET, FILM COATED ORAL at 21:08

## 2022-10-08 RX ADMIN — ARIPIPRAZOLE 5 MILLIGRAM(S): 15 TABLET ORAL at 08:50

## 2022-10-08 RX ADMIN — Medication 5 MILLIGRAM(S): at 08:51

## 2022-10-08 NOTE — BH INPATIENT PSYCHIATRY PROGRESS NOTE - NSBHCHARTREVIEWVS_PSY_A_CORE FT
Vital Signs Last 24 Hrs  T(C): 36.4 (10-08-22 @ 08:04), Max: 36.4 (10-08-22 @ 08:04)  T(F): 97.6 (10-08-22 @ 08:04), Max: 97.6 (10-08-22 @ 08:04)  HR: --  BP: --  BP(mean): --  RR: --  SpO2: --    Orthostatic VS  10-08-22 @ 08:04  Lying BP: 139/70 HR: 84  Sitting BP: 147/81 HR: 87  Standing BP: --/-- HR: --  Site: --  Mode: --  Orthostatic VS  10-07-22 @ 07:52  Lying BP: --/-- HR: --  Sitting BP: 145/78 HR: 65  Standing BP: 139/70 HR: 70  Site: --  Mode: --

## 2022-10-08 NOTE — BH INPATIENT PSYCHIATRY PROGRESS NOTE - MSE UNSTRUCTURED FT
Patient is awake and alert. Affect is neutral/bright. Speech is fluent. TP is linear, coherent. No delusions. No perceptual disturbance.  Fair insight. No suicidal ideations. No EPS.

## 2022-10-08 NOTE — BH INPATIENT PSYCHIATRY PROGRESS NOTE - NSBHASSESSSUMMFT_PSY_ALL_CORE
71 y.o. male with hx of unusual relatedness including pattern of making up psychiatric symptoms or hx such as claiming he arranged a hit or his brother killed himself. Recently there has been escalating pattern of hospitalizations, ER visits. Compliance after d/c is nil. Patient also develops excessive attachment or intense dislike of providers and other staff. Patient has no actual hx of suicide attempts or fh or SIB. Patient with likely facititious disorder vs mood disorder. Suspect possible unconscious primary gain related to medical complaints help seeking then rejection of help as inadequate. Patient not assessed as needing CO. Titrate medications and connect patient back to services. Appears to be provocative and help-rejecting. Prolonged hospitalization may not help achieve shared goals and might not be therapeutic for patient.    10/6: Has multiple somatic complaints, does not engage in treatment planning, however appears to show initiative toward getting a notebook which was a coping mechanism for patient during last hospitalization.   10/7: Continues to have multiple complaints, somewhat more receptive to treatment planning, did not report SI today. Continue with current medications.   10/8: No mood symptoms, no psychotic symptoms, no suicidal ideations     Plan:  1. Safety: Does not require CO 1:1 at this time.  2. Legals: 9.27  3. Psychiatry: Continue current meds  .Prozac 80mg PO daily  .Abilify 5mg PO daily  .Trazodone 50mg PO HS  .Melatonin 5mg PO HS  4. Medical: Continue current medications  .Finasteride 5mg PO HS  .Nifedipine 30mg PO Daily  .Oxybutynin 5mg PO daily  .Simvastatin 20mg PO HS  5. Disposition: Home when stable.

## 2022-10-08 NOTE — BH INPATIENT PSYCHIATRY PROGRESS NOTE - CURRENT MEDICATION
MEDICATIONS  (STANDING):  ARIPiprazole 5 milliGRAM(s) Oral daily  finasteride 5 milliGRAM(s) Oral daily  FLUoxetine 80 milliGRAM(s) Oral daily  fluticasone propionate 50 MICROgram(s)/spray Nasal Spray 1 Spray(s) Both Nostrils two times a day  melatonin. 5 milliGRAM(s) Oral at bedtime  NIFEdipine XL 30 milliGRAM(s) Oral daily  oxybutynin 5 milliGRAM(s) Oral daily  simvastatin 20 milliGRAM(s) Oral at bedtime  traZODone 50 milliGRAM(s) Oral at bedtime    MEDICATIONS  (PRN):  acetaminophen     Tablet .. 650 milliGRAM(s) Oral every 6 hours PRN Mild Pain (1 - 3), Moderate Pain (4 - 6)  diphenhydrAMINE 50 milliGRAM(s) Oral every 6 hours PRN agitation  diphenhydrAMINE Injectable 50 milliGRAM(s) IntraMuscular every 4 hours PRN severe agitation  haloperidol     Tablet 5 milliGRAM(s) Oral every 6 hours PRN agitation  haloperidol    Injectable 5 milliGRAM(s) IntraMuscular once PRN severe agitation  lidocaine   4% Patch 1 Patch Transdermal every 24 hours PRN pain  LORazepam     Tablet 2 milliGRAM(s) Oral every 6 hours PRN anxiety/agitation  LORazepam   Injectable 2 milliGRAM(s) IntraMuscular once PRN severe agitation

## 2022-10-09 PROCEDURE — 99231 SBSQ HOSP IP/OBS SF/LOW 25: CPT

## 2022-10-09 RX ADMIN — Medication 80 MILLIGRAM(S): at 09:39

## 2022-10-09 RX ADMIN — SIMVASTATIN 20 MILLIGRAM(S): 20 TABLET, FILM COATED ORAL at 20:45

## 2022-10-09 RX ADMIN — Medication 50 MILLIGRAM(S): at 20:45

## 2022-10-09 RX ADMIN — FINASTERIDE 5 MILLIGRAM(S): 5 TABLET, FILM COATED ORAL at 09:39

## 2022-10-09 RX ADMIN — Medication 30 MILLIGRAM(S): at 09:39

## 2022-10-09 RX ADMIN — Medication 1 SPRAY(S): at 09:43

## 2022-10-09 RX ADMIN — Medication 5 MILLIGRAM(S): at 09:39

## 2022-10-09 RX ADMIN — Medication 1 SPRAY(S): at 20:45

## 2022-10-09 RX ADMIN — Medication 5 MILLIGRAM(S): at 20:45

## 2022-10-09 RX ADMIN — ARIPIPRAZOLE 5 MILLIGRAM(S): 15 TABLET ORAL at 09:40

## 2022-10-09 NOTE — BH INPATIENT PSYCHIATRY PROGRESS NOTE - NSBHCHARTREVIEWVS_PSY_A_CORE FT
Vital Signs Last 24 Hrs  T(C): 36.4 (10-09-22 @ 08:17), Max: 36.4 (10-09-22 @ 08:17)  T(F): 97.6 (10-09-22 @ 08:17), Max: 97.6 (10-09-22 @ 08:17)  HR: --  BP: --  BP(mean): --  RR: 17 (10-09-22 @ 01:52) (17 - 17)  SpO2: 97% (10-09-22 @ 01:52) (97% - 97%)    Orthostatic VS  10-09-22 @ 08:17  Lying BP: --/-- HR: --  Sitting BP: 143/83 HR: 70  Standing BP: 144/86 HR: 76  Site: --  Mode: --  Orthostatic VS  10-09-22 @ 01:52  Lying BP: --/-- HR: --  Sitting BP: 142/82 HR: 80  Standing BP: --/-- HR: --  Site: upper left arm  Mode: electronic  Orthostatic VS  10-08-22 @ 08:04  Lying BP: 139/70 HR: 84  Sitting BP: 147/81 HR: 87  Standing BP: --/-- HR: --  Site: --  Mode: --

## 2022-10-09 NOTE — BH INPATIENT PSYCHIATRY PROGRESS NOTE - NSBHASSESSSUMMFT_PSY_ALL_CORE
71 y.o. male with hx of unusual relatedness including pattern of making up psychiatric symptoms or hx such as claiming he arranged a hit or his brother killed himself. Recently there has been escalating pattern of hospitalizations, ER visits. Compliance after d/c is nil. Patient also develops excessive attachment or intense dislike of providers and other staff. Patient has no actual hx of suicide attempts or fh or SIB. Patient with likely facititious disorder vs mood disorder. Suspect possible unconscious primary gain related to medical complaints help seeking then rejection of help as inadequate. Patient not assessed as needing CO. Titrate medications and connect patient back to services. Appears to be provocative and help-rejecting. Prolonged hospitalization may not help achieve shared goals and might not be therapeutic for patient.    10/6: Has multiple somatic complaints, does not engage in treatment planning, however appears to show initiative toward getting a notebook which was a coping mechanism for patient during last hospitalization.   10/7: Continues to have multiple complaints, somewhat more receptive to treatment planning, did not report SI today. Continue with current medications.   10/8: No mood symptoms, no psychotic symptoms, no suicidal ideations.  10/9/22; anxious, dysphoric, superficial, no SI/HI.     Plan:  1. Safety: Does not require CO 1:1 at this time.  2. Legals: 9.27  3. Psychiatry: Continue current meds  .Prozac 80mg PO daily  .Abilify 5mg PO daily  .Trazodone 50mg PO HS  .Melatonin 5mg PO HS  4. Medical: Continue current medications  .Finasteride 5mg PO HS  .Nifedipine 30mg PO Daily  .Oxybutynin 5mg PO daily  .Simvastatin 20mg PO HS  5. Disposition: Home when stable.

## 2022-10-09 NOTE — BH INPATIENT PSYCHIATRY PROGRESS NOTE - NSBHFUPINTERVALHXFT_PSY_A_CORE
Patient seen for depression, chart reviewed and discussed with nursing staff. Pt is compliant with meds, no acute events overnight, no PRN needed. On exam, Pt reports, 'I am not happy, nothing to do..'. He reports he eats very good. Denies any SI/HI, paranoia, hallucination.

## 2022-10-10 PROCEDURE — 99231 SBSQ HOSP IP/OBS SF/LOW 25: CPT | Mod: GC

## 2022-10-10 RX ADMIN — Medication 5 MILLIGRAM(S): at 10:34

## 2022-10-10 RX ADMIN — SIMVASTATIN 20 MILLIGRAM(S): 20 TABLET, FILM COATED ORAL at 20:53

## 2022-10-10 RX ADMIN — Medication 1 SPRAY(S): at 20:53

## 2022-10-10 RX ADMIN — Medication 50 MILLIGRAM(S): at 20:53

## 2022-10-10 RX ADMIN — ARIPIPRAZOLE 5 MILLIGRAM(S): 15 TABLET ORAL at 10:33

## 2022-10-10 RX ADMIN — Medication 30 MILLIGRAM(S): at 10:34

## 2022-10-10 RX ADMIN — Medication 5 MILLIGRAM(S): at 20:53

## 2022-10-10 RX ADMIN — Medication 80 MILLIGRAM(S): at 10:33

## 2022-10-10 RX ADMIN — FINASTERIDE 5 MILLIGRAM(S): 5 TABLET, FILM COATED ORAL at 10:35

## 2022-10-10 NOTE — BH INPATIENT PSYCHIATRY PROGRESS NOTE - NSBHASSESSSUMMFT_PSY_ALL_CORE
71 y.o. male with hx of unusual relatedness including pattern of making up psychiatric symptoms or hx such as claiming he arranged a hit or his brother killed himself. Recently there has been escalating pattern of hospitalizations, ER visits. Compliance after d/c is nil. Patient also develops excessive attachment or intense dislike of providers and other staff. Patient has no actual hx of suicide attempts or fh or SIB. Patient with likely facititious disorder vs mood disorder. Suspect possible unconscious primary gain related to medical complaints help seeking then rejection of help as inadequate. Patient not assessed as needing CO. Titrate medications and connect patient back to services. Appears to be provocative and help-rejecting. Prolonged hospitalization may not help achieve shared goals and might not be therapeutic for patient.    10/6: Has multiple somatic complaints, does not engage in treatment planning, however appears to show initiative toward getting a notebook which was a coping mechanism for patient during last hospitalization.   10/7: Continues to have multiple complaints, somewhat more receptive to treatment planning, did not report SI today. Continue with current medications.   10/8: No mood symptoms, no psychotic symptoms, no suicidal ideations.  10/9/22; anxious, dysphoric, superficial, no SI/HI.   10/10/22: Remain anxious, catastrophizes about his friendship, having multiple somatic complaints. Consider switching to Effexor XR.    Plan:  1. Safety: Does not require CO 1:1 at this time.  2. Legals: 9.27  3. Psychiatry: Continue current meds  .Prozac 80mg PO daily  .Abilify 5mg PO daily  .Trazodone 50mg PO HS  .Melatonin 5mg PO HS  4. Medical: Continue current medications  .Finasteride 5mg PO HS  .Nifedipine 30mg PO Daily  .Oxybutynin 5mg PO daily  .Simvastatin 20mg PO HS  5. Disposition: Home when stable.

## 2022-10-10 NOTE — BH INPATIENT PSYCHIATRY PROGRESS NOTE - NSBHCHARTREVIEWVS_PSY_A_CORE FT
Vital Signs Last 24 Hrs  T(C): 37.2 (10-10-22 @ 07:59), Max: 37.2 (10-10-22 @ 07:59)  T(F): 99 (10-10-22 @ 07:59), Max: 99 (10-10-22 @ 07:59)  HR: --  BP: --  BP(mean): --  RR: --  SpO2: --    Orthostatic VS  10-10-22 @ 07:59  Lying BP: --/-- HR: --  Sitting BP: 117/77 HR: 77  Standing BP: 117/76 HR: 93  Site: --  Mode: --  Orthostatic VS  10-09-22 @ 08:17  Lying BP: --/-- HR: --  Sitting BP: 143/83 HR: 70  Standing BP: 144/86 HR: 76  Site: --  Mode: --  Orthostatic VS  10-09-22 @ 01:52  Lying BP: --/-- HR: --  Sitting BP: 142/82 HR: 80  Standing BP: --/-- HR: --  Site: upper left arm  Mode: electronic

## 2022-10-10 NOTE — BH INPATIENT PSYCHIATRY PROGRESS NOTE - NSBHFUPINTERVALHXFT_PSY_A_CORE
Patient seen for depression, chart reviewed and discussed with nursing staff. Pt is compliant with meds, no acute events overnight, no PRN needed. Upon evaluation, pt verbalizes multiple somatic complaints including ingrown toenail and groin pruritus along with complaints about not being able to live with his brother. Pt was somewhat more receptive to treatment planning including meeting with , however continues to complain about unit rules and some catastrophization about losing his friend, Grace. Patient was redirected to focus on piecemealing goals to avoid magnification and catastrophization of his problems.

## 2022-10-11 PROCEDURE — 99231 SBSQ HOSP IP/OBS SF/LOW 25: CPT

## 2022-10-11 RX ADMIN — Medication 50 MILLIGRAM(S): at 21:34

## 2022-10-11 RX ADMIN — Medication 30 MILLIGRAM(S): at 08:36

## 2022-10-11 RX ADMIN — Medication 5 MILLIGRAM(S): at 21:34

## 2022-10-11 RX ADMIN — Medication 1 SPRAY(S): at 08:43

## 2022-10-11 RX ADMIN — Medication 1 SPRAY(S): at 21:37

## 2022-10-11 RX ADMIN — FINASTERIDE 5 MILLIGRAM(S): 5 TABLET, FILM COATED ORAL at 08:36

## 2022-10-11 RX ADMIN — ARIPIPRAZOLE 5 MILLIGRAM(S): 15 TABLET ORAL at 08:36

## 2022-10-11 RX ADMIN — Medication 5 MILLIGRAM(S): at 08:36

## 2022-10-11 RX ADMIN — SIMVASTATIN 20 MILLIGRAM(S): 20 TABLET, FILM COATED ORAL at 21:34

## 2022-10-11 RX ADMIN — Medication 80 MILLIGRAM(S): at 08:36

## 2022-10-11 NOTE — BH INPATIENT PSYCHIATRY PROGRESS NOTE - NSBHCHARTREVIEWVS_PSY_A_CORE FT
Vital Signs Last 24 Hrs  T(C): 36.6 (10-11-22 @ 08:25), Max: 36.7 (10-10-22 @ 19:42)  T(F): 97.9 (10-11-22 @ 08:25), Max: 98 (10-10-22 @ 19:42)  HR: --  BP: --  BP(mean): --  RR: 16 (10-10-22 @ 19:42) (16 - 16)  SpO2: 100% (10-11-22 @ 08:25) (98% - 100%)    Orthostatic VS  10-11-22 @ 08:25  Lying BP: --/-- HR: --  Sitting BP: 129/70 HR: 70  Standing BP: 143/90 HR: 79  Site: --  Mode: --  Orthostatic VS  10-10-22 @ 07:59  Lying BP: --/-- HR: --  Sitting BP: 117/77 HR: 77  Standing BP: 117/76 HR: 93  Site: --  Mode: --

## 2022-10-11 NOTE — BH INPATIENT PSYCHIATRY PROGRESS NOTE - NSBHFUPINTERVALHXFT_PSY_A_CORE
Patient seen for follow-up of depression, chart reviewed and discussed with nursing staff.  No events reported overnight.  The patient has been compliant with meds, denies any side effects.  Upon evaluation, pt continues to complain of depression.  He speaks about losing his best friend, whom he states he is no longer going to call.  Discussed that it was his choice not to call her, and that she did not want to end the relationship.  Discussed how he can make choices to improve his situation.  The patient remains depressed, admits to passive SI without plan ot intent.  He verbalizes multiple somatic complaints including leg pain and cold feet and hands.  He also complains about his living arrangement, but then says his brother has a present for him.  Pt was receptive to meeting with his . He has been eating and sleeping well.

## 2022-10-11 NOTE — BH INPATIENT PSYCHIATRY PROGRESS NOTE - NSBHASSESSSUMMFT_PSY_ALL_CORE
71 y.o. male with hx of unusual relatedness including pattern of making up psychiatric symptoms or hx such as claiming he arranged a hit or his brother killed himself. Recently there has been escalating pattern of hospitalizations, ER visits. Compliance after d/c is nil. Patient also develops excessive attachment or intense dislike of providers and other staff. Patient has no actual hx of suicide attempts or fh or SIB. Patient with likely facititious disorder vs mood disorder. Suspect possible unconscious primary gain related to medical complaints help seeking then rejection of help as inadequate. Patient not assessed as needing CO. Titrate medications and connect patient back to services. Appears to be provocative and help-rejecting. Prolonged hospitalization may not help achieve shared goals and might not be therapeutic for patient.    10/6: Has multiple somatic complaints, does not engage in treatment planning, however appears to show initiative toward getting a notebook which was a coping mechanism for patient during last hospitalization.   10/7: Continues to have multiple complaints, somewhat more receptive to treatment planning, did not report SI today. Continue with current medications.   10/8: No mood symptoms, no psychotic symptoms, no suicidal ideations.  10/9/22; anxious, dysphoric, superficial, no SI/HI.   10/10/22: Remain anxious, catastrophizes about his friendship, having multiple somatic complaints. Consider switching to Effexor XR.  10/11: The patient continues to complain of depression, anxiety, losing friendships, although it seems he could continue them if he desired.  He focuses on negatives.  Education attempted.  The patient is tolerating medications well, will continue.   to meet with patient.    Plan:  1. Safety: Does not require CO 1:1 at this time.  2. Legals: 9.27  3. Psychiatry: Continue current meds  .Prozac 80mg PO daily  .Abilify 5mg PO daily  .Trazodone 50mg PO HS  .Melatonin 5mg PO HS  4. Medical: Continue current medications  .Finasteride 5mg PO HS  .Nifedipine 30mg PO Daily  .Oxybutynin 5mg PO daily  .Simvastatin 20mg PO HS  5. Disposition: Home when stable.

## 2022-10-12 PROCEDURE — 99231 SBSQ HOSP IP/OBS SF/LOW 25: CPT | Mod: GC

## 2022-10-12 RX ORDER — FLUOXETINE HCL 10 MG
60 CAPSULE ORAL DAILY
Refills: 0 | Status: DISCONTINUED | OUTPATIENT
Start: 2022-10-12 | End: 2022-10-13

## 2022-10-12 RX ORDER — VENLAFAXINE HCL 75 MG
37.5 CAPSULE, EXT RELEASE 24 HR ORAL DAILY
Refills: 0 | Status: DISCONTINUED | OUTPATIENT
Start: 2022-10-12 | End: 2022-10-17

## 2022-10-12 RX ADMIN — Medication 1 SPRAY(S): at 21:39

## 2022-10-12 RX ADMIN — FINASTERIDE 5 MILLIGRAM(S): 5 TABLET, FILM COATED ORAL at 12:56

## 2022-10-12 RX ADMIN — Medication 5 MILLIGRAM(S): at 12:57

## 2022-10-12 RX ADMIN — Medication 5 MILLIGRAM(S): at 21:38

## 2022-10-12 RX ADMIN — Medication 1 SPRAY(S): at 12:57

## 2022-10-12 RX ADMIN — SIMVASTATIN 20 MILLIGRAM(S): 20 TABLET, FILM COATED ORAL at 21:39

## 2022-10-12 RX ADMIN — Medication 50 MILLIGRAM(S): at 21:38

## 2022-10-12 RX ADMIN — Medication 30 MILLIGRAM(S): at 12:56

## 2022-10-12 RX ADMIN — ARIPIPRAZOLE 5 MILLIGRAM(S): 15 TABLET ORAL at 12:56

## 2022-10-12 RX ADMIN — Medication 60 MILLIGRAM(S): at 12:56

## 2022-10-12 RX ADMIN — Medication 37.5 MILLIGRAM(S): at 12:57

## 2022-10-12 NOTE — BH INPATIENT PSYCHIATRY PROGRESS NOTE - NSBHASSESSSUMMFT_PSY_ALL_CORE
71 y.o. male with hx of unusual relatedness including pattern of making up psychiatric symptoms or hx such as claiming he arranged a hit or his brother killed himself. Recently there has been escalating pattern of hospitalizations, ER visits. Compliance after d/c is nil. Patient also develops excessive attachment or intense dislike of providers and other staff. Patient has no actual hx of suicide attempts or fh or SIB. Patient with likely facititious disorder vs mood disorder. Suspect possible unconscious primary gain related to medical complaints help seeking then rejection of help as inadequate. Patient not assessed as needing CO. Titrate medications and connect patient back to services. Appears to be provocative and help-rejecting. Prolonged hospitalization may not help achieve shared goals and might not be therapeutic for patient.    10/6: Has multiple somatic complaints, does not engage in treatment planning, however appears to show initiative toward getting a notebook which was a coping mechanism for patient during last hospitalization.   10/7: Continues to have multiple complaints, somewhat more receptive to treatment planning, did not report SI today. Continue with current medications.   10/8: No mood symptoms, no psychotic symptoms, no suicidal ideations.  10/9/22; anxious, dysphoric, superficial, no SI/HI.   10/10/22: Remain anxious, catastrophizes about his friendship, having multiple somatic complaints. Consider switching to Effexor XR.  10/11: The patient continues to complain of depression, anxiety, losing friendships, although it seems he could continue them if he desired.  He focuses on negatives.  Education attempted.  The patient is tolerating medications well, will continue.   to meet with patient.  10/12: Patient continues to report somatic complaints but rejecting interventions. Patient is awaiting meeting with . Plan to cross taper to Effexor XR in light of reported complaints and limited efficacy on Prozac (albeit insufficient trial).    Plan:  1. Safety: Does not require CO 1:1 at this time.  2. Legals: 9.27  3. Psychiatry: Continue current meds  .Prozac 60mg PO daily. Continue to taper.  .Effexor 37.5mg PO daily. Titrate as tolerated.  .Abilify 5mg PO daily  .Trazodone 50mg PO HS  .Melatonin 5mg PO HS  4. Medical: Continue current medications  .Finasteride 5mg PO HS  .Nifedipine 30mg PO Daily  .Oxybutynin 5mg PO daily  .Simvastatin 20mg PO HS  5. Disposition: Home when stable.

## 2022-10-12 NOTE — BH INPATIENT PSYCHIATRY PROGRESS NOTE - CURRENT MEDICATION
MEDICATIONS  (STANDING):  ARIPiprazole 5 milliGRAM(s) Oral daily  finasteride 5 milliGRAM(s) Oral daily  FLUoxetine 60 milliGRAM(s) Oral daily  fluticasone propionate 50 MICROgram(s)/spray Nasal Spray 1 Spray(s) Both Nostrils two times a day  melatonin. 5 milliGRAM(s) Oral at bedtime  NIFEdipine XL 30 milliGRAM(s) Oral daily  oxybutynin 5 milliGRAM(s) Oral daily  simvastatin 20 milliGRAM(s) Oral at bedtime  traZODone 50 milliGRAM(s) Oral at bedtime  venlafaxine 37.5 milliGRAM(s) Oral daily    MEDICATIONS  (PRN):  acetaminophen     Tablet .. 650 milliGRAM(s) Oral every 6 hours PRN Mild Pain (1 - 3), Moderate Pain (4 - 6)  diphenhydrAMINE 50 milliGRAM(s) Oral every 6 hours PRN agitation  diphenhydrAMINE Injectable 50 milliGRAM(s) IntraMuscular every 4 hours PRN severe agitation  haloperidol     Tablet 5 milliGRAM(s) Oral every 6 hours PRN agitation  haloperidol    Injectable 5 milliGRAM(s) IntraMuscular once PRN severe agitation  lidocaine   4% Patch 1 Patch Transdermal every 24 hours PRN pain  LORazepam     Tablet 2 milliGRAM(s) Oral every 6 hours PRN anxiety/agitation  LORazepam   Injectable 2 milliGRAM(s) IntraMuscular once PRN severe agitation

## 2022-10-12 NOTE — BH INPATIENT PSYCHIATRY PROGRESS NOTE - NSBHFUPINTERVALHXFT_PSY_A_CORE
Patient seen for follow-up of depression, chart reviewed and discussed with nursing staff.  No events reported overnight.  Patient has been adherent with medications.  Upon evaluation, pt continues to complain of depression with multiple somatic complaints including ingrown toenail and dry mouth. Patient has been limiting oral intake, not wanting to use the bathroom due to his prostate issues. Patient is open to meeting with his  tomorrow.

## 2022-10-12 NOTE — BH INPATIENT PSYCHIATRY PROGRESS NOTE - NSBHCHARTREVIEWVS_PSY_A_CORE FT
Vital Signs Last 24 Hrs  T(C): 36.2 (10-12-22 @ 08:04), Max: 36.2 (10-12-22 @ 08:04)  T(F): 97.2 (10-12-22 @ 08:04), Max: 97.2 (10-12-22 @ 08:04)  HR: --  BP: --  BP(mean): --  RR: --  SpO2: --    Orthostatic VS  10-12-22 @ 08:04  Lying BP: --/-- HR: --  Sitting BP: 118/44 HR: 61  Standing BP: 118/87 HR: 88  Site: upper right arm  Mode: electronic  Orthostatic VS  10-11-22 @ 08:25  Lying BP: --/-- HR: --  Sitting BP: 129/70 HR: 70  Standing BP: 143/90 HR: 79  Site: --  Mode: --

## 2022-10-13 RX ORDER — FLUOXETINE HCL 10 MG
40 CAPSULE ORAL DAILY
Refills: 0 | Status: DISCONTINUED | OUTPATIENT
Start: 2022-10-14 | End: 2022-10-17

## 2022-10-13 RX ADMIN — Medication 5 MILLIGRAM(S): at 09:21

## 2022-10-13 RX ADMIN — SIMVASTATIN 20 MILLIGRAM(S): 20 TABLET, FILM COATED ORAL at 21:57

## 2022-10-13 RX ADMIN — Medication 30 MILLIGRAM(S): at 09:21

## 2022-10-13 RX ADMIN — Medication 1 SPRAY(S): at 09:21

## 2022-10-13 RX ADMIN — Medication 37.5 MILLIGRAM(S): at 09:21

## 2022-10-13 RX ADMIN — Medication 5 MILLIGRAM(S): at 21:57

## 2022-10-13 RX ADMIN — ARIPIPRAZOLE 5 MILLIGRAM(S): 15 TABLET ORAL at 09:21

## 2022-10-13 RX ADMIN — Medication 1 SPRAY(S): at 22:03

## 2022-10-13 RX ADMIN — FINASTERIDE 5 MILLIGRAM(S): 5 TABLET, FILM COATED ORAL at 09:21

## 2022-10-13 RX ADMIN — Medication 60 MILLIGRAM(S): at 09:21

## 2022-10-13 RX ADMIN — Medication 50 MILLIGRAM(S): at 21:57

## 2022-10-13 NOTE — BH INPATIENT PSYCHIATRY PROGRESS NOTE - NSBHASSESSSUMMFT_PSY_ALL_CORE
71 y.o. male with hx of unusual relatedness including pattern of making up psychiatric symptoms or hx such as claiming he arranged a hit or his brother killed himself. Recently there has been escalating pattern of hospitalizations, ER visits. Compliance after d/c is nil. Patient also develops excessive attachment or intense dislike of providers and other staff. Patient has no actual hx of suicide attempts or fh or SIB. Patient with likely facititious disorder vs mood disorder. Suspect possible unconscious primary gain related to medical complaints help seeking then rejection of help as inadequate. Patient not assessed as needing CO. Titrate medications and connect patient back to services. Appears to be provocative and help-rejecting. Prolonged hospitalization may not help achieve shared goals and might not be therapeutic for patient.    10/6: Has multiple somatic complaints, does not engage in treatment planning, however appears to show initiative toward getting a notebook which was a coping mechanism for patient during last hospitalization.   10/7: Continues to have multiple complaints, somewhat more receptive to treatment planning, did not report SI today. Continue with current medications.   10/8: No mood symptoms, no psychotic symptoms, no suicidal ideations.  10/9/22; anxious, dysphoric, superficial, no SI/HI.   10/10/22: Remain anxious, catastrophizes about his friendship, having multiple somatic complaints. Consider switching to Effexor XR.  10/11: The patient continues to complain of depression, anxiety, losing friendships, although it seems he could continue them if he desired.  He focuses on negatives.  Education attempted.  The patient is tolerating medications well, will continue.   to meet with patient.  10/12: Patient continues to report somatic complaints but rejecting interventions. Patient is awaiting meeting with . Plan to cross taper to Effexor XR in light of reported complaints and limited efficacy on Prozac (albeit insufficient trial).  10/13: Patient is catastrophizing his somatic complaints and displaying pessimism with regards to meeting . Reassurance was provided. Continues with help-rejecting behavior and was redirected to previous patterns of pessimism during previous hospitalization. Patient acknowledged it however is not taking steps to behavioral change at this time.     Plan:  1. Safety: Does not require CO 1:1 at this time.  2. Legals: 9.27  3. Psychiatry: Continue current meds  .Prozac 40mg PO daily. Continue to taper.  .Effexor 37.5mg PO daily. Titrate as tolerated.  .Abilify 5mg PO daily  .Trazodone 50mg PO HS  .Melatonin 5mg PO HS  4. Medical: Continue current medications  .Finasteride 5mg PO HS  .Nifedipine 30mg PO Daily  .Oxybutynin 5mg PO daily  .Simvastatin 20mg PO HS  5. Disposition: Home when stable.

## 2022-10-13 NOTE — BH INPATIENT PSYCHIATRY PROGRESS NOTE - NSBHFUPINTERVALHXFT_PSY_A_CORE
Patient seen for follow-up of depression, chart reviewed and discussed with nursing staff.  No events reported overnight.  Patient has been adherent with medications.  Upon evaluation, pt was reporting on his somatic complaints with chest tightness. Patient was sitting on chair comfortably without any signs of respiratory distress. Patient was catastrophizing about his symptoms and help rejecting complaints reporting having dry mouth but also restricting fluid intake to avoid getting his bladder full. Patient was provided reassurance regarding his somatic complaints. Patient denies any active SI and continued to display help-rejecting behavior and pessimism that  will not help him. Patient seen for follow-up of depression, chart reviewed and discussed with nursing staff.  No events reported overnight.  Patient has been adherent with medications.  Prior to evaluation, patient was noted to be smiling in conversation with mental health worker. Upon evaluation, pt began catastrophizing while reporting on his somatic complaints with chest tightness, stating that this will be his last day and he will not be able to make it until meeting with . Patient was sitting on chair comfortably without any signs of respiratory distress. Patient was catastrophizing about his symptoms and help rejecting complaints reporting having dry mouth but also restricting fluid intake to avoid getting his bladder full. Patient was provided reassurance regarding his somatic complaints. Patient denies any active SI and continued to display help-rejecting behavior and pessimism that  can not help him. Patient seen for follow-up of depression, chart reviewed and discussed with nursing staff.  No events reported overnight.  Patient has been adherent with medications.  Prior to evaluation, patient was noted to be smiling in conversation with mental health worker. Upon evaluation, pt presented catastrophic thinking while reporting on his somatic complaints with chest tightness, announcing that this will be his last day and he will not be able to make it until meeting with  scheduled at noon. Patient was sitting on chair comfortably without any signs of respiratory distress. Patient continues to present help seeking then rejecting patterns. Reporting having dry mouth but also verbalizing he is restricting fluid intake to avoid getting his bladder full. Patient was provided reassurance regarding his somatic complaints. Of note, pt has been saturating at 99%-100% on room air. Patient denies any active SI and continued to display help-rejecting behavior and pessimism, expressing that  can not help him. Tried to engage pt in a conversation about any anticipatory anxiety he might have in regards to meeting new . Pt dismisses this, stating he "was" looking forward to meeting him, but fears that his physical health will not let him last long enough to meet  at noon, fixating on pessimistic and negative statements. Later in the afternoon pt joined and participated in group therapy. Seen working on his poems and art.

## 2022-10-13 NOTE — BH INPATIENT PSYCHIATRY PROGRESS NOTE - NSBHCHARTREVIEWVS_PSY_A_CORE FT
Vital Signs Last 24 Hrs  T(C): 36.2 (10-13-22 @ 07:46), Max: 36.2 (10-13-22 @ 07:46)  T(F): 97.2 (10-13-22 @ 07:46), Max: 97.2 (10-13-22 @ 07:46)  HR: --  BP: --  BP(mean): --  RR: --  SpO2: --    Orthostatic VS  10-13-22 @ 07:46  Lying BP: --/-- HR: --  Sitting BP: 129/81 HR: 66  Standing BP: 121/77 HR: 74  Site: upper right arm  Mode: electronic  Orthostatic VS  10-12-22 @ 08:04  Lying BP: --/-- HR: --  Sitting BP: 118/44 HR: 61  Standing BP: 118/87 HR: 88  Site: upper right arm  Mode: electronic

## 2022-10-13 NOTE — BH INPATIENT PSYCHIATRY PROGRESS NOTE - CURRENT MEDICATION
MEDICATIONS  (STANDING):  ARIPiprazole 5 milliGRAM(s) Oral daily  finasteride 5 milliGRAM(s) Oral daily  FLUoxetine 60 milliGRAM(s) Oral daily  fluticasone propionate 50 MICROgram(s)/spray Nasal Spray 1 Spray(s) Both Nostrils two times a day  melatonin. 5 milliGRAM(s) Oral at bedtime  NIFEdipine XL 30 milliGRAM(s) Oral daily  oxybutynin 5 milliGRAM(s) Oral daily  simvastatin 20 milliGRAM(s) Oral at bedtime  traZODone 50 milliGRAM(s) Oral at bedtime  venlafaxine 37.5 milliGRAM(s) Oral daily    MEDICATIONS  (PRN):  acetaminophen     Tablet .. 650 milliGRAM(s) Oral every 6 hours PRN Mild Pain (1 - 3), Moderate Pain (4 - 6)  diphenhydrAMINE 50 milliGRAM(s) Oral every 6 hours PRN agitation  diphenhydrAMINE Injectable 50 milliGRAM(s) IntraMuscular every 4 hours PRN severe agitation  haloperidol     Tablet 5 milliGRAM(s) Oral every 6 hours PRN agitation  haloperidol    Injectable 5 milliGRAM(s) IntraMuscular once PRN severe agitation  lidocaine   4% Patch 1 Patch Transdermal every 24 hours PRN pain  LORazepam     Tablet 2 milliGRAM(s) Oral every 6 hours PRN anxiety/agitation  LORazepam   Injectable 2 milliGRAM(s) IntraMuscular once PRN severe agitation   MEDICATIONS  (STANDING):  ARIPiprazole 5 milliGRAM(s) Oral daily  finasteride 5 milliGRAM(s) Oral daily  fluticasone propionate 50 MICROgram(s)/spray Nasal Spray 1 Spray(s) Both Nostrils two times a day  melatonin. 5 milliGRAM(s) Oral at bedtime  NIFEdipine XL 30 milliGRAM(s) Oral daily  oxybutynin 5 milliGRAM(s) Oral daily  simvastatin 20 milliGRAM(s) Oral at bedtime  traZODone 50 milliGRAM(s) Oral at bedtime  venlafaxine 37.5 milliGRAM(s) Oral daily    MEDICATIONS  (PRN):  acetaminophen     Tablet .. 650 milliGRAM(s) Oral every 6 hours PRN Mild Pain (1 - 3), Moderate Pain (4 - 6)  diphenhydrAMINE 50 milliGRAM(s) Oral every 6 hours PRN agitation  diphenhydrAMINE Injectable 50 milliGRAM(s) IntraMuscular every 4 hours PRN severe agitation  haloperidol     Tablet 5 milliGRAM(s) Oral every 6 hours PRN agitation  haloperidol    Injectable 5 milliGRAM(s) IntraMuscular once PRN severe agitation  lidocaine   4% Patch 1 Patch Transdermal every 24 hours PRN pain  LORazepam     Tablet 2 milliGRAM(s) Oral every 6 hours PRN anxiety/agitation  LORazepam   Injectable 2 milliGRAM(s) IntraMuscular once PRN severe agitation

## 2022-10-14 PROCEDURE — 99231 SBSQ HOSP IP/OBS SF/LOW 25: CPT | Mod: GC

## 2022-10-14 RX ADMIN — Medication 30 MILLIGRAM(S): at 11:39

## 2022-10-14 RX ADMIN — Medication 50 MILLIGRAM(S): at 21:50

## 2022-10-14 RX ADMIN — Medication 1 SPRAY(S): at 11:42

## 2022-10-14 RX ADMIN — Medication 37.5 MILLIGRAM(S): at 11:40

## 2022-10-14 RX ADMIN — ARIPIPRAZOLE 5 MILLIGRAM(S): 15 TABLET ORAL at 11:40

## 2022-10-14 RX ADMIN — Medication 40 MILLIGRAM(S): at 11:41

## 2022-10-14 RX ADMIN — Medication 1 SPRAY(S): at 21:50

## 2022-10-14 RX ADMIN — FINASTERIDE 5 MILLIGRAM(S): 5 TABLET, FILM COATED ORAL at 11:40

## 2022-10-14 RX ADMIN — Medication 5 MILLIGRAM(S): at 21:49

## 2022-10-14 RX ADMIN — SIMVASTATIN 20 MILLIGRAM(S): 20 TABLET, FILM COATED ORAL at 21:49

## 2022-10-14 RX ADMIN — Medication 5 MILLIGRAM(S): at 11:40

## 2022-10-14 NOTE — BH INPATIENT PSYCHIATRY PROGRESS NOTE - CURRENT MEDICATION
MEDICATIONS  (STANDING):  ARIPiprazole 5 milliGRAM(s) Oral daily  finasteride 5 milliGRAM(s) Oral daily  FLUoxetine 40 milliGRAM(s) Oral daily  fluticasone propionate 50 MICROgram(s)/spray Nasal Spray 1 Spray(s) Both Nostrils two times a day  melatonin. 5 milliGRAM(s) Oral at bedtime  NIFEdipine XL 30 milliGRAM(s) Oral daily  oxybutynin 5 milliGRAM(s) Oral daily  simvastatin 20 milliGRAM(s) Oral at bedtime  traZODone 50 milliGRAM(s) Oral at bedtime  venlafaxine 37.5 milliGRAM(s) Oral daily    MEDICATIONS  (PRN):  acetaminophen     Tablet .. 650 milliGRAM(s) Oral every 6 hours PRN Mild Pain (1 - 3), Moderate Pain (4 - 6)  diphenhydrAMINE 50 milliGRAM(s) Oral every 6 hours PRN agitation  diphenhydrAMINE Injectable 50 milliGRAM(s) IntraMuscular every 4 hours PRN severe agitation  haloperidol     Tablet 5 milliGRAM(s) Oral every 6 hours PRN agitation  haloperidol    Injectable 5 milliGRAM(s) IntraMuscular once PRN severe agitation  lidocaine   4% Patch 1 Patch Transdermal every 24 hours PRN pain  LORazepam     Tablet 2 milliGRAM(s) Oral every 6 hours PRN anxiety/agitation  LORazepam   Injectable 2 milliGRAM(s) IntraMuscular once PRN severe agitation

## 2022-10-14 NOTE — BH INPATIENT PSYCHIATRY PROGRESS NOTE - NSBHFUPINTERVALHXFT_PSY_A_CORE
Patient seen for follow-up of depression, chart reviewed and discussed with nursing staff.  No events reported overnight.  Patient has been adherent with medications.  Upon evaluation, patient was displaying negativistic thinking stating nothing will help him. Patient's frustration at his  not showing up to meet patient was acknowledged and validated. Patient continued to make provocative statements with negativistic themes, stating that his brother buying him a gift for his birthday was a bad thing, yet states if he were to be diagnosed with cancer, that would be a "good" thing. Patient did not report SI during evaluation.

## 2022-10-14 NOTE — BH INPATIENT PSYCHIATRY PROGRESS NOTE - NSBHASSESSSUMMFT_PSY_ALL_CORE
71 y.o. male with hx of unusual relatedness including pattern of making up psychiatric symptoms or hx such as claiming he arranged a hit or his brother killed himself. Recently there has been escalating pattern of hospitalizations, ER visits. Compliance after d/c is nil. Patient also develops excessive attachment or intense dislike of providers and other staff. Patient has no actual hx of suicide attempts or fh or SIB. Patient with likely facititious disorder vs mood disorder. Suspect possible unconscious primary gain related to medical complaints help seeking then rejection of help as inadequate. Patient not assessed as needing CO. Titrate medications and connect patient back to services. Appears to be provocative and help-rejecting. Prolonged hospitalization may not help achieve shared goals and might not be therapeutic for patient.    10/6: Has multiple somatic complaints, does not engage in treatment planning, however appears to show initiative toward getting a notebook which was a coping mechanism for patient during last hospitalization.   10/7: Continues to have multiple complaints, somewhat more receptive to treatment planning, did not report SI today. Continue with current medications.   10/8: No mood symptoms, no psychotic symptoms, no suicidal ideations.  10/9/22; anxious, dysphoric, superficial, no SI/HI.   10/10/22: Remain anxious, catastrophizes about his friendship, having multiple somatic complaints. Consider switching to Effexor XR.  10/11: The patient continues to complain of depression, anxiety, losing friendships, although it seems he could continue them if he desired.  He focuses on negatives.  Education attempted.  The patient is tolerating medications well, will continue.   to meet with patient.  10/12: Patient continues to report somatic complaints but rejecting interventions. Patient is awaiting meeting with . Plan to cross taper to Effexor XR in light of reported complaints and limited efficacy on Prozac (albeit insufficient trial).  10/13: Patient is catastrophizing his somatic complaints and displaying pessimism with regards to meeting . Reassurance was provided. Continues with help-rejecting behavior and was redirected to previous patterns of pessimism during previous hospitalization. Patient acknowledged it however is not taking steps to behavioral change at this time.   10/14: Patient continues to display negativistic thinking and making provocative statements. Pt has been adherent to medications. Continue to cross taper from Prozac to Effexor XR.    Plan:  1. Safety: Does not require CO 1:1 at this time.  2. Legals: 9.27  3. Psychiatry: Continue current meds  .Prozac 40mg PO daily. Continue to taper.  .Effexor 75mg PO daily. Titrate as tolerated.  .Abilify 5mg PO daily  .Trazodone 50mg PO HS  .Melatonin 5mg PO HS  4. Medical: Continue current medications  .Finasteride 5mg PO HS  .Nifedipine 30mg PO Daily  .Oxybutynin 5mg PO daily  .Simvastatin 20mg PO HS  5. Disposition: Home when stable.

## 2022-10-14 NOTE — BH INPATIENT PSYCHIATRY PROGRESS NOTE - NSBHCHARTREVIEWVS_PSY_A_CORE FT
Vital Signs Last 24 Hrs  T(C): 36.3 (10-14-22 @ 07:46), Max: 36.3 (10-14-22 @ 07:46)  T(F): 97.4 (10-14-22 @ 07:46), Max: 97.4 (10-14-22 @ 07:46)  HR: --  BP: --  BP(mean): --  RR: --  SpO2: --    Orthostatic VS  10-14-22 @ 07:46  Lying BP: --/-- HR: --  Sitting BP: 123/69 HR: 66  Standing BP: 129/76 HR: 79  Site: --  Mode: --  Orthostatic VS  10-13-22 @ 07:46  Lying BP: --/-- HR: --  Sitting BP: 129/81 HR: 66  Standing BP: 121/77 HR: 74  Site: upper right arm  Mode: electronic

## 2022-10-15 RX ADMIN — Medication 37.5 MILLIGRAM(S): at 09:50

## 2022-10-15 RX ADMIN — ARIPIPRAZOLE 5 MILLIGRAM(S): 15 TABLET ORAL at 09:49

## 2022-10-15 RX ADMIN — Medication 650 MILLIGRAM(S): at 21:45

## 2022-10-15 RX ADMIN — FINASTERIDE 5 MILLIGRAM(S): 5 TABLET, FILM COATED ORAL at 09:49

## 2022-10-15 RX ADMIN — Medication 650 MILLIGRAM(S): at 12:16

## 2022-10-15 RX ADMIN — Medication 5 MILLIGRAM(S): at 09:49

## 2022-10-15 RX ADMIN — Medication 1 SPRAY(S): at 21:14

## 2022-10-15 RX ADMIN — Medication 40 MILLIGRAM(S): at 09:49

## 2022-10-15 RX ADMIN — Medication 650 MILLIGRAM(S): at 22:47

## 2022-10-15 RX ADMIN — Medication 1 SPRAY(S): at 09:50

## 2022-10-15 RX ADMIN — SIMVASTATIN 20 MILLIGRAM(S): 20 TABLET, FILM COATED ORAL at 21:13

## 2022-10-15 RX ADMIN — Medication 30 MILLIGRAM(S): at 09:49

## 2022-10-15 RX ADMIN — Medication 50 MILLIGRAM(S): at 21:13

## 2022-10-15 RX ADMIN — Medication 5 MILLIGRAM(S): at 21:13

## 2022-10-15 RX ADMIN — Medication 650 MILLIGRAM(S): at 13:46

## 2022-10-16 LAB — SARS-COV-2 RNA SPEC QL NAA+PROBE: SIGNIFICANT CHANGE UP

## 2022-10-16 RX ADMIN — FINASTERIDE 5 MILLIGRAM(S): 5 TABLET, FILM COATED ORAL at 08:03

## 2022-10-16 RX ADMIN — SIMVASTATIN 20 MILLIGRAM(S): 20 TABLET, FILM COATED ORAL at 20:53

## 2022-10-16 RX ADMIN — Medication 5 MILLIGRAM(S): at 08:02

## 2022-10-16 RX ADMIN — Medication 37.5 MILLIGRAM(S): at 08:03

## 2022-10-16 RX ADMIN — Medication 1 SPRAY(S): at 08:02

## 2022-10-16 RX ADMIN — Medication 2 MILLIGRAM(S): at 20:53

## 2022-10-16 RX ADMIN — Medication 30 MILLIGRAM(S): at 08:03

## 2022-10-16 RX ADMIN — Medication 5 MILLIGRAM(S): at 20:53

## 2022-10-16 RX ADMIN — Medication 50 MILLIGRAM(S): at 20:53

## 2022-10-16 RX ADMIN — ARIPIPRAZOLE 5 MILLIGRAM(S): 15 TABLET ORAL at 08:02

## 2022-10-16 RX ADMIN — Medication 1 SPRAY(S): at 20:54

## 2022-10-16 RX ADMIN — Medication 40 MILLIGRAM(S): at 08:03

## 2022-10-17 RX ORDER — VENLAFAXINE HCL 75 MG
75 CAPSULE, EXT RELEASE 24 HR ORAL DAILY
Refills: 0 | Status: DISCONTINUED | OUTPATIENT
Start: 2022-10-17 | End: 2022-10-20

## 2022-10-17 RX ORDER — VENLAFAXINE HCL 75 MG
75 CAPSULE, EXT RELEASE 24 HR ORAL DAILY
Refills: 0 | Status: DISCONTINUED | OUTPATIENT
Start: 2022-10-17 | End: 2022-10-17

## 2022-10-17 RX ORDER — FLUOXETINE HCL 10 MG
20 CAPSULE ORAL DAILY
Refills: 0 | Status: COMPLETED | OUTPATIENT
Start: 2022-10-17 | End: 2022-10-19

## 2022-10-17 RX ADMIN — Medication 1 SPRAY(S): at 21:17

## 2022-10-17 RX ADMIN — Medication 5 MILLIGRAM(S): at 09:44

## 2022-10-17 RX ADMIN — Medication 37.5 MILLIGRAM(S): at 09:44

## 2022-10-17 RX ADMIN — SIMVASTATIN 20 MILLIGRAM(S): 20 TABLET, FILM COATED ORAL at 21:17

## 2022-10-17 RX ADMIN — Medication 30 MILLIGRAM(S): at 09:45

## 2022-10-17 RX ADMIN — Medication 50 MILLIGRAM(S): at 21:17

## 2022-10-17 RX ADMIN — Medication 40 MILLIGRAM(S): at 09:44

## 2022-10-17 RX ADMIN — ARIPIPRAZOLE 5 MILLIGRAM(S): 15 TABLET ORAL at 09:44

## 2022-10-17 RX ADMIN — FINASTERIDE 5 MILLIGRAM(S): 5 TABLET, FILM COATED ORAL at 09:44

## 2022-10-17 RX ADMIN — Medication 1 SPRAY(S): at 09:45

## 2022-10-17 RX ADMIN — Medication 5 MILLIGRAM(S): at 21:17

## 2022-10-17 NOTE — BH INPATIENT PSYCHIATRY PROGRESS NOTE - NSBHCHARTREVIEWVS_PSY_A_CORE FT
Vital Signs Last 24 Hrs  T(C): 36.7 (10-17-22 @ 07:53), Max: 36.7 (10-17-22 @ 07:53)  T(F): 98 (10-17-22 @ 07:53), Max: 98 (10-17-22 @ 07:53)  HR: 74 (10-17-22 @ 07:53) (74 - 76)  BP: 148/89 (10-17-22 @ 07:53) (140/78 - 148/89)  BP(mean): --  RR: 18 (10-16-22 @ 20:00) (18 - 18)  SpO2: 98% (10-16-22 @ 20:00) (98% - 98%)    Orthostatic VS  10-16-22 @ 08:02  Lying BP: --/-- HR: --  Sitting BP: 123/71 HR: 65  Standing BP: 101/75 HR: 82  Site: --  Mode: --

## 2022-10-17 NOTE — BH INPATIENT PSYCHIATRY PROGRESS NOTE - NSBHASSESSSUMMFT_PSY_ALL_CORE
71 y.o. male with hx of unusual relatedness including pattern of making up psychiatric symptoms or hx such as claiming he arranged a hit or his brother killed himself. Recently there has been escalating pattern of hospitalizations, ER visits. Compliance after d/c is nil. Patient also develops excessive attachment or intense dislike of providers and other staff. Patient has no actual hx of suicide attempts or fh or SIB. Patient with likely facititious disorder vs mood disorder. Suspect possible unconscious primary gain related to medical complaints help seeking then rejection of help as inadequate. Patient not assessed as needing CO. Titrate medications and connect patient back to services. Appears to be provocative and help-rejecting. Prolonged hospitalization may not help achieve shared goals and might not be therapeutic for patient.    10/6: Has multiple somatic complaints, does not engage in treatment planning, however appears to show initiative toward getting a notebook which was a coping mechanism for patient during last hospitalization.   10/7: Continues to have multiple complaints, somewhat more receptive to treatment planning, did not report SI today. Continue with current medications.   10/8: No mood symptoms, no psychotic symptoms, no suicidal ideations.  10/9/22; anxious, dysphoric, superficial, no SI/HI.   10/10/22: Remain anxious, catastrophizes about his friendship, having multiple somatic complaints. Consider switching to Effexor XR.  10/11: The patient continues to complain of depression, anxiety, losing friendships, although it seems he could continue them if he desired.  He focuses on negatives.  Education attempted.  The patient is tolerating medications well, will continue.   to meet with patient.  10/12: Patient continues to report somatic complaints but rejecting interventions. Patient is awaiting meeting with . Plan to cross taper to Effexor XR in light of reported complaints and limited efficacy on Prozac (albeit insufficient trial).  10/13: Patient is catastrophizing his somatic complaints and displaying pessimism with regards to meeting . Reassurance was provided. Continues with help-rejecting behavior and was redirected to previous patterns of pessimism during previous hospitalization. Patient acknowledged it however is not taking steps to behavioral change at this time.   10/14: Patient continues to display negativistic thinking and making provocative statements. Pt has been adherent to medications. Continue to cross taper from Prozac to Effexor XR.  10/17: Pt continues to present negativistic thinking. Visible in the unit. Appears more future oriented today. Will continue tapering Prozac (down to 20mg today) and titrating Effexor XR (up to 75mg today).    Plan:  1. Safety: Does not require CO 1:1 at this time.  2. Legals: 9.27  3. Psychiatry: Continue current meds  .Prozac 20mg PO daily. Continue to taper.  .Effexor XR 75mg PO daily. Titrate as tolerated.  .Abilify 5mg PO daily  .Trazodone 50mg PO HS  .Melatonin 5mg PO HS  4. Medical: Continue current medications  .Finasteride 5mg PO HS  .Nifedipine 30mg PO Daily  .Oxybutynin 5mg PO daily  .Simvastatin 20mg PO HS  5. Disposition: Home when stable.

## 2022-10-17 NOTE — BH INPATIENT PSYCHIATRY PROGRESS NOTE - NSBHMETABOLIC_PSY_ALL_CORE_FT
BMI: BMI (kg/m2): 25.2 (10-04-22 @ 17:55)  HbA1c: A1C with Estimated Average Glucose Result: 5.1 % (07-06-22 @ 10:38)    Glucose: POCT Blood Glucose.: 112 mg/dL (10-05-22 @ 20:21)    BP: 148/89 (10-17-22 @ 07:53) (140/78 - 148/89)  Lipid Panel: Date/Time: 07-06-22 @ 10:38  Cholesterol, Serum: 128  Direct LDL: --  HDL Cholesterol, Serum: 52  Total Cholesterol/HDL Ration Measurement: --  Triglycerides, Serum: 74

## 2022-10-17 NOTE — BH INPATIENT PSYCHIATRY PROGRESS NOTE - NSBHFUPINTERVALHXFT_PSY_A_CORE
Patient seen for follow-up of depression, chart reviewed and discussed with nursing staff.  No events reported overnight.  Patient has been adherent with medications. Pt continues to present negativistic thinking which appears to be his baseline. Reported however that things where "not so bad". Pt appears more future oriented on encounter today, verbalizing he has "many things I need to do", open with meeting with his . Pt does not endorse SIIP during today's encounter.

## 2022-10-18 RX ADMIN — FINASTERIDE 5 MILLIGRAM(S): 5 TABLET, FILM COATED ORAL at 08:43

## 2022-10-18 RX ADMIN — Medication 20 MILLIGRAM(S): at 08:43

## 2022-10-18 RX ADMIN — Medication 650 MILLIGRAM(S): at 17:43

## 2022-10-18 RX ADMIN — Medication 1 SPRAY(S): at 21:08

## 2022-10-18 RX ADMIN — Medication 50 MILLIGRAM(S): at 21:07

## 2022-10-18 RX ADMIN — Medication 5 MILLIGRAM(S): at 08:43

## 2022-10-18 RX ADMIN — Medication 1 SPRAY(S): at 08:43

## 2022-10-18 RX ADMIN — ARIPIPRAZOLE 5 MILLIGRAM(S): 15 TABLET ORAL at 08:42

## 2022-10-18 RX ADMIN — Medication 5 MILLIGRAM(S): at 21:07

## 2022-10-18 RX ADMIN — Medication 75 MILLIGRAM(S): at 08:42

## 2022-10-18 RX ADMIN — SIMVASTATIN 20 MILLIGRAM(S): 20 TABLET, FILM COATED ORAL at 21:07

## 2022-10-18 RX ADMIN — Medication 30 MILLIGRAM(S): at 08:43

## 2022-10-18 NOTE — BH INPATIENT PSYCHIATRY PROGRESS NOTE - CURRENT MEDICATION
MEDICATIONS  (STANDING):  ARIPiprazole 5 milliGRAM(s) Oral daily  finasteride 5 milliGRAM(s) Oral daily  FLUoxetine 20 milliGRAM(s) Oral daily  fluticasone propionate 50 MICROgram(s)/spray Nasal Spray 1 Spray(s) Both Nostrils two times a day  melatonin. 5 milliGRAM(s) Oral at bedtime  NIFEdipine XL 30 milliGRAM(s) Oral daily  oxybutynin 5 milliGRAM(s) Oral daily  simvastatin 20 milliGRAM(s) Oral at bedtime  traZODone 50 milliGRAM(s) Oral at bedtime  venlafaxine XR 75 milliGRAM(s) Oral daily    MEDICATIONS  (PRN):  acetaminophen     Tablet .. 650 milliGRAM(s) Oral every 6 hours PRN Mild Pain (1 - 3), Moderate Pain (4 - 6)  diphenhydrAMINE 50 milliGRAM(s) Oral every 6 hours PRN agitation  diphenhydrAMINE Injectable 50 milliGRAM(s) IntraMuscular every 4 hours PRN severe agitation  haloperidol     Tablet 5 milliGRAM(s) Oral every 6 hours PRN agitation  haloperidol    Injectable 5 milliGRAM(s) IntraMuscular once PRN severe agitation  lidocaine   4% Patch 1 Patch Transdermal every 24 hours PRN pain  LORazepam     Tablet 2 milliGRAM(s) Oral every 6 hours PRN anxiety/agitation  LORazepam   Injectable 2 milliGRAM(s) IntraMuscular once PRN severe agitation

## 2022-10-18 NOTE — BH INPATIENT PSYCHIATRY PROGRESS NOTE - NSBHCHARTREVIEWVS_PSY_A_CORE FT
Vital Signs Last 24 Hrs  T(C): 36.5 (10-18-22 @ 07:36), Max: 36.5 (10-18-22 @ 07:36)  T(F): 97.7 (10-18-22 @ 07:36), Max: 97.7 (10-18-22 @ 07:36)  HR: --  BP: --  BP(mean): --  RR: --  SpO2: --    Orthostatic VS  10-18-22 @ 07:36  Lying BP: --/-- HR: --  Sitting BP: 138/86 HR: 80  Standing BP: 134/80 HR: 80  Site: --  Mode: --

## 2022-10-18 NOTE — BH INPATIENT PSYCHIATRY PROGRESS NOTE - NSBHASSESSSUMMFT_PSY_ALL_CORE
71 y.o. male with hx of unusual relatedness including pattern of making up psychiatric symptoms or hx such as claiming he arranged a hit or his brother killed himself. Recently there has been escalating pattern of hospitalizations, ER visits. Compliance after d/c is nil. Patient also develops excessive attachment or intense dislike of providers and other staff. Patient has no actual hx of suicide attempts or fh or SIB. Patient with likely facititious disorder vs mood disorder. Suspect possible unconscious primary gain related to medical complaints help seeking then rejection of help as inadequate. Patient not assessed as needing CO. Titrate medications and connect patient back to services. Appears to be provocative and help-rejecting. Prolonged hospitalization may not help achieve shared goals and might not be therapeutic for patient.    10/6: Has multiple somatic complaints, does not engage in treatment planning, however appears to show initiative toward getting a notebook which was a coping mechanism for patient during last hospitalization.   10/7: Continues to have multiple complaints, somewhat more receptive to treatment planning, did not report SI today. Continue with current medications.   10/8: No mood symptoms, no psychotic symptoms, no suicidal ideations.  10/9/22; anxious, dysphoric, superficial, no SI/HI.   10/10/22: Remain anxious, catastrophizes about his friendship, having multiple somatic complaints. Consider switching to Effexor XR.  10/11: The patient continues to complain of depression, anxiety, losing friendships, although it seems he could continue them if he desired.  He focuses on negatives.  Education attempted.  The patient is tolerating medications well, will continue.   to meet with patient.  10/12: Patient continues to report somatic complaints but rejecting interventions. Patient is awaiting meeting with . Plan to cross taper to Effexor XR in light of reported complaints and limited efficacy on Prozac (albeit insufficient trial).  10/13: Patient is catastrophizing his somatic complaints and displaying pessimism with regards to meeting . Reassurance was provided. Continues with help-rejecting behavior and was redirected to previous patterns of pessimism during previous hospitalization. Patient acknowledged it however is not taking steps to behavioral change at this time.   10/14: Patient continues to display negativistic thinking and making provocative statements. Pt has been adherent to medications. Continue to cross taper from Prozac to Effexor XR.  10/17: Pt continues to present negativistic thinking. Visible in the unit. Appears more future oriented today. Will continue tapering Prozac (down to 20mg today) and titrating Effexor XR (up to 75mg today).  10/18: Presents with negativistic thinking but appears more goal oriented seeking services and looking forward to meeting with . Pt has been medication adherent. Continue to cross taper from Prozac to Effexor XR.      Plan:  1. Safety: Does not require CO 1:1 at this time.  2. Legals: 9.27  3. Psychiatry: Continue current meds  .Prozac 20mg PO daily. Taper.  .Effexor XR 75mg PO daily. Titrate as tolerated.  .Abilify 5mg PO daily  .Trazodone 50mg PO HS  .Melatonin 5mg PO HS  4. Medical: Continue current medications  .Finasteride 5mg PO HS  .Nifedipine 30mg PO Daily  .Oxybutynin 5mg PO daily  .Simvastatin 20mg PO HS  5. Disposition: Home when stable.

## 2022-10-19 RX ADMIN — ARIPIPRAZOLE 5 MILLIGRAM(S): 15 TABLET ORAL at 08:57

## 2022-10-19 RX ADMIN — FINASTERIDE 5 MILLIGRAM(S): 5 TABLET, FILM COATED ORAL at 08:58

## 2022-10-19 RX ADMIN — Medication 30 MILLIGRAM(S): at 08:57

## 2022-10-19 RX ADMIN — Medication 5 MILLIGRAM(S): at 21:20

## 2022-10-19 RX ADMIN — Medication 75 MILLIGRAM(S): at 08:58

## 2022-10-19 RX ADMIN — Medication 1 SPRAY(S): at 21:21

## 2022-10-19 RX ADMIN — Medication 50 MILLIGRAM(S): at 21:21

## 2022-10-19 RX ADMIN — Medication 5 MILLIGRAM(S): at 08:58

## 2022-10-19 RX ADMIN — Medication 20 MILLIGRAM(S): at 08:57

## 2022-10-19 RX ADMIN — SIMVASTATIN 20 MILLIGRAM(S): 20 TABLET, FILM COATED ORAL at 21:20

## 2022-10-19 NOTE — BH INPATIENT PSYCHIATRY PROGRESS NOTE - NSBHFUPINTERVALHXFT_PSY_A_CORE
Patient seen for follow-up of depression, chart reviewed and discussed with nursing staff.  No events reported overnight.  Patient has been adherent with medications. Pt continues to make provocative statements which appears to be patient's usual way of relating to others. He is awaiting meeting with . No [facility] IIP endorsed during evaluation.  Patient seen for follow-up of depression, chart reviewed and discussed with nursing staff.  No events reported overnight.  Patient has been adherent with medications. Pt continues to make provocative statements which appears to be patient's usual way of relating to others, getting attention. He is eagerly awaiting meeting with . No [facility] IIP endorsed during evaluation.  Eating well.  Reports not sleeping at all during the night, despite check sheet indicating fair sleep.  Patient states insomnia has been a problem for decades.

## 2022-10-19 NOTE — BH INPATIENT PSYCHIATRY PROGRESS NOTE - NSBHASSESSSUMMFT_PSY_ALL_CORE
71 y.o. male with hx of unusual relatedness including pattern of making up psychiatric symptoms or hx such as claiming he arranged a hit or his brother killed himself. Recently there has been escalating pattern of hospitalizations, ER visits. Compliance after d/c is nil. Patient also develops excessive attachment or intense dislike of providers and other staff. Patient has no actual hx of suicide attempts or fh or SIB. Patient with likely facititious disorder vs mood disorder. Suspect possible unconscious primary gain related to medical complaints help seeking then rejection of help as inadequate. Patient not assessed as needing CO. Titrate medications and connect patient back to services. Appears to be provocative and help-rejecting. Prolonged hospitalization may not help achieve shared goals and might not be therapeutic for patient.    10/6: Has multiple somatic complaints, does not engage in treatment planning, however appears to show initiative toward getting a notebook which was a coping mechanism for patient during last hospitalization.   10/7: Continues to have multiple complaints, somewhat more receptive to treatment planning, did not report SI today. Continue with current medications.   10/8: No mood symptoms, no psychotic symptoms, no suicidal ideations.  10/9/22; anxious, dysphoric, superficial, no SI/HI.   10/10/22: Remain anxious, catastrophizes about his friendship, having multiple somatic complaints. Consider switching to Effexor XR.  10/11: The patient continues to complain of depression, anxiety, losing friendships, although it seems he could continue them if he desired.  He focuses on negatives.  Education attempted.  The patient is tolerating medications well, will continue.   to meet with patient.  10/12: Patient continues to report somatic complaints but rejecting interventions. Patient is awaiting meeting with . Plan to cross taper to Effexor XR in light of reported complaints and limited efficacy on Prozac (albeit insufficient trial).  10/13: Patient is catastrophizing his somatic complaints and displaying pessimism with regards to meeting . Reassurance was provided. Continues with help-rejecting behavior and was redirected to previous patterns of pessimism during previous hospitalization. Patient acknowledged it however is not taking steps to behavioral change at this time.   10/14: Patient continues to display negativistic thinking and making provocative statements. Pt has been adherent to medications. Continue to cross taper from Prozac to Effexor XR.  10/17: Pt continues to present negativistic thinking. Visible in the unit. Appears more future oriented today. Will continue tapering Prozac (down to 20mg today) and titrating Effexor XR (up to 75mg today).  10/18: Presents with negativistic thinking but appears more goal oriented seeking services and looking forward to meeting with . Pt has been medication adherent. Continue to cross taper from Prozac to Effexor XR.  10/19: Makes provocative statements and presents with usual negativistic thinking however remains hopeful for meeting with . Has been noted to be visible on the unit and adherent to medications. Taper Prozac to 10mg PO daily.    Plan:  1. Safety: Does not require CO 1:1 at this time.  2. Legals: 9.27  3. Psychiatry: Continue current meds  .Prozac 10mg PO daily. Taper.  .Effexor XR 75mg PO daily. Titrate as tolerated.  .Abilify 5mg PO daily  .Trazodone 50mg PO HS  .Melatonin 5mg PO HS  4. Medical: Continue current medications  .Finasteride 5mg PO HS  .Nifedipine 30mg PO Daily  .Oxybutynin 5mg PO daily  .Simvastatin 20mg PO HS  5. Disposition: Home when stable. 71 y.o. male with hx of unusual relatedness including pattern of making up psychiatric symptoms or hx such as claiming he arranged a hit or his brother killed himself. Recently there has been escalating pattern of hospitalizations, ER visits. Compliance after d/c is nil. Patient also develops excessive attachment or intense dislike of providers and other staff. Patient has no actual hx of suicide attempts or fh or SIB. Patient with likely facititious disorder vs mood disorder. Suspect possible unconscious primary gain related to medical complaints help seeking then rejection of help as inadequate. Patient not assessed as needing CO. Titrate medications and connect patient back to services. Appears to be provocative and help-rejecting. Prolonged hospitalization may not help achieve shared goals and might not be therapeutic for patient.    10/6: Has multiple somatic complaints, does not engage in treatment planning, however appears to show initiative toward getting a notebook which was a coping mechanism for patient during last hospitalization.   10/7: Continues to have multiple complaints, somewhat more receptive to treatment planning, did not report SI today. Continue with current medications.   10/8: No mood symptoms, no psychotic symptoms, no suicidal ideations.  10/9/22; anxious, dysphoric, superficial, no SI/HI.   10/10/22: Remain anxious, catastrophizes about his friendship, having multiple somatic complaints. Consider switching to Effexor XR.  10/11: The patient continues to complain of depression, anxiety, losing friendships, although it seems he could continue them if he desired.  He focuses on negatives.  Education attempted.  The patient is tolerating medications well, will continue.   to meet with patient.  10/12: Patient continues to report somatic complaints but rejecting interventions. Patient is awaiting meeting with . Plan to cross taper to Effexor XR in light of reported complaints and limited efficacy on Prozac (albeit insufficient trial).  10/13: Patient is catastrophizing his somatic complaints and displaying pessimism with regards to meeting . Reassurance was provided. Continues with help-rejecting behavior and was redirected to previous patterns of pessimism during previous hospitalization. Patient acknowledged it however is not taking steps to behavioral change at this time.   10/14: Patient continues to display negativistic thinking and making provocative statements. Pt has been adherent to medications. Continue to cross taper from Prozac to Effexor XR.  10/17: Pt continues to present negativistic thinking. Visible in the unit. Appears more future oriented today. Will continue tapering Prozac (down to 20mg today) and titrating Effexor XR (up to 75mg today).  10/18: Presents with negativistic thinking but appears more goal oriented seeking services and looking forward to meeting with . Pt has been medication adherent. Continue to cross taper from Prozac to Effexor XR.  10/19: Makes provocative statements and presents with usual negativistic thinking however remains hopeful for meeting with . Has been noted to be visible on the unit and adherent to medications. D/C Prozac    Plan:  1. Safety: Does not require CO 1:1 at this time.  2. Legals: 9.27  3. Psychiatry: Continue current meds  .D/c Prozac.  .Effexor XR 75mg PO daily. Titrate as tolerated.  .Abilify 5mg PO daily  .Trazodone 50mg PO HS  .Melatonin 5mg PO HS  4. Medical: Continue current medications  .Finasteride 5mg PO HS  .Nifedipine 30mg PO Daily  .Oxybutynin 5mg PO daily  .Simvastatin 20mg PO HS  5. Disposition: Home when stable.

## 2022-10-19 NOTE — BH INPATIENT PSYCHIATRY PROGRESS NOTE - NSBHCHARTREVIEWVS_PSY_A_CORE FT
Vital Signs Last 24 Hrs  T(C): 37.1 (10-19-22 @ 07:29), Max: 37.1 (10-19-22 @ 07:29)  T(F): 98.7 (10-19-22 @ 07:29), Max: 98.7 (10-19-22 @ 07:29)  HR: --  BP: --  BP(mean): --  RR: --  SpO2: --    Orthostatic VS  10-19-22 @ 07:29  Lying BP: --/-- HR: --  Sitting BP: 151/87 HR: 70  Standing BP: 145/87 HR: 88  Site: --  Mode: --  Orthostatic VS  10-18-22 @ 07:36  Lying BP: --/-- HR: --  Sitting BP: 138/86 HR: 80  Standing BP: 134/80 HR: 80  Site: --  Mode: --

## 2022-10-19 NOTE — BH INPATIENT PSYCHIATRY PROGRESS NOTE - CURRENT MEDICATION
MEDICATIONS  (STANDING):  ARIPiprazole 5 milliGRAM(s) Oral daily  finasteride 5 milliGRAM(s) Oral daily  fluticasone propionate 50 MICROgram(s)/spray Nasal Spray 1 Spray(s) Both Nostrils two times a day  melatonin. 5 milliGRAM(s) Oral at bedtime  NIFEdipine XL 30 milliGRAM(s) Oral daily  oxybutynin 5 milliGRAM(s) Oral daily  simvastatin 20 milliGRAM(s) Oral at bedtime  traZODone 50 milliGRAM(s) Oral at bedtime  venlafaxine XR 75 milliGRAM(s) Oral daily    MEDICATIONS  (PRN):  acetaminophen     Tablet .. 650 milliGRAM(s) Oral every 6 hours PRN Mild Pain (1 - 3), Moderate Pain (4 - 6)  diphenhydrAMINE 50 milliGRAM(s) Oral every 6 hours PRN agitation  diphenhydrAMINE Injectable 50 milliGRAM(s) IntraMuscular every 4 hours PRN severe agitation  haloperidol     Tablet 5 milliGRAM(s) Oral every 6 hours PRN agitation  haloperidol    Injectable 5 milliGRAM(s) IntraMuscular once PRN severe agitation  lidocaine   4% Patch 1 Patch Transdermal every 24 hours PRN pain

## 2022-10-20 PROCEDURE — 82330 ASSAY OF CALCIUM: CPT

## 2022-10-20 PROCEDURE — 87640 STAPH A DNA AMP PROBE: CPT

## 2022-10-20 PROCEDURE — 99231 SBSQ HOSP IP/OBS SF/LOW 25: CPT | Mod: GC

## 2022-10-20 PROCEDURE — 83605 ASSAY OF LACTIC ACID: CPT

## 2022-10-20 PROCEDURE — 84132 ASSAY OF SERUM POTASSIUM: CPT

## 2022-10-20 PROCEDURE — 85379 FIBRIN DEGRADATION QUANT: CPT

## 2022-10-20 PROCEDURE — 71045 X-RAY EXAM CHEST 1 VIEW: CPT

## 2022-10-20 PROCEDURE — 84484 ASSAY OF TROPONIN QUANT: CPT

## 2022-10-20 PROCEDURE — 82272 OCCULT BLD FECES 1-3 TESTS: CPT

## 2022-10-20 PROCEDURE — 93306 TTE W/DOPPLER COMPLETE: CPT

## 2022-10-20 PROCEDURE — 84295 ASSAY OF SERUM SODIUM: CPT

## 2022-10-20 PROCEDURE — 86803 HEPATITIS C AB TEST: CPT

## 2022-10-20 PROCEDURE — 97110 THERAPEUTIC EXERCISES: CPT

## 2022-10-20 PROCEDURE — 83880 ASSAY OF NATRIURETIC PEPTIDE: CPT

## 2022-10-20 PROCEDURE — 80053 COMPREHEN METABOLIC PANEL: CPT

## 2022-10-20 PROCEDURE — 87641 MR-STAPH DNA AMP PROBE: CPT

## 2022-10-20 PROCEDURE — 83735 ASSAY OF MAGNESIUM: CPT

## 2022-10-20 PROCEDURE — 36415 COLL VENOUS BLD VENIPUNCTURE: CPT

## 2022-10-20 PROCEDURE — 99285 EMERGENCY DEPT VISIT HI MDM: CPT

## 2022-10-20 PROCEDURE — 80048 BASIC METABOLIC PNL TOTAL CA: CPT

## 2022-10-20 PROCEDURE — 87635 SARS-COV-2 COVID-19 AMP PRB: CPT

## 2022-10-20 PROCEDURE — 85025 COMPLETE CBC W/AUTO DIFF WBC: CPT

## 2022-10-20 PROCEDURE — 82803 BLOOD GASES ANY COMBINATION: CPT

## 2022-10-20 PROCEDURE — 97162 PT EVAL MOD COMPLEX 30 MIN: CPT

## 2022-10-20 PROCEDURE — 93005 ELECTROCARDIOGRAM TRACING: CPT

## 2022-10-20 PROCEDURE — 94640 AIRWAY INHALATION TREATMENT: CPT

## 2022-10-20 PROCEDURE — 85027 COMPLETE CBC AUTOMATED: CPT

## 2022-10-20 PROCEDURE — 71275 CT ANGIOGRAPHY CHEST: CPT

## 2022-10-20 PROCEDURE — 97116 GAIT TRAINING THERAPY: CPT

## 2022-10-20 PROCEDURE — 90853 GROUP PSYCHOTHERAPY: CPT

## 2022-10-20 PROCEDURE — 84100 ASSAY OF PHOSPHORUS: CPT

## 2022-10-20 RX ORDER — VENLAFAXINE HCL 75 MG
112.5 CAPSULE, EXT RELEASE 24 HR ORAL DAILY
Refills: 0 | Status: DISCONTINUED | OUTPATIENT
Start: 2022-10-21 | End: 2022-10-24

## 2022-10-20 RX ADMIN — Medication 1 SPRAY(S): at 20:56

## 2022-10-20 RX ADMIN — Medication 50 MILLIGRAM(S): at 20:56

## 2022-10-20 RX ADMIN — Medication 5 MILLIGRAM(S): at 08:53

## 2022-10-20 RX ADMIN — Medication 650 MILLIGRAM(S): at 09:50

## 2022-10-20 RX ADMIN — SIMVASTATIN 20 MILLIGRAM(S): 20 TABLET, FILM COATED ORAL at 20:56

## 2022-10-20 RX ADMIN — Medication 30 MILLIGRAM(S): at 08:53

## 2022-10-20 RX ADMIN — ARIPIPRAZOLE 5 MILLIGRAM(S): 15 TABLET ORAL at 08:53

## 2022-10-20 RX ADMIN — Medication 5 MILLIGRAM(S): at 20:56

## 2022-10-20 RX ADMIN — Medication 650 MILLIGRAM(S): at 09:20

## 2022-10-20 RX ADMIN — Medication 1 SPRAY(S): at 08:53

## 2022-10-20 RX ADMIN — FINASTERIDE 5 MILLIGRAM(S): 5 TABLET, FILM COATED ORAL at 08:54

## 2022-10-20 RX ADMIN — Medication 75 MILLIGRAM(S): at 08:54

## 2022-10-20 NOTE — BH INPATIENT PSYCHIATRY PROGRESS NOTE - NSBHFUPINTERVALHXFT_PSY_A_CORE
Patient seen for follow-up of depression, chart reviewed and discussed with nursing staff.  No events reported overnight.  Patient has been adherent with medications. Pt was initially on the phone awaiting to speak with [facility] staff member Nadine, however began making provocative statements when discussing his discharge plan and meeting with . He expressed his frustration with his  missing the previous meeting. No S/H IIP endorsed during evaluation.  Eating well. Tolerating meds.

## 2022-10-20 NOTE — BH INPATIENT PSYCHIATRY PROGRESS NOTE - CURRENT MEDICATION
MEDICATIONS  (STANDING):  ARIPiprazole 5 milliGRAM(s) Oral daily  finasteride 5 milliGRAM(s) Oral daily  fluticasone propionate 50 MICROgram(s)/spray Nasal Spray 1 Spray(s) Both Nostrils two times a day  melatonin. 5 milliGRAM(s) Oral at bedtime  NIFEdipine XL 30 milliGRAM(s) Oral daily  oxybutynin 5 milliGRAM(s) Oral daily  simvastatin 20 milliGRAM(s) Oral at bedtime  traZODone 50 milliGRAM(s) Oral at bedtime  venlafaxine XR 75 milliGRAM(s) Oral daily    MEDICATIONS  (PRN):  acetaminophen     Tablet .. 650 milliGRAM(s) Oral every 6 hours PRN Mild Pain (1 - 3), Moderate Pain (4 - 6)  diphenhydrAMINE 50 milliGRAM(s) Oral every 6 hours PRN agitation  diphenhydrAMINE Injectable 50 milliGRAM(s) IntraMuscular every 4 hours PRN severe agitation  haloperidol     Tablet 5 milliGRAM(s) Oral every 6 hours PRN agitation  haloperidol    Injectable 5 milliGRAM(s) IntraMuscular once PRN severe agitation  lidocaine   4% Patch 1 Patch Transdermal every 24 hours PRN pain   MEDICATIONS  (STANDING):  ARIPiprazole 5 milliGRAM(s) Oral daily  finasteride 5 milliGRAM(s) Oral daily  fluticasone propionate 50 MICROgram(s)/spray Nasal Spray 1 Spray(s) Both Nostrils two times a day  melatonin. 5 milliGRAM(s) Oral at bedtime  NIFEdipine XL 30 milliGRAM(s) Oral daily  oxybutynin 5 milliGRAM(s) Oral daily  simvastatin 20 milliGRAM(s) Oral at bedtime  traZODone 50 milliGRAM(s) Oral at bedtime    MEDICATIONS  (PRN):  acetaminophen     Tablet .. 650 milliGRAM(s) Oral every 6 hours PRN Mild Pain (1 - 3), Moderate Pain (4 - 6)  diphenhydrAMINE 50 milliGRAM(s) Oral every 6 hours PRN agitation  diphenhydrAMINE Injectable 50 milliGRAM(s) IntraMuscular every 4 hours PRN severe agitation  haloperidol     Tablet 5 milliGRAM(s) Oral every 6 hours PRN agitation  haloperidol    Injectable 5 milliGRAM(s) IntraMuscular once PRN severe agitation  lidocaine   4% Patch 1 Patch Transdermal every 24 hours PRN pain

## 2022-10-20 NOTE — BH INPATIENT PSYCHIATRY PROGRESS NOTE - NSBHASSESSSUMMFT_PSY_ALL_CORE
71 y.o. male with hx of unusual relatedness including pattern of making up psychiatric symptoms or hx such as claiming he arranged a hit or his brother killed himself. Recently there has been escalating pattern of hospitalizations, ER visits. Compliance after d/c is nil. Patient also develops excessive attachment or intense dislike of providers and other staff. Patient has no actual hx of suicide attempts or fh or SIB. Patient with likely facititious disorder vs mood disorder. Suspect possible unconscious primary gain related to medical complaints help seeking then rejection of help as inadequate. Patient not assessed as needing CO. Titrate medications and connect patient back to services. Appears to be provocative and help-rejecting. Prolonged hospitalization may not help achieve shared goals and might not be therapeutic for patient.    10/6: Has multiple somatic complaints, does not engage in treatment planning, however appears to show initiative toward getting a notebook which was a coping mechanism for patient during last hospitalization.   10/7: Continues to have multiple complaints, somewhat more receptive to treatment planning, did not report SI today. Continue with current medications.   10/8: No mood symptoms, no psychotic symptoms, no suicidal ideations.  10/9/22; anxious, dysphoric, superficial, no SI/HI.   10/10/22: Remain anxious, catastrophizes about his friendship, having multiple somatic complaints. Consider switching to Effexor XR.  10/11: The patient continues to complain of depression, anxiety, losing friendships, although it seems he could continue them if he desired.  He focuses on negatives.  Education attempted.  The patient is tolerating medications well, will continue.   to meet with patient.  10/12: Patient continues to report somatic complaints but rejecting interventions. Patient is awaiting meeting with . Plan to cross taper to Effexor XR in light of reported complaints and limited efficacy on Prozac (albeit insufficient trial).  10/13: Patient is catastrophizing his somatic complaints and displaying pessimism with regards to meeting . Reassurance was provided. Continues with help-rejecting behavior and was redirected to previous patterns of pessimism during previous hospitalization. Patient acknowledged it however is not taking steps to behavioral change at this time.   10/14: Patient continues to display negativistic thinking and making provocative statements. Pt has been adherent to medications. Continue to cross taper from Prozac to Effexor XR.  10/17: Pt continues to present negativistic thinking. Visible in the unit. Appears more future oriented today. Will continue tapering Prozac (down to 20mg today) and titrating Effexor XR (up to 75mg today).  10/18: Presents with negativistic thinking but appears more goal oriented seeking services and looking forward to meeting with . Pt has been medication adherent. Continue to cross taper from Prozac to Effexor XR.  10/19: Makes provocative statements and presents with usual negativistic thinking however remains hopeful for meeting with . Has been noted to be visible on the unit and adherent to medications. D/C Prozac  10/20: Continues to make provocative statements which is his usual way of relating with others. Tolerating meds, no new concerns reported. Awaiting meeting with . Titrate Effexor to 112.5mg    Plan:  1. Safety: Does not require CO 1:1 at this time.  2. Legals: 9.27  3. Psychiatry: Continue current meds  .D/c Prozac.  .Effexor .5mg PO daily. Titrate as tolerated.  .Abilify 5mg PO daily  .Trazodone 50mg PO HS  .Melatonin 5mg PO HS  4. Medical: Continue current medications  .Finasteride 5mg PO HS  .Nifedipine 30mg PO Daily  .Oxybutynin 5mg PO daily  .Simvastatin 20mg PO HS  5. Disposition: Home when stable.

## 2022-10-20 NOTE — BH INPATIENT PSYCHIATRY PROGRESS NOTE - NSBHCHARTREVIEWVS_PSY_A_CORE FT
Vital Signs Last 24 Hrs  T(C): 36.4 (10-20-22 @ 07:56), Max: 36.4 (10-20-22 @ 07:56)  T(F): 97.5 (10-20-22 @ 07:56), Max: 97.5 (10-20-22 @ 07:56)  HR: --  BP: --  BP(mean): --  RR: --  SpO2: --    Orthostatic VS  10-20-22 @ 07:56  Lying BP: --/-- HR: --  Sitting BP: 128/86 HR: 95  Standing BP: 117/78 HR: 87  Site: upper left arm  Mode: electronic  Orthostatic VS  10-19-22 @ 07:29  Lying BP: --/-- HR: --  Sitting BP: 151/87 HR: 70  Standing BP: 145/87 HR: 88  Site: --  Mode: --

## 2022-10-21 LAB — SARS-COV-2 RNA SPEC QL NAA+PROBE: SIGNIFICANT CHANGE UP

## 2022-10-21 PROCEDURE — 99231 SBSQ HOSP IP/OBS SF/LOW 25: CPT | Mod: GC

## 2022-10-21 RX ADMIN — ARIPIPRAZOLE 5 MILLIGRAM(S): 15 TABLET ORAL at 08:42

## 2022-10-21 RX ADMIN — Medication 1 SPRAY(S): at 20:49

## 2022-10-21 RX ADMIN — FINASTERIDE 5 MILLIGRAM(S): 5 TABLET, FILM COATED ORAL at 08:42

## 2022-10-21 RX ADMIN — Medication 112.5 MILLIGRAM(S): at 08:42

## 2022-10-21 RX ADMIN — Medication 5 MILLIGRAM(S): at 20:49

## 2022-10-21 RX ADMIN — Medication 50 MILLIGRAM(S): at 20:49

## 2022-10-21 RX ADMIN — Medication 5 MILLIGRAM(S): at 08:42

## 2022-10-21 RX ADMIN — Medication 1 SPRAY(S): at 08:42

## 2022-10-21 RX ADMIN — Medication 30 MILLIGRAM(S): at 08:42

## 2022-10-21 RX ADMIN — SIMVASTATIN 20 MILLIGRAM(S): 20 TABLET, FILM COATED ORAL at 20:49

## 2022-10-21 NOTE — BH INPATIENT PSYCHIATRY PROGRESS NOTE - NSBHASSESSSUMMFT_PSY_ALL_CORE
71 y.o. male with hx of unusual relatedness including pattern of making up psychiatric symptoms or hx such as claiming he arranged a hit or his brother killed himself. Recently there has been escalating pattern of hospitalizations, ER visits. Compliance after d/c is nil. Patient also develops excessive attachment or intense dislike of providers and other staff. Patient has no actual hx of suicide attempts or fh or SIB. Patient with likely facititious disorder vs mood disorder. Suspect possible unconscious primary gain related to medical complaints help seeking then rejection of help as inadequate. Patient not assessed as needing CO. Titrate medications and connect patient back to services. Appears to be provocative and help-rejecting. Prolonged hospitalization may not help achieve shared goals and might not be therapeutic for patient.    10/6: Has multiple somatic complaints, does not engage in treatment planning, however appears to show initiative toward getting a notebook which was a coping mechanism for patient during last hospitalization.   10/7: Continues to have multiple complaints, somewhat more receptive to treatment planning, did not report SI today. Continue with current medications.   10/8: No mood symptoms, no psychotic symptoms, no suicidal ideations.  10/9/22; anxious, dysphoric, superficial, no SI/HI.   10/10/22: Remain anxious, catastrophizes about his friendship, having multiple somatic complaints. Consider switching to Effexor XR.  10/11: The patient continues to complain of depression, anxiety, losing friendships, although it seems he could continue them if he desired.  He focuses on negatives.  Education attempted.  The patient is tolerating medications well, will continue.   to meet with patient.  10/12: Patient continues to report somatic complaints but rejecting interventions. Patient is awaiting meeting with . Plan to cross taper to Effexor XR in light of reported complaints and limited efficacy on Prozac (albeit insufficient trial).  10/13: Patient is catastrophizing his somatic complaints and displaying pessimism with regards to meeting . Reassurance was provided. Continues with help-rejecting behavior and was redirected to previous patterns of pessimism during previous hospitalization. Patient acknowledged it however is not taking steps to behavioral change at this time.   10/14: Patient continues to display negativistic thinking and making provocative statements. Pt has been adherent to medications. Continue to cross taper from Prozac to Effexor XR.  10/17: Pt continues to present negativistic thinking. Visible in the unit. Appears more future oriented today. Will continue tapering Prozac (down to 20mg today) and titrating Effexor XR (up to 75mg today).  10/18: Presents with negativistic thinking but appears more goal oriented seeking services and looking forward to meeting with . Pt has been medication adherent. Continue to cross taper from Prozac to Effexor XR.  10/19: Makes provocative statements and presents with usual negativistic thinking however remains hopeful for meeting with . Has been noted to be visible on the unit and adherent to medications. D/C Prozac  10/20: Continues to make provocative statements which is his usual way of relating with others. Tolerating meds, no new concerns reported. Awaiting meeting with . Titrate Effexor to 112.5mg  10/21: Tolerating medications. No new concerns. Patient appears anxious about meeting with his , however also awaiting to discuss his goals with him.     Plan:  1. Safety: Does not require CO 1:1 at this time.  2. Legals: 9.27  3. Psychiatry: Continue current meds  .D/c Prozac.  .Effexor .5mg PO daily. Titrate as tolerated.  .Abilify 5mg PO daily  .Trazodone 50mg PO HS  .Melatonin 5mg PO HS  4. Medical: Continue current medications  .Finasteride 5mg PO HS  .Nifedipine 30mg PO Daily  .Oxybutynin 5mg PO daily  .Simvastatin 20mg PO HS  5. Disposition: Home when stable.

## 2022-10-21 NOTE — BH INPATIENT PSYCHIATRY PROGRESS NOTE - CURRENT MEDICATION
MEDICATIONS  (STANDING):  ARIPiprazole 5 milliGRAM(s) Oral daily  finasteride 5 milliGRAM(s) Oral daily  fluticasone propionate 50 MICROgram(s)/spray Nasal Spray 1 Spray(s) Both Nostrils two times a day  melatonin. 5 milliGRAM(s) Oral at bedtime  NIFEdipine XL 30 milliGRAM(s) Oral daily  oxybutynin 5 milliGRAM(s) Oral daily  simvastatin 20 milliGRAM(s) Oral at bedtime  traZODone 50 milliGRAM(s) Oral at bedtime  venlafaxine .5 milliGRAM(s) Oral daily    MEDICATIONS  (PRN):  acetaminophen     Tablet .. 650 milliGRAM(s) Oral every 6 hours PRN Mild Pain (1 - 3), Moderate Pain (4 - 6)  diphenhydrAMINE 50 milliGRAM(s) Oral every 6 hours PRN agitation  diphenhydrAMINE Injectable 50 milliGRAM(s) IntraMuscular every 4 hours PRN severe agitation  haloperidol     Tablet 5 milliGRAM(s) Oral every 6 hours PRN agitation  haloperidol    Injectable 5 milliGRAM(s) IntraMuscular once PRN severe agitation  lidocaine   4% Patch 1 Patch Transdermal every 24 hours PRN pain

## 2022-10-21 NOTE — BH INPATIENT PSYCHIATRY PROGRESS NOTE - NSBHCHARTREVIEWVS_PSY_A_CORE FT
Vital Signs Last 24 Hrs  T(C): 36.7 (10-21-22 @ 07:37), Max: 36.7 (10-21-22 @ 07:37)  T(F): 98 (10-21-22 @ 07:37), Max: 98 (10-21-22 @ 07:37)  HR: --  BP: --  BP(mean): --  RR: --  SpO2: --    Orthostatic VS  10-21-22 @ 07:37  Lying BP: --/-- HR: --  Sitting BP: 125/87 HR: 67  Standing BP: 119/74 HR: 84  Site: --  Mode: --  Orthostatic VS  10-20-22 @ 07:56  Lying BP: --/-- HR: --  Sitting BP: 128/86 HR: 95  Standing BP: 117/78 HR: 87  Site: upper left arm  Mode: electronic

## 2022-10-21 NOTE — BH INPATIENT PSYCHIATRY PROGRESS NOTE - NSBHFUPINTERVALHXFT_PSY_A_CORE
Patient seen for follow-up of depression, chart reviewed and discussed with nursing staff.  No events reported overnight.  Patient has been adherent with medications and tolerating medications well. Patient remains somatically preoccupied, likely in anticipation of his meeting with his . Patient presents with negativistic thinking but is easily redirected. Patient questions if he will get along with the  and is awaiting the meeting. No SI/HI elicited during evaluation. Patient seen for follow-up of depression, chart reviewed and discussed with nursing staff.  No events reported overnight.  Vitals signs stable. Patient has been adherent with medications and tolerating medications well. Patient remains somatically preoccupied, likely in anticipation of his meeting with his . Patient presents with negativistic thinking but is easily redirected. Patient questions if he will get along with the  and is awaiting the meeting. No SI/HI elicited during evaluation.

## 2022-10-22 RX ADMIN — Medication 50 MILLIGRAM(S): at 21:30

## 2022-10-22 RX ADMIN — ARIPIPRAZOLE 5 MILLIGRAM(S): 15 TABLET ORAL at 10:00

## 2022-10-22 RX ADMIN — Medication 1 SPRAY(S): at 09:59

## 2022-10-22 RX ADMIN — Medication 5 MILLIGRAM(S): at 09:59

## 2022-10-22 RX ADMIN — SIMVASTATIN 20 MILLIGRAM(S): 20 TABLET, FILM COATED ORAL at 21:30

## 2022-10-22 RX ADMIN — Medication 112.5 MILLIGRAM(S): at 10:00

## 2022-10-22 RX ADMIN — Medication 1 SPRAY(S): at 21:29

## 2022-10-22 RX ADMIN — Medication 5 MILLIGRAM(S): at 21:30

## 2022-10-22 RX ADMIN — Medication 30 MILLIGRAM(S): at 10:00

## 2022-10-22 RX ADMIN — FINASTERIDE 5 MILLIGRAM(S): 5 TABLET, FILM COATED ORAL at 10:00

## 2022-10-23 RX ADMIN — Medication 112.5 MILLIGRAM(S): at 08:03

## 2022-10-23 RX ADMIN — SIMVASTATIN 20 MILLIGRAM(S): 20 TABLET, FILM COATED ORAL at 20:59

## 2022-10-23 RX ADMIN — Medication 5 MILLIGRAM(S): at 21:00

## 2022-10-23 RX ADMIN — Medication 5 MILLIGRAM(S): at 08:02

## 2022-10-23 RX ADMIN — Medication 50 MILLIGRAM(S): at 20:59

## 2022-10-23 RX ADMIN — Medication 1 SPRAY(S): at 20:58

## 2022-10-23 RX ADMIN — FINASTERIDE 5 MILLIGRAM(S): 5 TABLET, FILM COATED ORAL at 08:03

## 2022-10-23 RX ADMIN — ARIPIPRAZOLE 5 MILLIGRAM(S): 15 TABLET ORAL at 08:03

## 2022-10-23 RX ADMIN — Medication 1 SPRAY(S): at 08:03

## 2022-10-23 RX ADMIN — Medication 30 MILLIGRAM(S): at 08:02

## 2022-10-24 PROCEDURE — 99231 SBSQ HOSP IP/OBS SF/LOW 25: CPT | Mod: GC

## 2022-10-24 RX ORDER — VENLAFAXINE HCL 75 MG
150 CAPSULE, EXT RELEASE 24 HR ORAL DAILY
Refills: 0 | Status: DISCONTINUED | OUTPATIENT
Start: 2022-10-25 | End: 2022-12-02

## 2022-10-24 RX ADMIN — Medication 5 MILLIGRAM(S): at 20:26

## 2022-10-24 RX ADMIN — Medication 1 SPRAY(S): at 08:53

## 2022-10-24 RX ADMIN — Medication 1 SPRAY(S): at 20:25

## 2022-10-24 RX ADMIN — Medication 30 MILLIGRAM(S): at 08:51

## 2022-10-24 RX ADMIN — Medication 5 MILLIGRAM(S): at 08:52

## 2022-10-24 RX ADMIN — FINASTERIDE 5 MILLIGRAM(S): 5 TABLET, FILM COATED ORAL at 08:51

## 2022-10-24 RX ADMIN — SIMVASTATIN 20 MILLIGRAM(S): 20 TABLET, FILM COATED ORAL at 20:26

## 2022-10-24 RX ADMIN — ARIPIPRAZOLE 5 MILLIGRAM(S): 15 TABLET ORAL at 08:52

## 2022-10-24 RX ADMIN — Medication 112.5 MILLIGRAM(S): at 08:52

## 2022-10-24 RX ADMIN — Medication 50 MILLIGRAM(S): at 20:26

## 2022-10-24 NOTE — BH INPATIENT PSYCHIATRY PROGRESS NOTE - NSBHASSESSSUMMFT_PSY_ALL_CORE
71 y.o. male with hx of unusual relatedness including pattern of making up psychiatric symptoms or hx such as claiming he arranged a hit or his brother killed himself. Recently there has been escalating pattern of hospitalizations, ER visits. Compliance after d/c is nil. Patient also develops excessive attachment or intense dislike of providers and other staff. Patient has no actual hx of suicide attempts or fh or SIB. Patient with likely facititious disorder vs mood disorder. Suspect possible unconscious primary gain related to medical complaints help seeking then rejection of help as inadequate. Patient not assessed as needing CO. Titrate medications and connect patient back to services. Appears to be provocative and help-rejecting. Prolonged hospitalization may not help achieve shared goals and might not be therapeutic for patient.    10/6: Has multiple somatic complaints, does not engage in treatment planning, however appears to show initiative toward getting a notebook which was a coping mechanism for patient during last hospitalization.   10/7: Continues to have multiple complaints, somewhat more receptive to treatment planning, did not report SI today. Continue with current medications.   10/8: No mood symptoms, no psychotic symptoms, no suicidal ideations.  10/9/22; anxious, dysphoric, superficial, no SI/HI.   10/10/22: Remain anxious, catastrophizes about his friendship, having multiple somatic complaints. Consider switching to Effexor XR.  10/11: The patient continues to complain of depression, anxiety, losing friendships, although it seems he could continue them if he desired.  He focuses on negatives.  Education attempted.  The patient is tolerating medications well, will continue.   to meet with patient.  10/12: Patient continues to report somatic complaints but rejecting interventions. Patient is awaiting meeting with . Plan to cross taper to Effexor XR in light of reported complaints and limited efficacy on Prozac (albeit insufficient trial).  10/13: Patient is catastrophizing his somatic complaints and displaying pessimism with regards to meeting . Reassurance was provided. Continues with help-rejecting behavior and was redirected to previous patterns of pessimism during previous hospitalization. Patient acknowledged it however is not taking steps to behavioral change at this time.   10/14: Patient continues to display negativistic thinking and making provocative statements. Pt has been adherent to medications. Continue to cross taper from Prozac to Effexor XR.  10/17: Pt continues to present negativistic thinking. Visible in the unit. Appears more future oriented today. Will continue tapering Prozac (down to 20mg today) and titrating Effexor XR (up to 75mg today).  10/18: Presents with negativistic thinking but appears more goal oriented seeking services and looking forward to meeting with . Pt has been medication adherent. Continue to cross taper from Prozac to Effexor XR.  10/19: Makes provocative statements and presents with usual negativistic thinking however remains hopeful for meeting with . Has been noted to be visible on the unit and adherent to medications. D/C Prozac  10/20: Continues to make provocative statements which is his usual way of relating with others. Tolerating meds, no new concerns reported. Awaiting meeting with . Titrate Effexor to 112.5mg  10/21: Tolerating medications. No new concerns. Patient appears anxious about meeting with his , however also awaiting to discuss his goals with him.   10/24: Patient tolerating medications. Will increase Effexor to 150mg PO daily.     Plan:  1. Safety: Does not require CO 1:1 at this time.  2. Legals: 9.27  3. Psychiatry: Continue current meds  .Effexor XR 150mg PO daily. Titrate as tolerated.  .Abilify 5mg PO daily  .Trazodone 50mg PO HS  .Melatonin 5mg PO HS  4. Medical: Continue current medications  .Finasteride 5mg PO HS  .Nifedipine 30mg PO Daily  .Oxybutynin 5mg PO daily  .Simvastatin 20mg PO HS  5. Disposition: Home when stable. 71 y.o. male with hx of unusual relatedness including pattern of making up psychiatric symptoms or hx such as claiming he arranged a hit or his brother killed himself. Recently there has been escalating pattern of hospitalizations, ER visits. Compliance after d/c is nil. Patient also develops excessive attachment or intense dislike of providers and other staff. Patient has no actual hx of suicide attempts or fh or SIB. Patient with likely facititious disorder vs mood disorder. Suspect possible unconscious primary gain related to medical complaints help seeking then rejection of help as inadequate. Patient not assessed as needing CO. Titrate medications and connect patient back to services. Appears to be provocative and help-rejecting. Prolonged hospitalization may not help achieve shared goals and might not be therapeutic for patient.    10/6: Has multiple somatic complaints, does not engage in treatment planning, however appears to show initiative toward getting a notebook which was a coping mechanism for patient during last hospitalization.   10/7: Continues to have multiple complaints, somewhat more receptive to treatment planning, did not report SI today. Continue with current medications.   10/8: No mood symptoms, no psychotic symptoms, no suicidal ideations.  10/9/22; anxious, dysphoric, superficial, no SI/HI.   10/10/22: Remain anxious, catastrophizes his friendship, having multiple somatic complaints. Consider switching to Effexor XR.  10/11: The patient continues to complain of depression, anxiety, losing friendships, although it seems he could continue them if he desired.  He focuses on negatives. Education attempted. The patient is tolerating medications well, will continue.  to meet with patient.  10/12: Patient continues to report somatic complaints but rejecting interventions. Patient is awaiting meeting with . Plan to cross taper to Effexor XR in light of reported complaints and limited efficacy on Prozac (albeit insufficient trial).  10/13: Patient is catastrophizing his somatic complaints and displaying pessimism with regards to meeting . Reassurance was provided. Continues with help-rejecting behavior and was redirected to previous patterns of pessimism during previous hospitalization. Patient acknowledged it however is not taking steps to behavioral change at this time.   10/14: Patient continues to display negativistic thinking and making provocative statements. Pt has been adherent to medications. Continue to cross taper from Prozac to Effexor XR.  10/17: Pt continues to present negativistic thinking. Visible in the unit. Appears more future oriented today. Will continue tapering Prozac (down to 20mg today) and titrating Effexor XR (up to 75mg today).  10/18: Presents with negativistic thinking but appears more goal oriented seeking services and looking forward to meeting with . Pt has been medication adherent. Continue to cross taper from Prozac to Effexor XR.  10/19: Makes provocative statements and presents with usual negativistic thinking however remains hopeful for meeting with . Has been noted to be visible on the unit and adherent to medications. D/C Prozac  10/20: Continues to make provocative statements which is his usual way of relating with others. Tolerating meds, no new concerns reported. Awaiting meeting with . Titrate Effexor to 112.5mg  10/21: Tolerating medications. No new concerns. Patient appears anxious about meeting with his , however also awaiting to discuss his goals with him.   10/24: Patient tolerating medications. Will increase Effexor to 150mg PO daily.     Plan:  1. Safety: Does not require CO 1:1 at this time.  2. Legals: 9.27  3. Psychiatry: Continue current meds  .Effexor XR 150mg PO daily. Titrate as tolerated.  .Abilify 5mg PO daily  .Trazodone 50mg PO HS  .Melatonin 5mg PO HS  4. Medical: Continue current medications  .Finasteride 5mg PO HS  .Nifedipine 30mg PO Daily  .Oxybutynin 5mg PO daily  .Simvastatin 20mg PO HS  5. Disposition: Home when stable.

## 2022-10-24 NOTE — BH INPATIENT PSYCHIATRY PROGRESS NOTE - NSBHCHARTREVIEWVS_PSY_A_CORE FT
Vital Signs Last 24 Hrs  T(C): 36.4 (10-24-22 @ 08:19), Max: 36.4 (10-24-22 @ 08:19)  T(F): 97.6 (10-24-22 @ 08:19), Max: 97.6 (10-24-22 @ 08:19)  HR: --  BP: --  BP(mean): --  RR: --  SpO2: --    Orthostatic VS  10-24-22 @ 08:19  Lying BP: --/-- HR: --  Sitting BP: 141/83 HR: 67  Standing BP: 140/84 HR: 86  Site: --  Mode: --  Orthostatic VS  10-23-22 @ 06:51  Lying BP: --/-- HR: --  Sitting BP: 134/83 HR: 67  Standing BP: 119/76 HR: 83  Site: --  Mode: --   Vital Signs Last 24 Hrs  T(C): 36.4 (10-24-22 @ 08:19), Max: 36.4 (10-24-22 @ 08:19)  T(F): 97.6 (10-24-22 @ 08:19), Max: 97.6 (10-24-22 @ 08:19)    Orthostatic VS  10-24-22 @ 08:19  Sitting BP: 141/83 HR: 67  Standing BP: 140/84 HR: 86

## 2022-10-24 NOTE — BH INPATIENT PSYCHIATRY PROGRESS NOTE - NSBHFUPINTERVALHXFT_PSY_A_CORE
Patient seen for follow-up of depression, chart reviewed and discussed with nursing staff.  No events reported overnight.  Vitals signs stable. Patient has been adherent with medications and tolerating medications well. Patient reports speaking with his friends yesterday, appears to be less pessimistic. No SI/HI elicited during evaluation.

## 2022-10-24 NOTE — BH INPATIENT PSYCHIATRY PROGRESS NOTE - CURRENT MEDICATION
MEDICATIONS  (STANDING):  ARIPiprazole 5 milliGRAM(s) Oral daily  finasteride 5 milliGRAM(s) Oral daily  fluticasone propionate 50 MICROgram(s)/spray Nasal Spray 1 Spray(s) Both Nostrils two times a day  melatonin. 5 milliGRAM(s) Oral at bedtime  NIFEdipine XL 30 milliGRAM(s) Oral daily  oxybutynin 5 milliGRAM(s) Oral daily  simvastatin 20 milliGRAM(s) Oral at bedtime  traZODone 50 milliGRAM(s) Oral at bedtime  venlafaxine .5 milliGRAM(s) Oral daily    MEDICATIONS  (PRN):  acetaminophen     Tablet .. 650 milliGRAM(s) Oral every 6 hours PRN Mild Pain (1 - 3), Moderate Pain (4 - 6)  diphenhydrAMINE 50 milliGRAM(s) Oral every 6 hours PRN agitation  diphenhydrAMINE Injectable 50 milliGRAM(s) IntraMuscular every 4 hours PRN severe agitation  haloperidol     Tablet 5 milliGRAM(s) Oral every 6 hours PRN agitation  haloperidol    Injectable 5 milliGRAM(s) IntraMuscular once PRN severe agitation  lidocaine   4% Patch 1 Patch Transdermal every 24 hours PRN pain   MEDICATIONS  (STANDING):  ARIPiprazole 5 milliGRAM(s) Oral daily  finasteride 5 milliGRAM(s) Oral daily  fluticasone propionate 50 MICROgram(s)/spray Nasal Spray 1 Spray(s) Both Nostrils two times a day  melatonin. 5 milliGRAM(s) Oral at bedtime  NIFEdipine XL 30 milliGRAM(s) Oral daily  oxybutynin 5 milliGRAM(s) Oral daily  simvastatin 20 milliGRAM(s) Oral at bedtime  traZODone 50 milliGRAM(s) Oral at bedtime    MEDICATIONS  (PRN):  acetaminophen     Tablet .. 650 milliGRAM(s) Oral every 6 hours PRN Mild Pain (1 - 3), Moderate Pain (4 - 6)  diphenhydrAMINE 50 milliGRAM(s) Oral every 6 hours PRN agitation  diphenhydrAMINE Injectable 50 milliGRAM(s) IntraMuscular every 4 hours PRN severe agitation  haloperidol     Tablet 5 milliGRAM(s) Oral every 6 hours PRN agitation  haloperidol    Injectable 5 milliGRAM(s) IntraMuscular once PRN severe agitation  lidocaine   4% Patch 1 Patch Transdermal every 24 hours PRN pain

## 2022-10-24 NOTE — BH INPATIENT PSYCHIATRY PROGRESS NOTE - NSBHMETABOLIC_PSY_ALL_CORE_FT
BMI: BMI (kg/m2): 25.2 (10-04-22 @ 17:55)  HbA1c: A1C with Estimated Average Glucose Result: 5.1 % (07-06-22 @ 10:38)    Glucose: POCT Blood Glucose.: 112 mg/dL (10-05-22 @ 20:21)    BP: --  Lipid Panel: Date/Time: 07-06-22 @ 10:38  Cholesterol, Serum: 128  Direct LDL: --  HDL Cholesterol, Serum: 52  Total Cholesterol/HDL Ration Measurement: --  Triglycerides, Serum: 74   BMI: BMI (kg/m2): 25.2 (10-04-22 @ 17:55)  HbA1c: A1C with Estimated Average Glucose Result: 5.1 % (07-06-22 @ 10:38)  Glucose: POCT Blood Glucose.: 112 mg/dL (10-05-22 @ 20:21)  Lipid Panel: Date/Time: 07-06-22 @ 10:38  Cholesterol, Serum: 128  HDL Cholesterol, Serum: 52  Triglycerides, Serum: 74

## 2022-10-25 PROCEDURE — 99231 SBSQ HOSP IP/OBS SF/LOW 25: CPT

## 2022-10-25 RX ADMIN — Medication 1 SPRAY(S): at 09:06

## 2022-10-25 RX ADMIN — ARIPIPRAZOLE 5 MILLIGRAM(S): 15 TABLET ORAL at 09:04

## 2022-10-25 RX ADMIN — FINASTERIDE 5 MILLIGRAM(S): 5 TABLET, FILM COATED ORAL at 09:05

## 2022-10-25 RX ADMIN — Medication 30 MILLIGRAM(S): at 09:04

## 2022-10-25 RX ADMIN — Medication 150 MILLIGRAM(S): at 09:05

## 2022-10-25 RX ADMIN — Medication 5 MILLIGRAM(S): at 09:05

## 2022-10-25 RX ADMIN — Medication 650 MILLIGRAM(S): at 17:19

## 2022-10-25 NOTE — BH INPATIENT PSYCHIATRY PROGRESS NOTE - CURRENT MEDICATION
MEDICATIONS  (STANDING):  ARIPiprazole 5 milliGRAM(s) Oral daily  finasteride 5 milliGRAM(s) Oral daily  fluticasone propionate 50 MICROgram(s)/spray Nasal Spray 1 Spray(s) Both Nostrils two times a day  melatonin. 5 milliGRAM(s) Oral at bedtime  NIFEdipine XL 30 milliGRAM(s) Oral daily  oxybutynin 5 milliGRAM(s) Oral daily  simvastatin 20 milliGRAM(s) Oral at bedtime  traZODone 50 milliGRAM(s) Oral at bedtime  venlafaxine  milliGRAM(s) Oral daily    MEDICATIONS  (PRN):  acetaminophen     Tablet .. 650 milliGRAM(s) Oral every 6 hours PRN Mild Pain (1 - 3), Moderate Pain (4 - 6)  diphenhydrAMINE 50 milliGRAM(s) Oral every 6 hours PRN agitation  diphenhydrAMINE Injectable 50 milliGRAM(s) IntraMuscular every 4 hours PRN severe agitation  haloperidol     Tablet 5 milliGRAM(s) Oral every 6 hours PRN agitation  haloperidol    Injectable 5 milliGRAM(s) IntraMuscular once PRN severe agitation  lidocaine   4% Patch 1 Patch Transdermal every 24 hours PRN pain

## 2022-10-25 NOTE — DIETITIAN INITIAL EVALUATION ADULT - OTHER INFO
Pt admitted to [facility] with Primary diagnosis of Depression disorder. Medical history includes HTN, Hyperlipidemia. Saw Pt in day room. reports good appetite/po intale. No GI distress noted. Pt states " I just want to die" Writer could not obtain more information from Pt.  Pt admitted to [facility] with Primary diagnosis of Depression disorder. Medical history includes HTN, Hyperlipidemia. Saw Pt in day room. Reports good appetite/po intale. No GI distress noted. NKFA. Pt states " I just want to die" Writer could not obtain more information from Pt.

## 2022-10-25 NOTE — DIETITIAN INITIAL EVALUATION ADULT - PERTINENT MEDS FT
MEDICATIONS  (STANDING):  ARIPiprazole 5 milliGRAM(s) Oral daily  finasteride 5 milliGRAM(s) Oral daily  fluticasone propionate 50 MICROgram(s)/spray Nasal Spray 1 Spray(s) Both Nostrils two times a day  melatonin. 5 milliGRAM(s) Oral at bedtime  NIFEdipine XL 30 milliGRAM(s) Oral daily  oxybutynin 5 milliGRAM(s) Oral daily  simvastatin 20 milliGRAM(s) Oral at bedtime  traZODone 50 milliGRAM(s) Oral at bedtime  venlafaxine  milliGRAM(s) Oral daily    MEDICATIONS  (PRN):  acetaminophen     Tablet .. 650 milliGRAM(s) Oral every 6 hours PRN Mild Pain (1 - 3), Moderate Pain (4 - 6)  diphenhydrAMINE 50 milliGRAM(s) Oral every 6 hours PRN agitation  diphenhydrAMINE Injectable 50 milliGRAM(s) IntraMuscular every 4 hours PRN severe agitation  haloperidol     Tablet 5 milliGRAM(s) Oral every 6 hours PRN agitation  haloperidol    Injectable 5 milliGRAM(s) IntraMuscular once PRN severe agitation  lidocaine   4% Patch 1 Patch Transdermal every 24 hours PRN pain   MEDICATIONS  (STANDING):  ARIPiprazole 5 milliGRAM(s) Oral daily  finasteride 5 milliGRAM(s) Oral daily  fluticasone propionate 50 MICROgram(s)/spray Nasal Spray 1 Spray(s) Both Nostrils two times a day  melatonin. 5 milliGRAM(s) Oral at bedtime  NIFEdipine XL 30 milliGRAM(s) Oral daily  oxybutynin 5 milliGRAM(s) Oral daily  simvastatin 20 milliGRAM(s) Oral at bedtime  traZODone 50 milliGRAM(s) Oral at bedtime  venlafaxine  milliGRAM(s) Oral daily

## 2022-10-25 NOTE — BH INPATIENT PSYCHIATRY PROGRESS NOTE - NSBHASSESSSUMMFT_PSY_ALL_CORE
71 y.o. male with hx of unusual relatedness including pattern of making up psychiatric symptoms or hx such as claiming he arranged a hit or his brother killed himself. Recently there has been escalating pattern of hospitalizations, ER visits. Compliance after d/c is nil. Patient also develops excessive attachment or intense dislike of providers and other staff. Patient has no actual hx of suicide attempts or fh or SIB. Patient with likely facititious disorder vs mood disorder. Suspect possible unconscious primary gain related to medical complaints help seeking then rejection of help as inadequate. Patient not assessed as needing CO. Titrate medications and connect patient back to services. Appears to be provocative and help-rejecting. Prolonged hospitalization may not help achieve shared goals and might not be therapeutic for patient.    10/6: Has multiple somatic complaints, does not engage in treatment planning, however appears to show initiative toward getting a notebook which was a coping mechanism for patient during last hospitalization.   10/7: Continues to have multiple complaints, somewhat more receptive to treatment planning, did not report SI today. Continue with current medications.   10/8: No mood symptoms, no psychotic symptoms, no suicidal ideations.  10/9/22; anxious, dysphoric, superficial, no SI/HI.   10/10/22: Remain anxious, catastrophizes his friendship, having multiple somatic complaints. Consider switching to Effexor XR.  10/11: The patient continues to complain of depression, anxiety, losing friendships, although it seems he could continue them if he desired.  He focuses on negatives. Education attempted. The patient is tolerating medications well, will continue.  to meet with patient.  10/12: Patient continues to report somatic complaints but rejecting interventions. Patient is awaiting meeting with . Plan to cross taper to Effexor XR in light of reported complaints and limited efficacy on Prozac (albeit insufficient trial).  10/13: Patient is catastrophizing his somatic complaints and displaying pessimism with regards to meeting . Reassurance was provided. Continues with help-rejecting behavior and was redirected to previous patterns of pessimism during previous hospitalization. Patient acknowledged it however is not taking steps to behavioral change at this time.   10/14: Patient continues to display negativistic thinking and making provocative statements. Pt has been adherent to medications. Continue to cross taper from Prozac to Effexor XR.  10/17: Pt continues to present negativistic thinking. Visible in the unit. Appears more future oriented today. Will continue tapering Prozac (down to 20mg today) and titrating Effexor XR (up to 75mg today).  10/18: Presents with negativistic thinking but appears more goal oriented seeking services and looking forward to meeting with . Pt has been medication adherent. Continue to cross taper from Prozac to Effexor XR.  10/19: Makes provocative statements and presents with usual negativistic thinking however remains hopeful for meeting with . Has been noted to be visible on the unit and adherent to medications. D/C Prozac  10/20: Continues to make provocative statements which is his usual way of relating with others. Tolerating meds, no new concerns reported. Awaiting meeting with . Titrate Effexor to 112.5mg  10/21: Tolerating medications. No new concerns. Patient appears anxious about meeting with his , however also awaiting to discuss his goals with him.   10/24: Patient tolerating medications. Will increase Effexor to 150mg PO daily.   10/25: Tolerating medications. Patient appears to be catastrophizing however has intense eye contact and makes provocative statements, yet does not endorse any intent or plan to hurt self. Patient has pattern of describing enhanced expressions of symptoms closer to discharge.    Plan:  1. Safety: Does not require CO 1:1 at this time.  2. Legals: 9.27  3. Psychiatry: Continue current meds  .Effexor XR 150mg PO daily. Titrate as tolerated.  .Abilify 5mg PO daily  .Trazodone 50mg PO HS  .Melatonin 5mg PO HS  4. Medical: Continue current medications  .Finasteride 5mg PO HS  .Nifedipine 30mg PO Daily  .Oxybutynin 5mg PO daily  .Simvastatin 20mg PO HS  5. Disposition: Home when stable.

## 2022-10-25 NOTE — BH INPATIENT PSYCHIATRY PROGRESS NOTE - NSBHFUPINTERVALHXFT_PSY_A_CORE
Patient seen for follow-up of depression, chart reviewed and discussed with nursing staff.  No events reported overnight.  Vitals signs stable. Patient has been adherent with medications and tolerating medications well. Patient continues to catastrophize during interview, making provocative statements with intense eye contact. Discussed with patient about gaining accountability for one's own actions and patient's pattern of negativism toward any approach towards achieving patient's previously stated goals. Patient has been noted on unit to be socializing with peers and was seen in dayroom with his notebook.

## 2022-10-25 NOTE — BH INPATIENT PSYCHIATRY PROGRESS NOTE - NSBHCHARTREVIEWVS_PSY_A_CORE FT
Vital Signs Last 24 Hrs  T(C): 36.3 (10-25-22 @ 06:50), Max: 36.3 (10-25-22 @ 06:50)  T(F): 97.3 (10-25-22 @ 06:50), Max: 97.3 (10-25-22 @ 06:50)  HR: --  BP: --  BP(mean): --  RR: --  SpO2: --    Orthostatic VS  10-25-22 @ 06:50  Lying BP: --/-- HR: --  Sitting BP: 119/79 HR: 74  Standing BP: 105/74 HR: 83  Site: --  Mode: --  Orthostatic VS  10-24-22 @ 08:19  Lying BP: --/-- HR: --  Sitting BP: 141/83 HR: 67  Standing BP: 140/84 HR: 86  Site: --  Mode: --

## 2022-10-26 PROCEDURE — 90853 GROUP PSYCHOTHERAPY: CPT

## 2022-10-26 RX ADMIN — Medication 150 MILLIGRAM(S): at 11:27

## 2022-10-26 RX ADMIN — Medication 5 MILLIGRAM(S): at 11:27

## 2022-10-26 RX ADMIN — Medication 30 MILLIGRAM(S): at 11:28

## 2022-10-26 RX ADMIN — Medication 50 MILLIGRAM(S): at 20:43

## 2022-10-26 RX ADMIN — FINASTERIDE 5 MILLIGRAM(S): 5 TABLET, FILM COATED ORAL at 11:27

## 2022-10-26 RX ADMIN — SIMVASTATIN 20 MILLIGRAM(S): 20 TABLET, FILM COATED ORAL at 20:43

## 2022-10-26 RX ADMIN — Medication 1 SPRAY(S): at 20:44

## 2022-10-26 RX ADMIN — Medication 5 MILLIGRAM(S): at 20:43

## 2022-10-26 RX ADMIN — Medication 1 SPRAY(S): at 11:28

## 2022-10-26 RX ADMIN — ARIPIPRAZOLE 5 MILLIGRAM(S): 15 TABLET ORAL at 11:28

## 2022-10-26 NOTE — BH INPATIENT PSYCHIATRY PROGRESS NOTE - NSBHFUPINTERVALHXFT_PSY_A_CORE
Patient seen for follow-up of depression, chart reviewed and discussed with nursing staff.  No events reported overnight.  Vitals signs stable. Patient has been adherent with medications and tolerating medications well. Patient commented on a peer being discharged from the unit however when discussing goals for his own discharge, he continues to catastrophize during interview, making provocative statements with intense eye contact. Discussed with patient about gaining accountability for one's own actions and patient's pattern of erecting barriers to achieving his stated goals. Patient began upset with interview and began wriggling his hands complaining of tremors which was not present for the duration of the interview. Patient seen for follow-up of depression, chart reviewed and discussed with nursing staff.  No events reported overnight.  Vitals signs mildly orthostatic however pt is asymptomatic. Patient has been adherent with medications and tolerating medications well. Patient commented on a peer being discharged from the unit however when discussing goals for his own discharge, he continues to catastrophize during interview, making provocative statements with intense eye contact. Discussed with patient about gaining accountability for one's own actions and patient's pattern of erecting barriers to achieving his stated goals. Patient began upset with interview and began wriggling his hands complaining of tremors which was not present for the duration of the interview.

## 2022-10-26 NOTE — BH INPATIENT PSYCHIATRY PROGRESS NOTE - NSBHASSESSSUMMFT_PSY_ALL_CORE
71 y.o. male with hx of unusual relatedness including pattern of making up psychiatric symptoms or hx such as claiming he arranged a hit or his brother killed himself. Recently there has been escalating pattern of hospitalizations, ER visits. Compliance after d/c is nil. Patient also develops excessive attachment or intense dislike of providers and other staff. Patient has no actual hx of suicide attempts or fh or SIB. Patient with likely facititious disorder vs mood disorder. Suspect possible unconscious primary gain related to medical complaints help seeking then rejection of help as inadequate. Patient not assessed as needing CO. Titrate medications and connect patient back to services. Appears to be provocative and help-rejecting. Prolonged hospitalization may not help achieve shared goals and might not be therapeutic for patient.    10/6: Has multiple somatic complaints, does not engage in treatment planning, however appears to show initiative toward getting a notebook which was a coping mechanism for patient during last hospitalization.   10/7: Continues to have multiple complaints, somewhat more receptive to treatment planning, did not report SI today. Continue with current medications.   10/8: No mood symptoms, no psychotic symptoms, no suicidal ideations.  10/9/22; anxious, dysphoric, superficial, no SI/HI.   10/10/22: Remain anxious, catastrophizes his friendship, having multiple somatic complaints. Consider switching to Effexor XR.  10/11: The patient continues to complain of depression, anxiety, losing friendships, although it seems he could continue them if he desired.  He focuses on negatives. Education attempted. The patient is tolerating medications well, will continue.  to meet with patient.  10/12: Patient continues to report somatic complaints but rejecting interventions. Patient is awaiting meeting with . Plan to cross taper to Effexor XR in light of reported complaints and limited efficacy on Prozac (albeit insufficient trial).  10/13: Patient is catastrophizing his somatic complaints and displaying pessimism with regards to meeting . Reassurance was provided. Continues with help-rejecting behavior and was redirected to previous patterns of pessimism during previous hospitalization. Patient acknowledged it however is not taking steps to behavioral change at this time.   10/14: Patient continues to display negativistic thinking and making provocative statements. Pt has been adherent to medications. Continue to cross taper from Prozac to Effexor XR.  10/17: Pt continues to present negativistic thinking. Visible in the unit. Appears more future oriented today. Will continue tapering Prozac (down to 20mg today) and titrating Effexor XR (up to 75mg today).  10/18: Presents with negativistic thinking but appears more goal oriented seeking services and looking forward to meeting with . Pt has been medication adherent. Continue to cross taper from Prozac to Effexor XR.  10/19: Makes provocative statements and presents with usual negativistic thinking however remains hopeful for meeting with . Has been noted to be visible on the unit and adherent to medications. D/C Prozac  10/20: Continues to make provocative statements which is his usual way of relating with others. Tolerating meds, no new concerns reported. Awaiting meeting with . Titrate Effexor to 112.5mg  10/21: Tolerating medications. No new concerns. Patient appears anxious about meeting with his , however also awaiting to discuss his goals with him.   10/24: Patient tolerating medications. Will increase Effexor to 150mg PO daily.   10/25: Tolerating medications. Patient appears to be catastrophizing however has intense eye contact and makes provocative statements, yet does not endorse any intent or plan to hurt self. Patient has pattern of describing enhanced expressions of symptoms closer to discharge.  10/26: Patient continues to be help-rejecting and catastrophizing when discussing discharge. No plans endorsed to hurt self. Patient has pattern of describing enhanced expressions of symptoms closer to discharge.  Plan:  1. Safety: Does not require CO 1:1 at this time.  2. Legals: 9.27  3. Psychiatry: Continue current meds  .Effexor XR 150mg PO daily. Titrate as tolerated.  .Abilify 5mg PO daily  .Trazodone 50mg PO HS  .Melatonin 5mg PO HS  4. Medical: Continue current medications  .Finasteride 5mg PO HS  .Nifedipine 30mg PO Daily  .Oxybutynin 5mg PO daily  .Simvastatin 20mg PO HS  5. Disposition: Home when stable.

## 2022-10-26 NOTE — BH INPATIENT PSYCHIATRY PROGRESS NOTE - NSBHCHARTREVIEWVS_PSY_A_CORE FT
Vital Signs Last 24 Hrs  T(C): 36.7 (10-26-22 @ 08:10), Max: 36.7 (10-26-22 @ 08:10)  T(F): 98 (10-26-22 @ 08:10), Max: 98 (10-26-22 @ 08:10)  HR: --  BP: --  BP(mean): --  RR: --  SpO2: --    Orthostatic VS  10-26-22 @ 08:10  Lying BP: --/-- HR: --  Sitting BP: 111/71 HR: 76  Standing BP: 95/73 HR: 81  Site: upper left arm  Mode: electronic  Orthostatic VS  10-25-22 @ 06:50  Lying BP: --/-- HR: --  Sitting BP: 119/79 HR: 74  Standing BP: 105/74 HR: 83  Site: --  Mode: --   Vital Signs Last 24 Hrs  T(C): --  T(F): --  HR: --  BP: --  BP(mean): --  RR: --  SpO2: --    Orthostatic VS  10-26-22 @ 08:10  Lying BP: --/-- HR: --  Sitting BP: 111/71 HR: 76  Standing BP: 95/73 HR: 81  Site: upper left arm  Mode: electronic

## 2022-10-26 NOTE — BH PSYCHOLOGY - GROUP THERAPY NOTE - NSPSYCHOLGRPCOGPT_PSY_A_CORE FT
somatic preoccupation. negativistic thinking style. appeared somnolent, falling asleep by end of session

## 2022-10-27 PROCEDURE — 99231 SBSQ HOSP IP/OBS SF/LOW 25: CPT | Mod: GC

## 2022-10-27 PROCEDURE — 90853 GROUP PSYCHOTHERAPY: CPT

## 2022-10-27 RX ADMIN — FINASTERIDE 5 MILLIGRAM(S): 5 TABLET, FILM COATED ORAL at 09:24

## 2022-10-27 RX ADMIN — Medication 30 MILLIGRAM(S): at 09:23

## 2022-10-27 RX ADMIN — Medication 150 MILLIGRAM(S): at 09:23

## 2022-10-27 RX ADMIN — Medication 1 SPRAY(S): at 09:24

## 2022-10-27 RX ADMIN — Medication 5 MILLIGRAM(S): at 21:04

## 2022-10-27 RX ADMIN — ARIPIPRAZOLE 5 MILLIGRAM(S): 15 TABLET ORAL at 09:23

## 2022-10-27 RX ADMIN — Medication 5 MILLIGRAM(S): at 09:23

## 2022-10-27 RX ADMIN — SIMVASTATIN 20 MILLIGRAM(S): 20 TABLET, FILM COATED ORAL at 21:03

## 2022-10-27 RX ADMIN — Medication 1 SPRAY(S): at 21:03

## 2022-10-27 RX ADMIN — Medication 50 MILLIGRAM(S): at 21:03

## 2022-10-27 NOTE — BH INPATIENT PSYCHIATRY PROGRESS NOTE - NSBHCHARTREVIEWVS_PSY_A_CORE FT
Vital Signs Last 24 Hrs  T(C): 36.6 (10-27-22 @ 08:20), Max: 36.6 (10-27-22 @ 08:20)  T(F): 97.9 (10-27-22 @ 08:20), Max: 97.9 (10-27-22 @ 08:20)  HR: --  BP: --  BP(mean): --  RR: --  SpO2: --    Orthostatic VS  10-27-22 @ 08:20  Lying BP: --/-- HR: --  Sitting BP: 137/88 HR: 75  Standing BP: 121/75 HR: 80  Site: --  Mode: --  Orthostatic VS  10-26-22 @ 08:10  Lying BP: --/-- HR: --  Sitting BP: 111/71 HR: 76  Standing BP: 95/73 HR: 81  Site: upper left arm  Mode: electronic

## 2022-10-27 NOTE — BH PSYCHOLOGY - GROUP THERAPY NOTE - NSPSYCHOLGRPGENPROB_PSY_A_CORE
family dysfunction/impulsivity
depression
academic/vocational/social dysfunction/depression/suicidality

## 2022-10-27 NOTE — BH PSYCHOLOGY - GROUP THERAPY NOTE - NSPSYCHOLGRPGENINT_PSY_A_CORE
dynamic issues addressed/supported coping skills/supportive therapy
reality testing/supported coping skills/treatment compliance encouraged
problem solving techniques discussed/supported coping skills

## 2022-10-27 NOTE — BH PSYCHOLOGY - GROUP THERAPY NOTE - NSPSYCHOLGRPGENGOAL_PSY_A_CORE
improve reality testing/treatment compliance
decrease symptoms/improve family functioning/improve social/vocational/coping skills
decrease symptoms/improve social/vocational/coping skills

## 2022-10-27 NOTE — BH INPATIENT PSYCHIATRY PROGRESS NOTE - NSBHFUPINTERVALHXFT_PSY_A_CORE
Patient seen for follow-up of depression, chart reviewed and discussed with nursing staff.  No events reported overnight.  Vitals stable. Patient reported feeling okay and appeared future oriented talking about wanting to meet with his . Patient also verbalized her feelings about a peer whom he had become friendly with, being discharged from the unit and was encouraged to work on his own goals toward discharge. He denies any SI, HI during evaluation.

## 2022-10-27 NOTE — BH INPATIENT PSYCHIATRY PROGRESS NOTE - NSBHASSESSSUMMFT_PSY_ALL_CORE
71 y.o. male with hx of unusual relatedness including pattern of making up psychiatric symptoms or hx such as claiming he arranged a hit or his brother killed himself. Recently there has been escalating pattern of hospitalizations, ER visits. Compliance after d/c is nil. Patient also develops excessive attachment or intense dislike of providers and other staff. Patient has no actual hx of suicide attempts or fh or SIB. Patient with likely facititious disorder vs mood disorder. Suspect possible unconscious primary gain related to medical complaints help seeking then rejection of help as inadequate. Patient not assessed as needing CO. Titrate medications and connect patient back to services. Appears to be provocative and help-rejecting. Prolonged hospitalization may not help achieve shared goals and might not be therapeutic for patient.    10/6: Has multiple somatic complaints, does not engage in treatment planning, however appears to show initiative toward getting a notebook which was a coping mechanism for patient during last hospitalization.   10/7: Continues to have multiple complaints, somewhat more receptive to treatment planning, did not report SI today. Continue with current medications.   10/8: No mood symptoms, no psychotic symptoms, no suicidal ideations.  10/9/22; anxious, dysphoric, superficial, no SI/HI.   10/10/22: Remain anxious, catastrophizes his friendship, having multiple somatic complaints. Consider switching to Effexor XR.  10/11: The patient continues to complain of depression, anxiety, losing friendships, although it seems he could continue them if he desired.  He focuses on negatives. Education attempted. The patient is tolerating medications well, will continue.  to meet with patient.  10/12: Patient continues to report somatic complaints but rejecting interventions. Patient is awaiting meeting with . Plan to cross taper to Effexor XR in light of reported complaints and limited efficacy on Prozac (albeit insufficient trial).  10/13: Patient is catastrophizing his somatic complaints and displaying pessimism with regards to meeting . Reassurance was provided. Continues with help-rejecting behavior and was redirected to previous patterns of pessimism during previous hospitalization. Patient acknowledged it however is not taking steps to behavioral change at this time.   10/14: Patient continues to display negativistic thinking and making provocative statements. Pt has been adherent to medications. Continue to cross taper from Prozac to Effexor XR.  10/17: Pt continues to present negativistic thinking. Visible in the unit. Appears more future oriented today. Will continue tapering Prozac (down to 20mg today) and titrating Effexor XR (up to 75mg today).  10/18: Presents with negativistic thinking but appears more goal oriented seeking services and looking forward to meeting with . Pt has been medication adherent. Continue to cross taper from Prozac to Effexor XR.  10/19: Makes provocative statements and presents with usual negativistic thinking however remains hopeful for meeting with . Has been noted to be visible on the unit and adherent to medications. D/C Prozac  10/20: Continues to make provocative statements which is his usual way of relating with others. Tolerating meds, no new concerns reported. Awaiting meeting with . Titrate Effexor to 112.5mg  10/21: Tolerating medications. No new concerns. Patient appears anxious about meeting with his , however also awaiting to discuss his goals with him.   10/24: Patient tolerating medications. Will increase Effexor to 150mg PO daily.   10/25: Tolerating medications. Patient appears to be catastrophizing however has intense eye contact and makes provocative statements, yet does not endorse any intent or plan to hurt self. Patient has pattern of describing enhanced expressions of symptoms closer to discharge.  10/26: Patient continues to be help-rejecting and catastrophizing when discussing discharge. No plans endorsed to hurt self. Patient has pattern of describing enhanced expressions of symptoms closer to discharge.  10/27: Pt appears to be in behavioral control, denies SI/HI. Patient was less negativistic this AM and appeared future oriented toward meeting .     Plan:  1. Safety: Does not require CO 1:1 at this time.  2. Legals: 9.27  3. Psychiatry: Continue current meds  .Effexor XR 150mg PO daily. Titrate as tolerated.  .Abilify 5mg PO daily  .Trazodone 50mg PO HS  .Melatonin 5mg PO HS  4. Medical: Continue current medications  .Finasteride 5mg PO HS  .Nifedipine 30mg PO Daily  .Oxybutynin 5mg PO daily  .Simvastatin 20mg PO HS  5. Disposition: Home when stable.

## 2022-10-27 NOTE — BH PSYCHOLOGY - GROUP THERAPY NOTE - NSPSYCHOLGRPGENPT_PSY_A_CORE FT
The patient appears stated age, fair hygiene and dressed appropriately.  The patient was calm, cooperative and maintained appropriate eye contact.  No psychomotor agitation or retardation noted.  The patient's speech was fluent, normal in tone, rate, and volume.  Affect appears is constricted. The patient's thoughts appeared goal directed and coherent.
The patient was calm, but was argumentative with other members of group as well as group facilitators.   No psychomotor agitation or retardation noted. The patient's speech was fluent, normal in tone, rate, and volume.  The patient reported mood as "depressed."  Affect appeared blunted.  The patient's thoughts were tangential and restricted. 
The patient was calm, but resistant to interventions.  No psychomotor agitation or retardation noted. The patient's speech was fluent, normal in tone, rate, and volume. Affect appeared constricted.  The patient's thoughts appeared tangential. Insight is poor.

## 2022-10-28 PROCEDURE — 99231 SBSQ HOSP IP/OBS SF/LOW 25: CPT | Mod: GC

## 2022-10-28 RX ADMIN — Medication 1 SPRAY(S): at 21:24

## 2022-10-28 RX ADMIN — ARIPIPRAZOLE 5 MILLIGRAM(S): 15 TABLET ORAL at 09:16

## 2022-10-28 RX ADMIN — Medication 30 MILLIGRAM(S): at 09:16

## 2022-10-28 RX ADMIN — Medication 50 MILLIGRAM(S): at 21:24

## 2022-10-28 RX ADMIN — Medication 1 SPRAY(S): at 09:15

## 2022-10-28 RX ADMIN — Medication 150 MILLIGRAM(S): at 09:16

## 2022-10-28 RX ADMIN — FINASTERIDE 5 MILLIGRAM(S): 5 TABLET, FILM COATED ORAL at 09:16

## 2022-10-28 RX ADMIN — Medication 5 MILLIGRAM(S): at 09:16

## 2022-10-28 RX ADMIN — Medication 5 MILLIGRAM(S): at 21:24

## 2022-10-28 RX ADMIN — SIMVASTATIN 20 MILLIGRAM(S): 20 TABLET, FILM COATED ORAL at 21:24

## 2022-10-28 NOTE — BH INPATIENT PSYCHIATRY PROGRESS NOTE - NSBHCHARTREVIEWVS_PSY_A_CORE FT
Vital Signs Last 24 Hrs  T(C): 36.4 (10-28-22 @ 07:44), Max: 36.4 (10-28-22 @ 07:44)  T(F): 97.5 (10-28-22 @ 07:44), Max: 97.5 (10-28-22 @ 07:44)  HR: --  BP: --  BP(mean): --  RR: --  SpO2: --    Orthostatic VS  10-28-22 @ 10:27  Lying BP: --/-- HR: --  Sitting BP: 123/68 HR: 69  Standing BP: 96/65 HR: 93  Site: --  Mode: --  Orthostatic VS  10-28-22 @ 07:44  Lying BP: --/-- HR: --  Sitting BP: 142/88 HR: 65  Standing BP: 114/82 HR: 77  Site: --  Mode: --  Orthostatic VS  10-27-22 @ 08:20  Lying BP: --/-- HR: --  Sitting BP: 137/88 HR: 75  Standing BP: 121/75 HR: 80  Site: --  Mode: --

## 2022-10-28 NOTE — BH INPATIENT PSYCHIATRY PROGRESS NOTE - NSBHFUPINTERVALHXFT_PSY_A_CORE
Patient seen for follow-up of depression, chart reviewed and discussed with nursing staff.  No events reported overnight.  Vitals stable. Patient reported feeling okay. Discussed with patient about meeting being cancelled with  and that patient would have to follow up upon discharge. Patient was receptive and reported having his poetry and newspaper to keep himself engaged. He denies any SI, HI during evaluation. Patient seen for follow-up of depression, chart reviewed and discussed with nursing staff.  No events reported overnight.  Vitals stable. Patient reported feeling okay. Discussed with patient about meeting being cancelled with  and that patient would have to follow up upon discharge. Patient was receptive and reported having his poetry and newspaper to keep himself engaged. He denies any SI, HI during evaluation. Vitals positive for orthostatics. Will encourage fluids.

## 2022-10-28 NOTE — BH INPATIENT PSYCHIATRY PROGRESS NOTE - NSBHATTESTCOMMENTATTENDFT_PSY_A_CORE
Agree with assessment above. 
Agree with assessment above. Pt continues to present catastrophic, fortune-telling, black/white and pessimistic thinking patterns similar to his previous hospitalization and likely related to chronic characterologic problems. Pt tends to devaluate providers such as  prior to meeting them, anticipating they will not be able to help him and often falling into self fulfilling prophesies by self sabotaging such as not engaging with follow up or using his time with providers to focus on other topics while avoiding discussions on his treatment goals. 
The patient is little changed.  He continues to use complaints as a means to get attention.  Mood is "bad" but stable.  No SI.  Patient is able to discuss discharge planning.  Looking forward to meeting with his  today.  Continue Effexor trial.
Agree with assessment above. 
Agree with assessment above. 
The patient continues to complain of depression, but no SI reported today.  Eager to meet with his .  He remains provocative, attention seeking.  Behavior has been well controlled.  Tolerating medications, will continue.
The patient is little changed, but continues to prepare for discharge.  He is eager to meet with his  tomorrow, but reports feeling doubtful.  The patient reports vague unhappiness, eager to tell stories of his past.  Provocative and attention seeking at times.  No SI, behavior well controlled.  Tolerating medications well, will increase Effexor XR to 112.5mg daily.
Patient with varied psychiatric history and multiple hospitalizations now presenting with mood symptoms. He continues to c/o depressed mood and presents as pessimistic with a defeatist attitude. Patient continues to make provocative statements such as "I'd be better if I were 6-feet under" with no active intent or plan to hurt self. Patient's venlafaxine XR increased to 150mg daily starting 10/25 to better address mood symptoms. Concern for secondary gain remains given inconsistent symptoms and lack of reported improvement. Rest of the history, exam, assessment, and plan as documented in Dr. Pimentel's note.

## 2022-10-28 NOTE — BH INPATIENT PSYCHIATRY PROGRESS NOTE - NSBHASSESSSUMMFT_PSY_ALL_CORE
71 y.o. male with hx of unusual relatedness including pattern of making up psychiatric symptoms or hx such as claiming he arranged a hit or his brother killed himself. Recently there has been escalating pattern of hospitalizations, ER visits. Compliance after d/c is nil. Patient also develops excessive attachment or intense dislike of providers and other staff. Patient has no actual hx of suicide attempts or fh or SIB. Patient with likely facititious disorder vs mood disorder. Suspect possible unconscious primary gain related to medical complaints help seeking then rejection of help as inadequate. Patient not assessed as needing CO. Titrate medications and connect patient back to services. Appears to be provocative and help-rejecting. Prolonged hospitalization may not help achieve shared goals and might not be therapeutic for patient.    10/6: Has multiple somatic complaints, does not engage in treatment planning, however appears to show initiative toward getting a notebook which was a coping mechanism for patient during last hospitalization.   10/7: Continues to have multiple complaints, somewhat more receptive to treatment planning, did not report SI today. Continue with current medications.   10/8: No mood symptoms, no psychotic symptoms, no suicidal ideations.  10/9/22; anxious, dysphoric, superficial, no SI/HI.   10/10/22: Remain anxious, catastrophizes his friendship, having multiple somatic complaints. Consider switching to Effexor XR.  10/11: The patient continues to complain of depression, anxiety, losing friendships, although it seems he could continue them if he desired.  He focuses on negatives. Education attempted. The patient is tolerating medications well, will continue.  to meet with patient.  10/12: Patient continues to report somatic complaints but rejecting interventions. Patient is awaiting meeting with . Plan to cross taper to Effexor XR in light of reported complaints and limited efficacy on Prozac (albeit insufficient trial).  10/13: Patient is catastrophizing his somatic complaints and displaying pessimism with regards to meeting . Reassurance was provided. Continues with help-rejecting behavior and was redirected to previous patterns of pessimism during previous hospitalization. Patient acknowledged it however is not taking steps to behavioral change at this time.   10/14: Patient continues to display negativistic thinking and making provocative statements. Pt has been adherent to medications. Continue to cross taper from Prozac to Effexor XR.  10/17: Pt continues to present negativistic thinking. Visible in the unit. Appears more future oriented today. Will continue tapering Prozac (down to 20mg today) and titrating Effexor XR (up to 75mg today).  10/18: Presents with negativistic thinking but appears more goal oriented seeking services and looking forward to meeting with . Pt has been medication adherent. Continue to cross taper from Prozac to Effexor XR.  10/19: Makes provocative statements and presents with usual negativistic thinking however remains hopeful for meeting with . Has been noted to be visible on the unit and adherent to medications. D/C Prozac  10/20: Continues to make provocative statements which is his usual way of relating with others. Tolerating meds, no new concerns reported. Awaiting meeting with . Titrate Effexor to 112.5mg  10/21: Tolerating medications. No new concerns. Patient appears anxious about meeting with his , however also awaiting to discuss his goals with him.   10/24: Patient tolerating medications. Will increase Effexor to 150mg PO daily.   10/25: Tolerating medications. Patient appears to be catastrophizing however has intense eye contact and makes provocative statements, yet does not endorse any intent or plan to hurt self. Patient has pattern of describing enhanced expressions of symptoms closer to discharge.  10/26: Patient continues to be help-rejecting and catastrophizing when discussing discharge. No plans endorsed to hurt self. Patient has pattern of describing enhanced expressions of symptoms closer to discharge.  10/27: Pt appears to be in behavioral control, denies SI/HI. Patient was less negativistic this AM and appeared future oriented toward meeting .   10/28: Pt appears less provocative, more optimistic about future. Continue current regimen.    Plan:  1. Safety: Does not require CO 1:1 at this time.  2. Legals: 9.27  3. Psychiatry: Continue current meds  .Effexor XR 150mg PO daily. Titrate as tolerated.  .Abilify 5mg PO daily  .Trazodone 50mg PO HS  .Melatonin 5mg PO HS  4. Medical: Continue current medications  .Finasteride 5mg PO HS  .Nifedipine 30mg PO Daily  .Oxybutynin 5mg PO daily  .Simvastatin 20mg PO HS  5. Disposition: Home when stable.

## 2022-10-29 RX ADMIN — Medication 5 MILLIGRAM(S): at 09:24

## 2022-10-29 RX ADMIN — Medication 5 MILLIGRAM(S): at 21:43

## 2022-10-29 RX ADMIN — ARIPIPRAZOLE 5 MILLIGRAM(S): 15 TABLET ORAL at 09:24

## 2022-10-29 RX ADMIN — Medication 1 SPRAY(S): at 22:51

## 2022-10-29 RX ADMIN — Medication 150 MILLIGRAM(S): at 09:24

## 2022-10-29 RX ADMIN — Medication 50 MILLIGRAM(S): at 21:43

## 2022-10-29 RX ADMIN — Medication 1 SPRAY(S): at 09:25

## 2022-10-29 RX ADMIN — SIMVASTATIN 20 MILLIGRAM(S): 20 TABLET, FILM COATED ORAL at 21:43

## 2022-10-29 RX ADMIN — FINASTERIDE 5 MILLIGRAM(S): 5 TABLET, FILM COATED ORAL at 09:24

## 2022-10-29 RX ADMIN — Medication 30 MILLIGRAM(S): at 09:24

## 2022-10-30 RX ADMIN — Medication 1 SPRAY(S): at 21:04

## 2022-10-30 RX ADMIN — SIMVASTATIN 20 MILLIGRAM(S): 20 TABLET, FILM COATED ORAL at 21:04

## 2022-10-30 RX ADMIN — Medication 50 MILLIGRAM(S): at 21:04

## 2022-10-30 RX ADMIN — Medication 1 SPRAY(S): at 09:15

## 2022-10-30 RX ADMIN — ARIPIPRAZOLE 5 MILLIGRAM(S): 15 TABLET ORAL at 09:14

## 2022-10-30 RX ADMIN — Medication 5 MILLIGRAM(S): at 09:13

## 2022-10-30 RX ADMIN — Medication 150 MILLIGRAM(S): at 09:13

## 2022-10-30 RX ADMIN — Medication 5 MILLIGRAM(S): at 21:04

## 2022-10-30 RX ADMIN — Medication 30 MILLIGRAM(S): at 09:14

## 2022-10-30 RX ADMIN — FINASTERIDE 5 MILLIGRAM(S): 5 TABLET, FILM COATED ORAL at 09:13

## 2022-10-31 PROCEDURE — 99231 SBSQ HOSP IP/OBS SF/LOW 25: CPT

## 2022-10-31 RX ADMIN — Medication 1 SPRAY(S): at 09:09

## 2022-10-31 RX ADMIN — ARIPIPRAZOLE 5 MILLIGRAM(S): 15 TABLET ORAL at 09:08

## 2022-10-31 RX ADMIN — Medication 1 SPRAY(S): at 21:35

## 2022-10-31 RX ADMIN — FINASTERIDE 5 MILLIGRAM(S): 5 TABLET, FILM COATED ORAL at 09:09

## 2022-10-31 RX ADMIN — Medication 50 MILLIGRAM(S): at 21:36

## 2022-10-31 RX ADMIN — Medication 5 MILLIGRAM(S): at 21:36

## 2022-10-31 RX ADMIN — Medication 30 MILLIGRAM(S): at 09:08

## 2022-10-31 RX ADMIN — SIMVASTATIN 20 MILLIGRAM(S): 20 TABLET, FILM COATED ORAL at 21:36

## 2022-10-31 RX ADMIN — Medication 150 MILLIGRAM(S): at 09:08

## 2022-10-31 RX ADMIN — Medication 5 MILLIGRAM(S): at 09:08

## 2022-10-31 NOTE — BH INPATIENT PSYCHIATRY PROGRESS NOTE - NSBHASSESSSUMMFT_PSY_ALL_CORE
71 y.o. male with hx of unusual relatedness including pattern of making up psychiatric symptoms or hx such as claiming he arranged a hit or his brother killed himself. Recently there has been escalating pattern of hospitalizations, ER visits. Compliance after d/c is nil. Patient also develops excessive attachment or intense dislike of providers and other staff. Patient has no actual hx of suicide attempts or fh or SIB. Patient with likely facititious disorder vs mood disorder. Suspect possible unconscious primary gain related to medical complaints help seeking then rejection of help as inadequate. Patient not assessed as needing CO. Titrate medications and connect patient back to services. Appears to be provocative and help-rejecting. Prolonged hospitalization may not help achieve shared goals and might not be therapeutic for patient.    10/6: Has multiple somatic complaints, does not engage in treatment planning, however appears to show initiative toward getting a notebook which was a coping mechanism for patient during last hospitalization.   10/7: Continues to have multiple complaints, somewhat more receptive to treatment planning, did not report SI today. Continue with current medications.   10/8: No mood symptoms, no psychotic symptoms, no suicidal ideations.  10/9/22; anxious, dysphoric, superficial, no SI/HI.   10/10/22: Remain anxious, catastrophizes his friendship, having multiple somatic complaints. Consider switching to Effexor XR.  10/11: The patient continues to complain of depression, anxiety, losing friendships, although it seems he could continue them if he desired.  He focuses on negatives. Education attempted. The patient is tolerating medications well, will continue.  to meet with patient.  10/12: Patient continues to report somatic complaints but rejecting interventions. Patient is awaiting meeting with . Plan to cross taper to Effexor XR in light of reported complaints and limited efficacy on Prozac (albeit insufficient trial).  10/13: Patient is catastrophizing his somatic complaints and displaying pessimism with regards to meeting . Reassurance was provided. Continues with help-rejecting behavior and was redirected to previous patterns of pessimism during previous hospitalization. Patient acknowledged it however is not taking steps to behavioral change at this time.   10/14: Patient continues to display negativistic thinking and making provocative statements. Pt has been adherent to medications. Continue to cross taper from Prozac to Effexor XR.  10/17: Pt continues to present negativistic thinking. Visible in the unit. Appears more future oriented today. Will continue tapering Prozac (down to 20mg today) and titrating Effexor XR (up to 75mg today).  10/18: Presents with negativistic thinking but appears more goal oriented seeking services and looking forward to meeting with . Pt has been medication adherent. Continue to cross taper from Prozac to Effexor XR.  10/19: Makes provocative statements and presents with usual negativistic thinking however remains hopeful for meeting with . Has been noted to be visible on the unit and adherent to medications. D/C Prozac  10/20: Continues to make provocative statements which is his usual way of relating with others. Tolerating meds, no new concerns reported. Awaiting meeting with . Titrate Effexor to 112.5mg  10/21: Tolerating medications. No new concerns. Patient appears anxious about meeting with his , however also awaiting to discuss his goals with him.   10/24: Patient tolerating medications. Will increase Effexor to 150mg PO daily.   10/25: Tolerating medications. Patient appears to be catastrophizing however has intense eye contact and makes provocative statements, yet does not endorse any intent or plan to hurt self. Patient has pattern of describing enhanced expressions of symptoms closer to discharge.  10/26: Patient continues to be help-rejecting and catastrophizing when discussing discharge. No plans endorsed to hurt self. Patient has pattern of describing enhanced expressions of symptoms closer to discharge.  10/27: Pt appears to be in behavioral control, denies SI/HI. Patient was less negativistic this AM and appeared future oriented toward meeting .   10/28: Pt appears less provocative, more optimistic about future. Continue current regimen.  10/31: Pt continues to present as dysphoric, withdrawn. Has not been interested in his poetry and drawings as he has been in the past. During encounter today pt continues to express low mood and hopelessness about the future. Despite patient's characterologic issues and suspected element of factitious disorder, there might be an element of mood disorder present that may ameliorate with ECT treatment. Pt expresses willingness to "try anything that could help". Will place ECT consult.   Plan:  1. Safety: Does not require CO 1:1 at this time.  2. Legals: 9.27  3. Psychiatry: Continue current meds  .Effexor XR 150mg PO daily. Titrate as tolerated.  .Abilify 5mg PO daily  .Trazodone 50mg PO HS  .Melatonin 5mg PO HS  4. Medical: Continue current medications  .Finasteride 5mg PO HS  .Nifedipine 30mg PO Daily  .Oxybutynin 5mg PO daily  .Simvastatin 20mg PO HS  5. Disposition: Home when stable.

## 2022-10-31 NOTE — BH INPATIENT PSYCHIATRY PROGRESS NOTE - NSBHFUPINTERVALHXFT_PSY_A_CORE
Patient seen for follow-up of depression, chart reviewed and discussed with nursing staff.  No events reported overnight.  Vitals stable. Pt continues to present as dysphoric, withdrawn. Has not been interested in his poetry and drawings as he has been in the past. During encounter today pt continues to express low mood and hopelessness about the future. Despite patient's characterologic issues and suspected element of factitious disorder, there might be an element of mood disorder present that may ameliorate with ECT treatment. Pt expresses willingness to "try anything that could help". Will place ECT consult.

## 2022-10-31 NOTE — BH INPATIENT PSYCHIATRY PROGRESS NOTE - NSBHCHARTREVIEWVS_PSY_A_CORE FT
Vital Signs Last 24 Hrs  T(C): 36.3 (10-31-22 @ 08:05), Max: 36.3 (10-31-22 @ 08:05)  T(F): 97.4 (10-31-22 @ 08:05), Max: 97.4 (10-31-22 @ 08:05)  HR: --  BP: --  BP(mean): --  RR: --  SpO2: --    Orthostatic VS  10-31-22 @ 08:05  Lying BP: --/-- HR: --  Sitting BP: 115/72 HR: 84  Standing BP: 104/73 HR: 80  Site: upper right arm  Mode: electronic  Orthostatic VS  10-30-22 @ 07:43  Lying BP: --/-- HR: --  Sitting BP: 129/85 HR: 78  Standing BP: 119/85 HR: 90  Site: --  Mode: --

## 2022-11-01 DIAGNOSIS — N40.0 BENIGN PROSTATIC HYPERPLASIA WITHOUT LOWER URINARY TRACT SYMPTOMS: ICD-10-CM

## 2022-11-01 DIAGNOSIS — I10 ESSENTIAL (PRIMARY) HYPERTENSION: ICD-10-CM

## 2022-11-01 DIAGNOSIS — E78.5 HYPERLIPIDEMIA, UNSPECIFIED: ICD-10-CM

## 2022-11-01 DIAGNOSIS — Z01.818 ENCOUNTER FOR OTHER PREPROCEDURAL EXAMINATION: ICD-10-CM

## 2022-11-01 DIAGNOSIS — F32.9 MAJOR DEPRESSIVE DISORDER, SINGLE EPISODE, UNSPECIFIED: ICD-10-CM

## 2022-11-01 DIAGNOSIS — E04.1 NONTOXIC SINGLE THYROID NODULE: ICD-10-CM

## 2022-11-01 DIAGNOSIS — E87.6 HYPOKALEMIA: ICD-10-CM

## 2022-11-01 LAB
ALBUMIN SERPL ELPH-MCNC: 3.8 G/DL — SIGNIFICANT CHANGE UP (ref 3.3–5)
ALP SERPL-CCNC: 100 U/L — SIGNIFICANT CHANGE UP (ref 40–120)
ALT FLD-CCNC: 22 U/L — SIGNIFICANT CHANGE UP (ref 4–41)
ANION GAP SERPL CALC-SCNC: 9 MMOL/L — SIGNIFICANT CHANGE UP (ref 7–14)
AST SERPL-CCNC: 19 U/L — SIGNIFICANT CHANGE UP (ref 4–40)
BILIRUB SERPL-MCNC: 0.5 MG/DL — SIGNIFICANT CHANGE UP (ref 0.2–1.2)
BUN SERPL-MCNC: 29 MG/DL — HIGH (ref 7–23)
CALCIUM SERPL-MCNC: 9 MG/DL — SIGNIFICANT CHANGE UP (ref 8.4–10.5)
CHLORIDE SERPL-SCNC: 99 MMOL/L — SIGNIFICANT CHANGE UP (ref 98–107)
CO2 SERPL-SCNC: 32 MMOL/L — HIGH (ref 22–31)
CREAT SERPL-MCNC: 1.12 MG/DL — SIGNIFICANT CHANGE UP (ref 0.5–1.3)
EGFR: 70 ML/MIN/1.73M2 — SIGNIFICANT CHANGE UP
GLUCOSE SERPL-MCNC: 103 MG/DL — HIGH (ref 70–99)
HCT VFR BLD CALC: 38.3 % — LOW (ref 39–50)
HGB BLD-MCNC: 12.9 G/DL — LOW (ref 13–17)
MCHC RBC-ENTMCNC: 29.5 PG — SIGNIFICANT CHANGE UP (ref 27–34)
MCHC RBC-ENTMCNC: 33.7 GM/DL — SIGNIFICANT CHANGE UP (ref 32–36)
MCV RBC AUTO: 87.4 FL — SIGNIFICANT CHANGE UP (ref 80–100)
NRBC # BLD: 0 /100 WBCS — SIGNIFICANT CHANGE UP (ref 0–0)
NRBC # FLD: 0 K/UL — SIGNIFICANT CHANGE UP (ref 0–0)
PLATELET # BLD AUTO: 194 K/UL — SIGNIFICANT CHANGE UP (ref 150–400)
POTASSIUM SERPL-MCNC: 3.1 MMOL/L — LOW (ref 3.5–5.3)
POTASSIUM SERPL-SCNC: 3.1 MMOL/L — LOW (ref 3.5–5.3)
PROT SERPL-MCNC: 6.4 G/DL — SIGNIFICANT CHANGE UP (ref 6–8.3)
RBC # BLD: 4.38 M/UL — SIGNIFICANT CHANGE UP (ref 4.2–5.8)
RBC # FLD: 13 % — SIGNIFICANT CHANGE UP (ref 10.3–14.5)
SODIUM SERPL-SCNC: 140 MMOL/L — SIGNIFICANT CHANGE UP (ref 135–145)
TROPONIN T, HIGH SENSITIVITY RESULT: 17 NG/L — SIGNIFICANT CHANGE UP
TSH SERPL-MCNC: 1.02 UIU/ML — SIGNIFICANT CHANGE UP (ref 0.27–4.2)
WBC # BLD: 8.48 K/UL — SIGNIFICANT CHANGE UP (ref 3.8–10.5)
WBC # FLD AUTO: 8.48 K/UL — SIGNIFICANT CHANGE UP (ref 3.8–10.5)

## 2022-11-01 PROCEDURE — 93010 ELECTROCARDIOGRAM REPORT: CPT

## 2022-11-01 PROCEDURE — 99231 SBSQ HOSP IP/OBS SF/LOW 25: CPT

## 2022-11-01 PROCEDURE — 99232 SBSQ HOSP IP/OBS MODERATE 35: CPT

## 2022-11-01 RX ORDER — POTASSIUM CHLORIDE 20 MEQ
40 PACKET (EA) ORAL ONCE
Refills: 0 | Status: COMPLETED | OUTPATIENT
Start: 2022-11-01 | End: 2022-11-01

## 2022-11-01 RX ADMIN — ARIPIPRAZOLE 5 MILLIGRAM(S): 15 TABLET ORAL at 09:45

## 2022-11-01 RX ADMIN — Medication 30 MILLIGRAM(S): at 09:45

## 2022-11-01 RX ADMIN — Medication 150 MILLIGRAM(S): at 09:46

## 2022-11-01 RX ADMIN — SIMVASTATIN 20 MILLIGRAM(S): 20 TABLET, FILM COATED ORAL at 21:32

## 2022-11-01 RX ADMIN — Medication 5 MILLIGRAM(S): at 09:46

## 2022-11-01 RX ADMIN — FINASTERIDE 5 MILLIGRAM(S): 5 TABLET, FILM COATED ORAL at 09:46

## 2022-11-01 RX ADMIN — Medication 5 MILLIGRAM(S): at 21:32

## 2022-11-01 RX ADMIN — Medication 40 MILLIEQUIVALENT(S): at 13:33

## 2022-11-01 RX ADMIN — Medication 1 SPRAY(S): at 09:46

## 2022-11-01 RX ADMIN — Medication 1 SPRAY(S): at 21:32

## 2022-11-01 RX ADMIN — Medication 50 MILLIGRAM(S): at 21:32

## 2022-11-01 NOTE — PROGRESS NOTE ADULT - SUBJECTIVE AND OBJECTIVE BOX
Leonila Wright         72y old  Male who presents with a chief complaint of Recurrent major depressive disorder     (25 Oct 2022 13:08)    Medicine Consulted for ECT PRE-PROC Clearance.    SUBJECTIVE / OVERNIGHT EVENTS: No acute overnight events. This morning pt upon evaluation patient states "I want to die, I have nothing to live for." States he is amenable to ECT trial but "nothing can help me." Somatically, pt reports chest pain and SOB, states he feels like "something is sitting on my chest." Reports SOB is due to "what's sitting on my chest." Pt points to substernal region when asked for the location of chest pain. Non-radiating. SOB is present "all the time". No notable aggravating or alleviating factors. States SOB and chest pain have been ongoing for months and both are progressively getting worse. Denies fevers, chills, dizziness/light headedness, diaphoresis, palpitations, N/V.     MEDICATIONS  (STANDING):  ARIPiprazole 5 milliGRAM(s) Oral daily  finasteride 5 milliGRAM(s) Oral daily  fluticasone propionate 50 MICROgram(s)/spray Nasal Spray 1 Spray(s) Both Nostrils two times a day  melatonin. 5 milliGRAM(s) Oral at bedtime  NIFEdipine XL 30 milliGRAM(s) Oral daily  oxybutynin 5 milliGRAM(s) Oral daily  simvastatin 20 milliGRAM(s) Oral at bedtime  traZODone 50 milliGRAM(s) Oral at bedtime  venlafaxine  milliGRAM(s) Oral daily    MEDICATIONS  (PRN):  acetaminophen     Tablet .. 650 milliGRAM(s) Oral every 6 hours PRN Mild Pain (1 - 3), Moderate Pain (4 - 6)  diphenhydrAMINE 50 milliGRAM(s) Oral every 6 hours PRN agitation  diphenhydrAMINE Injectable 50 milliGRAM(s) IntraMuscular every 4 hours PRN severe agitation  haloperidol     Tablet 5 milliGRAM(s) Oral every 6 hours PRN agitation  haloperidol    Injectable 5 milliGRAM(s) IntraMuscular once PRN severe agitation  lidocaine   4% Patch 1 Patch Transdermal every 24 hours PRN pain    Allergies    penicillin (Hives)  Septra (Rash; Urticaria; Hives)    Intolerances        Vital Signs Last 24 Hrs  T(C): --  T(F): --  HR: --  BP: --  BP(mean): --  RR: --  SpO2: --      Daily     Daily   CAPILLARY BLOOD GLUCOSE        I&O's Summary      PHYSICAL EXAM:  GENERAL: anxious appearing, Diffuse   H    LABS:                        12.9   8.48  )-----------( 194      ( 01 Nov 2022 11:39 )             38.3     Hgb Trend: 12.9<--  11-01    140  |  99  |  29<H>  ----------------------------<  103<H>  3.1<L>   |  32<H>  |  1.12    Ca    9.0      01 Nov 2022 11:39    TPro  6.4  /  Alb  3.8  /  TBili  0.5  /  DBili  x   /  AST  19  /  ALT  22  /  AlkPhos  100  11-01    Creatinine Trend: 1.12<--  LIVER FUNCTIONS - ( 01 Nov 2022 11:39 )  Alb: 3.8 g/dL / Pro: 6.4 g/dL / ALK PHOS: 100 U/L / ALT: 22 U/L / AST: 19 U/L / GGT: x                     RADIOLOGY & ADDITIONAL TESTS:    Imaging Personally Reviewed.    Consultant(s) Notes Reviewed.    Care Discussed with Consultants/Other Providers.       Leonila Wright         72y old  Male who presents with a chief complaint of Recurrent major depressive disorder     (25 Oct 2022 13:08)    Medicine Consulted for ECT PRE-PROC Clearance.    SUBJECTIVE / OVERNIGHT EVENTS: No acute overnight events. This morning pt upon evaluation patient states "I want to die, I have nothing to live for." States he is amenable to ECT trial but "nothing can help me." Somatically, pt reports chest pain and SOB, states he feels like "something is sitting on my chest." Reports SOB is due to "what's sitting on my chest." Pt points to substernal region when asked for the location of chest pain. Non-radiating. SOB is present "all the time". No notable aggravating or alleviating factors. States SOB and chest pain have been ongoing for months and both are progressively getting worse. Denies fevers, chills, dizziness/light headedness, diaphoresis, palpitations, N/V.     MEDICATIONS  (STANDING):  ARIPiprazole 5 milliGRAM(s) Oral daily  finasteride 5 milliGRAM(s) Oral daily  fluticasone propionate 50 MICROgram(s)/spray Nasal Spray 1 Spray(s) Both Nostrils two times a day  melatonin. 5 milliGRAM(s) Oral at bedtime  NIFEdipine XL 30 milliGRAM(s) Oral daily  oxybutynin 5 milliGRAM(s) Oral daily  simvastatin 20 milliGRAM(s) Oral at bedtime  traZODone 50 milliGRAM(s) Oral at bedtime  venlafaxine  milliGRAM(s) Oral daily    MEDICATIONS  (PRN):  acetaminophen     Tablet .. 650 milliGRAM(s) Oral every 6 hours PRN Mild Pain (1 - 3), Moderate Pain (4 - 6)  diphenhydrAMINE 50 milliGRAM(s) Oral every 6 hours PRN agitation  diphenhydrAMINE Injectable 50 milliGRAM(s) IntraMuscular every 4 hours PRN severe agitation  haloperidol     Tablet 5 milliGRAM(s) Oral every 6 hours PRN agitation  haloperidol    Injectable 5 milliGRAM(s) IntraMuscular once PRN severe agitation  lidocaine   4% Patch 1 Patch Transdermal every 24 hours PRN pain    Allergies    penicillin (Hives)  Septra (Rash; Urticaria; Hives)    Intolerances        Vital Signs Last 24 Hrs  T(C): --  T(F): --  HR: --  BP: --  BP(mean): --  RR: --  SpO2: --      Daily     Daily   CAPILLARY BLOOD GLUCOSE        I&O's Summary      PHYSICAL EXAM:  GENERAL: anxious appearing, Diffuse seborrheic dermatitis noted on scalp/neck  ENMT:   MMM, EOMI, conjunctiva and sclera clear, neck supple  RESPIRATORY: CTAB, normal respiratory effort, no wheezing or crackles   HEART: Regular rate and rhythm; No murmurs, rubs, or gallops  ABDOMEN: Soft, Nontender, Nondistended; Bowel sounds present  EXTREMITIES:  2+ Peripheral Pulses, No clubbing, cyanosis, or edema  NEUROLOGY: non-focal  SKIN: No rashes or lesions    LABS:                        12.9   8.48  )-----------( 194      ( 01 Nov 2022 11:39 )             38.3     Hgb Trend: 12.9<--  11-01    140  |  99  |  29<H>  ----------------------------<  103<H>  3.1<L>   |  32<H>  |  1.12    Ca    9.0      01 Nov 2022 11:39    TPro  6.4  /  Alb  3.8  /  TBili  0.5  /  DBili  x   /  AST  19  /  ALT  22  /  AlkPhos  100  11-01    Creatinine Trend: 1.12<--  LIVER FUNCTIONS - ( 01 Nov 2022 11:39 )  Alb: 3.8 g/dL / Pro: 6.4 g/dL / ALK PHOS: 100 U/L / ALT: 22 U/L / AST: 19 U/L / GGT: x                     RADIOLOGY & ADDITIONAL TESTS:    Imaging Personally Reviewed.    Consultant(s) Notes Reviewed.    Care Discussed with Consultants/Other Providers.

## 2022-11-01 NOTE — BH INPATIENT PSYCHIATRY PROGRESS NOTE - NSBHCHARTREVIEWVS_PSY_A_CORE FT
Vital Signs Last 24 Hrs  T(C): --  T(F): --  HR: --  BP: --  BP(mean): --  RR: --  SpO2: --    Orthostatic VS  10-31-22 @ 08:05  Lying BP: --/-- HR: --  Sitting BP: 115/72 HR: 84  Standing BP: 104/73 HR: 80  Site: upper right arm  Mode: electronic

## 2022-11-01 NOTE — BH INPATIENT PSYCHIATRY PROGRESS NOTE - NSBHASSESSSUMMFT_PSY_ALL_CORE
71 y.o. male with hx of unusual relatedness including pattern of making up psychiatric symptoms or hx such as claiming he arranged a hit or his brother killed himself. Recently there has been escalating pattern of hospitalizations, ER visits. Compliance after d/c is nil. Patient also develops excessive attachment or intense dislike of providers and other staff. Patient has no actual hx of suicide attempts or fh or SIB. Patient with likely facititious disorder vs mood disorder. Suspect possible unconscious primary gain related to medical complaints help seeking then rejection of help as inadequate. Patient not assessed as needing CO. Titrate medications and connect patient back to services. Appears to be provocative and help-rejecting. Prolonged hospitalization may not help achieve shared goals and might not be therapeutic for patient.    10/6: Has multiple somatic complaints, does not engage in treatment planning, however appears to show initiative toward getting a notebook which was a coping mechanism for patient during last hospitalization.   10/7: Continues to have multiple complaints, somewhat more receptive to treatment planning, did not report SI today. Continue with current medications.   10/8: No mood symptoms, no psychotic symptoms, no suicidal ideations.  10/9/22; anxious, dysphoric, superficial, no SI/HI.   10/10/22: Remain anxious, catastrophizes his friendship, having multiple somatic complaints. Consider switching to Effexor XR.  10/11: The patient continues to complain of depression, anxiety, losing friendships, although it seems he could continue them if he desired.  He focuses on negatives. Education attempted. The patient is tolerating medications well, will continue.  to meet with patient.  10/12: Patient continues to report somatic complaints but rejecting interventions. Patient is awaiting meeting with . Plan to cross taper to Effexor XR in light of reported complaints and limited efficacy on Prozac (albeit insufficient trial).  10/13: Patient is catastrophizing his somatic complaints and displaying pessimism with regards to meeting . Reassurance was provided. Continues with help-rejecting behavior and was redirected to previous patterns of pessimism during previous hospitalization. Patient acknowledged it however is not taking steps to behavioral change at this time.   10/14: Patient continues to display negativistic thinking and making provocative statements. Pt has been adherent to medications. Continue to cross taper from Prozac to Effexor XR.  10/17: Pt continues to present negativistic thinking. Visible in the unit. Appears more future oriented today. Will continue tapering Prozac (down to 20mg today) and titrating Effexor XR (up to 75mg today).  10/18: Presents with negativistic thinking but appears more goal oriented seeking services and looking forward to meeting with . Pt has been medication adherent. Continue to cross taper from Prozac to Effexor XR.  10/19: Makes provocative statements and presents with usual negativistic thinking however remains hopeful for meeting with . Has been noted to be visible on the unit and adherent to medications. D/C Prozac  10/20: Continues to make provocative statements which is his usual way of relating with others. Tolerating meds, no new concerns reported. Awaiting meeting with . Titrate Effexor to 112.5mg  10/21: Tolerating medications. No new concerns. Patient appears anxious about meeting with his , however also awaiting to discuss his goals with him.   10/24: Patient tolerating medications. Will increase Effexor to 150mg PO daily.   10/25: Tolerating medications. Patient appears to be catastrophizing however has intense eye contact and makes provocative statements, yet does not endorse any intent or plan to hurt self. Patient has pattern of describing enhanced expressions of symptoms closer to discharge.  10/26: Patient continues to be help-rejecting and catastrophizing when discussing discharge. No plans endorsed to hurt self. Patient has pattern of describing enhanced expressions of symptoms closer to discharge.  10/27: Pt appears to be in behavioral control, denies SI/HI. Patient was less negativistic this AM and appeared future oriented toward meeting .   10/28: Pt appears less provocative, more optimistic about future. Continue current regimen.  10/31: Pt continues to present as dysphoric, withdrawn. Has not been interested in his poetry and drawings as he has been in the past. During encounter today pt continues to express low mood and hopelessness about the future. Despite patient's characterologic issues and suspected element of factitious disorder, there might be an element of mood disorder present that may ameliorate with ECT treatment. Pt expresses willingness to "try anything that could help". Will place ECT consult.   11/1: Pt continues to present as dysphoric, withdrawn. Continues to endorse hopelessness about the future and passive SI. Dental clearance pending for ECT. Appointment scheduled for Thursday.   Plan:  1. Safety: Does not require CO 1:1 at this time.  2. Legals: 9.27  3. Psychiatry: Continue current meds  .Effexor XR 150mg PO daily. Titrate as tolerated.  .Abilify 5mg PO daily  .Trazodone 50mg PO HS  .Melatonin 5mg PO HS  4. Medical: Continue current medications  .Finasteride 5mg PO HS  .Nifedipine 30mg PO Daily  .Oxybutynin 5mg PO daily  .Simvastatin 20mg PO HS  5. Disposition: Home when stable.

## 2022-11-01 NOTE — BH INPATIENT PSYCHIATRY PROGRESS NOTE - NSBHFUPINTERVALHXFT_PSY_A_CORE
Patient seen for follow-up of depression, chart reviewed and discussed with nursing staff.  No events reported overnight.  Vitals stable. Pt continues to present as dysphoric, withdrawn. Continues to endorse hopelessness about the future and passive SI. Dental clearance pending for ECT. Appointment scheduled for Thursday.

## 2022-11-01 NOTE — ECT CONSULT NOTE - NSECTASSESSRECOMM_PSY_ALL_CORE
PLAN  -  -med clearance  -dental clearance  -med regimen does not need to be modified  -repeat CMP, CBC, TSH, EKG 72M, single, no children, living w/ brother, unemployed, psych h/o MDD, OCD, unspecified personality disorder, ?factitious disorder, 3 PPH most recent [facility] aug 2022, unclear non suicidal self injurious behavior (per pt he hits self and used to cut, chart indicates pt hits self, brother states pt does not self harm), no known SA/violence/legal hx/abuse, prior etoh use but no substance use currently, PMH, HTN, HLD, BPH, arthritis, COVID+ sep 2022, incidental thyroid nodules, admitted to medicine for dyspnea workup, voiced SI, subsequently med cleared and transferred to psych for mgmt of depression, ECT consulted for persistent sx despite pharmacological intervention. hx and exam remarkable for    PLAN  -  -med clearance  -dental clearance  -med regimen does not need to be modified  -repeat CMP, CBC, TSH, EKG 72M, single, no children, living w/ brother, unemployed, psych h/o MDD, OCD, unspecified personality disorder, ?factitious disorder, 3 PPH most recent [facility] aug 2022, unclear non suicidal self injurious behavior (per pt he hits self and used to cut, chart indicates pt hits self, brother states pt does not self harm), no known SA/violence/legal hx/abuse, prior etoh use but no substance use currently, PMH, HTN, HLD, BPH, arthritis, COVID+ sep 2022, incidental thyroid nodules, admitted to medicine for dyspnea workup, voiced SI, subsequently med cleared and transferred to psych for mgmt of depression, ECT consulted for persistent sx despite pharmacological intervention. hx and exam remarkable for reported lifelong sx of depression w/ pt now near pan positive.  CL and inpt team have noted dependent personality traits and have raised suspicion for factitious disorder.  despite this, pt does appear to have ECT responsive sx that have not been alleviated by pharmacological methods.  pt has no medical contraindications for ECT and informed consent has been obtained.  will proceed with treatment pending below issues    PLAN  -ECT indicated  -bifrontal consent in chart  -med clearance  -dental clearance  -med regimen does not need to be modified  -repeat CMP, CBC, TSH, EKG

## 2022-11-01 NOTE — ECT CONSULT NOTE - NSECTMENTALSTATUSEXAM_PSY_ALL_CORE
Appearance: Awake and alert, appearing stated age, in no acute distress   Behavior: Appropriate facial expressions and eye contact, no motor/gait abnormalities observed   Speech: Spontaneous speech fluent with normal rate/volume/rhythm/intonation   Attitude: Cooperative with interviewer and engaged during interview   Mood: “”   Affect:    Thought Process: Linear, coherent, and goal-directed   Thought Content:        -Delusions- None elicited        -Suicidal ideation- Denies        -Homicidal ideation- Denies   Perception: Denies auditory and visual perceptual disturbances, no internal preoccupation, not responding to internal stimuli   Insight: Fair  Judgment: Fair  Appearance: Awake and alert, appearing stated age, in no acute distress   Behavior: Appropriate facial expressions, poor eye contact, facial tick, unsteady gait   Speech: Spontaneous speech fluent with normal rate/volume/rhythm/intonation   Attitude: Cooperative with interviewer and engaged during interview   Mood: “awful”   Affect: constricted   Thought Process: Linear   Thought Content:        -Delusions- None elicited        -Suicidal ideation- +passive SI        -Homicidal ideation- Denies   Perception: Denies auditory and visual perceptual disturbances, no internal preoccupation, not responding to internal stimuli   Insight: poor  Judgment: poor

## 2022-11-01 NOTE — PROGRESS NOTE ADULT - ASSESSMENT
72 y/o male with BPH, HTN and HLD, recent covid infection 9/22/22 transferred from [facility] to [facility]. Initially presented to [facility] with generalized complaints; SOB. Pt admitted for  workup for dyspnea and depression . CTA chest negative for PE, cardiac w/u negative for ACS. Covid positive while hospitalized on 9/22, did not require o2 therapy, dex or remdes. Admitted to [facility] for mgmt of his depression, SI and agitation.     Medicine consulted for pre-ECT medical evaluation.      #Pre-ECT evaluation: patient denies a hx of ICP, aneurysm, MI or stroke. Patient does not report a hx of COPD and asthma. Currently patient complains of SOB / Chest pain. Evaluated on recent admission w/ negative work up.     -CTA w/o PE  -Cardiac enzymes not consistent w/ ACS  -Echo w/ EF 55%, normal RV/LV size and function. Grade 1 diastolic dysfunction. Mild     -Would favor obtaining EKG/Repeating Troponin given report of worsening atypical chest pain    Otherwise, patient reports no prior issue with anesthesia. No hx of RA or neck/spinal issue. No CKD.     Dental evaluation pending per primary team.    From the medical standpoint, patient can proceed with ECT pending EKG/Troponin.          Depression  - Mgmt as per psych     BPH  - Cont flomax and oxybutynin as ordered    HTN  - Cont nifedipine 30 daily  HLD  - Cont statin    Thyroid nodules, incidental finding  On CTA on admission. TSH checked in July 2022 was wnl.  - Needs outpatient f/u w PCP for further surveillance  - TSH wnl    72 y/o male with BPH, HTN and HLD, recent covid infection 9/22/22 transferred from [facility] to [facility]. Initially presented to [facility] with generalized complaints; SOB. Pt admitted for  workup for dyspnea and depression . CTA chest negative for PE, cardiac w/u negative for ACS. Covid positive while hospitalized on 9/22, did not require o2 therapy, dex or remdes. Admitted to [facility] [place] for mgmt of his depression, SI and agitation.     Medicine consulted for pre-ECT medical evaluation.      #Pre-ECT evaluation: patient denies a hx of ICP, aneurysm, MI or stroke. Patient does not report a hx of COPD and asthma. Currently patient complains of SOB / Chest pain. Evaluated on recent admission w/ negative work up.     -CTA w/o PE  -Cardiac enzymes not consistent w/ ACS  -Echo w/ EF 55%, normal RV/LV size and function. Grade 1 diastolic dysfunction. Mild     -Would favor obtaining EKG/Repeating Troponin given report of worsening atypical chest pain    Otherwise, patient reports no prior issue with anesthesia. No hx of RA or neck/spinal issue. No CKD.     Dental evaluation pending per primary team.    From the medical standpoint, patient has an RCRI score of 0, class I Risk; can proceed with ECT pending EKG/Troponin.          Depression  - Mgmt as per psych     BPH  - Cont flomax and oxybutynin as ordered    HTN  - Cont nifedipine 30 daily  HLD  - Cont statin    Thyroid nodules, incidental finding  On CTA on admission. TSH checked in July 2022 was wnl.  - Needs outpatient f/u w PCP for further surveillance  - TSH wnl    72 y/o male with BPH, HTN and HLD, recent covid infection 9/22/22 transferred from [facility] to [facility]. Initially presented to [facility] with generalized complaints; SOB. Pt admitted for  workup for dyspnea and depression . CTA chest negative for PE, cardiac w/u negative for ACS. Covid positive while hospitalized on 9/22, did not require o2 therapy, dex or remdes. Admitted to [facility] [place] [place] for mgmt of his depression, SI and agitation.     Medicine consulted for pre-ECT medical evaluation.      #Pre-ECT evaluation: patient denies a hx of ICP, aneurysm, MI or stroke. Patient does not report a hx of COPD and asthma. Currently patient complains of SOB / Chest pain. Evaluated on recent admission w/ negative work up.     -CTA w/o PE  -Cardiac enzymes not consistent w/ ACS  -Echo w/ EF 55%, normal RV/LV size and function. Grade 1 diastolic dysfunction. Mild     -Would favor obtaining EKG/Repeating Troponin given report of worsening atypical chest pain    Otherwise, patient reports no prior issue with anesthesia. No hx of RA or neck/spinal issue. No CKD.     Dental evaluation pending per primary team.    From the medical standpoint, patient has an RCRI score of 0, class I Risk; can proceed with ECT pending EKG/Troponin.        #Hypokalemia  -Hypokalemia noted 11/1  -Now s/p repletion  -Monitor BMPs  -Encourage PO intake    Depression  - Mgmt as per psych     BPH  - Cont flomax and oxybutynin as ordered    HTN  - Cont nifedipine 30 daily  HLD  - Cont statin    Thyroid nodules, incidental finding  On CTA on admission. TSH checked in July 2022 was wnl.  - Needs outpatient f/u w PCP for further surveillance  - TSH wnl

## 2022-11-01 NOTE — ECT CONSULT NOTE - OTHER PAST PSYCHIATRIC HISTORY (INCLUDE DETAILS REGARDING ONSET, COURSE OF ILLNESS, INPATIENT/OUTPATIENT TREATMENT)
ID: [id number], living w/ brother, unemployed, psych h/o MDD, OCD, unspecified personality disorder, ?factitious disorder, 3 PPH most recent [facility] aug 2022, no known SA/non suicidal self injurious behavior/violence/legal hx, prior etoh use but no substance use currently, PMH, HTN, HLD, BPH, arthritis, COVID+ sep 2022, incidental thyroid nodules, admitted to medicine for dyspnea workup, voiced SI, subsequently med cleared and transferred to psych for mgmt of depression, ECT consulted for persistent sx despite pharmacological intervention.    HPI:  as per chart:  "...Patient initially complains of shortness of breath but is able to hold long conversations without any visible distress, reporting that he was unable to read instructions on his discharge papers, yet is able to read label printed on doctor's jacket. Patient is provocative and help-rejecting saying "Just discharge me, I will go to [place] and die". Patient did not follow up with  upon discharge, and had reportedly given an incorrect number to the . Patient reports he cannot stay with his brother, because his brother "hates" him and reports being unable to meet with his friends. Patient is uncooperative and unwilling to discuss treatment goals. Patient denies any active SI with plan, despite making statements of dying if he were to be discharged...  ...hx of unusual relatedness including pattern of making up psychiatric symptoms or hx such as claiming he arranged a hit or his brother killed himself. Recently there has been escalating pattern of hospitalizations, ER visits. Compliance after d/c is nil. Patient also develops excessive attachment or intense dislike of providers and other staff. Patient has no actual hx of suicide attempts or fh or SIB. Patient with likely facititious disorder vs mood disorder. Suspect possible unconscious primary gain related to medical complaints help seeking then rejection of help as inadequate. Patient not assessed as needing CO. Titrate medications and connect patient back to services. Appears to be provocative and help-rejecting. Prolonged hospitalization may not help achieve shared goals and might not be therapeutic for patient."    on my exam ID: [id number], living w/ brother, unemployed, psych h/o MDD, OCD, unspecified personality disorder, ?factitious disorder, 3 PPH most recent [facility] aug 2022, no known SA/non suicidal self injurious behavior/violence/legal hx, prior etoh use but no substance use currently, PMH, HTN, HLD, BPH, arthritis, COVID+ sep 2022, incidental thyroid nodules, admitted to medicine for dyspnea workup, voiced SI, subsequently med cleared and transferred to psych for mgmt of depression, ECT consulted for persistent sx despite pharmacological intervention.    HPI:  as per CL assessment:  "...Patient states that he has been dealing with multiple medical issues including urinary incontinence and leg pain, and recently shortness of breath, that he cannot get these taken care of (unclear reasons). Patient states that he has felt increasingly depressed, not able to enjoy himself at all, that he no longer has anyone to talk to because hes pushed them all away, reports sleeping at most 2 hours a night, hes been losing weight, feeling hopeless.   He states that he doesn't want to go on anymore, THOUGH HE DENIES SUICIDAL PLAN OR INTENT, SAYING HE WOULD NEVER KILL HIMSELF, AND   THAT 'IF I WANTED TO BE DEAD, I WOULD BE'  says he "cant' get around" and needs helps which he is not receiving.    He does not know what he takes, stating that he has medications for high blood pressure, cholesterol, prostate issues, denies taking psych meds for years stating that they have all stopped working but that prozac worked in the past. Denies that he currently has any outpatient psychiatrist or therapist though stated that he had someone who he spoke with at [employer] who helped him with tasks from time to time but that this program is ending soon. He doesn't remember their name or contact information and states that there is nobody that we can talk to. He states that he had one best friend but that this person doesn't have time for him anymore and he doesn't want to burden her.     He denies auditory, visual, or tactile hallucinations, denies paranoia though states that the psychiatrist at [facility] has lied to him in the past and locked him away. He states that he used to drink alcohol but hasn't in decades, denies any drug use, states that he is incredibly anti nicotine.    says he doesn't know his meds and hasn't been taking them consistently. endorses hopelessness and passive si w/o intent or plan, saying "I always express suicidal thinking."    spoke with pt's brother. says pt has "psychosomatic" symptoms from anxiety. that it is "his M.O." to go to hosptial and find someone that "doesn't know him" and   say he is suicidal, but that he is not acutally. he says pthas never attempted to harm self. he doesn't believe pt is a true suicide risk.    spoke with dr. lombardo from . 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 she says he was help rejecting throughout his stay on . she does not believe pt responded to inpatient treatment or would respond to   a rehospitalization . she set him up with a CM prior to dc, and the unit SW will be calling back with that information."    as per inpt assessment  "...Patient initially complains of shortness of breath but is able to hold long conversations without any visible distress, reporting that he was unable to read instructions on his discharge papers, yet is able to read label printed on doctor's jacket. Patient is provocative and help-rejecting saying "Just discharge me, I will go to [place] and die". Patient did not follow up with  upon discharge, and had reportedly given an incorrect number to the . Patient reports he cannot stay with his brother, because his brother "hates" him and reports being unable to meet with his friends. Patient is uncooperative and unwilling to discuss treatment goals. Patient denies any active SI with plan, despite making statements of dying if he were to be discharged...  ...hx of unusual relatedness including pattern of making up psychiatric symptoms or hx such as claiming he arranged a hit or his brother killed himself. Recently there has been escalating pattern of hospitalizations, ER visits. Compliance after d/c is nil. Patient also develops excessive attachment or intense dislike of providers and other staff. Patient has no actual hx of suicide attempts or fh or SIB. Patient with likely facititious disorder vs mood disorder. Suspect possible unconscious primary gain related to medical complaints help seeking then rejection of help as inadequate. Patient not assessed as needing CO. Titrate medications and connect patient back to services. Appears to be provocative and help-rejecting. Prolonged hospitalization may not help achieve shared goals and might not be therapeutic for patient."    on my exam ID: [id number], living w/ brother, unemployed, psych h/o MDD, OCD, unspecified personality disorder, ?factitious disorder, 3 PPH most recent [facility] aug 2022, no known SA/non suicidal self injurious behavior (per chart pt has h/o hitting self, pt's brother states pt does not engage in self harm)/violence/legal hx, prior etoh use but no substance use currently, PMH, HTN, HLD, BPH, arthritis, COVID+ sep 2022, incidental thyroid nodules, admitted to medicine for dyspnea workup, voiced SI, subsequently med cleared and transferred to psych for mgmt of depression, ECT consulted for persistent sx despite pharmacological intervention.    HPI:  as per CL assessment:  "...Patient states that he has been dealing with multiple medical issues including urinary incontinence and leg pain, and recently shortness of breath, that he cannot get these taken care of (unclear reasons). Patient states that he has felt increasingly depressed, not able to enjoy himself at all, that he no longer has anyone to talk to because hes pushed them all away, reports sleeping at most 2 hours a night, hes been losing weight, feeling hopeless.   He states that he doesn't want to go on anymore, THOUGH HE DENIES SUICIDAL PLAN OR INTENT, SAYING HE WOULD NEVER KILL HIMSELF, AND   THAT 'IF I WANTED TO BE DEAD, I WOULD BE'  says he "cant' get around" and needs helps which he is not receiving.    He does not know what he takes, stating that he has medications for high blood pressure, cholesterol, prostate issues, denies taking psych meds for years stating that they have all stopped working but that prozac worked in the past. Denies that he currently has any outpatient psychiatrist or therapist though stated that he had someone who he spoke with at [employer] who helped him with tasks from time to time but that this program is ending soon. He doesn't remember their name or contact information and states that there is nobody that we can talk to. He states that he had one best friend but that this person doesn't have time for him anymore and he doesn't want to burden her.     He denies auditory, visual, or tactile hallucinations, denies paranoia though states that the psychiatrist at [facility] has lied to him in the past and locked him away. He states that he used to drink alcohol but hasn't in decades, denies any drug use, states that he is incredibly anti nicotine.    says he doesn't know his meds and hasn't been taking them consistently. endorses hopelessness and passive si w/o intent or plan, saying "I always express suicidal thinking."    spoke with pt's brother. says pt has "psychosomatic" symptoms from anxiety. that [facility]" and   say he is suicidal, but that he is not acutally. he says pthas never attempted to harm self. he doesn't believe pt is a true suicide risk.    spoke with dr. lombardo from . 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 she says he was help rejecting throughout his stay on . she does not believe pt responded to inpatient treatment or would respond to   a rehospitalization . she set him up with a CM prior to dc, and the unit SW will be calling back with that information."    as per inpt assessment  "...Patient initially complains of shortness of breath but is able to hold long conversations without any visible distress, reporting that he was unable to read instructions on his discharge papers, yet is able to read label printed on doctor's jacket. Patient is provocative and help-rejecting saying "Just discharge me, I will go to [place] and die". Patient did not follow up with  upon discharge, and had reportedly given an incorrect number to the . Patient reports he cannot stay with his brother, because his brother "hates" him and reports being unable to meet with his friends. Patient is uncooperative and unwilling to discuss treatment goals. Patient denies any active SI with plan, despite making statements of dying if he were to be discharged...  ...hx of unusual relatedness including pattern of making up psychiatric symptoms or hx such as claiming he arranged a hit or his brother killed himself. Recently there has been escalating pattern of hospitalizations, ER visits. Compliance after d/c is nil. Patient also develops excessive attachment or intense dislike of providers and other staff. Patient has no actual hx of suicide attempts or fh or SIB. Patient with likely facititious disorder vs mood disorder. Suspect possible unconscious primary gain related to medical complaints help seeking then rejection of help as inadequate. Patient not assessed as needing CO. Titrate medications and connect patient back to services. Appears to be provocative and help-rejecting. Prolonged hospitalization may not help achieve shared goals and might not be therapeutic for patient."    on my exam ID: [id number], single, no children, living w/ brother, unemployed, psych h/o MDD, OCD, unspecified personality disorder, ?factitious disorder, 3 PPH most recent [facility] aug 2022, unclear non suicidal self injurious behavior (per pt he hits self and used to cut, chart indicates pt hits self, brother states pt does not self harm), no known SA/violence/legal hx/abuse, prior etoh use but no substance use currently, PMH, HTN, HLD, BPH, arthritis, COVID+ sep 2022, incidental thyroid nodules, admitted to medicine for dyspnea workup, voiced SI, subsequently med cleared and transferred to psych for mgmt of depression, ECT consulted for persistent sx despite pharmacological intervention.    HPI:  as per CL assessment:  "...Patient states that he has been dealing with multiple medical issues including urinary incontinence and leg pain, and recently shortness of breath, that he cannot get these taken care of (unclear reasons). Patient states that he has felt increasingly depressed, not able to enjoy himself at all, that he no longer has anyone to talk to because hes pushed them all away, reports sleeping at most 2 hours a night, hes been losing weight, feeling hopeless.   He states that he doesn't want to go on anymore, THOUGH HE DENIES SUICIDAL PLAN OR INTENT, SAYING HE WOULD NEVER KILL HIMSELF, AND   THAT 'IF I WANTED TO BE DEAD, I WOULD BE'  says he "cant' get around" and needs helps which he is not receiving.    He does not know what he takes, stating that he has medications for high blood pressure, cholesterol, prostate issues, denies taking psych meds for years stating that they have all stopped working but that prozac worked in the past. Denies that he currently has any outpatient psychiatrist or therapist though stated that he had someone who he spoke with at [employer] who helped him with tasks from time to time but that this program is ending soon. He doesn't remember their name or contact information and states that there is nobody that we can talk to. He states that he had one best friend but that this person doesn't have time for him anymore and he doesn't want to burden her.     He denies auditory, visual, or tactile hallucinations, denies paranoia though states that [facility] and [facility] him away. He states that he used to drink alcohol but hasn't in decades, denies any drug use, states that he is incredibly anti nicotine.    says he doesn't know his meds and hasn't been taking them consistently. endorses hopelessness and passive si w/o intent or plan, saying "I always express suicidal thinking."    spoke with pt's brother. says pt has "psychosomatic" symptoms from anxiety. that [facility]" and   say he is suicidal, but that he is not acutally. he says pthas never attempted to harm self. he doesn't believe pt is a true suicide risk.    spoke with dr. lombardo from . 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 she says he was help rejecting throughout his stay on . she does not believe pt responded to inpatient treatment or would respond to   a rehospitalization . she set him up with a CM prior to dc, and the unit SW will be calling back with that information."    as per inpt assessment:  "...Patient initially complains of shortness of breath but is able to hold long conversations without any visible distress, reporting that he was unable to read instructions on his discharge papers, yet is able to read label printed on doctor's jacket. Patient is provocative and help-rejecting saying "Just discharge me, I will go to [place] and die". Patient did not follow up with  upon discharge, and had reportedly given an incorrect number to the . Patient reports he cannot stay with his brother, because his brother "hates" him and reports being unable to meet with his friends. Patient is uncooperative and unwilling to discuss treatment goals. Patient denies any active SI with plan, despite making statements of dying if he were to be discharged...  ...hx of unusual relatedness including pattern of making up psychiatric symptoms or hx such as claiming he arranged a hit or his brother killed himself. Recently there has been escalating pattern of hospitalizations, ER visits. Compliance after d/c is nil. Patient also develops excessive attachment or intense dislike of providers and other staff. Patient has no actual hx of suicide attempts or fh or SIB. Patient with likely facititious disorder vs mood disorder. Suspect possible unconscious primary gain related to medical complaints help seeking then rejection of help as inadequate. Patient not assessed as needing CO. Titrate medications and connect patient back to services. Appears to be provocative and help-rejecting. Prolonged hospitalization may not help achieve shared goals and might not be therapeutic for patient."    on my exam pt states that he has been depressed "all of [his] life," w/ the only period of relative wellness being 7 mo in 1990 when he was seeing a woman romantically.  pt had difficulty describing progression of his symptoms but stated that since then he has been depressed "the entire time," with only 1-2 intermittent days of "not hating [his] life."  when asked why he did not seek help for his symptoms as an outpatient he replied "I didn't know I could."  unclear if current state is his baseline or a relative worsening of symptoms but pt endorsed daily low mood, anhedonia (no longer enjoys archeology, poetry, feeling "tired all the time"/low energy, middle insomnia, increased appetite w/out wt gain, feelings of worthlessness, amotivation, hopelessness, with daily SI occurring multiple times during the day, fleeting, w/ the thought "I wish I was dead." rare instances of SI w/ plan to "touch a live wire, jump in front of a train, or jump off a roof" but these have not occurred for some time, are vague, w/out intent, and not accompanied by rehearsal behaviors, preparatory actions, or acts of furtherance.  no manic sx, psychotic sx. ID: [id number], single, no children, living w/ brother, unemployed, psych h/o MDD, OCD, unspecified personality disorder, ?factitious disorder, 3 PPH most recent [facility] aug 2022, unclear non suicidal self injurious behavior (per pt he hits self and used to cut, chart indicates pt hits self, brother states pt does not self harm), no known SA/violence/legal hx/abuse, prior etoh use but no substance use currently, PMH, HTN, HLD, BPH, arthritis, COVID+ sep 2022, incidental thyroid nodules, admitted to medicine for dyspnea workup, voiced SI, subsequently med cleared and transferred to psych for mgmt of depression, ECT consulted for persistent sx despite pharmacological intervention.    HPI:  as per CL assessment:  "...Patient states that he has been dealing with multiple medical issues including urinary incontinence and leg pain, and recently shortness of breath, that he cannot get these taken care of (unclear reasons). Patient states that he has felt increasingly depressed, not able to enjoy himself at all, that he no longer has anyone to talk to because hes pushed them all away, reports sleeping at most 2 hours a night, hes been losing weight, feeling hopeless.   He states that he doesn't want to go on anymore, THOUGH HE DENIES SUICIDAL PLAN OR INTENT, SAYING HE WOULD NEVER KILL HIMSELF, AND   THAT 'IF I WANTED TO BE DEAD, I WOULD BE'  says he "cant' get around" and needs helps which he is not receiving.    He does not know what he takes, stating that he has medications for high blood pressure, cholesterol, prostate issues, denies taking psych meds for years stating that they have all stopped working but that prozac worked in the past. Denies that he currently has any outpatient psychiatrist or therapist though stated that he had someone who he spoke with at [employer] who helped him with tasks from time to time but that this program is ending soon. He doesn't remember their name or contact information and states that there is nobody that we can talk to. He states that he had one best friend but that this person doesn't have time for him anymore and he doesn't want to burden her.     He denies auditory, visual, or tactile hallucinations, denies paranoia though states that the psychiatrist at [facility] has lied to him in the past and locked him away. He states that he used to drink alcohol but hasn't in decades, denies any drug use, states that he is incredibly anti nicotine.    says he doesn't know his meds and hasn't been taking them consistently. endorses hopelessness and passive si w/o intent or plan, saying "I always express suicidal thinking."    spoke with pt's brother. says pt has "psychosomatic" symptoms from anxiety. that [facility]" and   say he is suicidal, but that he is not acutally. he says pthas never attempted to harm self. he doesn't believe pt is a true suicide risk.    spoke with dr. lombardo from . 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 she says he was help rejecting throughout his stay on . she does not believe pt responded to inpatient treatment or would respond to   a rehospitalization . she set him up with a CM prior to dc, and the unit SW will be calling back with that information."    as per inpt assessment:  "...Patient initially complains of shortness of breath but is able to hold long conversations without any visible distress, reporting that he was unable to read instructions on his discharge papers, yet is able to read label printed on doctor's jacket. Patient is provocative and help-rejecting saying "Just discharge me, I will go to [place] and die". Patient did not follow up with  upon discharge, and had reportedly given an incorrect number to the . Patient reports he cannot stay with his brother, because his brother "hates" him and reports being unable to meet with his friends. Patient is uncooperative and unwilling to discuss treatment goals. Patient denies any active SI with plan, despite making statements of dying if he were to be discharged...  ...hx of unusual relatedness including pattern of making up psychiatric symptoms or hx such as claiming he arranged a hit or his brother killed himself. Recently there has been escalating pattern of hospitalizations, ER visits. Compliance after d/c is nil. Patient also develops excessive attachment or intense dislike of providers and other staff. Patient has no actual hx of suicide attempts or fh or SIB. Patient with likely facititious disorder vs mood disorder. Suspect possible unconscious primary gain related to medical complaints help seeking then rejection of help as inadequate. Patient not assessed as needing CO. Titrate medications and connect patient back to services. Appears to be provocative and help-rejecting. Prolonged hospitalization may not help achieve shared goals and might not be therapeutic for patient."    on my exam pt states that he has been depressed "all of [his] life," w/ the only period of relative wellness being 7 mo in 1990 when he was seeing a woman romantically.  pt had difficulty describing progression of his symptoms but stated that since then he has been depressed "the entire time," with only 1-2 intermittent days of "not hating [his] life."  when asked why he did not seek help for his symptoms as an outpatient he replied "I didn't know I could."  unclear if current state is his baseline or a relative worsening of symptoms but pt endorsed daily low mood, anhedonia (no longer enjoys archeology, poetry, feeling "tired all the time"/low energy, middle insomnia, increased appetite w/out wt gain, feelings of worthlessness, amotivation, hopelessness, with daily SI occurring multiple times during the day, fleeting, w/ the thought "I wish I was dead." rare instances of SI w/ plan to "touch a live wire, jump in front of a train, or jump off a roof" but these have not occurred for some time, are vague, w/out intent, and not accompanied by rehearsal behaviors, preparatory actions, or acts of furtherance.  denied concentration changes. no manic sx, psychotic sx elicited ID: [id number], single, no children, living w/ brother, unemployed, psych h/o MDD, OCD, unspecified personality disorder, ?factitious disorder, 3 PPH most recent [facility] aug 2022, unclear non suicidal self injurious behavior (per pt he hits self and used to cut, chart indicates pt hits self, brother states pt does not self harm), no known SA/violence/legal hx/abuse, prior etoh use but no substance use currently, PMH, HTN, HLD, BPH, arthritis, COVID+ sep 2022, incidental thyroid nodules, admitted to medicine for dyspnea workup, voiced SI, subsequently med cleared and transferred to psych for mgmt of depression, ECT consulted for persistent sx despite pharmacological intervention    HPI:  as per CL assessment:  "...Patient states that he has been dealing with multiple medical issues including urinary incontinence and leg pain, and recently shortness of breath, that he cannot get these taken care of (unclear reasons). Patient states that he has felt increasingly depressed, not able to enjoy himself at all, that he no longer has anyone to talk to because hes pushed them all away, reports sleeping at most 2 hours a night, hes been losing weight, feeling hopeless.   He states that he doesn't want to go on anymore, THOUGH HE DENIES SUICIDAL PLAN OR INTENT, SAYING HE WOULD NEVER KILL HIMSELF, AND   THAT 'IF I WANTED TO BE DEAD, I WOULD BE'  says he "cant' get around" and needs helps which he is not receiving.    He does not know what he takes, stating that he has medications for high blood pressure, cholesterol, prostate issues, denies taking psych meds for years stating that they have all stopped working but that prozac worked in the past. Denies that he currently has any outpatient psychiatrist or therapist though stated that he had someone who he spoke with at [employer] who helped him with tasks from time to time but that this program is ending soon. He doesn't remember their name or contact information and states that there is nobody that we can talk to. He states that he had one best friend but that this person doesn't have time for him anymore and he doesn't want to burden her.     He denies auditory, visual, or tactile hallucinations, denies paranoia though states that the psychiatrist at [facility] has lied to him in the past and locked him away. He states that he used to drink alcohol but hasn't in decades, denies any drug use, states that he is incredibly anti nicotine.    says he doesn't know his meds and hasn't been taking them consistently. endorses hopelessness and passive si w/o intent or plan, saying "I always express suicidal thinking."    spoke with pt's brother. says pt has "psychosomatic" symptoms from anxiety. that [facility]" and   say he is suicidal, but that he is not acutally. he says pthas never attempted to harm self. he doesn't believe pt is a true suicide risk.    spoke with dr. lombardo from . she says pt is a long time pt at [facility] with factitious d/o and has been "essetnially banned" from admission at   [place]. she says he was help rejecting throughout his stay on . she does not believe pt responded to inpatient treatment or would respond to   a rehospitalization . she set him up with a CM prior to dc, and the unit SW will be calling back with that information."    as per inpt assessment:  "...Patient initially complains of shortness of breath but is able to hold long conversations without any visible distress, reporting that he was unable to read instructions on his discharge papers, yet is able to read label printed on doctor's jacket. Patient is provocative and help-rejecting saying "Just discharge me, I will go to [place] and die". Patient did not follow up with  upon discharge, and had reportedly given an incorrect number to the . Patient reports he cannot stay with his brother, because his brother "hates" him and reports being unable to meet with his friends. Patient is uncooperative and unwilling to discuss treatment goals. Patient denies any active SI with plan, despite making statements of dying if he were to be discharged...  ...hx of unusual relatedness including pattern of making up psychiatric symptoms or hx such as claiming he arranged a hit or his brother killed himself. Recently there has been escalating pattern of hospitalizations, ER visits. Compliance after d/c is nil. Patient also develops excessive attachment or intense dislike of providers and other staff. Patient has no actual hx of suicide attempts or fh or SIB. Patient with likely facititious disorder vs mood disorder. Suspect possible unconscious primary gain related to medical complaints help seeking then rejection of help as inadequate. Patient not assessed as needing CO. Titrate medications and connect patient back to services. Appears to be provocative and help-rejecting. Prolonged hospitalization may not help achieve shared goals and might not be therapeutic for patient."    on my exam pt states that he has been depressed "all of [his] life," w/ the only period of relative wellness being 7 mo in 1990 when he was seeing a woman romantically.  pt had difficulty describing progression of his symptoms but stated that since then he has been depressed "the entire time," with only 1-2 intermittent days of "not hating [his] life."  when asked why he did not seek help for his symptoms as an outpatient he replied "I didn't know I could."  unclear if current state is his baseline or a relative worsening of symptoms but pt endorsed daily low mood, anhedonia (no longer enjoys archeology, poetry, feeling "tired all the time"/low energy, middle insomnia, increased appetite w/out wt gain, feelings of worthlessness, amotivation, hopelessness, with daily SI occurring multiple times during the day, fleeting, w/ the thought "I wish I was dead." rare instances of SI w/ plan to "touch a live wire, jump in front of a train, or jump off a roof" but these have not occurred for some time, are vague, w/out intent, and not accompanied by rehearsal behaviors, preparatory actions, or acts of furtherance.  denied concentration changes. no manic sx, psychotic sx elicited

## 2022-11-01 NOTE — BH INPATIENT PSYCHIATRY PROGRESS NOTE - CURRENT MEDICATION
MEDICATIONS  (STANDING):  ARIPiprazole 5 milliGRAM(s) Oral daily  finasteride 5 milliGRAM(s) Oral daily  fluticasone propionate 50 MICROgram(s)/spray Nasal Spray 1 Spray(s) Both Nostrils two times a day  melatonin. 5 milliGRAM(s) Oral at bedtime  NIFEdipine XL 30 milliGRAM(s) Oral daily  oxybutynin 5 milliGRAM(s) Oral daily  potassium chloride    Tablet ER 40 milliEquivalent(s) Oral once  simvastatin 20 milliGRAM(s) Oral at bedtime  traZODone 50 milliGRAM(s) Oral at bedtime  venlafaxine  milliGRAM(s) Oral daily    MEDICATIONS  (PRN):  acetaminophen     Tablet .. 650 milliGRAM(s) Oral every 6 hours PRN Mild Pain (1 - 3), Moderate Pain (4 - 6)  diphenhydrAMINE 50 milliGRAM(s) Oral every 6 hours PRN agitation  diphenhydrAMINE Injectable 50 milliGRAM(s) IntraMuscular every 4 hours PRN severe agitation  haloperidol     Tablet 5 milliGRAM(s) Oral every 6 hours PRN agitation  haloperidol    Injectable 5 milliGRAM(s) IntraMuscular once PRN severe agitation  lidocaine   4% Patch 1 Patch Transdermal every 24 hours PRN pain

## 2022-11-02 LAB
ANION GAP SERPL CALC-SCNC: 12 MMOL/L — SIGNIFICANT CHANGE UP (ref 7–14)
BUN SERPL-MCNC: 30 MG/DL — HIGH (ref 7–23)
CALCIUM SERPL-MCNC: 9.1 MG/DL — SIGNIFICANT CHANGE UP (ref 8.4–10.5)
CHLORIDE SERPL-SCNC: 100 MMOL/L — SIGNIFICANT CHANGE UP (ref 98–107)
CO2 SERPL-SCNC: 30 MMOL/L — SIGNIFICANT CHANGE UP (ref 22–31)
CREAT SERPL-MCNC: 1.05 MG/DL — SIGNIFICANT CHANGE UP (ref 0.5–1.3)
EGFR: 75 ML/MIN/1.73M2 — SIGNIFICANT CHANGE UP
GLUCOSE SERPL-MCNC: 77 MG/DL — SIGNIFICANT CHANGE UP (ref 70–99)
POTASSIUM SERPL-MCNC: 3.3 MMOL/L — LOW (ref 3.5–5.3)
POTASSIUM SERPL-SCNC: 3.3 MMOL/L — LOW (ref 3.5–5.3)
SODIUM SERPL-SCNC: 142 MMOL/L — SIGNIFICANT CHANGE UP (ref 135–145)

## 2022-11-02 PROCEDURE — 99231 SBSQ HOSP IP/OBS SF/LOW 25: CPT

## 2022-11-02 RX ORDER — POTASSIUM CHLORIDE 20 MEQ
40 PACKET (EA) ORAL ONCE
Refills: 0 | Status: COMPLETED | OUTPATIENT
Start: 2022-11-02 | End: 2022-11-02

## 2022-11-02 RX ADMIN — Medication 1 SPRAY(S): at 21:22

## 2022-11-02 RX ADMIN — FINASTERIDE 5 MILLIGRAM(S): 5 TABLET, FILM COATED ORAL at 09:55

## 2022-11-02 RX ADMIN — ARIPIPRAZOLE 5 MILLIGRAM(S): 15 TABLET ORAL at 09:55

## 2022-11-02 RX ADMIN — Medication 50 MILLIGRAM(S): at 20:27

## 2022-11-02 RX ADMIN — Medication 5 MILLIGRAM(S): at 09:56

## 2022-11-02 RX ADMIN — Medication 1 SPRAY(S): at 09:56

## 2022-11-02 RX ADMIN — Medication 40 MILLIEQUIVALENT(S): at 14:18

## 2022-11-02 RX ADMIN — Medication 150 MILLIGRAM(S): at 09:55

## 2022-11-02 RX ADMIN — Medication 5 MILLIGRAM(S): at 20:27

## 2022-11-02 RX ADMIN — SIMVASTATIN 20 MILLIGRAM(S): 20 TABLET, FILM COATED ORAL at 20:28

## 2022-11-02 RX ADMIN — Medication 30 MILLIGRAM(S): at 09:55

## 2022-11-02 NOTE — BH INPATIENT PSYCHIATRY PROGRESS NOTE - NSBHCHARTREVIEWVS_PSY_A_CORE FT
Vital Signs Last 24 Hrs  T(C): 36.6 (11-02-22 @ 08:06), Max: 36.6 (11-02-22 @ 08:06)  T(F): 97.8 (11-02-22 @ 08:06), Max: 97.8 (11-02-22 @ 08:06)  HR: --  BP: --  BP(mean): --  RR: --  SpO2: --    Orthostatic VS  11-02-22 @ 08:06  Lying BP: --/-- HR: --  Sitting BP: 154/91 HR: 94  Standing BP: 138/88 HR: 104  Site: upper left arm  Mode: electronic

## 2022-11-02 NOTE — UM REPORT PROGRESS NOTE - NSUMSWACTION_GEN_A_CORE FT
[place] meets with pt, coordinating his contact with his care coordinator at [facility], planning for outpatient psychiatric and medical follow up, MOWs, and access to [facility] application process.  [place] updates with pt's brother when available.  Pt is on waitlist for ACT team from application submitted over the summer 2022

## 2022-11-02 NOTE — UM REPORT PROGRESS NOTE - NSUMSWNOTE_GEN_A_CORE FT
Pt has multiple admissions during the past twelve months, related to reported SI with plan, somatic complaints where he states he cannot walk, or complete basic tasks for himself.  Pt is compliant while in the hospital, with limited improvement, and doesn't follow up with outpatient providers and prescribed medication at discharge, although going to local ERs.  Pt lives with his medically ill brother, and an unrelated roomate who are unable to help him consistently.  Pt has agreed to start a course of ECT.  He is being medically cleared and will likely start on 11/04. Pt has multiple admissions during the past twelve months, related to reported SI with plan, somatic complaints where he states he cannot walk, or complete basic tasks for himself.  Pt is compliant while in the hospital, with limited improvement, and doesn't follow up with outpatient providers and prescribed medication at discharge, although going to local ERs.  Pt lives with his medically ill brother, and an unrelated roomate who are unable to help him consistently.  Pt with two ECT treatments to date, with willingness to continue.  Pt reports no benefit as of yet.  Pt will be discharged home when stable

## 2022-11-02 NOTE — BH INPATIENT PSYCHIATRY PROGRESS NOTE - NSBHFUPINTERVALHXFT_PSY_A_CORE
Patient seen for follow-up of depression, chart reviewed and discussed with nursing staff.  No events reported overnight.  Vitals stable. Pt continues to present as dysphoric, withdrawn. Pt denies any changes since our last meeting. Continues to endorse hopelessness about the future and passive SI. Dental clearance pending for ECT.

## 2022-11-02 NOTE — BH INPATIENT PSYCHIATRY PROGRESS NOTE - NSBHASSESSSUMMFT_PSY_ALL_CORE
71 y.o. male with hx of unusual relatedness including pattern of making up psychiatric symptoms or hx such as claiming he arranged a hit or his brother killed himself. Recently there has been escalating pattern of hospitalizations, ER visits. Compliance after d/c is nil. Patient also develops excessive attachment or intense dislike of providers and other staff. Patient has no actual hx of suicide attempts or fh or SIB. Patient with likely facititious disorder vs mood disorder. Suspect possible unconscious primary gain related to medical complaints help seeking then rejection of help as inadequate. Patient not assessed as needing CO. Titrate medications and connect patient back to services. Appears to be provocative and help-rejecting. Prolonged hospitalization may not help achieve shared goals and might not be therapeutic for patient.    10/6: Has multiple somatic complaints, does not engage in treatment planning, however appears to show initiative toward getting a notebook which was a coping mechanism for patient during last hospitalization.   10/7: Continues to have multiple complaints, somewhat more receptive to treatment planning, did not report SI today. Continue with current medications.   10/8: No mood symptoms, no psychotic symptoms, no suicidal ideations.  10/9/22; anxious, dysphoric, superficial, no SI/HI.   10/10/22: Remain anxious, catastrophizes his friendship, having multiple somatic complaints. Consider switching to Effexor XR.  10/11: The patient continues to complain of depression, anxiety, losing friendships, although it seems he could continue them if he desired.  He focuses on negatives. Education attempted. The patient is tolerating medications well, will continue.  to meet with patient.  10/12: Patient continues to report somatic complaints but rejecting interventions. Patient is awaiting meeting with . Plan to cross taper to Effexor XR in light of reported complaints and limited efficacy on Prozac (albeit insufficient trial).  10/13: Patient is catastrophizing his somatic complaints and displaying pessimism with regards to meeting . Reassurance was provided. Continues with help-rejecting behavior and was redirected to previous patterns of pessimism during previous hospitalization. Patient acknowledged it however is not taking steps to behavioral change at this time.   10/14: Patient continues to display negativistic thinking and making provocative statements. Pt has been adherent to medications. Continue to cross taper from Prozac to Effexor XR.  10/17: Pt continues to present negativistic thinking. Visible in the unit. Appears more future oriented today. Will continue tapering Prozac (down to 20mg today) and titrating Effexor XR (up to 75mg today).  10/18: Presents with negativistic thinking but appears more goal oriented seeking services and looking forward to meeting with . Pt has been medication adherent. Continue to cross taper from Prozac to Effexor XR.  10/19: Makes provocative statements and presents with usual negativistic thinking however remains hopeful for meeting with . Has been noted to be visible on the unit and adherent to medications. D/C Prozac  10/20: Continues to make provocative statements which is his usual way of relating with others. Tolerating meds, no new concerns reported. Awaiting meeting with . Titrate Effexor to 112.5mg  10/21: Tolerating medications. No new concerns. Patient appears anxious about meeting with his , however also awaiting to discuss his goals with him.   10/24: Patient tolerating medications. Will increase Effexor to 150mg PO daily.   10/25: Tolerating medications. Patient appears to be catastrophizing however has intense eye contact and makes provocative statements, yet does not endorse any intent or plan to hurt self. Patient has pattern of describing enhanced expressions of symptoms closer to discharge.  10/26: Patient continues to be help-rejecting and catastrophizing when discussing discharge. No plans endorsed to hurt self. Patient has pattern of describing enhanced expressions of symptoms closer to discharge.  10/27: Pt appears to be in behavioral control, denies SI/HI. Patient was less negativistic this AM and appeared future oriented toward meeting .   10/28: Pt appears less provocative, more optimistic about future. Continue current regimen.  10/31: Pt continues to present as dysphoric, withdrawn. Has not been interested in his poetry and drawings as he has been in the past. During encounter today pt continues to express low mood and hopelessness about the future. Despite patient's characterologic issues and suspected element of factitious disorder, there might be an element of mood disorder present that may ameliorate with ECT treatment. Pt expresses willingness to "try anything that could help". Will place ECT consult.   11/1: Pt continues to present as dysphoric, withdrawn. Continues to endorse hopelessness about the future and passive SI. Dental clearance pending for ECT. Appointment scheduled for Thursday.   12/2: Pt continues to express hopelessness and negativistic view of the world. Mistrustful of people's ability to help him. Dental appointment for ECT clearance scheduled for tomorrow.   Plan:  1. Safety: Does not require CO 1:1 at this time.  2. Legals: 9.27  3. Psychiatry: Continue current meds  .Effexor XR 150mg PO daily. Titrate as tolerated.  .Abilify 5mg PO daily  .Trazodone 50mg PO HS  .Melatonin 5mg PO HS  4. Medical: Continue current medications  .Finasteride 5mg PO HS  .Nifedipine 30mg PO Daily  .Oxybutynin 5mg PO daily  .Simvastatin 20mg PO HS  5. Disposition: Home when stable.

## 2022-11-03 LAB
ANION GAP SERPL CALC-SCNC: 9 MMOL/L — SIGNIFICANT CHANGE UP (ref 7–14)
BUN SERPL-MCNC: 31 MG/DL — HIGH (ref 7–23)
CALCIUM SERPL-MCNC: 9.5 MG/DL — SIGNIFICANT CHANGE UP (ref 8.4–10.5)
CHLORIDE SERPL-SCNC: 101 MMOL/L — SIGNIFICANT CHANGE UP (ref 98–107)
CO2 SERPL-SCNC: 34 MMOL/L — HIGH (ref 22–31)
CREAT SERPL-MCNC: 1.12 MG/DL — SIGNIFICANT CHANGE UP (ref 0.5–1.3)
EGFR: 70 ML/MIN/1.73M2 — SIGNIFICANT CHANGE UP
GLUCOSE SERPL-MCNC: 93 MG/DL — SIGNIFICANT CHANGE UP (ref 70–99)
POTASSIUM SERPL-MCNC: 3.6 MMOL/L — SIGNIFICANT CHANGE UP (ref 3.5–5.3)
POTASSIUM SERPL-SCNC: 3.6 MMOL/L — SIGNIFICANT CHANGE UP (ref 3.5–5.3)
SARS-COV-2 RNA SPEC QL NAA+PROBE: SIGNIFICANT CHANGE UP
SODIUM SERPL-SCNC: 144 MMOL/L — SIGNIFICANT CHANGE UP (ref 135–145)

## 2022-11-03 PROCEDURE — 90836 PSYTX W PT W E/M 45 MIN: CPT | Mod: 59

## 2022-11-03 PROCEDURE — 90853 GROUP PSYCHOTHERAPY: CPT

## 2022-11-03 PROCEDURE — 99231 SBSQ HOSP IP/OBS SF/LOW 25: CPT

## 2022-11-03 RX ADMIN — Medication 1 SPRAY(S): at 21:30

## 2022-11-03 RX ADMIN — Medication 5 MILLIGRAM(S): at 21:29

## 2022-11-03 RX ADMIN — Medication 1 SPRAY(S): at 09:04

## 2022-11-03 RX ADMIN — Medication 30 MILLIGRAM(S): at 09:04

## 2022-11-03 RX ADMIN — Medication 5 MILLIGRAM(S): at 09:03

## 2022-11-03 RX ADMIN — FINASTERIDE 5 MILLIGRAM(S): 5 TABLET, FILM COATED ORAL at 09:04

## 2022-11-03 RX ADMIN — Medication 150 MILLIGRAM(S): at 09:03

## 2022-11-03 RX ADMIN — Medication 50 MILLIGRAM(S): at 21:28

## 2022-11-03 RX ADMIN — ARIPIPRAZOLE 5 MILLIGRAM(S): 15 TABLET ORAL at 09:03

## 2022-11-03 RX ADMIN — SIMVASTATIN 20 MILLIGRAM(S): 20 TABLET, FILM COATED ORAL at 21:29

## 2022-11-03 NOTE — BH PSYCHOLOGY - GROUP THERAPY NOTE - NSBHPSYCHOLGRPTYPE_PSY_A_CORE
General Group Therapy
Symptom Management
Cognitive Behavioral Coping Skills
Cognitive Behavioral Coping Skills

## 2022-11-03 NOTE — BH PSYCHOLOGY - GROUP THERAPY NOTE - NSBHPSYCHOLPARTICIPCOMMENT_PSY_A_CORE FT
Patient's thought was restricted to depression, physical illness, suicide, and hopelessness. Patient was argumentative with co-facilitators.
Patient participated meaningfully in group discussion.
Patient appeared to be listening to group discussion, however when called upon to participate he demonstrated reluctance.
The patient was fairly quiet, but when he spoke he was somewhat insightful. 
Patient participated when called on, thought process appeared coherent.
patient appeared partially engaged, responding appropriately and reviewing handouts. toward middle of session he appeared somnolent, falling asleep by end of session

## 2022-11-03 NOTE — BH PSYCHOLOGY - GROUP THERAPY NOTE - NSBHPSYCHOLRESPONSE_PSY_A_CORE
Accepted support
other...
Accepted support
Insight displayed/Accepted support
Coping skills acquired/Accepted support
Accepted support

## 2022-11-03 NOTE — BH PSYCHOLOGY - GROUP THERAPY NOTE - NSPSYCHOLGRPCOGINT_PSY_A_CORE
explain the connection between health habits and stress vulnerability/group members provided support/explore reducing vulnerability to stress/behavioral distress tolerance skills taught
acceptance skills taught/cognitive restructuring/reinforced need to self-reward

## 2022-11-03 NOTE — BH INPATIENT PSYCHIATRY PROGRESS NOTE - NSBHCHARTREVIEWVS_PSY_A_CORE FT
Vital Signs Last 24 Hrs  T(C): 36.8 (11-03-22 @ 08:05), Max: 36.8 (11-03-22 @ 08:05)  T(F): 98.2 (11-03-22 @ 08:05), Max: 98.2 (11-03-22 @ 08:05)  HR: --  BP: --  BP(mean): --  RR: --  SpO2: --    Orthostatic VS  11-03-22 @ 08:05  Lying BP: --/-- HR: --  Sitting BP: 114/76 HR: 80  Standing BP: 102/76 HR: 88  Site: --  Mode: --  Orthostatic VS  11-02-22 @ 08:06  Lying BP: --/-- HR: --  Sitting BP: 154/91 HR: 94  Standing BP: 138/88 HR: 104  Site: upper left arm  Mode: electronic

## 2022-11-03 NOTE — BH PSYCHOLOGY - GROUP THERAPY NOTE - NSBHPSYCHOLPARTICIP_PSY_A_CORE
Resistant to interventions
Fully engaged
Partially engaged
Resistant to interventions
Fully engaged
Partially engaged

## 2022-11-03 NOTE — BH PSYCHOLOGY - CLINICIAN PSYCHOTHERAPY NOTE - NSBHPSYCHOLADDL_PSY_A_CORE
The current session focused on the patient's "black or white" thinking with regards to his relationships with others and his view of himself. Patient and writer worked towards identifying how the patient could live a life that could be enjoyable without isolating himself from others totally nor accepting poor treatment from others without protest. Patient identified a first step towards this might be refraining from purposefully starting fights with other people in order to push them away. Patient agreed to try this and keep track of the outcome for himself emotionally.

## 2022-11-03 NOTE — BH PSYCHOLOGY - GROUP THERAPY NOTE - NSBHPSYCHOLASSESSPROV_PSY_A_CORE
Licensed Psychologist and Psychology Trainee
Licensed Psychologist
Licensed Psychologist and Psychology Trainee
Licensed Psychologist and Psychology Trainee

## 2022-11-03 NOTE — BH PSYCHOLOGY - CLINICIAN PSYCHOTHERAPY NOTE - NSBHPSYCHOLNARRATIVE_PSY_A_CORE FT
The patient presented to session in hospital gown and pants, worn in a disheveled fashion. Patient's hygene was poor, writer noticed some fecal matter on the back of his gown. The patient was calm, cooperative with the interview and maintained appropriate eye contact.  No psychomotor agitation or retardation noted.  The patient's speech was fluent, normal in tone, rate, and volume. Affect is constricted, stable. The patient's thoughts are goal directed. Patient reported passive suicidal ideation without intent or plan.  Insight is poor.  Judgment is poor.  Impulse control reported by patient as poor.

## 2022-11-03 NOTE — BH PSYCHOLOGY - GROUP THERAPY NOTE - NSBHPSYCHOLADDL_PSY_A_CORE
n group therapy, patients were encouraged to discuss concerns and observations about recent events on the unit or in their lives that are cause for distress and to exchange views. Emphasis was on coping, offering support and generating alternatives for problems and challenges. Group members discussed recent reactions to stresses including medical treatments, anxiety about discharge, and being in a psychiatric hospital and interacting with other patients. Group members were able to listen to each other and receive encouragement, validation and acceptance.
The group psychotherapy session focused on the concept and utility of healthy boundaries, coping, and healthy relationships. Patients discussed past interpersonal experiences using the framework of healthy, rigid, or porous boundaries. Stressors were defined and examples were discussed. Patients were encouraged to share recent examples of stress triggers and to discuss their own responses. Patients were presented with common stressors and asked to choose from an array of coping behaviors which suited them best. Patients were encouraged to examine their boundaries and coping skills and how they may aid them in achieving their goals.
In group session patients discussed depression, self-esteem, and self-compassion.  Patients discussed ways in which harsh self and other-criticism can contribute to negative outcomes including depression and anxiety. Patients identified ways they have engaged in self-compassionate behavior vs criticism. Patients also responded to questions that were aimed at fostering a sense of self-love and compassion. Patients were encouraged to continue taking part in activities to continue building self-esteem and self-compassion while on unit.
The group psychotherapy session examined values and living a values-based life. Facilitator discussed values-based living as a means of improving one’s mood. Participants reflected on pleasant activities and behaviors as well as what made them so enjoyable. Psychologist discussed the underlying values that were related to the pleasant memories. Participants were presented a values worksheet; identified values that were most important to them and behaviors that were tied with those values. Patients discussed ways in which practicing values also encourages personal growth and serves as a form of self-compassion.
The group psychotherapy session examined self-concept and identity. Patients discussed ways in which their identity has been shaped over time. Patients discussed ways in which the experience of symptoms has impacted their self-concept. Facilitators discussed ways of improving one’s sense of self via self-compassion and goal setting. Patients discussed ways in which practicing self-compassion encourages personal growth and can also improves quality of life.
     In group therapy, patients discussed challenges with transitions. Psychologist provided psychoeducation regarding transitions, social support, and coping. Patients discussed their experiences with transitions, expressing vulnerability. Emphasis was on coping skills, offering support, self-compassion and generating alternatives for problems and challenges. Group members discussed recent reactions to transitions, including discharge. Group members were able to listen to each other and receive encouragement, validation, and acceptance. Psychologist encouraged patients to continue practicing coping skills and self-compassion during their time on the unit.

## 2022-11-03 NOTE — BH PSYCHOLOGY - GROUP THERAPY NOTE - NSBHPTASSESSDT_PSY_A_CORE
27-Oct-2022 13:15
20-Oct-2022 13:15
26-Oct-2022 13:15
06-Oct-2022 01:30
03-Nov-2022 13:15
13-Oct-2022 13:15

## 2022-11-03 NOTE — BH PSYCHOLOGY - CLINICIAN PSYCHOTHERAPY NOTE - NSBHPSYCHOLADHERE_PSY_A_CORE
SPOKE WITH PATIENT'S MOTHER INFORMED HER OF HER SON'S 5AM SURGERY ARRIVAL TIME. PATIENT'S MOTHER WAS ALSO INFORMED TO FOLLOW FASTING INSTRUCTIONS GIVEN IN CLINIC AT THE TIME OF PATIENT VISIT.   Fair

## 2022-11-03 NOTE — BH PSYCHOLOGY - GROUP THERAPY NOTE - NSBHPSYCHOLGRPNAME_PSY_A_CORE
Coping Skills
Depression + Self-Esteem
CBT (Cognitive Behavioral Therapy) Group
other...
Coping with Transitions
Daily Living Skills

## 2022-11-03 NOTE — BH PSYCHOLOGY - GROUP THERAPY NOTE - NSPSYCHOLGRPBILLING_PSY_A_CORE
[id number] - Group Psychotherapy
[id number] - Group Psychotherapy
[id number] - Group Psychotherapy
[id number] - Group Psychotherapy
[id number] - Group Psychotherapy
[id number] - Group Psychotherapy

## 2022-11-03 NOTE — BH PSYCHOLOGY - GROUP THERAPY NOTE - PROVIDER ATTESTATION
I was present with the psychology trainee during the critical/key portions of the visit. I agree with the findings and plan as documented in the psychology trainee note unless noted below.
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
## 2022-11-03 NOTE — BH PSYCHOLOGY - GROUP THERAPY NOTE - NSPSYCHOLGRPCOGGOAL_PSY_A_CORE
reduce reaction to psychosocial stressors/learn cognitive skills to improve coping with stress
learn cognitive skills to improve coping with stress

## 2022-11-03 NOTE — PROGRESS NOTE ADULT - SUBJECTIVE AND OBJECTIVE BOX
Pt presents from [place] for ECT clearance.    Med Hx: HTN, high cholesterol, arthritis, OCD  Meds: haloperidol, lorazepam, mirtazipine, tamsulosin, simvastatin, nifedipine    EOE: WNL  (-) clicking  (-) popping  (-) trismus  (-) asymmetry  (-) swelling    IOE: Permanent dentition, multiple fractured and carious teeth, multiple mobile teeth  	(-) lesions FOM, lateral borders of the tongue, hard/soft palate, uvula, buccal mucosa or gingiva    TX: EOE, IOE, mouth guard fabrication indicated.  Verbal consent obtained. Maxillary alginate taken.    Recommendations:     1. Use standard bite block therapy    2. Mouth guard will be fabricated for use during ECT.  This will minimize risk of damage to teeth or restorations but does NOT eliminate these risks.    3. Please  guard on 11/7 after 9am.    4. Patient was advised to seek comprehensive dental care upon discharge from [place]    5. Contact [facility] dental for any acute dental needs [phone]      Jazz Garcia DDS, #[id number]

## 2022-11-03 NOTE — BH PSYCHOLOGY - CLINICIAN PSYCHOTHERAPY NOTE - NSBHPSYCHOLINT_PSY_A_CORE
Dynamic issues addressed/Problem-solving techniques discussed/Reality testing/Treatment compliance encouraged

## 2022-11-03 NOTE — BH PSYCHOLOGY - GROUP THERAPY NOTE - TOKEN PULL-DIAGNOSIS
Primary Diagnosis:  Depressive disorder [F32.A]        Problem Dx:   Malingering [Z76.5]      
Primary Diagnosis:  Depressive disorder [F32.A]        Problem Dx:   Malingering [Z76.5]      
Primary Diagnosis:  Depressive disorder [F32.A]        Problem Dx:   Thyroid nodule [E04.1]      HLD (hyperlipidemia) [E78.5]      HTN (hypertension) [I10]      BPH (benign prostatic hyperplasia) [N40.0]      Hypokalemia [E87.6]      Major depression [F32.9]      Preprocedural examination [Z01.818]      
Primary Diagnosis:  Depressive disorder [F32.A]        Problem Dx:   Malingering [Z76.5]      

## 2022-11-03 NOTE — BH INPATIENT PSYCHIATRY PROGRESS NOTE - NSBHFUPINTERVALHXFT_PSY_A_CORE
NSR @86  biphasic t-waves in V5-V6 Patient seen for follow-up of depression, chart reviewed and discussed with nursing staff.  No events reported overnight.  Vitals stable. Pt continues to present as dysphoric, withdrawn. Expresses hope about ECT, eager to start treatment. Dental clearance pending for ECT.

## 2022-11-03 NOTE — BH PSYCHOLOGY - GROUP THERAPY NOTE - NSPSYCHOLGRPCOGSPCH_PSY_A_CORE FT
WNL. quantity was responsive.
Rate, intelligibility, volume, and quality of expressive speech were normal. Quantity was responsive.

## 2022-11-03 NOTE — BH PSYCHOLOGY - GROUP THERAPY NOTE - NSPSYCHOLGRPCOGPT_PSY_A_CORE
participated in exercise/shared negative coping experience/shared positive coping experience/other...
shared negative coping experience

## 2022-11-04 PROCEDURE — 99231 SBSQ HOSP IP/OBS SF/LOW 25: CPT

## 2022-11-04 RX ADMIN — Medication 150 MILLIGRAM(S): at 08:37

## 2022-11-04 RX ADMIN — Medication 1 SPRAY(S): at 08:37

## 2022-11-04 RX ADMIN — Medication 5 MILLIGRAM(S): at 20:48

## 2022-11-04 RX ADMIN — Medication 30 MILLIGRAM(S): at 08:37

## 2022-11-04 RX ADMIN — SIMVASTATIN 20 MILLIGRAM(S): 20 TABLET, FILM COATED ORAL at 20:48

## 2022-11-04 RX ADMIN — ARIPIPRAZOLE 5 MILLIGRAM(S): 15 TABLET ORAL at 08:37

## 2022-11-04 RX ADMIN — Medication 50 MILLIGRAM(S): at 20:49

## 2022-11-04 RX ADMIN — Medication 5 MILLIGRAM(S): at 08:37

## 2022-11-04 RX ADMIN — FINASTERIDE 5 MILLIGRAM(S): 5 TABLET, FILM COATED ORAL at 08:37

## 2022-11-04 RX ADMIN — Medication 1 SPRAY(S): at 20:48

## 2022-11-04 NOTE — BH INPATIENT PSYCHIATRY PROGRESS NOTE - NSBHFUPINTERVALHXFT_PSY_A_CORE
Patient seen for follow-up of depression, chart reviewed and discussed with nursing staff.  No events reported overnight.  Vitals stable. ECT cancelled today since patient's mouthguard was not ready. As per dental it will be ready Monday morning, rescheduled ECT for 11/7. Pt expressed feeling upset the two patients he used to talk to went home today. Pt approached writer requesting to be discharged. When asked about his plans going home he offers "I'm going to stop eating so I can die". Pt also stated he would not continue taking his meds or go to any doctor appointments "because nothing works, all I want to do is die". Pt adds that if writer keeps him in the hospital he would kill himself over the weekend. Pt was unable to engage in any sort of safety planning and was unable to contract for safety, stating he would not reach out to any staff, or writer, because he doesn't trust anyone "I despise the human race". Writer validated patient's frustration associated to having his peers leave and recognized his concerns about what his future roommate will be like and uncertainties about the future. Pt agreed with this, instead of engaging in conversation about his emotions he continued to catastrophize "I lost my best friend forever, I should just die". Pt placed on CO over the weekend.

## 2022-11-04 NOTE — BH INPATIENT PSYCHIATRY PROGRESS NOTE - NSBHCHARTREVIEWVS_PSY_A_CORE FT
Vital Signs Last 24 Hrs  T(C): 36.6 (11-04-22 @ 07:51), Max: 36.6 (11-04-22 @ 07:51)  T(F): 97.8 (11-04-22 @ 07:51), Max: 97.8 (11-04-22 @ 07:51)  HR: --  BP: --  BP(mean): --  RR: --  SpO2: --    Orthostatic VS  11-04-22 @ 07:51  Lying BP: --/-- HR: --  Sitting BP: 118/80 HR: 85  Standing BP: 91/69 HR: 96  Site: --  Mode: electronic  Orthostatic VS  11-03-22 @ 08:05  Lying BP: --/-- HR: --  Sitting BP: 114/76 HR: 80  Standing BP: 102/76 HR: 88  Site: --  Mode: --

## 2022-11-04 NOTE — BH INPATIENT PSYCHIATRY PROGRESS NOTE - NSBHASSESSSUMMFT_PSY_ALL_CORE

## 2022-11-05 PROCEDURE — 99231 SBSQ HOSP IP/OBS SF/LOW 25: CPT

## 2022-11-05 RX ORDER — TRAZODONE HCL 50 MG
50 TABLET ORAL AT BEDTIME
Refills: 0 | Status: DISCONTINUED | OUTPATIENT
Start: 2022-11-05 | End: 2022-12-02

## 2022-11-05 RX ADMIN — ARIPIPRAZOLE 5 MILLIGRAM(S): 15 TABLET ORAL at 10:07

## 2022-11-05 RX ADMIN — Medication 5 MILLIGRAM(S): at 21:12

## 2022-11-05 RX ADMIN — FINASTERIDE 5 MILLIGRAM(S): 5 TABLET, FILM COATED ORAL at 10:07

## 2022-11-05 RX ADMIN — Medication 5 MILLIGRAM(S): at 10:07

## 2022-11-05 RX ADMIN — Medication 30 MILLIGRAM(S): at 10:07

## 2022-11-05 RX ADMIN — Medication 50 MILLIGRAM(S): at 21:12

## 2022-11-05 RX ADMIN — Medication 150 MILLIGRAM(S): at 10:08

## 2022-11-05 RX ADMIN — SIMVASTATIN 20 MILLIGRAM(S): 20 TABLET, FILM COATED ORAL at 21:12

## 2022-11-05 RX ADMIN — Medication 1 SPRAY(S): at 10:08

## 2022-11-05 NOTE — BH INPATIENT PSYCHIATRY PROGRESS NOTE - NSBHCHARTREVIEWVS_PSY_A_CORE FT
Vital Signs Last 24 Hrs  T(C): 36.4 (11-05-22 @ 08:16), Max: 36.4 (11-05-22 @ 08:16)  T(F): 97.5 (11-05-22 @ 08:16), Max: 97.5 (11-05-22 @ 08:16)  HR: --  BP: --  BP(mean): --  RR: --  SpO2: --    Orthostatic VS  11-05-22 @ 08:16  Lying BP: --/-- HR: --  Sitting BP: 123/81 HR: 91  Standing BP: 104/79 HR: 84  Site: upper right arm  Mode: electronic  Orthostatic VS  11-04-22 @ 07:51  Lying BP: --/-- HR: --  Sitting BP: 118/80 HR: 85  Standing BP: 91/69 HR: 96  Site: --  Mode: electronic

## 2022-11-05 NOTE — BH INPATIENT PSYCHIATRY PROGRESS NOTE - NSBHASSESSSUMMFT_PSY_ALL_CORE

## 2022-11-05 NOTE — BH INPATIENT PSYCHIATRY PROGRESS NOTE - OTHER
guarded, superficial Disproportionate upset that name plaque on wall in front off room contains name of someone who is not in that room any longer

## 2022-11-05 NOTE — BH INPATIENT PSYCHIATRY PROGRESS NOTE - NSBHFUPINTERVALHXFT_PSY_A_CORE
Patient seen for follow-up of depression, chart reviewed and discussed with nursing staff.  No events reported overnight.  Vitals stable. ECT cancelled today since patient's mouthguard was not ready. As per dental it will be ready Monday morning, rescheduled ECT for 11/7. Pt expressed feeling upset the two patients he used to talk to went home today. When asked about his plans going home he offers "I'm going to stop eating so I can die". Pt also stated he would not continue taking his meds or go to any doctor appointments "because nothing works, all I want to do is die". Pt adds that if writer keeps him in the hospital he would kill himself over the weekend. Pt was unable to engage in any sort of safety planning and was unable to contract for safety, stating he would not reach out to any staff, or writer, because he doesn't trust anyone "I despise the human race". Writer validated patient's frustration associated to having his peers leave and recognized his concerns about what his future roommate will be like and uncertainties about the future. Pt agreed with this, instead of engaging in conversation about his emotions he continued to catastrophize "I lost my best friend forever, I should just die". Pt placed on CO over the weekend. VSS with minor ortho change w/o dizziness

## 2022-11-06 LAB — SARS-COV-2 RNA SPEC QL NAA+PROBE: SIGNIFICANT CHANGE UP

## 2022-11-06 PROCEDURE — 99231 SBSQ HOSP IP/OBS SF/LOW 25: CPT

## 2022-11-06 RX ADMIN — Medication 50 MILLIGRAM(S): at 20:45

## 2022-11-06 RX ADMIN — Medication 5 MILLIGRAM(S): at 10:19

## 2022-11-06 RX ADMIN — SIMVASTATIN 20 MILLIGRAM(S): 20 TABLET, FILM COATED ORAL at 20:45

## 2022-11-06 RX ADMIN — Medication 1 SPRAY(S): at 10:20

## 2022-11-06 RX ADMIN — Medication 150 MILLIGRAM(S): at 10:19

## 2022-11-06 RX ADMIN — Medication 30 MILLIGRAM(S): at 10:19

## 2022-11-06 RX ADMIN — Medication 1 SPRAY(S): at 20:46

## 2022-11-06 RX ADMIN — ARIPIPRAZOLE 5 MILLIGRAM(S): 15 TABLET ORAL at 10:19

## 2022-11-06 RX ADMIN — FINASTERIDE 5 MILLIGRAM(S): 5 TABLET, FILM COATED ORAL at 10:19

## 2022-11-06 RX ADMIN — Medication 5 MILLIGRAM(S): at 20:45

## 2022-11-06 NOTE — BH INPATIENT PSYCHIATRY PROGRESS NOTE - NSBHCHARTREVIEWVS_PSY_A_CORE FT
Vital Signs Last 24 Hrs  T(C): 36.3 (11-06-22 @ 07:54), Max: 36.3 (11-06-22 @ 07:54)  T(F): 97.3 (11-06-22 @ 07:54), Max: 97.3 (11-06-22 @ 07:54)  HR: --  BP: --  BP(mean): --  RR: --  SpO2: --    Orthostatic VS  11-06-22 @ 07:54  Lying BP: --/-- HR: --  Sitting BP: 118/80 HR: 88  Standing BP: 99/68 HR: 104  Site: upper left arm  Mode: electronic  Orthostatic VS  11-05-22 @ 08:16  Lying BP: --/-- HR: --  Sitting BP: 123/81 HR: 91  Standing BP: 104/79 HR: 84  Site: upper right arm  Mode: electronic

## 2022-11-06 NOTE — BH INPATIENT PSYCHIATRY PROGRESS NOTE - NSBHFUPINTERVALHXFT_PSY_A_CORE
Patient seen for follow-up of depression, chart reviewed and discussed with nursing staff.  Pt is compliant with meds, no events reported overnight.  Vitals stable.  On exam, Pt reports he is not happy, endorses anxiety, depression, passive suicidal thoughts, 'I would be happy if dead..', denies any specific plan. He says, he likes to be on CO so nobody can bother him. Reports poor sleep and appetite but vague when asked details. He endorses paranoia about other peers but vague. When asked about any interest in any activities, he says he likes to watch TV events but can't watch his channel as another peer watches her preferred channel  and  others like that channel. Denies any [place]es, [place].

## 2022-11-06 NOTE — BH INPATIENT PSYCHIATRY PROGRESS NOTE - NSBHASSESSSUMMFT_PSY_ALL_CORE

## 2022-11-07 PROCEDURE — 90870 ELECTROCONVULSIVE THERAPY: CPT

## 2022-11-07 RX ADMIN — Medication 50 MILLIGRAM(S): at 20:40

## 2022-11-07 RX ADMIN — Medication 1 SPRAY(S): at 08:47

## 2022-11-07 RX ADMIN — Medication 1 SPRAY(S): at 20:40

## 2022-11-07 RX ADMIN — Medication 5 MILLIGRAM(S): at 08:46

## 2022-11-07 RX ADMIN — FINASTERIDE 5 MILLIGRAM(S): 5 TABLET, FILM COATED ORAL at 08:47

## 2022-11-07 RX ADMIN — Medication 30 MILLIGRAM(S): at 08:46

## 2022-11-07 RX ADMIN — SIMVASTATIN 20 MILLIGRAM(S): 20 TABLET, FILM COATED ORAL at 20:40

## 2022-11-07 RX ADMIN — ARIPIPRAZOLE 5 MILLIGRAM(S): 15 TABLET ORAL at 08:46

## 2022-11-07 RX ADMIN — Medication 150 MILLIGRAM(S): at 08:46

## 2022-11-07 RX ADMIN — Medication 5 MILLIGRAM(S): at 20:40

## 2022-11-07 NOTE — BH INPATIENT PSYCHIATRY PROGRESS NOTE - NSBHCHARTREVIEWVS_PSY_A_CORE FT
Vital Signs Last 24 Hrs  T(C): 36.1 (11-07-22 @ 11:40), Max: 36.3 (11-07-22 @ 10:31)  T(F): 97 (11-07-22 @ 11:40), Max: 97.4 (11-07-22 @ 10:31)  HR: 92 (11-07-22 @ 11:40) (68 - 106)  BP: 150/88 (11-07-22 @ 11:40) (116/80 - 172/97)  BP(mean): --  RR: 16 (11-07-22 @ 11:40) (13 - 17)  SpO2: 97% (11-07-22 @ 11:40) (97% - 100%)    Orthostatic VS  11-07-22 @ 08:54  Lying BP: --/-- HR: --  Sitting BP: 120/76 HR: 89  Standing BP: 100/66 HR: 80  Site: --  Mode: --  Orthostatic VS  11-06-22 @ 07:54  Lying BP: --/-- HR: --  Sitting BP: 118/80 HR: 88  Standing BP: 99/68 HR: 104  Site: upper left arm  Mode: electronic
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
## 2022-11-07 NOTE — BH INPATIENT PSYCHIATRY PROGRESS NOTE - NSBHASSESSSUMMFT_PSY_ALL_CORE

## 2022-11-07 NOTE — BH INPATIENT PSYCHIATRY PROGRESS NOTE - NSBHMETABOLIC_PSY_ALL_CORE_FT
BMI: BMI (kg/m2): 25.2 (10-04-22 @ 17:55)  HbA1c: A1C with Estimated Average Glucose Result: 5.1 % (07-06-22 @ 10:38)    Glucose: POCT Blood Glucose.: 112 mg/dL (10-05-22 @ 20:21)    BP: 150/88 (11-07-22 @ 11:40) (116/80 - 172/97)  Lipid Panel: Date/Time: 07-06-22 @ 10:38  Cholesterol, Serum: 128  Direct LDL: --  HDL Cholesterol, Serum: 52  Total Cholesterol/HDL Ration Measurement: --  Triglycerides, Serum: 74

## 2022-11-08 PROCEDURE — 99231 SBSQ HOSP IP/OBS SF/LOW 25: CPT

## 2022-11-08 RX ORDER — FLUTICASONE PROPIONATE 50 MCG
1 SPRAY, SUSPENSION NASAL
Refills: 0 | Status: DISCONTINUED | OUTPATIENT
Start: 2022-11-08 | End: 2022-12-02

## 2022-11-08 RX ADMIN — ARIPIPRAZOLE 5 MILLIGRAM(S): 15 TABLET ORAL at 09:05

## 2022-11-08 RX ADMIN — Medication 150 MILLIGRAM(S): at 09:04

## 2022-11-08 RX ADMIN — Medication 50 MILLIGRAM(S): at 21:18

## 2022-11-08 RX ADMIN — Medication 30 MILLIGRAM(S): at 09:05

## 2022-11-08 RX ADMIN — Medication 1 SPRAY(S): at 21:17

## 2022-11-08 RX ADMIN — Medication 5 MILLIGRAM(S): at 09:04

## 2022-11-08 RX ADMIN — Medication 5 MILLIGRAM(S): at 21:18

## 2022-11-08 RX ADMIN — FINASTERIDE 5 MILLIGRAM(S): 5 TABLET, FILM COATED ORAL at 09:04

## 2022-11-08 RX ADMIN — SIMVASTATIN 20 MILLIGRAM(S): 20 TABLET, FILM COATED ORAL at 21:18

## 2022-11-08 NOTE — BH INPATIENT PSYCHIATRY PROGRESS NOTE - CURRENT MEDICATION
MEDICATIONS  (STANDING):  ARIPiprazole 5 milliGRAM(s) Oral daily  finasteride 5 milliGRAM(s) Oral daily  melatonin. 5 milliGRAM(s) Oral at bedtime  NIFEdipine XL 30 milliGRAM(s) Oral daily  oxybutynin 5 milliGRAM(s) Oral daily  simvastatin 20 milliGRAM(s) Oral at bedtime  traZODone 50 milliGRAM(s) Oral at bedtime  venlafaxine  milliGRAM(s) Oral daily    MEDICATIONS  (PRN):  acetaminophen     Tablet .. 650 milliGRAM(s) Oral every 6 hours PRN Mild Pain (1 - 3), Moderate Pain (4 - 6)  diphenhydrAMINE 50 milliGRAM(s) Oral every 6 hours PRN agitation  diphenhydrAMINE Injectable 50 milliGRAM(s) IntraMuscular every 4 hours PRN severe agitation  fluticasone propionate 50 MICROgram(s)/spray Nasal Spray 1 Spray(s) Both Nostrils two times a day PRN congestion  haloperidol     Tablet 5 milliGRAM(s) Oral every 6 hours PRN agitation  haloperidol    Injectable 5 milliGRAM(s) IntraMuscular once PRN severe agitation  lidocaine   4% Patch 1 Patch Transdermal every 24 hours PRN pain

## 2022-11-08 NOTE — BH INPATIENT PSYCHIATRY PROGRESS NOTE - NSBHCHARTREVIEWVS_PSY_A_CORE FT
Vital Signs Last 24 Hrs  T(C): 37 (11-08-22 @ 08:11), Max: 37 (11-08-22 @ 08:11)  T(F): 98.6 (11-08-22 @ 08:11), Max: 98.6 (11-08-22 @ 08:11)  HR: --  BP: --  BP(mean): --  RR: --  SpO2: --    Orthostatic VS  11-08-22 @ 08:11  Lying BP: --/-- HR: --  Sitting BP: 146/80 HR: 72  Standing BP: --/-- HR: --  Site: --  Mode: --  Orthostatic VS  11-07-22 @ 08:54  Lying BP: --/-- HR: --  Sitting BP: 120/76 HR: 89  Standing BP: 100/66 HR: 80  Site: --  Mode: --

## 2022-11-08 NOTE — BH INPATIENT PSYCHIATRY PROGRESS NOTE - NSBHASSESSSUMMFT_PSY_ALL_CORE

## 2022-11-08 NOTE — BH INPATIENT PSYCHIATRY PROGRESS NOTE - NSBHFUPINTERVALHXFT_PSY_A_CORE
Patient seen for follow-up of depression, chart reviewed and discussed with nursing staff.  Pt is compliant with meds, no events reported overnight.  Vitals stable.  No significant events reported overnight, no PRNs needed. On encounter this morning Mr. Rogers was found chatting with one of our new patients. Pt continues to report chronic low mood and negative thinking. Post ECT confusion appears to have cleared up, pt is AAOx4. Denies side effects from ECT and is on board to continue treatment. Denies active SIIP.

## 2022-11-09 PROCEDURE — 90870 ELECTROCONVULSIVE THERAPY: CPT

## 2022-11-09 PROCEDURE — 99231 SBSQ HOSP IP/OBS SF/LOW 25: CPT | Mod: 25

## 2022-11-09 RX ADMIN — Medication 30 MILLIGRAM(S): at 08:00

## 2022-11-09 RX ADMIN — ARIPIPRAZOLE 5 MILLIGRAM(S): 15 TABLET ORAL at 08:00

## 2022-11-09 RX ADMIN — FINASTERIDE 5 MILLIGRAM(S): 5 TABLET, FILM COATED ORAL at 08:00

## 2022-11-09 RX ADMIN — SIMVASTATIN 20 MILLIGRAM(S): 20 TABLET, FILM COATED ORAL at 21:55

## 2022-11-09 RX ADMIN — Medication 150 MILLIGRAM(S): at 08:00

## 2022-11-09 RX ADMIN — Medication 5 MILLIGRAM(S): at 08:00

## 2022-11-09 RX ADMIN — Medication 50 MILLIGRAM(S): at 21:55

## 2022-11-09 RX ADMIN — Medication 5 MILLIGRAM(S): at 21:55

## 2022-11-09 NOTE — BH PSYCHOLOGY - CLINICIAN PSYCHOTHERAPY NOTE - NSBHPSYCHOLINT_PSY_A_CORE
Dynamic issues addressed/Encourage medication compliance/Stress management/Supported coping skills/Supportive therapy/Treatment compliance encouraged
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
## 2022-11-09 NOTE — BH INPATIENT PSYCHIATRY PROGRESS NOTE - NSBHMETABOLIC_PSY_ALL_CORE_FT
BMI: BMI (kg/m2): 25.2 (10-04-22 @ 17:55)  HbA1c: A1C with Estimated Average Glucose Result: 5.1 % (07-06-22 @ 10:38)    Glucose: POCT Blood Glucose.: 112 mg/dL (10-05-22 @ 20:21)    BP: 162/85 (11-09-22 @ 11:05) (116/80 - 174/97)  Lipid Panel: Date/Time: 07-06-22 @ 10:38  Cholesterol, Serum: 128  Direct LDL: --  HDL Cholesterol, Serum: 52  Total Cholesterol/HDL Ration Measurement: --  Triglycerides, Serum: 74

## 2022-11-09 NOTE — BH INPATIENT PSYCHIATRY PROGRESS NOTE - NSBHFUPINTERVALHXFT_PSY_A_CORE
Patient seen for follow-up of depression, chart reviewed and discussed with nursing staff.  Pt is compliant with meds, no events reported overnight.  Vitals stable.  No significant events reported overnight, no PRNs needed. Pt continues to express negative view of the world, however states that this morning everything is going well. Denies having any complaints, waiting to be brought to his second ECT. Denies having any questions or concerns about ECT.

## 2022-11-09 NOTE — BH INPATIENT PSYCHIATRY PROGRESS NOTE - NSBHASSESSSUMMFT_PSY_ALL_CORE

## 2022-11-09 NOTE — BH INPATIENT PSYCHIATRY PROGRESS NOTE - NSBHCHARTREVIEWVS_PSY_A_CORE FT
Vital Signs Last 24 Hrs  T(C): 36.3 (11-09-22 @ 10:31), Max: 36.9 (11-09-22 @ 10:19)  T(F): 97.3 (11-09-22 @ 10:31), Max: 98.5 (11-09-22 @ 10:19)  HR: 81 (11-09-22 @ 11:05) (68 - 103)  BP: 162/85 (11-09-22 @ 11:05) (129/83 - 174/97)  BP(mean): --  RR: 17 (11-09-22 @ 11:05) (16 - 18)  SpO2: 99% (11-09-22 @ 11:05) (97% - 99%)    Orthostatic VS  11-09-22 @ 10:19  Lying BP: --/-- HR: --  Sitting BP: 117/79 HR: 79  Standing BP: 96/77 HR: 104  Site: upper left arm  Mode: electronic  Orthostatic VS  11-08-22 @ 08:11  Lying BP: --/-- HR: --  Sitting BP: 146/80 HR: 72  Standing BP: --/-- HR: --  Site: --  Mode: --

## 2022-11-10 LAB
ANION GAP SERPL CALC-SCNC: 12 MMOL/L — SIGNIFICANT CHANGE UP (ref 7–14)
BUN SERPL-MCNC: 27 MG/DL — HIGH (ref 7–23)
CALCIUM SERPL-MCNC: 9.3 MG/DL — SIGNIFICANT CHANGE UP (ref 8.4–10.5)
CHLORIDE SERPL-SCNC: 101 MMOL/L — SIGNIFICANT CHANGE UP (ref 98–107)
CO2 SERPL-SCNC: 30 MMOL/L — SIGNIFICANT CHANGE UP (ref 22–31)
CREAT SERPL-MCNC: 1.13 MG/DL — SIGNIFICANT CHANGE UP (ref 0.5–1.3)
EGFR: 69 ML/MIN/1.73M2 — SIGNIFICANT CHANGE UP
GLUCOSE SERPL-MCNC: 211 MG/DL — HIGH (ref 70–99)
POTASSIUM SERPL-MCNC: 3.3 MMOL/L — LOW (ref 3.5–5.3)
POTASSIUM SERPL-SCNC: 3.3 MMOL/L — LOW (ref 3.5–5.3)
SODIUM SERPL-SCNC: 143 MMOL/L — SIGNIFICANT CHANGE UP (ref 135–145)

## 2022-11-10 PROCEDURE — 99231 SBSQ HOSP IP/OBS SF/LOW 25: CPT

## 2022-11-10 RX ORDER — POTASSIUM CHLORIDE 20 MEQ
40 PACKET (EA) ORAL ONCE
Refills: 0 | Status: COMPLETED | OUTPATIENT
Start: 2022-11-10 | End: 2022-11-10

## 2022-11-10 RX ADMIN — FINASTERIDE 5 MILLIGRAM(S): 5 TABLET, FILM COATED ORAL at 10:37

## 2022-11-10 RX ADMIN — SIMVASTATIN 20 MILLIGRAM(S): 20 TABLET, FILM COATED ORAL at 21:15

## 2022-11-10 RX ADMIN — Medication 5 MILLIGRAM(S): at 21:15

## 2022-11-10 RX ADMIN — Medication 5 MILLIGRAM(S): at 10:38

## 2022-11-10 RX ADMIN — Medication 40 MILLIEQUIVALENT(S): at 14:30

## 2022-11-10 RX ADMIN — Medication 50 MILLIGRAM(S): at 21:16

## 2022-11-10 RX ADMIN — Medication 150 MILLIGRAM(S): at 10:37

## 2022-11-10 RX ADMIN — ARIPIPRAZOLE 5 MILLIGRAM(S): 15 TABLET ORAL at 10:37

## 2022-11-10 RX ADMIN — Medication 30 MILLIGRAM(S): at 10:38

## 2022-11-10 NOTE — BH INPATIENT PSYCHIATRY PROGRESS NOTE - NSBHASSESSSUMMFT_PSY_ALL_CORE
71 y.o. male with hx of unusual relatedness including pattern of making up psychiatric symptoms or hx such as claiming he arranged a hit or his brother killed himself. Recently there has been escalating pattern of hospitalizations, ER visits. Compliance after d/c is nil. Patient also develops excessive attachment or intense dislike of providers and other staff. Patient has no actual hx of suicide attempts or fh or SIB. Patient with likely facititious disorder vs mood disorder. Suspect possible unconscious primary gain related to medical complaints help seeking then rejection of help as inadequate. Patient not assessed as needing CO. Titrate medications and connect patient back to services. Appears to be provocative and help-rejecting. Prolonged hospitalization may not help achieve shared goals and might not be therapeutic for patient.    10/6: Has multiple somatic complaints, does not engage in treatment planning, however appears to show initiative toward getting a notebook which was a coping mechanism for patient during last hospitalization.   10/7: Continues to have multiple complaints, somewhat more receptive to treatment planning, did not report SI today. Continue with current medications.   10/8: No mood symptoms, no psychotic symptoms, no suicidal ideations.  10/9/22; anxious, dysphoric, superficial, no SI/HI.   10/10/22: Remain anxious, catastrophizes his friendship, having multiple somatic complaints. Consider switching to Effexor XR.  10/11: The patient continues to complain of depression, anxiety, losing friendships, although it seems he could continue them if he desired.  He focuses on negatives. Education attempted. The patient is tolerating medications well, will continue.  to meet with patient.  10/12: Patient continues to report somatic complaints but rejecting interventions. Patient is awaiting meeting with . Plan to cross taper to Effexor XR in light of reported complaints and limited efficacy on Prozac (albeit insufficient trial).  10/13: Patient is catastrophizing his somatic complaints and displaying pessimism with regards to meeting . Reassurance was provided. Continues with help-rejecting behavior and was redirected to previous patterns of pessimism during previous hospitalization. Patient acknowledged it however is not taking steps to behavioral change at this time.   10/14: Patient continues to display negativistic thinking and making provocative statements. Pt has been adherent to medications. Continue to cross taper from Prozac to Effexor XR.  10/17: Pt continues to present negativistic thinking. Visible in the unit. Appears more future oriented today. Will continue tapering Prozac (down to 20mg today) and titrating Effexor XR (up to 75mg today).  10/18: Presents with negativistic thinking but appears more goal oriented seeking services and looking forward to meeting with . Pt has been medication adherent. Continue to cross taper from Prozac to Effexor XR.  10/19: Makes provocative statements and presents with usual negativistic thinking however remains hopeful for meeting with . Has been noted to be visible on the unit and adherent to medications. D/C Prozac  10/20: Continues to make provocative statements which is his usual way of relating with others. Tolerating meds, no new concerns reported. Awaiting meeting with . Titrate Effexor to 112.5mg  10/21: Tolerating medications. No new concerns. Patient appears anxious about meeting with his , however also awaiting to discuss his goals with him.   10/24: Patient tolerating medications. Will increase Effexor to 150mg PO daily.   10/25: Tolerating medications. Patient appears to be catastrophizing however has intense eye contact and makes provocative statements, yet does not endorse any intent or plan to hurt self. Patient has pattern of describing enhanced expressions of symptoms closer to discharge.  10/26: Patient continues to be help-rejecting and catastrophizing when discussing discharge. No plans endorsed to hurt self. Patient has pattern of describing enhanced expressions of symptoms closer to discharge.  10/27: Pt appears to be in behavioral control, denies SI/HI. Patient was less negativistic this AM and appeared future oriented toward meeting .   10/28: Pt appears less provocative, more optimistic about future. Continue current regimen.  10/31: Pt continues to present as dysphoric, withdrawn. Has not been interested in his poetry and drawings as he has been in the past. During encounter today pt continues to express low mood and hopelessness about the future. Despite patient's characterologic issues and suspected element of factitious disorder, there might be an element of mood disorder present that may ameliorate with ECT treatment. Pt expresses willingness to "try anything that could help". Will place ECT consult.   11/1: Pt continues to present as dysphoric, withdrawn. Continues to endorse hopelessness about the future and passive SI. Dental clearance pending for ECT. Appointment scheduled for Thursday.   11/2: Pt continues to express hopelessness and negativistic view of the world. Mistrustful of people's ability to help him. Dental appointment for ECT clearance scheduled for tomorrow.   11/3: Pt remains dysphoric, expressing some hope ECT may help. Dental clearance pending.   11/4: Pt placed on CO for suicidality. To be reassessed Monday.   11/5 Dysphoric, SI, unclear severity given past pattern of behavior. Will cont CO  Plan is to try ECT for mood and SI. If BP low would reduce trazodone.  11/6: dysphoric, irritable, SI, no plan mentioned, but unpredictable, some vague paranoia.    11/7: Pt remains dysphoric but less irritable than last Friday. Currently endorsing chronic, passive SI with no active intent or plans. Appears more future oriented, asking relevant questions about ECT treatment plan. Will d/c CO today. Will continue ECT 3x week.  11/8: Visible in the unit, interacting with select peers. ECT #2 scheduled for tomorrow.   11/9: Completed second ECT today. Denies SIIP. ECT #3 scheduled for Friday 11/11.   11/10: Pt denies SIIP, stable mood. ECT #3 scheduled tomorrow. Repleted potassium today.   Plan:  1. CO for suicidality  2. Legals: 9.27  3. Psychiatry: Continue current meds  .Effexor XR 150mg PO daily. Titrate as tolerated.  .Abilify 5mg PO daily  .Trazodone 50mg PO HS  .Melatonin 5mg PO HS  4. ECT #2 11/9  4. Medical: Continue current medications  .Finasteride 5mg PO HS  .Nifedipine 30mg PO Daily  .Oxybutynin 5mg PO daily  .Simvastatin 20mg PO HS  5. Disposition: Home when stable.
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
## 2022-11-10 NOTE — BH INPATIENT PSYCHIATRY PROGRESS NOTE - NSBHMETABOLIC_PSY_ALL_CORE_FT
BMI: BMI (kg/m2): 25.2 (10-04-22 @ 17:55)  HbA1c: A1C with Estimated Average Glucose Result: 5.1 % (07-06-22 @ 10:38)    Glucose: POCT Blood Glucose.: 112 mg/dL (10-05-22 @ 20:21)    BP: 162/85 (11-09-22 @ 11:20) (129/83 - 174/97)  Lipid Panel: Date/Time: 07-06-22 @ 10:38  Cholesterol, Serum: 128  Direct LDL: --  HDL Cholesterol, Serum: 52  Total Cholesterol/HDL Ration Measurement: --  Triglycerides, Serum: 74

## 2022-11-10 NOTE — BH INPATIENT PSYCHIATRY PROGRESS NOTE - NSBHCHARTREVIEWVS_PSY_A_CORE FT
Vital Signs Last 24 Hrs  T(C): 36.9 (11-10-22 @ 07:52), Max: 36.9 (11-10-22 @ 07:52)  T(F): 98.4 (11-10-22 @ 07:52), Max: 98.4 (11-10-22 @ 07:52)  HR: --  BP: --  BP(mean): --  RR: --  SpO2: --    Orthostatic VS  11-10-22 @ 07:52  Lying BP: --/-- HR: --  Sitting BP: 139/85 HR: 69  Standing BP: 119/81 HR: 71  Site: upper left arm  Mode: electronic  Orthostatic VS  11-09-22 @ 10:19  Lying BP: --/-- HR: --  Sitting BP: 117/79 HR: 79  Standing BP: 96/77 HR: 104  Site: upper left arm  Mode: electronic

## 2022-11-11 ENCOUNTER — EMERGENCY (EMERGENCY)
Facility: HOSPITAL | Age: 72
LOS: 1 days | Discharge: HOME | End: 2022-11-11
Attending: EMERGENCY MEDICINE | Admitting: EMERGENCY MEDICINE

## 2022-11-11 VITALS
RESPIRATION RATE: 17 BRPM | TEMPERATURE: 98 F | HEART RATE: 77 BPM | OXYGEN SATURATION: 100 % | SYSTOLIC BLOOD PRESSURE: 130 MMHG | DIASTOLIC BLOOD PRESSURE: 76 MMHG | HEIGHT: 73 IN

## 2022-11-11 VITALS — TEMPERATURE: 99 F

## 2022-11-11 LAB
ANION GAP SERPL CALC-SCNC: 11 MMOL/L — SIGNIFICANT CHANGE UP (ref 7–14)
ANION GAP SERPL CALC-SCNC: 9 MMOL/L — SIGNIFICANT CHANGE UP (ref 7–14)
BUN SERPL-MCNC: 30 MG/DL — HIGH (ref 7–23)
BUN SERPL-MCNC: 31 MG/DL — HIGH (ref 7–23)
CALCIUM SERPL-MCNC: 9.6 MG/DL — SIGNIFICANT CHANGE UP (ref 8.4–10.5)
CALCIUM SERPL-MCNC: 9.7 MG/DL — SIGNIFICANT CHANGE UP (ref 8.4–10.5)
CHLORIDE SERPL-SCNC: 100 MMOL/L — SIGNIFICANT CHANGE UP (ref 98–107)
CHLORIDE SERPL-SCNC: 99 MMOL/L — SIGNIFICANT CHANGE UP (ref 98–107)
CO2 SERPL-SCNC: 31 MMOL/L — SIGNIFICANT CHANGE UP (ref 22–31)
CO2 SERPL-SCNC: 33 MMOL/L — HIGH (ref 22–31)
CREAT SERPL-MCNC: 1.16 MG/DL — SIGNIFICANT CHANGE UP (ref 0.5–1.3)
CREAT SERPL-MCNC: 1.24 MG/DL — SIGNIFICANT CHANGE UP (ref 0.5–1.3)
EGFR: 62 ML/MIN/1.73M2 — SIGNIFICANT CHANGE UP
EGFR: 67 ML/MIN/1.73M2 — SIGNIFICANT CHANGE UP
GLUCOSE SERPL-MCNC: 67 MG/DL — LOW (ref 70–99)
GLUCOSE SERPL-MCNC: 87 MG/DL — SIGNIFICANT CHANGE UP (ref 70–99)
MAGNESIUM SERPL-MCNC: 2.1 MG/DL — SIGNIFICANT CHANGE UP (ref 1.6–2.6)
PHOSPHATE SERPL-MCNC: 2.8 MG/DL — SIGNIFICANT CHANGE UP (ref 2.5–4.5)
POTASSIUM SERPL-MCNC: 3.1 MMOL/L — LOW (ref 3.5–5.3)
POTASSIUM SERPL-MCNC: 3.6 MMOL/L — SIGNIFICANT CHANGE UP (ref 3.5–5.3)
POTASSIUM SERPL-SCNC: 3.1 MMOL/L — LOW (ref 3.5–5.3)
POTASSIUM SERPL-SCNC: 3.6 MMOL/L — SIGNIFICANT CHANGE UP (ref 3.5–5.3)
SODIUM SERPL-SCNC: 140 MMOL/L — SIGNIFICANT CHANGE UP (ref 135–145)
SODIUM SERPL-SCNC: 143 MMOL/L — SIGNIFICANT CHANGE UP (ref 135–145)

## 2022-11-11 PROCEDURE — 99283 EMERGENCY DEPT VISIT LOW MDM: CPT | Mod: 25

## 2022-11-11 PROCEDURE — 99231 SBSQ HOSP IP/OBS SF/LOW 25: CPT | Mod: 25

## 2022-11-11 PROCEDURE — 12001 RPR S/N/AX/GEN/TRNK 2.5CM/<: CPT

## 2022-11-11 PROCEDURE — 73140 X-RAY EXAM OF FINGER(S): CPT | Mod: 26,RT

## 2022-11-11 PROCEDURE — 90870 ELECTROCONVULSIVE THERAPY: CPT

## 2022-11-11 RX ORDER — POTASSIUM CHLORIDE 20 MEQ
60 PACKET (EA) ORAL ONCE
Refills: 0 | Status: DISCONTINUED | OUTPATIENT
Start: 2022-11-11 | End: 2022-11-14

## 2022-11-11 RX ORDER — TETANUS TOXOID, REDUCED DIPHTHERIA TOXOID AND ACELLULAR PERTUSSIS VACCINE, ADSORBED 5; 2.5; 8; 8; 2.5 [IU]/.5ML; [IU]/.5ML; UG/.5ML; UG/.5ML; UG/.5ML
0.5 SUSPENSION INTRAMUSCULAR ONCE
Refills: 0 | Status: COMPLETED | OUTPATIENT
Start: 2022-11-11 | End: 2022-11-11

## 2022-11-11 RX ORDER — POTASSIUM CHLORIDE 20 MEQ
40 PACKET (EA) ORAL ONCE
Refills: 0 | Status: COMPLETED | OUTPATIENT
Start: 2022-11-11 | End: 2022-11-11

## 2022-11-11 RX ADMIN — Medication 50 MILLIGRAM(S): at 22:18

## 2022-11-11 RX ADMIN — FINASTERIDE 5 MILLIGRAM(S): 5 TABLET, FILM COATED ORAL at 17:19

## 2022-11-11 RX ADMIN — Medication 5 MILLIGRAM(S): at 22:18

## 2022-11-11 RX ADMIN — TETANUS TOXOID, REDUCED DIPHTHERIA TOXOID AND ACELLULAR PERTUSSIS VACCINE, ADSORBED 0.5 MILLILITER(S): 5; 2.5; 8; 8; 2.5 SUSPENSION INTRAMUSCULAR at 10:39

## 2022-11-11 RX ADMIN — ARIPIPRAZOLE 5 MILLIGRAM(S): 15 TABLET ORAL at 14:33

## 2022-11-11 RX ADMIN — Medication 5 MILLIGRAM(S): at 14:33

## 2022-11-11 RX ADMIN — Medication 30 MILLIGRAM(S): at 14:33

## 2022-11-11 RX ADMIN — SIMVASTATIN 20 MILLIGRAM(S): 20 TABLET, FILM COATED ORAL at 22:18

## 2022-11-11 RX ADMIN — Medication 150 MILLIGRAM(S): at 14:33

## 2022-11-11 NOTE — BH INPATIENT PSYCHIATRY PROGRESS NOTE - NSBHCHARTREVIEWVS_PSY_A_CORE FT
Vital Signs Last 24 Hrs  T(C): 37.4 (11-11-22 @ 10:50), Max: 37.4 (11-11-22 @ 10:50)  T(F): 99.4 (11-11-22 @ 10:50), Max: 99.4 (11-11-22 @ 10:50)  HR: 77 (11-11-22 @ 09:42) (77 - 77)  BP: 130/76 (11-11-22 @ 09:42) (130/76 - 130/76)  BP(mean): --  RR: 17 (11-11-22 @ 09:42) (17 - 17)  SpO2: 100% (11-11-22 @ 09:42) (100% - 100%)    Orthostatic VS  11-11-22 @ 10:14  Lying BP: --/-- HR: --  Sitting BP: 157/96 HR: 95  Standing BP: 139/86 HR: 100  Site: --  Mode: --  Orthostatic VS  11-10-22 @ 07:52  Lying BP: --/-- HR: --  Sitting BP: 139/85 HR: 69  Standing BP: 119/81 HR: 71  Site: upper left arm  Mode: electronic

## 2022-11-11 NOTE — ED PROVIDER NOTE - OBJECTIVE STATEMENT
72-year-old male past medical history of depression mixed personality disorder and major depressive disorder from the [facility] presents to the ED complaining of laceration to the right finger. Pt states his finger got caught in the bathroom door this morning, now has a cut. Denies pain, weakness, numbness, or tingling sensation. Last tetanus shot unknown. Denies any other complaints.

## 2022-11-11 NOTE — ED PROVIDER NOTE - PROGRESS NOTE DETAILS
x-rays shows no fracture. s/p lac repair, pt tolerated procedure well. stable for dc back to [facility]. Suture removal in a week. Strict return precautions. Huddle for dc back to [facility]. x-rays shows no fracture. s/p lac repair, pt tolerated procedure well. stable for dc back to [facility]. Suture removal in a week. Strict return precautions. SW Huddle for dc back to [facility]. spoke to GRZEGORZ Phillips from [facility], discharge instructions explained. transport to be arranged.

## 2022-11-11 NOTE — ED PROVIDER NOTE - PATIENT PORTAL LINK FT
You can access the FollowMyHealth Patient Portal offered by [facility] by registering at the following website: [email or web address]. By joining [facility]’s FollowMyHealth portal, you will also be able to view your health information using other applications (apps) compatible with our system.

## 2022-11-11 NOTE — BH INPATIENT PSYCHIATRY PROGRESS NOTE - NSBHFUPINTERVALHXFT_PSY_A_CORE
Patient seen for follow-up of depression, chart reviewed and discussed with nursing staff.  Pt is compliant with meds, no events reported overnight.  Vitals stable.  This morning pt got his right middle finger caught in the bathroom door, now has a laceration in his distal interphalangeal joint. Pt was sent to the [facility] ED-fast track for imaging and laceration repair.    Patient seen for follow-up of depression, chart reviewed and discussed with nursing staff.  Pt is compliant with meds, no events reported overnight.  Vitals stable.  This morning pt got his right middle finger caught in the soiled utility closet door, now has a laceration in his distal interphalangeal joint. Pt was sent to the [facility] ED-fast track for imaging and laceration repair.

## 2022-11-11 NOTE — ED PROVIDER NOTE - NS ED ATTENDING STATEMENT MOD
This was a shared visit with the DELFINO. I reviewed and verified the documentation and independently performed the documented:

## 2022-11-11 NOTE — ED PROVIDER NOTE - NSFOLLOWUPINSTRUCTIONS_ED_ALL_ED_FT
Please return to the emergency department in one week for suture removal.    Keep wound dry and clean.    If you have any new, worsened or concerning symptoms, please return to the emergency department immediately.

## 2022-11-11 NOTE — BH INPATIENT PSYCHIATRY PROGRESS NOTE - NSBHMETABOLIC_PSY_ALL_CORE_FT
BMI: BMI (kg/m2): 25.2 (11-11-22 @ 09:42)  HbA1c: A1C with Estimated Average Glucose Result: 5.1 % (07-06-22 @ 10:38)    Glucose: POCT Blood Glucose.: 112 mg/dL (10-05-22 @ 20:21)    BP: 162/85 (11-09-22 @ 11:20) (129/83 - 174/97)  Lipid Panel: Date/Time: 07-06-22 @ 10:38  Cholesterol, Serum: 128  Direct LDL: --  HDL Cholesterol, Serum: 52  Total Cholesterol/HDL Ration Measurement: --  Triglycerides, Serum: 74

## 2022-11-11 NOTE — ED PROVIDER NOTE - CLINICAL SUMMARY MEDICAL DECISION MAKING FREE TEXT BOX
72-year-old male past medical history of depression mixed personality disorder and major depressive disorder from the [facility] presents to the ED complaining of laceration to the right finger. Impression: right third finger distal DIP joint 1 cm laceration status post contusion, without acute fractures.  Plan for right middle finger x-ray, update tetanus, lac repair, likely DC back to [facility].

## 2022-11-11 NOTE — BH INPATIENT PSYCHIATRY PROGRESS NOTE - NSBHASSESSSUMMFT_PSY_ALL_CORE

## 2022-11-11 NOTE — CHART NOTE - NSCHARTNOTEFT_GEN_A_CORE
[facility] Inpatient to ED Transfer Summary    Reason for Transfer/Medical Summary: Finger laceration      PAST MEDICAL & SURGICAL HISTORY:  HTN (hypertension)      Severe depression      HLD (hyperlipidemia)          Allergies    penicillin (Hives)  Septra (Rash; Urticaria; Hives)    Intolerances        MEDICATIONS  (STANDING):  ARIPiprazole 5 milliGRAM(s) Oral daily  finasteride 5 milliGRAM(s) Oral daily  melatonin. 5 milliGRAM(s) Oral at bedtime  NIFEdipine XL 30 milliGRAM(s) Oral daily  oxybutynin 5 milliGRAM(s) Oral daily  simvastatin 20 milliGRAM(s) Oral at bedtime  traZODone 50 milliGRAM(s) Oral at bedtime  venlafaxine  milliGRAM(s) Oral daily    MEDICATIONS  (PRN):  acetaminophen     Tablet .. 650 milliGRAM(s) Oral every 6 hours PRN Mild Pain (1 - 3), Moderate Pain (4 - 6)  diphenhydrAMINE 50 milliGRAM(s) Oral every 6 hours PRN agitation  diphenhydrAMINE Injectable 50 milliGRAM(s) IntraMuscular every 4 hours PRN severe agitation  fluticasone propionate 50 MICROgram(s)/spray Nasal Spray 1 Spray(s) Both Nostrils two times a day PRN congestion  haloperidol     Tablet 5 milliGRAM(s) Oral every 6 hours PRN agitation  haloperidol    Injectable 5 milliGRAM(s) IntraMuscular once PRN severe agitation  lidocaine   4% Patch 1 Patch Transdermal every 24 hours PRN pain      Vital Signs Last 24 Hrs  T(C): --  T(F): --  HR: --  BP: --  BP(mean): --  RR: --  SpO2: --      CAPILLARY BLOOD GLUCOSE            PHYSICAL EXAM:  GENERAL: NAD, well-developed  HEAD:  Atraumatic, Normocephalic  EYES: EOMI, PERRLA, conjunctiva and sclera clear  NECK: Supple, No JVD  CHEST/LUNG: Clear to auscultation bilaterally; No wheeze  HEART: Regular rate and rhythm; No murmurs, rubs, or gallops  ABDOMEN: Soft, Nontender, Nondistended; Bowel sounds present  EXTREMITIES:  2+ Peripheral Pulses, No clubbing, cyanosis, or edema  PSYCH: AAOx3  NEUROLOGY: non-focal  SKIN: No rashes or lesions    LABS:    11-10    143  |  101  |  27<H>  ----------------------------<  211<H>  3.3<L>   |  30  |  1.13    Ca    9.3      10 Nov 2022 09:13                Psychiatry Section:  Psychiatric Summary/[facility] admitting diagnosis: 72 year old man with hx of depression and mixed personality disorder, admitted for treatment of MDD, undergoing ECT. This morning pt got his right middle finger caught in the bathroom door, now has a laceration in his distal interphalangeal joint.     Psychiatric Recommendations:    Observation status (check one):   ( ) Constant Observation  ( ) Enhanced care  ( ) Routine checks    Risk Status (check all that apply if present):  ( ) at risk for suicide/self-injury  ( ) at risk for aggressive behavior  ( ) at risk for elopement  ( ) other risk:

## 2022-11-11 NOTE — ED PROVIDER NOTE - ATTENDING APP SHARED VISIT CONTRIBUTION OF CARE
Dr. Calderon:  I performed a face to face bedside interview with patient regarding history of present illness, review of symptoms and past medical history. I completed an independent physical exam.  I have discussed patient's plan of care with PA.   I agree with note as stated above, having amended the EMR as needed to reflect my findings.   This includes HISTORY OF PRESENT ILLNESS, HIV, PAST MEDICAL/SURGICAL/FAMILY/SOCIAL HISTORY, ALLERGIES AND HOME MEDICATIONS, REVIEW OF SYSTEMS, PHYSICAL EXAM, and any PROGRESS NOTES during the time I functioned as the attending physician for this patient.    72M h/o depression, mixed personality disorder, sent from [facility] inpatient for injury to right finger.  Finger was caught in bathroom door, sustained laceration.    Exam:  - nad  - +1.5cm horizontal laceration to distal right middle finger, bleeding controlled, neurovascularly intact    A/P   - finger injury, r/o fracture, repair lac, update tetanus prn

## 2022-11-11 NOTE — PROVIDER CONTACT NOTE (OTHER) - BACKGROUND
ED PA asked ED SW to assist with getting discharged pt. back to his unit at [facility].  SW contacted Unit [address] at [facility] and spoke to pt's nurse, Rhea brown [id number] to inform of her of the

## 2022-11-11 NOTE — BH INPATIENT PSYCHIATRY PROGRESS NOTE - CURRENT MEDICATION
MEDICATIONS  (STANDING):  ARIPiprazole 5 milliGRAM(s) Oral daily  finasteride 5 milliGRAM(s) Oral daily  melatonin. 5 milliGRAM(s) Oral at bedtime  NIFEdipine XL 30 milliGRAM(s) Oral daily  oxybutynin 5 milliGRAM(s) Oral daily  potassium chloride    Tablet ER 60 milliEquivalent(s) Oral once  potassium chloride    Tablet ER 40 milliEquivalent(s) Oral once  simvastatin 20 milliGRAM(s) Oral at bedtime  traZODone 50 milliGRAM(s) Oral at bedtime  venlafaxine  milliGRAM(s) Oral daily    MEDICATIONS  (PRN):  acetaminophen     Tablet .. 650 milliGRAM(s) Oral every 6 hours PRN Mild Pain (1 - 3), Moderate Pain (4 - 6)  diphenhydrAMINE 50 milliGRAM(s) Oral every 6 hours PRN agitation  diphenhydrAMINE Injectable 50 milliGRAM(s) IntraMuscular every 4 hours PRN severe agitation  fluticasone propionate 50 MICROgram(s)/spray Nasal Spray 1 Spray(s) Both Nostrils two times a day PRN congestion  haloperidol     Tablet 5 milliGRAM(s) Oral every 6 hours PRN agitation  haloperidol    Injectable 5 milliGRAM(s) IntraMuscular once PRN severe agitation  lidocaine   4% Patch 1 Patch Transdermal every 24 hours PRN pain

## 2022-11-11 NOTE — ED ADULT TRIAGE NOTE - CHIEF COMPLAINT QUOTE
Pt arrives ambulatory from [facility] with aide, c/o pain to right middle finger after slamming it in a door this AM. Denies other complaints. Arrives with finger wrapped in gauze, no active bleeding. Cooperative in triage. PMH: HLD, severe depression, HTN.

## 2022-11-12 RX ADMIN — ARIPIPRAZOLE 5 MILLIGRAM(S): 15 TABLET ORAL at 09:16

## 2022-11-12 RX ADMIN — Medication 50 MILLIGRAM(S): at 21:44

## 2022-11-12 RX ADMIN — Medication 150 MILLIGRAM(S): at 09:17

## 2022-11-12 RX ADMIN — Medication 30 MILLIGRAM(S): at 09:17

## 2022-11-12 RX ADMIN — FINASTERIDE 5 MILLIGRAM(S): 5 TABLET, FILM COATED ORAL at 09:16

## 2022-11-12 RX ADMIN — SIMVASTATIN 20 MILLIGRAM(S): 20 TABLET, FILM COATED ORAL at 21:45

## 2022-11-12 RX ADMIN — Medication 5 MILLIGRAM(S): at 09:17

## 2022-11-12 RX ADMIN — Medication 5 MILLIGRAM(S): at 21:45

## 2022-11-13 LAB — SARS-COV-2 RNA SPEC QL NAA+PROBE: SIGNIFICANT CHANGE UP

## 2022-11-13 RX ORDER — BACITRACIN ZINC 500 UNIT/G
1 OINTMENT IN PACKET (EA) TOPICAL
Refills: 0 | Status: DISCONTINUED | OUTPATIENT
Start: 2022-11-13 | End: 2022-11-25

## 2022-11-13 RX ADMIN — Medication 50 MILLIGRAM(S): at 21:51

## 2022-11-13 RX ADMIN — ARIPIPRAZOLE 5 MILLIGRAM(S): 15 TABLET ORAL at 08:25

## 2022-11-13 RX ADMIN — Medication 30 MILLIGRAM(S): at 08:26

## 2022-11-13 RX ADMIN — SIMVASTATIN 20 MILLIGRAM(S): 20 TABLET, FILM COATED ORAL at 21:51

## 2022-11-13 RX ADMIN — Medication 5 MILLIGRAM(S): at 08:25

## 2022-11-13 RX ADMIN — Medication 5 MILLIGRAM(S): at 21:50

## 2022-11-13 RX ADMIN — Medication 150 MILLIGRAM(S): at 08:25

## 2022-11-13 RX ADMIN — FINASTERIDE 5 MILLIGRAM(S): 5 TABLET, FILM COATED ORAL at 08:25

## 2022-11-14 PROCEDURE — 99231 SBSQ HOSP IP/OBS SF/LOW 25: CPT | Mod: 25

## 2022-11-14 PROCEDURE — 90870 ELECTROCONVULSIVE THERAPY: CPT

## 2022-11-14 RX ADMIN — Medication 5 MILLIGRAM(S): at 11:23

## 2022-11-14 RX ADMIN — Medication 150 MILLIGRAM(S): at 11:24

## 2022-11-14 RX ADMIN — SIMVASTATIN 20 MILLIGRAM(S): 20 TABLET, FILM COATED ORAL at 21:09

## 2022-11-14 RX ADMIN — ARIPIPRAZOLE 5 MILLIGRAM(S): 15 TABLET ORAL at 11:23

## 2022-11-14 RX ADMIN — Medication 30 MILLIGRAM(S): at 11:23

## 2022-11-14 RX ADMIN — FINASTERIDE 5 MILLIGRAM(S): 5 TABLET, FILM COATED ORAL at 11:24

## 2022-11-14 RX ADMIN — Medication 5 MILLIGRAM(S): at 21:09

## 2022-11-14 RX ADMIN — Medication 50 MILLIGRAM(S): at 21:09

## 2022-11-14 NOTE — BH INPATIENT PSYCHIATRY PROGRESS NOTE - CURRENT MEDICATION
MEDICATIONS  (STANDING):  ARIPiprazole 5 milliGRAM(s) Oral daily  finasteride 5 milliGRAM(s) Oral daily  melatonin. 5 milliGRAM(s) Oral at bedtime  NIFEdipine XL 30 milliGRAM(s) Oral daily  oxybutynin 5 milliGRAM(s) Oral daily  simvastatin 20 milliGRAM(s) Oral at bedtime  traZODone 50 milliGRAM(s) Oral at bedtime  venlafaxine  milliGRAM(s) Oral daily    MEDICATIONS  (PRN):  acetaminophen     Tablet .. 650 milliGRAM(s) Oral every 6 hours PRN Mild Pain (1 - 3), Moderate Pain (4 - 6)  bacitracin   Ointment 1 Application(s) Topical two times a day PRN Wound care  diphenhydrAMINE 50 milliGRAM(s) Oral every 6 hours PRN agitation  diphenhydrAMINE Injectable 50 milliGRAM(s) IntraMuscular every 4 hours PRN severe agitation  fluticasone propionate 50 MICROgram(s)/spray Nasal Spray 1 Spray(s) Both Nostrils two times a day PRN congestion  haloperidol     Tablet 5 milliGRAM(s) Oral every 6 hours PRN agitation  haloperidol    Injectable 5 milliGRAM(s) IntraMuscular once PRN severe agitation  lidocaine   4% Patch 1 Patch Transdermal every 24 hours PRN pain

## 2022-11-14 NOTE — BH INPATIENT PSYCHIATRY PROGRESS NOTE - NSBHMETABOLIC_PSY_ALL_CORE_FT
BMI: BMI (kg/m2): 24.4 (11-12-22 @ 18:02)  HbA1c: A1C with Estimated Average Glucose Result: 5.1 % (07-06-22 @ 10:38)    Glucose: POCT Blood Glucose.: 112 mg/dL (10-05-22 @ 20:21)    BP: 162/85 (11-11-22 @ 16:35) (158/94 - 193/101)  Lipid Panel: Date/Time: 07-06-22 @ 10:38  Cholesterol, Serum: 128  Direct LDL: --  HDL Cholesterol, Serum: 52  Total Cholesterol/HDL Ration Measurement: --  Triglycerides, Serum: 74

## 2022-11-14 NOTE — BH INPATIENT PSYCHIATRY PROGRESS NOTE - NSBHCHARTREVIEWVS_PSY_A_CORE FT
Vital Signs Last 24 Hrs  T(C): 36.5 (11-14-22 @ 05:51), Max: 36.5 (11-14-22 @ 05:51)  T(F): 97.7 (11-14-22 @ 05:51), Max: 97.7 (11-14-22 @ 05:51)  HR: --  BP: --  BP(mean): --  RR: --  SpO2: --    Orthostatic VS  11-14-22 @ 10:06  Lying BP: --/-- HR: --  Sitting BP: 135/72 HR: 73  Standing BP: 107/72 HR: 100  Site: upper left arm  Mode: electronic  Orthostatic VS  11-14-22 @ 05:51  Lying BP: --/-- HR: --  Sitting BP: 149/87 HR: 92  Standing BP: 115/75 HR: 96  Site: --  Mode: --  Orthostatic VS  11-13-22 @ 07:42  Lying BP: --/-- HR: --  Sitting BP: 105/77 HR: 88  Standing BP: 101/64 HR: 91  Site: --  Mode: --

## 2022-11-14 NOTE — ADVANCED PRACTICE NURSE CONSULT - RECOMMEDATIONS
Continue to monitor for erythema and edema and keep area clean dry and intact.  Placed on calendar for the PA to remove stiches on Friday the 18th.  Will be available as needed

## 2022-11-14 NOTE — BH INPATIENT PSYCHIATRY PROGRESS NOTE - NSBHFUPINTERVALHXFT_PSY_A_CORE
Patient seen for follow-up of depression, chart reviewed and discussed with nursing staff.  Pt is compliant with meds. As per staff pt ate vicki crackers this morning thus his ECT was delayed until 3pm. Pt was informed. He expressed feeling unhappy about it but understanding the protocol. When asked about his mood since treatment started pt states he feels grumpier and angrier at other people, however he smiles while saying this and overall appears brighter in affect. Vitals reveal orthostatic hypotension this morning. Discussed possibility of receiving IV fluid during ECT today. Pt may also receive clear fluids with no protein up until 1pm today.

## 2022-11-14 NOTE — ADVANCED PRACTICE NURSE CONSULT - ASSESSMENT
Patient s/p laceration repair on 11/11  On assessment noted patient had three stiches on his middle finger; stiches in place and intact.  Pt reported minimal pain when rubbing his fingers. Patient tolerated moving fingers without difficulties.

## 2022-11-14 NOTE — BH INPATIENT PSYCHIATRY PROGRESS NOTE - NSBHASSESSSUMMFT_PSY_ALL_CORE

## 2022-11-15 LAB
ANION GAP SERPL CALC-SCNC: 12 MMOL/L — SIGNIFICANT CHANGE UP (ref 7–14)
BUN SERPL-MCNC: 27 MG/DL — HIGH (ref 7–23)
CALCIUM SERPL-MCNC: 8.8 MG/DL — SIGNIFICANT CHANGE UP (ref 8.4–10.5)
CHLORIDE SERPL-SCNC: 99 MMOL/L — SIGNIFICANT CHANGE UP (ref 98–107)
CO2 SERPL-SCNC: 30 MMOL/L — SIGNIFICANT CHANGE UP (ref 22–31)
CREAT SERPL-MCNC: 1.17 MG/DL — SIGNIFICANT CHANGE UP (ref 0.5–1.3)
EGFR: 66 ML/MIN/1.73M2 — SIGNIFICANT CHANGE UP
GLUCOSE SERPL-MCNC: 223 MG/DL — HIGH (ref 70–99)
POTASSIUM SERPL-MCNC: 3.5 MMOL/L — SIGNIFICANT CHANGE UP (ref 3.5–5.3)
POTASSIUM SERPL-SCNC: 3.5 MMOL/L — SIGNIFICANT CHANGE UP (ref 3.5–5.3)
SODIUM SERPL-SCNC: 141 MMOL/L — SIGNIFICANT CHANGE UP (ref 135–145)

## 2022-11-15 PROCEDURE — 99231 SBSQ HOSP IP/OBS SF/LOW 25: CPT

## 2022-11-15 RX ADMIN — ARIPIPRAZOLE 5 MILLIGRAM(S): 15 TABLET ORAL at 09:21

## 2022-11-15 RX ADMIN — Medication 150 MILLIGRAM(S): at 09:21

## 2022-11-15 RX ADMIN — FINASTERIDE 5 MILLIGRAM(S): 5 TABLET, FILM COATED ORAL at 09:21

## 2022-11-15 RX ADMIN — Medication 5 MILLIGRAM(S): at 09:21

## 2022-11-15 RX ADMIN — SIMVASTATIN 20 MILLIGRAM(S): 20 TABLET, FILM COATED ORAL at 21:06

## 2022-11-15 RX ADMIN — Medication 5 MILLIGRAM(S): at 21:06

## 2022-11-15 RX ADMIN — Medication 50 MILLIGRAM(S): at 21:06

## 2022-11-15 RX ADMIN — Medication 30 MILLIGRAM(S): at 09:21

## 2022-11-15 NOTE — BH INPATIENT PSYCHIATRY PROGRESS NOTE - NSBHASSESSSUMMFT_PSY_ALL_CORE

## 2022-11-15 NOTE — BH INPATIENT PSYCHIATRY PROGRESS NOTE - NSBHCHARTREVIEWVS_PSY_A_CORE FT
Vital Signs Last 24 Hrs  T(C): 36.7 (11-15-22 @ 08:51), Max: 36.7 (11-15-22 @ 08:51)  T(F): 98 (11-15-22 @ 08:51), Max: 98 (11-15-22 @ 08:51)  HR: 86 (11-15-22 @ 08:51) (71 - 101)  BP: 123/78 (11-15-22 @ 08:51) (123/78 - 188/109)  BP(mean): 111 (11-14-22 @ 15:50) (111 - 128)  RR: 17 (11-15-22 @ 08:51) (15 - 20)  SpO2: 99% (11-15-22 @ 08:51) (95% - 100%)    Orthostatic VS  11-15-22 @ 08:51  Lying BP: --/-- HR: --  Sitting BP: 123/78 HR: 86  Standing BP: 105/89 HR: 103  Site: --  Mode: --  Orthostatic VS  11-14-22 @ 10:32  Lying BP: --/-- HR: --  Sitting BP: 133/80 HR: 92  Standing BP: 104/80 HR: 88  Site: upper left arm  Mode: electronic  Orthostatic VS  11-14-22 @ 10:06  Lying BP: --/-- HR: --  Sitting BP: 135/72 HR: 73  Standing BP: 107/72 HR: 100  Site: upper left arm  Mode: electronic  Orthostatic VS  11-14-22 @ 05:51  Lying BP: --/-- HR: --  Sitting BP: 149/87 HR: 92  Standing BP: 115/75 HR: 96  Site: --  Mode: --

## 2022-11-15 NOTE — BH INPATIENT PSYCHIATRY PROGRESS NOTE - NSBHCHARTREVIEWVS_PSY_A_CORE FT
Vital Signs Last 24 Hrs  T(C): 36.7 (11-15-22 @ 08:51), Max: 36.7 (11-15-22 @ 08:51)  T(F): 98 (11-15-22 @ 08:51), Max: 98 (11-15-22 @ 08:51)  HR: 86 (11-15-22 @ 08:51) (86 - 86)  BP: 123/78 (11-15-22 @ 08:51) (123/78 - 123/78)  BP(mean): --  RR: 17 (11-15-22 @ 08:51) (17 - 17)  SpO2: 99% (11-15-22 @ 08:51) (99% - 99%)    Orthostatic VS  11-15-22 @ 08:51  Lying BP: --/-- HR: --  Sitting BP: 123/78 HR: 86  Standing BP: 105/89 HR: 103  Site: --  Mode: --  Orthostatic VS  11-14-22 @ 10:32  Lying BP: --/-- HR: --  Sitting BP: 133/80 HR: 92  Standing BP: 104/80 HR: 88  Site: upper left arm  Mode: electronic  Orthostatic VS  11-14-22 @ 10:06  Lying BP: --/-- HR: --  Sitting BP: 135/72 HR: 73  Standing BP: 107/72 HR: 100  Site: upper left arm  Mode: electronic  Orthostatic VS  11-14-22 @ 05:51  Lying BP: --/-- HR: --  Sitting BP: 149/87 HR: 92  Standing BP: 115/75 HR: 96  Site: --  Mode: --

## 2022-11-15 NOTE — BH INPATIENT PSYCHIATRY PROGRESS NOTE - NSBHMETABOLIC_PSY_ALL_CORE_FT
BMI: BMI (kg/m2): 24.4 (11-12-22 @ 18:02)  HbA1c: A1C with Estimated Average Glucose Result: 5.1 % (07-06-22 @ 10:38)    Glucose: POCT Blood Glucose.: 112 mg/dL (10-05-22 @ 20:21)    BP: 123/78 (11-15-22 @ 08:51) (123/78 - 188/109)  Lipid Panel: Date/Time: 07-06-22 @ 10:38  Cholesterol, Serum: 128  Direct LDL: --  HDL Cholesterol, Serum: 52  Total Cholesterol/HDL Ration Measurement: --  Triglycerides, Serum: 74

## 2022-11-15 NOTE — BH INPATIENT PSYCHIATRY PROGRESS NOTE - NSBHFUPINTERVALHXFT_PSY_A_CORE
Patient seen for follow-up of depression, chart reviewed and discussed with nursing staff.  Pt is compliant with meds. Pt is s/p ECT #4. He is alert and oriented. Appears brighter in affect. Thought content is more relevant, inquiring about his ECT treatment plan instead of focusing on negative thinking and somatic complaints. No SIIP elicited today. Pt is eating and drinking adequately. Will continue pushing fluids as pt shows orthostatic drop on vitals today.

## 2022-11-15 NOTE — BH INPATIENT PSYCHIATRY PROGRESS NOTE - NSBHASSESSSUMMFT_PSY_ALL_CORE

## 2022-11-16 PROCEDURE — 99233 SBSQ HOSP IP/OBS HIGH 50: CPT

## 2022-11-16 PROCEDURE — 99231 SBSQ HOSP IP/OBS SF/LOW 25: CPT

## 2022-11-16 RX ORDER — CEPHALEXIN 500 MG
500 CAPSULE ORAL
Refills: 0 | Status: DISCONTINUED | OUTPATIENT
Start: 2022-11-16 | End: 2022-11-25

## 2022-11-16 RX ADMIN — Medication 150 MILLIGRAM(S): at 11:59

## 2022-11-16 RX ADMIN — ARIPIPRAZOLE 5 MILLIGRAM(S): 15 TABLET ORAL at 12:00

## 2022-11-16 RX ADMIN — Medication 50 MILLIGRAM(S): at 21:02

## 2022-11-16 RX ADMIN — Medication 30 MILLIGRAM(S): at 11:59

## 2022-11-16 RX ADMIN — Medication 5 MILLIGRAM(S): at 11:59

## 2022-11-16 RX ADMIN — Medication 100 MILLIGRAM(S): at 21:02

## 2022-11-16 RX ADMIN — SIMVASTATIN 20 MILLIGRAM(S): 20 TABLET, FILM COATED ORAL at 21:02

## 2022-11-16 RX ADMIN — Medication 500 MILLIGRAM(S): at 16:58

## 2022-11-16 RX ADMIN — Medication 5 MILLIGRAM(S): at 21:02

## 2022-11-16 RX ADMIN — Medication 500 MILLIGRAM(S): at 21:03

## 2022-11-16 RX ADMIN — FINASTERIDE 5 MILLIGRAM(S): 5 TABLET, FILM COATED ORAL at 11:59

## 2022-11-16 NOTE — BH INPATIENT PSYCHIATRY PROGRESS NOTE - NSBHFUPINTERVALHXFT_PSY_A_CORE
Patient seen for follow-up of depression, chart reviewed and discussed with nursing staff.  Pt is compliant with meds. Pt is s/p ECT #4. He is alert and oriented. Appears brighter in affect. Pt no longer endorsing SI. During encounters he tends to dwell less on somatic complaints and negative ruminations as well. Finger appears more swollen today, discussed with hospitalist. Pt is afebrile.   ECT cancelled today after Anesthesia noted patient has a loose molar. Dental consult placed for likely extraction.

## 2022-11-16 NOTE — BH PSYCHOLOGY - CLINICIAN PSYCHOTHERAPY NOTE - NSTXDCOTHRGOAL_PSY_ALL_CORE
Pt will comply with recommended treatment, with an improvement in his symptoms, and resumption of baseline functioning
Pt will continue with ECT and medication, with an improvement in symptoms, helping him to participate in his care, accepting of supports at DC
Pt will comply with medication and treatment recommendations with an improvement in symptoms, helping pt's overall functioning and safety in the community

## 2022-11-16 NOTE — BH PSYCHOLOGY - CLINICIAN PSYCHOTHERAPY NOTE - NSBHPSYCHOLNARRATIVE_PSY_A_CORE FT
Pt. presented for individual psychotherapy with a depressed mood and a constricted affect. Pt.'s thought process was  Pt. presented for individual psychotherapy with a depressed mood and a constricted affect. Pt.'s thought process was linear, and his thought content centered upon themes related to negative self-perceptions, a desire to be in a romantic companionship, and a more negativistic outlook about his future. Pt.'s speech rhythm, rate, volume and tone were within normative limits. His grooming and hygiene appeared fair. Pt. endorsed passive suicidal thoughts but denied suicidal intentions or plans. Writer and pt. discussed pt's negative automatic thoughts from a cognitive behavioral therapeutic framework and the ways in which expectations of negative events often lead to worsening of depressive symptoms. Writer provided pt. with psychoeducation about the ways in which depressive symptoms can cloud perceptions. Writer encouraged pt. to consider, and write down, one positive event from his day each day between today's session and next week. Pt. reported that he can try. Writer also encouraged pt's continued group attendance and use of coping skills, including writing poetry, as pt. reported writing serves as a distraction from his thoughts for brief periods of time. Pt. presented for individual psychotherapy with a depressed mood and a constricted affect. Pt.'s thought process was linear, and his thought content centered upon themes related to negative self-perceptions, a desire to be in a romantic companionship, and a more negativistic outlook about his future. Pt.'s speech rhythm, rate, volume and tone were within normative limits. His grooming and hygiene appeared fair. Pt. continues to endorse passive suicidal thoughts but denied suicidal intentions or plans. Writer and pt. discussed pt's negative automatic thoughts from a cognitive behavioral therapeutic framework and the ways in which expectations of negative events often lead to worsening of depressive symptoms. Writer provided pt. with psychoeducation about the ways in which depressive symptoms can cloud perceptions. Writer encouraged pt. to consider, and write down, one positive event from his day each day between today's session and next week. Pt. reported that he can try. Writer also encouraged pt's continued group attendance and use of coping skills, including writing poetry, as pt. reported writing serves as a distraction from his thoughts for brief periods of time.

## 2022-11-16 NOTE — PROGRESS NOTE ADULT - SUBJECTIVE AND OBJECTIVE BOX
CC/Reason for Consult: laceration to the finger    SUBJECTIVE / OVERNIGHT EVENTS:  Patient seen and examined this afternoon. Medicine consulted b/o finger laceration. About 5 days ago the patient had his finger caught in the bathroom door. He subsequently presented to the ED where he had 3 stitches. The patient states that since the injury he had progressing swelling and pain in his finger. No f/c present.     MEDICATIONS  (STANDING):  ARIPiprazole 5 milliGRAM(s) Oral daily  finasteride 5 milliGRAM(s) Oral daily  melatonin. 5 milliGRAM(s) Oral at bedtime  NIFEdipine XL 30 milliGRAM(s) Oral daily  oxybutynin 5 milliGRAM(s) Oral daily  simvastatin 20 milliGRAM(s) Oral at bedtime  traZODone 50 milliGRAM(s) Oral at bedtime  venlafaxine  milliGRAM(s) Oral daily    MEDICATIONS  (PRN):  acetaminophen     Tablet .. 650 milliGRAM(s) Oral every 6 hours PRN Mild Pain (1 - 3), Moderate Pain (4 - 6)  bacitracin   Ointment 1 Application(s) Topical two times a day PRN Wound care  diphenhydrAMINE 50 milliGRAM(s) Oral every 6 hours PRN agitation  diphenhydrAMINE Injectable 50 milliGRAM(s) IntraMuscular every 4 hours PRN severe agitation  fluticasone propionate 50 MICROgram(s)/spray Nasal Spray 1 Spray(s) Both Nostrils two times a day PRN congestion  haloperidol     Tablet 5 milliGRAM(s) Oral every 6 hours PRN agitation  haloperidol    Injectable 5 milliGRAM(s) IntraMuscular once PRN severe agitation  lidocaine   4% Patch 1 Patch Transdermal every 24 hours PRN pain      Vital Signs Last 24 Hrs  T(C): 36.7 (16 Nov 2022 06:10), Max: 36.7 (16 Nov 2022 06:10)  T(F): 98 (16 Nov 2022 06:10), Max: 98 (16 Nov 2022 06:10)  HR: --  BP: --  BP(mean): --  RR: --  SpO2: --      CAPILLARY BLOOD GLUCOSE            PHYSICAL EXAM:  GENERAL: NAD, well-developed  HEAD:  Atraumatic, Normocephalic  EYES: EOMI, conjunctiva and sclera clear  NECK: Supple, No JVD  CHEST/LUNG: Clear to auscultation bilaterally; No wheeze  HEART: Regular rate and rhythm; No murmurs, rubs, or gallops  ABDOMEN: Soft, Nontender, Nondistended; Bowel sounds present  EXTREMITIES:  2+ Peripheral Pulses, No clubbing, cyanosis, or edema. R. middle finger with laceration and sutures noted.   PSYCH: AAOx3  NEUROLOGY: non-focal  SKIN: No rashes or lesions    LABS:    11-15    141  |  99  |  27<H>  ----------------------------<  223<H>  3.5   |  30  |  1.17    Ca    8.8      15 Nov 2022 09:20                RADIOLOGY & ADDITIONAL TESTS:    Imaging Personally Reviewed:    Consultant(s) Notes Reviewed:      Care Discussed with Consultants/Other Providers:   CC/Reason for Consult: laceration to the finger    SUBJECTIVE / OVERNIGHT EVENTS:  Patient seen and examined this afternoon. Medicine consulted b/o finger laceration. About 5 days ago the patient had his finger caught in the bathroom door. He subsequently presented to the ED where he had 3 stitches. The patient states that since the injury he had progressing swelling and pain in his finger. No f/c present.     MEDICATIONS  (STANDING):  ARIPiprazole 5 milliGRAM(s) Oral daily  finasteride 5 milliGRAM(s) Oral daily  melatonin. 5 milliGRAM(s) Oral at bedtime  NIFEdipine XL 30 milliGRAM(s) Oral daily  oxybutynin 5 milliGRAM(s) Oral daily  simvastatin 20 milliGRAM(s) Oral at bedtime  traZODone 50 milliGRAM(s) Oral at bedtime  venlafaxine  milliGRAM(s) Oral daily    MEDICATIONS  (PRN):  acetaminophen     Tablet .. 650 milliGRAM(s) Oral every 6 hours PRN Mild Pain (1 - 3), Moderate Pain (4 - 6)  bacitracin   Ointment 1 Application(s) Topical two times a day PRN Wound care  diphenhydrAMINE 50 milliGRAM(s) Oral every 6 hours PRN agitation  diphenhydrAMINE Injectable 50 milliGRAM(s) IntraMuscular every 4 hours PRN severe agitation  fluticasone propionate 50 MICROgram(s)/spray Nasal Spray 1 Spray(s) Both Nostrils two times a day PRN congestion  haloperidol     Tablet 5 milliGRAM(s) Oral every 6 hours PRN agitation  haloperidol    Injectable 5 milliGRAM(s) IntraMuscular once PRN severe agitation  lidocaine   4% Patch 1 Patch Transdermal every 24 hours PRN pain      Vital Signs Last 24 Hrs  T(C): 36.7 (16 Nov 2022 06:10), Max: 36.7 (16 Nov 2022 06:10)  T(F): 98 (16 Nov 2022 06:10), Max: 98 (16 Nov 2022 06:10)  HR: --  BP: --  BP(mean): --  RR: --  SpO2: --      CAPILLARY BLOOD GLUCOSE            PHYSICAL EXAM:  GENERAL: NAD, well-developed  HEAD:  Atraumatic, Normocephalic  EYES: EOMI, conjunctiva and sclera clear  NECK: Supple, No JVD  CHEST/LUNG: Clear to auscultation bilaterally; No wheeze  HEART: Regular rate and rhythm; No murmurs, rubs, or gallops  ABDOMEN: Soft, Nontender, Nondistended; Bowel sounds present  EXTREMITIES:  2+ Peripheral Pulses, No clubbing, cyanosis, or edema. R. middle finger with laceration and sutures noted. Warm to touch (warmer than other fingers), no erythema noted.   PSYCH: AAOx3  NEUROLOGY: non-focal  SKIN: No rashes or lesions    LABS:    11-15    141  |  99  |  27<H>  ----------------------------<  223<H>  3.5   |  30  |  1.17    Ca    8.8      15 Nov 2022 09:20                RADIOLOGY & ADDITIONAL TESTS:    Imaging Personally Reviewed:    Consultant(s) Notes Reviewed:      Care Discussed with Consultants/Other Providers:

## 2022-11-16 NOTE — PHARMACOTHERAPY INTERVENTION NOTE - COMMENTS
spoke to dr. Acuna about penicillin allergy and possible cross sensitivity with cephalexin. He said patient has not much choices and he will tell RN to monitor

## 2022-11-16 NOTE — PROGRESS NOTE ADULT - ASSESSMENT
70 y/o male with BPH, HTN and HLD, recent covid infection 9/22/22 transferred from [facility] to [facility]. Initially presented to [facility] with generalized complaints; SOB. Pt admitted for  workup for dyspnea and depression . CTA chest negative for PE, cardiac w/u negative for ACS. Covid positive while hospitalized on 9/22, did not require o2 therapy, dex or remdes. Admitted to [facility] for mgmt of his depression, SI and agitation.     Finger laceration      BPH  - Cont flomax and oxybutynin as ordered    HTN  - Cont nifedipine 30 daily  HLD  - Cont statin    Thyroid nodules, incidental finding  On CTA on admission. TSH checked in July 2022 was wnl.  - Needs outpatient f/u w PCP for further surveillance  - TSH wnl    70 y/o male with BPH, HTN and HLD, recent covid infection 9/22/22 transferred from [facility] to [facility]. Initially presented to [facility] with generalized complaints; SOB. Pt admitted for  workup for dyspnea and depression . CTA chest negative for PE, cardiac w/u negative for ACS. Covid positive while hospitalized on 9/22, did not require o2 therapy, dex or remdes. Admitted to [facility] [place] [place] for mgmt of his depression, SI and agitation.     Finger laceration/Cellulitis of the right middle finger  - Will start the patient on cephalexin and doxy  - Monitor for improvement    BPH  - Cont flomax and oxybutynin as ordered    HTN  - Cont nifedipine 30 daily  HLD  - Cont statin    Thyroid nodules, incidental finding  On CTA on admission. TSH checked in July 2022 was wnl.  - Needs outpatient f/u w PCP for further surveillance  - TSH wnl

## 2022-11-16 NOTE — BH PSYCHOLOGY - CLINICIAN PSYCHOTHERAPY NOTE - NSBHPSYCHOLGOALS_PSY_A_CORE
Assessment/Improve social/vocational/coping skills/Psychoeducation/Treatment compliance
Decrease symptoms/Improve level of independent functioning/Improve social/vocational/coping skills/Psychoeducation/Treatment compliance
Improve social/vocational/coping skills/Treatment compliance

## 2022-11-16 NOTE — BH INPATIENT PSYCHIATRY PROGRESS NOTE - NSBHASSESSSUMMFT_PSY_ALL_CORE

## 2022-11-16 NOTE — BH PSYCHOLOGY - CLINICIAN PSYCHOTHERAPY NOTE - TOKEN PULL-DIAGNOSIS
Primary Diagnosis:  Depressive disorder [F32.A]        Problem Dx:   Thyroid nodule [E04.1]      HLD (hyperlipidemia) [E78.5]      HTN (hypertension) [I10]      BPH (benign prostatic hyperplasia) [N40.0]      Hypokalemia [E87.6]      Major depression [F32.9]      Preprocedural examination [Z01.818]      

## 2022-11-16 NOTE — BH INPATIENT PSYCHIATRY PROGRESS NOTE - NSBHCHARTREVIEWVS_PSY_A_CORE FT
Vital Signs Last 24 Hrs  T(C): 36.7 (11-16-22 @ 06:10), Max: 36.7 (11-16-22 @ 06:10)  T(F): 98 (11-16-22 @ 06:10), Max: 98 (11-16-22 @ 06:10)  HR: --  BP: --  BP(mean): --  RR: --  SpO2: --    Orthostatic VS  11-16-22 @ 06:10  Lying BP: 141/79 HR: 76  Sitting BP: 122/85 HR: 89  Standing BP: --/-- HR: --  Site: upper left arm  Mode: electronic  Orthostatic VS  11-15-22 @ 08:51  Lying BP: --/-- HR: --  Sitting BP: 123/78 HR: 86  Standing BP: 105/89 HR: 103  Site: --  Mode: --

## 2022-11-16 NOTE — BH PSYCHOLOGY - CLINICIAN PSYCHOTHERAPY NOTE - NSBHPSYCHOLINT_PSY_A_CORE
Supported coping skills/Supportive therapy/Treatment compliance encouraged Cognitive/behavioral therapy/Supported coping skills/Supportive therapy/Treatment compliance encouraged

## 2022-11-16 NOTE — BH PSYCHOLOGY - CLINICIAN PSYCHOTHERAPY NOTE - NSBHPSYCHOLPROBS_PSY_ALL_CORE
Academic/Vocational/Social Dysfunction/Anger/Irritability/Depression/Impulsivity
Anger/Irritability/Depression
Depression

## 2022-11-17 PROCEDURE — 99231 SBSQ HOSP IP/OBS SF/LOW 25: CPT

## 2022-11-17 RX ADMIN — Medication 50 MILLIGRAM(S): at 20:51

## 2022-11-17 RX ADMIN — FINASTERIDE 5 MILLIGRAM(S): 5 TABLET, FILM COATED ORAL at 11:01

## 2022-11-17 RX ADMIN — SIMVASTATIN 20 MILLIGRAM(S): 20 TABLET, FILM COATED ORAL at 20:51

## 2022-11-17 RX ADMIN — Medication 100 MILLIGRAM(S): at 20:50

## 2022-11-17 RX ADMIN — Medication 100 MILLIGRAM(S): at 09:44

## 2022-11-17 RX ADMIN — Medication 500 MILLIGRAM(S): at 09:43

## 2022-11-17 RX ADMIN — Medication 150 MILLIGRAM(S): at 09:44

## 2022-11-17 RX ADMIN — Medication 5 MILLIGRAM(S): at 20:51

## 2022-11-17 RX ADMIN — ARIPIPRAZOLE 5 MILLIGRAM(S): 15 TABLET ORAL at 09:43

## 2022-11-17 RX ADMIN — Medication 30 MILLIGRAM(S): at 09:44

## 2022-11-17 RX ADMIN — Medication 5 MILLIGRAM(S): at 09:44

## 2022-11-17 RX ADMIN — Medication 500 MILLIGRAM(S): at 20:50

## 2022-11-17 RX ADMIN — Medication 500 MILLIGRAM(S): at 14:27

## 2022-11-17 NOTE — BH INPATIENT PSYCHIATRY PROGRESS NOTE - NSBHFUPINTERVALHXFT_PSY_A_CORE
Patient seen for follow-up of depression, chart reviewed and discussed with nursing staff.  Pt is compliant with meds. Seen by hospitalist for swelling on the finger he received stitches on. Started on antibiotics. Pt reports swelling is "the same" as yesterday, will continue monitoring. ECT got cancelled yesterday due to loose molar. Pt later reported molar fell out. Dental appointment scheduled for tomorrow morning. VS reveal significant orthostatic hypotension, improved on repeat measurement later in the morning. Will continue pushing fluids.

## 2022-11-17 NOTE — BH INPATIENT PSYCHIATRY PROGRESS NOTE - NSBHASSESSSUMMFT_PSY_ALL_CORE
71 y.o. male with hx of unusual relatedness including pattern of making up psychiatric symptoms or hx such as claiming he arranged a hit or his brother killed himself. Recently there has been escalating pattern of hospitalizations, ER visits. Compliance after d/c is nil. Patient also develops excessive attachment or intense dislike of providers and other staff. Patient has no actual hx of suicide attempts or fh or SIB. Patient with likely facititious disorder vs mood disorder. Suspect possible unconscious primary gain related to medical complaints help seeking then rejection of help as inadequate. Patient not assessed as needing CO. Titrate medications and connect patient back to services. Appears to be provocative and help-rejecting. Prolonged hospitalization may not help achieve shared goals and might not be therapeutic for patient.    10/6: Has multiple somatic complaints, does not engage in treatment planning, however appears to show initiative toward getting a notebook which was a coping mechanism for patient during last hospitalization.   10/7: Continues to have multiple complaints, somewhat more receptive to treatment planning, did not report SI today. Continue with current medications.   10/8: No mood symptoms, no psychotic symptoms, no suicidal ideations.  10/9/22; anxious, dysphoric, superficial, no SI/HI.   10/10/22: Remain anxious, catastrophizes his friendship, having multiple somatic complaints. Consider switching to Effexor XR.  10/11: The patient continues to complain of depression, anxiety, losing friendships, although it seems he could continue them if he desired.  He focuses on negatives. Education attempted. The patient is tolerating medications well, will continue.  to meet with patient.  10/12: Patient continues to report somatic complaints but rejecting interventions. Patient is awaiting meeting with . Plan to cross taper to Effexor XR in light of reported complaints and limited efficacy on Prozac (albeit insufficient trial).  10/13: Patient is catastrophizing his somatic complaints and displaying pessimism with regards to meeting . Reassurance was provided. Continues with help-rejecting behavior and was redirected to previous patterns of pessimism during previous hospitalization. Patient acknowledged it however is not taking steps to behavioral change at this time.   10/14: Patient continues to display negativistic thinking and making provocative statements. Pt has been adherent to medications. Continue to cross taper from Prozac to Effexor XR.  10/17: Pt continues to present negativistic thinking. Visible in the unit. Appears more future oriented today. Will continue tapering Prozac (down to 20mg today) and titrating Effexor XR (up to 75mg today).  10/18: Presents with negativistic thinking but appears more goal oriented seeking services and looking forward to meeting with . Pt has been medication adherent. Continue to cross taper from Prozac to Effexor XR.  10/19: Makes provocative statements and presents with usual negativistic thinking however remains hopeful for meeting with . Has been noted to be visible on the unit and adherent to medications. D/C Prozac  10/20: Continues to make provocative statements which is his usual way of relating with others. Tolerating meds, no new concerns reported. Awaiting meeting with . Titrate Effexor to 112.5mg  10/21: Tolerating medications. No new concerns. Patient appears anxious about meeting with his , however also awaiting to discuss his goals with him.   10/24: Patient tolerating medications. Will increase Effexor to 150mg PO daily.   10/25: Tolerating medications. Patient appears to be catastrophizing however has intense eye contact and makes provocative statements, yet does not endorse any intent or plan to hurt self. Patient has pattern of describing enhanced expressions of symptoms closer to discharge.  10/26: Patient continues to be help-rejecting and catastrophizing when discussing discharge. No plans endorsed to hurt self. Patient has pattern of describing enhanced expressions of symptoms closer to discharge.  10/27: Pt appears to be in behavioral control, denies SI/HI. Patient was less negativistic this AM and appeared future oriented toward meeting .   10/28: Pt appears less provocative, more optimistic about future. Continue current regimen.  10/31: Pt continues to present as dysphoric, withdrawn. Has not been interested in his poetry and drawings as he has been in the past. During encounter today pt continues to express low mood and hopelessness about the future. Despite patient's characterologic issues and suspected element of factitious disorder, there might be an element of mood disorder present that may ameliorate with ECT treatment. Pt expresses willingness to "try anything that could help". Will place ECT consult.   11/1: Pt continues to present as dysphoric, withdrawn. Continues to endorse hopelessness about the future and passive SI. Dental clearance pending for ECT. Appointment scheduled for Thursday.   11/2: Pt continues to express hopelessness and negativistic view of the world. Mistrustful of people's ability to help him. Dental appointment for ECT clearance scheduled for tomorrow.   11/3: Pt remains dysphoric, expressing some hope ECT may help. Dental clearance pending.   11/4: Pt placed on CO for suicidality. To be reassessed Monday.   11/5 Dysphoric, SI, unclear severity given past pattern of behavior. Will cont CO  Plan is to try ECT for mood and SI. If BP low would reduce trazodone.  11/6: dysphoric, irritable, SI, no plan mentioned, but unpredictable, some vague paranoia.    11/7: Pt remains dysphoric but less irritable than last Friday. Currently endorsing chronic, passive SI with no active intent or plans. Appears more future oriented, asking relevant questions about ECT treatment plan. Will d/c CO today. Will continue ECT 3x week.  11/8: Visible in the unit, interacting with select peers. ECT #2 scheduled for tomorrow.   11/9: Completed second ECT today. Denies SIIP. ECT #3 scheduled for Friday 11/11.   11/10: Pt denies SIIP, stable mood. ECT #3 scheduled tomorrow. Repleted potassium today.   11/11: This morning pt got his right middle finger caught in the bathroom door, now has a laceration in his distal interphalangeal joint. Pt was sent to the [facility] ED-fast track for imaging and laceration repair.   11/14: ECT #4 scheduled today. Pt appears brighter in affect. No longer endorsing SI.   11/15: ECT #5 scheduled tomorrow.   11/16: Pt is s/p ECT #4. He is alert and oriented. Appears brighter in affect. Pt no longer endorsing SI. During encounters he tends to dwell less on somatic complaints and negative ruminations as well. Finger appears more swollen today, discussed with hospitalist. Pt is afebrile.   ECT cancelled today after Anesthesia noted patient has a loose molar. Dental consult placed for likely extraction.   11/17: Seen by hospitalist for swelling on the finger he received stitches on. Started on antibiotics. Pt reports swelling is "the same" as yesterday, will continue monitoring. ECT got cancelled yesterday due to loose molar. Pt later reported molar fell out. Dental appointment scheduled for tomorrow morning. Pushing fluids.    Plan:  1. CO for suicidality  2. Legals: 9.27  3. Psychiatry: Continue current meds  .Effexor XR 150mg PO daily. Titrate as tolerated.  .Abilify 5mg PO daily  .Trazodone 50mg PO HS  .Melatonin 5mg PO HS  4. ECT #2 11/9  4. Medical: Continue current medications  .Finasteride 5mg PO HS  .Nifedipine 30mg PO Daily  .Oxybutynin 5mg PO daily  .Simvastatin 20mg PO HS  5. Disposition: Home when stable.

## 2022-11-17 NOTE — BH INPATIENT PSYCHIATRY PROGRESS NOTE - NSBHCHARTREVIEWVS_PSY_A_CORE FT
Vital Signs Last 24 Hrs  T(C): 36.7 (11-17-22 @ 07:41), Max: 36.7 (11-17-22 @ 07:41)  T(F): 98 (11-17-22 @ 07:41), Max: 98 (11-17-22 @ 07:41)  HR: --  BP: --  BP(mean): --  RR: --  SpO2: --    Orthostatic VS  11-17-22 @ 09:43  Lying BP: --/-- HR: --  Sitting BP: 117/72 HR: 85  Standing BP: 101/68 HR: 107  Site: upper left arm  Mode: electronic  Orthostatic VS  11-17-22 @ 07:41  Lying BP: --/-- HR: --  Sitting BP: 150/80 HR: 83  Standing BP: 109/90 HR: 79  Site: --  Mode: --  Orthostatic VS  11-16-22 @ 06:10  Lying BP: 141/79 HR: 76  Sitting BP: 122/85 HR: 89  Standing BP: --/-- HR: --  Site: upper left arm  Mode: electronic

## 2022-11-17 NOTE — BH INPATIENT PSYCHIATRY PROGRESS NOTE - CURRENT MEDICATION
MEDICATIONS  (STANDING):  ARIPiprazole 5 milliGRAM(s) Oral daily  cephalexin 500 milliGRAM(s) Oral four times a day  doxycycline monohydrate Capsule 100 milliGRAM(s) Oral every 12 hours  finasteride 5 milliGRAM(s) Oral daily  melatonin. 5 milliGRAM(s) Oral at bedtime  NIFEdipine XL 30 milliGRAM(s) Oral daily  oxybutynin 5 milliGRAM(s) Oral daily  simvastatin 20 milliGRAM(s) Oral at bedtime  traZODone 50 milliGRAM(s) Oral at bedtime  venlafaxine  milliGRAM(s) Oral daily    MEDICATIONS  (PRN):  acetaminophen     Tablet .. 650 milliGRAM(s) Oral every 6 hours PRN Mild Pain (1 - 3), Moderate Pain (4 - 6)  bacitracin   Ointment 1 Application(s) Topical two times a day PRN Wound care  diphenhydrAMINE 50 milliGRAM(s) Oral every 6 hours PRN agitation  diphenhydrAMINE Injectable 50 milliGRAM(s) IntraMuscular every 4 hours PRN severe agitation  fluticasone propionate 50 MICROgram(s)/spray Nasal Spray 1 Spray(s) Both Nostrils two times a day PRN congestion  haloperidol     Tablet 5 milliGRAM(s) Oral every 6 hours PRN agitation  haloperidol    Injectable 5 milliGRAM(s) IntraMuscular once PRN severe agitation  lidocaine   4% Patch 1 Patch Transdermal every 24 hours PRN pain

## 2022-11-18 PROCEDURE — 99231 SBSQ HOSP IP/OBS SF/LOW 25: CPT

## 2022-11-18 RX ADMIN — FINASTERIDE 5 MILLIGRAM(S): 5 TABLET, FILM COATED ORAL at 09:46

## 2022-11-18 RX ADMIN — Medication 5 MILLIGRAM(S): at 20:56

## 2022-11-18 RX ADMIN — SIMVASTATIN 20 MILLIGRAM(S): 20 TABLET, FILM COATED ORAL at 20:57

## 2022-11-18 RX ADMIN — Medication 500 MILLIGRAM(S): at 09:45

## 2022-11-18 RX ADMIN — Medication 100 MILLIGRAM(S): at 09:46

## 2022-11-18 RX ADMIN — ARIPIPRAZOLE 5 MILLIGRAM(S): 15 TABLET ORAL at 09:45

## 2022-11-18 RX ADMIN — Medication 5 MILLIGRAM(S): at 09:49

## 2022-11-18 RX ADMIN — Medication 500 MILLIGRAM(S): at 18:44

## 2022-11-18 RX ADMIN — Medication 100 MILLIGRAM(S): at 20:56

## 2022-11-18 RX ADMIN — Medication 500 MILLIGRAM(S): at 20:56

## 2022-11-18 RX ADMIN — Medication 500 MILLIGRAM(S): at 13:12

## 2022-11-18 RX ADMIN — Medication 50 MILLIGRAM(S): at 20:56

## 2022-11-18 RX ADMIN — Medication 150 MILLIGRAM(S): at 09:45

## 2022-11-18 RX ADMIN — Medication 30 MILLIGRAM(S): at 09:48

## 2022-11-18 NOTE — BH INPATIENT PSYCHIATRY PROGRESS NOTE - NSBHMETABOLIC_PSY_ALL_CORE_FT
BMI: BMI (kg/m2): 24.4 (11-12-22 @ 18:02)  HbA1c: A1C with Estimated Average Glucose Result: 5.1 % (07-06-22 @ 10:38)    Glucose: POCT Blood Glucose.: 112 mg/dL (10-05-22 @ 20:21)    BP: --  Lipid Panel: Date/Time: 07-06-22 @ 10:38  Cholesterol, Serum: 128  Direct LDL: --  HDL Cholesterol, Serum: 52  Total Cholesterol/HDL Ration Measurement: --  Triglycerides, Serum: 74

## 2022-11-18 NOTE — BH INPATIENT PSYCHIATRY PROGRESS NOTE - NSBHASSESSSUMMFT_PSY_ALL_CORE
71 y.o. male with hx of unusual relatedness including pattern of making up psychiatric symptoms or hx such as claiming he arranged a hit or his brother killed himself. Recently there has been escalating pattern of hospitalizations, ER visits. Compliance after d/c is nil. Patient also develops excessive attachment or intense dislike of providers and other staff. Patient has no actual hx of suicide attempts or fh or SIB. Patient with likely facititious disorder vs mood disorder. Suspect possible unconscious primary gain related to medical complaints help seeking then rejection of help as inadequate. Patient not assessed as needing CO. Titrate medications and connect patient back to services. Appears to be provocative and help-rejecting. Prolonged hospitalization may not help achieve shared goals and might not be therapeutic for patient.    10/6: Has multiple somatic complaints, does not engage in treatment planning, however appears to show initiative toward getting a notebook which was a coping mechanism for patient during last hospitalization.   10/7: Continues to have multiple complaints, somewhat more receptive to treatment planning, did not report SI today. Continue with current medications.   10/8: No mood symptoms, no psychotic symptoms, no suicidal ideations.  10/9/22; anxious, dysphoric, superficial, no SI/HI.   10/10/22: Remain anxious, catastrophizes his friendship, having multiple somatic complaints. Consider switching to Effexor XR.  10/11: The patient continues to complain of depression, anxiety, losing friendships, although it seems he could continue them if he desired.  He focuses on negatives. Education attempted. The patient is tolerating medications well, will continue.  to meet with patient.  10/12: Patient continues to report somatic complaints but rejecting interventions. Patient is awaiting meeting with . Plan to cross taper to Effexor XR in light of reported complaints and limited efficacy on Prozac (albeit insufficient trial).  10/13: Patient is catastrophizing his somatic complaints and displaying pessimism with regards to meeting . Reassurance was provided. Continues with help-rejecting behavior and was redirected to previous patterns of pessimism during previous hospitalization. Patient acknowledged it however is not taking steps to behavioral change at this time.   10/14: Patient continues to display negativistic thinking and making provocative statements. Pt has been adherent to medications. Continue to cross taper from Prozac to Effexor XR.  10/17: Pt continues to present negativistic thinking. Visible in the unit. Appears more future oriented today. Will continue tapering Prozac (down to 20mg today) and titrating Effexor XR (up to 75mg today).  10/18: Presents with negativistic thinking but appears more goal oriented seeking services and looking forward to meeting with . Pt has been medication adherent. Continue to cross taper from Prozac to Effexor XR.  10/19: Makes provocative statements and presents with usual negativistic thinking however remains hopeful for meeting with . Has been noted to be visible on the unit and adherent to medications. D/C Prozac  10/20: Continues to make provocative statements which is his usual way of relating with others. Tolerating meds, no new concerns reported. Awaiting meeting with . Titrate Effexor to 112.5mg  10/21: Tolerating medications. No new concerns. Patient appears anxious about meeting with his , however also awaiting to discuss his goals with him.   10/24: Patient tolerating medications. Will increase Effexor to 150mg PO daily.   10/25: Tolerating medications. Patient appears to be catastrophizing however has intense eye contact and makes provocative statements, yet does not endorse any intent or plan to hurt self. Patient has pattern of describing enhanced expressions of symptoms closer to discharge.  10/26: Patient continues to be help-rejecting and catastrophizing when discussing discharge. No plans endorsed to hurt self. Patient has pattern of describing enhanced expressions of symptoms closer to discharge.  10/27: Pt appears to be in behavioral control, denies SI/HI. Patient was less negativistic this AM and appeared future oriented toward meeting .   10/28: Pt appears less provocative, more optimistic about future. Continue current regimen.  10/31: Pt continues to present as dysphoric, withdrawn. Has not been interested in his poetry and drawings as he has been in the past. During encounter today pt continues to express low mood and hopelessness about the future. Despite patient's characterologic issues and suspected element of factitious disorder, there might be an element of mood disorder present that may ameliorate with ECT treatment. Pt expresses willingness to "try anything that could help". Will place ECT consult.   11/1: Pt continues to present as dysphoric, withdrawn. Continues to endorse hopelessness about the future and passive SI. Dental clearance pending for ECT. Appointment scheduled for Thursday.   11/2: Pt continues to express hopelessness and negativistic view of the world. Mistrustful of people's ability to help him. Dental appointment for ECT clearance scheduled for tomorrow.   11/3: Pt remains dysphoric, expressing some hope ECT may help. Dental clearance pending.   11/4: Pt placed on CO for suicidality. To be reassessed Monday.   11/5 Dysphoric, SI, unclear severity given past pattern of behavior. Will cont CO  Plan is to try ECT for mood and SI. If BP low would reduce trazodone.  11/6: dysphoric, irritable, SI, no plan mentioned, but unpredictable, some vague paranoia.    11/7: Pt remains dysphoric but less irritable than last Friday. Currently endorsing chronic, passive SI with no active intent or plans. Appears more future oriented, asking relevant questions about ECT treatment plan. Will d/c CO today. Will continue ECT 3x week.  11/8: Visible in the unit, interacting with select peers. ECT #2 scheduled for tomorrow.   11/9: Completed second ECT today. Denies SIIP. ECT #3 scheduled for Friday 11/11.   11/10: Pt denies SIIP, stable mood. ECT #3 scheduled tomorrow. Repleted potassium today.   11/11: This morning pt got his right middle finger caught in the bathroom door, now has a laceration in his distal interphalangeal joint. Pt was sent to the [facility] ED-fast track for imaging and laceration repair.   11/14: ECT #4 scheduled today. Pt appears brighter in affect. No longer endorsing SI.   11/15: ECT #5 scheduled tomorrow.   11/16: Pt is s/p ECT #4. He is alert and oriented. Appears brighter in affect. Pt no longer endorsing SI. During encounters he tends to dwell less on somatic complaints and negative ruminations as well. Finger appears more swollen today, discussed with hospitalist. Pt is afebrile.   ECT cancelled today after Anesthesia noted patient has a loose molar. Dental consult placed for likely extraction.   11/17: Seen by hospitalist for swelling on the finger he received stitches on. Started on antibiotics. Pt reports swelling is "the same" as yesterday, will continue monitoring. ECT got cancelled yesterday due to loose molar. Pt later reported molar fell out. Dental appointment scheduled for tomorrow morning. Pushing fluids.  11/18: Pt is visible in the unit, interacting with select peers. Pt is compliant with meds. BP improving today, no orthostatic hypotension noted. Pt had a long conversation with one of the nursing students this morning in which he shared his life story. Pt went to his dental appointment this morning, pending consult results.  Plan:  1. CO for suicidality  2. Legals: 9.27  3. Psychiatry: Continue current meds  .Effexor XR 150mg PO daily. Titrate as tolerated.  .Abilify 5mg PO daily  .Trazodone 50mg PO HS  .Melatonin 5mg PO HS  4. ECT #2 11/9  4. Medical: Continue current medications  .Finasteride 5mg PO HS  .Nifedipine 30mg PO Daily  .Oxybutynin 5mg PO daily  .Simvastatin 20mg PO HS  5. Disposition: Home when stable.

## 2022-11-18 NOTE — BH INPATIENT PSYCHIATRY PROGRESS NOTE - NSBHCHARTREVIEWVS_PSY_A_CORE FT
Vital Signs Last 24 Hrs  T(C): 36.9 (11-18-22 @ 08:02), Max: 36.9 (11-18-22 @ 08:02)  T(F): 98.4 (11-18-22 @ 08:02), Max: 98.4 (11-18-22 @ 08:02)  HR: --  BP: --  BP(mean): --  RR: --  SpO2: --    Orthostatic VS  11-18-22 @ 08:02  Lying BP: --/-- HR: --  Sitting BP: 102/64 HR: 78  Standing BP: 99/76 HR: 106  Site: upper right arm  Mode: electronic  Orthostatic VS  11-17-22 @ 09:43  Lying BP: --/-- HR: --  Sitting BP: 117/72 HR: 85  Standing BP: 101/68 HR: 107  Site: upper left arm  Mode: electronic  Orthostatic VS  11-17-22 @ 07:41  Lying BP: --/-- HR: --  Sitting BP: 150/80 HR: 83  Standing BP: 109/90 HR: 79  Site: --  Mode: --

## 2022-11-18 NOTE — BH INPATIENT PSYCHIATRY PROGRESS NOTE - NSBHFUPINTERVALHXFT_PSY_A_CORE
Patient seen for follow-up of depression, chart reviewed and discussed with interdisciplinary team. No significant overnight events reported. Pt is visible in the unit, interacting with select peers. Pt is compliant with meds. BP improving today, no orthostatic hypotension noted. Pt had a long conversation with one of the nursing students this morning in which he shared his life story. Pt went to his dental appointment this morning, pending consult results.

## 2022-11-19 RX ADMIN — Medication 100 MILLIGRAM(S): at 20:58

## 2022-11-19 RX ADMIN — Medication 5 MILLIGRAM(S): at 08:14

## 2022-11-19 RX ADMIN — Medication 500 MILLIGRAM(S): at 17:05

## 2022-11-19 RX ADMIN — Medication 5 MILLIGRAM(S): at 20:58

## 2022-11-19 RX ADMIN — Medication 500 MILLIGRAM(S): at 12:17

## 2022-11-19 RX ADMIN — Medication 500 MILLIGRAM(S): at 20:58

## 2022-11-19 RX ADMIN — SIMVASTATIN 20 MILLIGRAM(S): 20 TABLET, FILM COATED ORAL at 20:58

## 2022-11-19 RX ADMIN — FINASTERIDE 5 MILLIGRAM(S): 5 TABLET, FILM COATED ORAL at 08:14

## 2022-11-19 RX ADMIN — Medication 150 MILLIGRAM(S): at 08:15

## 2022-11-19 RX ADMIN — Medication 50 MILLIGRAM(S): at 20:59

## 2022-11-19 RX ADMIN — Medication 100 MILLIGRAM(S): at 08:13

## 2022-11-19 RX ADMIN — Medication 30 MILLIGRAM(S): at 08:14

## 2022-11-19 RX ADMIN — ARIPIPRAZOLE 5 MILLIGRAM(S): 15 TABLET ORAL at 08:15

## 2022-11-19 RX ADMIN — Medication 500 MILLIGRAM(S): at 08:14

## 2022-11-20 LAB — SARS-COV-2 RNA SPEC QL NAA+PROBE: SIGNIFICANT CHANGE UP

## 2022-11-20 RX ADMIN — Medication 500 MILLIGRAM(S): at 16:54

## 2022-11-20 RX ADMIN — Medication 500 MILLIGRAM(S): at 08:12

## 2022-11-20 RX ADMIN — Medication 100 MILLIGRAM(S): at 20:58

## 2022-11-20 RX ADMIN — Medication 50 MILLIGRAM(S): at 20:58

## 2022-11-20 RX ADMIN — Medication 500 MILLIGRAM(S): at 12:44

## 2022-11-20 RX ADMIN — Medication 30 MILLIGRAM(S): at 08:11

## 2022-11-20 RX ADMIN — Medication 100 MILLIGRAM(S): at 08:11

## 2022-11-20 RX ADMIN — Medication 5 MILLIGRAM(S): at 20:58

## 2022-11-20 RX ADMIN — Medication 150 MILLIGRAM(S): at 08:11

## 2022-11-20 RX ADMIN — SIMVASTATIN 20 MILLIGRAM(S): 20 TABLET, FILM COATED ORAL at 20:58

## 2022-11-20 RX ADMIN — FINASTERIDE 5 MILLIGRAM(S): 5 TABLET, FILM COATED ORAL at 08:12

## 2022-11-20 RX ADMIN — Medication 5 MILLIGRAM(S): at 08:11

## 2022-11-20 RX ADMIN — ARIPIPRAZOLE 5 MILLIGRAM(S): 15 TABLET ORAL at 08:11

## 2022-11-20 RX ADMIN — Medication 500 MILLIGRAM(S): at 20:58

## 2022-11-21 LAB
ANION GAP SERPL CALC-SCNC: 10 MMOL/L — SIGNIFICANT CHANGE UP (ref 7–14)
BUN SERPL-MCNC: 27 MG/DL — HIGH (ref 7–23)
CALCIUM SERPL-MCNC: 9.5 MG/DL — SIGNIFICANT CHANGE UP (ref 8.4–10.5)
CHLORIDE SERPL-SCNC: 98 MMOL/L — SIGNIFICANT CHANGE UP (ref 98–107)
CO2 SERPL-SCNC: 33 MMOL/L — HIGH (ref 22–31)
CREAT SERPL-MCNC: 1.16 MG/DL — SIGNIFICANT CHANGE UP (ref 0.5–1.3)
EGFR: 67 ML/MIN/1.73M2 — SIGNIFICANT CHANGE UP
GLUCOSE SERPL-MCNC: 88 MG/DL — SIGNIFICANT CHANGE UP (ref 70–99)
HCT VFR BLD CALC: 40.1 % — SIGNIFICANT CHANGE UP (ref 39–50)
HGB BLD-MCNC: 13.4 G/DL — SIGNIFICANT CHANGE UP (ref 13–17)
MAGNESIUM SERPL-MCNC: 2 MG/DL — SIGNIFICANT CHANGE UP (ref 1.6–2.6)
MCHC RBC-ENTMCNC: 28.9 PG — SIGNIFICANT CHANGE UP (ref 27–34)
MCHC RBC-ENTMCNC: 33.4 GM/DL — SIGNIFICANT CHANGE UP (ref 32–36)
MCV RBC AUTO: 86.4 FL — SIGNIFICANT CHANGE UP (ref 80–100)
NRBC # BLD: 0 /100 WBCS — SIGNIFICANT CHANGE UP (ref 0–0)
NRBC # FLD: 0 K/UL — SIGNIFICANT CHANGE UP (ref 0–0)
PHOSPHATE SERPL-MCNC: 2.8 MG/DL — SIGNIFICANT CHANGE UP (ref 2.5–4.5)
PLATELET # BLD AUTO: 242 K/UL — SIGNIFICANT CHANGE UP (ref 150–400)
POTASSIUM SERPL-MCNC: 3.4 MMOL/L — LOW (ref 3.5–5.3)
POTASSIUM SERPL-SCNC: 3.4 MMOL/L — LOW (ref 3.5–5.3)
RBC # BLD: 4.64 M/UL — SIGNIFICANT CHANGE UP (ref 4.2–5.8)
RBC # FLD: 13 % — SIGNIFICANT CHANGE UP (ref 10.3–14.5)
SODIUM SERPL-SCNC: 141 MMOL/L — SIGNIFICANT CHANGE UP (ref 135–145)
WBC # BLD: 7.85 K/UL — SIGNIFICANT CHANGE UP (ref 3.8–10.5)
WBC # FLD AUTO: 7.85 K/UL — SIGNIFICANT CHANGE UP (ref 3.8–10.5)

## 2022-11-21 PROCEDURE — 90870 ELECTROCONVULSIVE THERAPY: CPT

## 2022-11-21 RX ORDER — POTASSIUM CHLORIDE 20 MEQ
20 PACKET (EA) ORAL DAILY
Refills: 0 | Status: DISCONTINUED | OUTPATIENT
Start: 2022-11-22 | End: 2022-12-02

## 2022-11-21 RX ORDER — POTASSIUM CHLORIDE 20 MEQ
40 PACKET (EA) ORAL ONCE
Refills: 0 | Status: COMPLETED | OUTPATIENT
Start: 2022-11-21 | End: 2022-11-21

## 2022-11-21 RX ADMIN — Medication 100 MILLIGRAM(S): at 21:31

## 2022-11-21 RX ADMIN — Medication 30 MILLIGRAM(S): at 08:11

## 2022-11-21 RX ADMIN — Medication 500 MILLIGRAM(S): at 12:15

## 2022-11-21 RX ADMIN — FINASTERIDE 5 MILLIGRAM(S): 5 TABLET, FILM COATED ORAL at 08:11

## 2022-11-21 RX ADMIN — Medication 500 MILLIGRAM(S): at 16:48

## 2022-11-21 RX ADMIN — Medication 100 MILLIGRAM(S): at 08:11

## 2022-11-21 RX ADMIN — Medication 500 MILLIGRAM(S): at 21:31

## 2022-11-21 RX ADMIN — SIMVASTATIN 20 MILLIGRAM(S): 20 TABLET, FILM COATED ORAL at 21:33

## 2022-11-21 RX ADMIN — Medication 40 MILLIEQUIVALENT(S): at 15:15

## 2022-11-21 RX ADMIN — Medication 5 MILLIGRAM(S): at 21:32

## 2022-11-21 RX ADMIN — Medication 5 MILLIGRAM(S): at 08:11

## 2022-11-21 RX ADMIN — Medication 150 MILLIGRAM(S): at 08:10

## 2022-11-21 RX ADMIN — ARIPIPRAZOLE 5 MILLIGRAM(S): 15 TABLET ORAL at 08:11

## 2022-11-21 RX ADMIN — Medication 500 MILLIGRAM(S): at 08:11

## 2022-11-21 RX ADMIN — Medication 50 MILLIGRAM(S): at 21:33

## 2022-11-21 NOTE — BH INPATIENT PSYCHIATRY PROGRESS NOTE - CURRENT MEDICATION
MEDICATIONS  (STANDING):  ARIPiprazole 5 milliGRAM(s) Oral daily  cephalexin 500 milliGRAM(s) Oral four times a day  doxycycline monohydrate Capsule 100 milliGRAM(s) Oral every 12 hours  finasteride 5 milliGRAM(s) Oral daily  melatonin. 5 milliGRAM(s) Oral at bedtime  NIFEdipine XL 30 milliGRAM(s) Oral daily  oxybutynin 5 milliGRAM(s) Oral daily  potassium chloride    Tablet ER 40 milliEquivalent(s) Oral once  simvastatin 20 milliGRAM(s) Oral at bedtime  traZODone 50 milliGRAM(s) Oral at bedtime  venlafaxine  milliGRAM(s) Oral daily    MEDICATIONS  (PRN):  acetaminophen     Tablet .. 650 milliGRAM(s) Oral every 6 hours PRN Mild Pain (1 - 3), Moderate Pain (4 - 6)  bacitracin   Ointment 1 Application(s) Topical two times a day PRN Wound care  diphenhydrAMINE 50 milliGRAM(s) Oral every 6 hours PRN agitation  diphenhydrAMINE Injectable 50 milliGRAM(s) IntraMuscular every 4 hours PRN severe agitation  fluticasone propionate 50 MICROgram(s)/spray Nasal Spray 1 Spray(s) Both Nostrils two times a day PRN congestion  haloperidol     Tablet 5 milliGRAM(s) Oral every 6 hours PRN agitation  haloperidol    Injectable 5 milliGRAM(s) IntraMuscular once PRN severe agitation  lidocaine   4% Patch 1 Patch Transdermal every 24 hours PRN pain

## 2022-11-21 NOTE — BH INPATIENT PSYCHIATRY PROGRESS NOTE - NSBHCHARTREVIEWVS_PSY_A_CORE FT
Vital Signs Last 24 Hrs  T(C): 36.2 (11-21-22 @ 12:40), Max: 36.6 (11-21-22 @ 06:07)  T(F): 97.2 (11-21-22 @ 12:40), Max: 97.9 (11-21-22 @ 06:07)  HR: 70 (11-21-22 @ 12:55) (70 - 80)  BP: 171/93 (11-21-22 @ 12:55) (156/95 - 174/98)  BP(mean): --  RR: 17 (11-21-22 @ 12:55) (15 - 18)  SpO2: 100% (11-21-22 @ 12:55) (97% - 100%)    Orthostatic VS  11-21-22 @ 06:07  Lying BP: --/-- HR: --  Sitting BP: 124/81 HR: 88  Standing BP: 133/76 HR: 85  Site: upper left arm  Mode: electronic  Orthostatic VS  11-20-22 @ 05:44  Lying BP: --/-- HR: --  Sitting BP: 131/90 HR: 81  Standing BP: 125/82 HR: 97  Site: --  Mode: --

## 2022-11-21 NOTE — BH INPATIENT PSYCHIATRY PROGRESS NOTE - NSBHASSESSSUMMFT_PSY_ALL_CORE
71 y.o. male with hx of unusual relatedness including pattern of making up psychiatric symptoms or hx such as claiming he arranged a hit or his brother killed himself. Recently there has been escalating pattern of hospitalizations, ER visits. Compliance after d/c is nil. Patient also develops excessive attachment or intense dislike of providers and other staff. Patient has no actual hx of suicide attempts or fh or SIB. Patient with likely facititious disorder vs mood disorder. Suspect possible unconscious primary gain related to medical complaints help seeking then rejection of help as inadequate. Patient not assessed as needing CO. Titrate medications and connect patient back to services. Appears to be provocative and help-rejecting. Prolonged hospitalization may not help achieve shared goals and might not be therapeutic for patient.    10/6: Has multiple somatic complaints, does not engage in treatment planning, however appears to show initiative toward getting a notebook which was a coping mechanism for patient during last hospitalization.   10/7: Continues to have multiple complaints, somewhat more receptive to treatment planning, did not report SI today. Continue with current medications.   10/8: No mood symptoms, no psychotic symptoms, no suicidal ideations.  10/9/22; anxious, dysphoric, superficial, no SI/HI.   10/10/22: Remain anxious, catastrophizes his friendship, having multiple somatic complaints. Consider switching to Effexor XR.  10/11: The patient continues to complain of depression, anxiety, losing friendships, although it seems he could continue them if he desired.  He focuses on negatives. Education attempted. The patient is tolerating medications well, will continue.  to meet with patient.  10/12: Patient continues to report somatic complaints but rejecting interventions. Patient is awaiting meeting with . Plan to cross taper to Effexor XR in light of reported complaints and limited efficacy on Prozac (albeit insufficient trial).  10/13: Patient is catastrophizing his somatic complaints and displaying pessimism with regards to meeting . Reassurance was provided. Continues with help-rejecting behavior and was redirected to previous patterns of pessimism during previous hospitalization. Patient acknowledged it however is not taking steps to behavioral change at this time.   10/14: Patient continues to display negativistic thinking and making provocative statements. Pt has been adherent to medications. Continue to cross taper from Prozac to Effexor XR.  10/17: Pt continues to present negativistic thinking. Visible in the unit. Appears more future oriented today. Will continue tapering Prozac (down to 20mg today) and titrating Effexor XR (up to 75mg today).  10/18: Presents with negativistic thinking but appears more goal oriented seeking services and looking forward to meeting with . Pt has been medication adherent. Continue to cross taper from Prozac to Effexor XR.  10/19: Makes provocative statements and presents with usual negativistic thinking however remains hopeful for meeting with . Has been noted to be visible on the unit and adherent to medications. D/C Prozac  10/20: Continues to make provocative statements which is his usual way of relating with others. Tolerating meds, no new concerns reported. Awaiting meeting with . Titrate Effexor to 112.5mg  10/21: Tolerating medications. No new concerns. Patient appears anxious about meeting with his , however also awaiting to discuss his goals with him.   10/24: Patient tolerating medications. Will increase Effexor to 150mg PO daily.   10/25: Tolerating medications. Patient appears to be catastrophizing however has intense eye contact and makes provocative statements, yet does not endorse any intent or plan to hurt self. Patient has pattern of describing enhanced expressions of symptoms closer to discharge.  10/26: Patient continues to be help-rejecting and catastrophizing when discussing discharge. No plans endorsed to hurt self. Patient has pattern of describing enhanced expressions of symptoms closer to discharge.  10/27: Pt appears to be in behavioral control, denies SI/HI. Patient was less negativistic this AM and appeared future oriented toward meeting .   10/28: Pt appears less provocative, more optimistic about future. Continue current regimen.  10/31: Pt continues to present as dysphoric, withdrawn. Has not been interested in his poetry and drawings as he has been in the past. During encounter today pt continues to express low mood and hopelessness about the future. Despite patient's characterologic issues and suspected element of factitious disorder, there might be an element of mood disorder present that may ameliorate with ECT treatment. Pt expresses willingness to "try anything that could help". Will place ECT consult.   11/1: Pt continues to present as dysphoric, withdrawn. Continues to endorse hopelessness about the future and passive SI. Dental clearance pending for ECT. Appointment scheduled for Thursday.   11/2: Pt continues to express hopelessness and negativistic view of the world. Mistrustful of people's ability to help him. Dental appointment for ECT clearance scheduled for tomorrow.   11/3: Pt remains dysphoric, expressing some hope ECT may help. Dental clearance pending.   11/4: Pt placed on CO for suicidality. To be reassessed Monday.   11/5 Dysphoric, SI, unclear severity given past pattern of behavior. Will cont CO  Plan is to try ECT for mood and SI. If BP low would reduce trazodone.  11/6: dysphoric, irritable, SI, no plan mentioned, but unpredictable, some vague paranoia.    11/7: Pt remains dysphoric but less irritable than last Friday. Currently endorsing chronic, passive SI with no active intent or plans. Appears more future oriented, asking relevant questions about ECT treatment plan. Will d/c CO today. Will continue ECT 3x week.  11/8: Visible in the unit, interacting with select peers. ECT #2 scheduled for tomorrow.   11/9: Completed second ECT today. Denies SIIP. ECT #3 scheduled for Friday 11/11.   11/10: Pt denies SIIP, stable mood. ECT #3 scheduled tomorrow. Repleted potassium today.   11/11: This morning pt got his right middle finger caught in the bathroom door, now has a laceration in his distal interphalangeal joint. Pt was sent to the [facility] ED-fast track for imaging and laceration repair.   11/14: ECT #4 scheduled today. Pt appears brighter in affect. No longer endorsing SI.   11/15: ECT #5 scheduled tomorrow.   11/16: Pt is s/p ECT #4. He is alert and oriented. Appears brighter in affect. Pt no longer endorsing SI. During encounters he tends to dwell less on somatic complaints and negative ruminations as well. Finger appears more swollen today, discussed with hospitalist. Pt is afebrile.   ECT cancelled today after Anesthesia noted patient has a loose molar. Dental consult placed for likely extraction.   11/17: Seen by hospitalist for swelling on the finger he received stitches on. Started on antibiotics. Pt reports swelling is "the same" as yesterday, will continue monitoring. ECT got cancelled yesterday due to loose molar. Pt later reported molar fell out. Dental appointment scheduled for tomorrow morning. Pushing fluids.  11/18: Pt is visible in the unit, interacting with select peers. Pt is compliant with meds. BP improving today, no orthostatic hypotension noted. Pt had a long conversation with one of the nursing students this morning in which he shared his life story. Pt went to his dental appointment this morning, pending consult results.  11/21: Pt completed ECT #5 this morning. ECT #6 scheduled for Wed 11/23.   Plan:  1. CO for suicidality  2. Legals: 9.27  3. Psychiatry: Continue current meds  .Effexor XR 150mg PO daily. Titrate as tolerated.  .Abilify 5mg PO daily  .Trazodone 50mg PO HS  .Melatonin 5mg PO HS  4. ECT #2 11/9  4. Medical: Continue current medications  .Finasteride 5mg PO HS  .Nifedipine 30mg PO Daily  .Oxybutynin 5mg PO daily  .Simvastatin 20mg PO HS  5. Disposition: Home when stable.

## 2022-11-21 NOTE — BH INPATIENT PSYCHIATRY PROGRESS NOTE - OTHER
"An EKG was completed on the pt, with no complications noted during the procedure.    Vital signs:  Temp: 97  F (36.1  C) Temp src: Oral BP: 133/69   Heart Rate: 60 Resp: 18 SpO2: 93 % O2 Device: Nasal cannula Oxygen Delivery: 2 LPM Height: 180.3 cm (5' 11\") Weight: 91.3 kg (201 lb 4.8 oz)  Estimated body mass index is 28.08 kg/m  as calculated from the following:    Height as of this encounter: 1.803 m (5' 11\").    Weight as of this encounter: 91.3 kg (201 lb 4.8 oz).        Past Medical History:   Diagnosis Date     Allergic rhinitis      Anemia due to chronic kidney disease 10/21/2011     CAD S/P percutaneous coronary angioplasty 6/15/2015     Cataract      CKD (chronic kidney disease)      Colon cancer (H)      COPD (chronic obstructive pulmonary disease) (H)      Depression      Dilated aortic root (H) 5/6/2016     Diverticulitis      ESRD (end stage renal disease) on dialysis (H) 5/6/2016     Gout      Human immunodeficiency virus (HIV) disease (H)      Hx of squamous cell carcinoma 03/23/2007     Hypertension 2010     Impotence of organic origin      Increased prostate specific antigen (PSA) velocity 08/08/2016    Awaiting bx on blood thinner     Mixed hyperlipidemia      Pulmonary HTN (H)     Mod     TIA (transient ischemic attack) 5/2016     Type 2 diabetes mellitus (H) age 52       Past Surgical History:   Procedure Laterality Date     ANGIOGRAM  03-04-16    No culprit lesions, stents widely patent      ANGIOGRAM  05-06-16    Cutting balloon ptca=Diag     APPENDECTOMY  2000     CHOLECYSTECTOMY, LAPOROSCOPIC       COLON SURGERY       COLOSTOMY  09/30/1999    Temporary for diverticulitis     EP PACEMAKER N/A 5/2/2019    Procedure: EP Pacemaker;  Surgeon: Angelique Perera MD;  Location:  [facility] CATH LAB     HC LEFT HEART CATHETERIZATION  03/12/2018    No significant change  Elevated LVEDp  LVEF 30% No PCI     HEART CATH, ANGIOPLASTY  08-18-16    LAD PCI. Stented with a 3.0 x 8 mm Xience " Migdalia stent.     IR DIALYSIS FISTULOGRAM LEFT  2019     STENT, CORONARY, S660 15/18  2015    VANITA=Diag, PTCA=LAD     STENT, CORONARY, S660 15/18  2015    VANITA=LAD       Family History   Problem Relation Age of Onset     Heart Disease Brother 40        CABG     Kidney Disease Sister      Hypertension Sister      Heart Disease Brother         Dilated aorta       Social History     Tobacco Use     Smoking status: Former Smoker     Packs/day: 2.00     Years: 41.00     Pack years: 82.00     Types: Cigarettes     Last attempt to quit: 2003     Years since quittin.8     Smokeless tobacco: Never Used   Substance Use Topics     Alcohol use: No     Alcohol/week: 0.0 standard drinks     Pepe Whitfield, RT  10/22/2019     guarded, superficial

## 2022-11-21 NOTE — BH INPATIENT PSYCHIATRY PROGRESS NOTE - NSBHMETABOLIC_PSY_ALL_CORE_FT
BMI: BMI (kg/m2): 24.4 (11-12-22 @ 18:02)  HbA1c: A1C with Estimated Average Glucose Result: 5.1 % (07-06-22 @ 10:38)    Glucose: POCT Blood Glucose.: 112 mg/dL (10-05-22 @ 20:21)    BP: 171/93 (11-21-22 @ 12:55) (156/95 - 174/98)  Lipid Panel: Date/Time: 07-06-22 @ 10:38  Cholesterol, Serum: 128  Direct LDL: --  HDL Cholesterol, Serum: 52  Total Cholesterol/HDL Ration Measurement: --  Triglycerides, Serum: 74

## 2022-11-21 NOTE — CHART NOTE - NSCHARTNOTEFT_GEN_A_CORE
Labs reviewed 11/21. Pt w ongoing issues with hypokalemia. Meds reviewed, no iatrogenic causes identified. Dosed w 40meq once 11/21, will need standing K 20 meq daily thereafter. Follow up mag level [ ], added on.

## 2022-11-21 NOTE — BH INPATIENT PSYCHIATRY PROGRESS NOTE - NSBHFUPINTERVALHXFT_PSY_A_CORE
Patient seen for follow-up of depression, chart reviewed and discussed with interdisciplinary team. No significant overnight events reported. Pt is visible in the unit, interacting with select peers. Pt is compliant with meds. On encounter this morning pt expresses not being in a good mood today. Preoccupied with discharge, however at the same time stating he doesn't want to return home with his brother or consider residential. BP improving today, no orthostatic hypotension noted. S/P ECT #5 today. Pt seen by hospitalistcarine appreciated.

## 2022-11-22 RX ADMIN — Medication 5 MILLIGRAM(S): at 21:25

## 2022-11-22 RX ADMIN — ARIPIPRAZOLE 5 MILLIGRAM(S): 15 TABLET ORAL at 09:46

## 2022-11-22 RX ADMIN — Medication 500 MILLIGRAM(S): at 21:24

## 2022-11-22 RX ADMIN — FINASTERIDE 5 MILLIGRAM(S): 5 TABLET, FILM COATED ORAL at 09:47

## 2022-11-22 RX ADMIN — Medication 100 MILLIGRAM(S): at 09:46

## 2022-11-22 RX ADMIN — Medication 500 MILLIGRAM(S): at 09:46

## 2022-11-22 RX ADMIN — Medication 500 MILLIGRAM(S): at 13:13

## 2022-11-22 RX ADMIN — Medication 1 APPLICATION(S): at 09:46

## 2022-11-22 RX ADMIN — Medication 50 MILLIGRAM(S): at 21:24

## 2022-11-22 RX ADMIN — Medication 100 MILLIGRAM(S): at 21:24

## 2022-11-22 RX ADMIN — SIMVASTATIN 20 MILLIGRAM(S): 20 TABLET, FILM COATED ORAL at 21:24

## 2022-11-22 RX ADMIN — Medication 150 MILLIGRAM(S): at 09:47

## 2022-11-22 RX ADMIN — Medication 30 MILLIGRAM(S): at 09:46

## 2022-11-22 RX ADMIN — Medication 5 MILLIGRAM(S): at 09:46

## 2022-11-22 RX ADMIN — Medication 500 MILLIGRAM(S): at 16:22

## 2022-11-22 RX ADMIN — Medication 20 MILLIEQUIVALENT(S): at 09:45

## 2022-11-22 NOTE — BH INPATIENT PSYCHIATRY PROGRESS NOTE - NSBHASSESSSUMMFT_PSY_ALL_CORE
71 y.o. male with hx of unusual relatedness including pattern of making up psychiatric symptoms or hx such as claiming he arranged a hit or his brother killed himself. Recently there has been escalating pattern of hospitalizations, ER visits. Compliance after d/c is nil. Patient also develops excessive attachment or intense dislike of providers and other staff. Patient has no actual hx of suicide attempts or fh or SIB. Patient with likely facititious disorder vs mood disorder. Suspect possible unconscious primary gain related to medical complaints help seeking then rejection of help as inadequate. Patient not assessed as needing CO. Titrate medications and connect patient back to services. Appears to be provocative and help-rejecting. Prolonged hospitalization may not help achieve shared goals and might not be therapeutic for patient.    10/6: Has multiple somatic complaints, does not engage in treatment planning, however appears to show initiative toward getting a notebook which was a coping mechanism for patient during last hospitalization.   10/7: Continues to have multiple complaints, somewhat more receptive to treatment planning, did not report SI today. Continue with current medications.   10/8: No mood symptoms, no psychotic symptoms, no suicidal ideations.  10/9/22; anxious, dysphoric, superficial, no SI/HI.   10/10/22: Remain anxious, catastrophizes his friendship, having multiple somatic complaints. Consider switching to Effexor XR.  10/11: The patient continues to complain of depression, anxiety, losing friendships, although it seems he could continue them if he desired.  He focuses on negatives. Education attempted. The patient is tolerating medications well, will continue.  to meet with patient.  10/12: Patient continues to report somatic complaints but rejecting interventions. Patient is awaiting meeting with . Plan to cross taper to Effexor XR in light of reported complaints and limited efficacy on Prozac (albeit insufficient trial).  10/13: Patient is catastrophizing his somatic complaints and displaying pessimism with regards to meeting . Reassurance was provided. Continues with help-rejecting behavior and was redirected to previous patterns of pessimism during previous hospitalization. Patient acknowledged it however is not taking steps to behavioral change at this time.   10/14: Patient continues to display negativistic thinking and making provocative statements. Pt has been adherent to medications. Continue to cross taper from Prozac to Effexor XR.  10/17: Pt continues to present negativistic thinking. Visible in the unit. Appears more future oriented today. Will continue tapering Prozac (down to 20mg today) and titrating Effexor XR (up to 75mg today).  10/18: Presents with negativistic thinking but appears more goal oriented seeking services and looking forward to meeting with . Pt has been medication adherent. Continue to cross taper from Prozac to Effexor XR.  10/19: Makes provocative statements and presents with usual negativistic thinking however remains hopeful for meeting with . Has been noted to be visible on the unit and adherent to medications. D/C Prozac  10/20: Continues to make provocative statements which is his usual way of relating with others. Tolerating meds, no new concerns reported. Awaiting meeting with . Titrate Effexor to 112.5mg  10/21: Tolerating medications. No new concerns. Patient appears anxious about meeting with his , however also awaiting to discuss his goals with him.   10/24: Patient tolerating medications. Will increase Effexor to 150mg PO daily.   10/25: Tolerating medications. Patient appears to be catastrophizing however has intense eye contact and makes provocative statements, yet does not endorse any intent or plan to hurt self. Patient has pattern of describing enhanced expressions of symptoms closer to discharge.  10/26: Patient continues to be help-rejecting and catastrophizing when discussing discharge. No plans endorsed to hurt self. Patient has pattern of describing enhanced expressions of symptoms closer to discharge.  10/27: Pt appears to be in behavioral control, denies SI/HI. Patient was less negativistic this AM and appeared future oriented toward meeting .   10/28: Pt appears less provocative, more optimistic about future. Continue current regimen.  10/31: Pt continues to present as dysphoric, withdrawn. Has not been interested in his poetry and drawings as he has been in the past. During encounter today pt continues to express low mood and hopelessness about the future. Despite patient's characterologic issues and suspected element of factitious disorder, there might be an element of mood disorder present that may ameliorate with ECT treatment. Pt expresses willingness to "try anything that could help". Will place ECT consult.   11/1: Pt continues to present as dysphoric, withdrawn. Continues to endorse hopelessness about the future and passive SI. Dental clearance pending for ECT. Appointment scheduled for Thursday.   11/2: Pt continues to express hopelessness and negativistic view of the world. Mistrustful of people's ability to help him. Dental appointment for ECT clearance scheduled for tomorrow.   11/3: Pt remains dysphoric, expressing some hope ECT may help. Dental clearance pending.   11/4: Pt placed on CO for suicidality. To be reassessed Monday.   11/5 Dysphoric, SI, unclear severity given past pattern of behavior. Will cont CO  Plan is to try ECT for mood and SI. If BP low would reduce trazodone.  11/6: dysphoric, irritable, SI, no plan mentioned, but unpredictable, some vague paranoia.    11/7: Pt remains dysphoric but less irritable than last Friday. Currently endorsing chronic, passive SI with no active intent or plans. Appears more future oriented, asking relevant questions about ECT treatment plan. Will d/c CO today. Will continue ECT 3x week.  11/8: Visible in the unit, interacting with select peers. ECT #2 scheduled for tomorrow.   11/9: Completed second ECT today. Denies SIIP. ECT #3 scheduled for Friday 11/11.   11/10: Pt denies SIIP, stable mood. ECT #3 scheduled tomorrow. Repleted potassium today.   11/11: This morning pt got his right middle finger caught in the bathroom door, now has a laceration in his distal interphalangeal joint. Pt was sent to the [facility] ED-fast track for imaging and laceration repair.   11/14: ECT #4 scheduled today. Pt appears brighter in affect. No longer endorsing SI.   11/15: ECT #5 scheduled tomorrow.   11/16: Pt is s/p ECT #4. He is alert and oriented. Appears brighter in affect. Pt no longer endorsing SI. During encounters he tends to dwell less on somatic complaints and negative ruminations as well. Finger appears more swollen today, discussed with hospitalist. Pt is afebrile.   ECT cancelled today after Anesthesia noted patient has a loose molar. Dental consult placed for likely extraction.   11/17: Seen by hospitalist for swelling on the finger he received stitches on. Started on antibiotics. Pt reports swelling is "the same" as yesterday, will continue monitoring. ECT got cancelled yesterday due to loose molar. Pt later reported molar fell out. Dental appointment scheduled for tomorrow morning. Pushing fluids.  11/18: Pt is visible in the unit, interacting with select peers. Pt is compliant with meds. BP improving today, no orthostatic hypotension noted. Pt had a long conversation with one of the nursing students this morning in which he shared his life story. Pt went to his dental appointment this morning, pending consult results.  11/21: Pt completed ECT #5 this morning. ECT #6 scheduled for Wed 11/23.   11/22: Pt is visible in the unit, interacting with select peers. Pt is compliant with meds. On encounter this morning pt appears brighter, is able to engage in conversation without introducing negative thinking or somatic preoccupations. Pt was also able to discuss aftercare planning with his  without catastrophizing as he had done in the past. ECT #6 scheduled for tomorrow 11/23. Planning for 7th ECT next Monday and likely discharge next week.     Plan:  1. CO for suicidality  2. Legals: 9.27  3. Psychiatry: Continue current meds  .Effexor XR 150mg PO daily. Titrate as tolerated.  .Abilify 5mg PO daily  .Trazodone 50mg PO HS  .Melatonin 5mg PO HS  4. ECT #2 11/9  4. Medical: Continue current medications  .Finasteride 5mg PO HS  .Nifedipine 30mg PO Daily  .Oxybutynin 5mg PO daily  .Simvastatin 20mg PO HS  5. Disposition: Home when stable.

## 2022-11-22 NOTE — BH INPATIENT PSYCHIATRY PROGRESS NOTE - CURRENT MEDICATION
MEDICATIONS  (STANDING):  ARIPiprazole 5 milliGRAM(s) Oral daily  cephalexin 500 milliGRAM(s) Oral four times a day  doxycycline monohydrate Capsule 100 milliGRAM(s) Oral every 12 hours  finasteride 5 milliGRAM(s) Oral daily  melatonin. 5 milliGRAM(s) Oral at bedtime  NIFEdipine XL 30 milliGRAM(s) Oral daily  oxybutynin 5 milliGRAM(s) Oral daily  potassium chloride    Tablet ER 20 milliEquivalent(s) Oral daily  simvastatin 20 milliGRAM(s) Oral at bedtime  traZODone 50 milliGRAM(s) Oral at bedtime  venlafaxine  milliGRAM(s) Oral daily    MEDICATIONS  (PRN):  acetaminophen     Tablet .. 650 milliGRAM(s) Oral every 6 hours PRN Mild Pain (1 - 3), Moderate Pain (4 - 6)  bacitracin   Ointment 1 Application(s) Topical two times a day PRN Wound care  diphenhydrAMINE 50 milliGRAM(s) Oral every 6 hours PRN agitation  diphenhydrAMINE Injectable 50 milliGRAM(s) IntraMuscular every 4 hours PRN severe agitation  fluticasone propionate 50 MICROgram(s)/spray Nasal Spray 1 Spray(s) Both Nostrils two times a day PRN congestion  haloperidol     Tablet 5 milliGRAM(s) Oral every 6 hours PRN agitation  haloperidol    Injectable 5 milliGRAM(s) IntraMuscular once PRN severe agitation  lidocaine   4% Patch 1 Patch Transdermal every 24 hours PRN pain

## 2022-11-22 NOTE — BH INPATIENT PSYCHIATRY PROGRESS NOTE - NSBHFUPINTERVALHXFT_PSY_A_CORE
Patient seen for follow-up of depression, chart reviewed and discussed with interdisciplinary team. No significant overnight events reported. Pt is visible in the unit, interacting with select peers. Pt is compliant with meds. On encounter this morning pt appears brighter, is able to engage in conversation without introducing negative thinking or somatic preoccupations. Pt was also able to discuss aftercare planning with his  without catastrophizing as he had done in the past. ECT #6 scheduled for tomorrow 11/23. Planning for 7th ECT next Monday and likely discharge next week.

## 2022-11-22 NOTE — BH INPATIENT PSYCHIATRY PROGRESS NOTE - NSBHCHARTREVIEWVS_PSY_A_CORE FT
Vital Signs Last 24 Hrs  T(C): 36.4 (11-22-22 @ 08:58), Max: 36.4 (11-22-22 @ 08:58)  T(F): 97.6 (11-22-22 @ 08:58), Max: 97.6 (11-22-22 @ 08:58)  HR: --  BP: --  BP(mean): --  RR: --  SpO2: --    Orthostatic VS  11-22-22 @ 08:58  Lying BP: --/-- HR: --  Sitting BP: 105/70 HR: 78  Standing BP: --/-- HR: --  Site: --  Mode: --  Orthostatic VS  11-21-22 @ 06:07  Lying BP: --/-- HR: --  Sitting BP: 124/81 HR: 88  Standing BP: 133/76 HR: 85  Site: upper left arm  Mode: electronic

## 2022-11-23 PROCEDURE — 99231 SBSQ HOSP IP/OBS SF/LOW 25: CPT

## 2022-11-23 RX ADMIN — Medication 100 MILLIGRAM(S): at 12:25

## 2022-11-23 RX ADMIN — Medication 30 MILLIGRAM(S): at 12:26

## 2022-11-23 RX ADMIN — Medication 5 MILLIGRAM(S): at 21:07

## 2022-11-23 RX ADMIN — ARIPIPRAZOLE 5 MILLIGRAM(S): 15 TABLET ORAL at 12:25

## 2022-11-23 RX ADMIN — Medication 500 MILLIGRAM(S): at 12:25

## 2022-11-23 RX ADMIN — Medication 150 MILLIGRAM(S): at 12:26

## 2022-11-23 RX ADMIN — Medication 5 MILLIGRAM(S): at 12:26

## 2022-11-23 RX ADMIN — SIMVASTATIN 20 MILLIGRAM(S): 20 TABLET, FILM COATED ORAL at 21:07

## 2022-11-23 RX ADMIN — FINASTERIDE 5 MILLIGRAM(S): 5 TABLET, FILM COATED ORAL at 12:26

## 2022-11-23 RX ADMIN — Medication 20 MILLIEQUIVALENT(S): at 12:26

## 2022-11-23 RX ADMIN — Medication 50 MILLIGRAM(S): at 21:07

## 2022-11-23 NOTE — BH INPATIENT PSYCHIATRY PROGRESS NOTE - NSBHASSESSSUMMFT_PSY_ALL_CORE
71 y.o. male with hx of unusual relatedness including pattern of making up psychiatric symptoms or hx such as claiming he arranged a hit or his brother killed himself. Recently there has been escalating pattern of hospitalizations, ER visits. Compliance after d/c is nil. Patient also develops excessive attachment or intense dislike of providers and other staff. Patient has no actual hx of suicide attempts or fh or SIB. Patient with likely facititious disorder vs mood disorder. Suspect possible unconscious primary gain related to medical complaints help seeking then rejection of help as inadequate. Patient not assessed as needing CO. Titrate medications and connect patient back to services. Appears to be provocative and help-rejecting. Prolonged hospitalization may not help achieve shared goals and might not be therapeutic for patient.    10/6: Has multiple somatic complaints, does not engage in treatment planning, however appears to show initiative toward getting a notebook which was a coping mechanism for patient during last hospitalization.   10/7: Continues to have multiple complaints, somewhat more receptive to treatment planning, did not report SI today. Continue with current medications.   10/8: No mood symptoms, no psychotic symptoms, no suicidal ideations.  10/9/22; anxious, dysphoric, superficial, no SI/HI.   10/10/22: Remain anxious, catastrophizes his friendship, having multiple somatic complaints. Consider switching to Effexor XR.  10/11: The patient continues to complain of depression, anxiety, losing friendships, although it seems he could continue them if he desired.  He focuses on negatives. Education attempted. The patient is tolerating medications well, will continue.  to meet with patient.  10/12: Patient continues to report somatic complaints but rejecting interventions. Patient is awaiting meeting with . Plan to cross taper to Effexor XR in light of reported complaints and limited efficacy on Prozac (albeit insufficient trial).  10/13: Patient is catastrophizing his somatic complaints and displaying pessimism with regards to meeting . Reassurance was provided. Continues with help-rejecting behavior and was redirected to previous patterns of pessimism during previous hospitalization. Patient acknowledged it however is not taking steps to behavioral change at this time.   10/14: Patient continues to display negativistic thinking and making provocative statements. Pt has been adherent to medications. Continue to cross taper from Prozac to Effexor XR.  10/17: Pt continues to present negativistic thinking. Visible in the unit. Appears more future oriented today. Will continue tapering Prozac (down to 20mg today) and titrating Effexor XR (up to 75mg today).  10/18: Presents with negativistic thinking but appears more goal oriented seeking services and looking forward to meeting with . Pt has been medication adherent. Continue to cross taper from Prozac to Effexor XR.  10/19: Makes provocative statements and presents with usual negativistic thinking however remains hopeful for meeting with . Has been noted to be visible on the unit and adherent to medications. D/C Prozac  10/20: Continues to make provocative statements which is his usual way of relating with others. Tolerating meds, no new concerns reported. Awaiting meeting with . Titrate Effexor to 112.5mg  10/21: Tolerating medications. No new concerns. Patient appears anxious about meeting with his , however also awaiting to discuss his goals with him.   10/24: Patient tolerating medications. Will increase Effexor to 150mg PO daily.   10/25: Tolerating medications. Patient appears to be catastrophizing however has intense eye contact and makes provocative statements, yet does not endorse any intent or plan to hurt self. Patient has pattern of describing enhanced expressions of symptoms closer to discharge.  10/26: Patient continues to be help-rejecting and catastrophizing when discussing discharge. No plans endorsed to hurt self. Patient has pattern of describing enhanced expressions of symptoms closer to discharge.  10/27: Pt appears to be in behavioral control, denies SI/HI. Patient was less negativistic this AM and appeared future oriented toward meeting .   10/28: Pt appears less provocative, more optimistic about future. Continue current regimen.  10/31: Pt continues to present as dysphoric, withdrawn. Has not been interested in his poetry and drawings as he has been in the past. During encounter today pt continues to express low mood and hopelessness about the future. Despite patient's characterologic issues and suspected element of factitious disorder, there might be an element of mood disorder present that may ameliorate with ECT treatment. Pt expresses willingness to "try anything that could help". Will place ECT consult.   11/1: Pt continues to present as dysphoric, withdrawn. Continues to endorse hopelessness about the future and passive SI. Dental clearance pending for ECT. Appointment scheduled for Thursday.   11/2: Pt continues to express hopelessness and negativistic view of the world. Mistrustful of people's ability to help him. Dental appointment for ECT clearance scheduled for tomorrow.   11/3: Pt remains dysphoric, expressing some hope ECT may help. Dental clearance pending.   11/4: Pt placed on CO for suicidality. To be reassessed Monday.   11/5 Dysphoric, SI, unclear severity given past pattern of behavior. Will cont CO  Plan is to try ECT for mood and SI. If BP low would reduce trazodone.  11/6: dysphoric, irritable, SI, no plan mentioned, but unpredictable, some vague paranoia.    11/7: Pt remains dysphoric but less irritable than last Friday. Currently endorsing chronic, passive SI with no active intent or plans. Appears more future oriented, asking relevant questions about ECT treatment plan. Will d/c CO today. Will continue ECT 3x week.  11/8: Visible in the unit, interacting with select peers. ECT #2 scheduled for tomorrow.   11/9: Completed second ECT today. Denies SIIP. ECT #3 scheduled for Friday 11/11.   11/10: Pt denies SIIP, stable mood. ECT #3 scheduled tomorrow. Repleted potassium today.   11/11: This morning pt got his right middle finger caught in the bathroom door, now has a laceration in his distal interphalangeal joint. Pt was sent to the [facility] ED-fast track for imaging and laceration repair.   11/14: ECT #4 scheduled today. Pt appears brighter in affect. No longer endorsing SI.   11/15: ECT #5 scheduled tomorrow.   11/16: Pt is s/p ECT #4. He is alert and oriented. Appears brighter in affect. Pt no longer endorsing SI. During encounters he tends to dwell less on somatic complaints and negative ruminations as well. Finger appears more swollen today, discussed with hospitalist. Pt is afebrile.   ECT cancelled today after Anesthesia noted patient has a loose molar. Dental consult placed for likely extraction.   11/17: Seen by hospitalist for swelling on the finger he received stitches on. Started on antibiotics. Pt reports swelling is "the same" as yesterday, will continue monitoring. ECT got cancelled yesterday due to loose molar. Pt later reported molar fell out. Dental appointment scheduled for tomorrow morning. Pushing fluids.  11/18: Pt is visible in the unit, interacting with select peers. Pt is compliant with meds. BP improving today, no orthostatic hypotension noted. Pt had a long conversation with one of the nursing students this morning in which he shared his life story. Pt went to his dental appointment this morning, pending consult results.  11/21: Pt completed ECT #5 this morning. ECT #6 scheduled for Wed 11/23.   11/22: Pt is visible in the unit, interacting with select peers. Pt is compliant with meds. On encounter this morning pt appears brighter, is able to engage in conversation without introducing negative thinking or somatic preoccupations. Pt was also able to discuss aftercare planning with his  without catastrophizing as he had done in the past. ECT #6 scheduled for tomorrow 11/23. Planning for 7th ECT next Monday and likely discharge next week.   11/23: Continues to show modest gains in terms of mood. ECT #6 rescheduled for Monday.   Plan:  1. CO for suicidality  2. Legals: 9.27  3. Psychiatry: Continue current meds  .Effexor XR 150mg PO daily. Titrate as tolerated.  .Abilify 5mg PO daily  .Trazodone 50mg PO HS  .Melatonin 5mg PO HS  4. ECT #2 11/9  4. Medical: Continue current medications  .Finasteride 5mg PO HS  .Nifedipine 30mg PO Daily  .Oxybutynin 5mg PO daily  .Simvastatin 20mg PO HS  5. Disposition: Home when stable.

## 2022-11-23 NOTE — BH INPATIENT PSYCHIATRY PROGRESS NOTE - NSBHCHARTREVIEWVS_PSY_A_CORE FT
Vital Signs Last 24 Hrs  T(C): 36.4 (11-23-22 @ 08:10), Max: 36.4 (11-23-22 @ 08:10)  T(F): 97.5 (11-23-22 @ 08:10), Max: 97.5 (11-23-22 @ 08:10)  HR: --  BP: --  BP(mean): --  RR: --  SpO2: --    Orthostatic VS  11-23-22 @ 08:10  Lying BP: --/-- HR: --  Sitting BP: 145/89 HR: 88  Standing BP: 120/86 HR: 113  Site: upper left arm  Mode: electronic  Orthostatic VS  11-22-22 @ 08:58  Lying BP: --/-- HR: --  Sitting BP: 105/70 HR: 78  Standing BP: --/-- HR: --  Site: --  Mode: --

## 2022-11-23 NOTE — BH INPATIENT PSYCHIATRY PROGRESS NOTE - CURRENT MEDICATION
MEDICATIONS  (STANDING):  ARIPiprazole 5 milliGRAM(s) Oral daily  cephalexin 500 milliGRAM(s) Oral four times a day  finasteride 5 milliGRAM(s) Oral daily  melatonin. 5 milliGRAM(s) Oral at bedtime  NIFEdipine XL 30 milliGRAM(s) Oral daily  oxybutynin 5 milliGRAM(s) Oral daily  potassium chloride    Tablet ER 20 milliEquivalent(s) Oral daily  simvastatin 20 milliGRAM(s) Oral at bedtime  traZODone 50 milliGRAM(s) Oral at bedtime  venlafaxine  milliGRAM(s) Oral daily    MEDICATIONS  (PRN):  acetaminophen     Tablet .. 650 milliGRAM(s) Oral every 6 hours PRN Mild Pain (1 - 3), Moderate Pain (4 - 6)  bacitracin   Ointment 1 Application(s) Topical two times a day PRN Wound care  diphenhydrAMINE 50 milliGRAM(s) Oral every 6 hours PRN agitation  diphenhydrAMINE Injectable 50 milliGRAM(s) IntraMuscular every 4 hours PRN severe agitation  fluticasone propionate 50 MICROgram(s)/spray Nasal Spray 1 Spray(s) Both Nostrils two times a day PRN congestion  haloperidol     Tablet 5 milliGRAM(s) Oral every 6 hours PRN agitation  haloperidol    Injectable 5 milliGRAM(s) IntraMuscular once PRN severe agitation  lidocaine   4% Patch 1 Patch Transdermal every 24 hours PRN pain

## 2022-11-23 NOTE — BH INPATIENT PSYCHIATRY PROGRESS NOTE - NSBHFUPINTERVALHXFT_PSY_A_CORE
Patient seen for follow-up of depression, chart reviewed and discussed with interdisciplinary team. No significant overnight events reported. Pt is visible in the unit, interacting with select peers. Pt is compliant with meds. During encounter this morning pt was calm, continues to report no changes however his affect appears brighter and his thought content is not overtly negative. Pt denies SI. Later in the morning pt had an episode of incontinence. Rescheduled ECT for Monday as pt did not want to go after his accident.

## 2022-11-24 RX ADMIN — Medication 20 MILLIEQUIVALENT(S): at 18:45

## 2022-11-24 RX ADMIN — ARIPIPRAZOLE 5 MILLIGRAM(S): 15 TABLET ORAL at 18:45

## 2022-11-24 RX ADMIN — SIMVASTATIN 20 MILLIGRAM(S): 20 TABLET, FILM COATED ORAL at 20:32

## 2022-11-24 RX ADMIN — FINASTERIDE 5 MILLIGRAM(S): 5 TABLET, FILM COATED ORAL at 18:46

## 2022-11-24 RX ADMIN — Medication 150 MILLIGRAM(S): at 18:44

## 2022-11-24 RX ADMIN — Medication 30 MILLIGRAM(S): at 18:45

## 2022-11-24 RX ADMIN — Medication 5 MILLIGRAM(S): at 18:46

## 2022-11-24 RX ADMIN — Medication 50 MILLIGRAM(S): at 20:31

## 2022-11-24 RX ADMIN — Medication 5 MILLIGRAM(S): at 20:31

## 2022-11-25 PROCEDURE — 99232 SBSQ HOSP IP/OBS MODERATE 35: CPT

## 2022-11-25 PROCEDURE — 99231 SBSQ HOSP IP/OBS SF/LOW 25: CPT

## 2022-11-25 RX ADMIN — Medication 5 MILLIGRAM(S): at 20:27

## 2022-11-25 RX ADMIN — Medication 5 MILLIGRAM(S): at 10:07

## 2022-11-25 RX ADMIN — Medication 50 MILLIGRAM(S): at 20:27

## 2022-11-25 RX ADMIN — Medication 20 MILLIEQUIVALENT(S): at 10:07

## 2022-11-25 RX ADMIN — Medication 150 MILLIGRAM(S): at 10:07

## 2022-11-25 RX ADMIN — FINASTERIDE 5 MILLIGRAM(S): 5 TABLET, FILM COATED ORAL at 10:07

## 2022-11-25 RX ADMIN — SIMVASTATIN 20 MILLIGRAM(S): 20 TABLET, FILM COATED ORAL at 20:27

## 2022-11-25 NOTE — BH TREATMENT PLAN - NSTXPATIENTPARTICIPATE_PSY_ALL_CORE
Patient participated in identification of needs/problems/goals for treatment
Patient participated in identification of needs/problems/goals for treatment/Patient participated in defining interventions/Patient participated in development of after care plan
Patient participated in identification of needs/problems/goals for treatment/Patient participated in defining interventions/Patient participated in development of after care plan
Patient participated in identification of needs/problems/goals for treatment/Patient participated in defining interventions

## 2022-11-25 NOTE — BH TREATMENT PLAN - NSTXDCOTHRGOAL_PSY_ALL_CORE
Pt will participate in care coordination intake, accept adequate services for discharge, and accept DC when ready
Pt will comply with recommended treatment, with an improvement in his symptoms, and resumption of baseline functioning
Pt will comply with treatment with an improvement in symptoms and resumption of baseline functioning
Pt will comply with treatment and follow up with an improvement in symptoms, helping him to participate in self-care and followup.

## 2022-11-25 NOTE — BH TREATMENT PLAN - NSTXCAREGIVERPARTICIPATE_PSY_P_CORE
Family/Caregiver participated in identification of needs/problems/goals for treatment/Family/Caregiver participated in defining interventions/Family/Caregiver participated in development of after care plan
Family/Caregiver participated in identification of needs/problems/goals for treatment/Family/Caregiver participated in defining interventions/Family/Caregiver participated in development of after care plan
Family/Caregiver participated in identification of needs/problems/goals for treatment
Family/Caregiver participated in identification of needs/problems/goals for treatment

## 2022-11-25 NOTE — BH INPATIENT PSYCHIATRY PROGRESS NOTE - NSBHCHARTREVIEWVS_PSY_A_CORE FT
Vital Signs Last 24 Hrs  T(C): 36.7 (11-25-22 @ 08:16), Max: 36.7 (11-25-22 @ 08:16)  T(F): 98.1 (11-25-22 @ 08:16), Max: 98.1 (11-25-22 @ 08:16)  HR: --  BP: --  BP(mean): --  RR: --  SpO2: --    Orthostatic VS  11-25-22 @ 09:33  Lying BP: --/-- HR: --  Sitting BP: 117/69 HR: 76  Standing BP: 88/60 HR: 92  Site: upper left arm  Mode: electronic  Orthostatic VS  11-25-22 @ 08:24  Lying BP: --/-- HR: --  Sitting BP: 146/72 HR: 72  Standing BP: 122/66 HR: 84  Site: upper left arm  Mode: electronic  Orthostatic VS  11-25-22 @ 08:16  Lying BP: --/-- HR: --  Sitting BP: 133/81 HR: 74  Standing BP: 104/75 HR: 89  Site: upper left arm  Mode: electronic  Orthostatic VS  11-24-22 @ 08:27  Lying BP: --/-- HR: --  Sitting BP: 175/114 HR: 89  Standing BP: 132/108 HR: 104  Site: upper left arm  Mode: electronic

## 2022-11-25 NOTE — BH TREATMENT PLAN - NSTXCOPEINTERMD_PSY_ALL_CORE
Psychoeducation and psycho pharm. Titrating effexor

## 2022-11-25 NOTE — BH TREATMENT PLAN - NSTXPLANTHERAPYSESSIONSFT_PSY_ALL_CORE
10-11-22  Type of therapy: Coping skills,Creative arts therapy,Health and fitness,Spirituality,Stress management  Type of session: Group  Level of patient participation: Participated with encouragement  Duration of participation: 45 minutes  Therapy conducted by: Psych rehab  Therapy Summary: In response to the COVID-19 outbreak there has been a shift in hospital wide policies and protocols. As a result it should be noted the unit activities and structure have been temporarily modified to promote safety of patients and staff. Patient over the past week made some gains towards his rehab goals. Patient with moderate to maximum encouragement participated in several of the morning and afternoon rehab groups. During activities patient is generally cooperative, verbal and is able to stay task focused with moderate redirection. Patient is compliant with his medications, and eats his meals with the community. However, patient remains anxious, dysphoric, somatic and at times irritable. Patient at times is needy, and demanding.  
  11-21-22  Type of therapy: Leisure development,Peer advocate  Type of session: Individual  Level of patient participation: Participates  Duration of participation: 30 minutes  Therapy conducted by: Psych rehab  Therapy Summary: Writer met with pt to assess progress toward psychiatric rehabilitation goals over the past week. Pt continues to report feeling depressed and is unable/unwilling to identify any benefits of medication/ECT and programming compliance. Pt is unable to identify any coping skills in consultation with pt’s goal and is ambivalent to support/problem-solving related to pt’s physical/mental challenges. Per staff, pt is eating and exhibits fair hygiene. Pt is visible in the milieu though minimally engaged with peers. Pt demonstrates some engagement in group sessions, however, participates minimally. Pt endorsed suicidal ideations with planning if pt is discharged.  Pt denied HI, AH/VH. Pt demonstrates poor insight. Pt’s thought process was within normal limits with overly pessimistic and at times irrational content.  Psychiatric rehabilitation staff will continue to provide support and will encourage engagement in treatment and programming.  
  10-18-22  Type of therapy: Coping skills,Creative arts therapy,Health and fitness,Spirituality,Stress management  Type of session: Group  Level of patient participation: Participated with encouragement  Duration of participation: 45 minutes  Therapy conducted by: Psych rehab  Therapy Summary: In response to the COVID-19 outbreak there has been a shift in hospital wide policies and procedures. As a result it should be noted the unit activities and structure have been temporarily modified to promote safety of patients and staff. Patient over the past week made some gains towards his rehab goals. Patient with moderate to maximum encouragement participated in most of the morning and afternoon rehab groups. During activities patient is verbal and is able to stay task focused with moderate to maximum redirection. Patient initiates social interaction with select peers. Patient is compliant with his medications, eats his meals with the community and ambulates independently. However, patient remains dysphoric, somatic and negative.  
  11-07-22  Type of therapy: Coping skills,Creative arts therapy,Health and fitness,Spirituality,Stress management  Type of session: Group  Level of patient participation: Participated with encouragement  Duration of participation: 45 minutes  Therapy conducted by: Psych rehab  Therapy Summary: In response to the COVID-19 outbreak there has been a shift in hospital wide policies and protocols. As a result it should be noted the unit activities and structure have been temporarily modified to promote safety of patients and staff. Patient over the past week did not make any major gains towards his rehab goals. Patient remains very negative regarding his recovery. Patient continues to state the medications and hospital environment are not helping him with his depressive symptoms. Patient continues to endorse passive suicidal ideations, feelings of anxiety and despair. However, patient with moderate encouragement attends most of the rehab groups. However, during activities patient is negative, and critical. Patient requires moderate to maximum redirection to stay task focused. Patient remains compliant with his medications, eats his meals with the community and ambulates independently.

## 2022-11-25 NOTE — BH INPATIENT PSYCHIATRY PROGRESS NOTE - NSBHFUPINTERVALHXFT_PSY_A_CORE
Patient seen for follow-up of depression, chart reviewed and discussed with interdisciplinary team. No significant overnight events reported. Pt is visible in the unit, interacting with select peers. Pt is compliant with meds. During encounter this morning pt was calm, preoccupied with his prostate in the context of recent episode of incontinence the day before yesterday. Pt focused on pursuing treatment for his incontinence. Denies SIIP. Pt was orthostatic this morning, reports he had been holding off on drinking liquids because he thought he had ECT today. Denies feeling lightheaded. Pushing fluids. ECT scheduled for Monday.

## 2022-11-25 NOTE — BH TREATMENT PLAN - NSTXCOPEINTERPR_PSY_ALL_CORE
Patient will identify and utilize two coping skills daily to manage his anxiety and depressive symptoms within seven days.
Pt was unable/unwilling to identify effective coping skills and was ambivalent to support/problem-solving. Psychiatric rehabilitation staff will continue to provide support and will encourage engagement in treatment to facilitate progress.
Patient will identify and utilize two coping skills daily to manage his depressive symptoms within seven days.
Patient will identify and utilize two coping skills daily to increase his frustration tolerance and manage his depressive symptoms within seven days.

## 2022-11-25 NOTE — BH TREATMENT PLAN - NSTXDCOTHRINTERSW_PSY_ALL_CORE
SW meets with pt to discuss and coordinate his supportive discharge
SW meets with pt routinely to encourage his overall compliance, participation in his care, and acceptance of adequate DC supports
SW meets with pt routinely to encourage his overall compliance, and participation in accessing services at [facility].
SW meets with pt routinely to encourage his compliance with treatment, and participation in his plan for follow up and DC supports

## 2022-11-25 NOTE — BH INPATIENT PSYCHIATRY PROGRESS NOTE - CURRENT MEDICATION
MEDICATIONS  (STANDING):  finasteride 5 milliGRAM(s) Oral daily  melatonin. 5 milliGRAM(s) Oral at bedtime  NIFEdipine XL 30 milliGRAM(s) Oral daily  oxybutynin 5 milliGRAM(s) Oral daily  potassium chloride    Tablet ER 20 milliEquivalent(s) Oral daily  simvastatin 20 milliGRAM(s) Oral at bedtime  traZODone 50 milliGRAM(s) Oral at bedtime  venlafaxine  milliGRAM(s) Oral daily    MEDICATIONS  (PRN):  acetaminophen     Tablet .. 650 milliGRAM(s) Oral every 6 hours PRN Mild Pain (1 - 3), Moderate Pain (4 - 6)  diphenhydrAMINE 50 milliGRAM(s) Oral every 6 hours PRN agitation  diphenhydrAMINE Injectable 50 milliGRAM(s) IntraMuscular every 4 hours PRN severe agitation  fluticasone propionate 50 MICROgram(s)/spray Nasal Spray 1 Spray(s) Both Nostrils two times a day PRN congestion  haloperidol     Tablet 5 milliGRAM(s) Oral every 6 hours PRN agitation  haloperidol    Injectable 5 milliGRAM(s) IntraMuscular once PRN severe agitation  lidocaine   4% Patch 1 Patch Transdermal every 24 hours PRN pain

## 2022-11-25 NOTE — BH TREATMENT PLAN - NSTXDEPRESINTERMD_PSY_ALL_CORE
Will continue psychoeducation and pharmacotherapy. ECT 3x weekly. 
Will continue psychoeducation and pharmacotherapy. ECT 3x weekly.

## 2022-11-25 NOTE — PROGRESS NOTE ADULT - SUBJECTIVE AND OBJECTIVE BOX
[facility] Division of Hospital Medicine  Kaela Shrestha MD  Pager (M-F, 8A-5P): [phone]  Other Times:  [phone]      SUBJECTIVE / OVERNIGHT EVENTS: Pt feels fine this am, though depressed. Says his finger no longer hurts, looks better to him.    MEDICATIONS  (STANDING):  cephalexin 500 milliGRAM(s) Oral four times a day  finasteride 5 milliGRAM(s) Oral daily  melatonin. 5 milliGRAM(s) Oral at bedtime  NIFEdipine XL 30 milliGRAM(s) Oral daily  oxybutynin 5 milliGRAM(s) Oral daily  potassium chloride    Tablet ER 20 milliEquivalent(s) Oral daily  simvastatin 20 milliGRAM(s) Oral at bedtime  traZODone 50 milliGRAM(s) Oral at bedtime  venlafaxine  milliGRAM(s) Oral daily    MEDICATIONS  (PRN):  acetaminophen     Tablet .. 650 milliGRAM(s) Oral every 6 hours PRN Mild Pain (1 - 3), Moderate Pain (4 - 6)  bacitracin   Ointment 1 Application(s) Topical two times a day PRN Wound care  diphenhydrAMINE 50 milliGRAM(s) Oral every 6 hours PRN agitation  diphenhydrAMINE Injectable 50 milliGRAM(s) IntraMuscular every 4 hours PRN severe agitation  fluticasone propionate 50 MICROgram(s)/spray Nasal Spray 1 Spray(s) Both Nostrils two times a day PRN congestion  haloperidol     Tablet 5 milliGRAM(s) Oral every 6 hours PRN agitation  haloperidol    Injectable 5 milliGRAM(s) IntraMuscular once PRN severe agitation  lidocaine   4% Patch 1 Patch Transdermal every 24 hours PRN pain      I&O's Summary      PHYSICAL EXAM:  Vital Signs Last 24 Hrs  T(C): 36.7 (25 Nov 2022 08:16), Max: 36.7 (25 Nov 2022 08:16)  T(F): 98.1 (25 Nov 2022 08:16), Max: 98.1 (25 Nov 2022 08:16)      LABS:      SKIN: R 3rd digit with well healing laceration 1x1 cm, dry flaking skin, no abscess. Also has a small plantar wart laterally on the same finger.                 RADIOLOGY & ADDITIONAL TESTS:  Results Reviewed:   Imaging Personally Reviewed:  Electrocardiogram Personally Reviewed:    COORDINATION OF CARE:  Care Discussed with Consultants/Other Providers [Y/N]:  Prior or Outpatient Records Reviewed [Y/N]:

## 2022-11-25 NOTE — PROGRESS NOTE ADULT - ASSESSMENT
70 y/o male with BPH, HTN and HLD, recent covid infection 9/22/22 transferred from [facility] to [facility]. Initially presented to [facility] with generalized complaints; SOB. Pt admitted for  workup for dyspnea and depression . CTA chest negative for PE, cardiac w/u negative for ACS. Covid positive while hospitalized on 9/22, did not require o2 therapy, dex or remdes. Admitted to [facility] for mgmt of his depression, SI and agitation. Currently on ECT.     # Laceration of middle finger (R hand), resolving  Pt completed a course of antibiotics (keflex and doxy) on 11/23.  - CTM, looks to be healing well. Will d/c the bacitracin.      #Hypokalemia  -Hypokalemia noted 11/1  -Now s/p repletion  -Monitor BMPs  -Encourage PO intake    Depression  - Mgmt as per psych     BPH  - Cont flomax and oxybutynin as ordered    HTN  - Cont nifedipine 30 daily  HLD  - Cont statin    Thyroid nodules, incidental finding  On CTA on admission. TSH checked in July 2022 was wnl.  - Needs outpatient f/u w PCP for further surveillance  - TSH wnl

## 2022-11-25 NOTE — BH INPATIENT PSYCHIATRY PROGRESS NOTE - NSBHASSESSSUMMFT_PSY_ALL_CORE
left hip hemiarthroplasty/yes 71 y.o. male with hx of unusual relatedness including pattern of making up psychiatric symptoms or hx such as claiming he arranged a hit or his brother killed himself. Recently there has been escalating pattern of hospitalizations, ER visits. Compliance after d/c is nil. Patient also develops excessive attachment or intense dislike of providers and other staff. Patient has no actual hx of suicide attempts or fh or SIB. Patient with likely facititious disorder vs mood disorder. Suspect possible unconscious primary gain related to medical complaints help seeking then rejection of help as inadequate. Patient not assessed as needing CO. Titrate medications and connect patient back to services. Appears to be provocative and help-rejecting. Prolonged hospitalization may not help achieve shared goals and might not be therapeutic for patient.    10/6: Has multiple somatic complaints, does not engage in treatment planning, however appears to show initiative toward getting a notebook which was a coping mechanism for patient during last hospitalization.   10/7: Continues to have multiple complaints, somewhat more receptive to treatment planning, did not report SI today. Continue with current medications.   10/8: No mood symptoms, no psychotic symptoms, no suicidal ideations.  10/9/22; anxious, dysphoric, superficial, no SI/HI.   10/10/22: Remain anxious, catastrophizes his friendship, having multiple somatic complaints. Consider switching to Effexor XR.  10/11: The patient continues to complain of depression, anxiety, losing friendships, although it seems he could continue them if he desired.  He focuses on negatives. Education attempted. The patient is tolerating medications well, will continue.  to meet with patient.  10/12: Patient continues to report somatic complaints but rejecting interventions. Patient is awaiting meeting with . Plan to cross taper to Effexor XR in light of reported complaints and limited efficacy on Prozac (albeit insufficient trial).  10/13: Patient is catastrophizing his somatic complaints and displaying pessimism with regards to meeting . Reassurance was provided. Continues with help-rejecting behavior and was redirected to previous patterns of pessimism during previous hospitalization. Patient acknowledged it however is not taking steps to behavioral change at this time.   10/14: Patient continues to display negativistic thinking and making provocative statements. Pt has been adherent to medications. Continue to cross taper from Prozac to Effexor XR.  10/17: Pt continues to present negativistic thinking. Visible in the unit. Appears more future oriented today. Will continue tapering Prozac (down to 20mg today) and titrating Effexor XR (up to 75mg today).  10/18: Presents with negativistic thinking but appears more goal oriented seeking services and looking forward to meeting with . Pt has been medication adherent. Continue to cross taper from Prozac to Effexor XR.  10/19: Makes provocative statements and presents with usual negativistic thinking however remains hopeful for meeting with . Has been noted to be visible on the unit and adherent to medications. D/C Prozac  10/20: Continues to make provocative statements which is his usual way of relating with others. Tolerating meds, no new concerns reported. Awaiting meeting with . Titrate Effexor to 112.5mg  10/21: Tolerating medications. No new concerns. Patient appears anxious about meeting with his , however also awaiting to discuss his goals with him.   10/24: Patient tolerating medications. Will increase Effexor to 150mg PO daily.   10/25: Tolerating medications. Patient appears to be catastrophizing however has intense eye contact and makes provocative statements, yet does not endorse any intent or plan to hurt self. Patient has pattern of describing enhanced expressions of symptoms closer to discharge.  10/26: Patient continues to be help-rejecting and catastrophizing when discussing discharge. No plans endorsed to hurt self. Patient has pattern of describing enhanced expressions of symptoms closer to discharge.  10/27: Pt appears to be in behavioral control, denies SI/HI. Patient was less negativistic this AM and appeared future oriented toward meeting .   10/28: Pt appears less provocative, more optimistic about future. Continue current regimen.  10/31: Pt continues to present as dysphoric, withdrawn. Has not been interested in his poetry and drawings as he has been in the past. During encounter today pt continues to express low mood and hopelessness about the future. Despite patient's characterologic issues and suspected element of factitious disorder, there might be an element of mood disorder present that may ameliorate with ECT treatment. Pt expresses willingness to "try anything that could help". Will place ECT consult.   11/1: Pt continues to present as dysphoric, withdrawn. Continues to endorse hopelessness about the future and passive SI. Dental clearance pending for ECT. Appointment scheduled for Thursday.   11/2: Pt continues to express hopelessness and negativistic view of the world. Mistrustful of people's ability to help him. Dental appointment for ECT clearance scheduled for tomorrow.   11/3: Pt remains dysphoric, expressing some hope ECT may help. Dental clearance pending.   11/4: Pt placed on CO for suicidality. To be reassessed Monday.   11/5 Dysphoric, SI, unclear severity given past pattern of behavior. Will cont CO  Plan is to try ECT for mood and SI. If BP low would reduce trazodone.  11/6: dysphoric, irritable, SI, no plan mentioned, but unpredictable, some vague paranoia.    11/7: Pt remains dysphoric but less irritable than last Friday. Currently endorsing chronic, passive SI with no active intent or plans. Appears more future oriented, asking relevant questions about ECT treatment plan. Will d/c CO today. Will continue ECT 3x week.  11/8: Visible in the unit, interacting with select peers. ECT #2 scheduled for tomorrow.   11/9: Completed second ECT today. Denies SIIP. ECT #3 scheduled for Friday 11/11.   11/10: Pt denies SIIP, stable mood. ECT #3 scheduled tomorrow. Repleted potassium today.   11/11: This morning pt got his right middle finger caught in the bathroom door, now has a laceration in his distal interphalangeal joint. Pt was sent to the [facility] ED-fast track for imaging and laceration repair.   11/14: ECT #4 scheduled today. Pt appears brighter in affect. No longer endorsing SI.   11/15: ECT #5 scheduled tomorrow.   11/16: Pt is s/p ECT #4. He is alert and oriented. Appears brighter in affect. Pt no longer endorsing SI. During encounters he tends to dwell less on somatic complaints and negative ruminations as well. Finger appears more swollen today, discussed with hospitalist. Pt is afebrile.   ECT cancelled today after Anesthesia noted patient has a loose molar. Dental consult placed for likely extraction.   11/17: Seen by hospitalist for swelling on the finger he received stitches on. Started on antibiotics. Pt reports swelling is "the same" as yesterday, will continue monitoring. ECT got cancelled yesterday due to loose molar. Pt later reported molar fell out. Dental appointment scheduled for tomorrow morning. Pushing fluids.  11/18: Pt is visible in the unit, interacting with select peers. Pt is compliant with meds. BP improving today, no orthostatic hypotension noted. Pt had a long conversation with one of the nursing students this morning in which he shared his life story. Pt went to his dental appointment this morning, pending consult results.  11/21: Pt completed ECT #5 this morning. ECT #6 scheduled for Wed 11/23.   11/22: Pt is visible in the unit, interacting with select peers. Pt is compliant with meds. On encounter this morning pt appears brighter, is able to engage in conversation without introducing negative thinking or somatic preoccupations. Pt was also able to discuss aftercare planning with his  without catastrophizing as he had done in the past. ECT #6 scheduled for tomorrow 11/23. Planning for 7th ECT next Monday and likely discharge next week.   11/23: Continues to show modest gains in terms of mood. ECT #6 rescheduled for Monday.   11/25: ECT scheduled for Monday. Pt remains stable in terms of mood.   Plan:  1. CO for suicidality  2. Legals: 9.27  3. Psychiatry: Continue current meds  .Effexor XR 150mg PO daily. Titrate as tolerated.  .Abilify 5mg PO daily  .Trazodone 50mg PO HS  .Melatonin 5mg PO HS  4. ECT #2 11/9  4. Medical: Continue current medications  .Finasteride 5mg PO HS  .Nifedipine 30mg PO Daily  .Oxybutynin 5mg PO daily  .Simvastatin 20mg PO HS  5. Disposition: Home when stable.

## 2022-11-26 RX ADMIN — Medication 20 MILLIEQUIVALENT(S): at 08:19

## 2022-11-26 RX ADMIN — Medication 5 MILLIGRAM(S): at 20:50

## 2022-11-26 RX ADMIN — Medication 5 MILLIGRAM(S): at 08:19

## 2022-11-26 RX ADMIN — SIMVASTATIN 20 MILLIGRAM(S): 20 TABLET, FILM COATED ORAL at 20:50

## 2022-11-26 RX ADMIN — FINASTERIDE 5 MILLIGRAM(S): 5 TABLET, FILM COATED ORAL at 08:19

## 2022-11-26 RX ADMIN — Medication 50 MILLIGRAM(S): at 20:50

## 2022-11-26 RX ADMIN — Medication 30 MILLIGRAM(S): at 08:19

## 2022-11-26 RX ADMIN — Medication 150 MILLIGRAM(S): at 08:19

## 2022-11-27 LAB — SARS-COV-2 RNA SPEC QL NAA+PROBE: SIGNIFICANT CHANGE UP

## 2022-11-27 RX ADMIN — FINASTERIDE 5 MILLIGRAM(S): 5 TABLET, FILM COATED ORAL at 08:35

## 2022-11-27 RX ADMIN — SIMVASTATIN 20 MILLIGRAM(S): 20 TABLET, FILM COATED ORAL at 21:17

## 2022-11-27 RX ADMIN — Medication 30 MILLIGRAM(S): at 08:35

## 2022-11-27 RX ADMIN — Medication 5 MILLIGRAM(S): at 21:17

## 2022-11-27 RX ADMIN — Medication 5 MILLIGRAM(S): at 08:35

## 2022-11-27 RX ADMIN — Medication 20 MILLIEQUIVALENT(S): at 08:35

## 2022-11-27 RX ADMIN — Medication 50 MILLIGRAM(S): at 21:17

## 2022-11-27 RX ADMIN — Medication 150 MILLIGRAM(S): at 08:35

## 2022-11-28 PROCEDURE — 90870 ELECTROCONVULSIVE THERAPY: CPT

## 2022-11-28 PROCEDURE — 99231 SBSQ HOSP IP/OBS SF/LOW 25: CPT | Mod: 25

## 2022-11-28 RX ADMIN — SIMVASTATIN 20 MILLIGRAM(S): 20 TABLET, FILM COATED ORAL at 21:04

## 2022-11-28 RX ADMIN — Medication 5 MILLIGRAM(S): at 21:04

## 2022-11-28 RX ADMIN — Medication 150 MILLIGRAM(S): at 12:25

## 2022-11-28 RX ADMIN — Medication 5 MILLIGRAM(S): at 12:25

## 2022-11-28 RX ADMIN — Medication 20 MILLIEQUIVALENT(S): at 12:25

## 2022-11-28 RX ADMIN — FINASTERIDE 5 MILLIGRAM(S): 5 TABLET, FILM COATED ORAL at 12:25

## 2022-11-28 RX ADMIN — Medication 30 MILLIGRAM(S): at 12:25

## 2022-11-28 RX ADMIN — Medication 50 MILLIGRAM(S): at 21:04

## 2022-11-28 NOTE — BH INPATIENT PSYCHIATRY PROGRESS NOTE - NSBHMETABOLIC_PSY_ALL_CORE_FT
BMI: BMI (kg/m2): 24.7 (11-26-22 @ 17:41)  HbA1c: A1C with Estimated Average Glucose Result: 5.1 % (07-06-22 @ 10:38)    Glucose: POCT Blood Glucose.: 112 mg/dL (10-05-22 @ 20:21)    BP: 166/92 (11-28-22 @ 11:15) (151/88 - 175/100)  Lipid Panel: Date/Time: 07-06-22 @ 10:38  Cholesterol, Serum: 128  Direct LDL: --  HDL Cholesterol, Serum: 52  Total Cholesterol/HDL Ration Measurement: --  Triglycerides, Serum: 74

## 2022-11-28 NOTE — BH INPATIENT PSYCHIATRY PROGRESS NOTE - NSBHCHARTREVIEWVS_PSY_A_CORE FT
Vital Signs Last 24 Hrs  T(C): 36.4 (11-28-22 @ 11:15), Max: 37.1 (11-28-22 @ 05:40)  T(F): 97.6 (11-28-22 @ 11:15), Max: 98.8 (11-28-22 @ 05:40)  HR: 70 (11-28-22 @ 11:15) (68 - 84)  BP: 166/92 (11-28-22 @ 11:15) (151/88 - 175/100)  BP(mean): --  RR: 16 (11-28-22 @ 11:15) (16 - 20)  SpO2: 100% (11-28-22 @ 11:15) (98% - 100%)    Orthostatic VS  11-28-22 @ 07:55  Lying BP: --/-- HR: --  Sitting BP: 106/78 HR: 72  Standing BP: 107/82 HR: 97  Site: --  Mode: --  Orthostatic VS  11-28-22 @ 05:40  Lying BP: --/-- HR: --  Sitting BP: 136/81 HR: 87  Standing BP: 125/90 HR: 94  Site: --  Mode: --  Orthostatic VS  11-27-22 @ 05:52  Lying BP: --/-- HR: --  Sitting BP: 135/84 HR: 69  Standing BP: 123/90 HR: 91  Site: upper right arm  Mode: electronic

## 2022-11-28 NOTE — BH INPATIENT PSYCHIATRY PROGRESS NOTE - NSBHFUPINTERVALHXFT_PSY_A_CORE
Patient seen for follow-up of depression, chart reviewed and discussed with interdisciplinary team. No significant overnight events reported. Pt is visible in the unit, interacting with select peers. Pt is compliant with meds. Pt was irritable on approach this morning, upset that ECT had not taken him already "at this rate I'm going to miss breakfast, and I don't like lunch". Pt continues to present overtly negativistic view of the world, focusing on negative aspects of his life, black/white thinking.

## 2022-11-28 NOTE — BH INPATIENT PSYCHIATRY PROGRESS NOTE - NSBHASSESSSUMMFT_PSY_ALL_CORE
71 y.o. male with hx of unusual relatedness including pattern of making up psychiatric symptoms or hx such as claiming he arranged a hit or his brother killed himself. Recently there has been escalating pattern of hospitalizations, ER visits. Compliance after d/c is nil. Patient also develops excessive attachment or intense dislike of providers and other staff. Patient has no actual hx of suicide attempts or fh or SIB. Patient with likely facititious disorder vs mood disorder. Suspect possible unconscious primary gain related to medical complaints help seeking then rejection of help as inadequate. Patient not assessed as needing CO. Titrate medications and connect patient back to services. Appears to be provocative and help-rejecting. Prolonged hospitalization may not help achieve shared goals and might not be therapeutic for patient.    10/6: Has multiple somatic complaints, does not engage in treatment planning, however appears to show initiative toward getting a notebook which was a coping mechanism for patient during last hospitalization.   10/7: Continues to have multiple complaints, somewhat more receptive to treatment planning, did not report SI today. Continue with current medications.   10/8: No mood symptoms, no psychotic symptoms, no suicidal ideations.  10/9/22; anxious, dysphoric, superficial, no SI/HI.   10/10/22: Remain anxious, catastrophizes his friendship, having multiple somatic complaints. Consider switching to Effexor XR.  10/11: The patient continues to complain of depression, anxiety, losing friendships, although it seems he could continue them if he desired.  He focuses on negatives. Education attempted. The patient is tolerating medications well, will continue.  to meet with patient.  10/12: Patient continues to report somatic complaints but rejecting interventions. Patient is awaiting meeting with . Plan to cross taper to Effexor XR in light of reported complaints and limited efficacy on Prozac (albeit insufficient trial).  10/13: Patient is catastrophizing his somatic complaints and displaying pessimism with regards to meeting . Reassurance was provided. Continues with help-rejecting behavior and was redirected to previous patterns of pessimism during previous hospitalization. Patient acknowledged it however is not taking steps to behavioral change at this time.   10/14: Patient continues to display negativistic thinking and making provocative statements. Pt has been adherent to medications. Continue to cross taper from Prozac to Effexor XR.  10/17: Pt continues to present negativistic thinking. Visible in the unit. Appears more future oriented today. Will continue tapering Prozac (down to 20mg today) and titrating Effexor XR (up to 75mg today).  10/18: Presents with negativistic thinking but appears more goal oriented seeking services and looking forward to meeting with . Pt has been medication adherent. Continue to cross taper from Prozac to Effexor XR.  10/19: Makes provocative statements and presents with usual negativistic thinking however remains hopeful for meeting with . Has been noted to be visible on the unit and adherent to medications. D/C Prozac  10/20: Continues to make provocative statements which is his usual way of relating with others. Tolerating meds, no new concerns reported. Awaiting meeting with . Titrate Effexor to 112.5mg  10/21: Tolerating medications. No new concerns. Patient appears anxious about meeting with his , however also awaiting to discuss his goals with him.   10/24: Patient tolerating medications. Will increase Effexor to 150mg PO daily.   10/25: Tolerating medications. Patient appears to be catastrophizing however has intense eye contact and makes provocative statements, yet does not endorse any intent or plan to hurt self. Patient has pattern of describing enhanced expressions of symptoms closer to discharge.  10/26: Patient continues to be help-rejecting and catastrophizing when discussing discharge. No plans endorsed to hurt self. Patient has pattern of describing enhanced expressions of symptoms closer to discharge.  10/27: Pt appears to be in behavioral control, denies SI/HI. Patient was less negativistic this AM and appeared future oriented toward meeting .   10/28: Pt appears less provocative, more optimistic about future. Continue current regimen.  10/31: Pt continues to present as dysphoric, withdrawn. Has not been interested in his poetry and drawings as he has been in the past. During encounter today pt continues to express low mood and hopelessness about the future. Despite patient's characterologic issues and suspected element of factitious disorder, there might be an element of mood disorder present that may ameliorate with ECT treatment. Pt expresses willingness to "try anything that could help". Will place ECT consult.   11/1: Pt continues to present as dysphoric, withdrawn. Continues to endorse hopelessness about the future and passive SI. Dental clearance pending for ECT. Appointment scheduled for Thursday.   11/2: Pt continues to express hopelessness and negativistic view of the world. Mistrustful of people's ability to help him. Dental appointment for ECT clearance scheduled for tomorrow.   11/3: Pt remains dysphoric, expressing some hope ECT may help. Dental clearance pending.   11/4: Pt placed on CO for suicidality. To be reassessed Monday.   11/5 Dysphoric, SI, unclear severity given past pattern of behavior. Will cont CO  Plan is to try ECT for mood and SI. If BP low would reduce trazodone.  11/6: dysphoric, irritable, SI, no plan mentioned, but unpredictable, some vague paranoia.    11/7: Pt remains dysphoric but less irritable than last Friday. Currently endorsing chronic, passive SI with no active intent or plans. Appears more future oriented, asking relevant questions about ECT treatment plan. Will d/c CO today. Will continue ECT 3x week.  11/8: Visible in the unit, interacting with select peers. ECT #2 scheduled for tomorrow.   11/9: Completed second ECT today. Denies SIIP. ECT #3 scheduled for Friday 11/11.   11/10: Pt denies SIIP, stable mood. ECT #3 scheduled tomorrow. Repleted potassium today.   11/11: This morning pt got his right middle finger caught in the bathroom door, now has a laceration in his distal interphalangeal joint. Pt was sent to the [facility] ED-fast track for imaging and laceration repair.   11/14: ECT #4 scheduled today. Pt appears brighter in affect. No longer endorsing SI.   11/15: ECT #5 scheduled tomorrow.   11/16: Pt is s/p ECT #4. He is alert and oriented. Appears brighter in affect. Pt no longer endorsing SI. During encounters he tends to dwell less on somatic complaints and negative ruminations as well. Finger appears more swollen today, discussed with hospitalist. Pt is afebrile.   ECT cancelled today after Anesthesia noted patient has a loose molar. Dental consult placed for likely extraction.   11/17: Seen by hospitalist for swelling on the finger he received stitches on. Started on antibiotics. Pt reports swelling is "the same" as yesterday, will continue monitoring. ECT got cancelled yesterday due to loose molar. Pt later reported molar fell out. Dental appointment scheduled for tomorrow morning. Pushing fluids.  11/18: Pt is visible in the unit, interacting with select peers. Pt is compliant with meds. BP improving today, no orthostatic hypotension noted. Pt had a long conversation with one of the nursing students this morning in which he shared his life story. Pt went to his dental appointment this morning, pending consult results.  11/21: Pt completed ECT #5 this morning. ECT #6 scheduled for Wed 11/23.   11/22: Pt is visible in the unit, interacting with select peers. Pt is compliant with meds. On encounter this morning pt appears brighter, is able to engage in conversation without introducing negative thinking or somatic preoccupations. Pt was also able to discuss aftercare planning with his  without catastrophizing as he had done in the past. ECT #6 scheduled for tomorrow 11/23. Planning for 7th ECT next Monday and likely discharge next week.   11/23: Continues to show modest gains in terms of mood. ECT #6 rescheduled for Monday.   11/25: ECT scheduled for Monday. Pt remains stable in terms of mood.   11/28: S/P ECT #6 today.  Plan:  1. CO for suicidality  2. Legals: 9.27  3. Psychiatry: Continue current meds  .Effexor XR 150mg PO daily. Titrate as tolerated.  .Abilify 5mg PO daily  .Trazodone 50mg PO HS  .Melatonin 5mg PO HS  4. ECT #2 11/9  4. Medical: Continue current medications  .Finasteride 5mg PO HS  .Nifedipine 30mg PO Daily  .Oxybutynin 5mg PO daily  .Simvastatin 20mg PO HS  5. Disposition: Home when stable.

## 2022-11-28 NOTE — ECT PRE-PROCEDURE CHECKLIST - NSECTCONSENT_PSY_ALL_CORE
no (notify psychiatry) ECT consent  obtained on 11/1/22  Anesthesia consent expires on 2/7/22/no (notify psychiatry)

## 2022-11-29 PROCEDURE — 99231 SBSQ HOSP IP/OBS SF/LOW 25: CPT

## 2022-11-29 RX ADMIN — Medication 5 MILLIGRAM(S): at 08:32

## 2022-11-29 RX ADMIN — SIMVASTATIN 20 MILLIGRAM(S): 20 TABLET, FILM COATED ORAL at 21:12

## 2022-11-29 RX ADMIN — Medication 150 MILLIGRAM(S): at 08:32

## 2022-11-29 RX ADMIN — Medication 50 MILLIGRAM(S): at 21:13

## 2022-11-29 RX ADMIN — Medication 20 MILLIEQUIVALENT(S): at 08:32

## 2022-11-29 RX ADMIN — Medication 30 MILLIGRAM(S): at 08:31

## 2022-11-29 RX ADMIN — Medication 5 MILLIGRAM(S): at 21:12

## 2022-11-29 RX ADMIN — FINASTERIDE 5 MILLIGRAM(S): 5 TABLET, FILM COATED ORAL at 08:32

## 2022-11-29 NOTE — BH INPATIENT PSYCHIATRY PROGRESS NOTE - NSBHCHARTREVIEWVS_PSY_A_CORE FT
Vital Signs Last 24 Hrs  T(C): 36.5 (11-29-22 @ 08:04), Max: 36.5 (11-29-22 @ 08:04)  T(F): 97.7 (11-29-22 @ 08:04), Max: 97.7 (11-29-22 @ 08:04)  HR: --  BP: --  BP(mean): --  RR: --  SpO2: --    Orthostatic VS  11-29-22 @ 08:04  Lying BP: --/-- HR: --  Sitting BP: 114/92 HR: 72  Standing BP: 103/86 HR: 87  Site: upper left arm  Mode: electronic  Orthostatic VS  11-28-22 @ 07:55  Lying BP: --/-- HR: --  Sitting BP: 106/78 HR: 72  Standing BP: 107/82 HR: 97  Site: --  Mode: --  Orthostatic VS  11-28-22 @ 05:40  Lying BP: --/-- HR: --  Sitting BP: 136/81 HR: 87  Standing BP: 125/90 HR: 94  Site: --  Mode: --

## 2022-11-29 NOTE — BH INPATIENT PSYCHIATRY PROGRESS NOTE - NSBHASSESSSUMMFT_PSY_ALL_CORE
71 y.o. male with hx of unusual relatedness including pattern of making up psychiatric symptoms or hx such as claiming he arranged a hit or his brother killed himself. Recently there has been escalating pattern of hospitalizations, ER visits. Compliance after d/c is nil. Patient also develops excessive attachment or intense dislike of providers and other staff. Patient has no actual hx of suicide attempts or fh or SIB. Patient with likely facititious disorder vs mood disorder. Suspect possible unconscious primary gain related to medical complaints help seeking then rejection of help as inadequate. Patient not assessed as needing CO. Titrate medications and connect patient back to services. Appears to be provocative and help-rejecting. Prolonged hospitalization may not help achieve shared goals and might not be therapeutic for patient.    10/6: Has multiple somatic complaints, does not engage in treatment planning, however appears to show initiative toward getting a notebook which was a coping mechanism for patient during last hospitalization.   10/7: Continues to have multiple complaints, somewhat more receptive to treatment planning, did not report SI today. Continue with current medications.   10/8: No mood symptoms, no psychotic symptoms, no suicidal ideations.  10/9/22; anxious, dysphoric, superficial, no SI/HI.   10/10/22: Remain anxious, catastrophizes his friendship, having multiple somatic complaints. Consider switching to Effexor XR.  10/11: The patient continues to complain of depression, anxiety, losing friendships, although it seems he could continue them if he desired.  He focuses on negatives. Education attempted. The patient is tolerating medications well, will continue.  to meet with patient.  10/12: Patient continues to report somatic complaints but rejecting interventions. Patient is awaiting meeting with . Plan to cross taper to Effexor XR in light of reported complaints and limited efficacy on Prozac (albeit insufficient trial).  10/13: Patient is catastrophizing his somatic complaints and displaying pessimism with regards to meeting . Reassurance was provided. Continues with help-rejecting behavior and was redirected to previous patterns of pessimism during previous hospitalization. Patient acknowledged it however is not taking steps to behavioral change at this time.   10/14: Patient continues to display negativistic thinking and making provocative statements. Pt has been adherent to medications. Continue to cross taper from Prozac to Effexor XR.  10/17: Pt continues to present negativistic thinking. Visible in the unit. Appears more future oriented today. Will continue tapering Prozac (down to 20mg today) and titrating Effexor XR (up to 75mg today).  10/18: Presents with negativistic thinking but appears more goal oriented seeking services and looking forward to meeting with . Pt has been medication adherent. Continue to cross taper from Prozac to Effexor XR.  10/19: Makes provocative statements and presents with usual negativistic thinking however remains hopeful for meeting with . Has been noted to be visible on the unit and adherent to medications. D/C Prozac  10/20: Continues to make provocative statements which is his usual way of relating with others. Tolerating meds, no new concerns reported. Awaiting meeting with . Titrate Effexor to 112.5mg  10/21: Tolerating medications. No new concerns. Patient appears anxious about meeting with his , however also awaiting to discuss his goals with him.   10/24: Patient tolerating medications. Will increase Effexor to 150mg PO daily.   10/25: Tolerating medications. Patient appears to be catastrophizing however has intense eye contact and makes provocative statements, yet does not endorse any intent or plan to hurt self. Patient has pattern of describing enhanced expressions of symptoms closer to discharge.  10/26: Patient continues to be help-rejecting and catastrophizing when discussing discharge. No plans endorsed to hurt self. Patient has pattern of describing enhanced expressions of symptoms closer to discharge.  10/27: Pt appears to be in behavioral control, denies SI/HI. Patient was less negativistic this AM and appeared future oriented toward meeting .   10/28: Pt appears less provocative, more optimistic about future. Continue current regimen.  10/31: Pt continues to present as dysphoric, withdrawn. Has not been interested in his poetry and drawings as he has been in the past. During encounter today pt continues to express low mood and hopelessness about the future. Despite patient's characterologic issues and suspected element of factitious disorder, there might be an element of mood disorder present that may ameliorate with ECT treatment. Pt expresses willingness to "try anything that could help". Will place ECT consult.   11/1: Pt continues to present as dysphoric, withdrawn. Continues to endorse hopelessness about the future and passive SI. Dental clearance pending for ECT. Appointment scheduled for Thursday.   11/2: Pt continues to express hopelessness and negativistic view of the world. Mistrustful of people's ability to help him. Dental appointment for ECT clearance scheduled for tomorrow.   11/3: Pt remains dysphoric, expressing some hope ECT may help. Dental clearance pending.   11/4: Pt placed on CO for suicidality. To be reassessed Monday.   11/5 Dysphoric, SI, unclear severity given past pattern of behavior. Will cont CO  Plan is to try ECT for mood and SI. If BP low would reduce trazodone.  11/6: dysphoric, irritable, SI, no plan mentioned, but unpredictable, some vague paranoia.    11/7: Pt remains dysphoric but less irritable than last Friday. Currently endorsing chronic, passive SI with no active intent or plans. Appears more future oriented, asking relevant questions about ECT treatment plan. Will d/c CO today. Will continue ECT 3x week.  11/8: Visible in the unit, interacting with select peers. ECT #2 scheduled for tomorrow.   11/9: Completed second ECT today. Denies SIIP. ECT #3 scheduled for Friday 11/11.   11/10: Pt denies SIIP, stable mood. ECT #3 scheduled tomorrow. Repleted potassium today.   11/11: This morning pt got his right middle finger caught in the bathroom door, now has a laceration in his distal interphalangeal joint. Pt was sent to the [facility] ED-fast track for imaging and laceration repair.   11/14: ECT #4 scheduled today. Pt appears brighter in affect. No longer endorsing SI.   11/15: ECT #5 scheduled tomorrow.   11/16: Pt is s/p ECT #4. He is alert and oriented. Appears brighter in affect. Pt no longer endorsing SI. During encounters he tends to dwell less on somatic complaints and negative ruminations as well. Finger appears more swollen today, discussed with hospitalist. Pt is afebrile.   ECT cancelled today after Anesthesia noted patient has a loose molar. Dental consult placed for likely extraction.   11/17: Seen by hospitalist for swelling on the finger he received stitches on. Started on antibiotics. Pt reports swelling is "the same" as yesterday, will continue monitoring. ECT got cancelled yesterday due to loose molar. Pt later reported molar fell out. Dental appointment scheduled for tomorrow morning. Pushing fluids.  11/18: Pt is visible in the unit, interacting with select peers. Pt is compliant with meds. BP improving today, no orthostatic hypotension noted. Pt had a long conversation with one of the nursing students this morning in which he shared his life story. Pt went to his dental appointment this morning, pending consult results.  11/21: Pt completed ECT #5 this morning. ECT #6 scheduled for Wed 11/23.   11/22: Pt is visible in the unit, interacting with select peers. Pt is compliant with meds. On encounter this morning pt appears brighter, is able to engage in conversation without introducing negative thinking or somatic preoccupations. Pt was also able to discuss aftercare planning with his  without catastrophizing as he had done in the past. ECT #6 scheduled for tomorrow 11/23. Planning for 7th ECT next Monday and likely discharge next week.   11/23: Continues to show modest gains in terms of mood. ECT #6 rescheduled for Monday.   11/25: ECT scheduled for Monday. Pt remains stable in terms of mood.   11/28: S/P ECT #6 today.  Plan:  1. CO for suicidality  2. Legals: 9.27  3. Psychiatry: Continue current meds  .Effexor XR 150mg PO daily. Titrate as tolerated.  .Abilify 5mg PO daily  .Trazodone 50mg PO HS  .Melatonin 5mg PO HS  4. ECT #2 11/9  4. Medical: Continue current medications  .Finasteride 5mg PO HS  .Nifedipine 30mg PO Daily  .Oxybutynin 5mg PO daily  .Simvastatin 20mg PO HS  5. Disposition: Home when stable.

## 2022-11-29 NOTE — BH INPATIENT PSYCHIATRY PROGRESS NOTE - NSBHFUPINTERVALHXFT_PSY_A_CORE
Patient seen for follow-up of depression, chart reviewed and discussed with interdisciplinary team. No significant overnight events reported. Pt is visible in the unit, interacting with select peers. Pt is compliant with meds. Appears calmer, less irritable this morning. Able to carry a conversation about topics other than his negative view of the world and somatic complaints. Pt states that, in a way, he misses his brother "whom I hate".

## 2022-11-29 NOTE — CHART NOTE - NSCHARTNOTEFT_GEN_A_CORE
Nutrition f/u note:  Patient continues treatment for depression.     Diet : Regular    Patient reports [ ] nausea  [ ] vomiting [ ] diarrhea [ ] constipation  [ ]chewing problems [ ] swallowing issues  [ ] other:     PO intake:  < 50% [ ] 50-75% [ ]   % [x ]  other :    Source for PO intake [x ] Patient [ ] family [ x] chart [ ] staff [ ] other    Spoke to patient in the day area. Patient endorses good appetite. Has food preferences in place. Patient had additional food choices which were obtained.    Current Weight: Weight (kg): 84.822 (11-26 @ 17:41) 3# decrease since admission.  % Weight Change: 1.58%    Pertinent Medications: MEDICATIONS  (STANDING):  finasteride 5 milliGRAM(s) Oral daily  melatonin. 5 milliGRAM(s) Oral at bedtime  NIFEdipine XL 30 milliGRAM(s) Oral daily  oxybutynin 5 milliGRAM(s) Oral daily  potassium chloride    Tablet ER 20 milliEquivalent(s) Oral daily  simvastatin 20 milliGRAM(s) Oral at bedtime  traZODone 50 milliGRAM(s) Oral at bedtime  venlafaxine  milliGRAM(s) Oral daily    MEDICATIONS  (PRN):  acetaminophen     Tablet .. 650 milliGRAM(s) Oral every 6 hours PRN Mild Pain (1 - 3), Moderate Pain (4 - 6)  diphenhydrAMINE 50 milliGRAM(s) Oral every 6 hours PRN agitation  diphenhydrAMINE Injectable 50 milliGRAM(s) IntraMuscular every 4 hours PRN severe agitation  fluticasone propionate 50 MICROgram(s)/spray Nasal Spray 1 Spray(s) Both Nostrils two times a day PRN congestion  haloperidol     Tablet 5 milliGRAM(s) Oral every 6 hours PRN agitation  haloperidol    Injectable 5 milliGRAM(s) IntraMuscular once PRN severe agitation  lidocaine   4% Patch 1 Patch Transdermal every 24 hours PRN pain    Pertinent Labs:  11/21 K 3.4 patient receiving K+supplement; Bun 27      Skin: intact    Estimated Needs:   [ x] no change since previous assessment  [ ] recalculated:       Previous Nutrition Diagnosis: [x] n/a    [ ] Inadequate Energy Intake [ ]Inadequate Oral Intake [ ] Excessive Energy Intake     [ ] Underweight [ ] Increased Nutrient Needs [ ] Overweight/Obesity     [ ] Altered GI Function [ ] Unintended Weight Loss [ ] Food & Nutrition Related Knowledge Deficit [ ] Malnutrition          Nutrition Diagnosis is [ ] ongoing  [ ] resolved [ x] not applicable          New Nutrition Diagnosis: [x ] not applicable    [ ] Inadequate Protein Energy Intake [ ]Inadequate Oral Intake [ ] Excessive Energy Intake     [ ] Underweight [ ] Increased Nutrient Needs [ ] Overweight/Obesity     [ ] Altered GI Function [ ] Unintended Weight Loss [ ] Food & Nutrition Related Knowledge Deficit[ ] Limited Adherence to nutrition related recommendations [ ] Malnutrition  [ ] other: Free text         Recommend/Plan:    [x ] Maintain present diet    [ x] Provide food preferences    [ x] Encourage adequate fluid intake           Monitoring and Evaluation:     [x ] PO intake [ ] Tolerance to diet prescription [x ] weights [x ] follow up per protocol    Georgiana Escobedo MS RDN  Pager #[phone] Nutrition f/u note:  Patient continues treatment for depression. Undergoing ECT.    Diet : Regular    Patient reports [ ] nausea  [ ] vomiting [ ] diarrhea [ ] constipation  [ ]chewing problems [ ] swallowing issues  [ ] other:     PO intake:  < 50% [ ] 50-75% [ ]   % [x ]  other :    Source for PO intake [x ] Patient [ ] family [ x] chart [ ] staff [ ] other    Spoke to patient in the day area. Patient endorses good appetite. Has food preferences in place. Patient had additional food choices which were obtained.    Current Weight: Weight (kg): 84.822 (11-26 @ 17:41) 3# decrease since admission.  % Weight Change: 1.58%    Pertinent Medications: MEDICATIONS  (STANDING):  finasteride 5 milliGRAM(s) Oral daily  melatonin. 5 milliGRAM(s) Oral at bedtime  NIFEdipine XL 30 milliGRAM(s) Oral daily  oxybutynin 5 milliGRAM(s) Oral daily  potassium chloride    Tablet ER 20 milliEquivalent(s) Oral daily  simvastatin 20 milliGRAM(s) Oral at bedtime  traZODone 50 milliGRAM(s) Oral at bedtime  venlafaxine  milliGRAM(s) Oral daily    MEDICATIONS  (PRN):  acetaminophen     Tablet .. 650 milliGRAM(s) Oral every 6 hours PRN Mild Pain (1 - 3), Moderate Pain (4 - 6)  diphenhydrAMINE 50 milliGRAM(s) Oral every 6 hours PRN agitation  diphenhydrAMINE Injectable 50 milliGRAM(s) IntraMuscular every 4 hours PRN severe agitation  fluticasone propionate 50 MICROgram(s)/spray Nasal Spray 1 Spray(s) Both Nostrils two times a day PRN congestion  haloperidol     Tablet 5 milliGRAM(s) Oral every 6 hours PRN agitation  haloperidol    Injectable 5 milliGRAM(s) IntraMuscular once PRN severe agitation  lidocaine   4% Patch 1 Patch Transdermal every 24 hours PRN pain    Pertinent Labs:  11/21 K 3.4 patient receiving K+supplement; Bun 27      Skin: intact    Estimated Needs:   [ x] no change since previous assessment  [ ] recalculated:       Previous Nutrition Diagnosis: [x] n/a    [ ] Inadequate Energy Intake [ ]Inadequate Oral Intake [ ] Excessive Energy Intake     [ ] Underweight [ ] Increased Nutrient Needs [ ] Overweight/Obesity     [ ] Altered GI Function [ ] Unintended Weight Loss [ ] Food & Nutrition Related Knowledge Deficit [ ] Malnutrition          Nutrition Diagnosis is [ ] ongoing  [ ] resolved [ x] not applicable          New Nutrition Diagnosis: [x ] not applicable    [ ] Inadequate Protein Energy Intake [ ]Inadequate Oral Intake [ ] Excessive Energy Intake     [ ] Underweight [ ] Increased Nutrient Needs [ ] Overweight/Obesity     [ ] Altered GI Function [ ] Unintended Weight Loss [ ] Food & Nutrition Related Knowledge Deficit[ ] Limited Adherence to nutrition related recommendations [ ] Malnutrition  [ ] other: Free text         Recommend/Plan:    [x ] Maintain present diet    [ x] Provide food preferences    [ x] Encourage adequate fluid intake           Monitoring and Evaluation:     [x ] PO intake [ ] Tolerance to diet prescription [x ] weights [x ] follow up per protocol    Georgiana Escobedo MS RDN  Pager #[phone]

## 2022-11-30 PROCEDURE — 90870 ELECTROCONVULSIVE THERAPY: CPT

## 2022-11-30 PROCEDURE — 99231 SBSQ HOSP IP/OBS SF/LOW 25: CPT | Mod: 25

## 2022-11-30 RX ORDER — NYSTATIN CREAM 100000 [USP'U]/G
1 CREAM TOPICAL
Refills: 0 | Status: DISCONTINUED | OUTPATIENT
Start: 2022-11-30 | End: 2022-12-02

## 2022-11-30 RX ADMIN — Medication 30 MILLIGRAM(S): at 12:11

## 2022-11-30 RX ADMIN — NYSTATIN CREAM 1 APPLICATION(S): 100000 CREAM TOPICAL at 22:26

## 2022-11-30 RX ADMIN — Medication 20 MILLIEQUIVALENT(S): at 12:11

## 2022-11-30 RX ADMIN — Medication 5 MILLIGRAM(S): at 21:15

## 2022-11-30 RX ADMIN — Medication 5 MILLIGRAM(S): at 12:10

## 2022-11-30 RX ADMIN — SIMVASTATIN 20 MILLIGRAM(S): 20 TABLET, FILM COATED ORAL at 21:15

## 2022-11-30 RX ADMIN — FINASTERIDE 5 MILLIGRAM(S): 5 TABLET, FILM COATED ORAL at 12:10

## 2022-11-30 RX ADMIN — Medication 50 MILLIGRAM(S): at 21:15

## 2022-11-30 RX ADMIN — Medication 150 MILLIGRAM(S): at 12:10

## 2022-11-30 NOTE — ECT PRE-PROCEDURE CHECKLIST - PATIENT PROBLEMS/NEEDS
Patient expressed no known problems or needs

## 2022-11-30 NOTE — ECT TREATMENT NOTE - NSICDXBHPRIMARYDX_PSY_ALL_CORE
Depressive disorder   F32.A  

## 2022-11-30 NOTE — ECT PRE-PROCEDURE CHECKLIST - NSMEDSDOSETAKEN_PSY_ALL_CORE
Abilify/Proscar/Ditropan/Procardia/Effexor @ 276pp
Abilify/Proscar/Ditropan/Procardia/Effexor @ 843lu
Abilify/Proscar/Ditropan/Procardia/Effexor @ 530gk
See MAR
Abilify/Proscar/Ditropan/Procardia/Effexor @ 002wk
Abilify/Proscar/Ditropan/Procardia/Effexor @ 055xm
Abilify/Proscar/Ditropan/Procardia/Effexor @ 201rg
See MAR

## 2022-11-30 NOTE — ECT TREATMENT NOTE - NSECTCPTCODE_PSY_ALL_CORE
51154 (ECT-Electroconvulsive Therapy)
21565 (ECT-Electroconvulsive Therapy)
36451 (ECT-Electroconvulsive Therapy)
07624 (ECT-Electroconvulsive Therapy)
70618 (ECT-Electroconvulsive Therapy)
85740 (ECT-Electroconvulsive Therapy)
50477 (ECT-Electroconvulsive Therapy)

## 2022-11-30 NOTE — ECT PRE-PROCEDURE CHECKLIST - INV PERIPH IV LOCATION
Right:
Right:/median cubital vein
Left:/metacarpal vein
Right:/basilic vein

## 2022-11-30 NOTE — BH INPATIENT PSYCHIATRY PROGRESS NOTE - NSBHFUPINTERVALHXFT_PSY_A_CORE
Patient seen for follow-up of depression, chart reviewed and discussed with interdisciplinary team. No significant overnight events reported. Pt is visible in the unit, interacting with select peers. Compliant with meds. Seen during rounds while he was waiting to go down for ECT. Pt reports he was told they would take him at 10:30am and denied any concerns about ECT, stating that at this point "it's just boring". Appears calmer, less irritable this morning. Able to carry a conversation about topics other than his negative view of the world and somatic complaints. Discharge planning in process.

## 2022-11-30 NOTE — ECT PRE-PROCEDURE CHECKLIST - TO WHOM
procedure nurse
Procedure Room RN 
Procedure Room RN
procedure nurse

## 2022-11-30 NOTE — BH INPATIENT PSYCHIATRY PROGRESS NOTE - NSBHMETABOLIC_PSY_ALL_CORE_FT
BMI: BMI (kg/m2): 24.7 (11-26-22 @ 17:41)  HbA1c: A1C with Estimated Average Glucose Result: 5.1 % (07-06-22 @ 10:38)    Glucose: POCT Blood Glucose.: 112 mg/dL (10-05-22 @ 20:21)    BP: 168/88 (11-30-22 @ 11:15) (148/89 - 200/121)  Lipid Panel: Date/Time: 07-06-22 @ 10:38  Cholesterol, Serum: 128  Direct LDL: --  HDL Cholesterol, Serum: 52  Total Cholesterol/HDL Ration Measurement: --  Triglycerides, Serum: 74

## 2022-11-30 NOTE — ECT PRE-PROCEDURE CHECKLIST - NSPROEDAREADYLEARN_GEN_A_NUR
lack of motivation

## 2022-11-30 NOTE — ECT PRE-PROCEDURE CHECKLIST - NSPROEDAREADYLEARNOTH_GEN_A_NUR
acuteness of illness

## 2022-11-30 NOTE — BH INPATIENT PSYCHIATRY PROGRESS NOTE - NSBHASSESSSUMMFT_PSY_ALL_CORE
71 y.o. male with hx of unusual relatedness including pattern of making up psychiatric symptoms or hx such as claiming he arranged a hit or his brother killed himself. Recently there has been escalating pattern of hospitalizations, ER visits. Compliance after d/c is nil. Patient also develops excessive attachment or intense dislike of providers and other staff. Patient has no actual hx of suicide attempts or fh or SIB. Patient with likely facititious disorder vs mood disorder. Suspect possible unconscious primary gain related to medical complaints help seeking then rejection of help as inadequate. Patient not assessed as needing CO. Titrate medications and connect patient back to services. Appears to be provocative and help-rejecting. Prolonged hospitalization may not help achieve shared goals and might not be therapeutic for patient.    10/6: Has multiple somatic complaints, does not engage in treatment planning, however appears to show initiative toward getting a notebook which was a coping mechanism for patient during last hospitalization.   10/7: Continues to have multiple complaints, somewhat more receptive to treatment planning, did not report SI today. Continue with current medications.   10/8: No mood symptoms, no psychotic symptoms, no suicidal ideations.  10/9/22; anxious, dysphoric, superficial, no SI/HI.   10/10/22: Remain anxious, catastrophizes his friendship, having multiple somatic complaints. Consider switching to Effexor XR.  10/11: The patient continues to complain of depression, anxiety, losing friendships, although it seems he could continue them if he desired.  He focuses on negatives. Education attempted. The patient is tolerating medications well, will continue.  to meet with patient.  10/12: Patient continues to report somatic complaints but rejecting interventions. Patient is awaiting meeting with . Plan to cross taper to Effexor XR in light of reported complaints and limited efficacy on Prozac (albeit insufficient trial).  10/13: Patient is catastrophizing his somatic complaints and displaying pessimism with regards to meeting . Reassurance was provided. Continues with help-rejecting behavior and was redirected to previous patterns of pessimism during previous hospitalization. Patient acknowledged it however is not taking steps to behavioral change at this time.   10/14: Patient continues to display negativistic thinking and making provocative statements. Pt has been adherent to medications. Continue to cross taper from Prozac to Effexor XR.  10/17: Pt continues to present negativistic thinking. Visible in the unit. Appears more future oriented today. Will continue tapering Prozac (down to 20mg today) and titrating Effexor XR (up to 75mg today).  10/18: Presents with negativistic thinking but appears more goal oriented seeking services and looking forward to meeting with . Pt has been medication adherent. Continue to cross taper from Prozac to Effexor XR.  10/19: Makes provocative statements and presents with usual negativistic thinking however remains hopeful for meeting with . Has been noted to be visible on the unit and adherent to medications. D/C Prozac  10/20: Continues to make provocative statements which is his usual way of relating with others. Tolerating meds, no new concerns reported. Awaiting meeting with . Titrate Effexor to 112.5mg  10/21: Tolerating medications. No new concerns. Patient appears anxious about meeting with his , however also awaiting to discuss his goals with him.   10/24: Patient tolerating medications. Will increase Effexor to 150mg PO daily.   10/25: Tolerating medications. Patient appears to be catastrophizing however has intense eye contact and makes provocative statements, yet does not endorse any intent or plan to hurt self. Patient has pattern of describing enhanced expressions of symptoms closer to discharge.  10/26: Patient continues to be help-rejecting and catastrophizing when discussing discharge. No plans endorsed to hurt self. Patient has pattern of describing enhanced expressions of symptoms closer to discharge.  10/27: Pt appears to be in behavioral control, denies SI/HI. Patient was less negativistic this AM and appeared future oriented toward meeting .   10/28: Pt appears less provocative, more optimistic about future. Continue current regimen.  10/31: Pt continues to present as dysphoric, withdrawn. Has not been interested in his poetry and drawings as he has been in the past. During encounter today pt continues to express low mood and hopelessness about the future. Despite patient's characterologic issues and suspected element of factitious disorder, there might be an element of mood disorder present that may ameliorate with ECT treatment. Pt expresses willingness to "try anything that could help". Will place ECT consult.   11/1: Pt continues to present as dysphoric, withdrawn. Continues to endorse hopelessness about the future and passive SI. Dental clearance pending for ECT. Appointment scheduled for Thursday.   11/2: Pt continues to express hopelessness and negativistic view of the world. Mistrustful of people's ability to help him. Dental appointment for ECT clearance scheduled for tomorrow.   11/3: Pt remains dysphoric, expressing some hope ECT may help. Dental clearance pending.   11/4: Pt placed on CO for suicidality. To be reassessed Monday.   11/5 Dysphoric, SI, unclear severity given past pattern of behavior. Will cont CO  Plan is to try ECT for mood and SI. If BP low would reduce trazodone.  11/6: dysphoric, irritable, SI, no plan mentioned, but unpredictable, some vague paranoia.    11/7: Pt remains dysphoric but less irritable than last Friday. Currently endorsing chronic, passive SI with no active intent or plans. Appears more future oriented, asking relevant questions about ECT treatment plan. Will d/c CO today. Will continue ECT 3x week.  11/8: Visible in the unit, interacting with select peers. ECT #2 scheduled for tomorrow.   11/9: Completed second ECT today. Denies SIIP. ECT #3 scheduled for Friday 11/11.   11/10: Pt denies SIIP, stable mood. ECT #3 scheduled tomorrow. Repleted potassium today.   11/11: This morning pt got his right middle finger caught in the bathroom door, now has a laceration in his distal interphalangeal joint. Pt was sent to the [facility] ED-fast track for imaging and laceration repair.   11/14: ECT #4 scheduled today. Pt appears brighter in affect. No longer endorsing SI.   11/15: ECT #5 scheduled tomorrow.   11/16: Pt is s/p ECT #4. He is alert and oriented. Appears brighter in affect. Pt no longer endorsing SI. During encounters he tends to dwell less on somatic complaints and negative ruminations as well. Finger appears more swollen today, discussed with hospitalist. Pt is afebrile.   ECT cancelled today after Anesthesia noted patient has a loose molar. Dental consult placed for likely extraction.   11/17: Seen by hospitalist for swelling on the finger he received stitches on. Started on antibiotics. Pt reports swelling is "the same" as yesterday, will continue monitoring. ECT got cancelled yesterday due to loose molar. Pt later reported molar fell out. Dental appointment scheduled for tomorrow morning. Pushing fluids.  11/18: Pt is visible in the unit, interacting with select peers. Pt is compliant with meds. BP improving today, no orthostatic hypotension noted. Pt had a long conversation with one of the nursing students this morning in which he shared his life story. Pt went to his dental appointment this morning, pending consult results.  11/21: Pt completed ECT #5 this morning. ECT #6 scheduled for Wed 11/23.   11/22: Pt is visible in the unit, interacting with select peers. Pt is compliant with meds. On encounter this morning pt appears brighter, is able to engage in conversation without introducing negative thinking or somatic preoccupations. Pt was also able to discuss aftercare planning with his  without catastrophizing as he had done in the past. ECT #6 scheduled for tomorrow 11/23. Planning for 7th ECT next Monday and likely discharge next week.   11/23: Continues to show modest gains in terms of mood. ECT #6 rescheduled for Monday.   11/25: ECT scheduled for Monday. Pt remains stable in terms of mood.   11/28: S/P ECT #6 today.  11/30: ECT #7 completed today. Pt presenting gains in terms of mood. Discharge planning in process.   Plan:  1. CO for suicidality  2. Legals: 9.27  3. Psychiatry: Continue current meds  .Effexor XR 150mg PO daily. Titrate as tolerated.  .Abilify 5mg PO daily  .Trazodone 50mg PO HS  .Melatonin 5mg PO HS  4. ECT #2 11/9  4. Medical: Continue current medications  .Finasteride 5mg PO HS  .Nifedipine 30mg PO Daily  .Oxybutynin 5mg PO daily  .Simvastatin 20mg PO HS  5. Disposition: Home when stable.

## 2022-11-30 NOTE — ECT TREATMENT NOTE - NSECTREVSYS_PSY_ALL_CORE
Cardiovascular/Renal/Urologic/Abdominal/Metabolic/Other

## 2022-11-30 NOTE — ECT TREATMENT NOTE - NSECTTXPERFDATETIME_PSY_ALL_CORE
11-Nov-2022 16:06
21-Nov-2022 12:11
14-Nov-2022 15:45
29-Sep-2022 16:15
07-Nov-2022 11:07
28-Nov-2022 10:32
30-Nov-2022 10:32
09-Nov-2022 10:32

## 2022-11-30 NOTE — ECT PRE-PROCEDURE CHECKLIST - ALLERGIES
Allergies:-  Septra  penicillin      

## 2022-11-30 NOTE — BH INPATIENT PSYCHIATRY PROGRESS NOTE - NSBHCHARTREVIEWVS_PSY_A_CORE FT
Vital Signs Last 24 Hrs  T(C): 36.1 (11-30-22 @ 11:15), Max: 36.4 (11-30-22 @ 10:07)  T(F): 96.9 (11-30-22 @ 11:15), Max: 97.6 (11-30-22 @ 10:07)  HR: 75 (11-30-22 @ 11:15) (75 - 89)  BP: 168/88 (11-30-22 @ 11:15) (148/89 - 200/121)  BP(mean): --  RR: 20 (11-30-22 @ 11:15) (16 - 20)  SpO2: 100% (11-30-22 @ 11:15) (97% - 100%)    Orthostatic VS  11-30-22 @ 08:35  Lying BP: --/-- HR: --  Sitting BP: 123/81 HR: 84  Standing BP: 120/61 HR: 105  Site: upper left arm  Mode: electronic  Orthostatic VS  11-29-22 @ 08:04  Lying BP: --/-- HR: --  Sitting BP: 114/92 HR: 72  Standing BP: 103/86 HR: 87  Site: upper left arm  Mode: electronic

## 2022-11-30 NOTE — BH INPATIENT PSYCHIATRY PROGRESS NOTE - CURRENT MEDICATION
MEDICATIONS  (STANDING):  finasteride 5 milliGRAM(s) Oral daily  melatonin. 5 milliGRAM(s) Oral at bedtime  NIFEdipine XL 30 milliGRAM(s) Oral daily  nystatin Powder 1 Application(s) Topical two times a day  oxybutynin 5 milliGRAM(s) Oral daily  potassium chloride    Tablet ER 20 milliEquivalent(s) Oral daily  simvastatin 20 milliGRAM(s) Oral at bedtime  traZODone 50 milliGRAM(s) Oral at bedtime  venlafaxine  milliGRAM(s) Oral daily    MEDICATIONS  (PRN):  acetaminophen     Tablet .. 650 milliGRAM(s) Oral every 6 hours PRN Mild Pain (1 - 3), Moderate Pain (4 - 6)  diphenhydrAMINE 50 milliGRAM(s) Oral every 6 hours PRN agitation  diphenhydrAMINE Injectable 50 milliGRAM(s) IntraMuscular every 4 hours PRN severe agitation  fluticasone propionate 50 MICROgram(s)/spray Nasal Spray 1 Spray(s) Both Nostrils two times a day PRN congestion  haloperidol     Tablet 5 milliGRAM(s) Oral every 6 hours PRN agitation  haloperidol    Injectable 5 milliGRAM(s) IntraMuscular once PRN severe agitation  lidocaine   4% Patch 1 Patch Transdermal every 24 hours PRN pain

## 2022-11-30 NOTE — ECT PRE-PROCEDURE CHECKLIST - HAND OFF
Unit RN to OR RN/Holding RN to OR RN
Unit RN to OR RN
Unit RN to OR RN/Holding RN to OR RN
Unit RN to OR RN/Holding RN to OR RN

## 2022-11-30 NOTE — ECT TREATMENT NOTE - NSICDXBHSECONDARYDX_PSY_ALL_CORE
Preprocedural examination   Z01.818  Major depression   F32.9  Hypokalemia   E87.6  BPH (benign prostatic hyperplasia)   N40.0  HTN (hypertension)   I10  HLD (hyperlipidemia)   E78.5  Thyroid nodule   E04.1  

## 2022-11-30 NOTE — ECT TREATMENT NOTE - NSECTOTHERCOMMENT_PSY_ALL_CORE
COVID + Sept 2022 

## 2022-11-30 NOTE — ECT TREATMENT NOTE - NSECTPOSTTXSUMMARY_PSY_ALL_CORE
The patient had a well modified grand mal seizure under general anesthesia and muscle relaxant. The patient is alert, responsive, in no acute distress.  Recovery uneventful.
The patient had a well modified grand mal seizure under general anesthesia and muscle relaxant.  The patient is alert, responsive, and in no acute distress. Recovery uneventful.
The patient had a well modified grand mal seizure under general anesthesia and muscle relaxant. The patient is alert, responsive, in no acute distress.  Recovery uneventful. 

## 2022-12-01 LAB
ANION GAP SERPL CALC-SCNC: 11 MMOL/L — SIGNIFICANT CHANGE UP (ref 7–14)
BUN SERPL-MCNC: 27 MG/DL — HIGH (ref 7–23)
CALCIUM SERPL-MCNC: 9.1 MG/DL — SIGNIFICANT CHANGE UP (ref 8.4–10.5)
CHLORIDE SERPL-SCNC: 100 MMOL/L — SIGNIFICANT CHANGE UP (ref 98–107)
CO2 SERPL-SCNC: 31 MMOL/L — SIGNIFICANT CHANGE UP (ref 22–31)
CREAT SERPL-MCNC: 1.25 MG/DL — SIGNIFICANT CHANGE UP (ref 0.5–1.3)
EGFR: 61 ML/MIN/1.73M2 — SIGNIFICANT CHANGE UP
GLUCOSE SERPL-MCNC: 61 MG/DL — LOW (ref 70–99)
MAGNESIUM SERPL-MCNC: 1.9 MG/DL — SIGNIFICANT CHANGE UP (ref 1.6–2.6)
PHOSPHATE SERPL-MCNC: 2.3 MG/DL — LOW (ref 2.5–4.5)
POTASSIUM SERPL-MCNC: 3.3 MMOL/L — LOW (ref 3.5–5.3)
POTASSIUM SERPL-SCNC: 3.3 MMOL/L — LOW (ref 3.5–5.3)
SODIUM SERPL-SCNC: 142 MMOL/L — SIGNIFICANT CHANGE UP (ref 135–145)

## 2022-12-01 PROCEDURE — 99231 SBSQ HOSP IP/OBS SF/LOW 25: CPT

## 2022-12-01 RX ORDER — ACETAMINOPHEN 500 MG
2 TABLET ORAL
Qty: 0 | Refills: 0 | DISCHARGE
Start: 2022-12-01

## 2022-12-01 RX ORDER — NYSTATIN CREAM 100000 [USP'U]/G
1 CREAM TOPICAL
Qty: 1 | Refills: 0
Start: 2022-12-01 | End: 2022-12-30

## 2022-12-01 RX ORDER — OXYBUTYNIN CHLORIDE 5 MG
1 TABLET ORAL
Qty: 30 | Refills: 0
Start: 2022-12-01 | End: 2022-12-30

## 2022-12-01 RX ORDER — SIMVASTATIN 20 MG/1
1 TABLET, FILM COATED ORAL
Qty: 30 | Refills: 0
Start: 2022-12-01 | End: 2022-12-30

## 2022-12-01 RX ORDER — VENLAFAXINE HCL 75 MG
1 CAPSULE, EXT RELEASE 24 HR ORAL
Qty: 30 | Refills: 0
Start: 2022-12-01 | End: 2022-12-30

## 2022-12-01 RX ORDER — NIFEDIPINE 30 MG
1 TABLET, EXTENDED RELEASE 24 HR ORAL
Qty: 30 | Refills: 0
Start: 2022-12-01 | End: 2022-12-30

## 2022-12-01 RX ORDER — TRAZODONE HCL 50 MG
1 TABLET ORAL
Qty: 30 | Refills: 0
Start: 2022-12-01 | End: 2022-12-30

## 2022-12-01 RX ORDER — BACITRACIN ZINC 500 UNIT/G
1 OINTMENT IN PACKET (EA) TOPICAL
Qty: 0 | Refills: 0 | DISCHARGE

## 2022-12-01 RX ORDER — POTASSIUM CHLORIDE 20 MEQ
1 PACKET (EA) ORAL
Qty: 14 | Refills: 0
Start: 2022-12-01 | End: 2022-12-14

## 2022-12-01 RX ORDER — FLUTICASONE PROPIONATE 50 MCG
1 SPRAY, SUSPENSION NASAL
Qty: 1 | Refills: 0
Start: 2022-12-01 | End: 2022-12-30

## 2022-12-01 RX ORDER — FINASTERIDE 5 MG/1
1 TABLET, FILM COATED ORAL
Qty: 30 | Refills: 0
Start: 2022-12-01 | End: 2022-12-30

## 2022-12-01 RX ADMIN — Medication 150 MILLIGRAM(S): at 09:09

## 2022-12-01 RX ADMIN — Medication 30 MILLIGRAM(S): at 09:09

## 2022-12-01 RX ADMIN — NYSTATIN CREAM 1 APPLICATION(S): 100000 CREAM TOPICAL at 21:25

## 2022-12-01 RX ADMIN — NYSTATIN CREAM 1 APPLICATION(S): 100000 CREAM TOPICAL at 09:15

## 2022-12-01 RX ADMIN — Medication 50 MILLIGRAM(S): at 21:01

## 2022-12-01 RX ADMIN — Medication 20 MILLIEQUIVALENT(S): at 09:09

## 2022-12-01 RX ADMIN — FINASTERIDE 5 MILLIGRAM(S): 5 TABLET, FILM COATED ORAL at 09:10

## 2022-12-01 RX ADMIN — Medication 5 MILLIGRAM(S): at 09:09

## 2022-12-01 RX ADMIN — SIMVASTATIN 20 MILLIGRAM(S): 20 TABLET, FILM COATED ORAL at 21:01

## 2022-12-01 RX ADMIN — Medication 5 MILLIGRAM(S): at 21:01

## 2022-12-01 NOTE — BH INPATIENT PSYCHIATRY DISCHARGE NOTE - NSDCMRMEDTOKEN_GEN_ALL_CORE_FT
acetaminophen 325 mg oral tablet: 2 tab(s) orally every 6 hours, As needed, Mild Pain (1 - 3), Moderate Pain (4 - 6)  aluminum hydroxide-magnesium hydroxide 200 mg-200 mg/5 mL oral suspension: 30 milliliter(s) orally every 6 hours, As needed, Dyspepsia  cholecalciferol: 2000 unit(s) orally once a day  finasteride 5 mg oral tablet: 1 tab(s) orally once a day  fluticasone 50 mcg/inh nasal spray: 1 spray(s) nasal 2 times a day  melatonin 5 mg oral tablet: 1 tab(s) orally once a day (at bedtime)  NIFEdipine 30 mg oral tablet, extended release: 1 tab(s) orally once a day  nystatin 100,000 units/g topical powder: 1 application topically 2 times a day  oxybutynin 5 mg oral tablet: 1 tab(s) orally once a day  Proscar 5 mg oral tablet: 1 tab(s) orally once a day  traZODone 50 mg oral tablet: 1 tab(s) orally once a day (at bedtime)  venlafaxine 150 mg oral capsule, extended release: 1 cap(s) orally once a day  Zocor 20 mg oral tablet: 1 tab(s) orally once a day (at bedtime)   acetaminophen 325 mg oral tablet: 2 tab(s) orally every 6 hours, As needed, Mild Pain (1 - 3), Moderate Pain (4 - 6)  aluminum hydroxide-magnesium hydroxide 200 mg-200 mg/5 mL oral suspension: 30 milliliter(s) orally every 6 hours, As needed, Dyspepsia  cholecalciferol: 2000 unit(s) orally once a day  fluticasone 50 mcg/inh nasal spray: 1 spray(s) nasal 2 times a day  melatonin 5 mg oral tablet: 1 tab(s) orally once a day (at bedtime)  NIFEdipine 30 mg oral tablet, extended release: 1 tab(s) orally once a day  nystatin 100,000 units/g topical powder: 1 application topically 2 times a day  oxybutynin 5 mg oral tablet: 1 tab(s) orally once a day  potassium chloride 20 mEq oral tablet, extended release: 1 tab(s) orally once a day. Follow up with your outpatient doctor to monitor potassium levels.  Proscar 5 mg oral tablet: 1 tab(s) orally once a day  traZODone 50 mg oral tablet: 1 tab(s) orally once a day (at bedtime)  venlafaxine 150 mg oral capsule, extended release: 1 cap(s) orally once a day  Zocor 20 mg oral tablet: 1 tab(s) orally once a day (at bedtime)

## 2022-12-01 NOTE — BH INPATIENT PSYCHIATRY DISCHARGE NOTE - HPI (INCLUDE ILLNESS QUALITY, SEVERITY, DURATION, TIMING, CONTEXT, MODIFYING FACTORS, ASSOCIATED SIGNS AND SYMPTOMS)
71M, unemployed, living with brother, with PMH of HTN, HLD, urinary incontinence due to unclear prostate issues, R leg pain from arthritis, psych hx of MDD, OCD, "mixed personality disorder", at least three prior hospitalizations ([facility] 8-25-22; [facility], [facility] 10/22-11/15/2021 for depression w psychosis), has reported SI in the past with thoughts of walking into traffic, but brother has verified that patient makes suicidal statements but has not had any suicidal or SIB attempts in the past. Patient was initially admitted to medicine for SOB and chest discomfort, psych consult called after pt expressed a desire to be dead. Patient initially assessed by [facility] and cleared on 9/13/2022. Team was Reconsulted on 9/14/2022 in the setting of new collateral information and patient's reports of SI without plan. Patient was then admitted to inpatient psychiatry after being treated for covid infection.    Patient seen and evaluated with attending. Patient initially complains of shortness of breath but is able to hold long conversations without any visible distress, reporting that he was unable to read instructions on his discharge papers, yet is able to read label printed on doctor's jacket. Patient is provocative and help-rejecting saying "Just discharge me, I will go to [place] and die". Patient did not follow up with  upon discharge, and had reportedly given an incorrect number to the . Patient reports he cannot stay with his brother, because his brother "hates" him and reports being unable to meet with his friends. Patient is uncooperative and unwilling to discuss treatment goals. Patient denies any active SI with plan, despite making statements of dying if he were to be discharged.

## 2022-12-01 NOTE — BH INPATIENT PSYCHIATRY PROGRESS NOTE - NSBHASSESSSUMMFT_PSY_ALL_CORE
71 y.o. male with hx of unusual relatedness including pattern of making up psychiatric symptoms or hx such as claiming he arranged a hit or his brother killed himself. Recently there has been escalating pattern of hospitalizations, ER visits. Compliance after d/c is nil. Patient also develops excessive attachment or intense dislike of providers and other staff. Patient has no actual hx of suicide attempts or fh or SIB. Patient with likely facititious disorder vs mood disorder. Suspect possible unconscious primary gain related to medical complaints help seeking then rejection of help as inadequate. Patient not assessed as needing CO. Titrate medications and connect patient back to services. Appears to be provocative and help-rejecting. Prolonged hospitalization may not help achieve shared goals and might not be therapeutic for patient.    10/6: Has multiple somatic complaints, does not engage in treatment planning, however appears to show initiative toward getting a notebook which was a coping mechanism for patient during last hospitalization.   10/7: Continues to have multiple complaints, somewhat more receptive to treatment planning, did not report SI today. Continue with current medications.   10/8: No mood symptoms, no psychotic symptoms, no suicidal ideations.  10/9/22; anxious, dysphoric, superficial, no SI/HI.   10/10/22: Remain anxious, catastrophizes his friendship, having multiple somatic complaints. Consider switching to Effexor XR.  10/11: The patient continues to complain of depression, anxiety, losing friendships, although it seems he could continue them if he desired.  He focuses on negatives. Education attempted. The patient is tolerating medications well, will continue.  to meet with patient.  10/12: Patient continues to report somatic complaints but rejecting interventions. Patient is awaiting meeting with . Plan to cross taper to Effexor XR in light of reported complaints and limited efficacy on Prozac (albeit insufficient trial).  10/13: Patient is catastrophizing his somatic complaints and displaying pessimism with regards to meeting . Reassurance was provided. Continues with help-rejecting behavior and was redirected to previous patterns of pessimism during previous hospitalization. Patient acknowledged it however is not taking steps to behavioral change at this time.   10/14: Patient continues to display negativistic thinking and making provocative statements. Pt has been adherent to medications. Continue to cross taper from Prozac to Effexor XR.  10/17: Pt continues to present negativistic thinking. Visible in the unit. Appears more future oriented today. Will continue tapering Prozac (down to 20mg today) and titrating Effexor XR (up to 75mg today).  10/18: Presents with negativistic thinking but appears more goal oriented seeking services and looking forward to meeting with . Pt has been medication adherent. Continue to cross taper from Prozac to Effexor XR.  10/19: Makes provocative statements and presents with usual negativistic thinking however remains hopeful for meeting with . Has been noted to be visible on the unit and adherent to medications. D/C Prozac  10/20: Continues to make provocative statements which is his usual way of relating with others. Tolerating meds, no new concerns reported. Awaiting meeting with . Titrate Effexor to 112.5mg  10/21: Tolerating medications. No new concerns. Patient appears anxious about meeting with his , however also awaiting to discuss his goals with him.   10/24: Patient tolerating medications. Will increase Effexor to 150mg PO daily.   10/25: Tolerating medications. Patient appears to be catastrophizing however has intense eye contact and makes provocative statements, yet does not endorse any intent or plan to hurt self. Patient has pattern of describing enhanced expressions of symptoms closer to discharge.  10/26: Patient continues to be help-rejecting and catastrophizing when discussing discharge. No plans endorsed to hurt self. Patient has pattern of describing enhanced expressions of symptoms closer to discharge.  10/27: Pt appears to be in behavioral control, denies SI/HI. Patient was less negativistic this AM and appeared future oriented toward meeting .   10/28: Pt appears less provocative, more optimistic about future. Continue current regimen.  10/31: Pt continues to present as dysphoric, withdrawn. Has not been interested in his poetry and drawings as he has been in the past. During encounter today pt continues to express low mood and hopelessness about the future. Despite patient's characterologic issues and suspected element of factitious disorder, there might be an element of mood disorder present that may ameliorate with ECT treatment. Pt expresses willingness to "try anything that could help". Will place ECT consult.   11/1: Pt continues to present as dysphoric, withdrawn. Continues to endorse hopelessness about the future and passive SI. Dental clearance pending for ECT. Appointment scheduled for Thursday.   11/2: Pt continues to express hopelessness and negativistic view of the world. Mistrustful of people's ability to help him. Dental appointment for ECT clearance scheduled for tomorrow.   11/3: Pt remains dysphoric, expressing some hope ECT may help. Dental clearance pending.   11/4: Pt placed on CO for suicidality. To be reassessed Monday.   11/5 Dysphoric, SI, unclear severity given past pattern of behavior. Will cont CO  Plan is to try ECT for mood and SI. If BP low would reduce trazodone.  11/6: dysphoric, irritable, SI, no plan mentioned, but unpredictable, some vague paranoia.    11/7: Pt remains dysphoric but less irritable than last Friday. Currently endorsing chronic, passive SI with no active intent or plans. Appears more future oriented, asking relevant questions about ECT treatment plan. Will d/c CO today. Will continue ECT 3x week.  11/8: Visible in the unit, interacting with select peers. ECT #2 scheduled for tomorrow.   11/9: Completed second ECT today. Denies SIIP. ECT #3 scheduled for Friday 11/11.   11/10: Pt denies SIIP, stable mood. ECT #3 scheduled tomorrow. Repleted potassium today.   11/11: This morning pt got his right middle finger caught in the bathroom door, now has a laceration in his distal interphalangeal joint. Pt was sent to the [facility] ED-fast track for imaging and laceration repair.   11/14: ECT #4 scheduled today. Pt appears brighter in affect. No longer endorsing SI.   11/15: ECT #5 scheduled tomorrow.   11/16: Pt is s/p ECT #4. He is alert and oriented. Appears brighter in affect. Pt no longer endorsing SI. During encounters he tends to dwell less on somatic complaints and negative ruminations as well. Finger appears more swollen today, discussed with hospitalist. Pt is afebrile.   ECT cancelled today after Anesthesia noted patient has a loose molar. Dental consult placed for likely extraction.   11/17: Seen by hospitalist for swelling on the finger he received stitches on. Started on antibiotics. Pt reports swelling is "the same" as yesterday, will continue monitoring. ECT got cancelled yesterday due to loose molar. Pt later reported molar fell out. Dental appointment scheduled for tomorrow morning. Pushing fluids.  11/18: Pt is visible in the unit, interacting with select peers. Pt is compliant with meds. BP improving today, no orthostatic hypotension noted. Pt had a long conversation with one of the nursing students this morning in which he shared his life story. Pt went to his dental appointment this morning, pending consult results.  11/21: Pt completed ECT #5 this morning. ECT #6 scheduled for Wed 11/23.   11/22: Pt is visible in the unit, interacting with select peers. Pt is compliant with meds. On encounter this morning pt appears brighter, is able to engage in conversation without introducing negative thinking or somatic preoccupations. Pt was also able to discuss aftercare planning with his  without catastrophizing as he had done in the past. ECT #6 scheduled for tomorrow 11/23. Planning for 7th ECT next Monday and likely discharge next week.   11/23: Continues to show modest gains in terms of mood. ECT #6 rescheduled for Monday.   11/25: ECT scheduled for Monday. Pt remains stable in terms of mood.   11/28: S/P ECT #6 today.  11/30: ECT #7 completed today. Pt presenting gains in terms of mood. Discharge planning in process.   12/1: Pt is visible in the unit, interacting with select peers. Compliant with meds. S/P ECT #7. Pt scheduled for discharge tomorrow. Discussed with pt and he took it well, was able to engage in conversation about aftercare plans. Discussed chronic hypokalemia and stressed need for pt to follow up with primary care provider after discharge to trend his potassium levels.   Plan:  1. CO for suicidality  2. Legals: 9.27  3. Psychiatry: Continue current meds  .Effexor XR 150mg PO daily. Titrate as tolerated.  .Abilify 5mg PO daily  .Trazodone 50mg PO HS  .Melatonin 5mg PO HS  4. ECT #2 11/9  4. Medical: Continue current medications  .Finasteride 5mg PO HS  .Nifedipine 30mg PO Daily  .Oxybutynin 5mg PO daily  .Simvastatin 20mg PO HS  5. Disposition: Home when stable.

## 2022-12-01 NOTE — BH INPATIENT PSYCHIATRY DISCHARGE NOTE - NSDCCPCAREPLAN_GEN_ALL_CORE_FT
PRINCIPAL DISCHARGE DIAGNOSIS  Diagnosis: Depression, unipolar  Assessment and Plan of Treatment:       SECONDARY DISCHARGE DIAGNOSES  Diagnosis: Mixed personality disorder  Assessment and Plan of Treatment:

## 2022-12-01 NOTE — BH INPATIENT PSYCHIATRY PROGRESS NOTE - NSBHFUPINTERVALHXFT_PSY_A_CORE
Patient seen for follow-up of depression, chart reviewed and discussed with interdisciplinary team. No significant overnight events reported. Pt is visible in the unit, interacting with select peers. Compliant with meds. S/P ECT #7. Pt scheduled for discharge tomorrow. Discussed with pt and he took it well, was able to engage in conversation about aftercare plans. Discussed chronic hypokalemia and stressed need for pt to follow up with primary care provider after discharge to trend his potassium levels.

## 2022-12-01 NOTE — BH INPATIENT PSYCHIATRY DISCHARGE NOTE - HOSPITAL COURSE
71 y.o. male with hx of unusual relatedness, mixed personality disorder, recently discharged from [facility], readmitted after being hospitalized for COVID-19 infection during which he endorsed passive suicidal thoughts with no plan. Recently there has been escalating pattern of hospitalizations and ER visits. Compliance after d/c is nil. Patient also has a pattern of developing excessive attachment or intense dislike of providers and other staff. Despite hx of chronic passive SI, Mr. Rogers has no actual hx of suicide attempts or fh or SIB. Patient with likely factitious disorder vs mood disorder. Suspect possible unconscious primary gain related to medical complaints help seeking then rejection of help as inadequate. Appears to be provocative and help-rejecting.   Pt's presentation was very similar to his previous one  Prolonged hospitalization may not help achieve shared goals and might not be therapeutic for patient. 71 y.o. male with hx of unusual relatedness, mixed personality disorder, recently discharged from [facility], readmitted after being hospitalized for COVID-19 infection during which he endorsed passive suicidal thoughts with no plan. Recently there has been escalating pattern of hospitalizations and ER visits. Compliance after d/c is nil. Patient also has a pattern of developing excessive attachment or intense dislike of providers and other staff. Despite hx of chronic passive SI, Mr. Rogers has no actual hx of suicide attempts or fh or SIB. Patient with likely factitious disorder vs mood disorder. Suspect possible unconscious primary gain related to medical complaints help seeking then rejection of help as inadequate. Appears to be provocative and help-rejecting.   Pt's presentation was very similar to his previous one, pt expressed same somatic complaints including his urinary incontinence and expressed needing to pursue prostate surgery. Pt had been given a follow up appointment with his urologist as part of his previous aftercare plan, however he did not attend. Pt expressed passive suicidal thoughts, with vague plans  Prolonged hospitalization may not help achieve shared goals and might not be therapeutic for patient. 71 y.o. male with hx of unusual relatedness, mixed personality disorder, recently discharged from [facility], readmitted after being hospitalized for COVID-19 infection during which he endorsed passive suicidal thoughts with no plan. Recently there has been escalating pattern of hospitalizations and ER visits. Compliance after d/c is nil. Patient also has a pattern of developing excessive attachment or intense dislike of providers and other staff. Despite hx of chronic passive SI, Mr. Rogers has no actual hx of suicide attempts or fh or SIB. Patient with likely factitious disorder vs mood disorder. Suspect possible unconscious primary gain related to medical complaints help seeking then rejection of help as inadequate. Appears to be provocative and help-rejecting.   Pt's presentation was very similar to his previous one, pt expressed same somatic complaints including his urinary incontinence and expressed needing to pursue prostate surgery. Pt had been given a follow up appointment with his urologist as part of his previous aftercare plan, however he did not attend. Pt expressed passive suicidal thoughts, with vague plans. Denied psychotic sx, georges or anxiety. Presenting chronic distorted and maladaptive thinking patterns including catastrophizing and black/white thinking. Pt was switched from Prozac to Effexor XR and titrated up to 150mg daily. he was also continued on Abilify, titrated up to   Prolonged hospitalization may not help achieve shared goals and might not be therapeutic for patient. 71 y.o. male with hx of unusual relatedness, mixed personality disorder, recently discharged from [facility], readmitted after being hospitalized for COVID-19 infection during which he endorsed passive suicidal thoughts with no plan. Recently there has been escalating pattern of hospitalizations and ER visits. Compliance after d/c is nil. Patient also has a pattern of developing excessive attachment or intense dislike of providers and other staff. Despite hx of chronic passive SI, Mr. Rogers has no actual hx of suicide attempts or fh or SIB. Patient with likely factitious disorder vs mood disorder. Suspect possible unconscious primary gain related to medical complaints help seeking then rejection of help as inadequate. Appears to be provocative and help-rejecting.   Pt's presentation was very similar to his previous one, pt expressed same somatic complaints including his urinary incontinence and expressed needing to pursue prostate surgery. Pt had been given a follow up appointment with his urologist as part of his previous aftercare plan, however he did not attend. Pt expressed passive suicidal thoughts, with vague plans. Denied psychotic sx, georges or anxiety. Presenting chronic distorted and maladaptive thinking patterns including catastrophizing and black/white thinking. During the hospitalization, patient would make conditional passive SI statements that were usually triggered by conversations about discharge planning or by poor frustration tolerance when his needs of immediate response when he is calling his friend. However pt has stated that he tends to talk about suicide as his own way to express his frustration and does not truly have the intent or plan to take steps to harm himself or end his life. Pt was switched from Prozac to Effexor XR and titrated up to 150mg daily. he was also continued on Abilify, titrated to 5mg QD, however later discontinued due to orthostatic hypotension. Despite high suspicion that patient's presentation is rooted in chronic personality disorder and has possible elements of factitious disorder, patient did present sx suggestive of depression. He appeared more withdrawn when compared to his last admission and did not present interest in known hobbies such as writing poetry and drawing. Pt received 7 Bifrontal ECT treatments with good tolerability and response. Pt's affect improved, becoming brighter, he was more visible in the unit and started interacting more with peers. Pt was also able to have conversations about topics other than his somatic preoccupations and negative rumination. Pt continues to present baseline negativistic and misanthropic view of the world which is part of his personality structure. Patient was followed by psychology on the unit, and was noted to have patterns of inflexibility, with a pervasive sense of grandiosity and with maladaptive communication skills.    Risk assessment: Pt remains at chronic moderate risk for self harm due to several static risk factors such as age, gender, medical comorbidities and history of multiple psychiatric hospitalizations. Perpetuating factors include personality traits leading to maladaptive communication skills, inflexibility and all or nothing thinking. Pt also has a history of poor adherence to treatment in the community. Acute risk mitigated by treatment of acute depressive sx with pharmacotherapy adjustments and ECT, with good treatment response. Protective factors include stable housing, expansion of social supports in the community including case management, supportive family, lack of prior suicide attempts and pt currently denying SIIP. Despite being at chronic risk, this risk will not be further ameliorated by prolonging inpatient psychiatric treatment. Any benefits from continued inpatient hospitalization would be outweighed by further reinforcement of patient's maladaptive behavior leading to further impeding return to a functional baseline in the community.

## 2022-12-01 NOTE — BH INPATIENT PSYCHIATRY DISCHARGE NOTE - NSBHDCHANDOFFFT_PSY_ALL_CORE
Verbal handoff provided to Dr. Rick Skinner at the [facility] geriatric psychiatry clinic ([phone]) with contact information for writer in the event future questions arise.  [facility] ([phone]) with contact information for writer in the event future questions arise.

## 2022-12-01 NOTE — BH INPATIENT PSYCHIATRY DISCHARGE NOTE - NSBHDCMEDICALFT_PSY_A_CORE
Pt was followed by the hospitalist service for hypokalemia. Pt was supplemented with 40meq potassium chloride on multiple occasions and was later placed on standing 20meq potassium chloride every morning. Potassium levels remain low but stable around 3.3. Discussed case with nephrology. Hypokalemia thought to be related to dehydration. Pt is known to restrict his fluid intake due to his urinary incontinence. Discussed with pt and encouraged him to drink more fluids. Pt discharged with 14 days supply of potassium chloride 20meq and was strongly advised to schedule follow up with his primary care doctor to continue monitoring potassium levels.     Pt received stitches after getting his finger caught in a door by accident. He received 7 day course of Cephalexin and Doxycycline for cellulitis on same finger, now resolved.  Pt was followed by the hospitalist service for hypokalemia. Pt was supplemented with 40meq potassium chloride on multiple occasions and was later placed on standing 20meq potassium chloride every morning. Potassium levels remain low but stable around 3.3. Discussed case with nephrology. Hypokalemia thought to be related to dehydration. Pt is known to restrict his fluid intake due to his urinary incontinence. Discussed with pt and encouraged him to drink more fluids. Pt discharged with 14 days supply of potassium chloride 20meq and was strongly advised to schedule follow up with his primary care doctor to continue monitoring potassium levels.     Pt received stitches after getting his finger caught in a door by accident. He received 7 day course of Cephalexin and Doxycycline for cellulitis on same finger, now resolved.     Pt was also given a follow up appointment with urology

## 2022-12-01 NOTE — BH INPATIENT PSYCHIATRY PROGRESS NOTE - NSBHCHARTREVIEWVS_PSY_A_CORE FT
Vital Signs Last 24 Hrs  T(C): 36.4 (12-01-22 @ 08:02), Max: 36.4 (12-01-22 @ 08:02)  T(F): 97.6 (12-01-22 @ 08:02), Max: 97.6 (12-01-22 @ 08:02)  HR: --  BP: --  BP(mean): --  RR: --  SpO2: --    Orthostatic VS  12-01-22 @ 08:02  Lying BP: --/-- HR: --  Sitting BP: 119/81 HR: 78  Standing BP: 110/63 HR: 113  Site: upper left arm  Mode: electronic  Orthostatic VS  11-30-22 @ 08:35  Lying BP: --/-- HR: --  Sitting BP: 123/81 HR: 84  Standing BP: 120/61 HR: 105  Site: upper left arm  Mode: electronic

## 2022-12-01 NOTE — BH INPATIENT PSYCHIATRY DISCHARGE NOTE - NSDCRECOMMENDMEDICALFT_PSY_ALL_CORE
Follow up with your outpatient psychiatrist and primary care doctor Follow up with your outpatient psychiatrist and primary care doctor. PLEASE FOLLOW UP WITH YOUR PRIMARY CARE DOCTOR TO MONITOR YOUR POTASSIUM LEVELS.

## 2022-12-01 NOTE — BH INPATIENT PSYCHIATRY DISCHARGE NOTE - OTHER PAST PSYCHIATRIC HISTORY (INCLUDE DETAILS REGARDING ONSET, COURSE OF ILLNESS, INPATIENT/OUTPATIENT TREATMENT)
ID: [id number], single, no children, living w/ brother, unemployed, psych h/o MDD, OCD, unspecified personality disorder, ?factitious disorder, 3 PPH most recent [facility] aug 2022, unclear non suicidal self injurious behavior (per pt he hits self and used to cut, chart indicates pt hits self, brother states pt does not self harm), no known SA/violence/legal hx/abuse, prior etoh use but no substance use currently, PMH, HTN, HLD, BPH, arthritis, COVID+ sep 2022, incidental thyroid nodules, admitted to medicine for dyspnea workup, voiced SI, subsequently med cleared and transferred to psych for mgmt of depression, ECT consulted for persistent sx despite pharmacological intervention    HPI:  as per CL assessment:  "...Patient states that he has been dealing with multiple medical issues including urinary incontinence and leg pain, and recently shortness of breath, that he cannot get these taken care of (unclear reasons). Patient states that he has felt increasingly depressed, not able to enjoy himself at all, that he no longer has anyone to talk to because hes pushed them all away, reports sleeping at most 2 hours a night, hes been losing weight, feeling hopeless.   He states that he doesn't want to go on anymore, THOUGH HE DENIES SUICIDAL PLAN OR INTENT, SAYING HE WOULD NEVER KILL HIMSELF, AND   THAT 'IF I WANTED TO BE DEAD, I WOULD BE'  says he "cant' get around" and needs helps which he is not receiving.    He does not know what he takes, stating that he has medications for high blood pressure, cholesterol, prostate issues, denies taking psych meds for years stating that they have all stopped working but that prozac worked in the past. Denies that he currently has any outpatient psychiatrist or therapist though stated that he had someone who he spoke with at [employer] who helped him with tasks from time to time but that this program is ending soon. He doesn't remember their name or contact information and states that there is nobody that we can talk to. He states that he had one best friend but that this person doesn't have time for him anymore and he doesn't want to burden her.     He denies auditory, visual, or tactile hallucinations, denies paranoia though states that the psychiatrist at [facility] has lied to him in the past and locked him away. He states that he used to drink alcohol but hasn't in decades, denies any drug use, states that he is incredibly anti nicotine.    says he doesn't know his meds and hasn't been taking them consistently. endorses hopelessness and passive si w/o intent or plan, saying "I always express suicidal thinking."    spoke with pt's brother. says pt has "psychosomatic" symptoms from anxiety. [facility]" and   say he is suicidal, but that he is not acutally. he says pthas never attempted to harm self. he doesn't believe pt is a true suicide risk.    spoke with dr. lombardo from . she says pt is a long time pt at [facility] with factitious d/o and has been "essetnially banned" from admission at   [facility]. she says he was help rejecting throughout his stay on . she does not believe pt responded to inpatient treatment or would respond to   a rehospitalization . she set him up with a CM prior to dc, and the unit SW will be calling back with that information."    as per inpt assessment:  "...Patient initially complains of shortness of breath but is able to hold long conversations without any visible distress, reporting that he was unable to read instructions on his discharge papers, yet is able to read label printed on doctor's jacket. Patient is provocative and help-rejecting saying "Just discharge me, I will go to [place] and die". Patient did not follow up with  upon discharge, and had reportedly given an incorrect number to the . Patient reports he cannot stay with his brother, because his brother "hates" him and reports being unable to meet with his friends. Patient is uncooperative and unwilling to discuss treatment goals. Patient denies any active SI with plan, despite making statements of dying if he were to be discharged...  ...hx of unusual relatedness including pattern of making up psychiatric symptoms or hx such as claiming he arranged a hit or his brother killed himself. Recently there has been escalating pattern of hospitalizations, ER visits. Compliance after d/c is nil. Patient also develops excessive attachment or intense dislike of providers and other staff. Patient has no actual hx of suicide attempts or fh or SIB. Patient with likely facititious disorder vs mood disorder. Suspect possible unconscious primary gain related to medical complaints help seeking then rejection of help as inadequate. Patient not assessed as needing CO. Titrate medications and connect patient back to services. Appears to be provocative and help-rejecting. Prolonged hospitalization may not help achieve shared goals and might not be therapeutic for patient."    on my exam pt states that he has been depressed "all of [his] life," w/ the only period of relative wellness being 7 mo in 1990 when he was seeing a woman romantically.  pt had difficulty describing progression of his symptoms but stated that since then he has been depressed "the entire time," with only 1-2 intermittent days of "not hating [his] life."  when asked why he did not seek help for his symptoms as an outpatient he replied "I didn't know I could."  unclear if current state is his baseline or a relative worsening of symptoms but pt endorsed daily low mood, anhedonia (no longer enjoys archeology, poetry, feeling "tired all the time"/low energy, middle insomnia, increased appetite w/out wt gain, feelings of worthlessness, amotivation, hopelessness, with daily SI occurring multiple times during the day, fleeting, w/ the thought "I wish I was dead." rare instances of SI w/ plan to "touch a live wire, jump in front of a train, or jump off a roof" but these have not occurred for some time, are vague, w/out intent, and not accompanied by rehearsal behaviors, preparatory actions, or acts of furtherance.  denied concentration changes. no manic sx, psychotic sx elicited

## 2022-12-02 VITALS — TEMPERATURE: 98 F

## 2022-12-02 LAB
ANION GAP SERPL CALC-SCNC: 12 MMOL/L — SIGNIFICANT CHANGE UP (ref 7–14)
BUN SERPL-MCNC: 29 MG/DL — HIGH (ref 7–23)
CALCIUM SERPL-MCNC: 9.2 MG/DL — SIGNIFICANT CHANGE UP (ref 8.4–10.5)
CHLORIDE SERPL-SCNC: 102 MMOL/L — SIGNIFICANT CHANGE UP (ref 98–107)
CO2 SERPL-SCNC: 29 MMOL/L — SIGNIFICANT CHANGE UP (ref 22–31)
CREAT SERPL-MCNC: 1.2 MG/DL — SIGNIFICANT CHANGE UP (ref 0.5–1.3)
EGFR: 64 ML/MIN/1.73M2 — SIGNIFICANT CHANGE UP
GLUCOSE SERPL-MCNC: 72 MG/DL — SIGNIFICANT CHANGE UP (ref 70–99)
MAGNESIUM SERPL-MCNC: 2 MG/DL — SIGNIFICANT CHANGE UP (ref 1.6–2.6)
PHOSPHATE SERPL-MCNC: 2.7 MG/DL — SIGNIFICANT CHANGE UP (ref 2.5–4.5)
POTASSIUM SERPL-MCNC: 3.5 MMOL/L — SIGNIFICANT CHANGE UP (ref 3.5–5.3)
POTASSIUM SERPL-SCNC: 3.5 MMOL/L — SIGNIFICANT CHANGE UP (ref 3.5–5.3)
SODIUM SERPL-SCNC: 143 MMOL/L — SIGNIFICANT CHANGE UP (ref 135–145)

## 2022-12-02 RX ADMIN — Medication 20 MILLIEQUIVALENT(S): at 09:51

## 2022-12-02 RX ADMIN — Medication 5 MILLIGRAM(S): at 09:51

## 2022-12-02 RX ADMIN — FINASTERIDE 5 MILLIGRAM(S): 5 TABLET, FILM COATED ORAL at 09:51

## 2022-12-02 RX ADMIN — Medication 150 MILLIGRAM(S): at 09:51

## 2022-12-02 RX ADMIN — Medication 30 MILLIGRAM(S): at 09:51

## 2022-12-02 NOTE — BH INPATIENT PSYCHIATRY PROGRESS NOTE - NSTXCOPEDATEEST_PSY_ALL_CORE
07-Nov-2022
06-Oct-2022
28-Nov-2022
14-Nov-2022
11-Oct-2022
24-Oct-2022
07-Nov-2022
28-Nov-2022
02-Dec-2022
24-Oct-2022
06-Oct-2022
24-Oct-2022
24-Oct-2022
18-Oct-2022
14-Nov-2022
14-Nov-2022
28-Nov-2022
24-Oct-2022
28-Nov-2022
07-Nov-2022
06-Oct-2022
11-Oct-2022
24-Oct-2022
06-Oct-2022
14-Nov-2022
14-Nov-2022
18-Oct-2022
06-Oct-2022
14-Nov-2022
24-Oct-2022
11-Oct-2022
24-Oct-2022
07-Nov-2022
11-Oct-2022
11-Oct-2022
14-Nov-2022
24-Oct-2022
14-Nov-2022
18-Oct-2022
07-Nov-2022
18-Oct-2022
24-Oct-2022
14-Nov-2022
14-Nov-2022

## 2022-12-02 NOTE — BH INPATIENT PSYCHIATRY PROGRESS NOTE - NSTXCOPEPROGRES_PSY_ALL_CORE
Improving
No Change
Improving
No Change
Improving
Met - goal discontinued
No Change
Improving
No Change
Improving
No Change
Improving
Improving
No Change
Improving
Improving
No Change
Improving
Improving
No Change
Improving
No Change
No Change
Improving
No Change
Improving
No Change
No Change

## 2022-12-02 NOTE — BH INPATIENT PSYCHIATRY PROGRESS NOTE - NSTXDEPRESDATETRGT_PSY_ALL_CORE
18-Nov-2022

## 2022-12-02 NOTE — BH INPATIENT PSYCHIATRY PROGRESS NOTE - NSBHMSEMOOD_PSY_A_CORE
Depressed/Irritable
Irritable
Depressed/Irritable
Depressed/Irritable
Irritable
Depressed/Irritable

## 2022-12-02 NOTE — BH INPATIENT PSYCHIATRY PROGRESS NOTE - NSTXPROBCOPE_PSY_ALL_CORE
COPING, INEFFECTIVE
negative
COPING, INEFFECTIVE

## 2022-12-02 NOTE — BH INPATIENT PSYCHIATRY PROGRESS NOTE - NSTXDCOTHRDATEEST_PSY_ALL_CORE
16-Nov-2022
18-Oct-2022
18-Oct-2022
13-Oct-2022
18-Oct-2022
06-Oct-2022
09-Nov-2022
28-Oct-2022
06-Oct-2022
28-Oct-2022
22-Nov-2022
06-Oct-2022
22-Nov-2022
22-Nov-2022
28-Oct-2022
18-Oct-2022
18-Oct-2022
02-Nov-2022
02-Nov-2022
06-Oct-2022
02-Nov-2022
13-Oct-2022
06-Oct-2022
16-Nov-2022
16-Nov-2022
22-Nov-2022
22-Nov-2022
06-Oct-2022
06-Oct-2022
09-Nov-2022
16-Nov-2022
13-Oct-2022
09-Nov-2022
02-Nov-2022
18-Oct-2022
09-Nov-2022
18-Oct-2022
18-Oct-2022
02-Nov-2022
22-Nov-2022
09-Nov-2022
02-Nov-2022
16-Nov-2022
22-Nov-2022
09-Nov-2022

## 2022-12-02 NOTE — BH INPATIENT PSYCHIATRY PROGRESS NOTE - NSBHMSEAFFCONG_PSY_A_CORE
Congruent

## 2022-12-02 NOTE — BH INPATIENT PSYCHIATRY PROGRESS NOTE - NSTXCOPEDATETRGT_PSY_ALL_CORE
02-Dec-2022
05-Dec-2022
07-Nov-2022
25-Oct-2022
07-Nov-2022
21-Nov-2022
07-Nov-2022
21-Nov-2022
14-Nov-2022
21-Nov-2022
13-Oct-2022
14-Nov-2022
07-Nov-2022
31-Oct-2022
31-Oct-2022
21-Nov-2022
31-Oct-2022
05-Dec-2022
31-Oct-2022
13-Oct-2022
14-Nov-2022
07-Nov-2022
28-Nov-2022
18-Oct-2022
18-Oct-2022
21-Nov-2022
25-Oct-2022
31-Oct-2022
21-Nov-2022
18-Oct-2022
14-Nov-2022
18-Oct-2022
13-Oct-2022
05-Dec-2022
31-Oct-2022
25-Oct-2022
25-Oct-2022
05-Dec-2022
14-Nov-2022
21-Nov-2022
18-Oct-2022
07-Nov-2022
21-Nov-2022
21-Nov-2022

## 2022-12-02 NOTE — BH INPATIENT PSYCHIATRY PROGRESS NOTE - NSBHMSERECMEM_PSY_A_CORE
Normal

## 2022-12-02 NOTE — BH INPATIENT PSYCHIATRY PROGRESS NOTE - NSTXDCOTHRGOAL_PSY_ALL_CORE
Pt will participate in care coordination intake, accept adequate services for discharge, and accept DC when ready
Pt will participate in safe DC planning including acceptance of outpatient follow up.
Pt will comply with recommended treatment, with an improvement in his symptoms, and resumption of baseline functioning
Pt will participate in care coordination intake, accept adequate services for discharge, and accept DC when ready
Pt will comply with treatment and follow up with an improvement in symptoms, helping him to participate in self-care and followup.
Pt will participate in care coordination intake, accept adequate services for discharge, and accept DC when ready
Pt will participate in safe DC planning including acceptance of outpatient follow up.
Pt will comply with treatment with an improvement in symptoms and resumption of baseline functioning
Pt will comply with recommended treatment, with an improvement in his symptoms, and resumption of baseline functioning
Pt will participate in care coordination intake, accept adequate services for discharge, and accept DC when ready
Pt will comply with treatment with an improvement in symptoms and resumption of baseline functioning
Pt will participate in safe DC planning including acceptance of outpatient follow up.
Pt will participate in safe DC planning including acceptance of outpatient follow up.
Pt will comply with treatment and follow up with an improvement in symptoms, helping him to participate in self-care and followup.
Pt will comply with treatment and follow up with an improvement in symptoms, helping him to participate in self-care and followup.
Pt will participate in safe DC planning including acceptance of outpatient follow up.
Pt will comply with treatment and follow up with an improvement in symptoms, helping him to participate in self-care and followup.
Pt will comply with medication, with an improvement in symptoms, allowing him to participate in basic care at discharge.
Pt will comply with medication, with an improvement in symptoms, allowing him to participate in basic care at discharge.
Pt will participate in safe DC planning including acceptance of outpatient follow up.
Pt will comply with recommended treatment, with an improvement in his symptoms, and resumption of baseline functioning
Pt will comply with treatment with an improvement in symptoms and resumption of baseline functioning
Pt will participate in care coordination intake, accept adequate services for discharge, and accept DC when ready
Pt will participate in safe DC planning including acceptance of outpatient follow up.
Pt will comply with medication, with an improvement in symptoms, allowing him to participate in basic care at discharge.
Pt will comply with treatment and follow up with an improvement in symptoms, helping him to participate in self-care and followup.
Pt will participate in care coordination intake, accept adequate services for discharge, and accept DC when ready
Pt will participate in care coordination intake, accept adequate services for discharge, and accept DC when ready
Pt will comply with medication and treatment recommendations with an improvement in symptoms, helping pt's overall functioning and safety in the community
Pt will comply with recommended treatment, with an improvement in his symptoms, and resumption of baseline functioning
Pt will comply with recommended treatment, with an improvement in his symptoms, and resumption of baseline functioning
Pt will comply with medication and treatment recommendations with an improvement in symptoms, helping pt's overall functioning and safety in the community
Pt will continue with ECT and medication, with an improvement in symptoms, helping him to participate in his care, accepting of supports at DC
Pt will continue with ECT and medication, with an improvement in symptoms, helping him to participate in his care, accepting of supports at DC
Pt will comply with medication and treatment recommendations with an improvement in symptoms, helping pt's overall functioning and safety in the community
Pt will participate in care coordination intake, accept adequate services for discharge, and accept DC when ready
Pt will comply with treatment and follow up with an improvement in symptoms, helping him to participate in self-care and followup.
Pt will continue with ECT and medication, with an improvement in symptoms, helping him to participate in his care, accepting of supports at DC
Pt will comply with recommended treatment, with an improvement in his symptoms, and resumption of baseline functioning
Pt will comply with medication and treatment recommendations with an improvement in symptoms, helping pt's overall functioning and safety in the community
Pt will continue with ECT and medication, with an improvement in symptoms, helping him to participate in his care, accepting of supports at DC
Pt will comply with medication and treatment recommendations with an improvement in symptoms, helping pt's overall functioning and safety in the community
Pt will comply with treatment and follow up with an improvement in symptoms, helping him to participate in self-care and followup.
Pt will comply with medication and treatment recommendations with an improvement in symptoms, helping pt's overall functioning and safety in the community
Pt will continue with ECT and medication, with an improvement in symptoms, helping him to participate in his care, accepting of supports at DC

## 2022-12-02 NOTE — BH INPATIENT PSYCHIATRY PROGRESS NOTE - NSBHPSYCHOLCOGORIENT_PSY_A_CORE
Oriented to time, place, person, situation

## 2022-12-02 NOTE — BH INPATIENT PSYCHIATRY PROGRESS NOTE - NSTXDCOTHRPROGRES_PSY_ALL_CORE
Improving
No Change
No Change
Improving
No Change
No Change
Improving
No Change
Improving
No Change
No Change
Improving
No Change
Improving
No Change
Improving
No Change
Improving
No Change
Improving
No Change
Improving

## 2022-12-02 NOTE — BH INPATIENT PSYCHIATRY PROGRESS NOTE - MSE OPTIONS
Structured MSE
Unstructured MSE
Structured MSE

## 2022-12-02 NOTE — BH INPATIENT PSYCHIATRY PROGRESS NOTE - NSBHCONSULTIPREASON_PSY_A_CORE
danger to self; mental illness expected to respond to inpatient care

## 2022-12-02 NOTE — BH INPATIENT PSYCHIATRY PROGRESS NOTE - TELEPSYCHIATRY?
No

## 2022-12-02 NOTE — BH INPATIENT PSYCHIATRY PROGRESS NOTE - LEVEL OF CONSCIOUSNESS
Alert

## 2022-12-02 NOTE — BH INPATIENT PSYCHIATRY PROGRESS NOTE - NSTXDEPRESDATEEST_PSY_ALL_CORE
11-Nov-2022
FEVER
11-Nov-2022

## 2022-12-02 NOTE — BH INPATIENT PSYCHIATRY PROGRESS NOTE - NSICDXBHPRIMARYDX_PSY_ALL_CORE
Depressive disorder   F32.A  

## 2022-12-02 NOTE — BH INPATIENT PSYCHIATRY PROGRESS NOTE - NSBHMSEMUSCLE_PSY_A_CORE
Normal muscle tone/strength
Unable to assess
Unable to assess
Normal muscle tone/strength
Unable to assess
Normal muscle tone/strength
Normal muscle tone/strength
Unable to assess
Normal muscle tone/strength
Unable to assess
Normal muscle tone/strength
Normal muscle tone/strength
Unable to assess
Normal muscle tone/strength
Unable to assess
Normal muscle tone/strength
Unable to assess
Normal muscle tone/strength
Normal muscle tone/strength
Unable to assess
Normal muscle tone/strength
Unable to assess
Normal muscle tone/strength
Unable to assess
Normal muscle tone/strength
Unable to assess
Normal muscle tone/strength
Unable to assess

## 2022-12-02 NOTE — BH INPATIENT PSYCHIATRY PROGRESS NOTE - NSBHMETABOLICLABS_PSY_ALL_CORE
Labs within last 12 months

## 2022-12-02 NOTE — BH INPATIENT PSYCHIATRY PROGRESS NOTE - NSBHASSESSSUMMFT_PSY_ALL_CORE
71 y.o. male with hx of unusual relatedness including pattern of making up psychiatric symptoms or hx such as claiming he arranged a hit or his brother killed himself. Recently there has been escalating pattern of hospitalizations, ER visits. Compliance after d/c is nil. Patient also develops excessive attachment or intense dislike of providers and other staff. Patient has no actual hx of suicide attempts or fh or SIB. Patient with likely facititious disorder vs mood disorder. Suspect possible unconscious primary gain related to medical complaints help seeking then rejection of help as inadequate. Patient not assessed as needing CO. Titrate medications and connect patient back to services. Appears to be provocative and help-rejecting. Prolonged hospitalization may not help achieve shared goals and might not be therapeutic for patient.    10/6: Has multiple somatic complaints, does not engage in treatment planning, however appears to show initiative toward getting a notebook which was a coping mechanism for patient during last hospitalization.   10/7: Continues to have multiple complaints, somewhat more receptive to treatment planning, did not report SI today. Continue with current medications.   10/8: No mood symptoms, no psychotic symptoms, no suicidal ideations.  10/9/22; anxious, dysphoric, superficial, no SI/HI.   10/10/22: Remain anxious, catastrophizes his friendship, having multiple somatic complaints. Consider switching to Effexor XR.  10/11: The patient continues to complain of depression, anxiety, losing friendships, although it seems he could continue them if he desired.  He focuses on negatives. Education attempted. The patient is tolerating medications well, will continue.  to meet with patient.  10/12: Patient continues to report somatic complaints but rejecting interventions. Patient is awaiting meeting with . Plan to cross taper to Effexor XR in light of reported complaints and limited efficacy on Prozac (albeit insufficient trial).  10/13: Patient is catastrophizing his somatic complaints and displaying pessimism with regards to meeting . Reassurance was provided. Continues with help-rejecting behavior and was redirected to previous patterns of pessimism during previous hospitalization. Patient acknowledged it however is not taking steps to behavioral change at this time.   10/14: Patient continues to display negativistic thinking and making provocative statements. Pt has been adherent to medications. Continue to cross taper from Prozac to Effexor XR.  10/17: Pt continues to present negativistic thinking. Visible in the unit. Appears more future oriented today. Will continue tapering Prozac (down to 20mg today) and titrating Effexor XR (up to 75mg today).  10/18: Presents with negativistic thinking but appears more goal oriented seeking services and looking forward to meeting with . Pt has been medication adherent. Continue to cross taper from Prozac to Effexor XR.  10/19: Makes provocative statements and presents with usual negativistic thinking however remains hopeful for meeting with . Has been noted to be visible on the unit and adherent to medications. D/C Prozac  10/20: Continues to make provocative statements which is his usual way of relating with others. Tolerating meds, no new concerns reported. Awaiting meeting with . Titrate Effexor to 112.5mg  10/21: Tolerating medications. No new concerns. Patient appears anxious about meeting with his , however also awaiting to discuss his goals with him.   10/24: Patient tolerating medications. Will increase Effexor to 150mg PO daily.   10/25: Tolerating medications. Patient appears to be catastrophizing however has intense eye contact and makes provocative statements, yet does not endorse any intent or plan to hurt self. Patient has pattern of describing enhanced expressions of symptoms closer to discharge.  10/26: Patient continues to be help-rejecting and catastrophizing when discussing discharge. No plans endorsed to hurt self. Patient has pattern of describing enhanced expressions of symptoms closer to discharge.  10/27: Pt appears to be in behavioral control, denies SI/HI. Patient was less negativistic this AM and appeared future oriented toward meeting .   10/28: Pt appears less provocative, more optimistic about future. Continue current regimen.  10/31: Pt continues to present as dysphoric, withdrawn. Has not been interested in his poetry and drawings as he has been in the past. During encounter today pt continues to express low mood and hopelessness about the future. Despite patient's characterologic issues and suspected element of factitious disorder, there might be an element of mood disorder present that may ameliorate with ECT treatment. Pt expresses willingness to "try anything that could help". Will place ECT consult.   11/1: Pt continues to present as dysphoric, withdrawn. Continues to endorse hopelessness about the future and passive SI. Dental clearance pending for ECT. Appointment scheduled for Thursday.   11/2: Pt continues to express hopelessness and negativistic view of the world. Mistrustful of people's ability to help him. Dental appointment for ECT clearance scheduled for tomorrow.   11/3: Pt remains dysphoric, expressing some hope ECT may help. Dental clearance pending.   11/4: Pt placed on CO for suicidality. To be reassessed Monday.   11/5 Dysphoric, SI, unclear severity given past pattern of behavior. Will cont CO  Plan is to try ECT for mood and SI. If BP low would reduce trazodone.  11/6: dysphoric, irritable, SI, no plan mentioned, but unpredictable, some vague paranoia.    11/7: Pt remains dysphoric but less irritable than last Friday. Currently endorsing chronic, passive SI with no active intent or plans. Appears more future oriented, asking relevant questions about ECT treatment plan. Will d/c CO today. Will continue ECT 3x week.  11/8: Visible in the unit, interacting with select peers. ECT #2 scheduled for tomorrow.   11/9: Completed second ECT today. Denies SIIP. ECT #3 scheduled for Friday 11/11.   11/10: Pt denies SIIP, stable mood. ECT #3 scheduled tomorrow. Repleted potassium today.   11/11: This morning pt got his right middle finger caught in the bathroom door, now has a laceration in his distal interphalangeal joint. Pt was sent to the [facility] ED-fast track for imaging and laceration repair.   11/14: ECT #4 scheduled today. Pt appears brighter in affect. No longer endorsing SI.   11/15: ECT #5 scheduled tomorrow.   11/16: Pt is s/p ECT #4. He is alert and oriented. Appears brighter in affect. Pt no longer endorsing SI. During encounters he tends to dwell less on somatic complaints and negative ruminations as well. Finger appears more swollen today, discussed with hospitalist. Pt is afebrile.   ECT cancelled today after Anesthesia noted patient has a loose molar. Dental consult placed for likely extraction.   11/17: Seen by hospitalist for swelling on the finger he received stitches on. Started on antibiotics. Pt reports swelling is "the same" as yesterday, will continue monitoring. ECT got cancelled yesterday due to loose molar. Pt later reported molar fell out. Dental appointment scheduled for tomorrow morning. Pushing fluids.  11/18: Pt is visible in the unit, interacting with select peers. Pt is compliant with meds. BP improving today, no orthostatic hypotension noted. Pt had a long conversation with one of the nursing students this morning in which he shared his life story. Pt went to his dental appointment this morning, pending consult results.  11/21: Pt completed ECT #5 this morning. ECT #6 scheduled for Wed 11/23.   11/22: Pt is visible in the unit, interacting with select peers. Pt is compliant with meds. On encounter this morning pt appears brighter, is able to engage in conversation without introducing negative thinking or somatic preoccupations. Pt was also able to discuss aftercare planning with his  without catastrophizing as he had done in the past. ECT #6 scheduled for tomorrow 11/23. Planning for 7th ECT next Monday and likely discharge next week.   11/23: Continues to show modest gains in terms of mood. ECT #6 rescheduled for Monday.   11/25: ECT scheduled for Monday. Pt remains stable in terms of mood.   11/28: S/P ECT #6 today.  11/30: ECT #7 completed today. Pt presenting gains in terms of mood. Discharge planning in process.   12/1: Pt is visible in the unit, interacting with select peers. Compliant with meds. S/P ECT #7. Pt scheduled for discharge tomorrow. Discussed with pt and he took it well, was able to engage in conversation about aftercare plans. Discussed chronic hypokalemia and stressed need for pt to follow up with primary care provider after discharge to trend his potassium levels.   12/2: Patient seen in preparation for discharge. No significant overnight events reported. Pt is visible in the unit, interacting with select peers. Compliant with meds. Completed course of ECT with good tolerability and response. Pt denies siip and shows improvement in terms of his mood. Discussed discharge planning and encouraged pt to continue follow up with psychiatry, urology and PCP to monitor potassium levels. Pt no longer a risk to self or others, may continue his care in the outpatient setting.   Plan:  1. CO for suicidality  2. Legals: 9.27  3. Psychiatry: Continue current meds  .Effexor XR 150mg PO daily. Titrate as tolerated.  .Abilify 5mg PO daily  .Trazodone 50mg PO HS  .Melatonin 5mg PO HS  4. ECT #2 11/9  4. Medical: Continue current medications  .Finasteride 5mg PO HS  .Nifedipine 30mg PO Daily  .Oxybutynin 5mg PO daily  .Simvastatin 20mg PO HS  5. Disposition: Home when stable.

## 2022-12-02 NOTE — BH INPATIENT PSYCHIATRY PROGRESS NOTE - NSBHMSEAFFQUAL_PSY_A_CORE
Depressed/Irritable/Anxious

## 2022-12-02 NOTE — BH INPATIENT PSYCHIATRY PROGRESS NOTE - NSBHMSEKNOW_PSY_A_CORE
Normal

## 2022-12-02 NOTE — BH INPATIENT PSYCHIATRY PROGRESS NOTE - NSCGIIMPROVESX_PSY_ALL_CORE
4 = No change - symptoms remain essentially unchanged

## 2022-12-02 NOTE — BH DISCHARGE NOTE NURSING/SOCIAL WORK/PSYCH REHAB - NSDCPRRECOMMEND_PSY_ALL_CORE
Patient is scheduled to be discharged today and will return to his previous residence. Writer encouraged patient to maintain medication compliance and to participate in structured leisure activities.

## 2022-12-02 NOTE — BH INPATIENT PSYCHIATRY PROGRESS NOTE - NSTXPROBDCOTHR_PSY_ALL_CORE
DISCHARGE ISSUE - OTHER

## 2022-12-02 NOTE — BH INPATIENT PSYCHIATRY PROGRESS NOTE - NSBHMSEPERCEPT_PSY_A_CORE
No abnormalities

## 2022-12-02 NOTE — BH INPATIENT PSYCHIATRY PROGRESS NOTE - NSBHPROGSUIC_PSY_A_CORE
no

## 2022-12-02 NOTE — BH INPATIENT PSYCHIATRY PROGRESS NOTE - NSBHCHARTREVIEWVS_PSY_A_CORE FT
Vital Signs Last 24 Hrs  T(C): 36.7 (12-02-22 @ 07:55), Max: 36.7 (12-02-22 @ 07:55)  T(F): 98.1 (12-02-22 @ 07:55), Max: 98.1 (12-02-22 @ 07:55)  HR: --  BP: --  BP(mean): --  RR: --  SpO2: --    Orthostatic VS  12-02-22 @ 10:18  Lying BP: --/-- HR: --  Sitting BP: 136/71 HR: 83  Standing BP: 121/77 HR: 110  Site: --  Mode: --  Orthostatic VS  12-02-22 @ 07:55  Lying BP: --/-- HR: --  Sitting BP: 168/97 HR: 89  Standing BP: 144/95 HR: 95  Site: --  Mode: --  Orthostatic VS  12-01-22 @ 08:02  Lying BP: --/-- HR: --  Sitting BP: 119/81 HR: 78  Standing BP: 110/63 HR: 113  Site: upper left arm  Mode: electronic

## 2022-12-02 NOTE — BH DISCHARGE NOTE NURSING/SOCIAL WORK/PSYCH REHAB - PATIENT PORTAL LINK FT
You can access the FollowMyHealth Patient Portal offered by [facility] by registering at the following website: [email or web address]. By joining [employer]’s FollowMyHealth portal, you will also be able to view your health information using other applications (apps) compatible with our system.

## 2022-12-02 NOTE — BH INPATIENT PSYCHIATRY PROGRESS NOTE - NSBHMSEINTELL_PSY_A_CORE
Average

## 2022-12-02 NOTE — BH INPATIENT PSYCHIATRY PROGRESS NOTE - NSBHMSEKNOWHOW_PSY_ALL_CORE
Vocabulary

## 2022-12-02 NOTE — BH INPATIENT PSYCHIATRY PROGRESS NOTE - NSCGISEVERILLNESS_PSY_ALL_CORE
5 = Markedly ill - intrusive symptoms that distinctly impair social/occupational function or cause intrusive levels of distress
4 = Moderately ill – overt symptoms causing noticeable, but modest, functional impairment or distress; symptom level may warrant medication
5 = Markedly ill - intrusive symptoms that distinctly impair social/occupational function or cause intrusive levels of distress
4 = Moderately ill – overt symptoms causing noticeable, but modest, functional impairment or distress; symptom level may warrant medication
5 = Markedly ill - intrusive symptoms that distinctly impair social/occupational function or cause intrusive levels of distress
4 = Moderately ill – overt symptoms causing noticeable, but modest, functional impairment or distress; symptom level may warrant medication
5 = Markedly ill - intrusive symptoms that distinctly impair social/occupational function or cause intrusive levels of distress
4 = Moderately ill – overt symptoms causing noticeable, but modest, functional impairment or distress; symptom level may warrant medication
5 = Markedly ill - intrusive symptoms that distinctly impair social/occupational function or cause intrusive levels of distress
4 = Moderately ill – overt symptoms causing noticeable, but modest, functional impairment or distress; symptom level may warrant medication
5 = Markedly ill - intrusive symptoms that distinctly impair social/occupational function or cause intrusive levels of distress
4 = Moderately ill – overt symptoms causing noticeable, but modest, functional impairment or distress; symptom level may warrant medication
5 = Markedly ill - intrusive symptoms that distinctly impair social/occupational function or cause intrusive levels of distress
4 = Moderately ill – overt symptoms causing noticeable, but modest, functional impairment or distress; symptom level may warrant medication
4 = Moderately ill – overt symptoms causing noticeable, but modest, functional impairment or distress; symptom level may warrant medication
5 = Markedly ill - intrusive symptoms that distinctly impair social/occupational function or cause intrusive levels of distress
4 = Moderately ill – overt symptoms causing noticeable, but modest, functional impairment or distress; symptom level may warrant medication
5 = Markedly ill - intrusive symptoms that distinctly impair social/occupational function or cause intrusive levels of distress
4 = Moderately ill – overt symptoms causing noticeable, but modest, functional impairment or distress; symptom level may warrant medication
5 = Markedly ill - intrusive symptoms that distinctly impair social/occupational function or cause intrusive levels of distress
5 = Markedly ill - intrusive symptoms that distinctly impair social/occupational function or cause intrusive levels of distress
4 = Moderately ill – overt symptoms causing noticeable, but modest, functional impairment or distress; symptom level may warrant medication
5 = Markedly ill - intrusive symptoms that distinctly impair social/occupational function or cause intrusive levels of distress
4 = Moderately ill – overt symptoms causing noticeable, but modest, functional impairment or distress; symptom level may warrant medication
5 = Markedly ill - intrusive symptoms that distinctly impair social/occupational function or cause intrusive levels of distress
4 = Moderately ill – overt symptoms causing noticeable, but modest, functional impairment or distress; symptom level may warrant medication
5 = Markedly ill - intrusive symptoms that distinctly impair social/occupational function or cause intrusive levels of distress
5 = Markedly ill - intrusive symptoms that distinctly impair social/occupational function or cause intrusive levels of distress

## 2022-12-02 NOTE — BH DISCHARGE NOTE NURSING/SOCIAL WORK/PSYCH REHAB - DISCHARGE INSTRUCTIONS AFTERCARE APPOINTMENTS
In order to check the location, date, or time of your aftercare appointment, please refer to your Discharge Instructions Document given to you upon leaving the hospital.  If you have lost the instructions please call [phone]

## 2022-12-02 NOTE — BH INPATIENT PSYCHIATRY PROGRESS NOTE - NSBHCONTPROVIDER_PSY_ALL_CORE
Not applicable

## 2022-12-02 NOTE — BH INPATIENT PSYCHIATRY PROGRESS NOTE - NSBHMSETHTCONTENT_PSY_A_CORE
Unremarkable/Ruminations
Unremarkable
Unremarkable
Unremarkable/Ruminations
Unremarkable
Unremarkable
Unremarkable/Ruminations
Unremarkable
Unremarkable
Unremarkable/Ruminations
Unremarkable
Unremarkable/Ruminations
Unremarkable
Unremarkable/Ruminations
Unremarkable
Unremarkable/Ruminations
Unremarkable
Unremarkable
Unremarkable/Other
Unremarkable/Ruminations
Unremarkable/Ruminations
Unremarkable
Unremarkable/Ruminations
Unremarkable
Unremarkable/Ruminations
Unremarkable
Unremarkable/Ruminations

## 2022-12-02 NOTE — [facility] INPATIENT PSYCHIATRY PROGRESS NOTE - PRN MEDS
MEDICATIONS  (PRN):  acetaminophen     Tablet .. 650 milliGRAM(s) Oral every 6 hours PRN Mild Pain (1 - 3), Moderate Pain (4 - 6)  bacitracin   Ointment 1 Application(s) Topical two times a day PRN Wound care  diphenhydrAMINE 50 milliGRAM(s) Oral every 6 hours PRN agitation  diphenhydrAMINE Injectable 50 milliGRAM(s) IntraMuscular every 4 hours PRN severe agitation  fluticasone propionate 50 MICROgram(s)/spray Nasal Spray 1 Spray(s) Both Nostrils two times a day PRN congestion  haloperidol     Tablet 5 milliGRAM(s) Oral every 6 hours PRN agitation  haloperidol    Injectable 5 milliGRAM(s) IntraMuscular once PRN severe agitation  lidocaine   4% Patch 1 Patch Transdermal every 24 hours PRN pain  
MEDICATIONS  (PRN):  acetaminophen     Tablet .. 650 milliGRAM(s) Oral every 6 hours PRN Mild Pain (1 - 3), Moderate Pain (4 - 6)  diphenhydrAMINE 50 milliGRAM(s) Oral every 6 hours PRN agitation  diphenhydrAMINE Injectable 50 milliGRAM(s) IntraMuscular every 4 hours PRN severe agitation  fluticasone propionate 50 MICROgram(s)/spray Nasal Spray 1 Spray(s) Both Nostrils two times a day PRN congestion  haloperidol     Tablet 5 milliGRAM(s) Oral every 6 hours PRN agitation  haloperidol    Injectable 5 milliGRAM(s) IntraMuscular once PRN severe agitation  lidocaine   4% Patch 1 Patch Transdermal every 24 hours PRN pain  
MEDICATIONS  (PRN):  acetaminophen     Tablet .. 650 milliGRAM(s) Oral every 6 hours PRN Mild Pain (1 - 3), Moderate Pain (4 - 6)  diphenhydrAMINE 50 milliGRAM(s) Oral every 6 hours PRN agitation  diphenhydrAMINE Injectable 50 milliGRAM(s) IntraMuscular every 4 hours PRN severe agitation  haloperidol     Tablet 5 milliGRAM(s) Oral every 6 hours PRN agitation  haloperidol    Injectable 5 milliGRAM(s) IntraMuscular once PRN severe agitation  lidocaine   4% Patch 1 Patch Transdermal every 24 hours PRN pain  
MEDICATIONS  (PRN):  acetaminophen     Tablet .. 650 milliGRAM(s) Oral every 6 hours PRN Mild Pain (1 - 3), Moderate Pain (4 - 6)  diphenhydrAMINE 50 milliGRAM(s) Oral every 6 hours PRN agitation  diphenhydrAMINE Injectable 50 milliGRAM(s) IntraMuscular every 4 hours PRN severe agitation  haloperidol     Tablet 5 milliGRAM(s) Oral every 6 hours PRN agitation  haloperidol    Injectable 5 milliGRAM(s) IntraMuscular once PRN severe agitation  lidocaine   4% Patch 1 Patch Transdermal every 24 hours PRN pain  
MEDICATIONS  (PRN):  acetaminophen     Tablet .. 650 milliGRAM(s) Oral every 6 hours PRN Mild Pain (1 - 3), Moderate Pain (4 - 6)  diphenhydrAMINE 50 milliGRAM(s) Oral every 6 hours PRN agitation  diphenhydrAMINE Injectable 50 milliGRAM(s) IntraMuscular every 4 hours PRN severe agitation  fluticasone propionate 50 MICROgram(s)/spray Nasal Spray 1 Spray(s) Both Nostrils two times a day PRN congestion  haloperidol     Tablet 5 milliGRAM(s) Oral every 6 hours PRN agitation  haloperidol    Injectable 5 milliGRAM(s) IntraMuscular once PRN severe agitation  lidocaine   4% Patch 1 Patch Transdermal every 24 hours PRN pain  
MEDICATIONS  (PRN):  acetaminophen     Tablet .. 650 milliGRAM(s) Oral every 6 hours PRN Mild Pain (1 - 3), Moderate Pain (4 - 6)  diphenhydrAMINE 50 milliGRAM(s) Oral every 6 hours PRN agitation  diphenhydrAMINE Injectable 50 milliGRAM(s) IntraMuscular every 4 hours PRN severe agitation  haloperidol     Tablet 5 milliGRAM(s) Oral every 6 hours PRN agitation  haloperidol    Injectable 5 milliGRAM(s) IntraMuscular once PRN severe agitation  lidocaine   4% Patch 1 Patch Transdermal every 24 hours PRN pain  
MEDICATIONS  (PRN):  acetaminophen     Tablet .. 650 milliGRAM(s) Oral every 6 hours PRN Mild Pain (1 - 3), Moderate Pain (4 - 6)  diphenhydrAMINE 50 milliGRAM(s) Oral every 6 hours PRN agitation  diphenhydrAMINE Injectable 50 milliGRAM(s) IntraMuscular every 4 hours PRN severe agitation  fluticasone propionate 50 MICROgram(s)/spray Nasal Spray 1 Spray(s) Both Nostrils two times a day PRN congestion  haloperidol     Tablet 5 milliGRAM(s) Oral every 6 hours PRN agitation  haloperidol    Injectable 5 milliGRAM(s) IntraMuscular once PRN severe agitation  lidocaine   4% Patch 1 Patch Transdermal every 24 hours PRN pain  
MEDICATIONS  (PRN):  acetaminophen     Tablet .. 650 milliGRAM(s) Oral every 6 hours PRN Mild Pain (1 - 3), Moderate Pain (4 - 6)  diphenhydrAMINE 50 milliGRAM(s) Oral every 6 hours PRN agitation  diphenhydrAMINE Injectable 50 milliGRAM(s) IntraMuscular every 4 hours PRN severe agitation  haloperidol     Tablet 5 milliGRAM(s) Oral every 6 hours PRN agitation  haloperidol    Injectable 5 milliGRAM(s) IntraMuscular once PRN severe agitation  lidocaine   4% Patch 1 Patch Transdermal every 24 hours PRN pain  LORazepam     Tablet 2 milliGRAM(s) Oral every 6 hours PRN anxiety/agitation  LORazepam   Injectable 2 milliGRAM(s) IntraMuscular once PRN severe agitation  
MEDICATIONS  (PRN):  acetaminophen     Tablet .. 650 milliGRAM(s) Oral every 6 hours PRN Mild Pain (1 - 3), Moderate Pain (4 - 6)  diphenhydrAMINE 50 milliGRAM(s) Oral every 6 hours PRN agitation  diphenhydrAMINE Injectable 50 milliGRAM(s) IntraMuscular every 4 hours PRN severe agitation  fluticasone propionate 50 MICROgram(s)/spray Nasal Spray 1 Spray(s) Both Nostrils two times a day PRN congestion  haloperidol     Tablet 5 milliGRAM(s) Oral every 6 hours PRN agitation  haloperidol    Injectable 5 milliGRAM(s) IntraMuscular once PRN severe agitation  lidocaine   4% Patch 1 Patch Transdermal every 24 hours PRN pain  
MEDICATIONS  (PRN):  acetaminophen     Tablet .. 650 milliGRAM(s) Oral every 6 hours PRN Mild Pain (1 - 3), Moderate Pain (4 - 6)  diphenhydrAMINE 50 milliGRAM(s) Oral every 6 hours PRN agitation  diphenhydrAMINE Injectable 50 milliGRAM(s) IntraMuscular every 4 hours PRN severe agitation  haloperidol     Tablet 5 milliGRAM(s) Oral every 6 hours PRN agitation  haloperidol    Injectable 5 milliGRAM(s) IntraMuscular once PRN severe agitation  lidocaine   4% Patch 1 Patch Transdermal every 24 hours PRN pain  
MEDICATIONS  (PRN):  acetaminophen     Tablet .. 650 milliGRAM(s) Oral every 6 hours PRN Mild Pain (1 - 3), Moderate Pain (4 - 6)  diphenhydrAMINE 50 milliGRAM(s) Oral every 6 hours PRN agitation  diphenhydrAMINE Injectable 50 milliGRAM(s) IntraMuscular every 4 hours PRN severe agitation  fluticasone propionate 50 MICROgram(s)/spray Nasal Spray 1 Spray(s) Both Nostrils two times a day PRN congestion  haloperidol     Tablet 5 milliGRAM(s) Oral every 6 hours PRN agitation  haloperidol    Injectable 5 milliGRAM(s) IntraMuscular once PRN severe agitation  lidocaine   4% Patch 1 Patch Transdermal every 24 hours PRN pain  
MEDICATIONS  (PRN):  acetaminophen     Tablet .. 650 milliGRAM(s) Oral every 6 hours PRN Mild Pain (1 - 3), Moderate Pain (4 - 6)  diphenhydrAMINE 50 milliGRAM(s) Oral every 6 hours PRN agitation  diphenhydrAMINE Injectable 50 milliGRAM(s) IntraMuscular every 4 hours PRN severe agitation  haloperidol     Tablet 5 milliGRAM(s) Oral every 6 hours PRN agitation  haloperidol    Injectable 5 milliGRAM(s) IntraMuscular once PRN severe agitation  lidocaine   4% Patch 1 Patch Transdermal every 24 hours PRN pain  LORazepam     Tablet 2 milliGRAM(s) Oral every 6 hours PRN anxiety/agitation  LORazepam   Injectable 2 milliGRAM(s) IntraMuscular once PRN severe agitation  
MEDICATIONS  (PRN):  acetaminophen     Tablet .. 650 milliGRAM(s) Oral every 6 hours PRN Mild Pain (1 - 3), Moderate Pain (4 - 6)  diphenhydrAMINE 50 milliGRAM(s) Oral every 6 hours PRN agitation  diphenhydrAMINE Injectable 50 milliGRAM(s) IntraMuscular every 4 hours PRN severe agitation  haloperidol     Tablet 5 milliGRAM(s) Oral every 6 hours PRN agitation  haloperidol    Injectable 5 milliGRAM(s) IntraMuscular once PRN severe agitation  lidocaine   4% Patch 1 Patch Transdermal every 24 hours PRN pain  LORazepam     Tablet 2 milliGRAM(s) Oral every 6 hours PRN anxiety/agitation  LORazepam   Injectable 2 milliGRAM(s) IntraMuscular once PRN severe agitation  
MEDICATIONS  (PRN):  acetaminophen     Tablet .. 650 milliGRAM(s) Oral every 6 hours PRN Mild Pain (1 - 3), Moderate Pain (4 - 6)  bacitracin   Ointment 1 Application(s) Topical two times a day PRN Wound care  diphenhydrAMINE 50 milliGRAM(s) Oral every 6 hours PRN agitation  diphenhydrAMINE Injectable 50 milliGRAM(s) IntraMuscular every 4 hours PRN severe agitation  fluticasone propionate 50 MICROgram(s)/spray Nasal Spray 1 Spray(s) Both Nostrils two times a day PRN congestion  haloperidol     Tablet 5 milliGRAM(s) Oral every 6 hours PRN agitation  haloperidol    Injectable 5 milliGRAM(s) IntraMuscular once PRN severe agitation  lidocaine   4% Patch 1 Patch Transdermal every 24 hours PRN pain  
MEDICATIONS  (PRN):  acetaminophen     Tablet .. 650 milliGRAM(s) Oral every 6 hours PRN Mild Pain (1 - 3), Moderate Pain (4 - 6)  diphenhydrAMINE 50 milliGRAM(s) Oral every 6 hours PRN agitation  diphenhydrAMINE Injectable 50 milliGRAM(s) IntraMuscular every 4 hours PRN severe agitation  fluticasone propionate 50 MICROgram(s)/spray Nasal Spray 1 Spray(s) Both Nostrils two times a day PRN congestion  haloperidol     Tablet 5 milliGRAM(s) Oral every 6 hours PRN agitation  haloperidol    Injectable 5 milliGRAM(s) IntraMuscular once PRN severe agitation  lidocaine   4% Patch 1 Patch Transdermal every 24 hours PRN pain  
MEDICATIONS  (PRN):  acetaminophen     Tablet .. 650 milliGRAM(s) Oral every 6 hours PRN Mild Pain (1 - 3), Moderate Pain (4 - 6)  diphenhydrAMINE 50 milliGRAM(s) Oral every 6 hours PRN agitation  diphenhydrAMINE Injectable 50 milliGRAM(s) IntraMuscular every 4 hours PRN severe agitation  haloperidol     Tablet 5 milliGRAM(s) Oral every 6 hours PRN agitation  haloperidol    Injectable 5 milliGRAM(s) IntraMuscular once PRN severe agitation  lidocaine   4% Patch 1 Patch Transdermal every 24 hours PRN pain  
MEDICATIONS  (PRN):  acetaminophen     Tablet .. 650 milliGRAM(s) Oral every 6 hours PRN Mild Pain (1 - 3), Moderate Pain (4 - 6)  diphenhydrAMINE 50 milliGRAM(s) Oral every 6 hours PRN agitation  diphenhydrAMINE Injectable 50 milliGRAM(s) IntraMuscular every 4 hours PRN severe agitation  haloperidol     Tablet 5 milliGRAM(s) Oral every 6 hours PRN agitation  haloperidol    Injectable 5 milliGRAM(s) IntraMuscular once PRN severe agitation  lidocaine   4% Patch 1 Patch Transdermal every 24 hours PRN pain  LORazepam     Tablet 2 milliGRAM(s) Oral every 6 hours PRN anxiety/agitation  LORazepam   Injectable 2 milliGRAM(s) IntraMuscular once PRN severe agitation  
MEDICATIONS  (PRN):  acetaminophen     Tablet .. 650 milliGRAM(s) Oral every 6 hours PRN Mild Pain (1 - 3), Moderate Pain (4 - 6)  diphenhydrAMINE 50 milliGRAM(s) Oral every 6 hours PRN agitation  diphenhydrAMINE Injectable 50 milliGRAM(s) IntraMuscular every 4 hours PRN severe agitation  haloperidol     Tablet 5 milliGRAM(s) Oral every 6 hours PRN agitation  haloperidol    Injectable 5 milliGRAM(s) IntraMuscular once PRN severe agitation  lidocaine   4% Patch 1 Patch Transdermal every 24 hours PRN pain  
MEDICATIONS  (PRN):  acetaminophen     Tablet .. 650 milliGRAM(s) Oral every 6 hours PRN Mild Pain (1 - 3), Moderate Pain (4 - 6)  diphenhydrAMINE 50 milliGRAM(s) Oral every 6 hours PRN agitation  diphenhydrAMINE Injectable 50 milliGRAM(s) IntraMuscular every 4 hours PRN severe agitation  haloperidol     Tablet 5 milliGRAM(s) Oral every 6 hours PRN agitation  haloperidol    Injectable 5 milliGRAM(s) IntraMuscular once PRN severe agitation  lidocaine   4% Patch 1 Patch Transdermal every 24 hours PRN pain  
MEDICATIONS  (PRN):  acetaminophen     Tablet .. 650 milliGRAM(s) Oral every 6 hours PRN Mild Pain (1 - 3), Moderate Pain (4 - 6)  diphenhydrAMINE 50 milliGRAM(s) Oral every 6 hours PRN agitation  diphenhydrAMINE Injectable 50 milliGRAM(s) IntraMuscular every 4 hours PRN severe agitation  haloperidol     Tablet 5 milliGRAM(s) Oral every 6 hours PRN agitation  haloperidol    Injectable 5 milliGRAM(s) IntraMuscular once PRN severe agitation  lidocaine   4% Patch 1 Patch Transdermal every 24 hours PRN pain  LORazepam     Tablet 2 milliGRAM(s) Oral every 6 hours PRN anxiety/agitation  LORazepam   Injectable 2 milliGRAM(s) IntraMuscular once PRN severe agitation  
MEDICATIONS  (PRN):  acetaminophen     Tablet .. 650 milliGRAM(s) Oral every 6 hours PRN Mild Pain (1 - 3), Moderate Pain (4 - 6)  diphenhydrAMINE 50 milliGRAM(s) Oral every 6 hours PRN agitation  diphenhydrAMINE Injectable 50 milliGRAM(s) IntraMuscular every 4 hours PRN severe agitation  haloperidol     Tablet 5 milliGRAM(s) Oral every 6 hours PRN agitation  haloperidol    Injectable 5 milliGRAM(s) IntraMuscular once PRN severe agitation  lidocaine   4% Patch 1 Patch Transdermal every 24 hours PRN pain  
MEDICATIONS  (PRN):  acetaminophen     Tablet .. 650 milliGRAM(s) Oral every 6 hours PRN Mild Pain (1 - 3), Moderate Pain (4 - 6)  diphenhydrAMINE 50 milliGRAM(s) Oral every 6 hours PRN agitation  diphenhydrAMINE Injectable 50 milliGRAM(s) IntraMuscular every 4 hours PRN severe agitation  haloperidol     Tablet 5 milliGRAM(s) Oral every 6 hours PRN agitation  haloperidol    Injectable 5 milliGRAM(s) IntraMuscular once PRN severe agitation  lidocaine   4% Patch 1 Patch Transdermal every 24 hours PRN pain  LORazepam     Tablet 2 milliGRAM(s) Oral every 6 hours PRN anxiety/agitation  LORazepam   Injectable 2 milliGRAM(s) IntraMuscular once PRN severe agitation  
MEDICATIONS  (PRN):  acetaminophen     Tablet .. 650 milliGRAM(s) Oral every 6 hours PRN Mild Pain (1 - 3), Moderate Pain (4 - 6)  diphenhydrAMINE 50 milliGRAM(s) Oral every 6 hours PRN agitation  diphenhydrAMINE Injectable 50 milliGRAM(s) IntraMuscular every 4 hours PRN severe agitation  haloperidol     Tablet 5 milliGRAM(s) Oral every 6 hours PRN agitation  haloperidol    Injectable 5 milliGRAM(s) IntraMuscular once PRN severe agitation  lidocaine   4% Patch 1 Patch Transdermal every 24 hours PRN pain  
MEDICATIONS  (PRN):  acetaminophen     Tablet .. 650 milliGRAM(s) Oral every 6 hours PRN Mild Pain (1 - 3), Moderate Pain (4 - 6)  diphenhydrAMINE 50 milliGRAM(s) Oral every 6 hours PRN agitation  diphenhydrAMINE Injectable 50 milliGRAM(s) IntraMuscular every 4 hours PRN severe agitation  haloperidol     Tablet 5 milliGRAM(s) Oral every 6 hours PRN agitation  haloperidol    Injectable 5 milliGRAM(s) IntraMuscular once PRN severe agitation  lidocaine   4% Patch 1 Patch Transdermal every 24 hours PRN pain  LORazepam     Tablet 2 milliGRAM(s) Oral every 6 hours PRN anxiety/agitation  LORazepam   Injectable 2 milliGRAM(s) IntraMuscular once PRN severe agitation  
MEDICATIONS  (PRN):  acetaminophen     Tablet .. 650 milliGRAM(s) Oral every 6 hours PRN Mild Pain (1 - 3), Moderate Pain (4 - 6)  diphenhydrAMINE 50 milliGRAM(s) Oral every 6 hours PRN agitation  diphenhydrAMINE Injectable 50 milliGRAM(s) IntraMuscular every 4 hours PRN severe agitation  haloperidol     Tablet 5 milliGRAM(s) Oral every 6 hours PRN agitation  haloperidol    Injectable 5 milliGRAM(s) IntraMuscular once PRN severe agitation  lidocaine   4% Patch 1 Patch Transdermal every 24 hours PRN pain  
MEDICATIONS  (PRN):  acetaminophen     Tablet .. 650 milliGRAM(s) Oral every 6 hours PRN Mild Pain (1 - 3), Moderate Pain (4 - 6)  diphenhydrAMINE 50 milliGRAM(s) Oral every 6 hours PRN agitation  diphenhydrAMINE Injectable 50 milliGRAM(s) IntraMuscular every 4 hours PRN severe agitation  haloperidol     Tablet 5 milliGRAM(s) Oral every 6 hours PRN agitation  haloperidol    Injectable 5 milliGRAM(s) IntraMuscular once PRN severe agitation  lidocaine   4% Patch 1 Patch Transdermal every 24 hours PRN pain  LORazepam     Tablet 2 milliGRAM(s) Oral every 6 hours PRN anxiety/agitation  LORazepam   Injectable 2 milliGRAM(s) IntraMuscular once PRN severe agitation  
MEDICATIONS  (PRN):  acetaminophen     Tablet .. 650 milliGRAM(s) Oral every 6 hours PRN Mild Pain (1 - 3), Moderate Pain (4 - 6)  diphenhydrAMINE 50 milliGRAM(s) Oral every 6 hours PRN agitation  diphenhydrAMINE Injectable 50 milliGRAM(s) IntraMuscular every 4 hours PRN severe agitation  haloperidol     Tablet 5 milliGRAM(s) Oral every 6 hours PRN agitation  haloperidol    Injectable 5 milliGRAM(s) IntraMuscular once PRN severe agitation  lidocaine   4% Patch 1 Patch Transdermal every 24 hours PRN pain  LORazepam     Tablet 2 milliGRAM(s) Oral every 6 hours PRN anxiety/agitation  LORazepam   Injectable 2 milliGRAM(s) IntraMuscular once PRN severe agitation  
MEDICATIONS  (PRN):  acetaminophen     Tablet .. 650 milliGRAM(s) Oral every 6 hours PRN Mild Pain (1 - 3), Moderate Pain (4 - 6)  diphenhydrAMINE 50 milliGRAM(s) Oral every 6 hours PRN agitation  diphenhydrAMINE Injectable 50 milliGRAM(s) IntraMuscular every 4 hours PRN severe agitation  fluticasone propionate 50 MICROgram(s)/spray Nasal Spray 1 Spray(s) Both Nostrils two times a day PRN congestion  haloperidol     Tablet 5 milliGRAM(s) Oral every 6 hours PRN agitation  haloperidol    Injectable 5 milliGRAM(s) IntraMuscular once PRN severe agitation  lidocaine   4% Patch 1 Patch Transdermal every 24 hours PRN pain  
MEDICATIONS  (PRN):  acetaminophen     Tablet .. 650 milliGRAM(s) Oral every 6 hours PRN Mild Pain (1 - 3), Moderate Pain (4 - 6)  bacitracin   Ointment 1 Application(s) Topical two times a day PRN Wound care  diphenhydrAMINE 50 milliGRAM(s) Oral every 6 hours PRN agitation  diphenhydrAMINE Injectable 50 milliGRAM(s) IntraMuscular every 4 hours PRN severe agitation  fluticasone propionate 50 MICROgram(s)/spray Nasal Spray 1 Spray(s) Both Nostrils two times a day PRN congestion  haloperidol     Tablet 5 milliGRAM(s) Oral every 6 hours PRN agitation  haloperidol    Injectable 5 milliGRAM(s) IntraMuscular once PRN severe agitation  lidocaine   4% Patch 1 Patch Transdermal every 24 hours PRN pain  
MEDICATIONS  (PRN):  acetaminophen     Tablet .. 650 milliGRAM(s) Oral every 6 hours PRN Mild Pain (1 - 3), Moderate Pain (4 - 6)  diphenhydrAMINE 50 milliGRAM(s) Oral every 6 hours PRN agitation  diphenhydrAMINE Injectable 50 milliGRAM(s) IntraMuscular every 4 hours PRN severe agitation  haloperidol     Tablet 5 milliGRAM(s) Oral every 6 hours PRN agitation  haloperidol    Injectable 5 milliGRAM(s) IntraMuscular once PRN severe agitation  lidocaine   4% Patch 1 Patch Transdermal every 24 hours PRN pain  
MEDICATIONS  (PRN):  acetaminophen     Tablet .. 650 milliGRAM(s) Oral every 6 hours PRN Mild Pain (1 - 3), Moderate Pain (4 - 6)  bacitracin   Ointment 1 Application(s) Topical two times a day PRN Wound care  diphenhydrAMINE 50 milliGRAM(s) Oral every 6 hours PRN agitation  diphenhydrAMINE Injectable 50 milliGRAM(s) IntraMuscular every 4 hours PRN severe agitation  fluticasone propionate 50 MICROgram(s)/spray Nasal Spray 1 Spray(s) Both Nostrils two times a day PRN congestion  haloperidol     Tablet 5 milliGRAM(s) Oral every 6 hours PRN agitation  haloperidol    Injectable 5 milliGRAM(s) IntraMuscular once PRN severe agitation  lidocaine   4% Patch 1 Patch Transdermal every 24 hours PRN pain  
MEDICATIONS  (PRN):  acetaminophen     Tablet .. 650 milliGRAM(s) Oral every 6 hours PRN Mild Pain (1 - 3), Moderate Pain (4 - 6)  diphenhydrAMINE 50 milliGRAM(s) Oral every 6 hours PRN agitation  diphenhydrAMINE Injectable 50 milliGRAM(s) IntraMuscular every 4 hours PRN severe agitation  haloperidol     Tablet 5 milliGRAM(s) Oral every 6 hours PRN agitation  haloperidol    Injectable 5 milliGRAM(s) IntraMuscular once PRN severe agitation  lidocaine   4% Patch 1 Patch Transdermal every 24 hours PRN pain  LORazepam     Tablet 2 milliGRAM(s) Oral every 6 hours PRN anxiety/agitation  LORazepam   Injectable 2 milliGRAM(s) IntraMuscular once PRN severe agitation  
MEDICATIONS  (PRN):  acetaminophen     Tablet .. 650 milliGRAM(s) Oral every 6 hours PRN Mild Pain (1 - 3), Moderate Pain (4 - 6)  diphenhydrAMINE 50 milliGRAM(s) Oral every 6 hours PRN agitation  diphenhydrAMINE Injectable 50 milliGRAM(s) IntraMuscular every 4 hours PRN severe agitation  haloperidol     Tablet 5 milliGRAM(s) Oral every 6 hours PRN agitation  haloperidol    Injectable 5 milliGRAM(s) IntraMuscular once PRN severe agitation  lidocaine   4% Patch 1 Patch Transdermal every 24 hours PRN pain  LORazepam     Tablet 2 milliGRAM(s) Oral every 6 hours PRN anxiety/agitation  LORazepam   Injectable 2 milliGRAM(s) IntraMuscular once PRN severe agitation  
MEDICATIONS  (PRN):  acetaminophen     Tablet .. 650 milliGRAM(s) Oral every 6 hours PRN Mild Pain (1 - 3), Moderate Pain (4 - 6)  diphenhydrAMINE 50 milliGRAM(s) Oral every 6 hours PRN agitation  diphenhydrAMINE Injectable 50 milliGRAM(s) IntraMuscular every 4 hours PRN severe agitation  haloperidol     Tablet 5 milliGRAM(s) Oral every 6 hours PRN agitation  haloperidol    Injectable 5 milliGRAM(s) IntraMuscular once PRN severe agitation  lidocaine   4% Patch 1 Patch Transdermal every 24 hours PRN pain  
MEDICATIONS  (PRN):  acetaminophen     Tablet .. 650 milliGRAM(s) Oral every 6 hours PRN Mild Pain (1 - 3), Moderate Pain (4 - 6)  bacitracin   Ointment 1 Application(s) Topical two times a day PRN Wound care  diphenhydrAMINE 50 milliGRAM(s) Oral every 6 hours PRN agitation  diphenhydrAMINE Injectable 50 milliGRAM(s) IntraMuscular every 4 hours PRN severe agitation  fluticasone propionate 50 MICROgram(s)/spray Nasal Spray 1 Spray(s) Both Nostrils two times a day PRN congestion  haloperidol     Tablet 5 milliGRAM(s) Oral every 6 hours PRN agitation  haloperidol    Injectable 5 milliGRAM(s) IntraMuscular once PRN severe agitation  lidocaine   4% Patch 1 Patch Transdermal every 24 hours PRN pain  
MEDICATIONS  (PRN):  acetaminophen     Tablet .. 650 milliGRAM(s) Oral every 6 hours PRN Mild Pain (1 - 3), Moderate Pain (4 - 6)  bacitracin   Ointment 1 Application(s) Topical two times a day PRN Wound care  diphenhydrAMINE 50 milliGRAM(s) Oral every 6 hours PRN agitation  diphenhydrAMINE Injectable 50 milliGRAM(s) IntraMuscular every 4 hours PRN severe agitation  fluticasone propionate 50 MICROgram(s)/spray Nasal Spray 1 Spray(s) Both Nostrils two times a day PRN congestion  haloperidol     Tablet 5 milliGRAM(s) Oral every 6 hours PRN agitation  haloperidol    Injectable 5 milliGRAM(s) IntraMuscular once PRN severe agitation  lidocaine   4% Patch 1 Patch Transdermal every 24 hours PRN pain  
MEDICATIONS  (PRN):  acetaminophen     Tablet .. 650 milliGRAM(s) Oral every 6 hours PRN Mild Pain (1 - 3), Moderate Pain (4 - 6)  diphenhydrAMINE 50 milliGRAM(s) Oral every 6 hours PRN agitation  diphenhydrAMINE Injectable 50 milliGRAM(s) IntraMuscular every 4 hours PRN severe agitation  fluticasone propionate 50 MICROgram(s)/spray Nasal Spray 1 Spray(s) Both Nostrils two times a day PRN congestion  haloperidol     Tablet 5 milliGRAM(s) Oral every 6 hours PRN agitation  haloperidol    Injectable 5 milliGRAM(s) IntraMuscular once PRN severe agitation  lidocaine   4% Patch 1 Patch Transdermal every 24 hours PRN pain  
MEDICATIONS  (PRN):  acetaminophen     Tablet .. 650 milliGRAM(s) Oral every 6 hours PRN Mild Pain (1 - 3), Moderate Pain (4 - 6)  diphenhydrAMINE 50 milliGRAM(s) Oral every 6 hours PRN agitation  diphenhydrAMINE Injectable 50 milliGRAM(s) IntraMuscular every 4 hours PRN severe agitation  haloperidol     Tablet 5 milliGRAM(s) Oral every 6 hours PRN agitation  haloperidol    Injectable 5 milliGRAM(s) IntraMuscular once PRN severe agitation  lidocaine   4% Patch 1 Patch Transdermal every 24 hours PRN pain  
MEDICATIONS  (PRN):  acetaminophen     Tablet .. 650 milliGRAM(s) Oral every 6 hours PRN Mild Pain (1 - 3), Moderate Pain (4 - 6)  diphenhydrAMINE 50 milliGRAM(s) Oral every 6 hours PRN agitation  diphenhydrAMINE Injectable 50 milliGRAM(s) IntraMuscular every 4 hours PRN severe agitation  haloperidol     Tablet 5 milliGRAM(s) Oral every 6 hours PRN agitation  haloperidol    Injectable 5 milliGRAM(s) IntraMuscular once PRN severe agitation  lidocaine   4% Patch 1 Patch Transdermal every 24 hours PRN pain  
MEDICATIONS  (PRN):  acetaminophen     Tablet .. 650 milliGRAM(s) Oral every 6 hours PRN Mild Pain (1 - 3), Moderate Pain (4 - 6)  bacitracin   Ointment 1 Application(s) Topical two times a day PRN Wound care  diphenhydrAMINE 50 milliGRAM(s) Oral every 6 hours PRN agitation  diphenhydrAMINE Injectable 50 milliGRAM(s) IntraMuscular every 4 hours PRN severe agitation  fluticasone propionate 50 MICROgram(s)/spray Nasal Spray 1 Spray(s) Both Nostrils two times a day PRN congestion  haloperidol     Tablet 5 milliGRAM(s) Oral every 6 hours PRN agitation  haloperidol    Injectable 5 milliGRAM(s) IntraMuscular once PRN severe agitation  lidocaine   4% Patch 1 Patch Transdermal every 24 hours PRN pain  
MEDICATIONS  (PRN):  acetaminophen     Tablet .. 650 milliGRAM(s) Oral every 6 hours PRN Mild Pain (1 - 3), Moderate Pain (4 - 6)  diphenhydrAMINE 50 milliGRAM(s) Oral every 6 hours PRN agitation  diphenhydrAMINE Injectable 50 milliGRAM(s) IntraMuscular every 4 hours PRN severe agitation  fluticasone propionate 50 MICROgram(s)/spray Nasal Spray 1 Spray(s) Both Nostrils two times a day PRN congestion  haloperidol     Tablet 5 milliGRAM(s) Oral every 6 hours PRN agitation  haloperidol    Injectable 5 milliGRAM(s) IntraMuscular once PRN severe agitation  lidocaine   4% Patch 1 Patch Transdermal every 24 hours PRN pain  
MEDICATIONS  (PRN):  acetaminophen     Tablet .. 650 milliGRAM(s) Oral every 6 hours PRN Mild Pain (1 - 3), Moderate Pain (4 - 6)  bacitracin   Ointment 1 Application(s) Topical two times a day PRN Wound care  diphenhydrAMINE 50 milliGRAM(s) Oral every 6 hours PRN agitation  diphenhydrAMINE Injectable 50 milliGRAM(s) IntraMuscular every 4 hours PRN severe agitation  fluticasone propionate 50 MICROgram(s)/spray Nasal Spray 1 Spray(s) Both Nostrils two times a day PRN congestion  haloperidol     Tablet 5 milliGRAM(s) Oral every 6 hours PRN agitation  haloperidol    Injectable 5 milliGRAM(s) IntraMuscular once PRN severe agitation  lidocaine   4% Patch 1 Patch Transdermal every 24 hours PRN pain  
MEDICATIONS  (PRN):  acetaminophen     Tablet .. 650 milliGRAM(s) Oral every 6 hours PRN Mild Pain (1 - 3), Moderate Pain (4 - 6)  bacitracin   Ointment 1 Application(s) Topical two times a day PRN Wound care  diphenhydrAMINE 50 milliGRAM(s) Oral every 6 hours PRN agitation  diphenhydrAMINE Injectable 50 milliGRAM(s) IntraMuscular every 4 hours PRN severe agitation  fluticasone propionate 50 MICROgram(s)/spray Nasal Spray 1 Spray(s) Both Nostrils two times a day PRN congestion  haloperidol     Tablet 5 milliGRAM(s) Oral every 6 hours PRN agitation  haloperidol    Injectable 5 milliGRAM(s) IntraMuscular once PRN severe agitation  lidocaine   4% Patch 1 Patch Transdermal every 24 hours PRN pain  
MEDICATIONS  (PRN):  acetaminophen     Tablet .. 650 milliGRAM(s) Oral every 6 hours PRN Mild Pain (1 - 3), Moderate Pain (4 - 6)  diphenhydrAMINE 50 milliGRAM(s) Oral every 6 hours PRN agitation  diphenhydrAMINE Injectable 50 milliGRAM(s) IntraMuscular every 4 hours PRN severe agitation  fluticasone propionate 50 MICROgram(s)/spray Nasal Spray 1 Spray(s) Both Nostrils two times a day PRN congestion  haloperidol     Tablet 5 milliGRAM(s) Oral every 6 hours PRN agitation  haloperidol    Injectable 5 milliGRAM(s) IntraMuscular once PRN severe agitation  lidocaine   4% Patch 1 Patch Transdermal every 24 hours PRN pain

## 2022-12-02 NOTE — BH INPATIENT PSYCHIATRY PROGRESS NOTE - NSBHMSEBEHAV_PSY_A_CORE
Other
Uncooperative
Other
Uncooperative
Other

## 2022-12-02 NOTE — BH INPATIENT PSYCHIATRY PROGRESS NOTE - GENERAL APPEARANCE
No deformities present

## 2022-12-02 NOTE — BH INPATIENT PSYCHIATRY PROGRESS NOTE - NSBHMSEJUDGE_PSY_A_CORE
Poor

## 2022-12-02 NOTE — BH INPATIENT PSYCHIATRY PROGRESS NOTE - NSTXDCOTHRDATETRGT_PSY_ALL_CORE
13-Oct-2022
16-Nov-2022
25-Oct-2022
29-Nov-2022
09-Nov-2022
09-Nov-2022
25-Oct-2022
13-Oct-2022
13-Oct-2022
29-Nov-2022
25-Oct-2022
20-Oct-2022
20-Oct-2022
29-Nov-2022
09-Nov-2022
16-Nov-2022
23-Nov-2022
29-Nov-2022
25-Oct-2022
09-Nov-2022
09-Nov-2022
29-Nov-2022
04-Nov-2022
13-Oct-2022
04-Nov-2022
25-Oct-2022
16-Nov-2022
16-Nov-2022
23-Nov-2022
20-Oct-2022
25-Oct-2022
13-Oct-2022
16-Nov-2022
29-Nov-2022
13-Oct-2022
13-Oct-2022
04-Nov-2022
23-Nov-2022
29-Nov-2022
09-Nov-2022
25-Oct-2022
16-Nov-2022
25-Oct-2022

## 2022-12-02 NOTE — BH INPATIENT PSYCHIATRY PROGRESS NOTE - NSTXDEPRESINTERMD_PSY_ALL_CORE
Will continue psychoeducation and pharmacotherapy. ECT 3x weekly. 

## 2022-12-02 NOTE — BH INPATIENT PSYCHIATRY PROGRESS NOTE - NSBHANTIPSYCHOTIC_PSY_ALL_CORE
Will add egd  
Yes...

## 2022-12-02 NOTE — BH DISCHARGE NOTE NURSING/SOCIAL WORK/PSYCH REHAB - NSCDUDCCRISIS_PSY_A_CORE
[facility]  1 (367) [facility]LL ([phone])  Text "WELL" to 78933  Website: www.[employer].National Suicide Prevention Lifeline 7 (149) 460-8554/.  [employer]  1 (494) LIF[employer] (245-4191)/.  [facility]  [address] [address] [phone]   Hours:  Monday through Friday from 9 AM to 3 PM/988 Suicide and Crisis Lifeline

## 2022-12-02 NOTE — BH INPATIENT PSYCHIATRY PROGRESS NOTE - NSBHATTESTTYPEVISIT_PSY_A_CORE
Attending Only
Attending with Resident/Fellow/Student
Attending with Resident/Fellow/Student
Attending Only
Attending with Resident/Fellow/Student
Attending Only
Attending with Resident/Fellow/Student
Attending Only

## 2022-12-02 NOTE — BH INPATIENT PSYCHIATRY PROGRESS NOTE - NSBHFUPINTERVALHXFT_PSY_A_CORE
Patient seen in preparation for discharge. No significant overnight events reported. Pt is visible in the unit, interacting with select peers. Compliant with meds. Completed course of ECT with good tolerability and response. Pt denies siip and shows improvement in terms of his mood. Discussed discharge planning and encouraged pt to continue follow up with psychiatry, urology and PCP to monitor potassium levels. Pt no longer a risk to self or others, may continue his care in the outpatient setting.

## 2022-12-02 NOTE — BH INPATIENT PSYCHIATRY PROGRESS NOTE - NSDCCRITERIA_PSY_ALL_CORE
Improvement in symptoms

## 2022-12-02 NOTE — BH INPATIENT PSYCHIATRY PROGRESS NOTE - NSBHMSETHTPROC_PSY_A_CORE
Linear
Linear/Perseverative
Linear/Perseverative
Linear
Linear
Linear/Perseverative
Linear
Linear
Linear/Perseverative
Linear
Linear
Linear/Perseverative
Linear
Linear/Perseverative
Linear/Perseverative
Linear
Linear/Perseverative
Linear
Linear/Perseverative
Linear
Linear/Perseverative
Linear
Linear/Perseverative
Linear/Perseverative
Linear
Linear/Perseverative
Linear/Perseverative
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
## 2022-12-02 NOTE — BH INPATIENT PSYCHIATRY PROGRESS NOTE - NSBHMSESPEECH_PSY_A_CORE
Normal volume, rate, productivity, spontaneity and articulation

## 2022-12-02 NOTE — BH INPATIENT PSYCHIATRY PROGRESS NOTE - NSTREATMENTCERTY_PSY_ALL_CORE

## 2022-12-02 NOTE — BH INPATIENT PSYCHIATRY PROGRESS NOTE - NSBHMSEBODY_PSY_A_CORE
Average build

## 2022-12-02 NOTE — BH INPATIENT PSYCHIATRY PROGRESS NOTE - NS ED [facility] MED ROS PSYCHIATRIC
See HPI

## 2022-12-02 NOTE — BH DISCHARGE NOTE NURSING/SOCIAL WORK/PSYCH REHAB - NSDCVIVACCINE_GEN_ALL_CORE_FT
influenza, high-dose, quadrivalent; 08-Oct-2022 09:19; Rhea Lyon (RN); [employer]; [id number] (Exp. Date: 30-Jun-2023); IntraMuscular; Deltoid Left.; 0.7 milliLiter(s); VIS (VIS Published: 06-Aug-2021, VIS Presented: 08-Oct-2022);   Tdap; 11-Nov-2022 10:39; Arlyn Neville (RN); [employer]; [id number] (Exp. Date: 08-Dec-2024); IntraMuscular; Deltoid Left.; 0.5 milliLiter(s); VIS (VIS Published: 09-May-2013, VIS Presented: 11-Nov-2022);

## 2022-12-02 NOTE — BH INPATIENT PSYCHIATRY PROGRESS NOTE - NSBHMSEAFFRANGE_PSY_A_CORE
Constricted

## 2022-12-02 NOTE — BH INPATIENT PSYCHIATRY PROGRESS NOTE - NSTXCOPEINTERMD_PSY_ALL_CORE
Psychoeducation and psycho pharm. Titrating effexor

## 2022-12-02 NOTE — BH INPATIENT PSYCHIATRY PROGRESS NOTE - NSBHMSETHTASSOC_PSY_A_CORE
Normal

## 2022-12-02 NOTE — BH INPATIENT PSYCHIATRY PROGRESS NOTE - NSBHCONSDANGERSELF_PSY_A_CORE
suicidal behavior

## 2022-12-02 NOTE — BH INPATIENT PSYCHIATRY PROGRESS NOTE - NSBHATTESTBILLINGAW_PSY_A_CORE
52384-Npkfrviagc Inpatient care - low complexity - 15 minutes
10853-Wdacimelgb Inpatient care - low complexity - 15 minutes
58728-Biwgjnofuc Inpatient care - low complexity - 15 minutes
17265-Oqwdygnwjq Inpatient care - low complexity - 15 minutes
50833-Rgqeyrkigc Inpatient care - low complexity - 15 minutes
84604-Qlkxovljoz Inpatient care - low complexity - 15 minutes
91026-Qxuvnwfadb Inpatient care - low complexity - 15 minutes
35342-Itvoblloyd Inpatient care - low complexity - 15 minutes
78899-Frmyqqmnru Inpatient care - low complexity - 15 minutes
74363-Thgjtvwjwy Inpatient care - low complexity - 15 minutes
70127-Jccssnubks Inpatient care - low complexity - 15 minutes
80595-Lwztgkvmcr Inpatient care - low complexity - 15 minutes
12455-Dukwjrkpad Inpatient care - low complexity - 15 minutes
91377-Mbhbayoxer Inpatient care - low complexity - 15 minutes
48577-Wcyjhpzeac Inpatient care - low complexity - 15 minutes
71563-Qespfcqrmz Inpatient care - low complexity - 15 minutes
45267-Rkjghnmawx Inpatient care - low complexity - 15 minutes
17969-Ocbpoyhevm Inpatient care - low complexity - 15 minutes
55661-Escmsfattq Inpatient care - low complexity - 15 minutes
39911-Fuliuhixyt Inpatient care - low complexity - 15 minutes
29950-Duqkwabwjf Inpatient care - low complexity - 15 minutes
13510-Ewykhkkheo Inpatient care - low complexity - 15 minutes
34685-Unlecskpdz Inpatient care - low complexity - 15 minutes
58503-Tuqbmpgaio Inpatient care - low complexity - 15 minutes
95333-Pjswglldsp Inpatient care - low complexity - 15 minutes
16133-Fhhqzltfap Inpatient care - low complexity - 15 minutes
72328-Ozwzpwheiw Inpatient care - low complexity - 15 minutes
25391-Lzxlhzxfnw Inpatient care - low complexity - 15 minutes
32244-Tybauvrerd Inpatient care - low complexity - 15 minutes
96986-Ukiizjpwlm Inpatient care - low complexity - 15 minutes
25372-Xquzsvlroc Inpatient care - low complexity - 15 minutes
72969-Moykqhlvxv Inpatient care - low complexity - 15 minutes
89584-Gorfioorkc Inpatient care - low complexity - 15 minutes
82004-Detfapfxai Inpatient care - low complexity - 15 minutes
02388-Orrofwsjea Inpatient care - low complexity - 15 minutes
39047-Tfkzjbexba Inpatient care - low complexity - 15 minutes
50682-Zovmluvzup Inpatient care - low complexity - 15 minutes
54524-Clpcdngmzv Inpatient care - low complexity - 15 minutes
71647-Skkhkettvf Inpatient care - low complexity - 15 minutes
19952-Zahoesllfj Inpatient care - low complexity - 15 minutes
15896-Emaaspqany Inpatient care - low complexity - 15 minutes
62927-Qqonsxcoio Inpatient care - low complexity - 15 minutes
85850-Ntzgelxpit Inpatient care - low complexity - 15 minutes

## 2022-12-02 NOTE — BH INPATIENT PSYCHIATRY PROGRESS NOTE - NSBHMSEMOVE_PSY_A_CORE
No abnormal movements
No abnormal movements/Other
No abnormal movements
No abnormal movements
No abnormal movements/Other
No abnormal movements

## 2022-12-02 NOTE — BH INPATIENT PSYCHIATRY PROGRESS NOTE - NSBHMSEGAIT_PSY_A_CORE
Normal gait / station

## 2022-12-02 NOTE — BH INPATIENT PSYCHIATRY PROGRESS NOTE - NSBHMSEHYG_PSY_A_CORE
Fair

## 2022-12-02 NOTE — BH INPATIENT PSYCHIATRY PROGRESS NOTE - NSBHROSSYSTEMS_PSY_ALL_CORE
Psychiatric

## 2022-12-02 NOTE — BH INPATIENT PSYCHIATRY PROGRESS NOTE - NSBHCHARTREVIEWLAB_PSY_A_CORE FT
COVID-19 PCR: NotDetec (21 Oct 2022 09:52)  COVID-19 PCR: NotDetec (16 Oct 2022 14:19)  COVID-19 PCR: Detected (22 Sep 2022 14:13)      

## 2022-12-02 NOTE — BH DISCHARGE NOTE NURSING/SOCIAL WORK/PSYCH REHAB - NSDCPRGOAL_PSY_ALL_CORE
Patient initially presented with symptoms of depression, anxiety, irritability and feelings of hopelessness. Patient's initial goals included increasing patient's hope in recovery, increasing patients coping skills, increasing his daily functioning and decreasing irritability. Patient made some gains during his hospitalization as evidenced by patient's decreased irritability, decreased anxiety and increased daily functioning. Patient demonstrated daily compliance with his medications and ECT. Patient with moderate to maximum encouragement participated in some of the rehab groups. However, patient remains generally negative and continues to state he feels depressed.     ***Patient completed a self-rating and a Press Ganey survey.

## 2022-12-05 NOTE — CHART NOTE - NSCHARTNOTESELECT_GEN_ALL_CORE
family update/Event Note
Event Note
Nutrition Services
Nutrition Services
Pt states "I want to kill myself"/Event Note
3
Event Note
Event Note
.

## 2022-12-07 NOTE — ED PROVIDER NOTE - INTERNATIONAL TRAVEL
Attempted to reach patient regarding missed appointment on 12/7/22. Unable to contact at this time. Left message to reschedule. No
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
## 2023-01-03 NOTE — SOCIAL WORK POST DISCHARGE FOLLOW UP NOTE - NSBHSWFOLLOWUP_PSY_ALL_CORE_FT
Writer is [place] rasmussen covering for unit SW.  Writer contacted patient - [phone] regarding not showing for his outpatient appointment at [facility].  The patient states that he is unable to get around to go to any appointment due to hurting his leg.  He states that he feel down a few weeks ago and can't get around.  Writer contacted Barron Lovelace [phone],  from [facility] and left a message to contact this patient.  Writer will notify unit SW.  Patient reports chronic SI but no intent or plan currently.

## 2023-01-04 NOTE — PROGRESS NOTE ADULT - PROBLEM SELECTOR PROBLEM 7
-Patient not in chest pain or SOB, on room air, NSR, hemodynamically stable  -Trending H&H, goal Hgb > 8, received 1 unit with subsequent decrease in following lab draw  -Order for another unit, waiting to confirm on \"wash\" according to blood bank in AM   -Replaced K / Mg  -Adequate urine output via urinal    Problem: Cardiac Rhythm Disturbances with or without Devices  Goal: Hemodynamic stability achieved/maintained  Outcome: Outcome Met, Continue evaluating goal progress toward completion      HTN (hypertension)

## 2023-06-26 NOTE — BH CONSULTATION LIAISON PROGRESS NOTE - NSBHMSEPERCEPT_PSY_A_CORE
No abnormalities
No
No abnormalities

## 2023-08-24 NOTE — PROGRESS NOTE ADULT - PROBLEM SELECTOR PLAN 6
[Well Developed] : well developed [No Acute Distress] : no acute distress [Obese] : obese [Normal Conjunctiva] : normal conjunctiva [Normal Venous Pressure] : normal venous pressure [No Carotid Bruit] : no carotid bruit [No Rub] : no rub Controlled  Continue procardia [Clear Lung Fields] : clear lung fields [Good Air Entry] : good air entry [No Respiratory Distress] : no respiratory distress  [Soft] : abdomen soft [Non Tender] : non-tender [Normal Bowel Sounds] : normal bowel sounds [Normal Gait] : normal gait [No Cyanosis] : no cyanosis [No Clubbing] : no clubbing [No Varicosities] : no varicosities [Edema ___] : edema [unfilled] [No Rash] : no rash [Moves all extremities] : moves all extremities [Normal Speech] : normal speech [Alert and Oriented] : alert and oriented [Normal memory] : normal memory [No Gallop] : no gallop [de-identified] : irregular S1 and S2

## 2023-11-30 NOTE — BH CONSULTATION LIAISON PROGRESS NOTE - NSBHMSETHTASSOC_PSY_A_CORE
Pt ambulatory in room with walker, steady gait noted
Normal

## 2024-01-13 NOTE — BH INPATIENT PSYCHIATRY PROGRESS NOTE - NSBHMSELANG_PSY_A_CORE
No abnormalities noted
Yes
No abnormalities noted

## 2024-02-09 NOTE — PATIENT PROFILE ADULT - STATED REASON FOR ADMISSION
Pt arrived to Unit 2[facility] for tx kidney biopsy procedure in IR. AFVSS. Denies pain. Consent done. INR processing. [facility] present to [facility] PIV. Pt's dad, Brent, at bedside, to drive pt home post procedure. Pt stable.    Patient came in because he as SOB
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
## 2024-08-28 NOTE — DISCHARGE NOTE NURSING/CASE MANAGEMENT/SOCIAL WORK - NSDCPETBCESMAN_GEN_ALL_CORE
Lvm for a return call in reference to Hem/Onc appt scheduled on 9/6 has been canceled and will need to be r/s d/t being scheduled incorrectly on Onc schedule. Balaner message sent.       If you are a smoker, it is important for your health to stop smoking. Please be aware that second hand smoke is also harmful.

## 2024-12-02 NOTE — PROGRESS NOTE ADULT - SUBJECTIVE AND OBJECTIVE BOX
S: 71M HTN, HLD, p/w non-specific complaints. Complains of SOB, depression, frequent urination. Workup negative. Also complains about various issues from his relationship to his brother to TV shows. Sx are psychosomatic. Also expresses suicidal ideation. Hx of multiple admission to psych unit.      O:  Vital Signs Last 24 Hrs  T(C): 36.6 (17 Sep 2022 04:52), Max: 36.6 (16 Sep 2022 12:48)  T(F): 97.8 (17 Sep 2022 04:52), Max: 97.8 (16 Sep 2022 12:48)  HR: 63 (17 Sep 2022 04:52) (60 - 69)  BP: 127/70 (17 Sep 2022 04:52) (126/73 - 127/75)  BP(mean): --  RR: 18 (17 Sep 2022 04:52) (16 - 18)  SpO2: 96% (17 Sep 2022 04:52) (96% - 99%)    Parameters below as of 16 Sep 2022 20:27  Patient On (Oxygen Delivery Method): room air        GENERAL: NAD, disheveled  HEAD:  Atraumatic, Normocephalic  EYES: EOMI, PERRLA, conjunctiva and sclera clear  NECK: Supple, No JVD  CHEST/LUNG: Clear to auscultation bilaterally; No wheeze  HEART: Regular rate and rhythm; No murmurs, rubs, or gallops  ABDOMEN: Soft, Nontender, Nondistended; Bowel sounds present  EXTREMITIES:  2+ Peripheral Pulses, No clubbing, cyanosis, or edema  PSYCH: normal affect, anxious  NEUROLOGY: non-focal  SKIN: No rashes or lesions    acetaminophen     Tablet .. 650 milliGRAM(s) Oral every 6 hours PRN  aluminum hydroxide/magnesium hydroxide/simethicone Suspension 30 milliLiter(s) Oral every 6 hours PRN  ARIPiprazole 5 milliGRAM(s) Oral daily  chlorhexidine 2% Cloths 1 Application(s) Topical <User Schedule>  enoxaparin Injectable 40 milliGRAM(s) SubCutaneous every 24 hours  FLUoxetine 20 milliGRAM(s) Oral daily  fluticasone propionate 50 MICROgram(s)/spray Nasal Spray 1 Spray(s) Both Nostrils two times a day  melatonin 3 milliGRAM(s) Oral at bedtime  mupirocin 2% Ointment 1 Application(s) Both Nostrils two times a day  NIFEdipine XL 30 milliGRAM(s) Oral daily  simvastatin 20 milliGRAM(s) Oral at bedtime  traZODone 50 milliGRAM(s) Oral at bedtime               S: 71M HTN, HLD, p/w non-specific complaints. Complains of SOB, depression, frequent urination. Workup negative. Also complains about various issues from his relationship to his brother to TV shows. Sx are psychosomatic. Also expresses suicidal ideation. Hx of multiple admission to psych unit.    No new complaints today    REVIEW OF SYSTEMS:    CONSTITUTIONAL: No fever  EYES: No acute visual disturbances  NECK: No pain or stiffness  RESPIRATORY: No cough; No shortness of breath  CARDIOVASCULAR: No chest pain, no palpitations  GASTROINTESTINAL: No pain. No nausea or vomiting.  No diarrhea   NEUROLOGICAL: No headache or numbness, no tremors  MUSCULOSKELETAL: no muscle pain  GENITOURINARY: No dysuria, no frequency, no hesitancy  PSYCHIATRY: No depression , no anxiety  ALL OTHER  ROS negative     O:  Vital Signs Last 24 Hrs  T(C): 36.6 (17 Sep 2022 04:52), Max: 36.6 (16 Sep 2022 12:48)  T(F): 97.8 (17 Sep 2022 04:52), Max: 97.8 (16 Sep 2022 12:48)  HR: 63 (17 Sep 2022 04:52) (60 - 69)  BP: 127/70 (17 Sep 2022 04:52) (126/73 - 127/75)  BP(mean): --  RR: 18 (17 Sep 2022 04:52) (16 - 18)  SpO2: 96% (17 Sep 2022 04:52) (96% - 99%)    Parameters below as of 16 Sep 2022 20:27  Patient On (Oxygen Delivery Method): room air    GENERAL: NAD, disheveled  HEAD:  Atraumatic, Normocephalic  EYES: EOMI, PERRLA, conjunctiva and sclera clear  NECK: Supple, No JVD  CHEST/LUNG: Clear to auscultation bilaterally; No wheeze  HEART: Regular rate and rhythm; No murmurs, rubs, or gallops  ABDOMEN: Soft, Nontender, Nondistended; Bowel sounds present  EXTREMITIES:  2+ Peripheral Pulses, No clubbing, cyanosis, or edema  PSYCH: normal affect, anxious  NEUROLOGY: non-focal  SKIN: No rashes or lesions    acetaminophen     Tablet .. 650 milliGRAM(s) Oral every 6 hours PRN  aluminum hydroxide/magnesium hydroxide/simethicone Suspension 30 milliLiter(s) Oral every 6 hours PRN  ARIPiprazole 5 milliGRAM(s) Oral daily  chlorhexidine 2% Cloths 1 Application(s) Topical <User Schedule>  enoxaparin Injectable 40 milliGRAM(s) SubCutaneous every 24 hours  FLUoxetine 20 milliGRAM(s) Oral daily  fluticasone propionate 50 MICROgram(s)/spray Nasal Spray 1 Spray(s) Both Nostrils two times a day  melatonin 3 milliGRAM(s) Oral at bedtime  mupirocin 2% Ointment 1 Application(s) Both Nostrils two times a day  NIFEdipine XL 30 milliGRAM(s) Oral daily  simvastatin 20 milliGRAM(s) Oral at bedtime  traZODone 50 milliGRAM(s) Oral at bedtime               Yes

## 2025-01-12 NOTE — CONSULT NOTE ADULT - CONSULT REQUESTED BY NAME
68 M, former smoke with PMH of BPH, PE (2013), DVT (in 2024), STEPHEN, fatty liver, gout, OA, HTN, HLD, T2DM (on Mounjaro), Stage 3 CKD (baseline Cr 1.5), CAD/NSTEMI s/p stent to RCA (10/4/24 at [facility]), Afib?, PVC s/p ablation a few months ago at [facility] (on Xarelto), LV thrombus s/p open heart thrombus extraction at [facility] (2020), pulmonary hypertension (on Opsumit), HFpEF and symptomatic severe aortic stenosis, who had TAVR with Iraida valve on 1/9/25 and noted to have new LBBB qrs 150ms post procedure. Pre TAVR EKG showed NSR with frequent PVC, postop Day 1-3 EKG continued to show unchanged LBBB around 150ms. TVP removed on 1/11 given no heart block or significant change in LBBB. 1/12 EKG showed unchanged LBBB and slightly improved PVC burden (was in bigeminy most times) after increasing dosage of bb to 50mg/25mg daily. We discussed that he will be on an EM upon discharge home. Recommend he has followup with his EP in [facility] in 1-2 weeks to discuss another PVC ablation.  Tereza

## 2025-01-25 NOTE — BH PATIENT PROFILE - FALL HARM RISK - FALLEN IN PAST
Received patient at ___1900______              Patient behaviors:  Patient restless pacing delusional isolative flight of ideas goal directed  Patient is medication compliant, mouth checks performed. Patient denies depression anxiety and SI/HI/AVH.  Patient encouraged to join group. Patient attended to ADL's including shower.  Patient ate their meals in the milieu.  Patient is alert/oriented x3, steady gait, speech is: clear inappropriate  No complaints of pain, sleep, or problems with voiding or bowels.   Safety precautions remain as ordered ____suicide _________.    No

## 2025-01-31 NOTE — BH CONSULTATION LIAISON PROGRESS NOTE - NSBHPATIENTONE_PSY_ALL_CORE
[Mother] : mother
[Mother] : mother
Yes
No
Yes
Yes

## 2025-06-24 NOTE — DISCHARGE NOTE PROVIDER - NSDCCAREPROVSEEN_GEN_ALL_CORE_FT
"Universal Protocol   procedure performed by consultantConsent: Verbal consent obtained. Written consent obtained  Risks and benefits: risks, benefits and alternatives were discussed  Consent given by: patient  Time out: Immediately prior to procedure a \"time out\" was called to verify the correct patient, procedure, equipment, support staff and site/side marked as required.  Patient understanding: patient states understanding of the procedure being performed  Patient consent: the patient's understanding of the procedure matches consent given  Procedure consent: procedure consent matches procedure scheduled  Relevant documents: relevant documents present and verified  Patient identity confirmed: verbally with patient      Chemodenervation     Date/Time  6/24/2025 2:30 PM     Performed by  Sharita Cordero PA-C   Authorized by  Sharita Cordero PA-C     Pre-procedure details      Preparation: Patient was prepped and draped in usual sterile fashion     Anesthesia  (see MAR for exact dosages):     Anesthesia method:  None   Botox      Botox Type:  Type A    Brand:  Botox    mL's of Botulinum Toxin:  100    mL's of preservative free sterile saline:  2    Final Concentration per CC:  50 units    Needle Gauge:  30 G 2.5 inch   Procedures      Botox Procedures: chronic headache     Injection Location      Head / Face:  R frontalis, L frontalis and L temporalis    L lateral frontalis:  45 unit(s)    R lateral frontalis:  10 unit(s)    Comments:  Around scar    L medial frontalis:  0 unit(s)    R medial frontalis:  5 unit(s)    Comments:  Around scar    L temporalis injection amount:  40 unit(s)    Comments:  Around scar   Total Units      Total units used:  100   Post-procedure details      Chemodenervation:  Chronic migraine    Facial Nerve Location::  Bilateral facial nerve    Patient tolerance of procedure:  Tolerated well, no immediate complications   Comments       All medically necessary             " Nathaniel Salinas Nathaniel Salinas Courtney